# Patient Record
Sex: FEMALE | Race: WHITE | NOT HISPANIC OR LATINO | Employment: OTHER | URBAN - METROPOLITAN AREA
[De-identification: names, ages, dates, MRNs, and addresses within clinical notes are randomized per-mention and may not be internally consistent; named-entity substitution may affect disease eponyms.]

---

## 2020-12-15 ENCOUNTER — TELEPHONE (OUTPATIENT)
Dept: FAMILY MEDICINE CLINIC | Facility: CLINIC | Age: 85
End: 2020-12-15

## 2020-12-22 ENCOUNTER — OFFICE VISIT (OUTPATIENT)
Dept: FAMILY MEDICINE CLINIC | Facility: CLINIC | Age: 85
End: 2020-12-22
Payer: COMMERCIAL

## 2020-12-22 VITALS
WEIGHT: 134 LBS | OXYGEN SATURATION: 97 % | BODY MASS INDEX: 21.53 KG/M2 | HEART RATE: 74 BPM | SYSTOLIC BLOOD PRESSURE: 160 MMHG | TEMPERATURE: 97.9 F | DIASTOLIC BLOOD PRESSURE: 84 MMHG | HEIGHT: 66 IN

## 2020-12-22 DIAGNOSIS — I82.461 ACUTE DEEP VEIN THROMBOSIS (DVT) OF CALF MUSCLE VEIN OF RIGHT LOWER EXTREMITY (HCC): ICD-10-CM

## 2020-12-22 DIAGNOSIS — I73.9 PAD (PERIPHERAL ARTERY DISEASE) (HCC): ICD-10-CM

## 2020-12-22 DIAGNOSIS — R79.9 ELEVATED BLOOD UREA NITROGEN: ICD-10-CM

## 2020-12-22 DIAGNOSIS — I16.0 HYPERTENSIVE URGENCY: Primary | ICD-10-CM

## 2020-12-22 DIAGNOSIS — L97.921 LEG ULCER, LEFT, LIMITED TO BREAKDOWN OF SKIN (HCC): ICD-10-CM

## 2020-12-22 DIAGNOSIS — E87.1 HYPONATREMIA: ICD-10-CM

## 2020-12-22 DIAGNOSIS — E46 PROTEIN-CALORIE MALNUTRITION, UNSPECIFIED SEVERITY (HCC): ICD-10-CM

## 2020-12-22 DIAGNOSIS — N13.30 HYDRONEPHROSIS OF RIGHT KIDNEY: ICD-10-CM

## 2020-12-22 DIAGNOSIS — N17.9 AKI (ACUTE KIDNEY INJURY) (HCC): ICD-10-CM

## 2020-12-22 DIAGNOSIS — L97.912 ULCER OF RIGHT LOWER EXTREMITY WITH FAT LAYER EXPOSED (HCC): ICD-10-CM

## 2020-12-22 DIAGNOSIS — A41.50 SEPSIS DUE TO GRAM NEGATIVE BACTERIA (HCC): ICD-10-CM

## 2020-12-22 DIAGNOSIS — R33.9 URINARY RETENTION: ICD-10-CM

## 2020-12-22 PROBLEM — T14.8XXA WOUND INFECTION: Status: ACTIVE | Noted: 2020-11-10

## 2020-12-22 PROBLEM — L08.9 WOUND INFECTION: Status: ACTIVE | Noted: 2020-11-10

## 2020-12-22 PROBLEM — T14.8XXA NONHEALING NONSURGICAL WOUND: Status: ACTIVE | Noted: 2020-11-09

## 2020-12-22 PROCEDURE — 1160F RVW MEDS BY RX/DR IN RCRD: CPT | Performed by: FAMILY MEDICINE

## 2020-12-22 PROCEDURE — 99214 OFFICE O/P EST MOD 30 MIN: CPT | Performed by: FAMILY MEDICINE

## 2020-12-22 PROCEDURE — 3725F SCREEN DEPRESSION PERFORMED: CPT | Performed by: FAMILY MEDICINE

## 2020-12-22 PROCEDURE — 1036F TOBACCO NON-USER: CPT | Performed by: FAMILY MEDICINE

## 2020-12-22 PROCEDURE — 3288F FALL RISK ASSESSMENT DOCD: CPT | Performed by: FAMILY MEDICINE

## 2020-12-22 PROCEDURE — 1100F PTFALLS ASSESS-DOCD GE2>/YR: CPT | Performed by: FAMILY MEDICINE

## 2020-12-22 RX ORDER — ERGOCALCIFEROL 1.25 MG/1
CAPSULE ORAL
COMMUNITY
Start: 2020-12-14 | End: 2021-03-22 | Stop reason: HOSPADM

## 2020-12-22 RX ORDER — WARFARIN SODIUM 4 MG/1
4 TABLET ORAL DAILY
Status: ON HOLD | COMMUNITY
Start: 2020-12-14 | End: 2021-01-06 | Stop reason: SDUPTHER

## 2020-12-22 RX ORDER — AMLODIPINE BESYLATE 10 MG/1
10 TABLET ORAL DAILY
COMMUNITY
Start: 2020-12-14 | End: 2021-01-15 | Stop reason: SDUPTHER

## 2020-12-28 ENCOUNTER — APPOINTMENT (EMERGENCY)
Dept: RADIOLOGY | Facility: HOSPITAL | Age: 85
DRG: 291 | End: 2020-12-28
Payer: MEDICARE

## 2020-12-28 ENCOUNTER — HOSPITAL ENCOUNTER (INPATIENT)
Facility: HOSPITAL | Age: 85
LOS: 9 days | Discharge: HOME HEALTH | DRG: 291 | End: 2021-01-06
Attending: EMERGENCY MEDICINE | Admitting: INTERNAL MEDICINE
Payer: MEDICARE

## 2020-12-28 DIAGNOSIS — N18.9 CKD (CHRONIC KIDNEY DISEASE): ICD-10-CM

## 2020-12-28 DIAGNOSIS — K59.00 CONSTIPATION: ICD-10-CM

## 2020-12-28 DIAGNOSIS — I73.9 PAD (PERIPHERAL ARTERY DISEASE) (HCC): Primary | ICD-10-CM

## 2020-12-28 DIAGNOSIS — I50.31 ACUTE DIASTOLIC CHF (CONGESTIVE HEART FAILURE) (HCC): ICD-10-CM

## 2020-12-28 DIAGNOSIS — I87.8 VENOUS STASIS OF LOWER EXTREMITY: ICD-10-CM

## 2020-12-28 DIAGNOSIS — I50.9 CHF (CONGESTIVE HEART FAILURE) (HCC): ICD-10-CM

## 2020-12-28 DIAGNOSIS — L89.616 PRESSURE INJURY OF DEEP TISSUE OF RIGHT HEEL: ICD-10-CM

## 2020-12-28 DIAGNOSIS — Z86.718 HISTORY OF DVT (DEEP VEIN THROMBOSIS): ICD-10-CM

## 2020-12-28 DIAGNOSIS — E87.79 OTHER HYPERVOLEMIA: ICD-10-CM

## 2020-12-28 PROBLEM — I10 ESSENTIAL HYPERTENSION: Status: ACTIVE | Noted: 2020-12-28

## 2020-12-28 PROBLEM — E87.70 VOLUME OVERLOAD: Status: ACTIVE | Noted: 2020-12-28

## 2020-12-28 LAB
ALBUMIN SERPL BCP-MCNC: 2.5 G/DL (ref 3.5–5)
ALP SERPL-CCNC: 101 U/L (ref 46–116)
ALT SERPL W P-5'-P-CCNC: 65 U/L (ref 12–78)
ANION GAP SERPL CALCULATED.3IONS-SCNC: 11 MMOL/L (ref 4–13)
APTT PPP: 42 SECONDS (ref 23–37)
AST SERPL W P-5'-P-CCNC: 61 U/L (ref 5–45)
ATRIAL RATE: 66 BPM
BASOPHILS # BLD AUTO: 0.03 THOUSANDS/ΜL (ref 0–0.1)
BASOPHILS NFR BLD AUTO: 0 % (ref 0–1)
BILIRUB SERPL-MCNC: 0.57 MG/DL (ref 0.2–1)
BUN SERPL-MCNC: 50 MG/DL (ref 5–25)
CALCIUM ALBUM COR SERPL-MCNC: 9.5 MG/DL (ref 8.3–10.1)
CALCIUM SERPL-MCNC: 8.3 MG/DL (ref 8.3–10.1)
CHLORIDE SERPL-SCNC: 105 MMOL/L (ref 100–108)
CO2 SERPL-SCNC: 22 MMOL/L (ref 21–32)
CREAT SERPL-MCNC: 1.9 MG/DL (ref 0.6–1.3)
EOSINOPHIL # BLD AUTO: 0.55 THOUSAND/ΜL (ref 0–0.61)
EOSINOPHIL NFR BLD AUTO: 7 % (ref 0–6)
ERYTHROCYTE [DISTWIDTH] IN BLOOD BY AUTOMATED COUNT: 19.2 % (ref 11.6–15.1)
FLUAV RNA RESP QL NAA+PROBE: NEGATIVE
FLUBV RNA RESP QL NAA+PROBE: NEGATIVE
GFR SERPL CREATININE-BSD FRML MDRD: 23 ML/MIN/1.73SQ M
GLUCOSE SERPL-MCNC: 136 MG/DL (ref 65–140)
HCT VFR BLD AUTO: 34.9 % (ref 34.8–46.1)
HGB BLD-MCNC: 10.6 G/DL (ref 11.5–15.4)
IMM GRANULOCYTES # BLD AUTO: 0.04 THOUSAND/UL (ref 0–0.2)
IMM GRANULOCYTES NFR BLD AUTO: 1 % (ref 0–2)
INR PPP: 1.96 (ref 0.84–1.19)
LACTATE SERPL-SCNC: 1.6 MMOL/L (ref 0.5–2)
LYMPHOCYTES # BLD AUTO: 3.7 THOUSANDS/ΜL (ref 0.6–4.47)
LYMPHOCYTES NFR BLD AUTO: 45 % (ref 14–44)
MCH RBC QN AUTO: 27.8 PG (ref 26.8–34.3)
MCHC RBC AUTO-ENTMCNC: 30.4 G/DL (ref 31.4–37.4)
MCV RBC AUTO: 92 FL (ref 82–98)
MONOCYTES # BLD AUTO: 0.66 THOUSAND/ΜL (ref 0.17–1.22)
MONOCYTES NFR BLD AUTO: 8 % (ref 4–12)
NEUTROPHILS # BLD AUTO: 3.18 THOUSANDS/ΜL (ref 1.85–7.62)
NEUTS SEG NFR BLD AUTO: 39 % (ref 43–75)
NRBC BLD AUTO-RTO: 0 /100 WBCS
P AXIS: 92 DEGREES
PLATELET # BLD AUTO: 305 THOUSANDS/UL (ref 149–390)
PMV BLD AUTO: 9 FL (ref 8.9–12.7)
POTASSIUM SERPL-SCNC: 4.3 MMOL/L (ref 3.5–5.3)
PR INTERVAL: 172 MS
PROCALCITONIN SERPL-MCNC: <0.05 NG/ML
PROT SERPL-MCNC: 6.7 G/DL (ref 6.4–8.2)
PROTHROMBIN TIME: 21.9 SECONDS (ref 11.6–14.5)
QRS AXIS: 73 DEGREES
QRSD INTERVAL: 86 MS
QT INTERVAL: 396 MS
QTC INTERVAL: 415 MS
RBC # BLD AUTO: 3.81 MILLION/UL (ref 3.81–5.12)
RSV RNA RESP QL NAA+PROBE: NEGATIVE
SARS-COV-2 RNA RESP QL NAA+PROBE: NEGATIVE
SODIUM SERPL-SCNC: 138 MMOL/L (ref 136–145)
T WAVE AXIS: 77 DEGREES
VENTRICULAR RATE: 66 BPM
WBC # BLD AUTO: 8.16 THOUSAND/UL (ref 4.31–10.16)

## 2020-12-28 PROCEDURE — 80053 COMPREHEN METABOLIC PANEL: CPT | Performed by: INTERNAL MEDICINE

## 2020-12-28 PROCEDURE — 93010 ELECTROCARDIOGRAM REPORT: CPT | Performed by: INTERNAL MEDICINE

## 2020-12-28 PROCEDURE — 0241U HB NFCT DS VIR RESP RNA 4 TRGT: CPT | Performed by: INTERNAL MEDICINE

## 2020-12-28 PROCEDURE — 73630 X-RAY EXAM OF FOOT: CPT

## 2020-12-28 PROCEDURE — 84145 PROCALCITONIN (PCT): CPT | Performed by: INTERNAL MEDICINE

## 2020-12-28 PROCEDURE — 83605 ASSAY OF LACTIC ACID: CPT | Performed by: INTERNAL MEDICINE

## 2020-12-28 PROCEDURE — 85025 COMPLETE CBC W/AUTO DIFF WBC: CPT | Performed by: INTERNAL MEDICINE

## 2020-12-28 PROCEDURE — 85730 THROMBOPLASTIN TIME PARTIAL: CPT | Performed by: INTERNAL MEDICINE

## 2020-12-28 PROCEDURE — 99223 1ST HOSP IP/OBS HIGH 75: CPT | Performed by: INTERNAL MEDICINE

## 2020-12-28 PROCEDURE — 87040 BLOOD CULTURE FOR BACTERIA: CPT | Performed by: INTERNAL MEDICINE

## 2020-12-28 PROCEDURE — 36415 COLL VENOUS BLD VENIPUNCTURE: CPT | Performed by: INTERNAL MEDICINE

## 2020-12-28 PROCEDURE — 99285 EMERGENCY DEPT VISIT HI MDM: CPT

## 2020-12-28 PROCEDURE — 71045 X-RAY EXAM CHEST 1 VIEW: CPT

## 2020-12-28 PROCEDURE — 85610 PROTHROMBIN TIME: CPT | Performed by: INTERNAL MEDICINE

## 2020-12-28 PROCEDURE — 99285 EMERGENCY DEPT VISIT HI MDM: CPT | Performed by: EMERGENCY MEDICINE

## 2020-12-28 PROCEDURE — 99254 IP/OBS CNSLTJ NEW/EST MOD 60: CPT | Performed by: PODIATRIST

## 2020-12-28 PROCEDURE — 93005 ELECTROCARDIOGRAM TRACING: CPT

## 2020-12-28 RX ORDER — POTASSIUM CHLORIDE 20 MEQ/1
20 TABLET, EXTENDED RELEASE ORAL DAILY
Status: DISCONTINUED | OUTPATIENT
Start: 2020-12-29 | End: 2021-01-06 | Stop reason: HOSPADM

## 2020-12-28 RX ORDER — AMLODIPINE BESYLATE 10 MG/1
10 TABLET ORAL DAILY
Status: DISCONTINUED | OUTPATIENT
Start: 2020-12-29 | End: 2021-01-06 | Stop reason: HOSPADM

## 2020-12-28 RX ORDER — ACETAMINOPHEN 325 MG/1
650 TABLET ORAL EVERY 4 HOURS PRN
Status: DISCONTINUED | OUTPATIENT
Start: 2020-12-28 | End: 2021-01-06 | Stop reason: HOSPADM

## 2020-12-28 RX ORDER — FUROSEMIDE 10 MG/ML
40 INJECTION INTRAMUSCULAR; INTRAVENOUS DAILY
Status: DISCONTINUED | OUTPATIENT
Start: 2020-12-29 | End: 2020-12-29

## 2020-12-28 RX ORDER — HEPARIN SODIUM 5000 [USP'U]/ML
5000 INJECTION, SOLUTION INTRAVENOUS; SUBCUTANEOUS EVERY 8 HOURS SCHEDULED
Status: DISCONTINUED | OUTPATIENT
Start: 2020-12-28 | End: 2020-12-29

## 2020-12-28 RX ORDER — CALCIUM CARBONATE 200(500)MG
1000 TABLET,CHEWABLE ORAL 3 TIMES DAILY PRN
Status: DISCONTINUED | OUTPATIENT
Start: 2020-12-28 | End: 2021-01-06 | Stop reason: HOSPADM

## 2020-12-28 RX ORDER — LABETALOL 20 MG/4 ML (5 MG/ML) INTRAVENOUS SYRINGE
10 EVERY 6 HOURS PRN
Status: DISCONTINUED | OUTPATIENT
Start: 2020-12-28 | End: 2020-12-29

## 2020-12-28 RX ORDER — FUROSEMIDE 10 MG/ML
20 INJECTION INTRAMUSCULAR; INTRAVENOUS ONCE
Status: COMPLETED | OUTPATIENT
Start: 2020-12-28 | End: 2020-12-28

## 2020-12-28 RX ORDER — ONDANSETRON 2 MG/ML
4 INJECTION INTRAMUSCULAR; INTRAVENOUS EVERY 6 HOURS PRN
Status: DISCONTINUED | OUTPATIENT
Start: 2020-12-28 | End: 2021-01-06 | Stop reason: HOSPADM

## 2020-12-28 RX ORDER — WARFARIN SODIUM 4 MG/1
4 TABLET ORAL
Status: DISCONTINUED | OUTPATIENT
Start: 2020-12-29 | End: 2021-01-04

## 2020-12-28 RX ORDER — SENNOSIDES 8.6 MG
1 TABLET ORAL
Status: DISCONTINUED | OUTPATIENT
Start: 2020-12-28 | End: 2021-01-06 | Stop reason: HOSPADM

## 2020-12-28 RX ADMIN — METOPROLOL TARTRATE 25 MG: 25 TABLET, FILM COATED ORAL at 22:57

## 2020-12-28 RX ADMIN — HEPARIN SODIUM 5000 UNITS: 5000 INJECTION INTRAVENOUS; SUBCUTANEOUS at 22:56

## 2020-12-28 RX ADMIN — FUROSEMIDE 20 MG: 10 INJECTION, SOLUTION INTRAMUSCULAR; INTRAVENOUS at 18:24

## 2020-12-28 NOTE — CONSULTS
[address] [facility]iatry - Consultation    Patient Information:   Malka Jaimes 80 y o  female MRN: [id number]  Unit/Bed#: ED 14 Encounter: [id number]  PCP: Terence Garvey MD  Date of Admission:  12/28/2020  Date of Consultation: 12/28/20  Requesting Physician: Lynn Perez*      ASSESSMENT:    Malka Jaimes is a 80 y o  female with:    1  Anterior Right Leg Venous Stasis Ulceration  2  Right Heel Pressure Ulceration - Unstageable  3  Right hallux and lateral right foot wounds with gangrenous changes    4  PAD with soft tissue loss  5  H/o DVT RLE    PLAN:    · Right foot xray ordered  No evidence of underlying osteomyelitis  · Continue with local wound care  Adaptic, alginate, DSD to anterior right leg; Betadine, adaptic, bulky dressing to right heel; Betadine paint, BRANDAN to lateral right foot and hallux  · Ordered LEADS  Emphasized importance of obtaining LEADS to assess lower extremity perfusion and to establish care with vascular surgeon  · Elevate heel while non-ambulatory for pressure reduction  · WBAT  · Rest of care per primary team   · Will discuss this plan with my attending and update as needed  SUBJECTIVE    History of Present Illness:    Malka Jaimes is a 80 y o  female with past medical history of HTN, no healing leg ulcer, BLE edema who presents with right lower extremity wounds  Patient states that wounds have been present at right leg since approximately June 2020 2/2 to trauma  She has previously undergone surgical debridement of wounds  She was recently admitted at [facility] and found to be septic with e  Coli bacteremia for which she completed 7x days of ciprofloxacin 500mg at discharge  She followed up in outpatient with family physician who recommended wound care and LEADS  Patient states that she did not go to wound care and did not get LEADS  She has been getting daily dressing changes from home visiting nurses   She reports numbness/tingling of the feet bilaterally  She denies history of smoking  Review of Systems:    Constitutional: Negative  HENT: Negative  Eyes: Negative  Respiratory: Negative  Cardiovascular: Negative  Gastrointestinal: Negative  Musculoskeletal: Negative   Skin:RLE ulcerations   Neurological: numbness/tingling of digits bilaterally   Psych: Negative  Past Medical and Surgical History:     Past Medical History:   Diagnosis Date    Chronic kidney disease     Hypertension        History reviewed  No pertinent surgical history  Meds/Allergies:    (Not in a hospital admission)      No Known Allergies    Social History:     Marital Status: /Civil Union    Substance Use History:   Social History     Substance and Sexual Activity   Alcohol Use Never    Frequency: Never     Social History     Tobacco Use   Smoking Status Never Smoker   Smokeless Tobacco Never Used     Social History     Substance and Sexual Activity   Drug Use Never       Family History:    Family History   Problem Relation Age of Onset    No Known Problems Mother     No Known Problems Father          OBJECTIVE:    Vitals:   Blood Pressure: (!) 196/81 (12/28/20 1530)  Pulse: 84 (12/28/20 1530)  Temperature: 97 8 °F (36 6 °C) (12/28/20 1530)  Temp Source: Temporal (12/28/20 1530)  Respirations: 20 (12/28/20 1530)  SpO2: 93 % (12/28/20 1530)    Physical Exam:     General Appearance: Alert, cooperative, no distress  HEENT: Head normocephalic, atraumatic, without obvious abnormality  Heart: Normal rate and rhythm  Lungs: Wheezing  Abdomen: Without distension  Psychiatric: AAOx3  Lower Extremity:  Vascular:   DP/PT pedal pulses on the left are absent and monophasic by Doppler  DP/PT pedal pulses on the right are absent and biphasic by Doppler  CRT delayed at the digits  Pedal hair is absent  +2/4 edema noted at bilateral lower extremities  Skin temperature is cool at the digits bilaterally      Musculoskeletal:  MMT is 4/5 in all muscle compartments bilaterally  ROM at the 1st MPJ and ankle joint are decreased bilaterally with the leg extended  Pain on palpation of right heel  Digital contractures noted bilaterally  Dermatological:  Mottling of plantar feet bilaterally  Duskiness of the digits bilaterally  Bilateral lower extremity edema and erythema with dry scaly skin on the left leg  Weeping from small wound mid thigh on the right  LE Wound Exam:   Wound (1) located central anterior right leg, measures approximately 5 cm x 4 cm x 0 2 cm  without sinus tracking or undermining  Wound bed appears pink/red and fibrotic with serous drainage  The wound base is 60% red/granular, 40% yellow/fibrous, 0% black/necrotic  Deepest tissue noted in base is subq  Malodor is not noticed  Wound edge appears rolled  Corazon-wound skin appears intact and erythematous  Probe to bone is negative   Signs of infection are not present at this time  Wound (2) located medial anterior right leg, measures approximately 3 cm x 2 cm x 0 3 cm  without sinus tracking or undermining  Wound bed appears pink/red and fibrotic with serous drainage  The wound base is 80% red/granular, 20% yellow/fibrous, 0% black/necrotic  Deepest tissue noted in base is subq  Malodor is not noticed  Wound edge appears rolled  Corazon-wound skin appears intact and erythematous  Probe to bone is negative   Signs of infection are not present at this time  Wound (3) located posterior right heel, measures approximately 4 cm x 3 cm x 0 1 cm  without sinus tracking or undermining  Wound bed appears necrotic with no drainage  The wound base is 0% red/granular, 0% yellow/fibrous, 100% black/necrotic  Deepest tissue noted in base is unknown  Malodor is not noticed  Wound edge appears Attached  Corazon-wound skin appears intact and erythematous  Probe to bone is negative   Signs of infection are not present at this time       Wound (4) located dorsal distal right hallux, measures approximately 1 cm x 1 5 cm x 0 1 cm  without sinus tracking or undermining  Wound bed appears nectrotic with no drainage  The wound base is 0% red/granular, 0% yellow/fibrous, 100% black/necrotic  Deepest tissue noted in base is unknown  Malodor is not noticed  Wound edge appears Attached  Corazon-wound skin appears calloused  Probe to bone is negative   Signs of infection are not present at this time  Wound (5) located distal lateral right foot, measures approximately 2 cm x 1 5 cm x 0 1 cm  without sinus tracking or undermining  Wound bed appears nectrotic with no drainage  The wound base is 0% red/granular, 0% yellow/fibrous, 100% black/necrotic  Deepest tissue noted in base is unknown  Malodor is not noticed  Wound edge appears Attached  Corazon-wound skin appears calloused  Probe to bone is negative   Signs of infection are not present at this time  Neurological:  Gross sensation is diminished  Protective sensation is absent  Patient Reports numbness and/or paresthesias  Clinical Images 12/28/20:    Anterior Right leg      Posterior right heel      Right hallux      Lateral right foot      Additional Data:     Lab Results: I have personally reviewed pertinent labs including:              Invalid input(s): LABALBU        Cultures: I have personally reviewed pertinent cultures including:              Imaging: I have personally reviewed pertinent films in PACS  EKG, Pathology, and Other Studies: I have personally reviewed pertinent reports  ** Please Note: Portions of the record may have been created with voice recognition software  Occasional wrong word or "sound a like" substitutions may have occurred due to the inherent limitations of voice recognition software  Read the chart carefully and recognize, using context, where substitutions have occurred   **

## 2020-12-28 NOTE — DISCHARGE INSTRUCTIONS
Discharge Instructions - Podiatry    Weight Bearing Status: Weight bear as tolerated                       Follow-up appointment instructions: Please make an appointment within one week of discharge with Dr Alyssa Eugene in wound care  Contact sooner if any increase in pain, or signs of infection occur    Wound Care:   1  Anterior right leg- adaptic, maxorb, abd pad, Swapna  Please change every other day  2  Right Heel- betadine, adaptic, Allevyn Heel pad  Please change every other day  Elevate heel while in bed  3  Right hallux and lateral right foot- Betadine, open to air  Apply daily

## 2020-12-28 NOTE — ED NOTES
Pt ambulated with steady gait but c/o SOB and R foot pain  Pt ambulatory pulse ox 87%   Pt placed on 2L at this time     Lex Day RN  12/28/20 8263

## 2020-12-28 NOTE — ED PROVIDER NOTES
History  Chief Complaint   Patient presents with    Leg Swelling     bilateral leg swelling x 1 week  recently discharged from rehab ([facility])  also c/o HTN     HPI  66-year-old female presents to the ED for evaluation of bilateral swelling  She states the swelling got worse over last 48 hours  Prior to that she had on and off again swelling since May  She reports she was at [facility] for physical rehab to regain her balance  Since coming home, she has had a health nurse who noticed her leak wound had worsened along with the bilateral leg swelling  Patient denies any leg pain  She has no acute limitations in her activity  She feels well overall  She lives at home with her  who was in good health  She reports she is on warfarin for the right leg DVT  Patient reports she has been wheezing and somewhat short of breath  She worse no oxygen at home and has no history of asthma  She denies any lung or heart disease  Patient denies headache, visual changes, dizziness, fevers, chills, chest pain, palpitations, abdominal pain, diarrhea, melena, hematochezia, dysuria or tingling of the extremities  Prior to Admission Medications   Prescriptions Last Dose Informant Patient Reported? Taking? amLODIPine (NORVASC) 10 mg tablet   Yes No   Sig: Take 10 mg by mouth daily   ergocalciferol (VITAMIN D2) 50,000 units   Yes No   Sig: take 1 capsule by mouth every week ON SATURDAY   metoprolol tartrate (LOPRESSOR) 25 mg tablet   No No   Sig: Take 1 tablet (25 mg total) by mouth every 12 (twelve) hours   warfarin (COUMADIN) 4 mg tablet   Yes No   Sig: Take 4 mg by mouth daily      Facility-Administered Medications: None       Past Medical History:   Diagnosis Date    Chronic kidney disease     Hypertension        History reviewed  No pertinent surgical history      Family History   Problem Relation Age of Onset    No Known Problems Mother     No Known Problems Father      I have reviewed and agree with the history as documented  E-Cigarette/Vaping    E-Cigarette Use Never User      E-Cigarette/Vaping Substances    Nicotine No     THC No     CBD No     Flavoring No     Other No     Unknown No      Social History     Tobacco Use    Smoking status: Never Smoker    Smokeless tobacco: Never Used   Substance Use Topics    Alcohol use: Never     Frequency: Never    Drug use: Never        Review of Systems   All other systems reviewed and are negative        Physical Exam  ED Triage Vitals [12/28/20 1530]   Temperature Pulse Respirations Blood Pressure SpO2   97 8 °F (36 6 °C) 84 20 (!) 196/81 93 %      Temp Source Heart Rate Source Patient Position - Orthostatic VS BP Location FiO2 (%)   Temporal Monitor Lying Right arm --      Pain Score       --             Orthostatic Vital Signs  Vitals:    12/28/20 1530 12/28/20 1708   BP: (!) 196/81 166/83   Pulse: 84 79   Patient Position - Orthostatic VS: Lying Lying       Physical Exam   PHYSICAL EXAM  Constitutional:  Well developed, no acute distress  HEENT:  Conjunctiva normal  Oropharynx moist  Respiratory:  No respiratory distress, mild diffuse wheezing  Cardiovascular:  Normal rate  GI:  Soft, nondistended, nontender  :  No costovertebral angle tenderness   Musculoskeletal:  See lower extremity phot below  Integument:  Well hydrated, no rash   Lymphatic:  Pitting lymphedema from feet up to upper thighs bilaterally   Neurologic:  Alert & oriented x 3, normal motor function, no focal deficits noted   Psychiatric:  Speech and behavior appropriate                     ED Medications  Medications   furosemide (LASIX) injection 20 mg (has no administration in time range)       Diagnostic Studies  Results Reviewed     Procedure Component Value Units Date/Time    COVID19, Influenza A/B, RSV PCR, [facility] [[id number]]     Lab Status: No result Specimen: Nares     Protime-INR [[id number]]  (Abnormal) Collected: 12/28/20 1531    Lab Status: Final result Specimen: Blood from Arm, Left Updated: 12/28/20 1708     Protime 21 9 seconds      INR 1 96    APTT [[id number]]  (Abnormal) Collected: 12/28/20 1531    Lab Status: Final result Specimen: Blood from Arm, Left Updated: 12/28/20 1708     PTT 42 seconds     Lactic acid [[id number]]  (Normal) Collected: 12/28/20 1605    Lab Status: Final result Specimen: Blood from Hand, Left Updated: 12/28/20 1638     LACTIC ACID 1 6 mmol/L     Narrative:      Result may be elevated if tourniquet was used during collection      Comprehensive metabolic panel [[id number]]  (Abnormal) Collected: 12/28/20 1531    Lab Status: Final result Specimen: Blood from Arm, Left Updated: 12/28/20 1637     Sodium 138 mmol/L      Potassium 4 3 mmol/L      Chloride 105 mmol/L      CO2 22 mmol/L      ANION GAP 11 mmol/L      BUN 50 mg/dL      Creatinine 1 90 mg/dL      Glucose 136 mg/dL      Calcium 8 3 mg/dL      Corrected Calcium 9 5 mg/dL      AST 61 U/L      ALT 65 U/L      Alkaline Phosphatase 101 U/L      Total Protein 6 7 g/dL      Albumin 2 5 g/dL      Total Bilirubin 0 57 mg/dL      eGFR 23 ml/min/1 73sq m     Narrative:      [place] guidelines for Chronic Kidney Disease (CKD):     Stage 1 with normal or high GFR (GFR > 90 mL/min/1 73 square meters)    Stage 2 Mild CKD (GFR = 60-89 mL/min/1 73 square meters)    Stage 3A Moderate CKD (GFR = 45-59 mL/min/1 73 square meters)    Stage 3B Moderate CKD (GFR = 30-44 mL/min/1 73 square meters)    Stage 4 Severe CKD (GFR = 15-29 mL/min/1 73 square meters)    Stage 5 End Stage CKD (GFR <15 mL/min/1 73 square meters)  Note: GFR calculation is accurate only with a steady state creatinine    CBC and differential [[id number]]  (Abnormal) Collected: 12/28/20 1531    Lab Status: Final result Specimen: Blood from Arm, Left Updated: 12/28/20 1618     WBC 8 16 Thousand/uL      RBC 3 81 Million/uL      Hemoglobin 10 6 g/dL      Hematocrit 34 9 %      MCV 92 fL      MCH 27 8 pg      MCHC 30 4 g/dL RDW 19 2 %      MPV 9 0 fL      Platelets 750 Thousands/uL      nRBC 0 /100 WBCs      Neutrophils Relative 39 %      Immat GRANS % 1 %      Lymphocytes Relative 45 %      Monocytes Relative 8 %      Eosinophils Relative 7 %      Basophils Relative 0 %      Neutrophils Absolute 3 18 Thousands/µL      Immature Grans Absolute 0 04 Thousand/uL      Lymphocytes Absolute 3 70 Thousands/µL      Monocytes Absolute 0 66 Thousand/µL      Eosinophils Absolute 0 55 Thousand/µL      Basophils Absolute 0 03 Thousands/µL     Blood culture #2 [[id number]] Collected: 12/28/20 1604    Lab Status: In process Specimen: Blood from Hand, Left Updated: 12/28/20 1615    Blood culture #1 [[id number]] Collected: 12/28/20 1531    Lab Status: In process Specimen: Blood from Arm, Left Updated: 12/28/20 1615    Procalcitonin with AM Reflex [[id number]] Collected: 12/28/20 1531    Lab Status: In process Specimen: Blood from Arm, Left Updated: 12/28/20 1612                 XR chest 1 view portable   Final Result by Richard Padilla MD (12/28 1738)      Cardiomegaly with interstitial prominence bilaterally, cephalization of the pulmonary vessels and bilateral pleural effusions consistent with CHF  Workstation performed: [id number]         XR foot 3+ vw right   Final Result by Dwight Chamberlain DO (12/28 1734)      No acute osseous abnormality  Workstation performed: [id number]         VAS lower limb arterial duplex, complete bilateral    (Results Pending)         Procedures  Procedures      ED Course                                       MDM  Number of Diagnoses or Management Options  CHF (congestive heart failure) ([facility] [address] ):   PAD (peripheral artery disease) ([facility] [address] ):   Pressure injury of deep tissue of right heel:   Venous stasis of lower extremity:   Diagnosis management comments: 51-year-old female with bilateral lymphedema and right leg ulcerative lesion  Labs showed elevated creatinine, no previous labs for comparison    Chest x-ray showed signs of CHF  Lasix started  Patient desaturated to the 80s with ambulation  Now patient is requiring 2-3 L of oxygen at rest   Podiatry consulted, they recommended vascular studies and no acute intervention for multiple lesions of right lower extremity  Will admit patient for further management          Amount and/or Complexity of Data Reviewed  Clinical lab tests: ordered and reviewed  Tests in the radiology section of CPT®: ordered and reviewed  Discussion of test results with the performing providers: yes  Review and summarize past medical records: yes  Discuss the patient with other providers: yes    Risk of Complications, Morbidity, and/or Mortality  Presenting problems: moderate  Diagnostic procedures: moderate  Management options: moderate    Patient Progress  Patient progress: improved      Disposition  Final diagnoses:   PAD (peripheral artery disease) ([facility] )   CHF (congestive heart failure) ([place] [address] )   Venous stasis of lower extremity   Pressure injury of deep tissue of right heel     Time reflects when diagnosis was documented in both MDM as applicable and the Disposition within this note     Time User Action Codes Description Comment    12/28/2020  5:36 PM Brian Scruggs Add [I73 9] PAD (peripheral artery disease) ([place] [address] )     12/28/2020  5:57 PM Creta Tejeda Add [I50 9] CHF (congestive heart failure) ([place] [address] )     12/28/2020  5:58 PM Creta Tejeda Add [I87 8] Venous stasis of lower extremity     12/28/2020  5:59 PM Creta Tejeda Add [L89 616] Pressure injury of deep tissue of right heel     12/28/2020  5:59 PM Creta Ileana Modify [I73 9] PAD (peripheral artery disease) [facility])       ED Disposition     ED Disposition Condition Date/Time Comment    Admit Stable Mon Dec 28, 2020  5:57 PM Case was discussed with Dr Lucie Rojo and the patient's admission status was agreed to be Admission Status: inpatient status to the service of Dr Boom Mclaughlin up With Specialties Details Why Contact Info Additional Information    Ana Laura Schedule an appointment as soon as possible for a visit in 1 week(s) Please see Dr Josiah Kayser in wound care [facility] [address] [facility] [place] [address] [facility], [facility] [facility] [address]  , [place], [place], [address] [facility] [address]          Current Discharge Medication List      CONTINUE these medications which have NOT CHANGED    Details   amLODIPine (NORVASC) 10 mg tablet Take 10 mg by mouth daily      ergocalciferol (VITAMIN D2) 50,000 units take 1 capsule by mouth every week ON SATURDAY      metoprolol tartrate (LOPRESSOR) 25 mg tablet Take 1 tablet (25 mg total) by mouth every 12 (twelve) hours  Qty: 60 tablet, Refills: 0    Associated Diagnoses: Hypertensive urgency; Acute deep vein thrombosis (DVT) of calf muscle vein of right lower extremity ([address] ); Leg ulcer, left, limited to breakdown of skin ([address] ); LILIAN (acute kidney injury) ([address] ); Hydronephrosis of right kidney; Urinary retention; Hyponatremia; Elevated blood urea nitrogen; Protein-calorie malnutrition, unspecified severity ([address] ); Sepsis due to Gram negative bacteria ([address] ); Ulcer of right lower extremity with fat layer exposed ([address] ); PAD (peripheral artery disease) ([facility])      warfarin (COUMADIN) 4 mg tablet Take 4 mg by mouth daily           Outpatient Discharge Orders   VAS lower limb arterial duplex, complete bilateral   Standing Status: Future Standing Exp  Date: 12/28/24       PDMP Review     None           ED Provider  Attending physically available and evaluated [facility]  I managed the patient along with the ED Attending      Electronically Signed by         Marni Liang MD  12/28/20 8813

## 2020-12-29 ENCOUNTER — APPOINTMENT (INPATIENT)
Dept: NON INVASIVE DIAGNOSTICS | Facility: HOSPITAL | Age: 85
DRG: 291 | End: 2020-12-29
Payer: MEDICARE

## 2020-12-29 LAB
ANION GAP SERPL CALCULATED.3IONS-SCNC: 10 MMOL/L (ref 4–13)
BASOPHILS # BLD AUTO: 0.03 THOUSANDS/ΜL (ref 0–0.1)
BASOPHILS NFR BLD AUTO: 0 % (ref 0–1)
BUN SERPL-MCNC: 47 MG/DL (ref 5–25)
CALCIUM SERPL-MCNC: 8.8 MG/DL (ref 8.3–10.1)
CHLORIDE SERPL-SCNC: 105 MMOL/L (ref 100–108)
CO2 SERPL-SCNC: 22 MMOL/L (ref 21–32)
CREAT SERPL-MCNC: 1.75 MG/DL (ref 0.6–1.3)
EOSINOPHIL # BLD AUTO: 0.45 THOUSAND/ΜL (ref 0–0.61)
EOSINOPHIL NFR BLD AUTO: 4 % (ref 0–6)
ERYTHROCYTE [DISTWIDTH] IN BLOOD BY AUTOMATED COUNT: 18.9 % (ref 11.6–15.1)
GFR SERPL CREATININE-BSD FRML MDRD: 25 ML/MIN/1.73SQ M
GLUCOSE SERPL-MCNC: 97 MG/DL (ref 65–140)
HCT VFR BLD AUTO: 35.2 % (ref 34.8–46.1)
HGB BLD-MCNC: 10.8 G/DL (ref 11.5–15.4)
IMM GRANULOCYTES # BLD AUTO: 0.03 THOUSAND/UL (ref 0–0.2)
IMM GRANULOCYTES NFR BLD AUTO: 0 % (ref 0–2)
INR PPP: 2.03 (ref 0.84–1.19)
LYMPHOCYTES # BLD AUTO: 5.53 THOUSANDS/ΜL (ref 0.6–4.47)
LYMPHOCYTES NFR BLD AUTO: 54 % (ref 14–44)
MCH RBC QN AUTO: 27.5 PG (ref 26.8–34.3)
MCHC RBC AUTO-ENTMCNC: 30.7 G/DL (ref 31.4–37.4)
MCV RBC AUTO: 90 FL (ref 82–98)
MONOCYTES # BLD AUTO: 0.7 THOUSAND/ΜL (ref 0.17–1.22)
MONOCYTES NFR BLD AUTO: 7 % (ref 4–12)
NEUTROPHILS # BLD AUTO: 3.61 THOUSANDS/ΜL (ref 1.85–7.62)
NEUTS SEG NFR BLD AUTO: 35 % (ref 43–75)
NRBC BLD AUTO-RTO: 0 /100 WBCS
NT-PROBNP SERPL-MCNC: ABNORMAL PG/ML
PLATELET # BLD AUTO: 335 THOUSANDS/UL (ref 149–390)
PMV BLD AUTO: 9.3 FL (ref 8.9–12.7)
POTASSIUM SERPL-SCNC: 3.9 MMOL/L (ref 3.5–5.3)
PROTHROMBIN TIME: 22.5 SECONDS (ref 11.6–14.5)
RBC # BLD AUTO: 3.93 MILLION/UL (ref 3.81–5.12)
SODIUM SERPL-SCNC: 137 MMOL/L (ref 136–145)
WBC # BLD AUTO: 10.35 THOUSAND/UL (ref 4.31–10.16)

## 2020-12-29 PROCEDURE — 99223 1ST HOSP IP/OBS HIGH 75: CPT | Performed by: INTERNAL MEDICINE

## 2020-12-29 PROCEDURE — 93925 LOWER EXTREMITY STUDY: CPT | Performed by: SURGERY

## 2020-12-29 PROCEDURE — 85025 COMPLETE CBC W/AUTO DIFF WBC: CPT | Performed by: PHYSICIAN ASSISTANT

## 2020-12-29 PROCEDURE — 94640 AIRWAY INHALATION TREATMENT: CPT

## 2020-12-29 PROCEDURE — 93922 UPR/L XTREMITY ART 2 LEVELS: CPT | Performed by: SURGERY

## 2020-12-29 PROCEDURE — 93306 TTE W/DOPPLER COMPLETE: CPT

## 2020-12-29 PROCEDURE — 93923 UPR/LXTR ART STDY 3+ LVLS: CPT

## 2020-12-29 PROCEDURE — 85610 PROTHROMBIN TIME: CPT | Performed by: HOSPITALIST

## 2020-12-29 PROCEDURE — 94002 VENT MGMT INPAT INIT DAY: CPT

## 2020-12-29 PROCEDURE — 93306 TTE W/DOPPLER COMPLETE: CPT | Performed by: INTERNAL MEDICINE

## 2020-12-29 PROCEDURE — 93925 LOWER EXTREMITY STUDY: CPT

## 2020-12-29 PROCEDURE — 94762 N-INVAS EAR/PLS OXIMTRY CONT: CPT

## 2020-12-29 PROCEDURE — 99232 SBSQ HOSP IP/OBS MODERATE 35: CPT | Performed by: HOSPITALIST

## 2020-12-29 PROCEDURE — 80048 BASIC METABOLIC PNL TOTAL CA: CPT | Performed by: INTERNAL MEDICINE

## 2020-12-29 PROCEDURE — 83880 ASSAY OF NATRIURETIC PEPTIDE: CPT | Performed by: PHYSICIAN ASSISTANT

## 2020-12-29 RX ORDER — HALOPERIDOL 5 MG/ML
2.5 INJECTION INTRAMUSCULAR ONCE
Status: COMPLETED | OUTPATIENT
Start: 2020-12-29 | End: 2020-12-29

## 2020-12-29 RX ORDER — ALBUTEROL SULFATE 2.5 MG/3ML
2.5 SOLUTION RESPIRATORY (INHALATION) EVERY 6 HOURS PRN
Status: DISCONTINUED | OUTPATIENT
Start: 2020-12-29 | End: 2021-01-06 | Stop reason: HOSPADM

## 2020-12-29 RX ORDER — HYDRALAZINE HYDROCHLORIDE 20 MG/ML
5 INJECTION INTRAMUSCULAR; INTRAVENOUS EVERY 6 HOURS PRN
Status: DISCONTINUED | OUTPATIENT
Start: 2020-12-29 | End: 2020-12-29

## 2020-12-29 RX ORDER — CARVEDILOL 12.5 MG/1
12.5 TABLET ORAL 2 TIMES DAILY WITH MEALS
Status: DISCONTINUED | OUTPATIENT
Start: 2020-12-29 | End: 2021-01-06 | Stop reason: HOSPADM

## 2020-12-29 RX ORDER — HYDRALAZINE HYDROCHLORIDE 20 MG/ML
10 INJECTION INTRAMUSCULAR; INTRAVENOUS EVERY 6 HOURS PRN
Status: DISCONTINUED | OUTPATIENT
Start: 2020-12-29 | End: 2021-01-06 | Stop reason: HOSPADM

## 2020-12-29 RX ORDER — FUROSEMIDE 10 MG/ML
40 INJECTION INTRAMUSCULAR; INTRAVENOUS
Status: DISCONTINUED | OUTPATIENT
Start: 2020-12-29 | End: 2020-12-30

## 2020-12-29 RX ORDER — LORAZEPAM 2 MG/ML
0.5 INJECTION INTRAMUSCULAR ONCE
Status: COMPLETED | OUTPATIENT
Start: 2020-12-29 | End: 2020-12-29

## 2020-12-29 RX ADMIN — LORAZEPAM 0.5 MG: 2 INJECTION INTRAMUSCULAR; INTRAVENOUS at 10:08

## 2020-12-29 RX ADMIN — ALBUTEROL SULFATE 2.5 MG: 2.5 SOLUTION RESPIRATORY (INHALATION) at 18:36

## 2020-12-29 RX ADMIN — WARFARIN SODIUM 4 MG: 4 TABLET ORAL at 17:47

## 2020-12-29 RX ADMIN — POTASSIUM CHLORIDE 20 MEQ: 1500 TABLET, EXTENDED RELEASE ORAL at 08:38

## 2020-12-29 RX ADMIN — HEPARIN SODIUM 5000 UNITS: 5000 INJECTION INTRAVENOUS; SUBCUTANEOUS at 06:34

## 2020-12-29 RX ADMIN — HYDRALAZINE HYDROCHLORIDE 10 MG: 20 INJECTION INTRAMUSCULAR; INTRAVENOUS at 14:51

## 2020-12-29 RX ADMIN — FUROSEMIDE 40 MG: 10 INJECTION, SOLUTION INTRAMUSCULAR; INTRAVENOUS at 15:29

## 2020-12-29 RX ADMIN — AMLODIPINE BESYLATE 10 MG: 10 TABLET ORAL at 08:38

## 2020-12-29 RX ADMIN — HYDRALAZINE HYDROCHLORIDE 5 MG: 20 INJECTION INTRAMUSCULAR; INTRAVENOUS at 10:12

## 2020-12-29 RX ADMIN — HALOPERIDOL LACTATE 2.5 MG: 5 INJECTION, SOLUTION INTRAMUSCULAR at 15:28

## 2020-12-29 RX ADMIN — FUROSEMIDE 40 MG: 10 INJECTION, SOLUTION INTRAMUSCULAR; INTRAVENOUS at 08:39

## 2020-12-29 RX ADMIN — CARVEDILOL 12.5 MG: 12.5 TABLET, FILM COATED ORAL at 17:47

## 2020-12-29 RX ADMIN — METOPROLOL TARTRATE 25 MG: 25 TABLET, FILM COATED ORAL at 08:39

## 2020-12-29 NOTE — PROGRESS NOTES
Progress Note - Sonido Abraham 1930, 80 y o  female MRN: [id number]    Unit/Bed#: E4 -01 Encounter: [id number]    Primary Care Provider: Delmar Corcoran MD   Date and time admitted to hospital: 2020  3:25 PM        * Volume overload  Assessment & Plan  · Likely new diagnosis of acute CHF  · Getting Echo right now  · BNP > 30, 000  · On IV lasix, but I am going to increase it    Accelerated HTN may be playing a role as well  Add prn IV hydralazine to Metoprolol and Norvasc  ( I may change Norvasc to an alternative agent due to leg swelling)    Hypertensive urgency  Assessment & Plan  May be contributing to the acute CHF    On Metoprolol and Norvasc for now    Add prn hydralazine  CKD (chronic kidney disease)  Assessment & Plan  Lab Results   Component Value Date    EGFR 25 2020    EGFR 23 2020    CREATININE 1 75 (H) 2020    CREATININE 1 90 (H) 2020   · creatinine appears around baseline 1 7-1 9   · Monitor daily while on IV Lasix       Actually a little better, so some of this may be due to hypoperfusion from acute CHF    History of DVT (deep vein thrombosis)  Assessment & Plan  INR mildly subtherapeutic on admission  See where it is today  Should not be low enough to cause a DVT PE  No need for bridging    Ulcer of right lower extremity with fat layer exposed [facility])  Assessment & Plan  Recently at [facility] for this  Follows with podiatry  Will ask wound care to eval here in the hospital            Subjective:   Still feels very SOB  Now requiring 100% non rebreather  No chest pain    No cough    No fever nor chills    Also, complaining of worsening bilateral leg swelling        Objective:     Vitals:   Temp (24hrs), Av 7 °F (36 5 °C), Min:97 6 °F (36 4 °C), Max:98 °F (36 7 °C)    Temp:  [97 6 °F (36 4 °C)-98 °F (36 7 °C)] 97 6 °F (36 4 °C)  HR:  [] 98  Resp:  [19-24] 22  BP: (141-210)/() 186/91  SpO2:  [84 %-97 %] 97 %  Body mass index is 22 56 kg/m²  Input and Output Summary (last 24 hours): Intake/Output Summary (Last 24 hours) at 12/29/2020 0957  Last data filed at 12/29/2020 0855  Gross per 24 hour   Intake 240 ml   Output 1136 ml   Net -896 ml       Physical Exam:     Physical Exam  Vitals signs and nursing note reviewed  Constitutional:       General: She is in acute distress  HENT:      Head: Normocephalic and atraumatic  Eyes:      Pupils: Pupils are equal, round, and reactive to light  Cardiovascular:      Rate and Rhythm: Normal rate and regular rhythm  Heart sounds: No murmur  No friction rub  No gallop  Pulmonary:      Effort: Pulmonary effort is normal       Breath sounds: Normal breath sounds  No wheezing or rales  Abdominal:      General: Bowel sounds are normal       Palpations: Abdomen is soft  Tenderness: There is no abdominal tenderness  Musculoskeletal:      Right lower leg: Edema (2+) present  Left lower leg: Left lower leg edema: 2+         Additional Data:     Labs:    Results from last 7 days   Lab Units 12/29/20  0535   WBC Thousand/uL 10 35*   HEMOGLOBIN g/dL 10 8*   HEMATOCRIT % 35 2   PLATELETS Thousands/uL 335   NEUTROS PCT % 35*   LYMPHS PCT % 54*   MONOS PCT % 7   EOS PCT % 4     Results from last 7 days   Lab Units 12/29/20  0535 12/28/20  1531   POTASSIUM mmol/L 3 9 4 3   CHLORIDE mmol/L 105 105   CO2 mmol/L 22 22   BUN mg/dL 47* 50*   CREATININE mg/dL 1 75* 1 90*   CALCIUM mg/dL 8 8 8 3   ALK PHOS U/L  --  101   ALT U/L  --  65   AST U/L  --  61*     Results from last 7 days   Lab Units 12/28/20  1531   INR  1 96*                   * I Have Reviewed All Lab Data     Recent Cultures (last 7 days):     Results from last 7 days   Lab Units 12/28/20  1604 12/28/20  1531   BLOOD CULTURE  Received in Microbiology Lab  Culture in Progress  Received in Microbiology Lab  Culture in Progress           Last 24 Hours Medication List:   Current Facility-Administered Medications Medication Dose Route Frequency Provider Last Rate    acetaminophen  650 mg Oral Q4H PRN Thurlow Milling, LAINA      amLODIPine  10 mg Oral Daily Juanita Batista PA-C      calcium carbonate  1,000 mg Oral TID PRN Thurlow Milling, LAINA      furosemide  40 mg Intravenous Daily Juanita Batista PA-C      hydrALAZINE  5 mg Intravenous Q6H PRN Luann Vasquez DO      metoprolol tartrate  25 mg Oral Q12H [facility] Juanita Batista PA-C      ondansetron  4 mg Intravenous Q6H PRN Thurlow Milling, LAINA      potassium chloride  20 mEq Oral Daily Juanita Batista PA-C      senna  1 tablet Oral HS PRN Thurlow Milling, LAINA      warfarin  4 mg Oral Daily (warfarin) Juanita Batista PA-C           VTE Pharmacologic Prophylaxis:   Pharmacologic: Warfarin (Coumadin)      Current Length of Stay: 1 day(s)    Current Patient Status: Inpatient       Discharge Plan: will need several days in hospital    Code Status: Level 1 - Full Code           Today, Patient Was Seen By: Deena Castillo DO    ** Please Note: Dictation voice to text software may have been used in the creation of this document   **

## 2020-12-29 NOTE — ASSESSMENT & PLAN NOTE
Lab Results   Component Value Date    EGFR 25 12/29/2020    EGFR 23 12/28/2020    CREATININE 1 75 (H) 12/29/2020    CREATININE 1 90 (H) 12/28/2020   · creatinine appears around baseline 1 7-1 9   · Monitor daily while on IV Lasix       Actually a little better, so some of this may be due to hypoperfusion from acute CHF

## 2020-12-29 NOTE — ASSESSMENT & PLAN NOTE
Lab Results   Component Value Date    EGFR 23 12/28/2020    CREATININE 1 90 (H) 12/28/2020   · creatinine appears around baseline 1 7-1 9   · Monitor daily while on IV Lasix

## 2020-12-29 NOTE — NURSING NOTE
Patient attempting to get out of bed, stating, "I have to go get my mom" RN attempted multiple times to reorient patient however patient insistent to get out and attempting to take off her non-rebreather  Received order for mitts, however patient able to get one mitt off and attemping to bite the other off  RN notified Dr Nato German, ordered soft wrists and q15 min checks  RN notified Namita Wilson Coordinator, will move patient to a room closer to nurses station when available  Vitals obtained see flowsheets  RN will continue to monitor

## 2020-12-29 NOTE — SOCIAL WORK
Rec a call from Hank Galvin with [employer] [facility] with [facility] ([phone]) stating she needs fax face sheet and H&P  Pt CAN NOT go home without a good discharge plan  Hank Galvin stated pt was at [facility], then  Acute, then home and now at [facility]  Fax face sheet and H&P to [facility] today  Pt has only been here for 17 hours  Will need to complete assessment and f/u for discharge needs

## 2020-12-29 NOTE — UTILIZATION REVIEW
Initial Clinical Review    Admission: Date/Time/Statement:   Admission Orders (From admission, onward)     Ordered        12/28/20 1800  Inpatient Admission  Once                   Orders Placed This Encounter   Procedures    Inpatient Admission     Standing Status:   Standing     Number of Occurrences:   1     Order Specific Question:   Admitting Physician     Answer:   Danielle Grayson [[id number]]     Order Specific Question:   Level of Care     Answer:   Med Surg [[id number]]     Order Specific Question:   Estimated length of stay     Answer:   More than 2 Midnights     Order Specific Question:   Certification     Answer:   I certify that inpatient services are medically necessary for this patient for a duration of greater than two midnights  See H&P and MD Progress Notes for additional information about the patient's course of treatment  ED Arrival Information     Expected Arrival Acuity Means of Arrival Escorted By Service Admission Type    - 12/28/2020 15:25 Urgent Ambulance [facility] Hospitalist Urgent    Arrival Complaint    Hypertension        Chief Complaint   Patient presents with    Leg Swelling     bilateral leg swelling x 1 week  recently discharged from rehab ([facility] evangelist)  also c/o HTN     Assessment/Plan: 80 y o  female with a PMHx of HTN, DVT, CKD presents to ED from home with bilateral  leg swelling worsening over the last 48 hrs and also notes some SOB & wheezing  She reports LE wounds for which she was recently hospitalized  with need for surgical debridement  She was discharge to Acute Rehab and then to home with [facility]  On exam: Pitting lymphedema from feet up to upper thighs bilaterally (see below)   BUN 50, Cr 1 90, AST 61, Hg 10 6,   CXR  consistent with CHF  O2 sat dropped to 87% on RA w/ ambulation - placed on O2 @ 2L NC  Rec'd IV Lasix in ED                Admitted to Inpatient with Volume Overload -Decompensated CHF, RLE Ulcer with fat layer exposed, CKD - creat appears to be @ baseline  Plan: IV diuresis with Lasix, Cardio Consult, ECHO, elevate LE's, Podiatry Consult for RLE wound  Monitor Cr on diuresis  Monitor BP with PRN labetolol for SBP >200     12/29 Pt c/o SOB, +JVD, 2+ LE Edema  Elevated NTproBNP  HTN may be contributing - Add prn IV hydralazine to Metoprolol and Norvasc  Sat dropped to 84% on 4 L - increased to 6 L only 88% so placed on NRB  Pt progressed to needing 100% NRB 15L  Confused  Echocardiogram showed EF 50% with grade 2 diastolic dysfunction  Cardiology following and patient is on 40 mg Lasix IV b i d  Carlyon Burows Upgraded to Level 2 Stepdown for BiPAP/diuresis/further monitoring        ED Triage Vitals   Temperature Pulse Respirations Blood Pressure SpO2   12/28/20 1530 12/28/20 1530 12/28/20 1530 12/28/20 1530 12/28/20 1530   97 8 °F (36 6 °C) 84 20 (!) 196/81 93 %      Temp Source Heart Rate Source Patient Position - Orthostatic VS BP Location FiO2 (%)   12/28/20 1530 12/28/20 1530 12/28/20 1530 12/28/20 1530 --   Temporal Monitor Lying Right arm       Pain Score       12/28/20 2045       No Pain          Wt Readings from Last 1 Encounters:   12/29/20 63 4 kg (139 lb 12 4 oz)     Additional Vital Signs:   12/29/20 1531  97  171/76Abnormal  97 %  Other (comment)   Bipap Lying   12/29/20 1500     96 %      12/29/20 1440  88 28Abnormal  172/81Abnormal     Lying   12/29/20 1211 97 °F (36 1 °C) 92 36Abnormal  183/87Abnormal  95 % 15 L/min Non-rebreather mask Sitting   12/29/20 0911  98 32Abnormal  186/91Abnormal  97 % 15 L/min Non-rebreather mask Sitting   12/29/20 0850     88 %Abnormal  6 L/min Nasal cannula    12/29/20 0845     84 %Abnormal  4 L/min Nasal cannula    12/29/20 0819    210/94Abnormal     Sitting   12/29/20 0812 97 6 °F (36 4 °C) 101 22 207/101Abnormal   2 L/min Nasal cannula Sitting   12/29/20 0300 98 °F (36 7 °C) 75 22 141/95 90 %   Lying   12/28/20 2330 97 6 °F (36 4 °C) 72 22 194/88Abnormal  92 %   Lying   12/28/20 2100 97 6 °F (36 4 °C) 85 22 192/97Abnormal  95 %   Lying   12/28/20 2044  91 24Abnormal  188/113Abnormal   93 % 2 L/min Nasal cannula Lying   12/28/20 1830  85 19 189/89Abnormal    95 %  None (Room air) Lying   12/28/20 1708  79 19 166/83 93 %  None (Room air) Lying       Pertinent Labs/Diagnostic Test Results:   Results from last 7 days   Lab Units 12/28/20  1822   SARS-COV-2  Negative     Results from last 7 days   Lab Units 12/29/20  0535 12/28/20  1531   WBC Thousand/uL 10 35* 8 16   HEMOGLOBIN g/dL 10 8* 10 6*   HEMATOCRIT % 35 2 34 9   PLATELETS Thousands/uL 335 305   NEUTROS ABS Thousands/µL 3 61 3 18     Results from last 7 days   Lab Units 12/29/20  0535 12/28/20  1531   SODIUM mmol/L 137 138   POTASSIUM mmol/L 3 9 4 3   CHLORIDE mmol/L 105 105   CO2 mmol/L 22 22   ANION GAP mmol/L 10 11   BUN mg/dL 47* 50*   CREATININE mg/dL 1 75* 1 90*   EGFR ml/min/1 73sq m 25 23   CALCIUM mg/dL 8 8 8 3     Results from last 7 days   Lab Units 12/28/20  1531   AST U/L 61*   ALT U/L 65   ALK PHOS U/L 101   TOTAL PROTEIN g/dL 6 7   ALBUMIN g/dL 2 5*   TOTAL BILIRUBIN mg/dL 0 57     Results from last 7 days   Lab Units 12/29/20  0535 12/28/20  1531   GLUCOSE RANDOM mg/dL 97 136       Results from last 7 days   Lab Units 12/29/20  1009 12/28/20  1531   PROTIME seconds 22 5* 21 9*   INR  2 03* 1 96*   PTT seconds  --  42*     Results from last 7 days   Lab Units 12/28/20  1531   PROCALCITONIN ng/ml <0 05     Results from last 7 days   Lab Units 12/28/20  1605   LACTIC ACID mmol/L 1 6       Results from last 7 days   Lab Units 12/29/20  0535   NT-PRO BNP pg/mL 32,959*     Results from last 7 days   Lab Units 12/28/20  1822   INFLUENZA A PCR  Negative   INFLUENZA B PCR  Negative   RSV PCR  Negative     Results from last 7 days   Lab Units 12/28/20  1604 12/28/20  1531   BLOOD CULTURE  Received in Microbiology Lab  Culture in Progress  Received in Microbiology Lab  Culture in Progress       12/28 CXR: Cardiomegaly with interstitial prominence bilaterally, cephalization of the pulmonary vessels and bilateral pleural effusions consistent with CHF    12/28 EKG: Sinus rhythm with sinus arrhythmia  12/28 R Foot Xray: No acute osseous abnormality  12/29 ECHO: LEFT VENTRICLE:  Systolic function was normal  Ejection fraction was estimated to be 50 %  There were no regional wall motion abnormalities  Wall thickness was mildly to moderately increased    Features were consistent with a pseudonormal left ventricular filling pattern, with concomitant abnormal relaxation and increased filling pressure (grade 2 diastolic dysfunction)      12/29 LEADS - PENDING    ED Treatment:   Medication Administration from 12/28/2020 1525 to 12/28/2020 2113       Date/Time Order Dose Route Action     12/28/2020 1824 furosemide (LASIX) injection 20 mg 20 mg Intravenous Given        Past Medical History:   Diagnosis Date    Chronic kidney disease     Hypertension      Present on Admission:   Ulcer of right lower extremity with fat layer exposed ([facility] [address] )   Hypertensive urgency      Admitting Diagnosis: CHF (congestive heart failure) ([facility]) [I50 9]  Hypertension [I10]  PAD (peripheral artery disease) ([facility]) [I73 9]  Venous stasis of lower extremity [I87 8]  Other hypervolemia [E87 79]  Pressure injury of deep tissue of right heel [L89 616]  Age/Sex: 80 y o  female     Admission Orders:  Scheduled Medications:  amLODIPine, 10 mg, Oral, Daily  carvedilol, 12 5 mg, Oral, BID With Meals  furosemide, 40 mg, Intravenous, Daily - increased to BID (diuretic) 12/29  metoprolol tartrate (LOPRESSOR) tablet 25 mg, Oral q 12H  potassium chloride, 20 mEq, Oral, Daily  warfarin, 4 mg, Oral, Daily (warfarin)    Continuous IV Infusions: na  PRN Meds:  acetaminophen, 650 mg, Oral, Q4H PRN  albuterol, 2 5 mg, Nebulization, Q6H PRN  calcium carbonate, 1,000 mg, Oral, TID PRN  hydrALAZINE, 5 mg, Intravenous, Q6H PRN x 1 12/29  - DC'd  hydrALAZINE, 10 mg, Intravenous, Q6H PRN x 1 12/29  ondansetron, 4 mg, Intravenous, Q6H PRN  senna, 1 tablet, Oral, HS PRN  Haldol 2 5 mg IV X 1 12/29  Ativan 0 5 mg IV x 1 12/29    IP CONSULT TO PODIATRY  IP CONSULT TO NUTRITION SERVICES  IP CONSULT TO CARDIOLOGY    Network Utilization Review Department  ATTENTION: Please call with any questions or concerns to [phone] and carefully listen to the prompts so that you are directed to the right person  All voicemails are confidential   Taiwo Pedersen all requests for admission clinical reviews, approved or denied determinations and any other requests to dedicated fax number below belonging to the campus where the patient is receiving treatment   List of dedicated fax numbers for the Facilities:  [address] DENIALS (Administrative/Medical Necessity) [phone]   [address] (Maternity/NICU/Pediatrics) [phone] 401 [address] Lukas Hagan 4057 (  [place] [facility]) [address] [place] [address] [address] [place] [address] [phone]

## 2020-12-29 NOTE — H&P
[address] Internal Medicine  H&P- Glenys Escalera 5/18/1930, 80 y o  female MRN: [id number]    Unit/Bed#: ED 14 Encounter: [id number]    Primary Care Provider: Jacobo Ashraf MD   Date and time admitted to hospital: 12/28/2020  3:25 PM    * Volume overload  Assessment & Plan  · Patient presents with lower extremity edema and shortness of breath for the past few days   · Denies known history of CHF, no echo on file   · Possible new onset CHF vs  Hypoalbuminemia vs  Dietary indiscretion vs  Hypertensive urgency   · Check Echo in AM   · Start on Lasix 40 mg IV daily- patient is lasix naive    · Cardiology consult   · Lower extremity elevation as tolerated     History of DVT (deep vein thrombosis)  Assessment & Plan  · Patient with history of DVT   · Maintained on Coumadin   · INR 1 96 today   · Continue daily Coumadin 4 mg   · Recheck INR in AM     CKD (chronic kidney disease)  Assessment & Plan  Lab Results   Component Value Date    EGFR 23 12/28/2020    CREATININE 1 90 (H) 12/28/2020   · creatinine appears around baseline 1 7-1 9   · Monitor daily while on IV Lasix     Essential hypertension  Assessment & Plan  · BP elevated at time of admission   · Home regimen Norvasc 10 mg daily and Lopressor 25 mg BID   · Given 1 dose of IV Lasix in ED   · Give nightly dose of Lopressor now   · Monitor BP with PRN labetolol for SBP >200     Ulcer of right lower extremity with fat layer exposed ([address] )  Assessment & Plan  · Patient notes recent admission with [facility] for lower extremity wounds, now has home care nurses   · Podiatry following   · Local wound care per podiatry   · LEADS pending     VTE Prophylaxis: Warfarin (Coumadin)  / sequential compression device   Code Status: full code  POLST: POLST form is not discussed and not completed at this time  Discussion with family: none    Anticipated Length of Stay:  Patient will be admitted on an Inpatient basis with an anticipated length of stay of  More than 2 midnights  Justification for Hospital Stay: lower extremity edema, pulmonary edema     Total Time for Visit, including Counseling / Coordination of Care: 1 hour  Greater than 50% of this total time spent on direct patient counseling and coordination of care  Chief Complaint:   Leg swelling     History of Present Illness:    Walter Krause is a 80 y o  female with a PMHx of HTN, DVT who presents with leg swelling  Patient notes she has been noticing her legs have been more swollen than usual for the past few days  Denies any pain or tenderness  Does report lower extremity wounds for which she was recently hospitalized for with need for surgical debridement  Patient reports she went to rehab after admission then was discharged home with home wound care  Patient notes she has not had this leg swelling in the past, only began noticing it a few days ago  Notes it is getting progressively worse  Denies any associated fevers but does not associated SOB  Patient denies any known cardiac history, denies any chest pain  Patient does not wear home oxygen  Denies history of COPD, asthma or tobacco abuse  Review of Systems:    Review of Systems   Constitutional: Negative for chills and fever  HENT: Negative for trouble swallowing  Eyes: Negative for visual disturbance  Respiratory: Positive for shortness of breath  Negative for cough  Cardiovascular: Negative for chest pain and leg swelling  Gastrointestinal: Negative for abdominal distention, abdominal pain, nausea and vomiting  Genitourinary: Negative for difficulty urinating  Musculoskeletal: Negative for back pain and gait problem  Skin: Positive for wound  Allergic/Immunologic: Negative for immunocompromised state  Neurological: Negative for dizziness and weakness  Psychiatric/Behavioral: Negative for confusion         Past Medical and Surgical History:     Past Medical History:   Diagnosis Date    Chronic kidney disease     Hypertension History reviewed  No pertinent surgical history  Meds/Allergies:    Prior to Admission medications    Medication Sig Start Date End Date Taking? Authorizing Provider   amLODIPine (NORVASC) 10 mg tablet Take 10 mg by mouth daily 12/14/20   Historical Provider, MD   ergocalciferol (VITAMIN D2) 50,000 units take 1 capsule by mouth every week ON SATURDAY 12/14/20   Historical Provider, MD   metoprolol tartrate (LOPRESSOR) 25 mg tablet Take 1 tablet (25 mg total) by mouth every 12 (twelve) hours 12/22/20   Brenda Cook MD   warfarin (COUMADIN) 4 mg tablet Take 4 mg by mouth daily 12/14/20   Historical Provider, MD     I have reviewed home medications with patient personally  Allergies: No Known Allergies    Social History:     Marital Status: /Civil Union   Occupation: unknown  Patient Pre-hospital Living Situation: home with   Patient Pre-hospital Level of Mobility: ambulatory with walker   Patient Pre-hospital Diet Restrictions: none  Substance Use History:   Social History     Substance and Sexual Activity   Alcohol Use Never    Frequency: Never     Social History     Tobacco Use   Smoking Status Never Smoker   Smokeless Tobacco Never Used     Social History     Substance and Sexual Activity   Drug Use Never       Family History:    Family History   Problem Relation Age of Onset    No Known Problems Mother     No Known Problems Father        Physical Exam:     Vitals:   Blood Pressure: (S) (!) 189/89([place]-Carl[place] DO aware) (12/28/20 1830)  Pulse: 85 (12/28/20 1830)  Temperature: 97 8 °F (36 6 °C) (12/28/20 1530)  Temp Source: Temporal (12/28/20 1530)  Respirations: 19 (12/28/20 1830)  SpO2: 95 % (12/28/20 1830)    Physical Exam  Vitals signs reviewed  Constitutional:       General: She is not in acute distress  HENT:      Head: Normocephalic and atraumatic  Eyes:      Conjunctiva/sclera: Conjunctivae normal    Neck:      Musculoskeletal: Neck supple     Cardiovascular:      Rate and Rhythm: Normal rate and regular rhythm  Heart sounds: No murmur  Pulmonary:      Effort: Pulmonary effort is normal  No respiratory distress  Breath sounds: Wheezing (expiratory ) and rales present  Abdominal:      General: Bowel sounds are normal  There is no distension  Palpations: Abdomen is soft  Tenderness: There is no abdominal tenderness  Musculoskeletal:      Right lower leg: Edema (2-3+) present  Left lower leg: Edema (2-3+) present  Comments: RLE wrapped in garrett, clean dry intact   Skin:     General: Skin is warm and dry  Neurological:      Mental Status: She is alert and oriented to person, place, and time  Psychiatric:         Mood and Affect: Mood normal          Behavior: Behavior normal          Additional Data:     Lab Results: I have personally reviewed pertinent reports  Results from last 7 days   Lab Units 12/28/20  1531   WBC Thousand/uL 8 16   HEMOGLOBIN g/dL 10 6*   HEMATOCRIT % 34 9   PLATELETS Thousands/uL 305   NEUTROS PCT % 39*   LYMPHS PCT % 45*   MONOS PCT % 8   EOS PCT % 7*     Results from last 7 days   Lab Units 12/28/20  1531   SODIUM mmol/L 138   POTASSIUM mmol/L 4 3   CHLORIDE mmol/L 105   CO2 mmol/L 22   BUN mg/dL 50*   CREATININE mg/dL 1 90*   ANION GAP mmol/L 11   CALCIUM mg/dL 8 3   ALBUMIN g/dL 2 5*   TOTAL BILIRUBIN mg/dL 0 57   ALK PHOS U/L 101   ALT U/L 65   AST U/L 61*   GLUCOSE RANDOM mg/dL 136     Results from last 7 days   Lab Units 12/28/20  1531   INR  1 96*             Results from last 7 days   Lab Units 12/28/20  1605   LACTIC ACID mmol/L 1 6       Imaging: I have personally reviewed pertinent reports  XR chest 1 view portable   Final Result by Wilma Chirinos MD (12/28 1738)      Cardiomegaly with interstitial prominence bilaterally, cephalization of the pulmonary vessels and bilateral pleural effusions consistent with CHF                    Workstation performed: [id number]         XR foot 3+ vw right   Final Result by Mert Ndiaye DO (12/28 1734)      No acute osseous abnormality  Workstation performed: [id number]         VAS lower limb arterial duplex, complete bilateral    (Results Pending)       EKG, Pathology, and Other Studies Reviewed on Admission:   · EKG: NSR    Allscripts / Epic Records Reviewed: Yes     ** Please Note: This note has been constructed using a voice recognition system   **

## 2020-12-29 NOTE — NURSING NOTE
Patient made a Level 2 Step Down  Patient placed on BiPap by Respiratory, restraints removed  IV Hydralazine given per order for NZD=934  Scott placed per order  IV Haldol and IV lasix given per order  Report given to Edgar Mendoza, [address] [facility]

## 2020-12-29 NOTE — NURSING NOTE
Patient's blood pressure this morning 210/94, FV=801, Respiratory rate 32 breath per min, She is tachypnic and complains of shortness of breath, patient is visibly in distress  Oxygen was 90% on 4LNC ( patient does not wear oxygen at home)  IV Lasix 40mg given as well as her daily norvasc and metoprolol  Patient was incontinent and changed, still in distress  RN rechecked oxygen , 84% on 4L, RN increased oxygen to 6L only 88%, placed a non-rebreather, now saturating at 98%  Lungs are diminished with an expiratory wheeze  RN notified Dr Chun Hernandez via [employer], no new orders at this time  RN will continue to monitor closely

## 2020-12-29 NOTE — CONSULTS
Consult - Cardiology   Alonzo Blanton 80 y o  female MRN: [id number]  Unit/Bed#: E4 -01 Encounter: [id number]        Reason For Consult:  Congestive heart failure               Assessment:  Acute hypoxic respiratory failure:  likely in large part due to Acute CHF (new Dx, unspecified type)  Uncontrolled hypertension:   0/P Rx:  Norvasc 10 mg, Lopressor 25 mg b i d  CKD:   Recent lab suggest baseline creatinine to be approximately 1 7-1 9  Atrophic left kidney, Hx Rt hydronephrosis and ureteral stenting - 12/2/2020 ([facility])  E coli bacteremia -11/2020:  Hx lower extremity wound:  Hx RLE DVT (11/18/2020):   0/P Rx: warfarin    Discussion / Plan:  · Check echocardiogram for assessment of structural heart disease with probable regurgitant valvulopathy and concern for reduced EF  · Continue IV diuretic ~~> agree with plan to increased dose  · For hypertension continue diuretic as above, Norvasc 10 mg, and beta-blocker ~~> will change Lopressor to carvedilol at increased dose (12 5mg bid)  · With recent history of DVT the patient remains on warfarin  -presently with a therapeutic INR (2 03)      History Of Present Illness:  Alonzo Blanton is a 44-year-old who for 40 years had had no medical care until she presented to [facility] 11/9/2020 complaining of nonhealing worsening lower extremity wounds which had been present for several months  There she had an extended hospitalization (11/9-12/6) highlighted by:  nonhealing lower extremity wounds, E coli sepsis, DVT, hypertensive urgency, LILIAN, urinary retension and right hydronephrosis as discussed below  During the patient's time in the hospital she underwent surgical debridement and local care as well as antibiotics for her lower extremity wounds  She had evidence of mild right hydronephrosis without nephrolithiasis for which she underwent ureteral stenting on 12/02         The patient had 2 of 2 blood cultures positive for E coli initially treated with cefepime with later transition to cefazolin, then Cipro  She had signs of uncontrolled hypertension for which she was initiated on labetalol which was later exchanged with Norvasc  On 11/18/2020 she underwent ultrasound showing occlusive DVT of the right soleus vein for which was placed on heparin while awaiting therapeutic INR     While hospitalized she was seen by Psychiatry who deemed her competent of independent decision making  Patient was ultimately discharged 12/6 to [facility] on a regimen notably including amlodipine 10 mg, warfarin, and ciprofloxacin x7 days     During her time in the hospital she had no cardiology consultation and I do not find that she has ever had an echocardiogram or other cardiac testing  On 12/22/2020 the patient was seen by her PCP, Dr Roman Montenegro  Her blood pressure at that visit was 160/84 with heart rate of 74, and weight of 60 8 kg/134 lb  His medications were not changed  A repeat lower extremity arterial duplex was ordered and she was referred to wound care with additional reminders to follow-up with urology  Yesterday the patient came to the emergency department reporting what sounds like her visiting nurse observing several days of worsening lower extremity edema with some audible wheezing and probable dyspnea  She presented with uncontrolled hypertension (189/89) with high normal heart rate (91) and 93% oxygen saturation  At the time my visit the patient is only a fair historian having some increased work of breathing and perhaps mild confusion  She is aware she is in the hospital and acknowledges that she is having trouble breathing but cannot tell me when or the extent to which her breathing has been affected with further inability to elaborate regarding her lower extremity edema  She denies chest pain          Past Medical History:        Past Medical History:   Diagnosis Date    Chronic kidney disease     Hypertension     History reviewed  No pertinent surgical history  Allergy:        No Known Allergies    Medications:       Prior to Admission medications    Medication Sig Start Date End Date Taking?  Authorizing Provider   amLODIPine (NORVASC) 10 mg tablet Take 10 mg by mouth daily 12/14/20  Yes Historical Provider, MD   ergocalciferol (VITAMIN D2) 50,000 units take 1 capsule by mouth every week ON SATURDAY 12/14/20  Yes Historical Provider, MD   metoprolol tartrate (LOPRESSOR) 25 mg tablet Take 1 tablet (25 mg total) by mouth every 12 (twelve) hours 12/22/20  Yes Brenda Cook MD   warfarin (COUMADIN) 4 mg tablet Take 4 mg by mouth daily 12/14/20  Yes Historical Provider, MD       Family History:     Family History   Problem Relation Age of Onset    No Known Problems Mother     No Known Problems Father         Social History:       Social History     Socioeconomic History    Marital status: /Civil Union     Spouse name: None    Number of children: None    Years of education: None    Highest education level: None   Occupational History    None   Social Needs    Financial resource strain: None    Food insecurity     Worry: None     Inability: None    Transportation needs     Medical: None     Non-medical: None   Tobacco Use    Smoking status: Never Smoker    Smokeless tobacco: Never Used   Substance and Sexual Activity    Alcohol use: Never     Frequency: Never    Drug use: Never    Sexual activity: None   Lifestyle    Physical activity     Days per week: None     Minutes per session: None    Stress: None   Relationships    Social connections     Talks on phone: None     Gets together: None     Attends [employer] service: None     Active member of club or organization: None     Attends meetings of clubs or organizations: None     Relationship status: None    Intimate partner violence     Fear of current or ex partner: None     Emotionally abused: None     Physically abused: None Forced sexual activity: None   Other Topics Concern    None   Social History Narrative    None       ROS:  Currently unobtainable with signs of confusion and increased work of breathing  The remainder of the review of systems is negative    Exam:  General:  Patient is alert and able to follow commands though she is pulling at her oxygen with visible increased work of breathing and audible wheezing  Head: Normocephalic, atraumatic  Eyes: EOMI  Pupils - equal, round, reactive to accomodation  No icterus  Normal Conjunctiva  Oropharynx: Without visible lesion  Neck:  Visible JVD  Heart:  Regular with increased rate  Heart sounds are over shadowed by tachypnea and audible wheezing though she does appear to have an apical systolic murmur  Respiratory effort / Chest Inspection:  Tachypneic with shallow breathing  Lungs:  Diffuse wheezing with by basilar blunting  Abdomen:  Soft and nontender with audible bowel sounds and no gross organomegaly or mass  Lower Limbs:  Mostly non pitting edema  Pulses[de-identified]  RLE - DP:  1+                 LLE - DP:  1+  Musculoskeletal: Independent movement of limbs observed, Formal ROM and strength eval not performed  Neurologic:    Oriented to: person , place, situation  Cranial Nerves: grossly intact - vision, smell, taste, and hearing  were not tested       Motor function: grossly normal,   Sensation: Was not tested    Vitals:    12/29/20 0819 12/29/20 0845 12/29/20 0850 12/29/20 0911   BP: (!) 210/94   (!) 186/91   BP Location: Right arm   Right arm   Pulse:    98   Resp:       Temp:       TempSrc:       SpO2:  (!) 84% (!) 88% 97%   Weight:       Height:               DATA:      ECG:   Sinus rhythm with sinus arrhythmia  Anterior infarct , age undetermined  Abnormal ECG  No previous ECGs available  Confirmed by Ruben Montiel ([id number]) on 12/28/2020 3:34:51 PM -----------------------------------------------------------------------------------------------------------------------------------------------  Weights: Wt Readings from Last 3 Encounters:   12/29/20 63 4 kg (139 lb 12 4 oz)   12/22/20 60 8 kg (134 lb)   , Body mass index is 22 56 kg/m²           Lab Studies:             Results from last 7 days   Lab Units 12/29/20  0535 12/28/20  1531   WBC Thousand/uL 10 35* 8 16   HEMOGLOBIN g/dL 10 8* 10 6*   HEMATOCRIT % 35 2 34 9   PLATELETS Thousands/uL 335 305   ,   Results from last 7 days   Lab Units 12/29/20  0535 12/28/20  1531   POTASSIUM mmol/L 3 9 4 3   CHLORIDE mmol/L 105 105   CO2 mmol/L 22 22   BUN mg/dL 47* 50*   CREATININE mg/dL 1 75* 1 90*   CALCIUM mg/dL 8 8 8 3   ALK PHOS U/L  --  101   ALT U/L  --  65   AST U/L  --  61*

## 2020-12-29 NOTE — ASSESSMENT & PLAN NOTE
· Likely new diagnosis of acute CHF  · Getting Echo right now  · BNP > 30, 000  · On IV lasix, but I am going to increase it    Accelerated HTN may be playing a role as well  Add prn IV hydralazine to Metoprolol and Norvasc      ( I may change Norvasc to an alternative agent due to leg swelling)

## 2020-12-29 NOTE — ASSESSMENT & PLAN NOTE
· Patient notes recent admission with [facility] for lower extremity wounds, now has home care nurses   · Podiatry following   · Local wound care per podiatry   · LEADS pending

## 2020-12-29 NOTE — QUICK NOTE
QUICK NOTE - Deterioration Index  Glenys Escalera 80 y o  female MRN: [id number]  Unit/Bed#: E4 -01 Encounter: [id number]      Time Paged: 1400 [facility]  Room #: 18  Primary RN: Sharath Childers RN   Arrival Time: 1420  Deterioration index score at time of page: 69 4    PROBLEMS:    Acute hypoxic respiratory failure secondary to volume overload   Acute diastolic CHF   HTN    PLAN:    · Transfer to step-down two for further monitoring   · Continue diuresis as per Cardiology/primary team  · Attempt BiPAP with bilateral wrist restraints removed  · Consider Scott catheter for diuresis  · Increase hydralazine to 10 mg IV q 6 hours for SBP greater than 160  · Place on telemetry  · Consider p r n  Haldol/zyprexa if QTC within normal limits to help with agitation    HPI Statement (Background):   Glenys Escalera is a 80y o  year old female who presents with bilateral leg swelling on 12/28/2020  She states she was noticing her legs becoming more swollen for the past few days  She was admitted to the medical-surgical floor and in the last 24 hours has had increased oxygenation going from room air, to nasal cannula, to currently non-rebreather 15 L  Echocardiogram showed EF 50% with grade 2 diastolic dysfunction  Cardiology following and patient is on 40 mg Lasix IV b i d   On exam, patient was found with increased work of breathing, respiratory rate 26 and was on a non-rebreather  When non-rebreather removed in patient transition to 15 L mid flow, oxygen saturation 86%  Patient placed back on non-rebreather and would encourage transitioning to BiPAP if bilateral wrist restraints able to be removed  Case discussed with internal medicine attending, Dr Kenn Contreras who agreed to transfer patient to step-down level 2 for BiPAP/diuresis/further monitoring  Historical Information   Past Medical History:   Diagnosis Date    Chronic kidney disease     Hypertension      History reviewed   No pertinent surgical history  Vitals:   Vitals:    20 0845 20 0850 20 0911 20 1211   BP:   (!) 186/91 (!) 183/87   BP Location:   Right arm Right arm   Pulse:   98 92   Resp:   (!) 32 (!) 36   Temp:    (!) 97 °F (36 1 °C)   TempSrc:    Temporal   SpO2: (!) 84% (!) 88% 97% 95%   Weight:       Height:           Temperature: Temp (24hrs), Av 6 °F (36 4 °C), Min:97 °F (36 1 °C), Max:98 °F (36 7 °C)  Current: Temperature: (!) 97 °F (36 1 °C)    DIAGNOSTIC DATA:    Labs:   Results from last 7 days   Lab Units 20  0535 20  1531   WBC Thousand/uL 10 35* 8 16   HEMOGLOBIN g/dL 10 8* 10 6*   HEMATOCRIT % 35 2 34 9   PLATELETS Thousands/uL 335 305   NEUTROS PCT % 35* 39*   MONOS PCT % 7 8     Results from last 7 days   Lab Units 20  0535 20  1531   SODIUM mmol/L 137 138   POTASSIUM mmol/L 3 9 4 3   CHLORIDE mmol/L 105 105   CO2 mmol/L 22 22   BUN mg/dL 47* 50*   CREATININE mg/dL 1 75* 1 90*   CALCIUM mg/dL 8 8 8 3   ALK PHOS U/L  --  101   ALT U/L  --  65   AST U/L  --  61*              Results from last 7 days   Lab Units 20  1009 20  1531   INR  2 03* 1 96*   PTT seconds  --  42*     Results from last 7 days   Lab Units 20  1605   LACTIC ACID mmol/L 1 6             Results from last 7 days   Lab Units 20  1531   PROCALCITONIN ng/ml <0 05     No results found for: [facility]             Applicable Imaging Studies: Xr Chest 1 View Portable    Result Date: 2020  Impression: Cardiomegaly with interstitial prominence bilaterally, cephalization of the pulmonary vessels and bilateral pleural effusions consistent with CHF  Workstation performed: [id number]     Xr Foot 3+ Vw Right    Result Date: 2020  Impression: No acute osseous abnormality   Workstation performed: [id number]     Code Status: Level 1 - Full Code

## 2020-12-29 NOTE — PLAN OF CARE
Problem: Potential for Falls  Goal: Patient will remain free of falls  Description: INTERVENTIONS:  - Assess patient frequently for physical needs  -  Identify cognitive and physical deficits and behaviors that affect risk of falls    -  [place] fall precautions as indicated by assessment   - Educate patient/family on patient safety including physical limitations  - Instruct patient to call for assistance with activity based on assessment  - Modify environment to reduce risk of injury  - Consider OT/PT consult to assist with strengthening/mobility  Outcome: Progressing     Problem: Prexisting or High Potential for Compromised Skin Integrity  Goal: Skin integrity is maintained or improved  Description: INTERVENTIONS:  - Identify patients at risk for skin breakdown  - Assess and monitor skin integrity  - Assess and monitor nutrition and hydration status  - Monitor labs   - Assess for incontinence   - Turn and reposition patient  - Assist with mobility/ambulation  - Relieve pressure over bony prominences  - Avoid friction and shearing  - Provide appropriate hygiene as needed including keeping skin clean and dry  - Evaluate need for skin moisturizer/barrier cream  - Collaborate with interdisciplinary team   - Patient/family teaching  - Consider wound care consult   Outcome: Progressing     Problem: PAIN - ADULT  Goal: Verbalizes/displays adequate comfort level or baseline comfort level  Description: Interventions:  - Encourage patient to monitor pain and request assistance  - Assess pain using appropriate pain scale  - Administer analgesics based on type and severity of pain and evaluate response  - Implement non-pharmacological measures as appropriate and evaluate response  - Consider cultural and social influences on pain and pain management  - Notify physician/advanced practitioner if interventions unsuccessful or patient reports new pain  Outcome: Progressing     Problem: INFECTION - ADULT  Goal: Absence or prevention of progression during hospitalization  Description: INTERVENTIONS:  - Assess and monitor for signs and symptoms of infection  - Monitor lab/diagnostic results  - Monitor all insertion sites, i e  indwelling lines, tubes, and drains  - Monitor endotracheal if appropriate and nasal secretions for changes in amount and color  - [place] appropriate cooling/warming therapies per order  - Administer medications as ordered  - Instruct and encourage patient and family to use good hand hygiene technique  - Identify and instruct in appropriate isolation precautions for identified infection/condition  Outcome: Progressing  Goal: Absence of fever/infection during neutropenic period  Description: INTERVENTIONS:  - Monitor WBC    Outcome: Progressing     Problem: SAFETY ADULT  Goal: Patient will remain free of falls  Description: INTERVENTIONS:  - Assess patient frequently for physical needs  -  Identify cognitive and physical deficits and behaviors that affect risk of falls    -  [place] fall precautions as indicated by assessment   - Educate patient/family on patient safety including physical limitations  - Instruct patient to call for assistance with activity based on assessment  - Modify environment to reduce risk of injury  - Consider OT/PT consult to assist with strengthening/mobility  Outcome: Progressing  Goal: Maintain or return to baseline ADL function  Description: INTERVENTIONS:  -  Assess patient's ability to carry out ADLs; assess patient's baseline for ADL function and identify physical deficits which impact ability to perform ADLs (bathing, care of mouth/teeth, toileting, grooming, dressing, etc )  - Assess/evaluate cause of self-care deficits   - Assess range of motion  - Assess patient's mobility; develop plan if impaired  - Assess patient's need for assistive devices and provide as appropriate  - Encourage maximum independence but intervene and supervise when necessary  - Involve family in performance of ADLs  - Assess for home care needs following discharge   - Consider OT consult to assist with ADL evaluation and planning for discharge  - Provide patient education as appropriate  Outcome: Progressing  Goal: Maintain or return mobility status to optimal level  Description: INTERVENTIONS:  - Assess patient's baseline mobility status (ambulation, transfers, stairs, etc )    - Identify cognitive and physical deficits and behaviors that affect mobility  - Identify mobility aids required to assist with transfers and/or ambulation (gait belt, sit-to-stand, lift, walker, cane, etc )  - [place] fall precautions as indicated by assessment  - Record patient progress and toleration of activity level on Mobility SBAR; progress patient to next Phase/Stage  - Instruct patient to call for assistance with activity based on assessment  - Consider rehabilitation consult to assist with strengthening/weightbearing, etc   Outcome: Progressing     Problem: DISCHARGE PLANNING  Goal: Discharge to home or other facility with appropriate resources  Description: INTERVENTIONS:  - Identify barriers to discharge w/patient and caregiver  - Arrange for needed discharge resources and transportation as appropriate  - Identify discharge learning needs (meds, wound care, etc )  - Arrange for interpretive services to assist at discharge as needed  - Refer to Case Management Department for coordinating discharge planning if the patient needs post-hospital services based on physician/advanced practitioner order or complex needs related to functional status, cognitive ability, or social support system  Outcome: Progressing     Problem: Knowledge Deficit  Goal: Patient/family/caregiver demonstrates understanding of disease process, treatment plan, medications, and discharge instructions  Description: Complete learning assessment and assess knowledge base    Interventions:  - Provide teaching at level of understanding  - Provide teaching via preferred learning methods  Outcome: Progressing

## 2020-12-29 NOTE — PLAN OF CARE
Problem: Potential for Falls  Goal: Patient will remain free of falls  Description: INTERVENTIONS:  - Assess patient frequently for physical needs  -  Identify cognitive and physical deficits and behaviors that affect risk of falls    -  [place] fall precautions as indicated by assessment   - Educate patient/family on patient safety including physical limitations  - Instruct patient to call for assistance with activity based on assessment  - Modify environment to reduce risk of injury  - Consider OT/PT consult to assist with strengthening/mobility  Outcome: Progressing     Problem: Prexisting or High Potential for Compromised Skin Integrity  Goal: Skin integrity is maintained or improved  Description: INTERVENTIONS:  - Identify patients at risk for skin breakdown  - Assess and monitor skin integrity  - Assess and monitor nutrition and hydration status  - Monitor labs   - Assess for incontinence   - Turn and reposition patient  - Assist with mobility/ambulation  - Relieve pressure over bony prominences  - Avoid friction and shearing  - Provide appropriate hygiene as needed including keeping skin clean and dry  - Evaluate need for skin moisturizer/barrier cream  - Collaborate with interdisciplinary team   - Patient/family teaching  - Consider wound care consult   Outcome: Progressing     Problem: PAIN - ADULT  Goal: Verbalizes/displays adequate comfort level or baseline comfort level  Description: Interventions:  - Encourage patient to monitor pain and request assistance  - Assess pain using appropriate pain scale  - Administer analgesics based on type and severity of pain and evaluate response  - Implement non-pharmacological measures as appropriate and evaluate response  - Consider cultural and social influences on pain and pain management  - Notify physician/advanced practitioner if interventions unsuccessful or patient reports new pain  Outcome: Progressing     Problem: INFECTION - ADULT  Goal: Absence or prevention of progression during hospitalization  Description: INTERVENTIONS:  - Assess and monitor for signs and symptoms of infection  - Monitor lab/diagnostic results  - Monitor all insertion sites, i e  indwelling lines, tubes, and drains  - Monitor endotracheal if appropriate and nasal secretions for changes in amount and color  - Indian appropriate cooling/warming therapies per order  - Administer medications as ordered  - Instruct and encourage patient and family to use good hand hygiene technique  - Identify and instruct in appropriate isolation precautions for identified infection/condition  Outcome: Progressing  Goal: Absence of fever/infection during neutropenic period  Description: INTERVENTIONS:  - Monitor WBC    Outcome: Progressing     Problem: SAFETY ADULT  Goal: Patient will remain free of falls  Description: INTERVENTIONS:  - Assess patient frequently for physical needs  -  Identify cognitive and physical deficits and behaviors that affect risk of falls    -  [place] fall precautions as indicated by assessment   - Educate patient/family on patient safety including physical limitations  - Instruct patient to call for assistance with activity based on assessment  - Modify environment to reduce risk of injury  - Consider OT/PT consult to assist with strengthening/mobility  Outcome: Progressing  Goal: Maintain or return to baseline ADL function  Description: INTERVENTIONS:  -  Assess patient's ability to carry out ADLs; assess patient's baseline for ADL function and identify physical deficits which impact ability to perform ADLs (bathing, care of mouth/teeth, toileting, grooming, dressing, etc )  - Assess/evaluate cause of self-care deficits   - Assess range of motion  - Assess patient's mobility; develop plan if impaired  - Assess patient's need for assistive devices and provide as appropriate  - Encourage maximum independence but intervene and supervise when necessary  - Involve family in performance of ADLs  - Assess for home care needs following discharge   - Consider OT consult to assist with ADL evaluation and planning for discharge  - Provide patient education as appropriate  Outcome: Progressing  Goal: Maintain or return mobility status to optimal level  Description: INTERVENTIONS:  - Assess patient's baseline mobility status (ambulation, transfers, stairs, etc )    - Identify cognitive and physical deficits and behaviors that affect mobility  - Identify mobility aids required to assist with transfers and/or ambulation (gait belt, sit-to-stand, lift, walker, cane, etc )  - Daisy fall precautions as indicated by assessment  - Record patient progress and toleration of activity level on Mobility SBAR; progress patient to next Phase/Stage  - Instruct patient to call for assistance with activity based on assessment  - Consider rehabilitation consult to assist with strengthening/weightbearing, etc   Outcome: Progressing     Problem: DISCHARGE PLANNING  Goal: Discharge to home or other facility with appropriate resources  Description: INTERVENTIONS:  - Identify barriers to discharge w/patient and caregiver  - Arrange for needed discharge resources and transportation as appropriate  - Identify discharge learning needs (meds, wound care, etc )  - Arrange for interpretive services to assist at discharge as needed  - Refer to Case Management Department for coordinating discharge planning if the patient needs post-hospital services based on physician/advanced practitioner order or complex needs related to functional status, cognitive ability, or social support system  Outcome: Progressing     Problem: Knowledge Deficit  Goal: Patient/family/caregiver demonstrates understanding of disease process, treatment plan, medications, and discharge instructions  Description: Complete learning assessment and assess knowledge base    Interventions:  - Provide teaching at level of understanding  - Provide teaching via preferred learning methods  Outcome: Progressing

## 2020-12-29 NOTE — ASSESSMENT & PLAN NOTE
· Patient presents with lower extremity edema and shortness of breath for the past few days   · Denies known history of CHF, no echo on file   · Possible new onset CHF vs  Hypoalbuminemia vs  Dietary indiscretion vs  Hypertensive urgency   · Check Echo in AM   · Start on Lasix 40 mg IV daily- patient is lasix naive    · Cardiology consult   · Lower extremity elevation as tolerated

## 2020-12-29 NOTE — CONSULTS
Consult for CHF diet education  PT is not currently appropriate for diet education  At visit PT wasn't able to answer questions, PT requesting to leave, appeared confused

## 2020-12-29 NOTE — ASSESSMENT & PLAN NOTE
Recently at [address] [place] [address] [address] for this  Follows with podiatry      Will ask wound care to eval here in the hospital

## 2020-12-29 NOTE — ASSESSMENT & PLAN NOTE
· Patient with history of DVT   · Maintained on Coumadin   · INR 1 96 today   · Continue daily Coumadin 4 mg   · Recheck INR in AM

## 2020-12-29 NOTE — ASSESSMENT & PLAN NOTE
INR mildly subtherapeutic on admission  See where it is today  Should not be low enough to cause a DVT PE    No need for bridging

## 2020-12-29 NOTE — PROGRESS NOTES
Progress Note - Shanelle Bryan 1930, 80 y o  female MRN: [id number]    Unit/Bed#: E4 -01 Encounter: [id number]    Primary Care Provider: Pasha Mooer MD   Date and time admitted to hospital: 2020  3:25 PM        * Volume overload  Assessment & Plan  · Likely new diagnosis of acute CHF  · Getting Echo right now  · BNP > 30, 000  · On IV lasix, but I am going to increase it    Accelerated HTN may be playing a role as well  Add prn IV hydralazine to Metoprolol and Norvasc  ( I may change Norvasc to an alternative agent due to leg swelling)    Hypertensive urgency  Assessment & Plan  May be contributing to the acute CHF    On Metoprolol and Norvasc for now    Add prn hydralazine  CKD (chronic kidney disease)  Assessment & Plan  Lab Results   Component Value Date    EGFR 25 2020    EGFR 23 2020    CREATININE 1 75 (H) 2020    CREATININE 1 90 (H) 2020   · creatinine appears around baseline 1 7-1 9   · Monitor daily while on IV Lasix       Actually a little better, so some of this may be due to hypoperfusion from acute CHF    History of DVT (deep vein thrombosis)  Assessment & Plan  INR mildly subtherapeutic on admission  See where it is today  Should not be low enough to cause a DVT PE  No need for bridging    Ulcer of right lower extremity with fat layer exposed [facility])  Assessment & Plan  Recently at [facility] for this  Follows with podiatry  Will ask wound care to eval here in the hospital          Subjective:   Feels very sob  No cough    No chest pain    Complains of leg swelling  Objective:     Vitals:   Temp (24hrs), Av 7 °F (36 5 °C), Min:97 6 °F (36 4 °C), Max:98 °F (36 7 °C)    Temp:  [97 6 °F (36 4 °C)-98 °F (36 7 °C)] 97 6 °F (36 4 °C)  HR:  [] 98  Resp:  [19-24] 22  BP: (141-210)/() 186/91  SpO2:  [84 %-97 %] 97 %  Body mass index is 22 56 kg/m²  Input and Output Summary (last 24 hours):        Intake/Output Summary (Last 24 hours) at 12/29/2020 1131  Last data filed at 12/29/2020 1057  Gross per 24 hour   Intake 240 ml   Output 1416 ml   Net -1176 ml       Physical Exam:     Physical Exam  Vitals signs and nursing note reviewed  Constitutional:       General: She is in acute distress  HENT:      Head: Normocephalic and atraumatic  Eyes:      Pupils: Pupils are equal, round, and reactive to light  Neck:      Comments: JVD up to mandible    Cardiovascular:      Rate and Rhythm: Normal rate and regular rhythm  Heart sounds: No murmur  No friction rub  No gallop  Pulmonary:      Effort: Pulmonary effort is normal       Breath sounds: Normal breath sounds  No wheezing or rales  Abdominal:      General: Bowel sounds are normal       Palpations: Abdomen is soft  Tenderness: There is no abdominal tenderness  Musculoskeletal:      Right lower leg: Edema (2+) present  Left lower leg: Left lower leg edema: 2+         Additional Data:     Labs:    Results from last 7 days   Lab Units 12/29/20  0535   WBC Thousand/uL 10 35*   HEMOGLOBIN g/dL 10 8*   HEMATOCRIT % 35 2   PLATELETS Thousands/uL 335   NEUTROS PCT % 35*   LYMPHS PCT % 54*   MONOS PCT % 7   EOS PCT % 4     Results from last 7 days   Lab Units 12/29/20  0535 12/28/20  1531   POTASSIUM mmol/L 3 9 4 3   CHLORIDE mmol/L 105 105   CO2 mmol/L 22 22   BUN mg/dL 47* 50*   CREATININE mg/dL 1 75* 1 90*   CALCIUM mg/dL 8 8 8 3   ALK PHOS U/L  --  101   ALT U/L  --  65   AST U/L  --  61*     Results from last 7 days   Lab Units 12/29/20  1009   INR  2 03*                   * I Have Reviewed All Lab Data     Recent Cultures (last 7 days):     Results from last 7 days   Lab Units 12/28/20  1604 12/28/20  1531   BLOOD CULTURE  Received in Microbiology Lab  Culture in Progress  Received in Microbiology Lab  Culture in Progress           Last 24 Hours Medication List:   Current Facility-Administered Medications   Medication Dose Route Frequency Provider Last Rate    acetaminophen  650 mg Oral Q4H PRN Zachary Russell PA-C      amLODIPine  10 mg Oral Daily Juanita Batista PA-C      calcium carbonate  1,000 mg Oral TID PRN Zachary Russell PA-C      carvedilol  12 5 mg Oral BID With Meals Clearance LAINA Layne      furosemide  40 mg Intravenous Daily Juanita Batista PA-C      hydrALAZINE  5 mg Intravenous Q6H PRN Shannon Vasquez DO      ondansetron  4 mg Intravenous Q6H PRN Zachary Russell PA-C      potassium chloride  20 mEq Oral Daily Juanita Batista PA-C      senna  1 tablet Oral HS PRN Zachary Russell PA-C      warfarin  4 mg Oral Daily (warfarin) Rema Batista PA-C           VTE Pharmacologic Prophylaxis:   Pharmacologic: Warfarin (Coumadin)      Current Length of Stay: 1 day(s)    Current Patient Status: Inpatient       Discharge Plan:   Code Status: Level 1 - Full Code           Today, Patient Was Seen By: Juanito Gutierrez DO    ** Please Note: Dictation voice to text software may have been used in the creation of this document   **

## 2020-12-30 LAB
ANION GAP SERPL CALCULATED.3IONS-SCNC: 11 MMOL/L (ref 4–13)
BUN SERPL-MCNC: 50 MG/DL (ref 5–25)
CALCIUM SERPL-MCNC: 8.7 MG/DL (ref 8.3–10.1)
CHLORIDE SERPL-SCNC: 105 MMOL/L (ref 100–108)
CO2 SERPL-SCNC: 23 MMOL/L (ref 21–32)
CREAT SERPL-MCNC: 2.12 MG/DL (ref 0.6–1.3)
ERYTHROCYTE [DISTWIDTH] IN BLOOD BY AUTOMATED COUNT: 18.5 % (ref 11.6–15.1)
GFR SERPL CREATININE-BSD FRML MDRD: 20 ML/MIN/1.73SQ M
GLUCOSE SERPL-MCNC: 92 MG/DL (ref 65–140)
GLUCOSE SERPL-MCNC: 99 MG/DL (ref 65–140)
HCT VFR BLD AUTO: 27.2 % (ref 34.8–46.1)
HGB BLD-MCNC: 8.3 G/DL (ref 11.5–15.4)
INR PPP: 2.42 (ref 0.84–1.19)
MAGNESIUM SERPL-MCNC: 2.1 MG/DL (ref 1.6–2.6)
MCH RBC QN AUTO: 28.3 PG (ref 26.8–34.3)
MCHC RBC AUTO-ENTMCNC: 30.5 G/DL (ref 31.4–37.4)
MCV RBC AUTO: 93 FL (ref 82–98)
PLATELET # BLD AUTO: 212 THOUSANDS/UL (ref 149–390)
PMV BLD AUTO: 9.9 FL (ref 8.9–12.7)
POTASSIUM SERPL-SCNC: 3.3 MMOL/L (ref 3.5–5.3)
PROTHROMBIN TIME: 25.8 SECONDS (ref 11.6–14.5)
RBC # BLD AUTO: 2.93 MILLION/UL (ref 3.81–5.12)
SODIUM SERPL-SCNC: 139 MMOL/L (ref 136–145)
WBC # BLD AUTO: 12.75 THOUSAND/UL (ref 4.31–10.16)

## 2020-12-30 PROCEDURE — 83735 ASSAY OF MAGNESIUM: CPT | Performed by: HOSPITALIST

## 2020-12-30 PROCEDURE — 99233 SBSQ HOSP IP/OBS HIGH 50: CPT | Performed by: INTERNAL MEDICINE

## 2020-12-30 PROCEDURE — 80048 BASIC METABOLIC PNL TOTAL CA: CPT | Performed by: HOSPITALIST

## 2020-12-30 PROCEDURE — 94640 AIRWAY INHALATION TREATMENT: CPT

## 2020-12-30 PROCEDURE — 99232 SBSQ HOSP IP/OBS MODERATE 35: CPT | Performed by: HOSPITALIST

## 2020-12-30 PROCEDURE — 82948 REAGENT STRIP/BLOOD GLUCOSE: CPT

## 2020-12-30 PROCEDURE — 99232 SBSQ HOSP IP/OBS MODERATE 35: CPT | Performed by: PODIATRIST

## 2020-12-30 PROCEDURE — 94762 N-INVAS EAR/PLS OXIMTRY CONT: CPT

## 2020-12-30 PROCEDURE — 99252 IP/OBS CONSLTJ NEW/EST SF 35: CPT | Performed by: SURGERY

## 2020-12-30 PROCEDURE — 85027 COMPLETE CBC AUTOMATED: CPT | Performed by: HOSPITALIST

## 2020-12-30 PROCEDURE — 85610 PROTHROMBIN TIME: CPT | Performed by: HOSPITALIST

## 2020-12-30 RX ORDER — POTASSIUM CHLORIDE 20 MEQ/1
20 TABLET, EXTENDED RELEASE ORAL ONCE
Status: COMPLETED | OUTPATIENT
Start: 2020-12-30 | End: 2020-12-30

## 2020-12-30 RX ORDER — FUROSEMIDE 20 MG/1
20 TABLET ORAL
Status: DISCONTINUED | OUTPATIENT
Start: 2020-12-30 | End: 2020-12-31

## 2020-12-30 RX ADMIN — ALBUTEROL SULFATE 2.5 MG: 2.5 SOLUTION RESPIRATORY (INHALATION) at 08:42

## 2020-12-30 RX ADMIN — WARFARIN SODIUM 4 MG: 4 TABLET ORAL at 17:23

## 2020-12-30 RX ADMIN — AMLODIPINE BESYLATE 10 MG: 10 TABLET ORAL at 08:20

## 2020-12-30 RX ADMIN — FUROSEMIDE 20 MG: 20 TABLET ORAL at 16:34

## 2020-12-30 RX ADMIN — CARVEDILOL 12.5 MG: 12.5 TABLET, FILM COATED ORAL at 08:20

## 2020-12-30 RX ADMIN — POTASSIUM CHLORIDE 20 MEQ: 1500 TABLET, EXTENDED RELEASE ORAL at 10:08

## 2020-12-30 RX ADMIN — FUROSEMIDE 40 MG: 10 INJECTION, SOLUTION INTRAMUSCULAR; INTRAVENOUS at 08:20

## 2020-12-30 RX ADMIN — POTASSIUM CHLORIDE 20 MEQ: 1500 TABLET, EXTENDED RELEASE ORAL at 08:20

## 2020-12-30 RX ADMIN — CARVEDILOL 12.5 MG: 12.5 TABLET, FILM COATED ORAL at 16:34

## 2020-12-30 NOTE — CONSULTS
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) presenting in acute CHF with b/l lower extremity edema and nonhealing wounds  Vascular surgery asked to evaluate regarding healing potential      Diagnostics:   -LEADs 12/29/2020 - Right: Occlusion vs high grade stenosis of mid/distal popliteal artery w/ stenosis x2 of SFA at the proximal and distal segments  Evidence of tibioperoneal disease  0 46/-/-  Left:  Occlusion vs high grade stenosis of proximal SFA w/ distal reconstitution  Evidence of tibioperoneal disease  0 71/-/-  -XR right foot 12/28/2020 - no evidence of OM  -BC:  No growth x24 hr  -PT/INR: 2 42 (12/30/2020)    Recommendations:   -Nonhealing wounds to the lower extremities b/l, R>L  -Imaging reviewed, patient will likely need RLE arteriogram for nonhealing wound of right foot for adequate wound healing  However given patient's age and comorbidities (coumadin would need to be held and patient would likely require general anesthesia as she is unable to lay flat) it is also reasonable to offer conservative management with local wound care    -On exam femoral pulses are palpable bilaterally  Wounds are noted to the lower extremities bilaterally with 1+ pitting edema   -INR this morning 2 42  Will need to be held if patient decides to proceed with arteriogram    -Cr 2 12, baseline appears to be 1 7-1 9   Will likely require Nephrology consult if arteriogram   -Recommend initiation of ASA 81 mg  -Patient will discuss with  family to determine if she wants arteriogram   Will reassess tomorrow  -Local wound care per Podiatry  -Discussed with Dr Asha Carbajal     CKD (chronic kidney disease)  Assessment & Plan  Lab Results   Component Value Date    EGFR 20 12/30/2020    EGFR 25 12/29/2020    EGFR 23 12/28/2020    CREATININE 2 12 (H) 12/30/2020    CREATININE 1 75 (H) 12/29/2020    CREATININE 1 90 (H) 12/28/2020   -Creatinine 2 12 this morning  -Will consult Nephrology if patient decides to proceed with arteriogram   -Avoid hypotension, nephrotoxins     Volume overload  Assessment & Plan  Patient presented in acute CHF  -Management per primary team   -Stable, on nasal cannula   -Will likely require cardiac clearance if proceeding with arteriogram as patient is unable to lay flat and will require general anesthesia  History of DVT (deep vein thrombosis)  Assessment & Plan  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  -INR 2 42 this morning  -Will need to hold coumadin if proceeding with arteriogram  -Will be able to transition to heparin drip       Subjective: 80year old female presenting with b/l lower extremity wounds, R>L  She initially presented to the hospital in acute CHF exacerbation with bilateral lower extremity edema  She states that she had been following with Podiatry for the wounds on her right foot  She states that she does experience pain on this foot  She reports being ambulatory with a walker at home  She reports minimal symptoms of claudication and some rest pain  Her primary concern is the pain she experiences from the wounds  She denies fevers or chills  Workup performed by primary team showing wounds of the right lower extremity without evidence of osteomyelitis or systemic infection  Noninvasive studies showing evidence of SFA and popliteal stenosis/occlusions    After discussion with the patient she expressed that she is hesitant to obtain arteriogram and would like to discuss with her family prior to proceeding  ROS:   Constitutional:  Negative  HEENT:  Negative  Eyes:  Negative  Pulmonary:  Positive for shortness of breath  Cardiovascular:  Positive for lower extremity edema bilaterally  Gastrointestinal:  Negative  Genitourinary:  Negative  Musculoskeletal:  Negative  Skin:  Positive for wound  Neurologic:  Negative  Endocrine:  Negative  Psych:  Negative    Medications:   Current Facility-Administered Medications   Medication Dose Route Frequency Provider Last Rate    acetaminophen  650 mg Oral Q4H PRN Lorangelo Sarabia PA-C      albuterol  2 5 mg Nebulization Q6H PRN Aroldo Vasquez,       amLODIPine  10 mg Oral Daily Juanita Batista PA-C      calcium carbonate  1,000 mg Oral TID PRN Crow Sarabia PA-C      carvedilol  12 5 mg Oral BID With Meals Dayana Orozco PA-C      furosemide  20 mg Oral BID (diuretic) Dayana Orozco PA-C      hydrALAZINE  10 mg Intravenous Q6H PRN OTIS Jon      ondansetron  4 mg Intravenous Q6H PRN Lorangelo Sarabia PA-C      potassium chloride  20 mEq Oral Daily Crow Sarabia PA-C      senna  1 tablet Oral HS PRN Lorangelo Sarabia PA-C      warfarin  4 mg Oral Daily (warfarin) Crow Sarabia PA-C          History:  Family History   Problem Relation Age of Onset    No Known Problems Mother     No Known Problems Father       Social History      Vitals:   Vitals:    12/29/20 2351 12/30/20 0321 12/30/20 0700 12/30/20 0857   BP: 140/63 148/52 134/60    BP Location: Left arm Right arm Right arm    Pulse: 83 85 88    Resp: (!) 23 (!) 23 22    Temp: 98 8 °F (37 1 °C) 98 1 °F (36 7 °C) 98 4 °F (36 9 °C)    TempSrc: Temporal Temporal Tympanic    SpO2: 95% 96% 97% 93%   Weight:       Height:            I's/O's: Intake/Output Summary (Last 24 hours) at 12/30/2020 1138  Last data filed at 12/30/2020 0852  Gross per 24 hour   Intake 900 ml   Output 1093 ml   Net -193 ml        Exam:    General:  Alert oriented no acute distress    Nasal cannula in place   Neck:  Supple  Cardiovascular:  Regular rate and rhythm  Lungs:  Normal effort, decreased breath sounds bilaterally  Abdomen:  Soft, nontender  Extremities:  Wound to the right lower extremity with bandages in place  Pressure ulcer to the right heel  Neurologic:  Grossly normal    Pulses:  Femoral: Right- 1+ Left- 2+  Popliteal: Right- nonpalpable Left- nonpalpable      Labs:  Lab Results   Component Value Date    WBC 12 75 (H) 2020    HGB 8 3 (L) 2020    HCT 27 2 (L) 2020    MCV 93 2020     2020      Lab Results   Component Value Date    CREATININE 2 12 (H) 2020    BUN 50 (H) 2020    K 3 3 (L) 2020     2020    CO2 23 2020     Lab Results   Component Value Date    INR 2 42 (H) 2020    INR 2 03 (H) 2020    INR 1 96 (H) 2020    PROTIME 25 8 (H) 2020    PROTIME 22 5 (H) 2020    PROTIME 21 9 (H) 2020      Lab Results   Component Value Date    PTT 42 (H) 2020       No results found for: HGBA1C       Imagin   As stated in assessment plan above      Regina Crespo PA-C  20

## 2020-12-30 NOTE — DISCHARGE INSTR - OTHER ORDERS
Sacral Wound Care Plan:   1-Offloading air cushion in chair when out of bed  2-Turn/reposition every 2 hours while in bed and weight shift frequently while in chair for pressure re-distribution on skin--use positioning wedges  Shanti Bench on your side as often as possible when in bed  3-Sacrum--cleanse with soap and water, pat dry  Apply Maxorb to wound bed and cover with Allevyn Life foam dressing  Change dressing every other day and as needed with soilage  IF PATIENT BECOMES INCONTINENT, discontinue foam dressing and apply Calmoseptine paste twice daily and as needed (available over the counter)  Follow-up at the [facility]--[phone], option 1

## 2020-12-30 NOTE — PROGRESS NOTES
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) presenting in acute CHF with b/l lower extremity edema and nonhealing wounds  Vascular surgery asked to evaluate regarding healing potential      Fem palp b/l,   Pop dopp b/l, AT dopp b/l,  PT dopp b/l, DP dopp b/l-weak signal on R    Plan:  - Patient states she has not decided on whether she would like to proceed with further intervention this AM (Agram vs  Local wound care)  - If Agram is decided, will need to hold coumadin, Nephro clearance, and Cards clearance  - Vascular service will be available as needed, will follow up patient decision  - Wound care per podiatry      Subjective/Objective     Subjective:   No acute events overnight  No new complaints  She states she discussed with her  the treatment course and possible Agram, but has not decided yet for sure  She was leaning toward local wound care  Objective:    Blood pressure 162/70, pulse 80, temperature 97 5 °F (36 4 °C), temperature source Temporal, resp  rate 16, height 5' 6" (1 676 m), weight 59 3 kg (130 lb 11 7 oz), SpO2 92 %  ,Body mass index is 21 1 kg/m²  Intake/Output Summary (Last 24 hours) at 12/31/2020 0659  Last data filed at 12/30/2020 2237  Gross per 24 hour   Intake 1440 ml   Output 1400 ml   Net 40 ml       Invasive Devices     Peripheral Intravenous Line            Peripheral IV 12/28/20 Left Forearm 2 days          Drain            Urethral Catheter Straight-tip 16 Fr  1 day                Physical Exam:   Gen:  NAD  HEENT: NCAT  MMM  CV: well perfused, pulses as above  Lungs: Normal respiratory effort  Abd: soft, nt/nd,   Skin: warm/ dry  Extremities: no peripheral edema, no clubbing or cyanosis, dressings cdi  Neuro:  AxO x3      Results from last 7 days   Lab Units 12/31/20  0459 12/30/20  2012 12/29/20  0535   WBC Thousand/uL 10 47* 12 75* 10 35*   HEMOGLOBIN g/dL 7 6* 8 3* 10 8*   HEMATOCRIT % 25 2* 27 2* 35 2   PLATELETS Thousands/uL 182 212 335     Results from last 7 days   Lab Units 12/31/20  0459 12/30/20  0651 12/29/20  0535   POTASSIUM mmol/L 3 5 3 3* 3 9   CHLORIDE mmol/L 102 105 105   CO2 mmol/L 23 23 22   BUN mg/dL 55* 50* 47*   CREATININE mg/dL 2 24* 2 12* 1 75*   CALCIUM mg/dL 8 3 8 7 8 8     Results from last 7 days   Lab Units 12/30/20  0650 12/29/20  1009 12/28/20  1531   INR  2 42* 2 03* 1 96*   PTT seconds  --   --  42*        I have personally reviewed pertinent films in PACS      Medications:   Scheduled Meds:  Current Facility-Administered Medications   Medication Dose Route Frequency Provider Last Rate    acetaminophen  650 mg Oral Q4H PRN Cliffaakash Bolanos PA-C      albuterol  2 5 mg Nebulization Q6H PRN Poppy Vasquez DO      amLODIPine  10 mg Oral Daily Juanita Batista PA-C      calcium carbonate  1,000 mg Oral TID PRN Cliffaakash Bolanos PA-C      carvedilol  12 5 mg Oral BID With Meals Brennen Christensen PA-C      furosemide  20 mg Oral BID (diuretic) Brennen Christensen PA-C      hydrALAZINE  10 mg Intravenous Q6H PRN OTIS Ayala      ondansetron  4 mg Intravenous Q6H PRN Cliffaakash Bolanos PA-C      potassium chloride  20 mEq Oral Daily Juanita Batista PA-C      senna  1 tablet Oral HS PRN Cliffton LAINA Bolanos      warfarin  4 mg Oral Daily (warfarin) Juanita Batista PA-C       Continuous Infusions:   PRN Meds:  acetaminophen, 650 mg, Q4H PRN  albuterol, 2 5 mg, Q6H PRN  calcium carbonate, 1,000 mg, TID PRN  hydrALAZINE, 10 mg, Q6H PRN  ondansetron, 4 mg, Q6H PRN  senna, 1 tablet, HS PRN      VTE Pharmacologic Prophylaxis: Warfarin (Coumadin)  VTE Mechanical Prophylaxis: sequential compression device

## 2020-12-30 NOTE — PROGRESS NOTES
Progress Note - Johnathan Ny 1930, 80 y o  female MRN: [id number]    Unit/Bed#: E4 -01 Encounter: [id number]    Primary Care Provider: Carleen Titus MD   Date and time admitted to hospital: 2020  3:25 PM        Volume overload  Assessment & Plan  Due to CHF  Continue diuresis    Appreciate cards help    Hypertensive urgency  Assessment & Plan  May be contributing to the acute CHF    On Metoprolol and Norvasc for now    Add prn hydralazine  Subjective:   Feels better  Much less sob  Much less anxious  Objective:     Vitals:   Temp (24hrs), Av 7 °F (37 1 °C), Min:98 °F (36 7 °C), Max:99 5 °F (37 5 °C)    Temp:  [98 °F (36 7 °C)-99 5 °F (37 5 °C)] 99 5 °F (37 5 °C)  HR:  [82-88] 82  Resp:  [18-24] 18  BP: (122-160)/(52-78) 142/78  SpO2:  [93 %-98 %] 93 %  Body mass index is 22 56 kg/m²  Input and Output Summary (last 24 hours): Intake/Output Summary (Last 24 hours) at 2020 1630  Last data filed at 2020 1417  Gross per 24 hour   Intake 1380 ml   Output 1450 ml   Net -70 ml       Physical Exam:     Physical Exam  Vitals signs and nursing note reviewed  HENT:      Head: Normocephalic and atraumatic  Eyes:      Pupils: Pupils are equal, round, and reactive to light  Cardiovascular:      Rate and Rhythm: Normal rate and regular rhythm  Heart sounds: No murmur  No friction rub  No gallop  Pulmonary:      Effort: Pulmonary effort is normal       Breath sounds: Normal breath sounds  No wheezing or rales  Abdominal:      General: Bowel sounds are normal       Palpations: Abdomen is soft  Tenderness: There is no abdominal tenderness  Musculoskeletal:      Right lower leg: Edema present  Left lower leg: Edema present  Comments: Bilateral leg wound dressings C/D/I                Additional Data:     Labs:    Results from last 7 days   Lab Units 20  0650 20  0535   WBC Thousand/uL 12 75* 10 35*   HEMOGLOBIN g/dL 8 3* 10 8*   HEMATOCRIT % 27 2* 35 2   PLATELETS Thousands/uL 212 335   NEUTROS PCT %  --  35*   LYMPHS PCT %  --  54*   MONOS PCT %  --  7   EOS PCT %  --  4     Results from last 7 days   Lab Units 12/30/20  0651  12/28/20  1531   POTASSIUM mmol/L 3 3*   < > 4 3   CHLORIDE mmol/L 105   < > 105   CO2 mmol/L 23   < > 22   BUN mg/dL 50*   < > 50*   CREATININE mg/dL 2 12*   < > 1 90*   CALCIUM mg/dL 8 7   < > 8 3   ALK PHOS U/L  --   --  101   ALT U/L  --   --  65   AST U/L  --   --  61*    < > = values in this interval not displayed  Results from last 7 days   Lab Units 12/30/20  0650   INR  2 42*     Results from last 7 days   Lab Units 12/30/20  1139   POC GLUCOSE mg/dl 99               * I Have Reviewed All Lab Data     Recent Cultures (last 7 days):     Results from last 7 days   Lab Units 12/28/20  1604 12/28/20  1531   BLOOD CULTURE  No Growth at 24 hrs  No Growth at 24 hrs  Last 24 Hours Medication List:   Current Facility-Administered Medications   Medication Dose Route Frequency Provider Last Rate    acetaminophen  650 mg Oral Q4H PRN Maximus Larsen PA-C      albuterol  2 5 mg Nebulization Q6H PRN Selvin Vasquez DO      amLODIPine  10 mg Oral Daily Juanita Batista PA-C      calcium carbonate  1,000 mg Oral TID PRN Maximus Larsen PA-C      carvedilol  12 5 mg Oral BID With Meals Sridhar Arnett PA-C      furosemide  20 mg Oral BID (diuretic) Sridhar Arnett PA-C      hydrALAZINE  10 mg Intravenous Q6H PRN OTIS Levine      ondansetron  4 mg Intravenous Q6H PRN Maximus Larsen PA-C      potassium chloride  20 mEq Oral Daily Juanita Batista PA-C      senna  1 tablet Oral HS PRN Maximus Larsen PA-C      warfarin  4 mg Oral Daily (warfarin) Maximus Larsen PA-C           VTE Pharmacologic Prophylaxis:   Pharmacologic: Warfarin (Coumadin)      Current Length of Stay: 2 day(s)    Current Patient Status: Inpatient       Discharge Plan:   Jaime Rosales     Code Status: Level 1 - Full Code Today, Patient Was Seen By: Fam Ortiz DO    ** Please Note: Dictation voice to text software may have been used in the creation of this document   **

## 2020-12-30 NOTE — SOCIAL WORK
LOS 1 day, no bundle, no 30 days readmission and unplanned readmission score is 17  PCP is Dr Jennifer Latham  Pt lives in [place] with her spouse in a ranch style house with no steps to enter  Pt stated she was independent with ADLs, drives a car, amb with walker and is retired  DME:  Walker and shower bench  Pt was open with [facility] for wound care, however they were paying private as [facility] was not in net work with their insurance  This was set up when pt was at [address] [place] [address]  Prior to going to [address] [place] [address] pt was at [facility]  Pt uses [address] at [facility] has no hx of [facility] or D&A  Pt stated she does not have a POA/Living Will  [place]  defer decision-making to spouse  Pt also has two sons  One live in the area and the other does not  Add son Dallas Munoz  "Edmond Valadez" at [phone] to face sheet as second   Pt stated her spouse or son will transport home  Informed pt will not make a referral back to [facility], but will try to find a VNA in net work with insurance  Informed pt if she does not have insurance coverage for VNA will make referral back to [facility]  Pt is agreeable to this plan  Referral made to [address] [place] [address]  at this time  Will f/u  Rec a call from Rosanne Palafox with [employer] at [phone]  Rosanne Palafox stated pt was paying private for VNA RN for wound care, but pt needs HHA care set up at home when discharge or IP Rehab  [facility] set up [facility] ,but this was not in net work with insurance  It is unclear if pt has VNA coverage with insurance  Rosanne Palafox wants a call prior to discharge if pt is going home or what rehab pt is going to  Rosanne Palafox stated she got her referral when pt was at [facility] did competency test and pt was competent to make her own decisions  Rosanne Palafox stated she was called because pt had neglected herself and had not seen doctor in 36 years  Pt now has PCP  Rosanne Palafox stated pt has poor insight to her own care  A post acute care recommendation was made by your care team for [facility]  Discussed Freedom of Choice with patient  List of agencies given to patient via in person  patient aware the list is custom filtered for them by preference  and that [facility] post acute providers are designated  CM reviewed d/c planning process including the following: identifying help at home, patient preference for d/c planning needs, Discharge Lounge, [facility] Meds to Bed program, availability of treatment team to discuss questions or concerns patient and/or family may have regarding understanding medications and recognizing signs and symptoms once discharged  CM also encouraged patient to follow up with all recommended appointments after discharge  Patient advised of importance for patient and family to participate in managing patients medical well being

## 2020-12-30 NOTE — PROGRESS NOTES
[facility] Podiatry - Progress Note  Patient: Luan Tompkins 80 y o  female   MRN: [id number]  PCP: Atilio Sargent MD  Unit/Bed#: E4 MS [place]-01 Encounter: [id number]  Date Of Visit: 20    ASSESSMENT:    Luan Tompkins is a 80 y o  female with:    1  Anterior Right Leg Venous Stasis Ulceration  2  Right Heel Pressure Ulceration - Unstageable  3  Right hallux and lateral right foot wounds with gangrenous changes    4  PAD with soft tissue loss  5  H/o DVT RLE      PLAN:    · Radiograph reviewed showing no OM or CHERYLE  · LEADs show possible occlusion of the right SFA  · Awaiting vascular final plan on revascularization  · Continue NWB given questionable ambulatory status  · Continue to elevate heels off bed bilaterally          SUBJECTIVE:     The patient was seen, evaluated, and assessed at bedside today  The patient was awake, alert, and in no acute distress  No acute events overnight  The patient reports no pain with dressing changes  Patient denies N/V/F/chills/SOB/CP  OBJECTIVE:     Vitals:   /58 (BP Location: Right arm)   Pulse 85   Temp 98 °F (36 7 °C) (Tympanic)   Resp 22   Ht 5' 6" (1 676 m)   Wt 63 4 kg (139 lb 12 4 oz)   LMP  (LMP Unknown)   SpO2 94%   BMI 22 56 kg/m²     Temp (24hrs), Av 5 °F (36 9 °C), Min:98 °F (36 7 °C), Max:99 4 °F (37 4 °C)      Physical Exam :     General:  Alert, cooperative, and in no distress  Lower extremity exam:  Cardiovascular status at baseline  Neurological status at baseline  Musculoskeletal status at baseline  No calf tenderness noted bilaterally  Wound (1) located central anterior right leg, measures approximately 5 cm x 4 cm x 0 2 cm  without sinus tracking or undermining  Wound bed appears pink/red and fibrotic with serous drainage  The wound base is 60% red/granular, 40% yellow/fibrous, 0% black/necrotic  Deepest tissue noted in base is subq  Malodor is not noticed  Wound edge appears rolled   Corazon-wound skin appears intact and erythematous  Probe to bone is negative   Signs of infection are not present at this time       Wound (2) located medial anterior right leg, measures approximately 3 cm x 2 cm x 0 3 cm  without sinus tracking or undermining  Wound bed appears pink/red and fibrotic with serous drainage  The wound base is 80% red/granular, 20% yellow/fibrous, 0% black/necrotic  Deepest tissue noted in base is subq  Malodor is not noticed  Wound edge appears rolled  Corazon-wound skin appears intact and erythematous  Probe to bone is negative   Signs of infection are not present at this time      Wound (3) located posterior right heel, measures approximately 4 cm x 3 cm x 0 1 cm  without sinus tracking or undermining  Wound bed appears necrotic with no drainage  The wound base is 0% red/granular, 0% yellow/fibrous, 100% black/necrotic  Deepest tissue noted in base is unknown  Malodor is not noticed  Wound edge appears Attached  Corazon-wound skin appears intact and erythematous  Probe to bone is negative   Signs of infection are not present at this time       Wound (4) located dorsal distal right hallux, measures approximately 1 cm x 1 5 cm x 0 1 cm  without sinus tracking or undermining  Wound bed appears nectrotic with no drainage  The wound base is 0% red/granular, 0% yellow/fibrous, 100% black/necrotic  Deepest tissue noted in base is unknown  Malodor is not noticed  Wound edge appears Attached  Corazon-wound skin appears calloused  Probe to bone is negative   Signs of infection are not present at this time       Wound (5) located distal lateral right foot, measures approximately 2 cm x 1 5 cm x 0 1 cm  without sinus tracking or undermining  Wound bed appears nectrotic with no drainage  The wound base is 0% red/granular, 0% yellow/fibrous, 100% black/necrotic  Deepest tissue noted in base is unknown  Malodor is not noticed  Wound edge appears Attached  Corazon-wound skin appears calloused  Probe to bone is negative    Signs of infection are not present at this time          Additional Data:     Labs:    Results from last 7 days   Lab Units 12/30/20  0650 12/29/20  0535   WBC Thousand/uL 12 75* 10 35*   HEMOGLOBIN g/dL 8 3* 10 8*   HEMATOCRIT % 27 2* 35 2   PLATELETS Thousands/uL 212 335   NEUTROS PCT %  --  35*   LYMPHS PCT %  --  54*   MONOS PCT %  --  7   EOS PCT %  --  4     Results from last 7 days   Lab Units 12/30/20  0651  12/28/20  1531   POTASSIUM mmol/L 3 3*   < > 4 3   CHLORIDE mmol/L 105   < > 105   CO2 mmol/L 23   < > 22   BUN mg/dL 50*   < > 50*   CREATININE mg/dL 2 12*   < > 1 90*   CALCIUM mg/dL 8 7   < > 8 3   ALK PHOS U/L  --   --  101   ALT U/L  --   --  65   AST U/L  --   --  61*    < > = values in this interval not displayed  Results from last 7 days   Lab Units 12/30/20  0650   INR  2 42*       * I Have Reviewed All Lab Data Listed Above  Recent Cultures (last 7 days):     Results from last 7 days   Lab Units 12/28/20  1604 12/28/20  1531   BLOOD CULTURE  No Growth at 24 hrs  No Growth at 24 hrs  Imaging: I have personally reviewed pertinent films in PACS  Pathology, and Other Studies: I have personally reviewed pertinent reports  ** Please Note: Portions of the record may have been created with voice recognition software  Occasional wrong word or "sound a like" substitutions may have occurred due to the inherent limitations of voice recognition software  Read the chart carefully and recognize, using context, where substitutions have occurred   **

## 2020-12-30 NOTE — UTILIZATION REVIEW
Continued Stay Review    Date:  12/30/20                        Current Patient Class:  IP Current Level of Care: Level 2 Stepdown    HPI:89 y o  female initially admitted on  12/28  To Inpatient  Medsur with Volume Overload -Decompensated CHF, RLE Ulcer with fat layer exposed, CKD - creat appears to be @ baseline  Upgraded to Level 2 Stepdown 12/29    Assessment/Plan:  Notable improvement in work of breathing  Was able to sleep last night  Still with some intermittent mild wheeze and cough without expectoration, with mild persistent dyspnea  BP improved - continue norvasc and carvedilol  Change Lasix to po 20 bid due to uptrend in creat  Continue to attempt to wean O2  Give additional dose of Kcl  Repeat electrolytes in a m  Plan to DC Scott catheter tomorrow  Pt currently on 4 L NC  ( weaned from [facility])    Per Podiatry: Radiograph reviewed showing no OM or CHERYLE  LEADs show possible occlusion of the right SFA  Awaiting vascular final plan on revascularization  Continue NWB and elevate heels off bed     Per Vascular: patient will likely need RLE arteriogram for nonhealing wound of right foot for adequate wound healing   Would need to hold coumadin and consult nephrology if pt agrees to arteriogram  Pt to dw her       Pertinent Labs/Diagnostic Results:   Results from last 7 days   Lab Units 12/28/20  1822   SARS-COV-2  Negative     Results from last 7 days   Lab Units 12/30/20  0650 12/29/20  0535 12/28/20  1531   WBC Thousand/uL 12 75* 10 35* 8 16   HEMOGLOBIN g/dL 8 3* 10 8* 10 6*   HEMATOCRIT % 27 2* 35 2 34 9   PLATELETS Thousands/uL 212 335 305   NEUTROS ABS Thousands/µL  --  3 61 3 18     Results from last 7 days   Lab Units 12/30/20  0651 12/29/20  0535 12/28/20  1531   SODIUM mmol/L 139 137 138   POTASSIUM mmol/L 3 3* 3 9 4 3   CHLORIDE mmol/L 105 105 105   CO2 mmol/L 23 22 22   ANION GAP mmol/L 11 10 11   BUN mg/dL 50* 47* 50*   CREATININE mg/dL 2 12* 1 75* 1 90*   EGFR ml/min/1 73sq m 20 25 23 CALCIUM mg/dL 8 7 8 8 8 3   MAGNESIUM mg/dL 2 1  --   --      Results from last 7 days   Lab Units 12/28/20  1531   AST U/L 61*   ALT U/L 65   ALK PHOS U/L 101   TOTAL PROTEIN g/dL 6 7   ALBUMIN g/dL 2 5*   TOTAL BILIRUBIN mg/dL 0 57     Results from last 7 days   Lab Units 12/30/20  1139   POC GLUCOSE mg/dl 99     Results from last 7 days   Lab Units 12/30/20  0651 12/29/20  0535 12/28/20  1531   GLUCOSE RANDOM mg/dL 92 97 136     Results from last 7 days   Lab Units 12/30/20  0650 12/29/20  1009 12/28/20  1531   PROTIME seconds 25 8* 22 5* 21 9*   INR  2 42* 2 03* 1 96*   PTT seconds  --   --  42*     Results from last 7 days   Lab Units 12/28/20  1531   PROCALCITONIN ng/ml <0 05     Results from last 7 days   Lab Units 12/28/20  1605   LACTIC ACID mmol/L 1 6     Results from last 7 days   Lab Units 12/29/20  0535   NT-PRO BNP pg/mL 32,959*     Results from last 7 days   Lab Units 12/28/20  1822   INFLUENZA A PCR  Negative   INFLUENZA B PCR  Negative   RSV PCR  Negative     Results from last 7 days   Lab Units 12/28/20  1604 12/28/20  1531   BLOOD CULTURE  No Growth at 24 hrs  No Growth at 24 hrs       Vital Signs:   12/30/20 1100 98 °F (36 7 °C) 85 22 122/58 94 % 4 L/min Nasal cannula Lying   12/30/20 0857     93 % 4 L/min Nasal cannula    12/30/20 0700 98 4 °F (36 9 °C) 88 22 134/60 97 %   Lying   12/30/20 0321 98 1 °F (36 7 °C) 85 23Abnormal  148/52 96 % 6 L/min Nasal cannula Lying   12/29/20 2351 98 8 °F (37 1 °C) 83 23Abnormal  140/63 95 % 6 L/min Nasal cannula Lying   12/29/20 1836     97 % 6 L/min High flow nasal cannula    12/29/20 1631 99 4 °F (37 4 °C) 88 24Abnormal  160/73 98 %   Other (comment) Lying   Nasal Cannula O2 Flow Rate (L/min): Bipap at 12/29/20 1631   12/29/20 1531  97  171/76Abnormal  97 %  Other (comment)  Lying   O2 Device: bi pap at 12/29/20 1531   12/29/20 1500     96 %      12/29/20 1440  88 28Abnormal  172/81Abnormal     Lying   12/29/20 1211 97 °F (36 1 °C) 92 36Abnormal  183/87Abnormal  95 % 15 L/min Non-rebreather mask Sitting       Medications:   Scheduled Medications:  amLODIPine, 10 mg, Oral, Daily  carvedilol, 12 5 mg, Oral, BID With Meals  furosemide, 40 mg, IV  BID (diuretic)  potassium chloride, 20 mEq, Oral, Daily  potassium chloride, 20 mEq, Oral,  x1 in addition to above KCL  warfarin, 4 mg, Oral, Daily (warfarin)    Continuous IV Infusions:  PRN Meds:  acetaminophen, 650 mg, Oral, Q4H PRN  albuterol, 2 5 mg, Nebulization, Q6H PRN  X 1  calcium carbonate, 1,000 mg, Oral, TID PRN  hydrALAZINE, 10 mg, Intravenous, Q6H PRN  ondansetron, 4 mg, Intravenous, Q6H PRN  senna, 1 tablet, Oral, HS PRN    Discharge Plan: TBD    Network Utilization Review Department  ATTENTION: Please call with any questions or concerns to [phone] and carefully listen to the prompts so that you are directed to the right person  All voicemails are confidential   Cindy Paulson all requests for admission clinical reviews, approved or denied determinations and any other requests to dedicated fax number below belonging to the campus where the patient is receiving treatment   List of dedicated fax numbers for the Facilities:  [address] DENIALS (Administrative/Medical Necessity) [phone]   [address] (Maternity/NICU/Pediatrics) [phone]   [address] [address] [facility] Shazia Hagan 1273 (  [facility]) [address] [place] [address] [address] Piyush Best [address] [facility]) [address] [place] [address] [phone]

## 2020-12-30 NOTE — ASSESSMENT & PLAN NOTE
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) presenting in acute CHF with b/l lower extremity edema and nonhealing wounds  Vascular surgery asked to evaluate regarding healing potential      Diagnostics:   -LEADs 12/29/2020 - Right: Occlusion vs high grade stenosis of mid/distal popliteal artery w/ stenosis x2 of SFA at the proximal and distal segments  Evidence of tibioperoneal disease  0 46/-/-  Left:  Occlusion vs high grade stenosis of proximal SFA w/ distal reconstitution  Evidence of tibioperoneal disease  0 71/-/-  -XR right foot 12/28/2020 - no evidence of OM  -BC:  No growth x24 hr  -PT/INR: 2 42 (12/30/2020)    Recommendations:   -Nonhealing wounds to the lower extremities b/l, R>L  -Imaging reviewed, patient will likely need RLE arteriogram for nonhealing wound of right foot for adequate wound healing  However given patient's age and comorbidities (coumadin would need to be held and patient would likely require general anesthesia as she is unable to lay flat) it is also reasonable to offer conservative management with local wound care    -On exam femoral pulses are palpable bilaterally  Wounds are noted to the lower extremities bilaterally with 1+ pitting edema   -INR this morning 2 42  Will need to be held if patient decides to proceed with arteriogram    -Cr 2 12, baseline appears to be 1 7-1 9   Will likely require Nephrology consult if arteriogram    -Will discuss with Dr Alfreda Abebe

## 2020-12-30 NOTE — ASSESSMENT & PLAN NOTE
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  -INR 2 42 this morning  -Will need to hold coumadin if proceeding with arteriogram  -Will be able to transition to heparin drip

## 2020-12-30 NOTE — PROGRESS NOTES
Progress Note - Cardiology   [facility] 80 y o  female MRN: [id number]  Unit/Bed#: E4 -01 Encounter: [id number]          Asessment/Plan  Acute hypoxic respiratory failure - acute diastolic CHF:   Echo 79/91/4150:  EF 50%, No RWMAs, mild-mod LVH, Gr II DD  Valvular heart disease: Mod MR, mild TR (ECHO 12/29/2020)  Hypertension - uncontrolled at admission:   0/p Rx:  Norvasc 10 mg, Lopressor 25 mg b i d  CKD:    Recent lab suggest baseline creatinine to be approximately 1 7-1 9  Atrophic left kidney, Hx Rt hydronephrosis and ureteral stenting - 12/2/2020 ([facility])  E coli bacteremia -11/2020:  Hx lower extremity wound:  Hx RLE DVT (11/18/2020):              0/P Rx: warfarin    Weight pending  O2 saturation 93% - 4 L  Potassium 3 3, BUN 50, creatinine 2 12    · BP improved ~~> continue Norvasc (pre-hospital dose), and carvedilol (new)  · With some up trend in creatinine will change IV Lasix to oral (40bid -->20bid)  · Check standing weight, increase ambulation, and try to wean from oxygen      Subjective:  Notable improvement in work of breathing  Was able to sleep last night  Still with some intermittent mild wheeze and cough without expectoration, with mild persistent dyspnea        Vitals:  Vitals:    12/28/20 2213 12/29/20 0537   Weight: 63 4 kg (139 lb 12 4 oz) 63 4 kg (139 lb 12 4 oz)   ,  Vitals:    12/29/20 2351 12/30/20 0321 12/30/20 0700 12/30/20 0857   BP: 140/63 148/52 134/60    BP Location: Left arm Right arm Right arm    Pulse: 83 85 88    Resp: (!) 23 (!) 23 22    Temp: 98 8 °F (37 1 °C) 98 1 °F (36 7 °C) 98 4 °F (36 9 °C)    TempSrc: Temporal Temporal Tympanic    SpO2: 95% 96% 97% 93%   Weight:       Height:           Exam:  General:  Patient appears comfortable this morning  Heart:  Regular with somewhat distant heart tones and soft apical murmur  Respiratory effort:  Currently unlabored with moderate excursion air exchange  Lungs:  Bibasilar dulling with few rales  Lower Limbs: mild edema (1+) Rt>Lt           Telemetry:       Normal sinus rhythm with ventricular rate 70-80s    Medications:    Current Facility-Administered Medications:     acetaminophen (TYLENOL) tablet 650 mg, 650 mg, Oral, Q4H PRN, Jay Furbish Smudde, PA-C    albuterol inhalation solution 2 5 mg, 2 5 mg, Nebulization, Q6H PRN, Luli SHAFER Prechtel, DO, 2 5 mg at 12/30/20 0842    amLODIPine (NORVASC) tablet 10 mg, 10 mg, Oral, Daily, Juanita Cristide, PA-C, 10 mg at 12/30/20 0820    calcium carbonate (TUMS) chewable tablet 1,000 mg, 1,000 mg, Oral, TID PRN, Jay Furbish Smudde, PA-C    carvedilol (COREG) tablet 12 5 mg, 12 5 mg, Oral, BID With Meals, GATO Soriano-C, 12 5 mg at 12/30/20 0820    furosemide (LASIX) injection 40 mg, 40 mg, Intravenous, BID (diuretic), Kandy Clearlake Prechtel, DO, 40 mg at 12/30/20 0820    hydrALAZINE (APRESOLINE) injection 10 mg, 10 mg, Intravenous, Q6H PRN, ROBBIN FloresNP, 10 mg at 12/29/20 1451    ondansetron (ZOFRAN) injection 4 mg, 4 mg, Intravenous, Q6H PRN, Jay Furbish Smudde, PA-C    potassium chloride (K-DUR,KLOR-CON) CR tablet 20 mEq, 20 mEq, Oral, Daily, Juanita Smudde, PA-C, 20 mEq at 12/30/20 0820    senna (SENOKOT) tablet 8 6 mg, 1 tablet, Oral, HS PRN, Jay Furbish Smudde, PA-C    warfarin (COUMADIN) tablet 4 mg, 4 mg, Oral, Daily (warfarin), Juanita Smudde, PA-C, 4 mg at 12/29/20 1747      Labs/Data:        Results from last 7 days   Lab Units 12/30/20  0650 12/29/20  0535 12/28/20  1531   WBC Thousand/uL 12 75* 10 35* 8 16   HEMOGLOBIN g/dL 8 3* 10 8* 10 6*   HEMATOCRIT % 27 2* 35 2 34 9   PLATELETS Thousands/uL 212 335 305     Results from last 7 days   Lab Units 12/30/20  0651 12/29/20  0535 12/28/20  1531   POTASSIUM mmol/L 3 3* 3 9 4 3   CHLORIDE mmol/L 105 105 105   CO2 mmol/L 23 22 22   BUN mg/dL 50* 47* 50*

## 2020-12-30 NOTE — ASSESSMENT & PLAN NOTE
Patient presented in acute CHF  -Management per primary team   -Stable, on nasal cannula   -Will likely require cardiac clearance if proceeding with arteriogram as patient is unable to lay flat and will require general anesthesia

## 2020-12-30 NOTE — PLAN OF CARE
Problem: Potential for Falls  Goal: Patient will remain free of falls  Description: INTERVENTIONS:  - Assess patient frequently for physical needs  -  Identify cognitive and physical deficits and behaviors that affect risk of falls    -  [place] fall precautions as indicated by assessment   - Educate patient/family on patient safety including physical limitations  - Instruct patient to call for assistance with activity based on assessment  - Modify environment to reduce risk of injury  - Consider OT/PT consult to assist with strengthening/mobility  Outcome: Progressing     Problem: Prexisting or High Potential for Compromised Skin Integrity  Goal: Skin integrity is maintained or improved  Description: INTERVENTIONS:  - Identify patients at risk for skin breakdown  - Assess and monitor skin integrity  - Assess and monitor nutrition and hydration status  - Monitor labs   - Assess for incontinence   - Turn and reposition patient  - Assist with mobility/ambulation  - Relieve pressure over bony prominences  - Avoid friction and shearing  - Provide appropriate hygiene as needed including keeping skin clean and dry  - Evaluate need for skin moisturizer/barrier cream  - Collaborate with interdisciplinary team   - Patient/family teaching  - Consider wound care consult   Outcome: Progressing     Problem: PAIN - ADULT  Goal: Verbalizes/displays adequate comfort level or baseline comfort level  Description: Interventions:  - Encourage patient to monitor pain and request assistance  - Assess pain using appropriate pain scale  - Administer analgesics based on type and severity of pain and evaluate response  - Implement non-pharmacological measures as appropriate and evaluate response  - Consider cultural and social influences on pain and pain management  - Notify physician/advanced practitioner if interventions unsuccessful or patient reports new pain  Outcome: Progressing     Problem: INFECTION - ADULT  Goal: Absence or prevention of progression during hospitalization  Description: INTERVENTIONS:  - Assess and monitor for signs and symptoms of infection  - Monitor lab/diagnostic results  - Monitor all insertion sites, i e  indwelling lines, tubes, and drains  - Monitor endotracheal if appropriate and nasal secretions for changes in amount and color  - [place] appropriate cooling/warming therapies per order  - Administer medications as ordered  - Instruct and encourage patient and family to use good hand hygiene technique  - Identify and instruct in appropriate isolation precautions for identified infection/condition  Outcome: Progressing  Goal: Absence of fever/infection during neutropenic period  Description: INTERVENTIONS:  - Monitor WBC    Outcome: Progressing     Problem: SAFETY ADULT  Goal: Patient will remain free of falls  Description: INTERVENTIONS:  - Assess patient frequently for physical needs  -  Identify cognitive and physical deficits and behaviors that affect risk of falls    -  [place] fall precautions as indicated by assessment   - Educate patient/family on patient safety including physical limitations  - Instruct patient to call for assistance with activity based on assessment  - Modify environment to reduce risk of injury  - Consider OT/PT consult to assist with strengthening/mobility  Outcome: Progressing  Goal: Maintain or return to baseline ADL function  Description: INTERVENTIONS:  -  Assess patient's ability to carry out ADLs; assess patient's baseline for ADL function and identify physical deficits which impact ability to perform ADLs (bathing, care of mouth/teeth, toileting, grooming, dressing, etc )  - Assess/evaluate cause of self-care deficits   - Assess range of motion  - Assess patient's mobility; develop plan if impaired  - Assess patient's need for assistive devices and provide as appropriate  - Encourage maximum independence but intervene and supervise when necessary  - Involve family in performance of ADLs  - Assess for home care needs following discharge   - Consider OT consult to assist with ADL evaluation and planning for discharge  - Provide patient education as appropriate  Outcome: Progressing  Goal: Maintain or return mobility status to optimal level  Description: INTERVENTIONS:  - Assess patient's baseline mobility status (ambulation, transfers, stairs, etc )    - Identify cognitive and physical deficits and behaviors that affect mobility  - Identify mobility aids required to assist with transfers and/or ambulation (gait belt, sit-to-stand, lift, walker, cane, etc )  - [place] fall precautions as indicated by assessment  - Record patient progress and toleration of activity level on Mobility SBAR; progress patient to next Phase/Stage  - Instruct patient to call for assistance with activity based on assessment  - Consider rehabilitation consult to assist with strengthening/weightbearing, etc   Outcome: Progressing     Problem: DISCHARGE PLANNING  Goal: Discharge to home or other facility with appropriate resources  Description: INTERVENTIONS:  - Identify barriers to discharge w/patient and caregiver  - Arrange for needed discharge resources and transportation as appropriate  - Identify discharge learning needs (meds, wound care, etc )  - Arrange for interpretive services to assist at discharge as needed  - Refer to Case Management Department for coordinating discharge planning if the patient needs post-hospital services based on physician/advanced practitioner order or complex needs related to functional status, cognitive ability, or social support system  Outcome: Progressing     Problem: Knowledge Deficit  Goal: Patient/family/caregiver demonstrates understanding of disease process, treatment plan, medications, and discharge instructions  Description: Complete learning assessment and assess knowledge base    Interventions:  - Provide teaching at level of understanding  - Provide teaching via preferred learning methods  Outcome: Progressing     Problem: Nutrition/Hydration-ADULT  Goal: Nutrient/Hydration intake appropriate for improving, restoring or maintaining nutritional needs  Description: Monitor and assess patient's nutrition/hydration status for malnutrition  Collaborate with interdisciplinary team and initiate plan and interventions as ordered  Monitor patient's weight and dietary intake as ordered or per policy  Utilize nutrition screening tool and intervene as necessary  Determine patient's food preferences and provide high-protein, high-caloric foods as appropriate       INTERVENTIONS:  - Monitor oral intake, urinary output, labs, and treatment plans  - Assess nutrition and hydration status and recommend course of action  - Evaluate amount of meals eaten  - Assist patient with eating if necessary   - Allow adequate time for meals  - Recommend/ encourage appropriate diets, oral nutritional supplements, and vitamin/mineral supplements  - Order, calculate, and assess calorie counts as needed  - Recommend, monitor, and adjust tube feedings and TPN/PPN based on assessed needs  - Assess need for intravenous fluids  - Provide specific nutrition/hydration education as appropriate  - Include patient/family/caregiver in decisions related to nutrition  Outcome: Progressing

## 2020-12-30 NOTE — PLAN OF CARE
Problem: Potential for Falls  Goal: Patient will remain free of falls  Description: INTERVENTIONS:  - Assess patient frequently for physical needs  -  Identify cognitive and physical deficits and behaviors that affect risk of falls    -  [place] fall precautions as indicated by assessment   - Educate patient/family on patient safety including physical limitations  - Instruct patient to call for assistance with activity based on assessment  - Modify environment to reduce risk of injury  - Consider OT/PT consult to assist with strengthening/mobility  Outcome: Progressing     Problem: Prexisting or High Potential for Compromised Skin Integrity  Goal: Skin integrity is maintained or improved  Description: INTERVENTIONS:  - Identify patients at risk for skin breakdown  - Assess and monitor skin integrity  - Assess and monitor nutrition and hydration status  - Monitor labs   - Assess for incontinence   - Turn and reposition patient  - Assist with mobility/ambulation  - Relieve pressure over bony prominences  - Avoid friction and shearing  - Provide appropriate hygiene as needed including keeping skin clean and dry  - Evaluate need for skin moisturizer/barrier cream  - Collaborate with interdisciplinary team   - Patient/family teaching  - Consider wound care consult   Outcome: Progressing     Problem: PAIN - ADULT  Goal: Verbalizes/displays adequate comfort level or baseline comfort level  Description: Interventions:  - Encourage patient to monitor pain and request assistance  - Assess pain using appropriate pain scale  - Administer analgesics based on type and severity of pain and evaluate response  - Implement non-pharmacological measures as appropriate and evaluate response  - Consider cultural and social influences on pain and pain management  - Notify physician/advanced practitioner if interventions unsuccessful or patient reports new pain  Outcome: Progressing     Problem: INFECTION - ADULT  Goal: Absence or prevention of progression during hospitalization  Description: INTERVENTIONS:  - Assess and monitor for signs and symptoms of infection  - Monitor lab/diagnostic results  - Monitor all insertion sites, i e  indwelling lines, tubes, and drains  - Monitor endotracheal if appropriate and nasal secretions for changes in amount and color  - [place] appropriate cooling/warming therapies per order  - Administer medications as ordered  - Instruct and encourage patient and family to use good hand hygiene technique  - Identify and instruct in appropriate isolation precautions for identified infection/condition  Outcome: Progressing     Problem: SAFETY ADULT  Goal: Patient will remain free of falls  Description: INTERVENTIONS:  - Assess patient frequently for physical needs  -  Identify cognitive and physical deficits and behaviors that affect risk of falls    -  [place] fall precautions as indicated by assessment   - Educate patient/family on patient safety including physical limitations  - Instruct patient to call for assistance with activity based on assessment  - Modify environment to reduce risk of injury  - Consider OT/PT consult to assist with strengthening/mobility  Outcome: Progressing  Goal: Maintain or return to baseline ADL function  Description: INTERVENTIONS:  -  Assess patient's ability to carry out ADLs; assess patient's baseline for ADL function and identify physical deficits which impact ability to perform ADLs (bathing, care of mouth/teeth, toileting, grooming, dressing, etc )  - Assess/evaluate cause of self-care deficits   - Assess range of motion  - Assess patient's mobility; develop plan if impaired  - Assess patient's need for assistive devices and provide as appropriate  - Encourage maximum independence but intervene and supervise when necessary  - Involve family in performance of ADLs  - Assess for home care needs following discharge   - Consider OT consult to assist with ADL evaluation and planning for discharge  - Provide patient education as appropriate  Outcome: Progressing  Goal: Maintain or return mobility status to optimal level  Description: INTERVENTIONS:  - Assess patient's baseline mobility status (ambulation, transfers, stairs, etc )    - Identify cognitive and physical deficits and behaviors that affect mobility  - Identify mobility aids required to assist with transfers and/or ambulation (gait belt, sit-to-stand, lift, walker, cane, etc )  - [place] fall precautions as indicated by assessment  - Record patient progress and toleration of activity level on Mobility SBAR; progress patient to next Phase/Stage  - Instruct patient to call for assistance with activity based on assessment  - Consider rehabilitation consult to assist with strengthening/weightbearing, etc   Outcome: Progressing     Problem: DISCHARGE PLANNING  Goal: Discharge to home or other facility with appropriate resources  Description: INTERVENTIONS:  - Identify barriers to discharge w/patient and caregiver  - Arrange for needed discharge resources and transportation as appropriate  - Identify discharge learning needs (meds, wound care, etc )  - Arrange for interpretive services to assist at discharge as needed  - Refer to Case Management Department for coordinating discharge planning if the patient needs post-hospital services based on physician/advanced practitioner order or complex needs related to functional status, cognitive ability, or social support system  Outcome: Progressing     Problem: Knowledge Deficit  Goal: Patient/family/caregiver demonstrates understanding of disease process, treatment plan, medications, and discharge instructions  Description: Complete learning assessment and assess knowledge base    Interventions:  - Provide teaching at level of understanding  - Provide teaching via preferred learning methods  Outcome: Progressing     Problem: Nutrition/Hydration-ADULT  Goal: Nutrient/Hydration intake appropriate for improving, restoring or maintaining nutritional needs  Description: Monitor and assess patient's nutrition/hydration status for malnutrition  Collaborate with interdisciplinary team and initiate plan and interventions as ordered  Monitor patient's weight and dietary intake as ordered or per policy  Utilize nutrition screening tool and intervene as necessary  Determine patient's food preferences and provide high-protein, high-caloric foods as appropriate       INTERVENTIONS:  - Monitor oral intake, urinary output, labs, and treatment plans  - Assess nutrition and hydration status and recommend course of action  - Evaluate amount of meals eaten  - Assist patient with eating if necessary   - Allow adequate time for meals  - Recommend/ encourage appropriate diets, oral nutritional supplements, and vitamin/mineral supplements  - Order, calculate, and assess calorie counts as needed  - Recommend, monitor, and adjust tube feedings and TPN/PPN based on assessed needs  - Assess need for intravenous fluids  - Provide specific nutrition/hydration education as appropriate  - Include patient/family/caregiver in decisions related to nutrition  Outcome: Progressing

## 2020-12-30 NOTE — WOUND OSTOMY CARE
Consult Note - Wound   Collsamaria Garvey 80 y o  female MRN: [id number]  Unit/Bed#: E4 -01 Encounter: [id number]      History and Present Illness:  80year old female presented to the hospital with increased lower extremity swelling  Patient's history significant for PAD, CHF, venous stasis  Assessment Findings:   Patient agreeable to assessment  Required assist x 1 to turn in bed for sacral assessment  [place] catheter in place  Patient denies incontinence  Left heel intact, boggy, with blanchable erythema  Scaling to lower extremities  Recently healed wound to right buttock with intact pink/tan scar tissue  1   Present on admission healing stage 3 pressure injury to sacrum--wound bed beefy red/pink with javed-wound hyperpigmentation  No induration or signs of infection at this time  2   Right lower extremity foot/leg wounds per podiatry  Vascular surgery consult pending for abnormal LEADs  See flowsheet and media for wound details  Wound Care Plan:   1-Apply Allevyn Life foam dressing to left heel for prevention  [place] with P   Peel back at least daily for skin assessment and re-apply  Change dressing every 3 days and PRN  2-Elevate heels off of bed surface to offload pressure  3-Ehob cushion in chair when out of bed  4-Moisturize skin daily with skin nourishing cream   5-Turn/reposition q2h or when medically stable for pressure re-distribution on skin--use positioning wedges  6-Sacrum--cleanse with soap and water, pat dry  Apply Maxorb to wound bed and cover with Allevyn Life foam dressing  Change dressing every other day and as needed with soilage  IF PATIENT BECOMES INCONTINENT, discontinue foam dressing and apply Triad paste twice daily and PRN (Triad available from storeroom)  7-Right lower extremity wounds per podiatry  Wound care team to follow  Plan of care reviewed with primary RN      Wound 12/30/20 Pressure Injury Sacrum (Active)   Wound Image   12/30/20 110 Wound Description Pink; Beefy red 12/30/20 1106   Pressure Injury Stage 3 12/30/20 1106   [place]-wound Assessment Hyperpigmented 12/30/20 1106   Wound Length (cm) 2 5 cm 12/30/20 1106   Wound Width (cm) 1 5 cm 12/30/20 1106   Wound Depth (cm) 0 1 cm 12/30/20 1106   Wound Surface Area (cm^2) 3 75 cm^2 12/30/20 1106   Wound Volume (cm^3) 0 38 cm^3 12/30/20 1106   Calculated Wound Volume (cm^3) 0 38 cm^3 12/30/20 1106   Drainage Amount Scant 12/30/20 1106   Drainage Description Serosanguineous 12/30/20 1106   Treatments Cleansed 12/30/20 1106   Dressing Foam, Silicon (eg  Allevyn, etc) 12/30/20 1106   Dressing Changed New 12/30/20 1106   Patient Tolerance Tolerated well 12/30/20 1106   Dressing Status Clean;Dry; Intact 12/30/20 Sam WOODN, RN, [employer]

## 2020-12-30 NOTE — ASSESSMENT & PLAN NOTE
Lab Results   Component Value Date    EGFR 20 12/30/2020    EGFR 25 12/29/2020    EGFR 23 12/28/2020    CREATININE 2 12 (H) 12/30/2020    CREATININE 1 75 (H) 12/29/2020    CREATININE 1 90 (H) 12/28/2020   -Creatinine 2 12 this morning  -Will consult Nephrology if patient decides to proceed with arteriogram   -Avoid hypotension, nephrotoxins

## 2020-12-31 ENCOUNTER — TELEPHONE (OUTPATIENT)
Dept: FAMILY MEDICINE CLINIC | Facility: CLINIC | Age: 85
End: 2020-12-31

## 2020-12-31 LAB
ANION GAP SERPL CALCULATED.3IONS-SCNC: 11 MMOL/L (ref 4–13)
BUN SERPL-MCNC: 55 MG/DL (ref 5–25)
CALCIUM SERPL-MCNC: 8.3 MG/DL (ref 8.3–10.1)
CHLORIDE SERPL-SCNC: 102 MMOL/L (ref 100–108)
CO2 SERPL-SCNC: 23 MMOL/L (ref 21–32)
CREAT SERPL-MCNC: 2.24 MG/DL (ref 0.6–1.3)
ERYTHROCYTE [DISTWIDTH] IN BLOOD BY AUTOMATED COUNT: 17.7 % (ref 11.6–15.1)
GFR SERPL CREATININE-BSD FRML MDRD: 19 ML/MIN/1.73SQ M
GLUCOSE SERPL-MCNC: 102 MG/DL (ref 65–140)
HCT VFR BLD AUTO: 25.2 % (ref 34.8–46.1)
HGB BLD-MCNC: 7.6 G/DL (ref 11.5–15.4)
MAGNESIUM SERPL-MCNC: 2.2 MG/DL (ref 1.6–2.6)
MCH RBC QN AUTO: 27.2 PG (ref 26.8–34.3)
MCHC RBC AUTO-ENTMCNC: 30.2 G/DL (ref 31.4–37.4)
MCV RBC AUTO: 90 FL (ref 82–98)
PLATELET # BLD AUTO: 182 THOUSANDS/UL (ref 149–390)
PMV BLD AUTO: 10.7 FL (ref 8.9–12.7)
POTASSIUM SERPL-SCNC: 3.5 MMOL/L (ref 3.5–5.3)
RBC # BLD AUTO: 2.79 MILLION/UL (ref 3.81–5.12)
SODIUM SERPL-SCNC: 136 MMOL/L (ref 136–145)
WBC # BLD AUTO: 10.47 THOUSAND/UL (ref 4.31–10.16)

## 2020-12-31 PROCEDURE — 99232 SBSQ HOSP IP/OBS MODERATE 35: CPT | Performed by: HOSPITALIST

## 2020-12-31 PROCEDURE — 85027 COMPLETE CBC AUTOMATED: CPT | Performed by: HOSPITALIST

## 2020-12-31 PROCEDURE — NC001 PR NO CHARGE: Performed by: SURGERY

## 2020-12-31 PROCEDURE — 80048 BASIC METABOLIC PNL TOTAL CA: CPT | Performed by: HOSPITALIST

## 2020-12-31 PROCEDURE — 94762 N-INVAS EAR/PLS OXIMTRY CONT: CPT

## 2020-12-31 PROCEDURE — 99232 SBSQ HOSP IP/OBS MODERATE 35: CPT | Performed by: PHYSICIAN ASSISTANT

## 2020-12-31 PROCEDURE — 83735 ASSAY OF MAGNESIUM: CPT | Performed by: HOSPITALIST

## 2020-12-31 RX ADMIN — HYDRALAZINE HYDROCHLORIDE 10 MG: 20 INJECTION INTRAMUSCULAR; INTRAVENOUS at 05:43

## 2020-12-31 RX ADMIN — CARVEDILOL 12.5 MG: 12.5 TABLET, FILM COATED ORAL at 08:37

## 2020-12-31 RX ADMIN — AMLODIPINE BESYLATE 10 MG: 10 TABLET ORAL at 08:37

## 2020-12-31 RX ADMIN — FUROSEMIDE 20 MG: 20 TABLET ORAL at 08:37

## 2020-12-31 RX ADMIN — WARFARIN SODIUM 4 MG: 4 TABLET ORAL at 18:14

## 2020-12-31 RX ADMIN — POTASSIUM CHLORIDE 20 MEQ: 1500 TABLET, EXTENDED RELEASE ORAL at 08:37

## 2020-12-31 RX ADMIN — CARVEDILOL 12.5 MG: 12.5 TABLET, FILM COATED ORAL at 16:51

## 2020-12-31 NOTE — PROGRESS NOTES
Progress Note - Cardiology   Martita Blackmon 80 y o  female MRN: [id number]  Unit/Bed#: E4 -01 Encounter: [id number]          Asessment/Plan  Acute hypoxic respiratory failure - acute diastolic CHF:              Echo 12/29/2020:  EF 50%, No RWMAs, mild-mod LVH, Gr II DD  Valvular heart disease: Mod MR, mild TR (ECHO 12/29/2020)  Hypertension - uncontrolled at admission:              0/p Rx:  Norvasc 10 mg, Lopressor 25 mg b i d  Anemia:   Presenting hemoglobin 10 6 (basline 10s) ~~>now 7 6  CKD:               Recent lab suggest baseline creatinine to be approximately 1 7-1 9  Atrophic left kidney, Hx Rt hydronephrosis and ureteral stenting - 12/2/2020 ([facility])  E coli bacteremia -11/2020:  Rt lower extremity wounds (POA):  Hx RLE DVT (11/18/2020):              0/P Rx: warfarin       - oxygen saturation 94% on 4 L     - Potassium 3 5, BUN 55, creatinine 2 24, magnesium 2 2     - WBCs 10 4, hemoglobin 7 6 (worsening)    · Blood pressure trend improved and now normotensive with addition of carvedilol ~~> continue current regimen of Norvasc+Coreg  · With slow up-trend in BUN and creatinine, will hold afternoon dose of Lasix and reassess in the morning  · Make efforts to try and wean from oxygen ~~> discussed with nursing  · Since admission the patient has had a 3 g drop in hemoglobin while being diuresed ~~> no signs of overt blood loss  Will order stool guaiac with further DX/Tx deferred to primary service  · For nonhealing distal right lower extremity wounds patient is being followed by Podiatry and vascular surgery ~~> at this point patient is undecided regarding desires to move forward with evaluation towards revascularization  Local vare ongoing  Subjective:  No new complaint    Breathing comfortably though still on supplemental oxygen    Vitals:  Vitals:    12/29/20 0537 12/31/20 0549   Weight: 63 4 kg (139 lb 12 4 oz) 59 3 kg (130 lb 11 7 oz)   ,  Vitals:    12/31/20 0500 12/31/20 0549 12/31/20 0730 12/31/20 1130   BP: 162/70  143/76 130/60   BP Location: Left arm  Left arm Left arm   Pulse: 80  79 77   Resp: 16  16 18   Temp: 97 5 °F (36 4 °C)  97 8 °F (36 6 °C) 99 2 °F (37 3 °C)   TempSrc: Temporal  Temporal Temporal   SpO2:   92% 94%   Weight:  59 3 kg (130 lb 11 7 oz)     Height:           Exam:  General:  Alert normally conversant and comfortable-appearing  Heart:  Somewhat distant    Regular with controlled rate and no pathologic murmur or rub  Lungs:  Clear throughout  Lower Limbs:  No edema           Telemetry:       Normal sinus rhythm with ventricular rate trend in the 70s    Medications:    Current Facility-Administered Medications:     acetaminophen (TYLENOL) tablet 650 mg, 650 mg, Oral, Q4H PRN, Yaneli Batista PA-C    albuterol inhalation solution 2 5 mg, 2 5 mg, Nebulization, Q6H PRN, Palmer Vasquez, DO, 2 5 mg at 12/30/20 0842    amLODIPine (NORVASC) tablet 10 mg, 10 mg, Oral, Daily, Juanita Batista PA-C, 10 mg at 12/31/20 4828    calcium carbonate (TUMS) chewable tablet 1,000 mg, 1,000 mg, Oral, TID PRN, Yaneli Batista PA-C    carvedilol (COREG) tablet 12 5 mg, 12 5 mg, Oral, BID With Meals, GATO Le-LOW, 12 5 mg at 12/31/20 3191    furosemide (LASIX) tablet 20 mg, 20 mg, Oral, BID (diuretic), GATO Le-C, 20 mg at 12/31/20 0897    hydrALAZINE (APRESOLINE) injection 10 mg, 10 mg, Intravenous, Q6H PRN, [place] Sauce, CRNP, 10 mg at 12/31/20 0543    ondansetron (ZOFRAN) injection 4 mg, 4 mg, Intravenous, Q6H PRN, GATO Pulido-C    potassium chloride (K-DUR,KLOR-CON) CR tablet 20 mEq, 20 mEq, Oral, Daily, GATO Dsouza-LOW, 20 mEq at 12/31/20 7137    senna (SENOKOT) tablet 8 6 mg, 1 tablet, Oral, HS PRN, [employer] LAINA Meyers    warfarin (COUMADIN) tablet 4 mg, 4 mg, Oral, Daily (warfarin), Juanita Batista PA-C, 4 mg at 12/30/20 1723      Labs/Data:        Results from last 7 days   Lab Units 12/31/20  0459 12/30/20  0650 12/29/20  0535   WBC Thousand/uL 10 47* 12 75* 10 35*   HEMOGLOBIN g/dL 7 6* 8 3* 10 8*   HEMATOCRIT % 25 2* 27 2* 35 2   PLATELETS Thousands/uL 182 212 335     Results from last 7 days   Lab Units 12/31/20  0459 12/30/20  0651 12/29/20  0535   POTASSIUM mmol/L 3 5 3 3* 3 9   CHLORIDE mmol/L 102 105 105   CO2 mmol/L 23 23 22   BUN mg/dL 55* 50* 47*

## 2020-12-31 NOTE — PLAN OF CARE
Problem: Potential for Falls  Goal: Patient will remain free of falls  Description: INTERVENTIONS:  - Assess patient frequently for physical needs  -  Identify cognitive and physical deficits and behaviors that affect risk of falls    -  [place] fall precautions as indicated by assessment   - Educate patient/family on patient safety including physical limitations  - Instruct patient to call for assistance with activity based on assessment  - Modify environment to reduce risk of injury  - Consider OT/PT consult to assist with strengthening/mobility  Outcome: Progressing     Problem: Prexisting or High Potential for Compromised Skin Integrity  Goal: Skin integrity is maintained or improved  Description: INTERVENTIONS:  - Identify patients at risk for skin breakdown  - Assess and monitor skin integrity  - Assess and monitor nutrition and hydration status  - Monitor labs   - Assess for incontinence   - Turn and reposition patient  - Assist with mobility/ambulation  - Relieve pressure over bony prominences  - Avoid friction and shearing  - Provide appropriate hygiene as needed including keeping skin clean and dry  - Evaluate need for skin moisturizer/barrier cream  - Collaborate with interdisciplinary team   - Patient/family teaching  - Consider wound care consult   Outcome: Progressing     Problem: PAIN - ADULT  Goal: Verbalizes/displays adequate comfort level or baseline comfort level  Description: Interventions:  - Encourage patient to monitor pain and request assistance  - Assess pain using appropriate pain scale  - Administer analgesics based on type and severity of pain and evaluate response  - Implement non-pharmacological measures as appropriate and evaluate response  - Consider cultural and social influences on pain and pain management  - Notify physician/advanced practitioner if interventions unsuccessful or patient reports new pain  Outcome: Progressing     Problem: INFECTION - ADULT  Goal: Absence or prevention of progression during hospitalization  Description: INTERVENTIONS:  - Assess and monitor for signs and symptoms of infection  - Monitor lab/diagnostic results  - Monitor all insertion sites, i e  indwelling lines, tubes, and drains  - Monitor endotracheal if appropriate and nasal secretions for changes in amount and color  - [place] appropriate cooling/warming therapies per order  - Administer medications as ordered  - Instruct and encourage patient and family to use good hand hygiene technique  - Identify and instruct in appropriate isolation precautions for identified infection/condition  Outcome: Progressing     Problem: SAFETY ADULT  Goal: Patient will remain free of falls  Description: INTERVENTIONS:  - Assess patient frequently for physical needs  -  Identify cognitive and physical deficits and behaviors that affect risk of falls    -  [place] fall precautions as indicated by assessment   - Educate patient/family on patient safety including physical limitations  - Instruct patient to call for assistance with activity based on assessment  - Modify environment to reduce risk of injury  - Consider OT/PT consult to assist with strengthening/mobility  Outcome: Progressing  Goal: Maintain or return to baseline ADL function  Description: INTERVENTIONS:  -  Assess patient's ability to carry out ADLs; assess patient's baseline for ADL function and identify physical deficits which impact ability to perform ADLs (bathing, care of mouth/teeth, toileting, grooming, dressing, etc )  - Assess/evaluate cause of self-care deficits   - Assess range of motion  - Assess patient's mobility; develop plan if impaired  - Assess patient's need for assistive devices and provide as appropriate  - Encourage maximum independence but intervene and supervise when necessary  - Involve family in performance of ADLs  - Assess for home care needs following discharge   - Consider OT consult to assist with ADL evaluation and planning for discharge  - Provide patient education as appropriate  Outcome: Progressing  Goal: Maintain or return mobility status to optimal level  Description: INTERVENTIONS:  - Assess patient's baseline mobility status (ambulation, transfers, stairs, etc )    - Identify cognitive and physical deficits and behaviors that affect mobility  - Identify mobility aids required to assist with transfers and/or ambulation (gait belt, sit-to-stand, lift, walker, cane, etc )  - [place] fall precautions as indicated by assessment  - Record patient progress and toleration of activity level on Mobility SBAR; progress patient to next Phase/Stage  - Instruct patient to call for assistance with activity based on assessment  - Consider rehabilitation consult to assist with strengthening/weightbearing, etc   Outcome: Progressing     Problem: DISCHARGE PLANNING  Goal: Discharge to home or other facility with appropriate resources  Description: INTERVENTIONS:  - Identify barriers to discharge w/patient and caregiver  - Arrange for needed discharge resources and transportation as appropriate  - Identify discharge learning needs (meds, wound care, etc )  - Arrange for interpretive services to assist at discharge as needed  - Refer to Case Management Department for coordinating discharge planning if the patient needs post-hospital services based on physician/advanced practitioner order or complex needs related to functional status, cognitive ability, or social support system  Outcome: Progressing     Problem: Knowledge Deficit  Goal: Patient/family/caregiver demonstrates understanding of disease process, treatment plan, medications, and discharge instructions  Description: Complete learning assessment and assess knowledge base    Interventions:  - Provide teaching at level of understanding  - Provide teaching via preferred learning methods  Outcome: Progressing     Problem: Nutrition/Hydration-ADULT  Goal: Nutrient/Hydration intake appropriate for improving, restoring or maintaining nutritional needs  Description: Monitor and assess patient's nutrition/hydration status for malnutrition  Collaborate with interdisciplinary team and initiate plan and interventions as ordered  Monitor patient's weight and dietary intake as ordered or per policy  Utilize nutrition screening tool and intervene as necessary  Determine patient's food preferences and provide high-protein, high-caloric foods as appropriate       INTERVENTIONS:  - Monitor oral intake, urinary output, labs, and treatment plans  - Assess nutrition and hydration status and recommend course of action  - Evaluate amount of meals eaten  - Assist patient with eating if necessary   - Allow adequate time for meals  - Recommend/ encourage appropriate diets, oral nutritional supplements, and vitamin/mineral supplements  - Order, calculate, and assess calorie counts as needed  - Recommend, monitor, and adjust tube feedings and TPN/PPN based on assessed needs  - Assess need for intravenous fluids  - Provide specific nutrition/hydration education as appropriate  - Include patient/family/caregiver in decisions related to nutrition  Outcome: Progressing

## 2020-12-31 NOTE — PROGRESS NOTES
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 [facility])  Assessment & Plan  Podiatry and vascular seeing            Subjective:   Doing better  Less sob  Less anxious    No chest pain    No cough  Objective:     Vitals:   Temp (24hrs), Av 7 °F (37 1 °C), Min:97 5 °F (36 4 °C), Max:99 4 °F (37 4 °C)    Temp:  [97 5 °F (36 4 °C)-99 4 °F (37 4 °C)] 99 4 °F (37 4 °C)  HR:  [74-89] 75  Resp:  [16-18] 18  BP: (130-162)/(60-76) 146/64  SpO2:  [92 %-94 %] 94 %  Body mass index is 21 1 kg/m²  Input and Output Summary (last 24 hours): Intake/Output Summary (Last 24 hours) at 2020 1755  Last data filed at 2020 0801  Gross per 24 hour   Intake 760 ml   Output 500 ml   Net 260 ml       Physical Exam:     Physical Exam  Vitals signs and nursing note reviewed  HENT:      Head: Normocephalic and atraumatic  Eyes:      Pupils: Pupils are equal, round, and reactive to light  Neck:      Comments: +JVD    Cardiovascular:      Rate and Rhythm: Normal rate and regular rhythm  Heart sounds: No murmur  No friction rub  No gallop  Pulmonary:      Effort: Pulmonary effort is normal       Breath sounds: Normal breath sounds  No wheezing or rales  Abdominal:      General: Bowel sounds are normal       Palpations: Abdomen is soft  Tenderness: There is no abdominal tenderness  Musculoskeletal:      Right lower leg: Edema (1+) present  Left lower leg: Edema (1+) present  [facility] Additional Data:     Labs:    Results from last 7 days   Lab Units 12/31/20  0459  12/29/20  0535   WBC Thousand/uL 10 47*   < > 10 35*   HEMOGLOBIN g/dL 7 6*   < > 10 8*   HEMATOCRIT % 25 2*   < > 35 2   PLATELETS Thousands/uL 182   < > 335   NEUTROS PCT %  --   --  35*   LYMPHS PCT %  --   --  54*   MONOS PCT %  --   --  7   EOS PCT %  --   --  4    < > = values in this interval not displayed  Results from last 7 days   Lab Units 12/31/20  0459  12/28/20  1531   POTASSIUM mmol/L 3 5   < > 4 3   CHLORIDE mmol/L 102   < > 105   CO2 mmol/L 23   < > 22   BUN mg/dL 55*   < > 50*   CREATININE mg/dL 2 24*   < > 1 90*   CALCIUM mg/dL 8 3   < > 8 3   ALK PHOS U/L  --   --  101   ALT U/L  --   --  65   AST U/L  --   --  61*    < > = values in this interval not displayed  Results from last 7 days   Lab Units 12/30/20  0650   INR  2 42*     Results from last 7 days   Lab Units 12/30/20  1139   POC GLUCOSE mg/dl 99               * I Have Reviewed All Lab Data     Recent Cultures (last 7 days):     Results from last 7 days   Lab Units 12/28/20  1604 12/28/20  1531   BLOOD CULTURE  No Growth at 48 hrs  No Growth at 48 hrs           Last 24 Hours Medication List:   Current Facility-Administered Medications   Medication Dose Route Frequency Provider Last Rate    acetaminophen  650 mg Oral Q4H PRN Kerri Vasquez PA-C      albuterol  2 5 mg Nebulization Q6H PRN Guillermina Vasquez,       amLODIPine  10 mg Oral Daily Juanita Batista PA-C      calcium carbonate  1,000 mg Oral TID PRN Kerri Vasquez PA-C      carvedilol  12 5 mg Oral BID With Meals Christopher Barba PA-C      hydrALAZINE  10 mg Intravenous Q6H PRN OTIS Simon      ondansetron  4 mg Intravenous Q6H PRN Kerri Vasquez PA-C      potassium chloride  20 mEq Oral Daily Juanita Batista PA-C      senna  1 tablet Oral HS PRN Kerri Vasquez PA-C      warfarin  4 mg Oral Daily (warfarin) Kerri Vasquez PA-C           VTE Pharmacologic Prophylaxis:   Pharmacologic: Warfarin (Coumadin)      Current Length of Stay: 3 day(s)    Current Patient Status: Inpatient       Discharge Plan:   Code Status: Level 1 - Full Code           Today, Patient Was Seen By: Enio Daugherty DO    ** Please Note: Dictation voice to text software may have been used in the creation of this document   **

## 2020-12-31 NOTE — ASSESSMENT & PLAN NOTE
He is diuresing well  Will have to back off on the lasix with worsening Cr      Move out of level 2 step down

## 2020-12-31 NOTE — SOCIAL WORK
[facility] can not accept any new cases  Made referrals to about 28  VNAs and need to f/u with accepting VNA  Rec a call from spouse stating their insurance will cover long term care RN and informed spouse they would need to work on getting long term RN in the home or HHA  This LSW will set up VNA for RN and PT in room  Spouse stated he understood  Need to f/u with what VNA will accept case for wound care RN and PT  [facility] accepted the case with a [facility] acute hospital[facility] on Tuesday and not before  Will need to fax [phone] when discharge  Left a message with Sandra Barkley from [place] stating setting up [facility] and spouse contacting HHA agencies for extra help in the home

## 2021-01-01 LAB
ANION GAP SERPL CALCULATED.3IONS-SCNC: 9 MMOL/L (ref 4–13)
BUN SERPL-MCNC: 51 MG/DL (ref 5–25)
CALCIUM SERPL-MCNC: 7.9 MG/DL (ref 8.3–10.1)
CHLORIDE SERPL-SCNC: 101 MMOL/L (ref 100–108)
CO2 SERPL-SCNC: 26 MMOL/L (ref 21–32)
CREAT SERPL-MCNC: 2.21 MG/DL (ref 0.6–1.3)
GFR SERPL CREATININE-BSD FRML MDRD: 19 ML/MIN/1.73SQ M
GLUCOSE SERPL-MCNC: 107 MG/DL (ref 65–140)
INR PPP: 2.96 (ref 0.84–1.19)
POTASSIUM SERPL-SCNC: 3.4 MMOL/L (ref 3.5–5.3)
PROTHROMBIN TIME: 30.2 SECONDS (ref 11.6–14.5)
SODIUM SERPL-SCNC: 136 MMOL/L (ref 136–145)

## 2021-01-01 PROCEDURE — 99232 SBSQ HOSP IP/OBS MODERATE 35: CPT | Performed by: HOSPITALIST

## 2021-01-01 PROCEDURE — 99232 SBSQ HOSP IP/OBS MODERATE 35: CPT | Performed by: INTERNAL MEDICINE

## 2021-01-01 PROCEDURE — 80048 BASIC METABOLIC PNL TOTAL CA: CPT | Performed by: PHYSICIAN ASSISTANT

## 2021-01-01 PROCEDURE — 85610 PROTHROMBIN TIME: CPT | Performed by: HOSPITALIST

## 2021-01-01 RX ORDER — FUROSEMIDE 10 MG/ML
20 INJECTION INTRAMUSCULAR; INTRAVENOUS ONCE
Status: COMPLETED | OUTPATIENT
Start: 2021-01-01 | End: 2021-01-01

## 2021-01-01 RX ORDER — FUROSEMIDE 10 MG/ML
20 INJECTION INTRAMUSCULAR; INTRAVENOUS
Status: DISCONTINUED | OUTPATIENT
Start: 2021-01-01 | End: 2021-01-01

## 2021-01-01 RX ADMIN — WARFARIN SODIUM 4 MG: 4 TABLET ORAL at 16:45

## 2021-01-01 RX ADMIN — POTASSIUM CHLORIDE 20 MEQ: 1500 TABLET, EXTENDED RELEASE ORAL at 08:41

## 2021-01-01 RX ADMIN — AMLODIPINE BESYLATE 10 MG: 10 TABLET ORAL at 08:41

## 2021-01-01 RX ADMIN — CARVEDILOL 12.5 MG: 12.5 TABLET, FILM COATED ORAL at 08:41

## 2021-01-01 RX ADMIN — FUROSEMIDE 20 MG: 10 INJECTION, SOLUTION INTRAMUSCULAR; INTRAVENOUS at 12:03

## 2021-01-01 RX ADMIN — CARVEDILOL 12.5 MG: 12.5 TABLET, FILM COATED ORAL at 16:45

## 2021-01-01 NOTE — PROGRESS NOTES
CARDIOLOGY INPATIENT FOLLOW-UP PROGRESS NOTE  *-*-*-*-*-*-*-*-*-*-*-*-*-*-*-*-*-*-*-*-*-*-*-*-*-*-*-*-*-*-*-*-*-*-*-*-*-*-*-*-*-*-*-*-*-*-*-*-*-*-*-*-*-*-  Remington Soto DATE: 01/01/21 11:04 AM   AUTHOR: Jossy Whalen MD  PATIENT: Malka Jaimes   5/18/1930    [id number]   80 y o    female  INPATIENT HOSPITALIST PHYSICIAN: Nadir Miller DO  DATE OF ADMISSION: 12/28/2020  3:25 PM; LENGTH OF STAY: 4 days  *-*-*-*-*-*-*-*-*-*-*-*-*-*-*-*-*-*-*-*-*-*-*-*-*-*-*-*-*-*-*-*-*-*-*-*-*-*-*-*-*-*-*-*-*-*-*-*-*-*-*-*-*-*-    CARDIOLOGY ASSESSMENT  1  Acute on chronic decompensated diastolic heart failure  2  Acute hypoxemic respiratory failure  3  Valvular heart disease with moderate mitral valve regurgitation and mild tricuspid valve regurgitation  4  Essential hypertension  5  Atrophic left kidney  History of recent right hydronephrosis and ureteral stenting  6  Recent E coli bacteremia  7  Right lower extremity DVT November 2020  8  Right lower extremity venous ulcerations  Patient Active Problem List   Diagnosis    Acute deep vein thrombosis (DVT) of calf muscle vein of right lower extremity (HCC)    LILIAN (acute kidney injury) ([address] )    Hydronephrosis of right kidney    Elevated blood urea nitrogen    Hypertensive urgency    Hyponatremia    Leg ulcer, left, limited to breakdown of skin ([address] )    Nonhealing nonsurgical wound    Protein-calorie malnutrition ([address] )    Sepsis due to Gram negative bacteria ([address] )    Ulcer of right lower extremity with fat layer exposed ([address] )    Urinary retention    Wound infection    PAD (peripheral artery disease) (HCC)    Essential hypertension    Volume overload    CKD (chronic kidney disease)    History of DVT (deep vein thrombosis)        PLAN:  - will give 1 dose of furosemide 20 mg x 1 today  - continue carvedilol amlodipine and potassium supplementation  - will try to wean off oxygen therapy    - will add further afterload reduction with Isordil and hydralazine if blood pressure is able to tolerate  - will follow up on repeat electrolytes and renal function tomorrow  -- continued wound care and incentive spirometry    *-*-*-*-*-*-*-*-*-*-*-*-*-*-*-*-*-*-*-*-*-*-*-*-*-*-*-*-*-*-*-*-*-*-*-*-*-*-*-*-*-*-*-*-*-*-*-*-*-*-*-*-*-*-  INTERVAL CHANGES / HISTORY OF PRESENT ILLNESS:  No acute events overnight  Patient reports feeling fatigued has intermittent cough  Still on nasal cannula oxygen up to 3 liters/minute  *-*-*-*-*-*-*-*-*-*-*-*-*-*-*-*-*-*-*-*-*-*-*-*-*-*-*-*-*-*-*-*-*-*-*-*-*-*-*-*-*-*-*-*-*-*-*-*-*-*-*-*-*-*  REVIEW OF SYMPTOMS:    Positive for:  Cough and fatigue  Negative for: All remaining as reviewed below and in HPI   SYSTEM SYMPTOMS REVIEWED:  Generalweight change, fever, night sweats  Respiratoryl Wheezing, shortness of breath, cough, URI symptoms, sputum, blood  Cardiovascularchest pain, syncope, dyspnea on exertion, edema, decline in exercise tolerance, claudication   Gastrointestinalpersistent vomiting, diarrhea, abdominal distention, blood in stool   Muscular or skeletaljoint pain or swelling   Neurologicheadaches, syncope, abnormal movement  Hematologichistory of easy bruising and bleeding   Endocrinethyroid enlargement, heat or cold intolerance, polyuria   Psychiatricanxiety, depression      *-*-*-*-*-*-*-*-*-*-*-*-*-*-*-*-*-*-*-*-*-*-*-*-*-*-*-*-*-*-*-*-*-*-*-*-*-*-*-*-*-*-*-*-*-*-*-*-*-*-*-*-*-*-  VITAL SIGNS:  Vitals:    20 1500 20 2357 21 0537 21 0816   BP: 146/64 121/57  153/70   BP Location: Right arm Right arm  Right arm   Pulse: 75 78  80   Resp: 18 18  18   Temp: 99 4 °F (37 4 °C)   98 4 °F (36 9 °C)   TempSrc: Temporal   Temporal   SpO2: 94% 94%  90%   Weight:   60 2 kg (132 lb 11 5 oz)    Height:          Temp (24hrs), Av °F (37 2 °C), Min:98 4 °F (36 9 °C), Max:99 4 °F (37 4 °C)  Current: Temperature: 98 4 °F (36 9 °C)    Weight (last 2 days)     Date/Time   Weight    21 0537   60 2 (132 72)    20 0549   59 3 (130 73)            Body mass index is 21 42 kg/m²  Wt Readings from Last 3 Encounters:   01/01/21 60 2 kg (132 lb 11 5 oz)   12/22/20 60 8 kg (134 lb)      Intake/Output Summary (Last 24 hours) at 1/1/2021 1104  Last data filed at 1/1/2021 0801  Gross per 24 hour   Intake 740 ml   Output 1000 ml   Net -260 ml        *-*-*-*-*-*-*-*-*-*-*-*-*-*-*-*-*-*-*-*-*-*-*-*-*-*-*-*-*-*-*-*-*-*-*-*-*-*-*-*-*-*-*-*-*-*-*-*-*-*-*-*-*-*-  PHYSICAL EXAM:  General Appearance:     frail, in mild respiratory distress, on nasal cannula oxygen, has intermittent cough   Head, Eyes, ENT:     mucous membranes dry   Neck:    unable to appreciate jugular venous distension   Back:     Symmetric, no curvature  Lungs:     Respirations slightly labored  decreased breath sounds bilaterally   Chest wall:    No tenderness or deformity   Heart:    Regular rate and rhythm, S1 and S2 normal, unable to appreciate murmur   Abdomen:     Soft, non-tender, No obvious masses, or organomegaly   Extremities:   warm peripheries, signs of poor perfusion and venous stasis, wound dressing present in right lower extremity   Skin:   Skin color, texture, turgor normal, no rashes or lesions     *-*-*-*-*-*-*-*-*-*-*-*-*-*-*-*-*-*-*-*-*-*-*-*-*-*-*-*-*-*-*-*-*-*-*-*-*-*-*-*-*-*-*-*-*-*-*-*-*-*-*-*-*-*-  TELEMETRY, LAST ECG:  Telemetry reviewed      Currently not on telemetry Results for orders placed or performed during the hospital encounter of 12/28/20   ECG 12 lead   Result Value    Ventricular Rate 66    Atrial Rate 66    WY Interval 172    QRSD Interval 86    QT Interval 396    QTC Interval 415    P Axis 92    QRS Axis 73    T Wave Axis 77    Narrative    Sinus rhythm with sinus arrhythmia  Anterior infarct , age undetermined  Abnormal ECG  No previous ECGs available  Confirmed by Ruben Montiel ([id number]) on 12/28/2020 3:34:51 PM *-*-*-*-*-*-*-*-*-*-*-*-*-*-*-*-*-*-*-*-*-*-*-*-*-*-*-*-*-*-*-*-*-*-*-*-*-*-*-*-*-*-*-*-*-*-*-*-*-*-*-*-*-*-    CURRENT SCHEDULED MEDICATIONS:    Current Facility-Administered Medications:     acetaminophen (TYLENOL) tablet 650 mg, 650 mg, Oral, Q4H PRN, Edson Juarez, CRNP    albuterol inhalation solution 2 5 mg, 2 5 mg, Nebulization, Q6H PRN, Edson Juarez, CRNP, 2 5 mg at 12/30/20 5216    amLODIPine (NORVASC) tablet 10 mg, 10 mg, Oral, Daily, Edson Juarez, CRNP, 10 mg at 01/01/21 8458    calcium carbonate (TUMS) chewable tablet 1,000 mg, 1,000 mg, Oral, TID PRN, Edson Juarez, CRNP    carvedilol (COREG) tablet 12 5 mg, 12 5 mg, Oral, BID With Meals, Edson Juarez, CRNP, 12 5 mg at 01/01/21 0841    hydrALAZINE (APRESOLINE) injection 10 mg, 10 mg, Intravenous, Q6H PRN, Edson Juarez, CRNP, 10 mg at 12/31/20 0543    ondansetron [facility]) injection 4 mg, 4 mg, Intravenous, Q6H PRN, Edson Juarez, CRNP    potassium chloride (K-DUR,KLOR-CON) CR tablet 20 mEq, 20 mEq, Oral, Daily, Edson Juarez, CRNP, 20 mEq at 01/01/21 0841    senna (SENOKOT) tablet 8 6 mg, 1 tablet, Oral, HS PRN, Edson Juarez, CRNP    warfarin (COUMADIN) tablet 4 mg, 4 mg, Oral, Daily (warfarin), Edson Juarez, CRNP, 4 mg at 12/31/20 1814 ALLERGIES:  Allergies   Allergen Reactions    No Known Allergies         *-*-*-*-*-*-*-*-*-*-*-*-*-*-*-*-*-*-*-*-*-*-*-*-*-*-*-*-*-*-*-*-*-*-*-*-*-*-*-*-*-*-*-*-*-*-*-*-*-*-*-*-*-*  LABORATORY DATA:  I have personally reviewed pertinent labs      CMP:  Results from last 7 days   Lab Units 01/01/21  0510 12/31/20  0459 12/30/20  0651  12/28/20  1531   SODIUM mmol/L 136 136 139   < > 138   POTASSIUM mmol/L 3 4* 3 5 3 3*   < > 4 3   CHLORIDE mmol/L 101 102 105   < > 105   CO2 mmol/L 26 23 23   < > 22   BUN mg/dL 51* 55* 50*   < > 50*   CREATININE mg/dL 2 21* 2 24* 2 12*   < > 1 90*   CALCIUM mg/dL 7 9* 8 3 8 7   < > 8 3   ALK PHOS U/L  --   --   --   --  101   ALT U/L  --   --   --   --  65   AST U/L  --   --   --   --  61* < > = values in this interval not displayed  Results from last 7 days   Lab Units 20  0459 20  0651   MAGNESIUM mg/dL 2 2 2 1        Cardiac Profile:   Results from last 7 days   Lab Units 20  0535   NT-PRO BNP pg/mL 32,959*                CBC:   Results from last 7 days   Lab Units 20  0459 20  0650 20  0535   WBC Thousand/uL 10 47* 12 75* 10 35*   HEMOGLOBIN g/dL 7 6* 8 3* 10 8*   HEMATOCRIT % 25 2* 27 2* 35 2   PLATELETS Thousands/uL 182 212 335     PT/INR: No results found for: PT, INR, Microbiology:   Results from last 7 days   Lab Units 20  1604 20  1531   BLOOD CULTURE  No Growth at 72 hrs  No Growth at 72 hrs  *-*-*-*-*-*-*-*-*-*-*-*-*-*-*-*-*-*-*-*-*-*-*-*-*-*-*-*-*-*-*-*-*-*-*-*-*-*-*-*-*-*-*-*-*-*-*-*-*-*-*-*-*-*-  IMAGING STUDIES REPORTS: Imaging studies results reviewed    No procedure found  No Chest XR results available for this patient  *-*-*-*-*-*-*-*-*-*-*-*-*-*-*-*-*-*-*-*-*-*-*-*-*-*-*-*-*-*-*-*-*-*-*-*-*-*-*-*-*-*-*-*-*-*-*-*-*-*-*-*-*-*-  ECHOCARDIOGRAM AND OTHER CARDIAC TESTS RESULTS:  Results for orders placed during the hospital encounter of 20   Echo complete with contrast if indicated    Narrative [address] [facility]    [address], [address]  [phone]    Transthoracic Echocardiogram  2D, M-mode, Doppler, and Color Doppler    Study date:  29-Dec-2020    Patient: Angeles Todd  MR number: [id number]  Account number: [de-identified]  : 18-May-1930  Age: 80 years  Gender: Female  Status: Inpatient  Location: Bedside  Height: 66 in  Weight: 138 6 lb  BP: 141/ 95 mmHg    Indications: Heart failure    Diagnoses: I50 9 - Heart failure, unspecified    Sonographer:  Dino Hollis RDCS  Primary Physician:  Sun Muhammad MD  Referring Physician:  Luann Manuel PA-C  Group:  [facility]  Interpreting Physician:  Arturo Aguayo DO    SUMMARY    LEFT VENTRICLE:  Systolic function was normal  Ejection fraction was estimated to be 50 %  There were no regional wall motion abnormalities  Wall thickness was mildly to moderately increased  Features were consistent with a pseudonormal left ventricular filling pattern, with concomitant abnormal relaxation and increased filling pressure (grade 2 diastolic dysfunction)  LEFT ATRIUM:  The atrium was markedly dilated  RIGHT ATRIUM:  The atrium was moderately dilated  MITRAL VALVE:  There was moderate regurgitation  TRICUSPID VALVE:  There was mild regurgitation  IVC, HEPATIC VEINS:  The inferior vena cava was mildly dilated  PERICARDIUM:  A trace pericardial effusion was identified posterior to the heart  There was a left pleural effusion  SUMMARY MEASUREMENTS  2D measurements:  Unspecified Anatomy:   %FS was 26 %  Ao Diam was 2 8 cm  EDV(Teich) was 106 ml   EF Biplane was 45 7 %  EF(Teich) was 51 %  ESV(Teich) was 52 ml  IVSd was 1 1 cm  LA Diam was 4 6 cm  LAAs A2C was 28 4 cm2  LAAs A4C was 27 2 cm2  LAESV A-L A2C was 102 1 ml  LAESV A-L A4C was 104 6 ml  LAESV Index (A-L) was 64 ml/m2  LAESV MOD A2C was 99 2 ml  LAESV MOD A4C was 97 3 ml  LAESV(A-L) was 109 4 ml  LAESV(MOD BP) was 103 7 ml  LAESVInd MOD BP was 60 6 ml/m2  LALs  A2C was 6 7 cm  LALs A4C was 6 cm  LVEDV MOD A2C was 158 1 ml  LVEDV MOD A4C was 138 ml  LVEDV MOD BP was 152 3 ml  LVEF MOD A2C was 36 9 %  LVEF MOD A4C was 57 %  LVESV MOD A2C was 99 7 ml  LVESV MOD A4C was 59 4 ml  LVESV MOD BP  was 82 7 ml  LVIDd was 4 8 cm  LVIDs was 3 5 cm  LVLd A2C was 8 8 cm  LVLd A4C was 8 2 cm  LVLs A2C was 8 cm  LVLs A4C was 6 9 cm  LVPWd was 1 1 cm  RAAs A4C was 14 3 cm2  RAESV A-L was 37 4 ml   RAESV MOD was 35 8 ml  RALs was  4 7 cm  RVIDd was 3 3 cm  SV MOD A2C was 58 4 ml   SV MOD A4C was 78 6 ml   SV(Teich) was 54 1 ml   CW measurements:  Unspecified Anatomy:   AV Vmax was 1 2 m/s    AV maxPG was 5 5 mmHg   TR Vmax was 2 6 m/s   TR maxPG was 27 9 mmHg  MM measurements:  Unspecified Anatomy:   TAPSE was 2 2 cm  PW measurements:  Unspecified Anatomy:   E' Avg was 0 1 m/s  E' Lat was 0 1 m/s  E' Sept was 0 1 m/s  E/E' Avg was 20   E/E' Lat was 20 6   E/E' Sept was 19 5   LVOT Vmax was 0 7 m/s  LVOT maxPG was 2 1 mmHg  MV A Steven was 1 1 m/s  MV Dec Finney was  8 7 m/s2  MV DecT was 152 ms  MV E Steven was 1 3 m/s  MV E/A Ratio was 1 2   MV PHT was 44 1 ms  MVA By PHT was 5 cm2  HISTORY: PRIOR HISTORY: HTN, DVT, CKD, sepsis, PVD    PROCEDURE: The procedure was performed at the bedside  This was a routine study  The transthoracic approach was used  The study included complete 2D imaging, M-mode, complete spectral Doppler, and color Doppler  Echocardiographic views  were somewhat limited due to poor patient compliance and poor acoustic window availability  Image quality was adequate  LEFT VENTRICLE: Size was normal  Systolic function was normal  Ejection fraction was estimated to be 50 %  There were no regional wall motion abnormalities  Wall thickness was mildly to moderately increased  DOPPLER: Features were  consistent with a pseudonormal left ventricular filling pattern, with concomitant abnormal relaxation and increased filling pressure (grade 2 diastolic dysfunction)  RIGHT VENTRICLE: The size was normal  Systolic function was normal  Wall thickness was normal     LEFT ATRIUM: The atrium was markedly dilated  RIGHT ATRIUM: The atrium was moderately dilated  MITRAL VALVE: There was normal leaflet separation  There was mildly restricted mobility of the posterior leaflet  DOPPLER: The transmitral velocity was within the normal range  There was no evidence for stenosis  There was moderate  regurgitation  The regurgitant jet was eccentric and directed posteriorly  AORTIC VALVE: The valve was trileaflet  Leaflets exhibited normal thickness and normal cuspal separation   DOPPLER: Transaortic velocity was within the normal range  There was no evidence for stenosis  There was no regurgitation  TRICUSPID VALVE: The valve structure was normal  There was normal leaflet separation  DOPPLER: The transtricuspid velocity was within the normal range  There was no evidence for stenosis  There was mild regurgitation  PULMONIC VALVE: Leaflets exhibited normal thickness, no calcification, and normal cuspal separation  DOPPLER: The transpulmonic velocity was within the normal range  There was no regurgitation  PERICARDIUM: A trace pericardial effusion was identified posterior to the heart  There was a left pleural effusion  SYSTEMIC VEINS: IVC: The inferior vena cava was mildly dilated   Respirophasic changes were normal     SYSTEM MEASUREMENT TABLES    2D  %FS: 26 %  Ao Diam: 2 8 cm  EDV(Teich): 106 ml  EF Biplane: 45 7 %  EF(Teich): 51 %  ESV(Teich): 52 ml  IVSd: 1 1 cm  LA Diam: 4 6 cm  LAAs A2C: 28 4 cm2  LAAs A4C: 27 2 cm2  LAESV A-L A2C: 102 1 ml  LAESV A-L A4C: 104 6 ml  LAESV Index (A-L): 64 ml/m2  LAESV MOD A2C: 99 2 ml  LAESV MOD A4C: 97 3 ml  LAESV(A-L): 109 4 ml  LAESV(MOD BP): 103 7 ml  LAESVInd MOD BP: 60 6 ml/m2  LALs A2C: 6 7 cm  LALs A4C: 6 cm  LVEDV MOD A2C: 158 1 ml  LVEDV MOD A4C: 138 ml  LVEDV MOD BP: 152 3 ml  LVEF MOD A2C: 36 9 %  LVEF MOD A4C: 57 %  LVESV MOD A2C: 99 7 ml  LVESV MOD A4C: 59 4 ml  LVESV MOD BP: 82 7 ml  LVIDd: 4 8 cm  LVIDs: 3 5 cm  LVLd A2C: 8 8 cm  LVLd A4C: 8 2 cm  LVLs A2C: 8 cm  LVLs A4C: 6 9 cm  LVPWd: 1 1 cm  RAAs A4C: 14 3 cm2  RAESV A-L: 37 4 ml  RAESV MOD: 35 8 ml  RALs: 4 7 cm  RVIDd: 3 3 cm  SV MOD A2C: 58 4 ml  SV MOD A4C: 78 6 ml  SV(Teich): 54 1 ml    CW  AV Vmax: 1 2 m/s  AV maxP 5 mmHg  TR Vmax: 2 6 m/s  TR maxP 9 mmHg    MM  TAPSE: 2 2 cm    PW  E' Av 1 m/s  E' Lat: 0 1 m/s  E' Sept: 0 1 m/s  E/E' Av  E/E' Lat: 20 6  E/E' Sept: 19 5  LVOT Vmax: 0 7 m/s  LVOT maxP 1 mmHg  MV A Steven: 1 1 m/s  MV Dec Barber: 8 7 m/s2  MV DecT: 152 ms  MV E Steven: 1 3 m/s  MV E/A Ratio: 1 2  MV PHT: 44 1 ms  MVA By PHT: 5 cm2    [address] [facility]    Prepared and electronically signed by    Timothy Ramirez DO  Signed 29-Dec-2020 12:36:03       No results found for this or any previous visit  No results found for this or any previous visit  No results found for this or any previous visit       *-*-*-*-*-*-*-*-*-*-*-*-*-*-*-*-*-*-*-*-*-*-*-*-*-*-*-*-*-*-*-*-*-*-*-*-*-*-*-*-*-*-*-*-*-*-*-*-*-*-*-*-*-*-  SIGNATURES:   @TLQ@   Celso Hdz MD

## 2021-01-01 NOTE — PLAN OF CARE
Problem: Potential for Falls  Goal: Patient will remain free of falls  Description: INTERVENTIONS:  - Assess patient frequently for physical needs  -  Identify cognitive and physical deficits and behaviors that affect risk of falls    -  [place] fall precautions as indicated by assessment   - Educate patient/family on patient safety including physical limitations  - Instruct patient to call for assistance with activity based on assessment  - Modify environment to reduce risk of injury  - Consider OT/PT consult to assist with strengthening/mobility  Outcome: Progressing     Problem: Prexisting or High Potential for Compromised Skin Integrity  Goal: Skin integrity is maintained or improved  Description: INTERVENTIONS:  - Identify patients at risk for skin breakdown  - Assess and monitor skin integrity  - Assess and monitor nutrition and hydration status  - Monitor labs   - Assess for incontinence   - Turn and reposition patient  - Assist with mobility/ambulation  - Relieve pressure over bony prominences  - Avoid friction and shearing  - Provide appropriate hygiene as needed including keeping skin clean and dry  - Evaluate need for skin moisturizer/barrier cream  - Collaborate with interdisciplinary team   - Patient/family teaching  - Consider wound care consult   Outcome: Progressing     Problem: PAIN - ADULT  Goal: Verbalizes/displays adequate comfort level or baseline comfort level  Description: Interventions:  - Encourage patient to monitor pain and request assistance  - Assess pain using appropriate pain scale  - Administer analgesics based on type and severity of pain and evaluate response  - Implement non-pharmacological measures as appropriate and evaluate response  - Consider cultural and social influences on pain and pain management  - Notify physician/advanced practitioner if interventions unsuccessful or patient reports new pain  Outcome: Progressing     Problem: INFECTION - ADULT  Goal: Absence or prevention of progression during hospitalization  Description: INTERVENTIONS:  - Assess and monitor for signs and symptoms of infection  - Monitor lab/diagnostic results  - Monitor all insertion sites, i e  indwelling lines, tubes, and drains  - Monitor endotracheal if appropriate and nasal secretions for changes in amount and color  - [place] appropriate cooling/warming therapies per order  - Administer medications as ordered  - Instruct and encourage patient and family to use good hand hygiene technique  - Identify and instruct in appropriate isolation precautions for identified infection/condition  Outcome: Progressing     Problem: SAFETY ADULT  Goal: Patient will remain free of falls  Description: INTERVENTIONS:  - Assess patient frequently for physical needs  -  Identify cognitive and physical deficits and behaviors that affect risk of falls    -  [place] fall precautions as indicated by assessment   - Educate patient/family on patient safety including physical limitations  - Instruct patient to call for assistance with activity based on assessment  - Modify environment to reduce risk of injury  - Consider OT/PT consult to assist with strengthening/mobility  Outcome: Progressing  Goal: Maintain or return to baseline ADL function  Description: INTERVENTIONS:  -  Assess patient's ability to carry out ADLs; assess patient's baseline for ADL function and identify physical deficits which impact ability to perform ADLs (bathing, care of mouth/teeth, toileting, grooming, dressing, etc )  - Assess/evaluate cause of self-care deficits   - Assess range of motion  - Assess patient's mobility; develop plan if impaired  - Assess patient's need for assistive devices and provide as appropriate  - Encourage maximum independence but intervene and supervise when necessary  - Involve family in performance of ADLs  - Assess for home care needs following discharge   - Consider OT consult to assist with ADL evaluation and planning for discharge  - Provide patient education as appropriate  Outcome: Progressing  Goal: Maintain or return mobility status to optimal level  Description: INTERVENTIONS:  - Assess patient's baseline mobility status (ambulation, transfers, stairs, etc )    - Identify cognitive and physical deficits and behaviors that affect mobility  - Identify mobility aids required to assist with transfers and/or ambulation (gait belt, sit-to-stand, lift, walker, cane, etc )  - [place] fall precautions as indicated by assessment  - Record patient progress and toleration of activity level on Mobility SBAR; progress patient to next Phase/Stage  - Instruct patient to call for assistance with activity based on assessment  - Consider rehabilitation consult to assist with strengthening/weightbearing, etc   Outcome: Progressing     Problem: DISCHARGE PLANNING  Goal: Discharge to home or other facility with appropriate resources  Description: INTERVENTIONS:  - Identify barriers to discharge w/patient and caregiver  - Arrange for needed discharge resources and transportation as appropriate  - Identify discharge learning needs (meds, wound care, etc )  - Arrange for interpretive services to assist at discharge as needed  - Refer to Case Management Department for coordinating discharge planning if the patient needs post-hospital services based on physician/advanced practitioner order or complex needs related to functional status, cognitive ability, or social support system  Outcome: Progressing     Problem: Knowledge Deficit  Goal: Patient/family/caregiver demonstrates understanding of disease process, treatment plan, medications, and discharge instructions  Description: Complete learning assessment and assess knowledge base    Interventions:  - Provide teaching at level of understanding  - Provide teaching via preferred learning methods  Outcome: Progressing     Problem: Nutrition/Hydration-ADULT  Goal: Nutrient/Hydration intake appropriate for improving, restoring or maintaining nutritional needs  Description: Monitor and assess patient's nutrition/hydration status for malnutrition  Collaborate with interdisciplinary team and initiate plan and interventions as ordered  Monitor patient's weight and dietary intake as ordered or per policy  Utilize nutrition screening tool and intervene as necessary  Determine patient's food preferences and provide high-protein, high-caloric foods as appropriate       INTERVENTIONS:  - Monitor oral intake, urinary output, labs, and treatment plans  - Assess nutrition and hydration status and recommend course of action  - Evaluate amount of meals eaten  - Assist patient with eating if necessary   - Allow adequate time for meals  - Recommend/ encourage appropriate diets, oral nutritional supplements, and vitamin/mineral supplements  - Order, calculate, and assess calorie counts as needed  - Recommend, monitor, and adjust tube feedings and TPN/PPN based on assessed needs  - Assess need for intravenous fluids  - Provide specific nutrition/hydration education as appropriate  - Include patient/family/caregiver in decisions related to nutrition  Outcome: Progressing

## 2021-01-01 NOTE — PROGRESS NOTES
Progress Note - Walter Krause 1930, 80 y o  female MRN: [id number]    Unit/Bed#: E4 -01 Encounter: [id number]    Primary Care Provider: Roxana Jean Baptiste MD   Date and time admitted to hospital: 2020  3:25 PM        Volume overload  Assessment & Plan  Acute diastolic CHF    Watch Cr with diureses     CKD (chronic kidney disease)  Assessment & Plan  Cr is pretty stable  Watch closely on lasix    History of DVT (deep vein thrombosis)  Assessment & Plan  INR mildly subtherapeutic on admission  See where it is today  Should not be low enough to cause a DVT PE  No need for bridging    Ulcer of right lower extremity with fat layer exposed [facility])  Assessment & Plan  Podiatry and vascular seeing          Subjective:   Feels better  Less sob  Less leg swelling  Objective:     Vitals:   Temp (24hrs), Av 4 °F (36 9 °C), Min:98 4 °F (36 9 °C), Max:98 4 °F (36 9 °C)    Temp:  [98 4 °F (36 9 °C)] 98 4 °F (36 9 °C)  HR:  [74-80] 74  Resp:  [18] 18  BP: (121-153)/(57-71) 129/71  SpO2:  [90 %-94 %] 92 %  Body mass index is 21 42 kg/m²  Input and Output Summary (last 24 hours): Intake/Output Summary (Last 24 hours) at 2021 1613  Last data filed at 2021 0801  Gross per 24 hour   Intake 740 ml   Output 1000 ml   Net -260 ml       Physical Exam:     Physical Exam  Vitals signs and nursing note reviewed  HENT:      Head: Normocephalic and atraumatic  Eyes:      Pupils: Pupils are equal, round, and reactive to light  Cardiovascular:      Rate and Rhythm: Normal rate and regular rhythm  Heart sounds: No murmur  No friction rub  No gallop  Pulmonary:      Effort: Pulmonary effort is normal       Breath sounds: Normal breath sounds  No wheezing or rales  Abdominal:      General: Bowel sounds are normal       Palpations: Abdomen is soft  Tenderness: There is no abdominal tenderness     Musculoskeletal:      Right lower leg: Edema (mild above wound, but improved) present  Left lower leg: No edema          Additional Data:     Labs:    Results from last 7 days   Lab Units 12/31/20  0459  12/29/20  0535   WBC Thousand/uL 10 47*   < > 10 35*   HEMOGLOBIN g/dL 7 6*   < > 10 8*   HEMATOCRIT % 25 2*   < > 35 2   PLATELETS Thousands/uL 182   < > 335   NEUTROS PCT %  --   --  35*   LYMPHS PCT %  --   --  54*   MONOS PCT %  --   --  7   EOS PCT %  --   --  4    < > = values in this interval not displayed  Results from last 7 days   Lab Units 01/01/21  0510  12/28/20  1531   POTASSIUM mmol/L 3 4*   < > 4 3   CHLORIDE mmol/L 101   < > 105   CO2 mmol/L 26   < > 22   BUN mg/dL 51*   < > 50*   CREATININE mg/dL 2 21*   < > 1 90*   CALCIUM mg/dL 7 9*   < > 8 3   ALK PHOS U/L  --   --  101   ALT U/L  --   --  65   AST U/L  --   --  61*    < > = values in this interval not displayed  Results from last 7 days   Lab Units 01/01/21  1036   INR  2 96*     Results from last 7 days   Lab Units 12/30/20  1139   POC GLUCOSE mg/dl 99               * I Have Reviewed All Lab Data     Recent Cultures (last 7 days):     Results from last 7 days   Lab Units 12/28/20  1604 12/28/20  1531   BLOOD CULTURE  No Growth at 72 hrs  No Growth at 72 hrs           Last 24 Hours Medication List:   Current Facility-Administered Medications   Medication Dose Route Frequency Provider Last Rate    acetaminophen  650 mg Oral Q4H PRN OTIS Manning      albuterol  2 5 mg Nebulization Q6H PRN OTIS Manning      amLODIPine  10 mg Oral Daily Raymond Manning      calcium carbonate  1,000 mg Oral TID PRN OTIS Manning      carvedilol  12 5 mg Oral BID With Meals OTIS Manning      hydrALAZINE  10 mg Intravenous Q6H PRN OTIS Manning      ondansetron  4 mg Intravenous Q6H PRN OTIS Manning      potassium chloride  20 mEq Oral Daily OTIS Manning      senna  1 tablet Oral HS PRN Debarah Sabot, CRNP      warfarin  4 mg Oral Daily (warfarin) Brian Crespo, OTIS           VTE Pharmacologic Prophylaxis:   Pharmacologic: Warfarin (Coumadin)      Current Length of Stay: 4 day(s)    Current Patient Status: Inpatient       Discharge Plan:   Code Status: Level 1 - Full Code           Today, Patient Was Seen By: Reilly Shi DO    ** Please Note: Dictation voice to text software may have been used in the creation of this document   **

## 2021-01-01 NOTE — PLAN OF CARE
Problem: Potential for Falls  Goal: Patient will remain free of falls  Description: INTERVENTIONS:  - Assess patient frequently for physical needs  -  Identify cognitive and physical deficits and behaviors that affect risk of falls    -  [place] fall precautions as indicated by assessment   - Educate patient/family on patient safety including physical limitations  - Instruct patient to call for assistance with activity based on assessment  - Modify environment to reduce risk of injury  - Consider OT/PT consult to assist with strengthening/mobility  Outcome: Progressing     Problem: Prexisting or High Potential for Compromised Skin Integrity  Goal: Skin integrity is maintained or improved  Description: INTERVENTIONS:  - Identify patients at risk for skin breakdown  - Assess and monitor skin integrity  - Assess and monitor nutrition and hydration status  - Monitor labs   - Assess for incontinence   - Turn and reposition patient  - Assist with mobility/ambulation  - Relieve pressure over bony prominences  - Avoid friction and shearing  - Provide appropriate hygiene as needed including keeping skin clean and dry  - Evaluate need for skin moisturizer/barrier cream  - Collaborate with interdisciplinary team   - Patient/family teaching  - Consider wound care consult   Outcome: Progressing     Problem: PAIN - ADULT  Goal: Verbalizes/displays adequate comfort level or baseline comfort level  Description: Interventions:  - Encourage patient to monitor pain and request assistance  - Assess pain using appropriate pain scale  - Administer analgesics based on type and severity of pain and evaluate response  - Implement non-pharmacological measures as appropriate and evaluate response  - Consider cultural and social influences on pain and pain management  - Notify physician/advanced practitioner if interventions unsuccessful or patient reports new pain  Outcome: Progressing     Problem: INFECTION - ADULT  Goal: Absence or prevention of progression during hospitalization  Description: INTERVENTIONS:  - Assess and monitor for signs and symptoms of infection  - Monitor lab/diagnostic results  - Monitor all insertion sites, i e  indwelling lines, tubes, and drains  - Monitor endotracheal if appropriate and nasal secretions for changes in amount and color  - [place] appropriate cooling/warming therapies per order  - Administer medications as ordered  - Instruct and encourage patient and family to use good hand hygiene technique  - Identify and instruct in appropriate isolation precautions for identified infection/condition  Outcome: Progressing     Problem: SAFETY ADULT  Goal: Patient will remain free of falls  Description: INTERVENTIONS:  - Assess patient frequently for physical needs  -  Identify cognitive and physical deficits and behaviors that affect risk of falls    -  [place] fall precautions as indicated by assessment   - Educate patient/family on patient safety including physical limitations  - Instruct patient to call for assistance with activity based on assessment  - Modify environment to reduce risk of injury  - Consider OT/PT consult to assist with strengthening/mobility  Outcome: Progressing  Goal: Maintain or return to baseline ADL function  Description: INTERVENTIONS:  -  Assess patient's ability to carry out ADLs; assess patient's baseline for ADL function and identify physical deficits which impact ability to perform ADLs (bathing, care of mouth/teeth, toileting, grooming, dressing, etc )  - Assess/evaluate cause of self-care deficits   - Assess range of motion  - Assess patient's mobility; develop plan if impaired  - Assess patient's need for assistive devices and provide as appropriate  - Encourage maximum independence but intervene and supervise when necessary  - Involve family in performance of ADLs  - Assess for home care needs following discharge   - Consider OT consult to assist with ADL evaluation and planning for discharge  - Provide patient education as appropriate  Outcome: Progressing  Goal: Maintain or return mobility status to optimal level  Description: INTERVENTIONS:  - Assess patient's baseline mobility status (ambulation, transfers, stairs, etc )    - Identify cognitive and physical deficits and behaviors that affect mobility  - Identify mobility aids required to assist with transfers and/or ambulation (gait belt, sit-to-stand, lift, walker, cane, etc )  - [place] fall precautions as indicated by assessment  - Record patient progress and toleration of activity level on Mobility SBAR; progress patient to next Phase/Stage  - Instruct patient to call for assistance with activity based on assessment  - Consider rehabilitation consult to assist with strengthening/weightbearing, etc   Outcome: Progressing     Problem: DISCHARGE PLANNING  Goal: Discharge to home or other facility with appropriate resources  Description: INTERVENTIONS:  - Identify barriers to discharge w/patient and caregiver  - Arrange for needed discharge resources and transportation as appropriate  - Identify discharge learning needs (meds, wound care, etc )  - Arrange for interpretive services to assist at discharge as needed  - Refer to Case Management Department for coordinating discharge planning if the patient needs post-hospital services based on physician/advanced practitioner order or complex needs related to functional status, cognitive ability, or social support system  Outcome: Progressing     Problem: Knowledge Deficit  Goal: Patient/family/caregiver demonstrates understanding of disease process, treatment plan, medications, and discharge instructions  Description: Complete learning assessment and assess knowledge base    Interventions:  - Provide teaching at level of understanding  - Provide teaching via preferred learning methods  Outcome: Progressing     Problem: Nutrition/Hydration-ADULT  Goal: Nutrient/Hydration intake appropriate for improving, restoring or maintaining nutritional needs  Description: Monitor and assess patient's nutrition/hydration status for malnutrition  Collaborate with interdisciplinary team and initiate plan and interventions as ordered  Monitor patient's weight and dietary intake as ordered or per policy  Utilize nutrition screening tool and intervene as necessary  Determine patient's food preferences and provide high-protein, high-caloric foods as appropriate       INTERVENTIONS:  - Monitor oral intake, urinary output, labs, and treatment plans  - Assess nutrition and hydration status and recommend course of action  - Evaluate amount of meals eaten  - Assist patient with eating if necessary   - Allow adequate time for meals  - Recommend/ encourage appropriate diets, oral nutritional supplements, and vitamin/mineral supplements  - Order, calculate, and assess calorie counts as needed  - Recommend, monitor, and adjust tube feedings and TPN/PPN based on assessed needs  - Assess need for intravenous fluids  - Provide specific nutrition/hydration education as appropriate  - Include patient/family/caregiver in decisions related to nutrition  Outcome: Progressing

## 2021-01-02 PROBLEM — I50.31 ACUTE DIASTOLIC CHF (CONGESTIVE HEART FAILURE) (HCC): Status: ACTIVE | Noted: 2020-12-28

## 2021-01-02 LAB
ANION GAP SERPL CALCULATED.3IONS-SCNC: 9 MMOL/L (ref 4–13)
BACTERIA BLD CULT: NORMAL
BACTERIA BLD CULT: NORMAL
BUN SERPL-MCNC: 58 MG/DL (ref 5–25)
CALCIUM SERPL-MCNC: 8.6 MG/DL (ref 8.3–10.1)
CHLORIDE SERPL-SCNC: 101 MMOL/L (ref 100–108)
CO2 SERPL-SCNC: 26 MMOL/L (ref 21–32)
CREAT SERPL-MCNC: 2.14 MG/DL (ref 0.6–1.3)
ERYTHROCYTE [DISTWIDTH] IN BLOOD BY AUTOMATED COUNT: 17.4 % (ref 11.6–15.1)
GFR SERPL CREATININE-BSD FRML MDRD: 20 ML/MIN/1.73SQ M
GLUCOSE SERPL-MCNC: 102 MG/DL (ref 65–140)
HCT VFR BLD AUTO: 26.3 % (ref 34.8–46.1)
HGB BLD-MCNC: 8.1 G/DL (ref 11.5–15.4)
INR PPP: 3.07 (ref 0.84–1.19)
MAGNESIUM SERPL-MCNC: 2 MG/DL (ref 1.6–2.6)
MCH RBC QN AUTO: 27.7 PG (ref 26.8–34.3)
MCHC RBC AUTO-ENTMCNC: 30.8 G/DL (ref 31.4–37.4)
MCV RBC AUTO: 90 FL (ref 82–98)
PLATELET # BLD AUTO: 226 THOUSANDS/UL (ref 149–390)
PMV BLD AUTO: 10.1 FL (ref 8.9–12.7)
POTASSIUM SERPL-SCNC: 3.6 MMOL/L (ref 3.5–5.3)
PROTHROMBIN TIME: 31 SECONDS (ref 11.6–14.5)
RBC # BLD AUTO: 2.92 MILLION/UL (ref 3.81–5.12)
SODIUM SERPL-SCNC: 136 MMOL/L (ref 136–145)
WBC # BLD AUTO: 12.84 THOUSAND/UL (ref 4.31–10.16)

## 2021-01-02 PROCEDURE — 85610 PROTHROMBIN TIME: CPT | Performed by: HOSPITALIST

## 2021-01-02 PROCEDURE — 80048 BASIC METABOLIC PNL TOTAL CA: CPT | Performed by: HOSPITALIST

## 2021-01-02 PROCEDURE — 83735 ASSAY OF MAGNESIUM: CPT | Performed by: HOSPITALIST

## 2021-01-02 PROCEDURE — 99232 SBSQ HOSP IP/OBS MODERATE 35: CPT | Performed by: INTERNAL MEDICINE

## 2021-01-02 PROCEDURE — 99232 SBSQ HOSP IP/OBS MODERATE 35: CPT | Performed by: HOSPITALIST

## 2021-01-02 PROCEDURE — 85027 COMPLETE CBC AUTOMATED: CPT | Performed by: HOSPITALIST

## 2021-01-02 RX ORDER — ISOSORBIDE DINITRATE 10 MG/1
10 TABLET ORAL
Status: DISCONTINUED | OUTPATIENT
Start: 2021-01-02 | End: 2021-01-02

## 2021-01-02 RX ORDER — HYDRALAZINE HYDROCHLORIDE 10 MG/1
10 TABLET, FILM COATED ORAL EVERY 8 HOURS SCHEDULED
Status: DISCONTINUED | OUTPATIENT
Start: 2021-01-02 | End: 2021-01-06 | Stop reason: HOSPADM

## 2021-01-02 RX ORDER — ISOSORBIDE DINITRATE 10 MG/1
5 TABLET ORAL
Status: DISCONTINUED | OUTPATIENT
Start: 2021-01-02 | End: 2021-01-03

## 2021-01-02 RX ADMIN — HYDRALAZINE HYDROCHLORIDE 10 MG: 10 TABLET, FILM COATED ORAL at 22:36

## 2021-01-02 RX ADMIN — WARFARIN SODIUM 4 MG: 4 TABLET ORAL at 18:42

## 2021-01-02 RX ADMIN — ISOSORBIDE DINITRATE 5 MG: 10 TABLET ORAL at 18:42

## 2021-01-02 RX ADMIN — AMLODIPINE BESYLATE 10 MG: 10 TABLET ORAL at 09:07

## 2021-01-02 RX ADMIN — HYDRALAZINE HYDROCHLORIDE 10 MG: 10 TABLET, FILM COATED ORAL at 16:31

## 2021-01-02 RX ADMIN — POTASSIUM CHLORIDE 20 MEQ: 1500 TABLET, EXTENDED RELEASE ORAL at 09:07

## 2021-01-02 RX ADMIN — CARVEDILOL 12.5 MG: 12.5 TABLET, FILM COATED ORAL at 09:07

## 2021-01-02 RX ADMIN — CARVEDILOL 12.5 MG: 12.5 TABLET, FILM COATED ORAL at 16:31

## 2021-01-02 NOTE — PROGRESS NOTES
CARDIOLOGY INPATIENT FOLLOW-UP PROGRESS NOTE  *-*-*-*-*-*-*-*-*-*-*-*-*-*-*-*-*-*-*-*-*-*-*-*-*-*-*-*-*-*-*-*-*-*-*-*-*-*-*-*-*-*-*-*-*-*-*-*-*-*-*-*-*-*-  An[facility] [place] DATE: 01/02/21 3:24 PM   AUTHOR: Ellis Woo MD  PATIENT: David Coello   5/18/1930    [id number]   80 y o    female  INPATIENT HOSPITALIST PHYSICIAN: Rashawn Lares DO  DATE OF ADMISSION: 12/28/2020  3:25 PM; LENGTH OF STAY: 5 days  *-*-*-*-*-*-*-*-*-*-*-*-*-*-*-*-*-*-*-*-*-*-*-*-*-*-*-*-*-*-*-*-*-*-*-*-*-*-*-*-*-*-*-*-*-*-*-*-*-*-*-*-*-*-    CARDIOLOGY ASSESSMENT  1  Acute on chronic decompensated diastolic heart failure  2  Acute hypoxemic respiratory failure  3  Valvular heart disease with moderate mitral valve regurgitation and mild tricuspid valve regurgitation  4  Essential hypertension  5  Atrophic left kidney  History of recent right hydronephrosis and ureteral stenting  6  Recent E coli bacteremia  7  Right lower extremity DVT November 2020  8  Right lower extremity arteria and lvenous ulcerations and fat exposed wounds  Patient Active Problem List   Diagnosis    Acute deep vein thrombosis (DVT) of calf muscle vein of right lower extremity (HCC)    LILIAN (acute kidney injury) ([address] )    Hydronephrosis of right kidney    Elevated blood urea nitrogen    Hypertensive urgency    Hyponatremia    Leg ulcer, left, limited to breakdown of skin ([address] )    Nonhealing nonsurgical wound    Protein-calorie malnutrition ([address] )    Sepsis due to Gram negative bacteria ([address] )    Ulcer of right lower extremity with fat layer exposed ([address] )    Urinary retention    Wound infection    PAD (peripheral artery disease) (HCC)    Essential hypertension    Volume overload    CKD (chronic kidney disease)    History of DVT (deep vein thrombosis)        PLAN:  - will add hydralazine 10 mg 3 times daily and Isordil 5 mg 3 times daily  Will continue carvedilol, amlodipine and potassium supplementation  - will place on furosemide 10 mg daily    - continue warfarin with goal INR 2 2-3 0   - continue weaning trial oxygen  - will try to wean off oxygen therapy  - continued wound care  Patient does not want vascular interventions at this time  She is interested in following with her primary care physician and make decisions  *-*-*-*-*-*-*-*-*-*-*-*-*-*-*-*-*-*-*-*-*-*-*-*-*-*-*-*-*-*-*-*-*-*-*-*-*-*-*-*-*-*-*-*-*-*-*-*-*-*-*-*-*-*-  INTERVAL CHANGES / HISTORY OF PRESENT ILLNESS:  No acute events overnight  Patient reports feeling better with respect to her breathing and cough  *-*-*-*-*-*-*-*-*-*-*-*-*-*-*-*-*-*-*-*-*-*-*-*-*-*-*-*-*-*-*-*-*-*-*-*-*-*-*-*-*-*-*-*-*-*-*-*-*-*-*-*-*-*  REVIEW OF SYMPTOMS:    Positive for:  Improved shortness of breath and cough  Negative for: All remaining as reviewed below and in HPI   SYSTEM SYMPTOMS REVIEWED:  Generalweight change, fever, night sweats  Respiratoryl Wheezing, shortness of breath, cough, URI symptoms, sputum, blood  Cardiovascularchest pain, syncope, dyspnea on exertion, edema, decline in exercise tolerance, claudication   Gastrointestinalpersistent vomiting, diarrhea, abdominal distention, blood in stool   Muscular or skeletaljoint pain or swelling   Neurologicheadaches, syncope, abnormal movement  Hematologichistory of easy bruising and bleeding   Endocrinethyroid enlargement, heat or cold intolerance, polyuria   Psychiatricanxiety, depression      *-*-*-*-*-*-*-*-*-*-*-*-*-*-*-*-*-*-*-*-*-*-*-*-*-*-*-*-*-*-*-*-*-*-*-*-*-*-*-*-*-*-*-*-*-*-*-*-*-*-*-*-*-*-  VITAL SIGNS:  Vitals:    21 1600 21 2354 21 0600 21 0759   BP: 157/67 162/74  167/74   BP Location: Right arm Left arm  Left arm   Pulse: 79 78  75   Resp: 18 18  18   Temp: 98 3 °F (36 8 °C) 98 7 °F (37 1 °C)  98 °F (36 7 °C)   TempSrc: Temporal Temporal  Temporal   SpO2: 92% 90%  90%   Weight:   61 7 kg (136 lb 0 4 oz)    Height:          Temp (24hrs), Av 3 °F (36 8 °C), Min:98 °F (36 7 °C), Max:98 7 °F (37 1 °C)  Current: Temperature: 98 °F (36 7 °C)    Weight (last 2 days)     Date/Time   Weight    01/02/21 0600   61 7 (136 02)    01/01/21 0537   60 2 (132 72)    12/31/20 0549   59 3 (130 73)            Body mass index is 21 95 kg/m²  Wt Readings from Last 3 Encounters:   01/02/21 61 7 kg (136 lb 0 4 oz)   12/22/20 60 8 kg (134 lb)      Intake/Output Summary (Last 24 hours) at 1/2/2021 1524  Last data filed at 1/2/2021 1201  Gross per 24 hour   Intake 220 ml   Output 625 ml   Net -405 ml        *-*-*-*-*-*-*-*-*-*-*-*-*-*-*-*-*-*-*-*-*-*-*-*-*-*-*-*-*-*-*-*-*-*-*-*-*-*-*-*-*-*-*-*-*-*-*-*-*-*-*-*-*-*-  PHYSICAL EXAM:  General Appearance:     frail, in no respiratory distress on nasal cannula oxygen,   Head, Eyes, ENT:     mucous membranes dry   Neck:    unable to appreciate jugular venous distension   Back:     Symmetric, no curvature  Lungs:     Respirations unlabored  decreased breath sounds bilaterally with rhonchi but no crackles   Chest wall:    No tenderness or deformity   Heart:    Regular rate and rhythm, S1 and S2 normal, unable to appreciate murmur   Abdomen:     Soft, non-tender, No obvious masses, or organomegaly   Extremities:   warm peripheries, signs of poor perfusion as well as venous insufficiency  Skin:    venous static changes and evidence of open wounds in lower extremities bilaterally worse on the right side with some HR formation and exposure of subcutaneous fat  *-*-*-*-*-*-*-*-*-*-*-*-*-*-*-*-*-*-*-*-*-*-*-*-*-*-*-*-*-*-*-*-*-*-*-*-*-*-*-*-*-*-*-*-*-*-*-*-*-*-*-*-*-*-  TELEMETRY, LAST ECG:  Telemetry reviewed      Currently not on telemetry Results for orders placed or performed during the hospital encounter of 12/28/20   ECG 12 lead   Result Value    Ventricular Rate 66    Atrial Rate 66    LA Interval 172    QRSD Interval 86    QT Interval 396    QTC Interval 415    P Axis 92    QRS Axis 73    T Wave Axis 77    Narrative    Sinus rhythm with sinus arrhythmia  Anterior infarct , age undetermined  Abnormal ECG  No previous ECGs available  Confirmed by Ruben Montiel ([id number]) on 12/28/2020 3:34:51 PM      *-*-*-*-*-*-*-*-*-*-*-*-*-*-*-*-*-*-*-*-*-*-*-*-*-*-*-*-*-*-*-*-*-*-*-*-*-*-*-*-*-*-*-*-*-*-*-*-*-*-*-*-*-*-    CURRENT SCHEDULED MEDICATIONS:    Current Facility-Administered Medications:     acetaminophen (TYLENOL) tablet 650 mg, 650 mg, Oral, Q4H PRN, OTIS Jon    albuterol inhalation solution 2 5 mg, 2 5 mg, Nebulization, Q6H PRN, OTIS Jon, 2 5 mg at 12/30/20 9906    amLODIPine (NORVASC) tablet 10 mg, 10 mg, Oral, Daily, OTIS Jon, 10 mg at 01/02/21 6288    calcium carbonate (TUMS) chewable tablet 1,000 mg, 1,000 mg, Oral, TID PRN, OTIS Jon    carvedilol (COREG) tablet 12 5 mg, 12 5 mg, Oral, BID With Meals, OTIS Jon, 12 5 mg at 01/02/21 0907    hydrALAZINE (APRESOLINE) injection 10 mg, 10 mg, Intravenous, Q6H PRN, OTIS Jon, 10 mg at 12/31/20 0543    ondansetron [facility]) injection 4 mg, 4 mg, Intravenous, Q6H PRN, OTIS Jon    potassium chloride (K-DUR,KLOR-CON) CR tablet 20 mEq, 20 mEq, Oral, Daily, OTIS Jon, 20 mEq at 01/02/21 7959    senna (SENOKOT) tablet 8 6 mg, 1 tablet, Oral, HS PRN, OTIS Jon    warfarin (COUMADIN) tablet 4 mg, 4 mg, Oral, Daily (warfarin), OTIS Jon, 4 mg at 01/01/21 1645 ALLERGIES:  Allergies   Allergen Reactions    No Known Allergies         *-*-*-*-*-*-*-*-*-*-*-*-*-*-*-*-*-*-*-*-*-*-*-*-*-*-*-*-*-*-*-*-*-*-*-*-*-*-*-*-*-*-*-*-*-*-*-*-*-*-*-*-*-*  LABORATORY DATA:  I have personally reviewed pertinent labs      CMP:  Results from last 7 days   Lab Units 01/02/21  0518 01/01/21  0510 12/31/20  0459  12/28/20  1531   SODIUM mmol/L 136 136 136   < > 138   POTASSIUM mmol/L 3 6 3 4* 3 5   < > 4 3   CHLORIDE mmol/L 101 101 102   < > 105   CO2 mmol/L 26 26 23   < > 22   BUN mg/dL 58* 51* 55*   < > 50*   CREATININE mg/dL 2 14* 2 21* 2 24*   < > 1 90*   CALCIUM mg/dL 8 6 7 9* 8 3 < > 8 3   ALK PHOS U/L  --   --   --   --  101   ALT U/L  --   --   --   --  65   AST U/L  --   --   --   --  61*    < > = values in this interval not displayed  Results from last 7 days   Lab Units 21  0518 20  0459 20  0651   MAGNESIUM mg/dL 2 0 2 2 2 1        Cardiac Profile:   Results from last 7 days   Lab Units 20  0535   NT-PRO BNP pg/mL 32,959*                CBC:   Results from last 7 days   Lab Units 21  0518 20  0459 20  0650   WBC Thousand/uL 12 84* 10 47* 12 75*   HEMOGLOBIN g/dL 8 1* 7 6* 8 3*   HEMATOCRIT % 26 3* 25 2* 27 2*   PLATELETS Thousands/uL 226 182 212     PT/INR:   Lab Results   Component Value Date    INR 3 07 (H) 2021   , Microbiology:   Results from last 7 days   Lab Units 20  1604 20  1531   BLOOD CULTURE  No Growth After 4 Days  No Growth After 4 Days  *-*-*-*-*-*-*-*-*-*-*-*-*-*-*-*-*-*-*-*-*-*-*-*-*-*-*-*-*-*-*-*-*-*-*-*-*-*-*-*-*-*-*-*-*-*-*-*-*-*-*-*-*-*-  IMAGING STUDIES REPORTS: Imaging studies results reviewed    No procedure found  No Chest XR results available for this patient  *-*-*-*-*-*-*-*-*-*-*-*-*-*-*-*-*-*-*-*-*-*-*-*-*-*-*-*-*-*-*-*-*-*-*-*-*-*-*-*-*-*-*-*-*-*-*-*-*-*-*-*-*-*-  ECHOCARDIOGRAM AND OTHER CARDIAC TESTS RESULTS:  Results for orders placed during the hospital encounter of 20   Echo complete with contrast if indicated    Narrative [address]  De[address]    [facility], [address]  [phone]    Transthoracic Echocardiogram  2D, M-mode, Doppler, and Color Doppler    Study date:  29-Dec-2020    Patient: Elijah Weiss  MR number: [id number]  Account number: [de-identified]  : 18-May-1930  Age: 80 years  Gender: Female  Status: Inpatient  Location: Bedside  Height: 66 in  Weight: 138 6 lb  BP: 141/ 95 mmHg    Indications: Heart failure    Diagnoses: I50 9 - Heart failure, unspecified    Sonographer:  Kimberly Matamoros [facility]  Primary Physician:  Trey Shields MD  Referring Physician:  Renay De La Torre PA-C  Group:  [facility]  Interpreting Physician:  Nereida Banerjee DO    SUMMARY    LEFT VENTRICLE:  Systolic function was normal  Ejection fraction was estimated to be 50 %  There were no regional wall motion abnormalities  Wall thickness was mildly to moderately increased  Features were consistent with a pseudonormal left ventricular filling pattern, with concomitant abnormal relaxation and increased filling pressure (grade 2 diastolic dysfunction)  LEFT ATRIUM:  The atrium was markedly dilated  RIGHT ATRIUM:  The atrium was moderately dilated  MITRAL VALVE:  There was moderate regurgitation  TRICUSPID VALVE:  There was mild regurgitation  IVC, HEPATIC VEINS:  The inferior vena cava was mildly dilated  PERICARDIUM:  A trace pericardial effusion was identified posterior to the heart  There was a left pleural effusion  SUMMARY MEASUREMENTS  2D measurements:  Unspecified Anatomy:   %FS was 26 %  Ao Diam was 2 8 cm  EDV(Teich) was 106 ml   EF Biplane was 45 7 %  EF(Teich) was 51 %  ESV(Teich) was 52 ml  IVSd was 1 1 cm  LA Diam was 4 6 cm  LAAs A2C was 28 4 cm2  LAAs A4C was 27 2 cm2  LAESV A-L A2C was 102 1 ml  LAESV A-L A4C was 104 6 ml  LAESV Index (A-L) was 64 ml/m2  LAESV MOD A2C was 99 2 ml  LAESV MOD A4C was 97 3 ml  LAESV(A-L) was 109 4 ml  LAESV(MOD BP) was 103 7 ml  LAESVInd MOD BP was 60 6 ml/m2  LALs  A2C was 6 7 cm  LALs A4C was 6 cm  LVEDV MOD A2C was 158 1 ml  LVEDV MOD A4C was 138 ml  LVEDV MOD BP was 152 3 ml  LVEF MOD A2C was 36 9 %  LVEF MOD A4C was 57 %  LVESV MOD A2C was 99 7 ml  LVESV MOD A4C was 59 4 ml  LVESV MOD BP  was 82 7 ml  LVIDd was 4 8 cm  LVIDs was 3 5 cm  LVLd A2C was 8 8 cm  LVLd A4C was 8 2 cm  LVLs A2C was 8 cm  LVLs A4C was 6 9 cm  LVPWd was 1 1 cm  RAAs A4C was 14 3 cm2  RAESV A-L was 37 4 ml   RAESV MOD was 35 8 ml    RALs was  4 7 cm  RVIDd was 3 3 cm  SV MOD A2C was 58 4 ml   SV MOD A4C was 78 6 ml   SV(Teich) was 54 1 ml   CW measurements:  Unspecified Anatomy:   AV Vmax was 1 2 m/s  AV maxPG was 5 5 mmHg  TR Vmax was 2 6 m/s   TR maxPG was 27 9 mmHg  MM measurements:  Unspecified Anatomy:   TAPSE was 2 2 cm  PW measurements:  Unspecified Anatomy:   E' Avg was 0 1 m/s  E' Lat was 0 1 m/s  E' Sept was 0 1 m/s  E/E' Avg was 20   E/E' Lat was 20 6   E/E' Sept was 19 5   LVOT Vmax was 0 7 m/s  LVOT maxPG was 2 1 mmHg  MV A Steven was 1 1 m/s  MV Dec [place] was  8 7 m/s2  MV DecT was 152 ms  MV E Steven was 1 3 m/s  MV E/A Ratio was 1 2   MV PHT was 44 1 ms  MVA By PHT was 5 cm2  HISTORY: PRIOR HISTORY: HTN, DVT, CKD, sepsis, PVD    PROCEDURE: The procedure was performed at the bedside  This was a routine study  The transthoracic approach was used  The study included complete 2D imaging, M-mode, complete spectral Doppler, and color Doppler  Echocardiographic views  were somewhat limited due to poor patient compliance and poor acoustic window availability  Image quality was adequate  LEFT VENTRICLE: Size was normal  Systolic function was normal  Ejection fraction was estimated to be 50 %  There were no regional wall motion abnormalities  Wall thickness was mildly to moderately increased  DOPPLER: Features were  consistent with a pseudonormal left ventricular filling pattern, with concomitant abnormal relaxation and increased filling pressure (grade 2 diastolic dysfunction)  RIGHT VENTRICLE: The size was normal  Systolic function was normal  Wall thickness was normal     LEFT ATRIUM: The atrium was markedly dilated  RIGHT ATRIUM: The atrium was moderately dilated  MITRAL VALVE: There was normal leaflet separation  There was mildly restricted mobility of the posterior leaflet  DOPPLER: The transmitral velocity was within the normal range  There was no evidence for stenosis  There was moderate  regurgitation   The regurgitant jet was eccentric and directed posteriorly  AORTIC VALVE: The valve was trileaflet  Leaflets exhibited normal thickness and normal cuspal separation  DOPPLER: Transaortic velocity was within the normal range  There was no evidence for stenosis  There was no regurgitation  TRICUSPID VALVE: The valve structure was normal  There was normal leaflet separation  DOPPLER: The transtricuspid velocity was within the normal range  There was no evidence for stenosis  There was mild regurgitation  PULMONIC VALVE: Leaflets exhibited normal thickness, no calcification, and normal cuspal separation  DOPPLER: The transpulmonic velocity was within the normal range  There was no regurgitation  PERICARDIUM: A trace pericardial effusion was identified posterior to the heart  There was a left pleural effusion  SYSTEMIC VEINS: IVC: The inferior vena cava was mildly dilated   Respirophasic changes were normal     SYSTEM MEASUREMENT TABLES    2D  %FS: 26 %  Ao Diam: 2 8 cm  EDV(Teich): 106 ml  EF Biplane: 45 7 %  EF(Teich): 51 %  ESV(Teich): 52 ml  IVSd: 1 1 cm  LA Diam: 4 6 cm  LAAs A2C: 28 4 cm2  LAAs A4C: 27 2 cm2  LAESV A-L A2C: 102 1 ml  LAESV A-L A4C: 104 6 ml  LAESV Index (A-L): 64 ml/m2  LAESV MOD A2C: 99 2 ml  LAESV MOD A4C: 97 3 ml  LAESV(A-L): 109 4 ml  LAESV(MOD BP): 103 7 ml  LAESVInd MOD BP: 60 6 ml/m2  LALs A2C: 6 7 cm  LALs A4C: 6 cm  LVEDV MOD A2C: 158 1 ml  LVEDV MOD A4C: 138 ml  LVEDV MOD BP: 152 3 ml  LVEF MOD A2C: 36 9 %  LVEF MOD A4C: 57 %  LVESV MOD A2C: 99 7 ml  LVESV MOD A4C: 59 4 ml  LVESV MOD BP: 82 7 ml  LVIDd: 4 8 cm  LVIDs: 3 5 cm  LVLd A2C: 8 8 cm  LVLd A4C: 8 2 cm  LVLs A2C: 8 cm  LVLs A4C: 6 9 cm  LVPWd: 1 1 cm  RAAs A4C: 14 3 cm2  RAESV A-L: 37 4 ml  RAESV MOD: 35 8 ml  RALs: 4 7 cm  RVIDd: 3 3 cm  SV MOD A2C: 58 4 ml  SV MOD A4C: 78 6 ml  SV(Teich): 54 1 ml    CW  AV Vmax: 1 2 m/s  AV maxP 5 mmHg  TR Vmax: 2 6 m/s  TR maxP 9 mmHg    MM  TAPSE: 2 2 cm    PW  E' Av 1 m/s  E' Lat: 0 1 m/s  E' Sept: 0 1 m/s  E/E' Av  E/E' Lat: 20 6  E/E' Sept: 19 5  LVOT Vmax: 0 7 m/s  LVOT maxP 1 mmHg  MV A Steven: 1 1 m/s  MV Dec [place]: 8 7 m/s2  MV DecT: 152 ms  MV E Steven: 1 3 m/s  MV E/A Ratio: 1 2  MV PHT: 44 1 ms  MVA By PHT: 5 cm2    [address] [facility]    Prepared and electronically signed by    Arturo Aguayo DO  Signed 29-Dec-2020 12:36:03       No results found for this or any previous visit  No results found for this or any previous visit  No results found for this or any previous visit       *-*-*-*-*-*-*-*-*-*-*-*-*-*-*-*-*-*-*-*-*-*-*-*-*-*-*-*-*-*-*-*-*-*-*-*-*-*-*-*-*-*-*-*-*-*-*-*-*-*-*-*-*-*-  SIGNATURES:   @CBT@   Saira Hernandez MD

## 2021-01-02 NOTE — PLAN OF CARE
Problem: Potential for Falls  Goal: Patient will remain free of falls  Description: INTERVENTIONS:  - Assess patient frequently for physical needs  -  Identify cognitive and physical deficits and behaviors that affect risk of falls    -  [place] fall precautions as indicated by assessment   - Educate patient/family on patient safety including physical limitations  - Instruct patient to call for assistance with activity based on assessment  - Modify environment to reduce risk of injury  - Consider OT/PT consult to assist with strengthening/mobility  Outcome: Progressing     Problem: Prexisting or High Potential for Compromised Skin Integrity  Goal: Skin integrity is maintained or improved  Description: INTERVENTIONS:  - Identify patients at risk for skin breakdown  - Assess and monitor skin integrity  - Assess and monitor nutrition and hydration status  - Monitor labs   - Assess for incontinence   - Turn and reposition patient  - Assist with mobility/ambulation  - Relieve pressure over bony prominences  - Avoid friction and shearing  - Provide appropriate hygiene as needed including keeping skin clean and dry  - Evaluate need for skin moisturizer/barrier cream  - Collaborate with interdisciplinary team   - Patient/family teaching  - Consider wound care consult   Outcome: Progressing     Problem: PAIN - ADULT  Goal: Verbalizes/displays adequate comfort level or baseline comfort level  Description: Interventions:  - Encourage patient to monitor pain and request assistance  - Assess pain using appropriate pain scale  - Administer analgesics based on type and severity of pain and evaluate response  - Implement non-pharmacological measures as appropriate and evaluate response  - Consider cultural and social influences on pain and pain management  - Notify physician/advanced practitioner if interventions unsuccessful or patient reports new pain  Outcome: Progressing     Problem: INFECTION - ADULT  Goal: Absence or prevention of progression during hospitalization  Description: INTERVENTIONS:  - Assess and monitor for signs and symptoms of infection  - Monitor lab/diagnostic results  - Monitor all insertion sites, i e  indwelling lines, tubes, and drains  - Monitor endotracheal if appropriate and nasal secretions for changes in amount and color  - [place] appropriate cooling/warming therapies per order  - Administer medications as ordered  - Instruct and encourage patient and family to use good hand hygiene technique  - Identify and instruct in appropriate isolation precautions for identified infection/condition  Outcome: Progressing     Problem: SAFETY ADULT  Goal: Patient will remain free of falls  Description: INTERVENTIONS:  - Assess patient frequently for physical needs  -  Identify cognitive and physical deficits and behaviors that affect risk of falls    -  [place] fall precautions as indicated by assessment   - Educate patient/family on patient safety including physical limitations  - Instruct patient to call for assistance with activity based on assessment  - Modify environment to reduce risk of injury  - Consider OT/PT consult to assist with strengthening/mobility  Outcome: Progressing  Goal: Maintain or return to baseline ADL function  Description: INTERVENTIONS:  -  Assess patient's ability to carry out ADLs; assess patient's baseline for ADL function and identify physical deficits which impact ability to perform ADLs (bathing, care of mouth/teeth, toileting, grooming, dressing, etc )  - Assess/evaluate cause of self-care deficits   - Assess range of motion  - Assess patient's mobility; develop plan if impaired  - Assess patient's need for assistive devices and provide as appropriate  - Encourage maximum independence but intervene and supervise when necessary  - Involve family in performance of ADLs  - Assess for home care needs following discharge   - Consider OT consult to assist with ADL evaluation and planning for discharge  - Provide patient education as appropriate  Outcome: Progressing  Goal: Maintain or return mobility status to optimal level  Description: INTERVENTIONS:  - Assess patient's baseline mobility status (ambulation, transfers, stairs, etc )    - Identify cognitive and physical deficits and behaviors that affect mobility  - Identify mobility aids required to assist with transfers and/or ambulation (gait belt, sit-to-stand, lift, walker, cane, etc )  - [place] fall precautions as indicated by assessment  - Record patient progress and toleration of activity level on Mobility SBAR; progress patient to next Phase/Stage  - Instruct patient to call for assistance with activity based on assessment  - Consider rehabilitation consult to assist with strengthening/weightbearing, etc   Outcome: Progressing     Problem: DISCHARGE PLANNING  Goal: Discharge to home or other facility with appropriate resources  Description: INTERVENTIONS:  - Identify barriers to discharge w/patient and caregiver  - Arrange for needed discharge resources and transportation as appropriate  - Identify discharge learning needs (meds, wound care, etc )  - Arrange for interpretive services to assist at discharge as needed  - Refer to Case Management Department for coordinating discharge planning if the patient needs post-hospital services based on physician/advanced practitioner order or complex needs related to functional status, cognitive ability, or social support system  Outcome: Progressing     Problem: Knowledge Deficit  Goal: Patient/family/caregiver demonstrates understanding of disease process, treatment plan, medications, and discharge instructions  Description: Complete learning assessment and assess knowledge base    Interventions:  - Provide teaching at level of understanding  - Provide teaching via preferred learning methods  Outcome: Progressing     Problem: Nutrition/Hydration-ADULT  Goal: Nutrient/Hydration intake appropriate for improving, restoring or maintaining nutritional needs  Description: Monitor and assess patient's nutrition/hydration status for malnutrition  Collaborate with interdisciplinary team and initiate plan and interventions as ordered  Monitor patient's weight and dietary intake as ordered or per policy  Utilize nutrition screening tool and intervene as necessary  Determine patient's food preferences and provide high-protein, high-caloric foods as appropriate       INTERVENTIONS:  - Monitor oral intake, urinary output, labs, and treatment plans  - Assess nutrition and hydration status and recommend course of action  - Evaluate amount of meals eaten  - Assist patient with eating if necessary   - Allow adequate time for meals  - Recommend/ encourage appropriate diets, oral nutritional supplements, and vitamin/mineral supplements  - Order, calculate, and assess calorie counts as needed  - Recommend, monitor, and adjust tube feedings and TPN/PPN based on assessed needs  - Assess need for intravenous fluids  - Provide specific nutrition/hydration education as appropriate  - Include patient/family/caregiver in decisions related to nutrition  Outcome: Progressing

## 2021-01-02 NOTE — PROGRESS NOTES
Progress Note - Martín Montanez 1930, 80 y o  female MRN: [id number]    Unit/Bed#: E4 -01 Encounter: [id number]    Primary Care Provider: Desiree Mcclellan MD   Date and time admitted to hospital: 2020  3:25 PM        * Acute diastolic CHF (congestive heart failure) ([facility] [address] )  Assessment & Plan  Acute diastolic CHF    Watch Cr with diureses     She is getting better    Hypertensive urgency  Assessment & Plan  May be contributing to the acute CHF    Blood pressure is under better control today        CKD (chronic kidney disease)  Assessment & Plan  Cr is pretty stable  Watch closely on lasix    Ulcer of right lower extremity with fat layer exposed [facility])  Assessment & Plan  Podiatry and vascular seeing          Subjective:   Feels much better  Much less sob  No chest pain  Less leg swelling  Objective:     Vitals:   Temp (24hrs), Av 5 °F (36 9 °C), Min:98 °F (36 7 °C), Max:98 9 °F (37 2 °C)    Temp:  [98 °F (36 7 °C)-98 9 °F (37 2 °C)] 98 9 °F (37 2 °C)  HR:  [67-79] 67  Resp:  [18] 18  BP: (139-167)/(59-74) 139/59  SpO2:  [90 %-92 %] 92 %  Body mass index is 21 95 kg/m²  Input and Output Summary (last 24 hours): Intake/Output Summary (Last 24 hours) at 2021 1539  Last data filed at 2021 1201  Gross per 24 hour   Intake 220 ml   Output 625 ml   Net -405 ml       Physical Exam:     Physical Exam  Vitals signs and nursing note reviewed  HENT:      Head: Normocephalic and atraumatic  Eyes:      Pupils: Pupils are equal, round, and reactive to light  Cardiovascular:      Rate and Rhythm: Normal rate and regular rhythm  Heart sounds: No murmur  No friction rub  No gallop  Pulmonary:      Effort: Pulmonary effort is normal       Breath sounds: Normal breath sounds  No wheezing or rales  Abdominal:      General: Bowel sounds are normal       Palpations: Abdomen is soft  Tenderness: There is no abdominal tenderness     Musculoskeletal:      Right lower leg: Edema (mild, but improving) present  Left lower leg: No edema          Additional Data:     Labs:    Results from last 7 days   Lab Units 01/02/21  0518  12/29/20  0535   WBC Thousand/uL 12 84*   < > 10 35*   HEMOGLOBIN g/dL 8 1*   < > 10 8*   HEMATOCRIT % 26 3*   < > 35 2   PLATELETS Thousands/uL 226   < > 335   NEUTROS PCT %  --   --  35*   LYMPHS PCT %  --   --  54*   MONOS PCT %  --   --  7   EOS PCT %  --   --  4    < > = values in this interval not displayed  Results from last 7 days   Lab Units 01/02/21  0518  12/28/20  1531   POTASSIUM mmol/L 3 6   < > 4 3   CHLORIDE mmol/L 101   < > 105   CO2 mmol/L 26   < > 22   BUN mg/dL 58*   < > 50*   CREATININE mg/dL 2 14*   < > 1 90*   CALCIUM mg/dL 8 6   < > 8 3   ALK PHOS U/L  --   --  101   ALT U/L  --   --  65   AST U/L  --   --  61*    < > = values in this interval not displayed  Results from last 7 days   Lab Units 01/02/21  0518   INR  3 07*     Results from last 7 days   Lab Units 12/30/20  1139   POC GLUCOSE mg/dl 99               * I Have Reviewed All Lab Data     Recent Cultures (last 7 days):     Results from last 7 days   Lab Units 12/28/20  1604 12/28/20  1531   BLOOD CULTURE  No Growth After 4 Days  No Growth After 4 Days           Last 24 Hours Medication List:   Current Facility-Administered Medications   Medication Dose Route Frequency Provider Last Rate    acetaminophen  650 mg Oral Q4H PRN OTIS Mancilla      albuterol  2 5 mg Nebulization Q6H PRN OTIS Mancilla      amLODIPine  10 mg Oral Daily Tray Mancilla [address]      calcium carbonate  1,000 mg Oral TID PRN OTIS Mancilla      carvedilol  12 5 mg Oral BID With Meals OTIS Mancilla      hydrALAZINE  10 mg Intravenous Q6H PRN OTIS Mancilla      hydrALAZINE  10 mg Oral Q8H [facility] Ruben Montiel MD      isosorbide dinitrate  5 mg Oral TID after meals Ruben Montiel MD      ondansetron  4 mg Intravenous Q6H PRN OTIS Mancilla      potassium chloride  20 mEq Oral Daily OTIS Vickers      senna  1 tablet Oral HS PRN OTIS Vickers      warfarin  4 mg Oral Daily (warfarin) OTSI Vickers           VTE Pharmacologic Prophylaxis:   Pharmacologic: Warfarin (Coumadin)      Current Length of Stay: 5 day(s)    Current Patient Status: Inpatient       Discharge Plan:   Code Status: Level 1 - Full Code           Today, Patient Was Seen By: Sheeba Correia DO    ** Please Note: Dictation voice to text software may have been used in the creation of this document   **

## 2021-01-03 LAB
ANION GAP SERPL CALCULATED.3IONS-SCNC: 9 MMOL/L (ref 4–13)
BUN SERPL-MCNC: 67 MG/DL (ref 5–25)
CALCIUM SERPL-MCNC: 8.7 MG/DL (ref 8.3–10.1)
CHLORIDE SERPL-SCNC: 101 MMOL/L (ref 100–108)
CO2 SERPL-SCNC: 25 MMOL/L (ref 21–32)
CREAT SERPL-MCNC: 2.04 MG/DL (ref 0.6–1.3)
ERYTHROCYTE [DISTWIDTH] IN BLOOD BY AUTOMATED COUNT: 17.2 % (ref 11.6–15.1)
GFR SERPL CREATININE-BSD FRML MDRD: 21 ML/MIN/1.73SQ M
GLUCOSE SERPL-MCNC: 106 MG/DL (ref 65–140)
HCT VFR BLD AUTO: 26.4 % (ref 34.8–46.1)
HGB BLD-MCNC: 8.2 G/DL (ref 11.5–15.4)
INR PPP: 3.91 (ref 0.84–1.19)
MAGNESIUM SERPL-MCNC: 2.3 MG/DL (ref 1.6–2.6)
MCH RBC QN AUTO: 27.8 PG (ref 26.8–34.3)
MCHC RBC AUTO-ENTMCNC: 31.1 G/DL (ref 31.4–37.4)
MCV RBC AUTO: 90 FL (ref 82–98)
PLATELET # BLD AUTO: 241 THOUSANDS/UL (ref 149–390)
PMV BLD AUTO: 10.6 FL (ref 8.9–12.7)
POTASSIUM SERPL-SCNC: 4.4 MMOL/L (ref 3.5–5.3)
PROTHROMBIN TIME: 37.4 SECONDS (ref 11.6–14.5)
RBC # BLD AUTO: 2.95 MILLION/UL (ref 3.81–5.12)
SODIUM SERPL-SCNC: 135 MMOL/L (ref 136–145)
WBC # BLD AUTO: 13.78 THOUSAND/UL (ref 4.31–10.16)

## 2021-01-03 PROCEDURE — 99232 SBSQ HOSP IP/OBS MODERATE 35: CPT | Performed by: HOSPITALIST

## 2021-01-03 PROCEDURE — 83735 ASSAY OF MAGNESIUM: CPT | Performed by: HOSPITALIST

## 2021-01-03 PROCEDURE — 85027 COMPLETE CBC AUTOMATED: CPT | Performed by: HOSPITALIST

## 2021-01-03 PROCEDURE — 80048 BASIC METABOLIC PNL TOTAL CA: CPT | Performed by: HOSPITALIST

## 2021-01-03 PROCEDURE — 85610 PROTHROMBIN TIME: CPT | Performed by: HOSPITALIST

## 2021-01-03 RX ORDER — ISOSORBIDE DINITRATE 10 MG/1
10 TABLET ORAL
Status: DISCONTINUED | OUTPATIENT
Start: 2021-01-03 | End: 2021-01-06 | Stop reason: HOSPADM

## 2021-01-03 RX ADMIN — HYDRALAZINE HYDROCHLORIDE 10 MG: 10 TABLET, FILM COATED ORAL at 13:36

## 2021-01-03 RX ADMIN — HYDRALAZINE HYDROCHLORIDE 10 MG: 10 TABLET, FILM COATED ORAL at 06:17

## 2021-01-03 RX ADMIN — ISOSORBIDE DINITRATE 10 MG: 10 TABLET ORAL at 16:42

## 2021-01-03 RX ADMIN — HYDRALAZINE HYDROCHLORIDE 10 MG: 10 TABLET, FILM COATED ORAL at 22:19

## 2021-01-03 RX ADMIN — AMLODIPINE BESYLATE 10 MG: 10 TABLET ORAL at 09:44

## 2021-01-03 RX ADMIN — POTASSIUM CHLORIDE 20 MEQ: 1500 TABLET, EXTENDED RELEASE ORAL at 09:44

## 2021-01-03 RX ADMIN — ISOSORBIDE DINITRATE 5 MG: 10 TABLET ORAL at 09:44

## 2021-01-03 RX ADMIN — CARVEDILOL 12.5 MG: 12.5 TABLET, FILM COATED ORAL at 09:44

## 2021-01-03 RX ADMIN — CARVEDILOL 12.5 MG: 12.5 TABLET, FILM COATED ORAL at 16:43

## 2021-01-03 RX ADMIN — ISOSORBIDE DINITRATE 10 MG: 10 TABLET ORAL at 12:54

## 2021-01-03 NOTE — ASSESSMENT & PLAN NOTE
INR was subtherapeutic on admission    She is now mildly supratherapeutic so I am holding the coumadin tonight

## 2021-01-03 NOTE — QUICK NOTE
Per cardiology note, patient not interested in vascular intervention for now  Vascular surgery will sign off  Please call if any questions  Patient can follow up vascular surgery as an outpatient

## 2021-01-03 NOTE — PLAN OF CARE
Problem: Potential for Falls  Goal: Patient will remain free of falls  Description: INTERVENTIONS:  - Assess patient frequently for physical needs  -  Identify cognitive and physical deficits and behaviors that affect risk of falls    -  [place] fall precautions as indicated by assessment   - Educate patient/family on patient safety including physical limitations  - Instruct patient to call for assistance with activity based on assessment  - Modify environment to reduce risk of injury  - Consider OT/PT consult to assist with strengthening/mobility  Outcome: Progressing     Problem: Prexisting or High Potential for Compromised Skin Integrity  Goal: Skin integrity is maintained or improved  Description: INTERVENTIONS:  - Identify patients at risk for skin breakdown  - Assess and monitor skin integrity  - Assess and monitor nutrition and hydration status  - Monitor labs   - Assess for incontinence   - Turn and reposition patient  - Assist with mobility/ambulation  - Relieve pressure over bony prominences  - Avoid friction and shearing  - Provide appropriate hygiene as needed including keeping skin clean and dry  - Evaluate need for skin moisturizer/barrier cream  - Collaborate with interdisciplinary team   - Patient/family teaching  - Consider wound care consult   Outcome: Progressing     Problem: PAIN - ADULT  Goal: Verbalizes/displays adequate comfort level or baseline comfort level  Description: Interventions:  - Encourage patient to monitor pain and request assistance  - Assess pain using appropriate pain scale  - Administer analgesics based on type and severity of pain and evaluate response  - Implement non-pharmacological measures as appropriate and evaluate response  - Consider cultural and social influences on pain and pain management  - Notify physician/advanced practitioner if interventions unsuccessful or patient reports new pain  Outcome: Progressing     Problem: INFECTION - ADULT  Goal: Absence or prevention of progression during hospitalization  Description: INTERVENTIONS:  - Assess and monitor for signs and symptoms of infection  - Monitor lab/diagnostic results  - Monitor all insertion sites, i e  indwelling lines, tubes, and drains  - Monitor endotracheal if appropriate and nasal secretions for changes in amount and color  - [place] appropriate cooling/warming therapies per order  - Administer medications as ordered  - Instruct and encourage patient and family to use good hand hygiene technique  - Identify and instruct in appropriate isolation precautions for identified infection/condition  Outcome: Progressing     Problem: SAFETY ADULT  Goal: Patient will remain free of falls  Description: INTERVENTIONS:  - Assess patient frequently for physical needs  -  Identify cognitive and physical deficits and behaviors that affect risk of falls    -  [place] fall precautions as indicated by assessment   - Educate patient/family on patient safety including physical limitations  - Instruct patient to call for assistance with activity based on assessment  - Modify environment to reduce risk of injury  - Consider OT/PT consult to assist with strengthening/mobility  Outcome: Progressing  Goal: Maintain or return to baseline ADL function  Description: INTERVENTIONS:  -  Assess patient's ability to carry out ADLs; assess patient's baseline for ADL function and identify physical deficits which impact ability to perform ADLs (bathing, care of mouth/teeth, toileting, grooming, dressing, etc )  - Assess/evaluate cause of self-care deficits   - Assess range of motion  - Assess patient's mobility; develop plan if impaired  - Assess patient's need for assistive devices and provide as appropriate  - Encourage maximum independence but intervene and supervise when necessary  - Involve family in performance of ADLs  - Assess for home care needs following discharge   - Consider OT consult to assist with ADL evaluation and planning for discharge  - Provide patient education as appropriate  Outcome: Progressing  Goal: Maintain or return mobility status to optimal level  Description: INTERVENTIONS:  - Assess patient's baseline mobility status (ambulation, transfers, stairs, etc )    - Identify cognitive and physical deficits and behaviors that affect mobility  - Identify mobility aids required to assist with transfers and/or ambulation (gait belt, sit-to-stand, lift, walker, cane, etc )  - [place] fall precautions as indicated by assessment  - Record patient progress and toleration of activity level on Mobility SBAR; progress patient to next Phase/Stage  - Instruct patient to call for assistance with activity based on assessment  - Consider rehabilitation consult to assist with strengthening/weightbearing, etc   Outcome: Progressing     Problem: DISCHARGE PLANNING  Goal: Discharge to home or other facility with appropriate resources  Description: INTERVENTIONS:  - Identify barriers to discharge w/patient and caregiver  - Arrange for needed discharge resources and transportation as appropriate  - Identify discharge learning needs (meds, wound care, etc )  - Arrange for interpretive services to assist at discharge as needed  - Refer to Case Management Department for coordinating discharge planning if the patient needs post-hospital services based on physician/advanced practitioner order or complex needs related to functional status, cognitive ability, or social support system  Outcome: Progressing     Problem: Knowledge Deficit  Goal: Patient/family/caregiver demonstrates understanding of disease process, treatment plan, medications, and discharge instructions  Description: Complete learning assessment and assess knowledge base    Interventions:  - Provide teaching at level of understanding  - Provide teaching via preferred learning methods  Outcome: Progressing     Problem: Nutrition/Hydration-ADULT  Goal: Nutrient/Hydration intake appropriate for improving, restoring or maintaining nutritional needs  Description: Monitor and assess patient's nutrition/hydration status for malnutrition  Collaborate with interdisciplinary team and initiate plan and interventions as ordered  Monitor patient's weight and dietary intake as ordered or per policy  Utilize nutrition screening tool and intervene as necessary  Determine patient's food preferences and provide high-protein, high-caloric foods as appropriate       INTERVENTIONS:  - Monitor oral intake, urinary output, labs, and treatment plans  - Assess nutrition and hydration status and recommend course of action  - Evaluate amount of meals eaten  - Assist patient with eating if necessary   - Allow adequate time for meals  - Recommend/ encourage appropriate diets, oral nutritional supplements, and vitamin/mineral supplements  - Order, calculate, and assess calorie counts as needed  - Recommend, monitor, and adjust tube feedings and TPN/PPN based on assessed needs  - Assess need for intravenous fluids  - Provide specific nutrition/hydration education as appropriate  - Include patient/family/caregiver in decisions related to nutrition  Outcome: Progressing

## 2021-01-03 NOTE — ASSESSMENT & PLAN NOTE
Came in with acute diastolic CHF and immedately needed BIPAP due to acute respiratory failure  She has improved      Diuretics are on hold right now due to acute kidney injury    Cardiology is following and adjusting medications

## 2021-01-03 NOTE — UTILIZATION REVIEW
Continued Stay Review    Date: 1/1/21                          Current Patient Class: inpatient    Current Level of Care: med surg since 12/31    HPI:89 y o  female initially admitted on 12/28/20 as inpatient due to decompensated CHF, RLE ulcer  Cardio, Podiatry and vascular following  Was on bipap, weaned to 4 liters, with lasix adjustments underway due to rising creat  Doppler showed vascular occlusion with below healing perfusion  Awaiting patient decision on vascular intervention  She was leaning toward local wound care only  Assessment/Plan: 1/1: feels fatigued, but less sob and leg swelling, has intermittent cough  Lungs normal  On 3 liters nc   give IV lasix 20mg x 1 today, continue bp meds, if bp can tolerate, will add isordil and hydralazine for further afterload reduction  Repeat lytes and renal function tomorrow  Creat has been stable  INR was mildly subtherapeutic, need to monitor to prevent it dropping too low to cause DVT or PE  No need for bridge at this time  Pertinent Labs/Diagnostic Results:   12/29 BLE dopplerRIGHT LOWER LIMB:  Findings suggests an occlusion versus high grade stenosis of the mid/distal  popliteal artery with two focal 50-75% stenoses noted; proximal superficial  femoral artery and distal superficial femoral artery  Findings suggests tibioperoneal disease  Ankle/Brachial index:  0 46, ischemic range  PVR/ PPG tracings are severely dampened, therefore, metatarsal and great toe  pressures are unobtainable suggesting poor distal perfusion  LEFT LOWER LIMB:  An occlusion versus high grade stenosis of the proximal thigh portion of the  superficial femoral artery with distal reconstitution  Findings suggests  tibioperoneal disease  Ankle/Brachial index: 0 71, moderate claudication range    PVR/ PPG tracings are severely dampened, therefore, metatarsal and great toe  pressures are unobtainable suggesting poor distal perfusion   ========================================  12/29 echo: LEFT VENTRICLE:  Systolic function was normal  Ejection fraction was estimated to be 50 %  There were no regional wall motion abnormalities  Wall thickness was mildly to moderately increased  Features were consistent with a pseudonormal left ventricular filling pattern, with concomitant abnormal relaxation and increased filling pressure (grade 2 diastolic dysfunction)      LEFT ATRIUM:  The atrium was markedly dilated      RIGHT ATRIUM:  The atrium was moderately dilated      MITRAL VALVE:  There was moderate regurgitation      TRICUSPID VALVE:  There was mild regurgitation      IVC, HEPATIC VEINS:  The inferior vena cava was mildly dilated      PERICARDIUM:  A trace pericardial effusion was identified posterior to the heart  There was a left pleural effusion    Results from last 7 days   Lab Units 12/28/20  1822   SARS-COV-2  Negative     Results from last 7 days   Lab Units 01/03/21  0545 01/02/21  0518 12/31/20  0459 12/30/20  0650 12/29/20  0535 12/28/20  1531   WBC Thousand/uL 13 78* 12 84* 10 47* 12 75* 10 35* 8 16   HEMOGLOBIN g/dL 8 2* 8 1* 7 6* 8 3* 10 8* 10 6*   HEMATOCRIT % 26 4* 26 3* 25 2* 27 2* 35 2 34 9   PLATELETS Thousands/uL 241 226 182 212 335 305   NEUTROS ABS Thousands/µL  --   --   --   --  3 61 3 18         Results from last 7 days   Lab Units 01/03/21  0545 01/02/21  0518 01/01/21  0510 12/31/20  0459 12/30/20  0651   SODIUM mmol/L 135* 136 136 136 139   POTASSIUM mmol/L 4 4 3 6 3 4* 3 5 3 3*   CHLORIDE mmol/L 101 101 101 102 105   CO2 mmol/L 25 26 26 23 23   ANION GAP mmol/L 9 9 9 11 11   BUN mg/dL 67* 58* 51* 55* 50*   CREATININE mg/dL 2 04* 2 14* 2 21* 2 24* 2 12*   EGFR ml/min/1 73sq m 21 20 19 19 20   CALCIUM mg/dL 8 7 8 6 7 9* 8 3 8 7   MAGNESIUM mg/dL 2 3 2 0  --  2 2 2 1     Results from last 7 days   Lab Units 12/28/20  1531   AST U/L 61*   ALT U/L 65   ALK PHOS U/L 101   TOTAL PROTEIN g/dL 6 7   ALBUMIN g/dL 2 5* TOTAL BILIRUBIN mg/dL 0 57     Results from last 7 days   Lab Units 12/30/20  1139   POC GLUCOSE mg/dl 99     Results from last 7 days   Lab Units 01/03/21  0545 01/02/21  0518 01/01/21  0510 12/31/20  0459 12/30/20  0651 12/29/20  0535 12/28/20  1531   GLUCOSE RANDOM mg/dL 106 102 107 102 92 97 136       Results from last 7 days   Lab Units 01/02/21  0518 01/01/21  1036 12/30/20  0650  12/28/20  1531   PROTIME seconds 31 0* 30 2* 25 8*   < > 21 9*   INR  3 07* 2 96* 2 42*   < > 1 96*   PTT seconds  --   --   --   --  42*    < > = values in this interval not displayed  Results from last 7 days   Lab Units 12/28/20  1531   PROCALCITONIN ng/ml <0 05     Results from last 7 days   Lab Units 12/28/20  1605   LACTIC ACID mmol/L 1 6     Results from last 7 days   Lab Units 12/29/20  0535   NT-PRO BNP pg/mL 32,959*       Results from last 7 days   Lab Units 12/28/20  1822   INFLUENZA A PCR  Negative   INFLUENZA B PCR  Negative   RSV PCR  Negative       Results from last 7 days   Lab Units 12/28/20  1604 12/28/20  1531   BLOOD CULTURE  No Growth After 5 Days  No Growth After 5 Days                 Vital Signs:   01/01/21 2354  98 7 °F (37 1 °C)  78  18  162/74  106  90 %        Lying   01/01/21 2137              32  3 L/min       01/01/21 1600  98 3 °F (36 8 °C)  79  18  157/67  97  92 %  32  3 L/min  Nasal cannula  Lying   01/01/21 1150    74  18  129/71    92 %  32  3 L/min  Nasal cannula  Lying   01/01/21 0816  98 4 °F (36 9 °C)  80  18  153/70    90 %  32  3 L/min  Nasal cannula  Lying         Medications:   Scheduled Medications:  amLODIPine, 10 mg, Oral, Daily  carvedilol, 12 5 mg, Oral, BID With Meals  hydrALAZINE, 10 mg, Oral, Q8H NATASHA  isosorbide dinitrate, 10 mg, Oral, TID after meals  potassium chloride, 20 mEq, Oral, Daily  warfarin, 4 mg, Oral, Daily (warfarin)      Continuous IV Infusions: none   PRN Meds:  acetaminophen, 650 mg, Oral, Q4H PRN  albuterol, 2 5 mg, Nebulization, Q6H PRN  calcium carbonate, 1,000 mg, Oral, TID PRN  hydrALAZINE, 10 mg, Intravenous, Q6H PRN x 1 12/31 @0543  ondansetron, 4 mg, Intravenous, Q6H PRN  senna, 1 tablet, Oral, HS PRN      Discharge Plan: TBD    Network Utilization Review Department  ATTENTION: Please call with any questions or concerns to [phone] and carefully listen to the prompts so that you are directed to the right person  All voicemails are confidential   Jaimee Spear all requests for admission clinical reviews, approved or denied determinations and any other requests to dedicated fax number below belonging to the campus where the patient is receiving treatment   List of dedicated fax numbers for the Facilities:  [address] DENIALS (Administrative/Medical Necessity) [phone]   [address] (Maternity/NICU/Pediatrics) [phone]   [address] [place] [address] [address] [place] [address] Dr Lukas Hagan 6504 (  lBaze James "Yuki" 103) [address] [place] [address] Torrey Best 14[address] [place]) [address] [place] [address] [phone]

## 2021-01-03 NOTE — PROGRESS NOTES
Progress Note - Reyes Terrell 1930, 80 y o  female MRN: [id number]    Unit/Bed#: E4 -01 Encounter: [id number]    Primary Care Provider: Ashley Alejandro MD   Date and time admitted to hospital: 2020  3:25 PM        * Acute diastolic CHF (congestive heart failure) ([facility] [address] )  Assessment & Plan  Came in with acute diastolic CHF and immedately needed BIPAP due to acute respiratory failure  She has improved  Diuretics are on hold right now due to acute kidney injury    Cardiology is following and adjusting medications    CKD (chronic kidney disease)  Assessment & Plan  Cr is a little better  Continue to hold Lasix until cr is back to baseline    History of DVT (deep vein thrombosis)  Assessment & Plan  INR was subtherapeutic on admission    She is now mildly supratherapeutic so I am holding the coumadin tonight    Ulcer of right lower extremity with fat layer exposed [facility])  Assessment & Plan  Podiatry and vascular seeing    Treatment will be local wound care          Subjective:   Doing well  No sob  Less leg swelling  Objective:     Vitals:   Temp (24hrs), Av 2 °F (36 8 °C), Min:97 6 °F (36 4 °C), Max:98 6 °F (37 °C)    Temp:  [97 6 °F (36 4 °C)-98 6 °F (37 °C)] 97 6 °F (36 4 °C)  HR:  [72-82] 82  Resp:  [16-18] 18  BP: (141-162)/(63-78) 156/70  SpO2:  [90 %-92 %] 91 %  Body mass index is 21 53 kg/m²  Input and Output Summary (last 24 hours): Intake/Output Summary (Last 24 hours) at 1/3/2021 1724  Last data filed at 1/3/2021 1123  Gross per 24 hour   Intake 360 ml   Output 1200 ml   Net -840 ml       Physical Exam:     Physical Exam  Vitals signs and nursing note reviewed  HENT:      Head: Normocephalic and atraumatic  Eyes:      Pupils: Pupils are equal, round, and reactive to light  Cardiovascular:      Rate and Rhythm: Normal rate and regular rhythm  Heart sounds: No murmur  No friction rub  No gallop      Pulmonary:      Effort: Pulmonary effort is normal  Breath sounds: Normal breath sounds  No wheezing or rales  Abdominal:      General: Bowel sounds are normal       Palpations: Abdomen is soft  Tenderness: There is no abdominal tenderness  Musculoskeletal:      Right lower leg: Edema (trace) present  Left lower leg: No edema  Comments: Right leg dressing over tibia is C/D/I         Additional Data:     Labs:    Results from last 7 days   Lab Units 01/03/21  0545  12/29/20  0535   WBC Thousand/uL 13 78*   < > 10 35*   HEMOGLOBIN g/dL 8 2*   < > 10 8*   HEMATOCRIT % 26 4*   < > 35 2   PLATELETS Thousands/uL 241   < > 335   NEUTROS PCT %  --   --  35*   LYMPHS PCT %  --   --  54*   MONOS PCT %  --   --  7   EOS PCT %  --   --  4    < > = values in this interval not displayed  Results from last 7 days   Lab Units 01/03/21  0545  12/28/20  1531   POTASSIUM mmol/L 4 4   < > 4 3   CHLORIDE mmol/L 101   < > 105   CO2 mmol/L 25   < > 22   BUN mg/dL 67*   < > 50*   CREATININE mg/dL 2 04*   < > 1 90*   CALCIUM mg/dL 8 7   < > 8 3   ALK PHOS U/L  --   --  101   ALT U/L  --   --  65   AST U/L  --   --  61*    < > = values in this interval not displayed  Results from last 7 days   Lab Units 01/03/21  1642   INR  3 91*     Results from last 7 days   Lab Units 12/30/20  1139   POC GLUCOSE mg/dl 99               * I Have Reviewed All Lab Data     Recent Cultures (last 7 days):     Results from last 7 days   Lab Units 12/28/20  1604 12/28/20  1531   BLOOD CULTURE  No Growth After 5 Days  No Growth After 5 Days           Last 24 Hours Medication List:   Current Facility-Administered Medications   Medication Dose Route Frequency Provider Last Rate    acetaminophen  650 mg Oral Q4H PRN OTIS Kong      albuterol  2 5 mg Nebulization Q6H PRN OTIS Kong      amLODIPine  10 mg Oral Daily Abelardo Pino, [address]      calcium carbonate  1,000 mg Oral TID PRN OTIS Kong      carvedilol  12 5 mg Oral BID With Meals OTIS Kong      hydrALAZINE  10 mg Intravenous Q6H PRN Russell Nest, CRNP      hydrALAZINE  10 mg Oral Q8H [address] Ruben Montiel MD      isosorbide dinitrate  10 mg Oral TID after meals Ruben Montiel MD      ondansetron  4 mg Intravenous Q6H PRN Russell Nest, CRNP      potassium chloride  20 mEq Oral Daily Russell Nest, CRNP      senna  1 tablet Oral HS PRN Russell Nest, CRNP      warfarin  4 mg Oral Daily (warfarin) OTIS Shearer           VTE Pharmacologic Prophylaxis:   Pharmacologic: Warfarin (Coumadin)      Current Length of Stay: 6 day(s)    Current Patient Status: Inpatient       Discharge Plan: waiting for SNF placment    Code Status: Level 1 - Full Code           Today, Patient Was Seen By: Kyle Richardson DO    ** Please Note: Dictation voice to text software may have been used in the creation of this document   **

## 2021-01-04 LAB
ANION GAP SERPL CALCULATED.3IONS-SCNC: 9 MMOL/L (ref 4–13)
BUN SERPL-MCNC: 67 MG/DL (ref 5–25)
CALCIUM SERPL-MCNC: 8.2 MG/DL (ref 8.3–10.1)
CHLORIDE SERPL-SCNC: 101 MMOL/L (ref 100–108)
CO2 SERPL-SCNC: 25 MMOL/L (ref 21–32)
CREAT SERPL-MCNC: 2.06 MG/DL (ref 0.6–1.3)
ERYTHROCYTE [DISTWIDTH] IN BLOOD BY AUTOMATED COUNT: 17.2 % (ref 11.6–15.1)
GFR SERPL CREATININE-BSD FRML MDRD: 21 ML/MIN/1.73SQ M
GLUCOSE SERPL-MCNC: 101 MG/DL (ref 65–140)
HCT VFR BLD AUTO: 23.9 % (ref 34.8–46.1)
HGB BLD-MCNC: 7.3 G/DL (ref 11.5–15.4)
INR PPP: 4.03 (ref 0.84–1.19)
MAGNESIUM SERPL-MCNC: 2.1 MG/DL (ref 1.6–2.6)
MCH RBC QN AUTO: 27.1 PG (ref 26.8–34.3)
MCHC RBC AUTO-ENTMCNC: 30.5 G/DL (ref 31.4–37.4)
MCV RBC AUTO: 89 FL (ref 82–98)
PLATELET # BLD AUTO: 203 THOUSANDS/UL (ref 149–390)
PMV BLD AUTO: 9.5 FL (ref 8.9–12.7)
POTASSIUM SERPL-SCNC: 4.5 MMOL/L (ref 3.5–5.3)
PROTHROMBIN TIME: 38.3 SECONDS (ref 11.6–14.5)
RBC # BLD AUTO: 2.69 MILLION/UL (ref 3.81–5.12)
SODIUM SERPL-SCNC: 135 MMOL/L (ref 136–145)
WBC # BLD AUTO: 11.98 THOUSAND/UL (ref 4.31–10.16)

## 2021-01-04 PROCEDURE — 80048 BASIC METABOLIC PNL TOTAL CA: CPT | Performed by: HOSPITALIST

## 2021-01-04 PROCEDURE — 99232 SBSQ HOSP IP/OBS MODERATE 35: CPT | Performed by: INTERNAL MEDICINE

## 2021-01-04 PROCEDURE — 97167 OT EVAL HIGH COMPLEX 60 MIN: CPT

## 2021-01-04 PROCEDURE — 85027 COMPLETE CBC AUTOMATED: CPT | Performed by: HOSPITALIST

## 2021-01-04 PROCEDURE — 83735 ASSAY OF MAGNESIUM: CPT | Performed by: HOSPITALIST

## 2021-01-04 PROCEDURE — 85610 PROTHROMBIN TIME: CPT | Performed by: HOSPITALIST

## 2021-01-04 RX ORDER — FUROSEMIDE 20 MG/1
20 TABLET ORAL 3 TIMES WEEKLY
Status: DISCONTINUED | OUTPATIENT
Start: 2021-01-05 | End: 2021-01-06

## 2021-01-04 RX ADMIN — ISOSORBIDE DINITRATE 10 MG: 10 TABLET ORAL at 09:30

## 2021-01-04 RX ADMIN — ISOSORBIDE DINITRATE 10 MG: 10 TABLET ORAL at 16:41

## 2021-01-04 RX ADMIN — HYDRALAZINE HYDROCHLORIDE 10 MG: 10 TABLET, FILM COATED ORAL at 05:04

## 2021-01-04 RX ADMIN — POTASSIUM CHLORIDE 20 MEQ: 1500 TABLET, EXTENDED RELEASE ORAL at 09:30

## 2021-01-04 RX ADMIN — AMLODIPINE BESYLATE 10 MG: 10 TABLET ORAL at 09:30

## 2021-01-04 RX ADMIN — CARVEDILOL 12.5 MG: 12.5 TABLET, FILM COATED ORAL at 16:41

## 2021-01-04 RX ADMIN — ISOSORBIDE DINITRATE 10 MG: 10 TABLET ORAL at 11:56

## 2021-01-04 RX ADMIN — CARVEDILOL 12.5 MG: 12.5 TABLET, FILM COATED ORAL at 09:30

## 2021-01-04 RX ADMIN — HYDRALAZINE HYDROCHLORIDE 10 MG: 10 TABLET, FILM COATED ORAL at 21:59

## 2021-01-04 RX ADMIN — HYDRALAZINE HYDROCHLORIDE 10 MG: 10 TABLET, FILM COATED ORAL at 13:05

## 2021-01-04 NOTE — PROGRESS NOTES
Patient is having difficulty sitting up in chair  Slouches down to the point where she's almost falling out of it  Needs to be repositioned every 20 minutes  Allevyn patch placed on patient's sacrum and patient was helped into bed

## 2021-01-04 NOTE — PROGRESS NOTES
Progress Note - Cardiology   Sonido Abraham 80 y o  female MRN: [id number]  Unit/Bed#: E4 -01 Encounter: [id number]          Asessment/Plan  Acute hypoxic respiratory failure - acute diastolic CHF: Now seems compensated              Echo 12/29/2020:  EF 50%, No RWMAs, mild-mod LVH, Gr II DD       O/p diuretic: NONE  Valvular heart disease: Mod MR, mild TR (ECHO 12/29/2020)  Hypertension - uncontrolled at admission:              0/p Rx:  Norvasc 10 mg, Lopressor 25 mg b i d  Anemia:              Presenting hemoglobin 10 6 (basline 10s) ~~> xi of 7 3  CKD:               Recent lab suggest baseline creatinine to be approximately 1 7-1 9   Peak 2 24 (12/31/2020)  Atrophic left kidney, Hx Rt hydronephrosis and ureteral stenting - 12/2/2020 ([facility])  E coli bacteremia -11/2020:  Rt lower extremity wounds (POA):  Hx RLE DVT (11/18/2020):              0/P Rx: warfarin    --Hemoglobin 7 3 - normocytic/normochromic indices  --Sodium 135 (stable), potassium 4 5, BUN 67, creatinine 2 06 (stable)  --Magnesium 2 1    · Blood pressure overall improved and now stable with SBP trending 140-150s on current regimen of prehospital Norvasc, carvedilol (replaced pre-hospital Lopressor), and new meds - hydralazine & isosorbide ~~> continue same  · With currently euvolemic exam and creatinine improving off diuretic suggest patient use diuretic on as needed basis guided by weight gain/symptoms ~~> suggest 20 mg/d as needed for gain of more than 3 lb in 24 hours, or 5 lb in a week  · For nonhealing distal right lower extremity wounds, the patient was evaluated by Podiatry and vascular surgery ~~> she has decided against continued workup towards revascularization and instead will pursue local care  · Hemoglobin remains stable though decreased from admission value  Stool guaiac pending with primary service evaluating      Subjective:  Patient admits overall comfort and offers no complaint to me    In fact, she is is wanting to go home      Vitals:  Vitals:    01/03/21 0539 01/04/21 0600   Weight: 60 5 kg (133 lb 6 1 oz) 60 3 kg (132 lb 15 oz)   ,  Vitals:    01/03/21 1500 01/03/21 2219 01/04/21 0504 01/04/21 0600   BP: 156/70 128/68 142/63    BP Location: Right arm Left arm     Pulse: 82 79     Resp: 18 19     Temp: 97 6 °F (36 4 °C) 98 3 °F (36 8 °C)     TempSrc: Temporal Temporal     SpO2: 91% 96%     Weight:    60 3 kg (132 lb 15 oz)   Height:           Exam:  General:  Alert normally conversant and comfortable-appearing elderly female   Heart:  Distant, Regular with controlled rate with faint apical murmur  Lungs:  Clear throughout  Lower Limbs:  No edema             Medications:    Current Facility-Administered Medications:     acetaminophen (TYLENOL) tablet 650 mg, 650 mg, Oral, Q4H PRN, Phyllis Sickle, CRNP    albuterol inhalation solution 2 5 mg, 2 5 mg, Nebulization, Q6H PRN, Phyllis Sickle, CRNP, 2 5 mg at 12/30/20 0842    amLODIPine (NORVASC) tablet 10 mg, 10 mg, Oral, Daily, Phyllis Sickle, CRNP, 10 mg at 01/04/21 0930    calcium carbonate (TUMS) chewable tablet 1,000 mg, 1,000 mg, Oral, TID PRN, Phyllis Sickle, CRNP    carvedilol (COREG) tablet 12 5 mg, 12 5 mg, Oral, BID With Meals, Phyllis Sickle, CRNP, 12 5 mg at 01/04/21 0930    hydrALAZINE (APRESOLINE) injection 10 mg, 10 mg, Intravenous, Q6H PRN, Phyllis Sickle, CRNP, 10 mg at 12/31/20 0543    hydrALAZINE (APRESOLINE) tablet 10 mg, 10 mg, Oral, Q8H [facility], Ruben Montiel MD, 10 mg at 01/04/21 0504    isosorbide dinitrate (ISORDIL) tablet 10 mg, 10 mg, Oral, TID after meals, Ruben Montiel MD, 10 mg at 01/04/21 1156    ondansetron (ZOFRAN) injection 4 mg, 4 mg, Intravenous, Q6H PRN, Phyllis Gillette, ROBBINNP    potassium chloride (K-DUR,KLOR-CON) CR tablet 20 mEq, 20 mEq, Oral, Daily, OTIS Bonner, 20 mEq at 01/04/21 0930    senna (SENOKOT) tablet 8 6 mg, 1 tablet, Oral, HS PRN, ROBBIN BonnerNP      Labs/Data:        Results from last 7 days   Lab Units 01/04/21  0414 01/03/21  0560 01/02/21  0518   WBC Thousand/uL 11 98* 13 78* 12 84*   HEMOGLOBIN g/dL 7 3* 8 2* 8 1*   HEMATOCRIT % 23 9* 26 4* 26 3*   PLATELETS Thousands/uL 203 241 226     Results from last 7 days   Lab Units 01/04/21  0441 01/03/21  0545 01/02/21  0518   POTASSIUM mmol/L 4 5 4 4 3 6   CHLORIDE mmol/L 101 101 101   CO2 mmol/L 25 25 26   BUN mg/dL 67* 67* 58*

## 2021-01-04 NOTE — OCCUPATIONAL THERAPY NOTE
Occupational Therapy Evaluation     Patient Name: Howard Aschoff [id number] Date: 1/4/2021  Problem List  Principal Problem:    Acute diastolic CHF (congestive heart failure) ([facility] [address] )  Active Problems:    Hypertensive urgency    Ulcer of right lower extremity with fat layer exposed ([facility] [address] )    Essential hypertension    CKD (chronic kidney disease)    History of DVT (deep vein thrombosis)    Past Medical History  Past Medical History:   Diagnosis Date    Chronic kidney disease     Hypertension      Past Surgical History  History reviewed  No pertinent surgical history  01/04/21 1438   OT Last Visit   OT Visit Date 01/04/21   Note Type   Note type Evaluation   Restrictions/Precautions   Weight Bearing Precautions Per Order Yes   RLE Weight Bearing Per Order NWB  (per podiatry note)   Braces or Orthoses   (reports has heel offloading shoe at home, wears at times)   Other Precautions Chair Alarm; Bed Alarm;Cognitive; Fall Risk;Pain;Multiple lines   Pain Assessment   Pain Assessment Tool 0-10   Pain Score No Pain   Home Living   Type of [address] [place] [address] One level;Performs ADLs on one level; Able to live on main level with bedroom/bathroom   Bathroom Shower/Tub Tub/shower unit   Bathroom Toilet Raised  (BSC over toilet)   Bathroom Equipment Grab bars in shower; Shower chair;Commode   [address] [place]   Additional Comments pt reports lives w/ spouse and states he is retired marine and able to help her   Prior Function   Level of [place] Independent with ADLs and functional mobility; Needs assistance with IADLs   Lives With Spouse   Receives Help From Family   ADL Assistance Independent   IADLs Independent  (shares tasks w/ spouse)   Falls in the last 6 months 1 to 4   Vocational Retired   Comments pt reports spouse drives, reports supportive family who visits t/o the week, reports recent rehab stay at [address] [place] [address]  per pt independent w/ ADLs, independent w/ functional transfers and mobility w/ RW or no AD at times, shares IADL tasks w/ spouse   Reciprocal Relationships spouse and children and grandkids   Service to Others retired for [employer]   Intrinsic Gratification watching tv   Subjective   Subjective "I have been in bed for a week"   ADL   Where Assessed Chair   Eating Assistance 5  Supervision/Setup   Grooming Assistance 4  Minimal Assistance   [address] 4  Minimal Assistance   [address] 3  Moderate Assistance   [address] 4  [address] 3  Moderate [address]  3  Moderate Assistance   Bed Mobility   Supine to Sit 3  Moderate assistance   Additional items Assist x 1; Increased time required;Verbal cues;LE management;HOB elevated; Bedrails   Additional Comments increased time to complete and use of bed pad to assist w/ positioning   Transfers   Sit to Stand 3  Moderate assistance   Additional items Assist x 2; Increased time required;Verbal cues; Bedrails   Stand to Sit 3  Moderate assistance   Additional items Assist x 2; Increased time required;Verbal cues;Armrests   Additional Comments cues for hand placement and positioning, NWB R LE and unable to maintain NWB R LE; cues for NWB R LE positioning when returning to seated position   Functional Mobility   Functional Mobility 3  Moderate assistance   Additional Comments assist x2 and unable to maintain NWB R LE   Additional items Rolling walker   Balance   Static Sitting Fair   Dynamic Sitting Fair -   Static Standing Poor +   Dynamic Standing Poor   Ambulatory Poor   Activity Tolerance   Activity Tolerance Patient limited by fatigue;Patient limited by pain;Treatment limited secondary to medical complications (Comment)   Nurse Made Aware appropriate to see per RNJulia; HOLLAND Storm present assisting w/ transfer   RUE Assessment   RUE Assessment WFL  (3+/5)   LUE Assessment   LUE Assessment WFL  (3+/5)   Hand Function   Gross Motor Coordination Functional   Fine Motor Coordination Functional   Sensation   Light Touch Partial deficits in the RLE;Partial deficits in the LLE   Proprioception   Proprioception No apparent deficits   Vision-Basic Assessment   Current Vision No visual deficits   Vision - Complex Assessment   Ocular Range of Motion WFL   Acuity Able to read clock/calendar on wall without difficulty   Perception   Inattention/Neglect Appears intact   Cognition   Overall Cognitive Status Impaired   Arousal/Participation Responsive; Cooperative   Attention Attends with cues to redirect   Orientation Level Oriented to person;Oriented to place;Oriented to time   Memory Decreased recall of precautions;Decreased short term memory   Following Commands Follows one step commands with increased time or repetition   Comments decreased insight into deficits, impaired safety awareness, increased processing time   Assessment   Limitation Decreased ADL status; Decreased UE strength;Decreased Safe judgement during ADL;Decreased cognition;Decreased endurance;Decreased self-care trans;Decreased high-level ADLs; Decreased sensation   Prognosis Good;Fair   Assessment Pt is a 80 y o  female seen for OT evaluation s/p admit to [facility] on 12/28/2020 w/ LE swelling and SOB; Acute diastolic CHF (congestive heart failure) ([facility] [address] )  Comorbidities affecting pt's functional performance at time of assessment include: ulcer of R LE (NWB R LE per podiatry), HTN, CKD, h/o DVT, Moderate mitral valve regurgitation and mild tricuspid valve regurgitation, hydronephrosis and ureteral stenting, 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 (pt declined agram)  Personal factors affecting pt at time of IE include:h/o I&D on LE wounds and then went to rehab and then home   Prior to admission, pt was living w/ spouse and reports independent w/ ADLs, independent w/ functional transfers and mobility w/ RW or no AD, assists spouse w/ IADLs, assist transport  Upon evaluation: Pt requires MOD assist x1 supine>sit bed mobility w/ increased time to complete, MOD assist x2 sit<>stand (unable to maintain NWB R LE) w/ VCs for hand placement and positioning of NWB R LE, MOD assist x2 functional mobility w/ RW and unable to maintain NWB R LE, MOD assist LB ADLs, MIN assist UB ADLs, MOD assist toileting  2* the following deficits impacting occupational performance: decreased strength and endurance, impaired balance, impaired activity tolerance, fall risk, multiple lines, increased pain, decreased insight and safety awareness, NWB R LE, impaired STM  Pt to benefit from continued skilled OT tx while in the hospital to address deficits as defined above and maximize level of functional independence w ADL's and functional mobility  Occupational Performance areas to address include: grooming, bathing/shower, toilet hygiene, dressing, health maintenance, functional mobility, clothing management, cleaning and meal prep, home safety education, formal cognitive assessment  From OT standpoint, recommendation at time of d/c would be short term rehab  Recommend geriatric consult  Pt refusing STR  Goals   Patient Goals "to get stronger"   LTG Time Frame 10-14   Long Term Goal please see below goals   Plan   Treatment Interventions ADL retraining;UE strengthening/ROM; Functional transfer training;Cognitive reorientation; Endurance training;Patient/family training;Equipment evaluation/education; Compensatory technique education; Activityengagement; Energy conservation   Goal Expiration Date 01/18/21   OT Frequency 3-5x/wk   Recommendation   Recommendation Geriatric Consult   OT Discharge Recommendation Post-Acute Rehabilitation Services   OT - OK to Discharge   (to rehab when medically stable)   Barthel Index   Feeding 10   Bathing 0   Grooming Score 0   Dressing Score 5   Bladder Score 5   Bowels Score 5   Toilet Use Score 5   Transfers (Bed/Chair) Score 5   Mobility (Level Surface) Score 0   Stairs Score 0   Barthel Index Score 35   Modified Black Scale   Modified Black Scale 4     Occupational Therapy Goals to be met in 10-14 days:  1) Pt will improve activity tolerance to G for 30 min txment sessions to enhance ADLs  2) Pt will complete ADLs/self care w/ setup   3) Pt will complete toileting w/ supervision w/ G hygiene/thoroughness using DME PRN  4) Pt will improve functional transfers on/off all surfaces using DME PRN w/ G balance/safety including toileting w/ supervision while maintaining NWB R LE  5) Pt will improve fx'l mobility during I/ADl/leisure tasks using DME PRN w/ g balance/safety w/ min assist while maintaining NWB  R LE  6) Pt will engage in ongoing cognitive assessment w/ G participation to A w/ safe d/c planning/recommendations  7) Pt will demonstrate G carryover of pt/caregiver education and training as appropriate w/ mod I  w/ G tolerance  8) Pt will engage in depression screen/leisure interest checklist w/ G participation to monitor s/s depression and ID 3 positive coping strategies to A w/ emotional regulation and management  9) Pt will demonstrate 100% carryover of E C  techniques w/ mod I t/o fx'l I/ADL/leisure tasks w/o cues s/p skilled education  10) Pt will demonstrate improved bed mobility to supervision to enhance ADLs  11) Pt will demonstrate improved b/l UE strength by 1 MMT grade to enhance ADLS and functional transfers       Documentation completed by: Randal Han, MS, OTR/L

## 2021-01-04 NOTE — WOUND OSTOMY CARE
Progress Note - Wound   [facility] 80 y o  female MRN: [id number]  Unit/Bed#: E4 -01 Encounter: [id number]        Assessment:   Patient seen for wound care follow-up  She is sitting up in recliner, able to turn in flat recliner for assessment with assist x 1  Scott catheter in place, no stool incontinence  Nutrition team following  Round hyperpigmentation to right buttock where patient recently had wound that is now healed  1   Present on admission stage 3 pressure injury to sacrum--wound bed beefy red with yellow fibrinous tissue and epithelialization  Wound measurements improved  Corazon-wound hyperpigmentation  No induration or signs of infection at this time  2   Right lower extremity foot/leg wounds per podiatry        See flowsheet and media for wound details      Wound Care Plan:   1-Apply Allevyn Life foam dressing to left heel for prevention  Junito with P   Peel back at least daily for skin assessment and re-apply  Change dressing every 3 days and PRN  2-Elevate heels off of bed surface to offload pressure  3-Ehob cushion in chair when out of bed  4-Moisturize skin daily with skin nourishing cream   5-Turn/reposition q2h or when medically stable for pressure re-distribution on skin--use positioning wedges  6-Sacrum--cleanse with soap and water, pat dry  Apply Maxorb to wound bed and cover with Allevyn Life foam dressing  Change dressing every other day and as needed with soilage  IF PATIENT BECOMES INCONTINENT, discontinue foam dressing and apply Triad paste twice daily and PRN (Triad available from storeroom)  7-Right lower extremity wounds per podiatry        Wound care team to follow  Plan of care reviewed with primary RN  Patient should be discharged with [facility] for wound care and follow-up at the wound center  Wound 12/30/20 Pressure Injury Sacrum (Active)   Wound Image   01/04/21 1605   Wound Description Yellow; Beefy red;Epithelialization 01/04/21 1605   Pressure Injury Stage 3 01/04/21 1605   Corazon-wound Assessment Hyperpigmented 01/04/21 1605   Wound Length (cm) 1 2 cm 01/04/21 1605   Wound Width (cm) 1 3 cm 01/04/21 1605   Wound Depth (cm) 0 1 cm 01/04/21 1605   Wound Surface Area (cm^2) 1 56 cm^2 01/04/21 1605   Wound Volume (cm^3) 0 16 cm^3 01/04/21 1605   Calculated Wound Volume (cm^3) 0 16 cm^3 01/04/21 1605   Change in Wound Size % 57 89 01/04/21 1605   Drainage Amount Scant 01/04/21 1605   Drainage Description Serous 01/04/21 1605   Treatments Cleansed 01/04/21 1605   Dressing Open to air 01/04/21 [address] WOODN, RN, [employer]

## 2021-01-04 NOTE — SOCIAL WORK
Rec two calls from spouse over the weekend  Left a message with pt spouse that [facility] ROMOA accepted the case and wanting on PT to see pt  Gave update to Slick Shepherd with [facility] that pt is set up with [facility]  PT/OT evals are pending

## 2021-01-04 NOTE — PROGRESS NOTES
[facility] Progress Note  Patient: Soledad Rivero 80 y o  female   MRN: [id number]  PCP: Demian Espinoza MD  Unit/Bed#: E4 -01 Encounter: [id number]  Date Of Visit: 01/04/21  Addendum: spoke with her  and updated him    Assessment/plan  1  Acute diastolic chf- appreciate cardiology recommendations  It has improved with diuretics  Diuretics on hold due to yuval  Cardiology stated that diuretics will be required on prn basis    2  Yuval/ckd3- baseline likely 1 7-2  Creatinine had been as elevated as 2 24  It improved to 2 06 off of lasix  Will check bmp in am    3  H/o dvt- inr was subtherapeutic on admit  inr is 4 03  will hold coumadin  Check inr in am      4  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 and evidence of femoral- popliteal occlusion- appreciate vascular recommendations who states the level of perfusion is below the healing level  Pt was given the option of arteriography and possibly endovascular intervention vrs local wound care  Pt decided to local wound care only  dispo- currently working on safe discharge plan  Possibly d/c tomorrow if cleared by cardiology and safe discharge  Subjective  Pt seen and examined  Pt wants to go home tomorrow  She states she thinks she is breathing okay  No f/c no cp no n/v/d no abd pain  Objective:     Vitals: Blood pressure 142/63, pulse 79, temperature 98 3 °F (36 8 °C), temperature source Temporal, resp  rate 19, height 5' 6" (1 676 m), weight 60 3 kg (132 lb 15 oz), SpO2 96 %  ,Body mass index is 21 46 kg/m²      Lab, Imaging and other studies:  Results from last 7 days   Lab Units 01/04/21  0429 01/04/21  0414   WBC Thousand/uL  --  11 98*   HEMOGLOBIN g/dL  --  7 3*   HEMATOCRIT %  --  23 9*   PLATELETS Thousands/uL  --  203   INR  4 03*  --      Results from last 7 days   Lab Units 01/04/21  0441  12/28/20  1531   POTASSIUM mmol/L 4 5   < > 4 3   CHLORIDE mmol/L 101   < > 105   CO2 mmol/L 25   < > 22 BUN mg/dL 67*   < > 50*   CREATININE mg/dL 2 06*   < > 1 90*   CALCIUM mg/dL 8 2*   < > 8 3   ALK PHOS U/L  --   --  101   ALT U/L  --   --  65   AST U/L  --   --  61*    < > = values in this interval not displayed  Lab Results   Component Value Date    BLOODCX No Growth After 5 Days  12/28/2020    BLOODCX No Growth After 5 Days  12/28/2020         Xr Chest 1 View Portable    Result Date: 12/28/2020  Narrative: CHEST INDICATION:   Wheezing  COMPARISON:  None EXAM PERFORMED/VIEWS:  XR CHEST PORTABLE FINDINGS: Mild cardiomegaly  Interstitial prominence bilaterally with cephalization of the pulmonary vessels and bilateral pleural effusions consistent with CHF  Osseous structures appear within normal limits for patient age  Impression: Cardiomegaly with interstitial prominence bilaterally, cephalization of the pulmonary vessels and bilateral pleural effusions consistent with CHF  Workstation performed: [id number]     Xr Foot 3+ Vw Right    Result Date: 12/28/2020  Narrative: RIGHT FOOT INDICATION:  Right heel ulcer with gangrene; assess for osteomyeltis  COMPARISON:  None VIEWS:  XR FOOT 3+ VW RIGHT FINDINGS: There is no acute fracture or dislocation  Small plantar calcaneal spur  No periosteal reaction or cortical destruction to suggest osteomyelitis  Soft tissues are unremarkable  Impression: No acute osseous abnormality  Workstation performed: [id number]     Vas Lower Limb Arterial Duplex, Complete Bilateral    Result Date: 12/29/2020  Narrative:  [facility] REPORT [facility]INICAL: Indications:  PVD, Unspecified [I73 9]  Patient presents with diminished/absent pedal pulses on physical examination  Right heel ulceration with gangrenous appearing digits  and left anterior distal limb wound  Risk Factors The patient has history of HTN, previous remote smoking, and CKD  Clinical Right Pressure:  164/ mm Hg, Left Pressure:  IV    FINDINGS:  Segment                Right                        Left Impression  PSV (cm/s)  EDV  Impression  PSV (cm/s)  EDV  Common Femoral Artery                     132    8                     139   16  Prox Profunda                                                           99    7  Prox SFA               50-75%             490   51  Occluded           161   15  Mid SFA                                    91   22                      65    5  Dist SFA               50-75%             408   99                      56    0  Proximal Pop                               33    0                      52    0  Pop Mid                Occluded                                                  Distal Pop                                 22    8                      52   11  Dist Post Tibial                                                        29    4  Dist  Ant  Tibial                          21    3                      43    5     CONCLUSION: Impression: Technically limited/ difficult study secondary to patient's inability to cooperate/ restlessness  RIGHT LOWER LIMB: Findings suggests an occlusion versus high grade stenosis of the mid/distal popliteal artery with two focal 50-75% stenoses noted; proximal superficial femoral artery and distal superficial femoral artery  Findings suggests tibioperoneal disease  Ankle/Brachial index:  0 46, ischemic range  PVR/ PPG tracings are severely dampened, therefore, metatarsal and great toe pressures are unobtainable suggesting poor distal perfusion  LEFT LOWER LIMB: An occlusion versus high grade stenosis of the proximal thigh portion of the superficial femoral artery with distal reconstitution  Findings suggests tibioperoneal disease  Ankle/Brachial index: 0 71, moderate claudication range  PVR/ PPG tracings are severely dampened, therefore, metatarsal and great toe pressures are unobtainable suggesting poor distal perfusion    SIGNATURE: Electronically Signed by: Luis Gill MD, [address] on 2020-12-29 08:11:07 PM      Scheduled Meds:   Current Facility-Administered Medications   Medication Dose Route Frequency Provider Last Rate    acetaminophen  650 mg Oral Q4H PRN OTIS Manning      albuterol  2 5 mg Nebulization Q6H PRN Brian Crespo, ROBBINNP      amLODIPine  10 mg Oral Daily Raymond Manning      calcium carbonate  1,000 mg Oral TID PRN Brian Crespo, ROBBINNP      carvedilol  12 5 mg Oral BID With Meals OTIS Manning      hydrALAZINE  10 mg Intravenous Q6H PRN Brian Crespo, ROBBINNP      hydrALAZINE  10 mg Oral Q8H [facility] Ruben Montiel MD      isosorbide dinitrate  10 mg Oral TID after meals Ruben Montiel MD      ondansetron  4 mg Intravenous Q6H PRN Brian Crespo, ROBBINNP      potassium chloride  20 mEq Oral Daily Brian Crespo, OTIS      senna  1 tablet Oral HS PRN Brian Crespo, ROBBINNP       Continuous Infusions:    PRN Meds:   acetaminophen    albuterol    calcium carbonate    hydrALAZINE    ondansetron    senna      Physical exam:  Physical Exam  General appearance: alert and oriented, in no acute distress  Head: Normocephalic, without obvious abnormality, atraumatic  Eyes: conjunctivae/corneas clear  PERRL, EOM's intact  Fundi benign    Neck: no adenopathy, no carotid bruit, no JVD, supple, symmetrical, trachea midline and thyroid not enlarged, symmetric, no tenderness/mass/nodules  Lungs: clear to auscultation bilaterally  Heart: regular rate and rhythm, S1, S2 normal, no murmur, click, rub or gallop  Abdomen: soft, non-tender; bowel sounds normal; no masses,  no organomegaly  Neurologic: Mental status: Alert, oriented, thought content appropriate      VTE Pharmacologic Prophylaxis: Warfarin (Coumadin)  VTE Mechanical Prophylaxis: sequential compression device    Counseling / Coordination of Care  Total floor / unit time spent today 20 minutes      Current Length of Stay: 7 day(s)    Current Patient Status: Inpatient       Code Status: Level 1 - Full Code

## 2021-01-04 NOTE — PLAN OF CARE
Problem: OCCUPATIONAL THERAPY ADULT  Goal: Performs self-care activities at highest level of function for planned discharge setting  See evaluation for individualized goals  Description: Treatment Interventions: ADL retraining, UE strengthening/ROM, Functional transfer training, Cognitive reorientation, Endurance training, Patient/family training, Equipment evaluation/education, Compensatory technique education, Activityengagement, Energy conservation          See flowsheet documentation for full assessment, interventions and recommendations  Note: Limitation: Decreased ADL status, Decreased UE strength, Decreased Safe judgement during ADL, Decreased cognition, Decreased endurance, Decreased self-care trans, Decreased high-level ADLs, Decreased sensation  Prognosis: Good, Fair  Assessment: Pt is a 80 y o  female seen for OT evaluation s/p admit to [facility] on 12/28/2020 w/ LE swelling and SOB; Acute diastolic CHF (congestive heart failure) ([facility] [address] )  Comorbidities affecting pt's functional performance at time of assessment include: ulcer of R LE (NWB R LE per podiatry), HTN, CKD, h/o DVT, Moderate mitral valve regurgitation and mild tricuspid valve regurgitation, hydronephrosis and ureteral stenting, 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 (pt declined agram)  Personal factors affecting pt at time of IE include:h/o I&D on LE wounds and then went to rehab and then home  Prior to admission, pt was living w/ spouse and reports independent w/ ADLs, independent w/ functional transfers and mobility w/ RW or no AD, assists spouse w/ IADLs, assist transport   Upon evaluation: Pt requires MOD assist x1 supine>sit bed mobility w/ increased time to complete, MOD assist x2 sit<>stand (unable to maintain NWB R LE) w/ VCs for hand placement and positioning of NWB R LE, MOD assist x2 functional mobility w/ RW and unable to maintain NWB R LE, MOD assist LB ADLs, MIN assist UB ADLs, MOD assist toileting  2* the following deficits impacting occupational performance: decreased strength and endurance, impaired balance, impaired activity tolerance, fall risk, multiple lines, increased pain, decreased insight and safety awareness, NWB R LE, impaired STM  Pt to benefit from continued skilled OT tx while in the hospital to address deficits as defined above and maximize level of functional independence w ADL's and functional mobility  Occupational Performance areas to address include: grooming, bathing/shower, toilet hygiene, dressing, health maintenance, functional mobility, clothing management, cleaning and meal prep, home safety education, formal cognitive assessment  From OT standpoint, recommendation at time of d/c would be short term rehab  Recommend geriatric consult  Pt refusing STR    Recommendation: Geriatric Consult  OT Discharge Recommendation: Post-Acute Rehabilitation Services  OT - OK to Discharge: (to rehab when medically stable)

## 2021-01-04 NOTE — PLAN OF CARE
Problem: Potential for Falls  Goal: Patient will remain free of falls  Description: INTERVENTIONS:  - Assess patient frequently for physical needs  -  Identify cognitive and physical deficits and behaviors that affect risk of falls    -  [place] fall precautions as indicated by assessment   - Educate patient/family on patient safety including physical limitations  - Instruct patient to call for assistance with activity based on assessment  - Modify environment to reduce risk of injury  - Consider OT/PT consult to assist with strengthening/mobility  Outcome: Progressing     Problem: Prexisting or High Potential for Compromised Skin Integrity  Goal: Skin integrity is maintained or improved  Description: INTERVENTIONS:  - Identify patients at risk for skin breakdown  - Assess and monitor skin integrity  - Assess and monitor nutrition and hydration status  - Monitor labs   - Assess for incontinence   - Turn and reposition patient  - Assist with mobility/ambulation  - Relieve pressure over bony prominences  - Avoid friction and shearing  - Provide appropriate hygiene as needed including keeping skin clean and dry  - Evaluate need for skin moisturizer/barrier cream  - Collaborate with interdisciplinary team   - Patient/family teaching  - Consider wound care consult   Outcome: Progressing     Problem: PAIN - ADULT  Goal: Verbalizes/displays adequate comfort level or baseline comfort level  Description: Interventions:  - Encourage patient to monitor pain and request assistance  - Assess pain using appropriate pain scale  - Administer analgesics based on type and severity of pain and evaluate response  - Implement non-pharmacological measures as appropriate and evaluate response  - Consider cultural and social influences on pain and pain management  - Notify physician/advanced practitioner if interventions unsuccessful or patient reports new pain  Outcome: Progressing     Problem: INFECTION - ADULT  Goal: Absence or prevention of progression during hospitalization  Description: INTERVENTIONS:  - Assess and monitor for signs and symptoms of infection  - Monitor lab/diagnostic results  - Monitor all insertion sites, i e  indwelling lines, tubes, and drains  - Monitor endotracheal if appropriate and nasal secretions for changes in amount and color  - [place] appropriate cooling/warming therapies per order  - Administer medications as ordered  - Instruct and encourage patient and family to use good hand hygiene technique  - Identify and instruct in appropriate isolation precautions for identified infection/condition  Outcome: Progressing     Problem: SAFETY ADULT  Goal: Patient will remain free of falls  Description: INTERVENTIONS:  - Assess patient frequently for physical needs  -  Identify cognitive and physical deficits and behaviors that affect risk of falls    -  [place] fall precautions as indicated by assessment   - Educate patient/family on patient safety including physical limitations  - Instruct patient to call for assistance with activity based on assessment  - Modify environment to reduce risk of injury  - Consider OT/PT consult to assist with strengthening/mobility  Outcome: Progressing  Goal: Maintain or return to baseline ADL function  Description: INTERVENTIONS:  -  Assess patient's ability to carry out ADLs; assess patient's baseline for ADL function and identify physical deficits which impact ability to perform ADLs (bathing, care of mouth/teeth, toileting, grooming, dressing, etc )  - Assess/evaluate cause of self-care deficits   - Assess range of motion  - Assess patient's mobility; develop plan if impaired  - Assess patient's need for assistive devices and provide as appropriate  - Encourage maximum independence but intervene and supervise when necessary  - Involve family in performance of ADLs  - Assess for home care needs following discharge   - Consider OT consult to assist with ADL evaluation and planning for discharge  - Provide patient education as appropriate  Outcome: Progressing  Goal: Maintain or return mobility status to optimal level  Description: INTERVENTIONS:  - Assess patient's baseline mobility status (ambulation, transfers, stairs, etc )    - Identify cognitive and physical deficits and behaviors that affect mobility  - Identify mobility aids required to assist with transfers and/or ambulation (gait belt, sit-to-stand, lift, walker, cane, etc )  - [place] fall precautions as indicated by assessment  - Record patient progress and toleration of activity level on Mobility SBAR; progress patient to next Phase/Stage  - Instruct patient to call for assistance with activity based on assessment  - Consider rehabilitation consult to assist with strengthening/weightbearing, etc   Outcome: Progressing     Problem: DISCHARGE PLANNING  Goal: Discharge to home or other facility with appropriate resources  Description: INTERVENTIONS:  - Identify barriers to discharge w/patient and caregiver  - Arrange for needed discharge resources and transportation as appropriate  - Identify discharge learning needs (meds, wound care, etc )  - Arrange for interpretive services to assist at discharge as needed  - Refer to Case Management Department for coordinating discharge planning if the patient needs post-hospital services based on physician/advanced practitioner order or complex needs related to functional status, cognitive ability, or social support system  Outcome: Progressing     Problem: Knowledge Deficit  Goal: Patient/family/caregiver demonstrates understanding of disease process, treatment plan, medications, and discharge instructions  Description: Complete learning assessment and assess knowledge base    Interventions:  - Provide teaching at level of understanding  - Provide teaching via preferred learning methods  Outcome: Progressing     Problem: Nutrition/Hydration-ADULT  Goal: Nutrient/Hydration intake appropriate for improving, restoring or maintaining nutritional needs  Description: Monitor and assess patient's nutrition/hydration status for malnutrition  Collaborate with interdisciplinary team and initiate plan and interventions as ordered  Monitor patient's weight and dietary intake as ordered or per policy  Utilize nutrition screening tool and intervene as necessary  Determine patient's food preferences and provide high-protein, high-caloric foods as appropriate       INTERVENTIONS:  - Monitor oral intake, urinary output, labs, and treatment plans  - Assess nutrition and hydration status and recommend course of action  - Evaluate amount of meals eaten  - Assist patient with eating if necessary   - Allow adequate time for meals  - Recommend/ encourage appropriate diets, oral nutritional supplements, and vitamin/mineral supplements  - Order, calculate, and assess calorie counts as needed  - Recommend, monitor, and adjust tube feedings and TPN/PPN based on assessed needs  - Assess need for intravenous fluids  - Provide specific nutrition/hydration education as appropriate  - Include patient/family/caregiver in decisions related to nutrition  Outcome: Progressing

## 2021-01-05 LAB
ANION GAP SERPL CALCULATED.3IONS-SCNC: 8 MMOL/L (ref 4–13)
BUN SERPL-MCNC: 73 MG/DL (ref 5–25)
CALCIUM SERPL-MCNC: 8.8 MG/DL (ref 8.3–10.1)
CHLORIDE SERPL-SCNC: 102 MMOL/L (ref 100–108)
CO2 SERPL-SCNC: 27 MMOL/L (ref 21–32)
CREAT SERPL-MCNC: 2.04 MG/DL (ref 0.6–1.3)
ERYTHROCYTE [DISTWIDTH] IN BLOOD BY AUTOMATED COUNT: 17.2 % (ref 11.6–15.1)
GFR SERPL CREATININE-BSD FRML MDRD: 21 ML/MIN/1.73SQ M
GLUCOSE SERPL-MCNC: 99 MG/DL (ref 65–140)
HCT VFR BLD AUTO: 26.7 % (ref 34.8–46.1)
HGB BLD-MCNC: 8.1 G/DL (ref 11.5–15.4)
INR PPP: 3.67 (ref 0.84–1.19)
MCH RBC QN AUTO: 27.3 PG (ref 26.8–34.3)
MCHC RBC AUTO-ENTMCNC: 30.3 G/DL (ref 31.4–37.4)
MCV RBC AUTO: 90 FL (ref 82–98)
PLATELET # BLD AUTO: 289 THOUSANDS/UL (ref 149–390)
PMV BLD AUTO: 10.2 FL (ref 8.9–12.7)
POTASSIUM SERPL-SCNC: 4.8 MMOL/L (ref 3.5–5.3)
PROTHROMBIN TIME: 35.6 SECONDS (ref 11.6–14.5)
RBC # BLD AUTO: 2.97 MILLION/UL (ref 3.81–5.12)
SODIUM SERPL-SCNC: 137 MMOL/L (ref 136–145)
WBC # BLD AUTO: 10.13 THOUSAND/UL (ref 4.31–10.16)

## 2021-01-05 PROCEDURE — 85027 COMPLETE CBC AUTOMATED: CPT | Performed by: INTERNAL MEDICINE

## 2021-01-05 PROCEDURE — 80048 BASIC METABOLIC PNL TOTAL CA: CPT | Performed by: INTERNAL MEDICINE

## 2021-01-05 PROCEDURE — 97163 PT EVAL HIGH COMPLEX 45 MIN: CPT | Performed by: PHYSICAL THERAPIST

## 2021-01-05 PROCEDURE — 99232 SBSQ HOSP IP/OBS MODERATE 35: CPT | Performed by: INTERNAL MEDICINE

## 2021-01-05 PROCEDURE — 85610 PROTHROMBIN TIME: CPT | Performed by: INTERNAL MEDICINE

## 2021-01-05 PROCEDURE — 97116 GAIT TRAINING THERAPY: CPT | Performed by: PHYSICAL THERAPIST

## 2021-01-05 PROCEDURE — 99232 SBSQ HOSP IP/OBS MODERATE 35: CPT | Performed by: PODIATRIST

## 2021-01-05 RX ADMIN — ISOSORBIDE DINITRATE 10 MG: 10 TABLET ORAL at 16:48

## 2021-01-05 RX ADMIN — AMLODIPINE BESYLATE 10 MG: 10 TABLET ORAL at 08:14

## 2021-01-05 RX ADMIN — ISOSORBIDE DINITRATE 10 MG: 10 TABLET ORAL at 12:15

## 2021-01-05 RX ADMIN — CARVEDILOL 12.5 MG: 12.5 TABLET, FILM COATED ORAL at 16:48

## 2021-01-05 RX ADMIN — CARVEDILOL 12.5 MG: 12.5 TABLET, FILM COATED ORAL at 08:15

## 2021-01-05 RX ADMIN — HYDRALAZINE HYDROCHLORIDE 10 MG: 10 TABLET, FILM COATED ORAL at 15:08

## 2021-01-05 RX ADMIN — POTASSIUM CHLORIDE 20 MEQ: 1500 TABLET, EXTENDED RELEASE ORAL at 08:15

## 2021-01-05 RX ADMIN — HYDRALAZINE HYDROCHLORIDE 10 MG: 10 TABLET, FILM COATED ORAL at 05:30

## 2021-01-05 RX ADMIN — HYDRALAZINE HYDROCHLORIDE 10 MG: 10 TABLET, FILM COATED ORAL at 22:52

## 2021-01-05 RX ADMIN — ISOSORBIDE DINITRATE 10 MG: 10 TABLET ORAL at 08:15

## 2021-01-05 NOTE — PHYSICAL THERAPY NOTE
Physical Therapy Evaluation      Patient Active Problem List   Diagnosis    Acute deep vein thrombosis (DVT) of calf muscle vein of right lower extremity ([facility])    LILIAN [facility]acute kidney injury) [facility]    Hydronephrosis of right kidney    Elevated blood urea nitrogen    Hypertensive urgency    Hyponatremia    Leg ulcer, left, limited to breakdown of skin ([facility] )    Nonhealing nonsurgical wound    Protein-calorie malnutrition ([facility]    Sepsis due to Gram negative bacteria ([facility])    Ulcer of right lower extremity with fat layer exposed [facility]    Urinary retention    Wound infection    PAD (peripheral artery disease) ([facility]    Essential hypertension    Acute diastolic CHF (congestive heart failure) ([facility])    CKD (chronic kidney disease)    History of DVT (deep vein thrombosis)       Past Medical History:   Diagnosis Date    Chronic kidney disease     Hypertension        History reviewed  No pertinent surgical history  01/05/21 1100   PT Last Visit   PT Visit Date 01/05/21   Note Type   Note type Evaluation   Pain Assessment   Pain Assessment Tool 0-10   Pain Score No Pain   Home Living   Type of [address] [place] [address] One level  (no stairs)   Bathroom Equipment Grab bars in shower;Commode   Home Equipment Walker   Prior Function   Level of [place] Independent with ADLs and functional mobility; Needs assistance with IADLs   Lives With Spouse   Receives Help From Family   ADL Assistance Independent   IADLs Needs assistance   Falls in the last 6 months 1 to 4   Comments pt reports using rw for amb  denies falls except remotely  pt reports concern about r foot wounds  Restrictions/Precautions   Weight Bearing Precautions Per Order No   RLE Weight Bearing Per Order WBAT  (clarified [facility] podiatry , now has order for wbat )   Other Precautions Cognitive; Chair Alarm; Bed Alarm; Fall Risk;Multiple lines;O2   General   Family/Caregiver Present No   Cognition   Overall Cognitive Status Impaired   Orientation Level Oriented to person;Oriented to place;Oriented to time  (confused about situation)   RUE Assessment   RUE Assessment WFL   LUE Assessment   LUE Assessment WFL   RLE Assessment   RLE Assessment X  (r foot wounds , swelling, str 4/5)   LLE Assessment   LLE Assessment X  (str 4/5)   Coordination   Movements are Fluid and Coordinated 1   Sensation X   Light Touch   RLE Light Touch Impaired   RLE Light Touch Comments foot   Bed Mobility   Supine to Sit 4  Minimal assistance   Additional items Assist x 1; Increased time required;Verbal cues;LE management; Bedrails   Sit to Supine 4  Minimal assistance   Additional items Assist x 1; Increased time required;Verbal cues;LE management   Transfers   Sit to Stand 3  Moderate assistance   Additional items Assist x 1; Increased time required; Impulsive;Verbal cues; Bedrails   Stand to Sit 4  Minimal assistance   Additional items Assist x 1; Armrests; Increased time required;Verbal cues; Impulsive   Ambulation/Elevation   Gait pattern Forward Flexion;Decreased foot clearance; Short stride; Excessively slow   Gait Assistance 4  Minimal assist   Additional items Assist x 1;Verbal cues; Tactile cues   Assistive Device Rolling walker   Distance 15', 20'   Balance   Static Sitting Fair +   Dynamic Sitting Fair -   Static Standing Poor +   Dynamic Standing Poor +   Ambulatory Poor +   Endurance Deficit   Endurance Deficit Yes   Endurance Deficit Description spO2 79% after amb 15 feet  O2 at 2L placed  gradual rising to spO2 86%  notified nursing   Activity Tolerance   Activity Tolerance Patient limited by fatigue;Treatment limited secondary to medical complications (Comment)   Medical Staff Made Aware podiatrist- spoke to regarding wbs, order for wbat placed   Nurse Made Aware yes   Assessment   Prognosis Fair   Problem List Decreased strength;Decreased endurance; Impaired balance;Decreased mobility; Decreased cognition; Impaired judgement;Decreased safety awareness; Impaired sensation;Decreased skin integrity   Assessment pt admitted with chf and r heel injury  evaluated by podiatry  referred to PT  pt was using rw with assist prior to admission  pt lives with spouse who can assist and uses rw , living in single floor home, no stairs    pt had been to rehab in past  refusing at this time  pt demonstrated moderate functional limitations due to prior debility, pad and foot wounds  pt needed min assist for mobility except sit to stand where she needed mod assist  pt can amb short distances only, due to hypoxia  pt had drop in spO2 to 79% on room air, requiring O2 at 2L to recover   pt has current deficits in strength, skin interity, balance, gait stability , posture and sequencing  pt is refusing rehab, is high fall risk but could be managed by 1 person , with rw   pt will need assist for all walking, 24/7 due to fall risk, recommend home PT  Barriers to Discharge None   Goals   Patient Goals have foot wounds heal on their own   STG Expiration Date 01/12/21   Short Term Goal #1 bed mobility, transfers, amb using rw for 50' with min assist  improve strength and balance by 1/2 grade  demonstrate good safety practices  Plan   Treatment/Interventions Functional transfer training;LE strengthening/ROM; Therapeutic exercise; Endurance training;Cognitive reorientation;Patient/family training;Equipment eval/education; Bed mobility;Gait training; Compensatory technique education;Spoke to nursing;Spoke to MD   PT Frequency   (3-5x/week)   Recommendation   PT Discharge Recommendation Return to previous environment with social support;Home with skilled therapy;Post-Acute Rehabilitation Services  (pt refusing rehab)   Equipment Recommended Kusum Ohara   PT - OK to Discharge Yes   Modified [place] Scale   Modified [place] Scale 4   Barthel Index   Feeding 10   Bathing 0   Grooming Score 0   Dressing Score 5   Bladder Score 0   Bowels Score 5   Toilet Use Score 5   Transfers (Bed/Chair) Score 10   Mobility (Level Surface) Score 0 Stairs Score 0   Barthel Index Score 35   History: co - morbidities, fall risk, use of assistive device, assist for adl's, cognition, multiple lines  Exam: impairments in locomotion, musculoskeletal, balance,posture,  skin integrity,  pulmonary, cognition   Clinical: unstable/unpredictable  Complexity:high  Time In:1046  Time Out:1100  Total Time: 14 min      S:  I need to go to the bathroom  O:  Pt assisted to bathroom with min assist  Pt noted to have mild dyspnea, increased weakness ble  Pt reported feeling out of breath  Pt needed assist to perform hygiene  Pt continues to need mod assist for sit to stand  A:  Low activity tolerance, poor safety decisions      P: cont above POC    Sudhir Louise, PT

## 2021-01-05 NOTE — PLAN OF CARE
Problem: PHYSICAL THERAPY ADULT  Goal: Performs mobility at highest level of function for planned discharge setting  See evaluation for individualized goals  Description: Treatment/Interventions: Functional transfer training, LE strengthening/ROM, Therapeutic exercise, Endurance training, Cognitive reorientation, Patient/family training, Equipment eval/education, Bed mobility, Gait training, Compensatory technique education, Spoke to nursing, Spoke to MD  Equipment Recommended: Mera Judd       See flowsheet documentation for full assessment, interventions and recommendations  Note: Prognosis: Fair  Problem List: Decreased strength, Decreased endurance, Impaired balance, Decreased mobility, Decreased cognition, Impaired judgement, Decreased safety awareness, Impaired sensation, Decreased skin integrity  Assessment: pt admitted with chf and r heel injury  evaluated by podiatry  referred to PT  pt was using rw with assist prior to admission  pt lives with spouse who can assist and uses rw , living in single floor home, no stairs    pt had been to rehab in past  refusing at this time  pt demonstrated moderate functional limitations due to prior debility, pad and foot wounds  pt needed min assist for mobility except sit to stand where she needed mod assist  pt can amb short distances only, due to hypoxia  pt had drop in spO2 to 79% on room air, requiring O2 at 2L to recover   pt has current deficits in strength, skin interity, balance, gait stability , posture and sequencing  pt is refusing rehab, is high fall risk but could be managed by 1 person , with rw   pt will need assist for all walking, 24/7 due to fall risk, recommend home PT  Barriers to Discharge: None     PT Discharge Recommendation: Return to previous environment with social support, Home with skilled therapy, Post-Acute Rehabilitation Services(pt refusing rehab)     PT - OK to Discharge: Yes    See flowsheet documentation for full assessment

## 2021-01-05 NOTE — PLAN OF CARE
Problem: Potential for Falls  Goal: Patient will remain free of falls  Description: INTERVENTIONS:  - Assess patient frequently for physical needs  -  Identify cognitive and physical deficits and behaviors that affect risk of falls    -  [place] fall precautions as indicated by assessment   - Educate patient/family on patient safety including physical limitations  - Instruct patient to call for assistance with activity based on assessment  - Modify environment to reduce risk of injury  - Consider OT/PT consult to assist with strengthening/mobility  Outcome: Progressing     Problem: Prexisting or High Potential for Compromised Skin Integrity  Goal: Skin integrity is maintained or improved  Description: INTERVENTIONS:  - Identify patients at risk for skin breakdown  - Assess and monitor skin integrity  - Assess and monitor nutrition and hydration status  - Monitor labs   - Assess for incontinence   - Turn and reposition patient  - Assist with mobility/ambulation  - Relieve pressure over bony prominences  - Avoid friction and shearing  - Provide appropriate hygiene as needed including keeping skin clean and dry  - Evaluate need for skin moisturizer/barrier cream  - Collaborate with interdisciplinary team   - Patient/family teaching  - Consider wound care consult   Outcome: Progressing     Problem: PAIN - ADULT  Goal: Verbalizes/displays adequate comfort level or baseline comfort level  Description: Interventions:  - Encourage patient to monitor pain and request assistance  - Assess pain using appropriate pain scale  - Administer analgesics based on type and severity of pain and evaluate response  - Implement non-pharmacological measures as appropriate and evaluate response  - Consider cultural and social influences on pain and pain management  - Notify physician/advanced practitioner if interventions unsuccessful or patient reports new pain  Outcome: Progressing     Problem: INFECTION - ADULT  Goal: Absence or prevention of progression during hospitalization  Description: INTERVENTIONS:  - Assess and monitor for signs and symptoms of infection  - Monitor lab/diagnostic results  - Monitor all insertion sites, i e  indwelling lines, tubes, and drains  - Monitor endotracheal if appropriate and nasal secretions for changes in amount and color  - [place] appropriate cooling/warming therapies per order  - Administer medications as ordered  - Instruct and encourage patient and family to use good hand hygiene technique  - Identify and instruct in appropriate isolation precautions for identified infection/condition  Outcome: Progressing     Problem: SAFETY ADULT  Goal: Patient will remain free of falls  Description: INTERVENTIONS:  - Assess patient frequently for physical needs  -  Identify cognitive and physical deficits and behaviors that affect risk of falls    -  [place] fall precautions as indicated by assessment   - Educate patient/family on patient safety including physical limitations  - Instruct patient to call for assistance with activity based on assessment  - Modify environment to reduce risk of injury  - Consider OT/PT consult to assist with strengthening/mobility  Outcome: Progressing  Goal: Maintain or return to baseline ADL function  Description: INTERVENTIONS:  -  Assess patient's ability to carry out ADLs; assess patient's baseline for ADL function and identify physical deficits which impact ability to perform ADLs (bathing, care of mouth/teeth, toileting, grooming, dressing, etc )  - Assess/evaluate cause of self-care deficits   - Assess range of motion  - Assess patient's mobility; develop plan if impaired  - Assess patient's need for assistive devices and provide as appropriate  - Encourage maximum independence but intervene and supervise when necessary  - Involve family in performance of ADLs  - Assess for home care needs following discharge   - Consider OT consult to assist with ADL evaluation and planning for discharge  - Provide patient education as appropriate  Outcome: Progressing  Goal: Maintain or return mobility status to optimal level  Description: INTERVENTIONS:  - Assess patient's baseline mobility status (ambulation, transfers, stairs, etc )    - Identify cognitive and physical deficits and behaviors that affect mobility  - Identify mobility aids required to assist with transfers and/or ambulation (gait belt, sit-to-stand, lift, walker, cane, etc )  - [place] fall precautions as indicated by assessment  - Record patient progress and toleration of activity level on Mobility SBAR; progress patient to next Phase/Stage  - Instruct patient to call for assistance with activity based on assessment  - Consider rehabilitation consult to assist with strengthening/weightbearing, etc   Outcome: Progressing     Problem: DISCHARGE PLANNING  Goal: Discharge to home or other facility with appropriate resources  Description: INTERVENTIONS:  - Identify barriers to discharge w/patient and caregiver  - Arrange for needed discharge resources and transportation as appropriate  - Identify discharge learning needs (meds, wound care, etc )  - Arrange for interpretive services to assist at discharge as needed  - Refer to Case Management Department for coordinating discharge planning if the patient needs post-hospital services based on physician/advanced practitioner order or complex needs related to functional status, cognitive ability, or social support system  Outcome: Progressing     Problem: Knowledge Deficit  Goal: Patient/family/caregiver demonstrates understanding of disease process, treatment plan, medications, and discharge instructions  Description: Complete learning assessment and assess knowledge base    Interventions:  - Provide teaching at level of understanding  - Provide teaching via preferred learning methods  Outcome: Progressing     Problem: Nutrition/Hydration-ADULT  Goal: Nutrient/Hydration intake appropriate for improving, restoring or maintaining nutritional needs  Description: Monitor and assess patient's nutrition/hydration status for malnutrition  Collaborate with interdisciplinary team and initiate plan and interventions as ordered  Monitor patient's weight and dietary intake as ordered or per policy  Utilize nutrition screening tool and intervene as necessary  Determine patient's food preferences and provide high-protein, high-caloric foods as appropriate       INTERVENTIONS:  - Monitor oral intake, urinary output, labs, and treatment plans  - Assess nutrition and hydration status and recommend course of action  - Evaluate amount of meals eaten  - Assist patient with eating if necessary   - Allow adequate time for meals  - Recommend/ encourage appropriate diets, oral nutritional supplements, and vitamin/mineral supplements  - Order, calculate, and assess calorie counts as needed  - Recommend, monitor, and adjust tube feedings and TPN/PPN based on assessed needs  - Assess need for intravenous fluids  - Provide specific nutrition/hydration education as appropriate  - Include patient/family/caregiver in decisions related to nutrition  Outcome: Progressing

## 2021-01-05 NOTE — QUICK NOTE
Spoke with pt  She states that they decided that she is to return home with hhpt  I tried to call her  to make sure this was indeed the plan and number was busy  Will try to call later  Pt will require home oxygen eval prior to discharge

## 2021-01-05 NOTE — CONSULTS
Consult for cardiac diet education, per PT request  Originally consulted for CHF diet education on 12/29/20 however PT wasn't appropriate at that time, however currently PT is appropriate  PT reported has good appetite, usually consumes 3 meals with snacks  B: eggs, [place] toast L: spaghetti with meatballs D: salad or cheese and fruit, snacks on pb with celery and raisins  Limited weight hx records: 11/23/20 110lbs, 12/9/20 139lbs, 1/5/20 132lbs weight changes most likely d/t fluid status  PT reported usually maintains around 125-130lbs  Despite signs of moderate muscle/fat depletion around orbitals, temples, clavicles, shoulders, and triceps didn't place malnutrition form d/t PT being 80years old and reported being active with good appetite and no weight loss  Provided verbal and written CHF diet education  Will continue to monitor po intake and any further diet questions

## 2021-01-05 NOTE — PROGRESS NOTES
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  Creatinine had been as elevated as 2 24  It improved to 2 04 off of lasix  Will d/c [place]     3  H/o dvt- inr was subtherapeutic on admit  inr is 3 67  will hold coumadin  Check inr in am       4  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 and evidence of femoral- popliteal occlusion- appreciate vascular recommendations who states the level of perfusion is below the healing level  Pt was given the option of arteriography and possibly endovascular intervention vrs local wound care  Pt decided to local wound care only  This was discussed with pt again and pt refused agram  Discussed with her  who would like to talk to vascular  Vascular will call her  to discuss  Her  was informed that ultimately it is pts decision       dispo- currently working on safe discharge plan  Pt currently wanting to be discharged to home with hhpt and vna   was not sure about this plan  He wanted to hold discharge until he spoke with vascular  Pt will require home oxygen eval prior to discharge and wound care follow up if being discharged to home  Subjective:   Pt seen and examined  Pt wants to go home  She spoke with her  this am who wants her to go forward with agram  Pts oxygen did drop with ambulation with physical therapy  Pt is also upset that podiatry has not followed up for change of her wound recently  No other problems were noted       Objective:     Vitals: Blood pressure 140/60, pulse 66, temperature (!) 97 3 °F (36 3 °C), temperature source Temporal, resp  rate 18, height 5' 6" (1 676 m), weight 60 kg (132 lb 4 4 oz), SpO2 90 %  ,Body mass index is 21 35 kg/m²  Lab, Imaging and other studies:  Results from last 7 days   Lab Units 01/05/21  0430   WBC Thousand/uL 10 13   HEMOGLOBIN g/dL 8 1*   HEMATOCRIT % 26 7*   PLATELETS Thousands/uL 289   INR  3 67*     Results from last 7 days   Lab Units 01/05/21  0430   POTASSIUM mmol/L 4 8   CHLORIDE mmol/L 102   CO2 mmol/L 27   BUN mg/dL 73*   CREATININE mg/dL 2 04*   CALCIUM mg/dL 8 8         Lab Results   Component Value Date    BLOODCX No Growth After 5 Days  12/28/2020    BLOODCX No Growth After 5 Days  12/28/2020     Scheduled Meds:   Current Facility-Administered Medications   Medication Dose Route Frequency Provider Last Rate    acetaminophen  650 mg Oral Q4H PRN OTIS Vickers      albuterol  2 5 mg Nebulization Q6H PRN OTIS Vickers      amLODIPine  10 mg Oral Daily [facility] [address]      calcium carbonate  1,000 mg Oral TID PRN OTIS Vickers      carvedilol  12 5 mg Oral BID With Meals OTIS Vickers      furosemide  20 mg Oral Once per day on Mon Wed Fri Ruben Montiel MD      hydrALAZINE  10 mg Intravenous Q6H PRN OTIS Vickers      hydrALAZINE  10 mg Oral Q8H [facility] Ruben Montiel MD      isosorbide dinitrate  10 mg Oral TID after meals Ruben Montiel MD      ondansetron  4 mg Intravenous Q6H PRN OTIS Vickers      potassium chloride  20 mEq Oral Daily OTIS Vickers      senna  1 tablet Oral HS PRN OTIS Vickers       Continuous Infusions:    PRN Meds:   acetaminophen    albuterol    calcium carbonate    hydrALAZINE    ondansetron    senna      Physical exam:  Physical Exam  General appearance: alert and oriented, in no acute distress  Head: Normocephalic, without obvious abnormality, atraumatic  Eyes: conjunctivae/corneas clear  PERRL, EOM's intact  Fundi benign    Lungs: clear to auscultation bilaterally  Heart: regular rate and rhythm, S1, S2 normal, no murmur, click, rub or gallop  Abdomen: soft, non-tender; bowel sounds normal; no masses,  no organomegaly  Skin: bandage on right lower ext some discoloration of heel   Neurologic: Mental status: Alert, oriented, thought content appropriate      VTE Pharmacologic Prophylaxis: Warfarin (Coumadin)  VTE Mechanical Prophylaxis: sequential compression device    Counseling / Coordination of Care  Total floor / unit time spent today 20 minutes    Current Length of Stay: 8 day(s)    Current Patient Status: Inpatient       Code Status: Level 1 - Full Code

## 2021-01-05 NOTE — UTILIZATION REVIEW
Continued Stay Review    Date:  1/5/21                       Current Patient Class: IP Current Level of Care: MedSur     HPI:89 y o  female initially admitted on 12/28  To Inpatient  Medsur with Volume Overload -Decompensated CHF, RLE Ulcer with fat layer exposed, Upgraded to Level 2 Stepdown 12/29  Med Surg since 12/31  Assessment/Plan:  1/3 Cr is a little better  Diuretics are on hold right now due to acute kidney injury - Cardio following  Hold coumain today as INR slightly supratherapeutic @ 3 91  Vascular Sx signed off as pt not interested in vascular intervention  1/5: Cr improved to 2 04  Cardio with concern that pt will develop flash pulmonary edema and heart failure if not on diuretic regime  Started on 20 mg 3 times weekly Monday Wednesday and Friday  Blood pressure overall improved and now stable with  current regimen of prehospital Norvasc, carvedilol (replaced pre-hospital Lopressor), and new meds - hydralazine & isosorbide  Pt with mild dyspnea and  Had drop in sat to 79% on RA  with PT - required O2 @ 2l to recover  Refusing rehab  Will need home O2 eval prior to discharge  FELIX Scott   wants pt to go forward w/ agram and wishes to dw Vascular  Further discussion pending         Pertinent Labs/Diagnostic Results:  Results from last 7 days   Lab Units 01/05/21  0430 01/04/21  0414 01/03/21  0545 01/02/21  0518 12/31/20  0459   WBC Thousand/uL 10 13 11 98* 13 78* 12 84* 10 47*   HEMOGLOBIN g/dL 8 1* 7 3* 8 2* 8 1* 7 6*   HEMATOCRIT % 26 7* 23 9* 26 4* 26 3* 25 2*   PLATELETS Thousands/uL 289 203 241 226 182     Results from last 7 days   Lab Units 01/05/21  0430 01/04/21  0441 01/03/21  0545 01/02/21  0518 01/01/21  0510 12/31/20  0459 12/30/20  0651   SODIUM mmol/L 137 135* 135* 136 136 136 139   POTASSIUM mmol/L 4 8 4 5 4 4 3 6 3 4* 3 5 3 3*   CHLORIDE mmol/L 102 101 101 101 101 102 105   CO2 mmol/L 27 25 25 26 26 23 23   ANION GAP mmol/L 8 9 9 9 9 11 11   BUN mg/dL 73* 67* 67* 58* 51* 55* 50*   CREATININE mg/dL 2 04* 2 06* 2 04* 2 14* 2 21* 2 24* 2 12*   EGFR ml/min/1 73sq m 21 21 21 20 19 19 20   CALCIUM mg/dL 8 8 8 2* 8 7 8 6 7 9* 8 3 8 7   MAGNESIUM mg/dL  --  2 1 2 3 2 0  --  2 2 2 1     Results from last 7 days   Lab Units 12/30/20  1139   POC GLUCOSE mg/dl 99     Results from last 7 days   Lab Units 01/05/21  0430 01/04/21  0441 01/03/21  0545 01/02/21  0518 01/01/21  0510 12/31/20  0459 12/30/20  0651   GLUCOSE RANDOM mg/dL 99 101 106 102 107 102 92       Results from last 7 days   Lab Units 01/05/21  0430 01/04/21  0429 01/03/21  1642   PROTIME seconds 35 6* 38 3* 37 4*   INR  3 67* 4 03* 3 91*     Vital Signs:   01/05/21 0821     90 %  None (Room air)    01/05/21 0814 97 3 °F (36 3 °C) 66 18 140/60    Sitting   01/05/21 0430  67  137/61    Lying   01/04/21 2317     92 % 2 L/min Nasal cannula    01/04/21 2308 98 °F (36 7 °C) 61 16 134/58    Lying   01/04/21 2157  63  123/55    Lying   01/04/21 1500 97 9 °F (36 6 °C) 70 18 120/59 90 %  None (Room air) Lying   01/04/21 0504    142/63       01/03/21 2219 98 3 °F (36 8 °C) 79 19 128/68 96 %  Nasal cannula Lying   01/03/21 1500 97 6 °F (36 4 °C) 82 18 156/70 91 %  Nasal cannula Lying   01/03/21 0950    144/66   Nasal cannula    01/03/21 0756 97 8 °F (36 6 °C) 76 16 162/78 92 %  Nasal cannula Lying   01/03/21 0615 98 6 °F (37 °C) 73  158/72 91 % 3 L/min Nasal cannula Lying     Medications:   Scheduled Medications:  amLODIPine, 10 mg, Oral, Daily  carvedilol, 12 5 mg, Oral, BID With Meals  furosemide, 20 mg, Oral, Once per day on Mon Wed Fri  hydrALAZINE, 10 mg, Oral, Q8H [address]  isosorbide dinitrate, 10 mg, Oral, TID after meals  potassium chloride, 20 mEq, Oral, Daily    Continuous IV Infusions:     PRN Meds:  acetaminophen, 650 mg, Oral, Q4H PRN  albuterol, 2 5 mg, Nebulization, Q6H PRN  calcium carbonate, 1,000 mg, Oral, TID PRN  hydrALAZINE, 10 mg, Intravenous, Q6H PRN  ondansetron, 4 mg, Intravenous, Q6H PRN  senna, 1 tablet, Oral, HS PRN    Discharge Plan: Pt is refusing rehab     Network Utilization Review Department  ATTENTION: Please call with any questions or concerns to [phone] and carefully listen to the prompts so that you are directed to the right person  All voicemails are confidential   Daniel Richard all requests for admission clinical reviews, approved or denied determinations and any other requests to dedicated fax number below belonging to the campus where the patient is receiving treatment   List of dedicated fax numbers for the Facilities:  [address] DENIALS (Administrative/Medical Necessity) [phone]   [address] (Maternity/NICU/Pediatrics) [phone] [address] Lukas Hagan 6083 (  [place] [facility]) [address] [place] [address] Torrey Piyush [address] [place] [address] [phone]

## 2021-01-05 NOTE — SOCIAL WORK
Left a message with Gary Lee with [facility] at [phone] that MD is planning on discharging pt tomorrow home with [facility] set up  Need to check for possible home 02  Spouse will transport  FAX script for home 02 and study to [employer] for home 02

## 2021-01-05 NOTE — PROGRESS NOTES
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    4  PAD with soft tissue loss  5  H/o DVT RLE     Plan:  - Right anterior leg wound, forefoot gangrene and heel pressure ulceration are stable without signs of infection or progression to wet gangrene  Plan to continue with [facility] - per vascular surgery note 1/3/21 no vascular intervention planned however discussion between , patient and vascular team pending for further treatment plan  - I discussed treatment options with patient today: watching/waiting with lwc to keep wounds clear of infection vs active vascular intervention for blood flow improvement - patient will discuss with    - Patient is stable for dispo from podiatry pov  Will f/u with Dr Tera Stephens in wound care center - instructions in dispo  - Elevate heels off of bed    Weight Bearing Status: Weight bear as tolerated                       Wound Care:   1  Anterior right leg- adaptic, maxorb, abd pad, Swapna  Please change every other day  2  Right Heel- betadine, adaptic, Allevyn Heel pad  Please change every other day  Elevate heel while in bed  3  Right hallux and lateral right foot- Betadine, open to air  Apply daily  Subjective/Objective   Chief Complaint:   Chief Complaint   Patient presents with    Leg Swelling     bilateral leg swelling x 1 week  recently discharged from rehab (good [facility])  also c/o HTN       Subjective: 80 y o  y/o female was seen and evaluated at bedside  Feels fine  Denies significant pain to right foot  Blood pressure 140/60, pulse 66, temperature (!) 97 3 °F (36 3 °C), temperature source Temporal, resp   rate 18, height 5' 6" (1 676 m), weight 60 kg (132 lb 4 4 oz), SpO2 90 % ,Body mass index is 21 35 kg/m²  Invasive Devices     Peripheral Intravenous Line            Peripheral IV 01/05/21 Left;Ventral (anterior) Forearm less than 1 day          Drain            Urethral Catheter Straight-tip 16 Fr  6 days                Physical Exam:   General: Alert, cooperative and no distress  Lungs: Non labored breathing    Gross msk, neuro and vascular status baseline to B/L LE  No open wounds to left lower extremity however skin is thin, dry and shiny  RIGHT lower extremity:   Wounds 1, 2, 3: Right hallux, 4th toe and lateral-distal foot (over 5th met head) with dry gangrenous changes - stable without conversion to wet gangrene or signs of infection noted  Wound #: 4  Location: Right heel  Length 4cm: Width 3cm: Depth unknown cm:   Probe to Bone: No  Peripheral Skin Description: Attached  Necrotic Tissue: 100%   Drainage Amount: None  Signs of Infection: No, no conversion to wet gangrene      Wound #: 5   Location: right anterior lower leg  Length 6cm: Width 2cm: Depth 0 2cm:   Deepest Tissue Noted in Base: subcutaneous  Probe to Bone: No  Peripheral Skin Description: Attached and rolled edge  Granulation: 80% Fibrotic Tissue: 20% Necrotic Tissue: 0%   Drainage Amount: minimal, serous  Signs of Infection: No, no ascending erythema, purulence

## 2021-01-06 VITALS
OXYGEN SATURATION: 95 % | WEIGHT: 136.9 LBS | HEIGHT: 66 IN | BODY MASS INDEX: 22 KG/M2 | HEART RATE: 55 BPM | SYSTOLIC BLOOD PRESSURE: 135 MMHG | RESPIRATION RATE: 18 BRPM | TEMPERATURE: 97.1 F | DIASTOLIC BLOOD PRESSURE: 63 MMHG

## 2021-01-06 PROBLEM — I16.0 HYPERTENSIVE URGENCY: Status: RESOLVED | Noted: 2020-11-10 | Resolved: 2021-01-06

## 2021-01-06 PROBLEM — I50.31 ACUTE DIASTOLIC CHF (CONGESTIVE HEART FAILURE) (HCC): Status: RESOLVED | Noted: 2020-12-28 | Resolved: 2021-01-06

## 2021-01-06 LAB
ANION GAP SERPL CALCULATED.3IONS-SCNC: 11 MMOL/L (ref 4–13)
BUN SERPL-MCNC: 72 MG/DL (ref 5–25)
CALCIUM SERPL-MCNC: 8.7 MG/DL (ref 8.3–10.1)
CHLORIDE SERPL-SCNC: 103 MMOL/L (ref 100–108)
CO2 SERPL-SCNC: 23 MMOL/L (ref 21–32)
CREAT SERPL-MCNC: 2.13 MG/DL (ref 0.6–1.3)
GFR SERPL CREATININE-BSD FRML MDRD: 20 ML/MIN/1.73SQ M
GLUCOSE SERPL-MCNC: 105 MG/DL (ref 65–140)
INR PPP: 3.24 (ref 0.84–1.19)
POTASSIUM SERPL-SCNC: 4.7 MMOL/L (ref 3.5–5.3)
PROTHROMBIN TIME: 32.4 SECONDS (ref 11.6–14.5)
SODIUM SERPL-SCNC: 137 MMOL/L (ref 136–145)

## 2021-01-06 PROCEDURE — 94761 N-INVAS EAR/PLS OXIMETRY MLT: CPT

## 2021-01-06 PROCEDURE — 99239 HOSP IP/OBS DSCHRG MGMT >30: CPT | Performed by: INTERNAL MEDICINE

## 2021-01-06 PROCEDURE — 80048 BASIC METABOLIC PNL TOTAL CA: CPT | Performed by: INTERNAL MEDICINE

## 2021-01-06 PROCEDURE — 85610 PROTHROMBIN TIME: CPT | Performed by: INTERNAL MEDICINE

## 2021-01-06 PROCEDURE — 99232 SBSQ HOSP IP/OBS MODERATE 35: CPT | Performed by: INTERNAL MEDICINE

## 2021-01-06 RX ORDER — FUROSEMIDE 20 MG/1
20 TABLET ORAL ONCE
Status: COMPLETED | OUTPATIENT
Start: 2021-01-06 | End: 2021-01-06

## 2021-01-06 RX ORDER — FUROSEMIDE 40 MG/1
40 TABLET ORAL DAILY
Qty: 30 TABLET | Refills: 0 | Status: SHIPPED | OUTPATIENT
Start: 2021-01-07 | End: 2021-02-03 | Stop reason: HOSPADM

## 2021-01-06 RX ORDER — ISOSORBIDE DINITRATE 10 MG/1
10 TABLET ORAL
Qty: 90 TABLET | Refills: 0 | Status: SHIPPED | OUTPATIENT
Start: 2021-01-06 | End: 2021-04-16 | Stop reason: SDUPTHER

## 2021-01-06 RX ORDER — POTASSIUM CHLORIDE 20 MEQ/1
20 TABLET, EXTENDED RELEASE ORAL DAILY
Qty: 30 TABLET | Refills: 0 | Status: SHIPPED | OUTPATIENT
Start: 2021-01-07 | End: 2021-03-22 | Stop reason: HOSPADM

## 2021-01-06 RX ORDER — CARVEDILOL 12.5 MG/1
12.5 TABLET ORAL 2 TIMES DAILY WITH MEALS
Qty: 60 TABLET | Refills: 0 | Status: SHIPPED | OUTPATIENT
Start: 2021-01-06 | End: 2021-04-07 | Stop reason: SDUPTHER

## 2021-01-06 RX ORDER — FUROSEMIDE 40 MG/1
40 TABLET ORAL DAILY
Status: DISCONTINUED | OUTPATIENT
Start: 2021-01-07 | End: 2021-01-06 | Stop reason: HOSPADM

## 2021-01-06 RX ORDER — HYDRALAZINE HYDROCHLORIDE 10 MG/1
10 TABLET, FILM COATED ORAL EVERY 8 HOURS SCHEDULED
Qty: 90 TABLET | Refills: 0 | Status: SHIPPED | OUTPATIENT
Start: 2021-01-06 | End: 2021-02-03 | Stop reason: HOSPADM

## 2021-01-06 RX ORDER — WARFARIN SODIUM 4 MG/1
2 TABLET ORAL DAILY
Refills: 0
Start: 2021-01-06 | End: 2021-01-06 | Stop reason: SDUPTHER

## 2021-01-06 RX ORDER — SENNOSIDES 8.6 MG
8.6 TABLET ORAL
Qty: 30 TABLET | Refills: 0 | Status: SHIPPED | OUTPATIENT
Start: 2021-01-06 | End: 2021-03-10

## 2021-01-06 RX ORDER — WARFARIN SODIUM 4 MG/1
2 TABLET ORAL DAILY
Refills: 0
Start: 2021-01-07 | End: 2021-02-03 | Stop reason: HOSPADM

## 2021-01-06 RX ADMIN — HYDRALAZINE HYDROCHLORIDE 10 MG: 10 TABLET, FILM COATED ORAL at 05:15

## 2021-01-06 RX ADMIN — ISOSORBIDE DINITRATE 10 MG: 10 TABLET ORAL at 08:13

## 2021-01-06 RX ADMIN — CARVEDILOL 12.5 MG: 12.5 TABLET, FILM COATED ORAL at 16:36

## 2021-01-06 RX ADMIN — ISOSORBIDE DINITRATE 10 MG: 10 TABLET ORAL at 12:01

## 2021-01-06 RX ADMIN — ISOSORBIDE DINITRATE 10 MG: 10 TABLET ORAL at 16:36

## 2021-01-06 RX ADMIN — FUROSEMIDE 20 MG: 20 TABLET ORAL at 08:13

## 2021-01-06 RX ADMIN — CARVEDILOL 12.5 MG: 12.5 TABLET, FILM COATED ORAL at 08:14

## 2021-01-06 RX ADMIN — ACETAMINOPHEN 650 MG: 325 TABLET ORAL at 05:14

## 2021-01-06 RX ADMIN — FUROSEMIDE 20 MG: 20 TABLET ORAL at 13:54

## 2021-01-06 RX ADMIN — AMLODIPINE BESYLATE 10 MG: 10 TABLET ORAL at 08:13

## 2021-01-06 RX ADMIN — POTASSIUM CHLORIDE 20 MEQ: 1500 TABLET, EXTENDED RELEASE ORAL at 08:14

## 2021-01-06 RX ADMIN — HYDRALAZINE HYDROCHLORIDE 10 MG: 10 TABLET, FILM COATED ORAL at 13:54

## 2021-01-06 NOTE — SOCIAL WORK
MD is planning on discharging pt home today with VNA  Pt will need home 02 and script/study was fax to [facility]  Spouse left message that he now wants transport set up  Pt will need BLS as she is new on 02  Completed the medical necessity, copy sent to [facility] and copy put in binder  TC to  [facility] for  time  [facility] reports [facility] will  at 1700 today  TC to [place] and sent message by [employer] questioning when 02 will be in the home as pt is being picked up at 1700  MD, [facility], and RN are aware of  time  TC to spouse and informed him of the  time  Spouse stated someone from [facility] stated the 02 should be delivered soon  FAX AVS ot [facility]  Rec a call from liaison with [facility] that the 02 should be in the home by 1700  They are aware pt is being picked up at hospital at 1700       Rec a call from spouse stating the oxygen is in the home so 1700 is a good  from the hospital

## 2021-01-06 NOTE — NURSING NOTE
IV removed  AVS, medications and appointments reviewed with patient  Up coming lab work discussed with patient and scripts for that included in discharge folder  Patient picked up by w/c [facility] with 3L NC on oxygen tank

## 2021-01-06 NOTE — TRANSPORTATION MEDICAL NECESSITY
Section I - General Information    Name of Patient: Percell Canavan                 : 1930    Medicare #: [id number]  Transport Date: 21 (PCS is valid for round trips on this date and for all repetitive trips in the 60-day range as noted below )  Origin: [address]                                                         Destination: [facility], [address]  Is the pt's stay covered under Medicare Part A (PPS/DRG)   []     Closest appropriate facility? If no, why is transport to more distant facility required? Yes  If hospice pt, is this transport related to pt's terminal illness? NA       Section II - Medical Necessity Questionnaire  Ambulance transportation is medically necessary only if other means of transport are contraindicated or would be potentially harmful to the patient  To meet this requirement, the patient must either be "bed confined" or suffer from a condition such that transport by means other than ambulance is contraindicated by the patient's condition  The following questions must be answered by the medical professional signing below for this form to be valid:    1)  Describe the MEDICAL CONDITION (physical and/or mental) of this patient AT [address] [place] [address] that requires the patient to be transported in an ambulance and why transport by other means is contraindicated by the patient's condition:  Patient is new on home oxygen at 3L, fall risk, impaired judgetment, decreased safety awareness, impaired balance, anterior right leg venous stasis ulceration, right heel pressure ulceration, right hallux & lateral right foot dry gangrene and PAD with soft tissue loss  Pt is not safe to ride in w/c [place] alone  2) Is the patient "bed confined" as defined below?      No  To be "be confined" the patient must satisfy all three of the following conditions: (1) unable to get up from bed without Assistance; AND (2) unable to ambulate; AND (3) unable to sit in a chair or wheelchair  3) Can this patient safely be transported by car or wheelchair van (i e , seated during transport without a medical attendant or monitoring)? No    4) In addition to completing questions 1-3 above, please check any of the following conditions that apply*:   *Note: supporting documentation for any boxes checked must be maintained in the patient's medical records  If hosp-hosp transfer, describe services needed at 2nd facility not available at 1st facility? Medical attendant required   Requires oxygen-unable to self administer  Unable to tolerate seated position for time needed to transport   Unable to sit in a chair or wheelchair due to decubitus ulcers or other wounds       Section III - Signature of Physician or Healthcare Professional  I certify that the above information is true and correct based on my evaluation of this patient, and represent that the patient requires transport by ambulance and that other forms of transport are contraindicated  I understand that this information will be used by the Centers for Medicare and Medicaid Services (CMS) to support the determination of medical necessity for ambulance services, and I represent that I have personal knowledge of the patient's condition at time of transport  []  If this box is checked, I also certify that the patient is physically or mentally incapable of signing the ambulance service's claim and that the institution with which I am affiliated has furnished care, services, or assistance to the patient  My signature below is made on behalf of the patient pursuant to 42 CFR §424 36(b)(4)  In accordance with 42 CFR §424 37, the specific reason(s) that the patient is physically or mentally incapable of signing the claim form is as follows        Signature of Physician* or [address] [place], [place] [address]JUNG LSW____________________________________________________________  Signature Date 01/06/21 (For scheduled repetitive transports, this form is not valid for transports performed more than 60 days after this date)    Printed Name & Credentials of Physician or Healthcare Professional (MD, DO, RN, etc )__Svetlana Rice, MSW, LSW______________________________  *Form must be signed by patient's attending physician for scheduled, repetitive transports   For non-repetitive, unscheduled ambulance transports, if unable to obtain the signature of the attending physician, any of the following may sign (choose appropriate option below)  [] Physician Assistant []  Clinical Nurse Specialist []  Registered Nurse  []  Nurse Practitioner  [x] Discharge Planner

## 2021-01-06 NOTE — DISCHARGE SUMMARY
Discharge Summary - [address] [facility]    Patient Information: Reyes Terrell 80 y o  female MRN: [id number]  Unit/Bed#: E4 -01 Encounter: [id number]    Discharging Physician / Practitioner: Roxy Cho DO  PCP: Ahsley Alejandro MD  Admission Date: 12/28/2020  Discharge Date: 01/06/21    Disposition:     Home with VNA Services (Reminder: Complete face to face encounter)     Reason for Admission: acute diastolic chf    Discharge Diagnoses:     Principal Problem (Resolved):    Acute diastolic CHF (congestive heart failure) ([facility] [address] )  Active Problems:    Ulcer of right lower extremity with fat layer exposed ([facility] [address] )    Essential hypertension    CKD (chronic kidney disease)    History of DVT (deep vein thrombosis)  Resolved Problems:    Hypertensive urgency      Consultations During Hospital Stay:  · Cardiology  · Podiatry  · Vascular surgery    Procedures Performed:     · none    Significant Findings / Test Results:   Xr Chest 1 View Portable  Result Date: 12/28/2020  Impression: Cardiomegaly with interstitial prominence bilaterally, cephalization of the pulmonary vessels and bilateral pleural effusions consistent with CHF  Xr Foot 3+ Vw Right  Result Date: 12/28/2020  Impression: No acute osseous abnormality  Incidental Findings:   · none    Test Results Pending at Discharge (will require follow up):   · none     Outpatient Tests Requested:  inr on Monday  Bmp in 1 week    Hospital Course:     Reyes Terrell is a 80 y o  female patient who originally presented to the hospital on 12/28/2020 due to acute diastolic chf in which she was evaluated by cardiology  It has improved with diuretics  In which her lasix was changed to 20mg three times a week dosing  Pt still appears to be slightly fluid overloaded  Spoke with cardiology and will increase lasix to 40mg daily for 1 week and decrease to 20mg daily after that  She will have repeat bmp in 1 week  She will follow up with cardiology outpt    She has Yuval/ckd3 with a baseline likely 1 7-2  Creatinine had been as elevated as 2 24  it had improved  Likely her new baseline is 2-2 1  Diuretics will be adjusted and pt will require repeat bmp in 1 week      She has a H/o dvt and inr was subtherapeutic on admit  inr is 3 24  will hold coumadin for one more night and restart coumadin on thrusday of 2mg  She will have a repeat inr on Monday  She has 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 and evidence of femoral- popliteal occlusion in which she was evaluated by vascular who stated the level of perfusion is below the healing level  Pt was given the option of arteriography and possibly endovascular intervention vrs local wound care  Pt decided to local wound care only  both her  and pt agree to continue current plan  Pt will follow up with podiatry outpt  If wounds do not heal she will be re evalated by vascular for procedure  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 in which she will continue with wound care and she will follow up with wound care clinic  She had Acute hypoxic respiratory failure due to diastolic chf and atelectasis  She will continue with 3 liters of oxygen  She was discharged to home with hhpt and close cardiology follow up     Discharge Day Visit / Exam:     * Please refer to separate progress note for these details *    Discharge instructions/Information to patient and family:   See after visit summary for information provided to patient and family  Provisions for Follow-Up Care:  See after visit summary for information related to follow-up care and any pertinent home health orders  Discharge Statement:  I spent 33 minutes discharging the patient  This time was spent on the day of discharge  I had direct contact with the patient on the day of discharge   Greater than 50% of the total time was spent examining patient, answering all patient questions, arranging and discussing plan of care with patient as well as directly providing post-discharge instructions  Additional time then spent on discharge activities  Discharge Medications:  See after visit summary for reconciled discharge medications provided to patient and family

## 2021-01-06 NOTE — PLAN OF CARE
Problem: Potential for Falls  Goal: Patient will remain free of falls  Description: INTERVENTIONS:  - Assess patient frequently for physical needs  -  Identify cognitive and physical deficits and behaviors that affect risk of falls    -  [place] fall precautions as indicated by assessment   - Educate patient/family on patient safety including physical limitations  - Instruct patient to call for assistance with activity based on assessment  - Modify environment to reduce risk of injury  - Consider OT/PT consult to assist with strengthening/mobility  Outcome: Progressing     Problem: Prexisting or High Potential for Compromised Skin Integrity  Goal: Skin integrity is maintained or improved  Description: INTERVENTIONS:  - Identify patients at risk for skin breakdown  - Assess and monitor skin integrity  - Assess and monitor nutrition and hydration status  - Monitor labs   - Assess for incontinence   - Turn and reposition patient  - Assist with mobility/ambulation  - Relieve pressure over bony prominences  - Avoid friction and shearing  - Provide appropriate hygiene as needed including keeping skin clean and dry  - Evaluate need for skin moisturizer/barrier cream  - Collaborate with interdisciplinary team   - Patient/family teaching  - Consider wound care consult   Outcome: Progressing     Problem: PAIN - ADULT  Goal: Verbalizes/displays adequate comfort level or baseline comfort level  Description: Interventions:  - Encourage patient to monitor pain and request assistance  - Assess pain using appropriate pain scale  - Administer analgesics based on type and severity of pain and evaluate response  - Implement non-pharmacological measures as appropriate and evaluate response  - Consider cultural and social influences on pain and pain management  - Notify physician/advanced practitioner if interventions unsuccessful or patient reports new pain  Outcome: Progressing     Problem: INFECTION - ADULT  Goal: Absence or prevention of progression during hospitalization  Description: INTERVENTIONS:  - Assess and monitor for signs and symptoms of infection  - Monitor lab/diagnostic results  - Monitor all insertion sites, i e  indwelling lines, tubes, and drains  - Monitor endotracheal if appropriate and nasal secretions for changes in amount and color  - [place] appropriate cooling/warming therapies per order  - Administer medications as ordered  - Instruct and encourage patient and family to use good hand hygiene technique  - Identify and instruct in appropriate isolation precautions for identified infection/condition  Outcome: Progressing     Problem: SAFETY ADULT  Goal: Patient will remain free of falls  Description: INTERVENTIONS:  - Assess patient frequently for physical needs  -  Identify cognitive and physical deficits and behaviors that affect risk of falls    -  [place] fall precautions as indicated by assessment   - Educate patient/family on patient safety including physical limitations  - Instruct patient to call for assistance with activity based on assessment  - Modify environment to reduce risk of injury  - Consider OT/PT consult to assist with strengthening/mobility  Outcome: Progressing  Goal: Maintain or return to baseline ADL function  Description: INTERVENTIONS:  -  Assess patient's ability to carry out ADLs; assess patient's baseline for ADL function and identify physical deficits which impact ability to perform ADLs (bathing, care of mouth/teeth, toileting, grooming, dressing, etc )  - Assess/evaluate cause of self-care deficits   - Assess range of motion  - Assess patient's mobility; develop plan if impaired  - Assess patient's need for assistive devices and provide as appropriate  - Encourage maximum independence but intervene and supervise when necessary  - Involve family in performance of ADLs  - Assess for home care needs following discharge   - Consider OT consult to assist with ADL evaluation and planning for discharge  - Provide patient education as appropriate  Outcome: Progressing  Goal: Maintain or return mobility status to optimal level  Description: INTERVENTIONS:  - Assess patient's baseline mobility status (ambulation, transfers, stairs, etc )    - Identify cognitive and physical deficits and behaviors that affect mobility  - Identify mobility aids required to assist with transfers and/or ambulation (gait belt, sit-to-stand, lift, walker, cane, etc )  - [place] fall precautions as indicated by assessment  - Record patient progress and toleration of activity level on Mobility SBAR; progress patient to next Phase/Stage  - Instruct patient to call for assistance with activity based on assessment  - Consider rehabilitation consult to assist with strengthening/weightbearing, etc   Outcome: Progressing     Problem: DISCHARGE PLANNING  Goal: Discharge to home or other facility with appropriate resources  Description: INTERVENTIONS:  - Identify barriers to discharge w/patient and caregiver  - Arrange for needed discharge resources and transportation as appropriate  - Identify discharge learning needs (meds, wound care, etc )  - Arrange for interpretive services to assist at discharge as needed  - Refer to Case Management Department for coordinating discharge planning if the patient needs post-hospital services based on physician/advanced practitioner order or complex needs related to functional status, cognitive ability, or social support system  Outcome: Progressing     Problem: Knowledge Deficit  Goal: Patient/family/caregiver demonstrates understanding of disease process, treatment plan, medications, and discharge instructions  Description: Complete learning assessment and assess knowledge base    Interventions:  - Provide teaching at level of understanding  - Provide teaching via preferred learning methods  Outcome: Progressing     Problem: Nutrition/Hydration-ADULT  Goal: Nutrient/Hydration intake appropriate for improving, restoring or maintaining nutritional needs  Description: Monitor and assess patient's nutrition/hydration status for malnutrition  Collaborate with interdisciplinary team and initiate plan and interventions as ordered  Monitor patient's weight and dietary intake as ordered or per policy  Utilize nutrition screening tool and intervene as necessary  Determine patient's food preferences and provide high-protein, high-caloric foods as appropriate       INTERVENTIONS:  - Monitor oral intake, urinary output, labs, and treatment plans  - Assess nutrition and hydration status and recommend course of action  - Evaluate amount of meals eaten  - Assist patient with eating if necessary   - Allow adequate time for meals  - Recommend/ encourage appropriate diets, oral nutritional supplements, and vitamin/mineral supplements  - Order, calculate, and assess calorie counts as needed  - Recommend, monitor, and adjust tube feedings and TPN/PPN based on assessed needs  - Assess need for intravenous fluids  - Provide specific nutrition/hydration education as appropriate  - Include patient/family/caregiver in decisions related to nutrition  Outcome: Progressing

## 2021-01-06 NOTE — RESPIRATORY THERAPY NOTE
Home Oxygen Qualifying Test       Patient name: Sari Doty        : 1930   Date of Test:  2021  Diagnosis:      Home Oxygen Test:    **Medicare Guidelines require item(s) 1-5 on all ambulatory patients or 1 and 2 on non-ambulatory patients  1   Baseline SPO2 on Room Air at rest 86%  2   SPO2 during exercise on Room Air n/a %  During exercise monitor SpO2  If SPO2 increases >=89% with ambulation do not add supplemental             oxygen  If <= 88% on room air add O2 via NC and titrate patient  Patient must be ambulated with O2 and titrated to > 88% with exertion  3   SPO2 on Oxygen at rest 95 %3 lpm     4   SPO2 during exercise on Oxygen n/a% a liter flow of n/a lpm     5   Exercise performed:   Pt unable to exercise or stand up from chair stating she had foot pain thus unable to ambulate          [x]  Supplemental Home Oxygen is indicated  []  Client does not qualify for home oxygen        Respiratory Additional Notes- pt should use 3 lpm as baseline oxygen requirement     Elke Husain, RT

## 2021-01-06 NOTE — PROGRESS NOTES
[facility] Progress Note  Patient: Viridiana Carroll 80 y o  female   MRN: [id number]  PCP: Ivone Keller MD  Unit/Bed#: E4 -01 Encounter: [id number]  Date Of Visit: 01/06/21      Assessment/plan  1  Acute diastolic chf- appreciate cardiology recommendations  It has improved with diuretics  Pt still appears to be slightly fluid overloaded  Spoke with cardiology and will increase lasix to 40mg daily for 1 week and decrease to 20mg daily after that  She will have repeat bmp in 1 week  She will follow up with cardiology outpt       2  Yuval/ckd3- baseline likely 1 7-2  Creatinine had been as elevated as 2 24  it had improved  Likely new baseline  Diuretics will be adjusted and pt will require repeat bmp in 1 week       3  H/o dvt- inr was subtherapeutic on admit  inr is 3 24  will hold coumadin for one more night and restart coumadin on Friday of 2mg  Repeat inr on Monday       4  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 and evidence of femoral- popliteal occlusion- appreciate vascular recommendations who states the level of perfusion is below the healing level  Pt was given the option of arteriography and possibly endovascular intervention vrs local wound care  Pt decided to local wound care only  both her  and pt agree to continue current plan  Pt will follow up with podiatry outpt  If wounds do not heal she will be re evalated by vascular for procedure  5  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- pt to continue with wound care and she will follow up with wound care clinic  6  Acute hypoxic respiratory failure due to number 1 and atelectasis- will continue with 3 liters of oxygen  Will work on weaning oxygen as outpt    dispo- d/c to home with hhpt  Pt to have close follow up with cardiology       Subjective:   Pt seen and examined  Pt is ready to be discharged  She states she feels better  No f/c no cp no worsening sob no n/v/d no abd pain       Objective: Vitals: Blood pressure 121/56, pulse 65, temperature (!) 97 3 °F (36 3 °C), temperature source Tympanic, resp  rate 18, height 5' 6" (1 676 m), weight 62 1 kg (136 lb 14 4 oz), SpO2 92 %  ,Body mass index is 22 1 kg/m²  Lab, Imaging and other studies:  Results from last 7 days   Lab Units 01/06/21  0521 01/05/21  0430   WBC Thousand/uL  --  10 13   HEMOGLOBIN g/dL  --  8 1*   HEMATOCRIT %  --  26 7*   PLATELETS Thousands/uL  --  289   INR  3 24* 3 67*     Results from last 7 days   Lab Units 01/06/21  0521   POTASSIUM mmol/L 4 7   CHLORIDE mmol/L 103   CO2 mmol/L 23   BUN mg/dL 72*   CREATININE mg/dL 2 13*   CALCIUM mg/dL 8 7         Lab Results   Component Value Date    BLOODCX No Growth After 5 Days  12/28/2020    BLOODCX No Growth After 5 Days   12/28/2020     Scheduled Meds:   Current Facility-Administered Medications   Medication Dose Route Frequency Provider Last Rate    acetaminophen  650 mg Oral Q4H PRN ROBBIN SegundoNP      albuterol  2 5 mg Nebulization Q6H PRN OTIS Segundo      amLODIPine  10 mg Oral Daily Macario Kirby, [address]      calcium carbonate  1,000 mg Oral TID PRN OTIS Segundo      carvedilol  12 5 mg Oral BID With Meals OTIS Segundo      [START ON 1/7/2021] furosemide  40 mg Oral Daily Noemi Becker DO      hydrALAZINE  10 mg Intravenous Q6H PRN OTIS Segundo      hydrALAZINE  10 mg Oral Q8H [facility] Ruben Montiel MD      isosorbide dinitrate  10 mg Oral TID after meals Ruben Montiel MD      ondansetron  4 mg Intravenous Q6H PRN OTIS Segundo      potassium chloride  20 mEq Oral Daily OTIS Segundo      senna  1 tablet Oral HS PRN ROBBIN SegundoNP       Continuous Infusions:    PRN Meds:   acetaminophen    albuterol    calcium carbonate    hydrALAZINE    ondansetron    senna      Physical exam:  Physical Exam  General appearance: alert and oriented, in no acute distress  Head: Normocephalic, without obvious abnormality, atraumatic  Eyes: conjunctivae/corneas clear  PERRL, EOM's intact  Fundi benign    Neck: no adenopathy, no carotid bruit, no JVD, supple, symmetrical, trachea midline and thyroid not enlarged, symmetric, no tenderness/mass/nodules  Lungs: slight decrease breath sounds at bases bilateral  Heart: regular rate and rhythm, S1, S2 normal, no murmur, click, rub or gallop  Abdomen: soft, non-tender; bowel sounds normal; no masses,  no organomegaly  Extremities: bandage c/d/i  Neurologic: Mental status: Alert, oriented, thought content appropriate

## 2021-01-06 NOTE — PROGRESS NOTES
Progress Note - Cardiology   Roger Garvey 80 y o  female MRN: [id number]  Unit/Bed#: E4 -01 Encounter: [id number]          Asessment/Plan  Acute hypoxic respiratory failure - acute diastolic CHF:               Echo 12/29/2020:  EF 50%, No RWMAs, mild-mod LVH, Gr II DD                  O/p diuretic: NONE  Valvular heart disease: Mod MR, mild TR (ECHO 12/29/2020)  Hypertension - uncontrolled at admission:              0/p Rx:  Norvasc 10 mg, Lopressor 25 mg b i d  Anemia:              Presenting hemoglobin 10 6 (basline 10s) ~~> xi of 7 3  CKD:               Recent lab suggest baseline creatinine to be approximately 1 7-1 9              Peak 2 24 (12/31/2020)  Atrophic left kidney, Hx Rt hydronephrosis and ureteral stenting - 12/2/2020 ([facility])  E coli bacteremia -11/2020:  Rt lower extremity wounds (POA):  Hx RLE DVT (11/18/2020):              0/P Rx: warfarin     Hemoglobin 8 1 (improving)  Potassium 4 7, BUN 72, creatinine 2 1 -- Though slightly above prior baseline, Creatinine looks to have plateaued around 0 9-7 6 with relative stability in the last few days  INR 3 24  No standing weights available    · Home oxygen test today records resting room air saturation of 86% with improvement to 95% on 3 L (patient was not ambulated) ~~> patient meets criteria for, and will go home with, oxygen -3 lpm  · With some edema in the dependent tissues and probably mild pulmonary rales which are under expressed by limited respiratory excursion feel the patient would benefit from a higher dose of Lasix ~~> suggest 40 mg daily for the next week with 20 mg/d thereafter  · Will arrange outpatient follow-up for repeat exam and reassess  · Check BMP in a week  · For nonhealing distal right lower extremity wounds, the patient was evaluated by Podiatry and vascular surgery ~~> she has decided against continued workup towards revascularization and instead will pursue palliative wound care    · Patient to get in-home PT post discharge      Subjective:  Patient offers no specific complaint to me  Earlier today she had a home O2 evaluation noting desaturation when off oxygen though she tells me she was comfortable at the time  Vitals:  Vitals:    01/05/21 0600 01/06/21 0543   Weight: 60 kg (132 lb 4 4 oz) 61 8 kg (136 lb 3 9 oz)   ,  Vitals:    01/06/21 0514 01/06/21 0543 01/06/21 0758 01/06/21 0813   BP: 145/67  123/67 123/67   BP Location:   Right arm    Pulse:   65 65   Resp:   18    Temp:   (!) 97 3 °F (36 3 °C)    TempSrc:   Tympanic    SpO2:   92%    Weight:  61 8 kg (136 lb 3 9 oz)     Height:           Exam:  General:  Patient is appropriately conversant and comfortable-appearing while seated out of bed with low-flow nasal cannula oxygen  Heart:  Regular with controlled rate and faint apical murmur  Respiratory effort: Moderate excursion with decreased air exchange  Lungs:  Scant and inspiratory rales can be auscultated - enter probably under appreciated given decreased air exchange  Lower Limbs:  Dependent thighs with at least 1+ pitting edema    Distal lower extremities with mild edema               Medications:    Current Facility-Administered Medications:     acetaminophen (TYLENOL) tablet 650 mg, 650 mg, Oral, Q4H PRN, OTIS Chandler, 650 mg at 01/06/21 0514    albuterol inhalation solution 2 5 mg, 2 5 mg, Nebulization, Q6H PRN, OTIS Chandler, 2 5 mg at 12/30/20 4304    amLODIPine (NORVASC) tablet 10 mg, 10 mg, Oral, Daily, OTIS Chandler, 10 mg at 01/06/21 0425    calcium carbonate (TUMS) chewable tablet 1,000 mg, 1,000 mg, Oral, TID PRN, OTIS Chandler    carvedilol (COREG) tablet 12 5 mg, 12 5 mg, Oral, BID With Meals, OTIS Chandler, 12 5 mg at 01/06/21 7891    furosemide (LASIX) tablet 20 mg, 20 mg, Oral, Once per day on Mon Wed Fri, Ruben Montiel MD, 20 mg at 01/06/21 0813    hydrALAZINE (APRESOLINE) injection 10 mg, 10 mg, Intravenous, Q6H PRN, OTIS Chandler, 10 mg at 12/31/20 0543    hydrALAZINE (APRESOLINE) tablet 10 mg, 10 mg, Oral, Q8H [address], Ruben Montiel MD, 10 mg at 01/06/21 0515    isosorbide dinitrate (ISORDIL) tablet 10 mg, 10 mg, Oral, TID after meals, Ruben Montiel MD, 10 mg at 01/06/21 1201    ondansetron (ZOFRAN) injection 4 mg, 4 mg, Intravenous, Q6H PRN, OTIS Roque    potassium chloride (K-DUR,KLOR-CON) CR tablet 20 mEq, 20 mEq, Oral, Daily, OTIS Roque, 20 mEq at 01/06/21 1306    senna (SENOKOT) tablet 8 6 mg, 1 tablet, Oral, HS PRN, OTIS Roque      Labs/Data:        Results from last 7 days   Lab Units 01/05/21  0430 01/04/21  0414 01/03/21  0545   WBC Thousand/uL 10 13 11 98* 13 78*   HEMOGLOBIN g/dL 8 1* 7 3* 8 2*   HEMATOCRIT % 26 7* 23 9* 26 4*   PLATELETS Thousands/uL 289 203 241     Results from last 7 days   Lab Units 01/06/21  0521 01/05/21  0430 01/04/21  0441   POTASSIUM mmol/L 4 7 4 8 4 5   CHLORIDE mmol/L 103 102 101   CO2 mmol/L 23 27 25   BUN mg/dL 72* 73* 67*

## 2021-01-07 ENCOUNTER — TRANSITIONAL CARE MANAGEMENT (OUTPATIENT)
Dept: FAMILY MEDICINE CLINIC | Facility: CLINIC | Age: 86
End: 2021-01-07

## 2021-01-13 ENCOUNTER — TELEPHONE (OUTPATIENT)
Dept: FAMILY MEDICINE CLINIC | Facility: CLINIC | Age: 86
End: 2021-01-13
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
## 2021-01-13 NOTE — TELEPHONE ENCOUNTER
As per [facility] protective service [phone]    FYI   Paper to follow  For home care  Spouse has limits  Needs direction on  Moving forward with  homecare /orders  TCM appt  1/15/21   As per Juan Rush  Pt hasn't been seen with PCP in 40 yrs ,   Would like a call back  Before Friday  If possible to speak with you ,  Will be faxing over  paper work to review also  Thank you  [facility]

## 2021-01-14 ENCOUNTER — DOCUMENTATION (OUTPATIENT)
Dept: FAMILY MEDICINE CLINIC | Facility: CLINIC | Age: 86
End: 2021-01-14

## 2021-01-15 ENCOUNTER — OFFICE VISIT (OUTPATIENT)
Dept: FAMILY MEDICINE CLINIC | Facility: CLINIC | Age: 86
End: 2021-01-15
Payer: COMMERCIAL

## 2021-01-15 VITALS — DIASTOLIC BLOOD PRESSURE: 48 MMHG | SYSTOLIC BLOOD PRESSURE: 118 MMHG | TEMPERATURE: 93.4 F

## 2021-01-15 DIAGNOSIS — I73.9 PAD (PERIPHERAL ARTERY DISEASE) (HCC): ICD-10-CM

## 2021-01-15 DIAGNOSIS — L97.912 ULCER OF RIGHT LOWER EXTREMITY WITH FAT LAYER EXPOSED (HCC): ICD-10-CM

## 2021-01-15 DIAGNOSIS — L97.921 LEG ULCER, LEFT, LIMITED TO BREAKDOWN OF SKIN (HCC): ICD-10-CM

## 2021-01-15 DIAGNOSIS — E03.9 HYPOTHYROIDISM, UNSPECIFIED TYPE: ICD-10-CM

## 2021-01-15 DIAGNOSIS — N18.30 STAGE 3 CHRONIC KIDNEY DISEASE, UNSPECIFIED WHETHER STAGE 3A OR 3B CKD (HCC): ICD-10-CM

## 2021-01-15 DIAGNOSIS — I10 ESSENTIAL HYPERTENSION: ICD-10-CM

## 2021-01-15 DIAGNOSIS — I82.461 ACUTE DEEP VEIN THROMBOSIS (DVT) OF CALF MUSCLE VEIN OF RIGHT LOWER EXTREMITY (HCC): ICD-10-CM

## 2021-01-15 DIAGNOSIS — I50.31 ACUTE DIASTOLIC CONGESTIVE HEART FAILURE (HCC): Primary | ICD-10-CM

## 2021-01-15 DIAGNOSIS — N17.9 AKI (ACUTE KIDNEY INJURY) (HCC): ICD-10-CM

## 2021-01-15 PROCEDURE — 1111F DSCHRG MED/CURRENT MED MERGE: CPT | Performed by: FAMILY MEDICINE

## 2021-01-15 PROCEDURE — 99495 TRANSJ CARE MGMT MOD F2F 14D: CPT | Performed by: FAMILY MEDICINE

## 2021-01-15 RX ORDER — AMLODIPINE BESYLATE 5 MG/1
5 TABLET ORAL DAILY
Qty: 90 TABLET | Refills: 1 | Status: SHIPPED | OUTPATIENT
Start: 2021-01-15 | End: 2021-02-03 | Stop reason: HOSPADM

## 2021-01-15 RX ORDER — GABAPENTIN 100 MG/1
100 CAPSULE ORAL 3 TIMES DAILY
Qty: 90 CAPSULE | Refills: 2 | Status: SHIPPED | OUTPATIENT
Start: 2021-01-15 | End: 2021-02-03 | Stop reason: HOSPADM

## 2021-01-15 RX ORDER — LEVOTHYROXINE SODIUM 0.03 MG/1
25 TABLET ORAL
Qty: 90 TABLET | Refills: 3 | Status: SHIPPED | OUTPATIENT
Start: 2021-01-15

## 2021-01-15 NOTE — PROGRESS NOTES
TCM Call (since 12/15/2020)     Date and time call was made  1/7/2021 11:52 AM    Patient was hospitialized at  [facility]        Date of Admission  12/28/20    Date of discharge  01/06/21    Diagnosis  ACUTE CHF    Disposition  Home    Were the patients medications reviewed and updated  Yes    Current Symptoms  None      TCM Call (since 12/15/2020)     Post hospital issues  None    Should patient be enrolled in anticoag monitoring? No    Scheduled for follow up?   Yes    Did you obtain your prescribed medications  Yes    Do you need help managing your prescriptions or medications  Yes    Why type of assitance do you need   HELPS HER    Is transportation to your appointment needed  Yes    Specify why  [address] or [address] other    Support System  Spouse    The type of support provided  Emotional; Physical    Do you have social support  Yes, as much as I need    Are you recieving any outpatient services  Yes    What type of services  VNA, PT, OT    Are you recieving home care services  Yes    Types of home care services  Home PT; Nurse visit    Are you using any community resources  No    Current waiver services  No    Have you fallen in the last 12 months  Yes    How many times  1    Interperter language line needed  No    Counseling  Family

## 2021-01-15 NOTE — PROGRESS NOTES
Assessment/Plan: guidance given overall  Extremely long discussion regarding wound care and need for vascularization  Patient wishes to observe eschars and elevate legs  Patient will have Norvasc decreased due to decreased blood pressure today and does see if this helps with lower extremity edema  Patient will continue with Lasix  Patient will continue with other meds as listed  Patient will start gabapentin low dose for leg pain  Patient have laboratory studies prior to next visit in 1 month  Patient will start levothyroxine for hypothyroid state  Recheck TSH prior to visit in 1 month  Patient to let us know if anything worsens or patient wishes to see vascular surgeon or wound care as referrals will be given  Patient does not want hyperbaric therapy either  Patient will continue to have visiting nurses well as physical therapy and occupational therapy come to the home  Diagnoses and all orders for this visit:    Acute diastolic congestive heart failure (HCC)  -     amLODIPine (NORVASC) 5 mg tablet; Take 1 tablet (5 mg total) by mouth daily  -     CBC and differential; Future  -     Comprehensive metabolic panel; Future  -     Lipid panel; Future  -     TSH, 3rd generation with Free T4 reflex; Future    PAD (peripheral artery disease) (HCC)  -     amLODIPine (NORVASC) 5 mg tablet; Take 1 tablet (5 mg total) by mouth daily  -     CBC and differential; Future  -     Comprehensive metabolic panel; Future  -     Lipid panel; Future  -     TSH, 3rd generation with Free T4 reflex; Future    Essential hypertension  -     amLODIPine (NORVASC) 5 mg tablet; Take 1 tablet (5 mg total) by mouth daily  -     CBC and differential; Future  -     Comprehensive metabolic panel; Future  -     Lipid panel; Future  -     TSH, 3rd generation with Free T4 reflex; Future    Acute deep vein thrombosis (DVT) of calf muscle vein of right lower extremity (HCC)  -     amLODIPine (NORVASC) 5 mg tablet;  Take 1 tablet (5 mg total) by mouth daily  -     CBC and differential; Future  -     Comprehensive metabolic panel; Future  -     Lipid panel; Future  -     TSH, 3rd generation with Free T4 reflex; Future    Stage 3 chronic kidney disease, unspecified whether stage 3a or 3b CKD  -     amLODIPine (NORVASC) 5 mg tablet; Take 1 tablet (5 mg total) by mouth daily  -     CBC and differential; Future  -     Comprehensive metabolic panel; Future  -     Lipid panel; Future  -     TSH, 3rd generation with Free T4 reflex; Future    Ulcer of right lower extremity with fat layer exposed (HCC)  -     amLODIPine (NORVASC) 5 mg tablet; Take 1 tablet (5 mg total) by mouth daily  -     CBC and differential; Future  -     Comprehensive metabolic panel; Future  -     Lipid panel; Future  -     TSH, 3rd generation with Free T4 reflex; Future    LILIAN (acute kidney injury) ([address] )  -     amLODIPine (NORVASC) 5 mg tablet; Take 1 tablet (5 mg total) by mouth daily  -     CBC and differential; Future  -     Comprehensive metabolic panel; Future  -     Lipid panel; Future  -     TSH, 3rd generation with Free T4 reflex; Future    Leg ulcer, left, limited to breakdown of skin (HCC)  -     gabapentin (NEURONTIN) 100 mg capsule; Take 1 capsule (100 mg total) by mouth 3 (three) times a day    Hypothyroidism, unspecified type  -     levothyroxine 25 mcg tablet; Take 1 tablet (25 mcg total) by mouth daily in the early morning            Subjective:        Patient ID: Walter Krause is a 80 y o  female  Patient is here status post hospitalization at [place] [address] from December 28th through January 6th  Patient diagnosed with acute CHF with lower extremity edema and shortness of breath  Patient also with peripheral vascular disease with decreased blood flow to the right lower extremity  Patient with right heel ulcer  Patient's other diagnosis include acute kidney injury, chronic kidney disease, history of DVT along with accelerated hypertension    Patient did have a chest x-ray which showed findings consistent with CHF  Patient also had x-ray of the right foot which was normal   Patient was seen by vascular surgery  They recommended bypass increase blood flow to right lower extremity  Patient denied /refused intervention  Patient is on Coumadin  Patient labs reviewed  Hemoglobin was 8 0  Patient also with elevated TSH  Patient is getting visiting nurses and PT and OT at home  Patient with some shortness of breath  No chest pain  Patient with lower extremity edema  Normal urination defecation  No fevers  Patient did not see wound care as an outpatient  Patient did go to [facility]  The following portions of the patient's history were reviewed and updated as appropriate: allergies, current medications, past family history, past medical history, past social history, past surgical history and problem list       Review of Systems   Constitutional: Negative  HENT: Negative  Eyes: Negative  Respiratory: Positive for shortness of breath  Cardiovascular: Positive for leg swelling  Gastrointestinal: Negative  Endocrine: Negative  Genitourinary: Negative  Musculoskeletal: Negative  Skin: Positive for color change and wound  Allergic/Immunologic: Negative  Neurological: Negative  Hematological: Negative  Psychiatric/Behavioral: Negative  Objective: Falls Plan of Care: balance, strength, and gait training instructions were provided  BP (!) 118/48 (BP Location: Right arm, Patient Position: Sitting, Cuff Size: Standard)   Temp (!) 93 4 °F (34 1 °C) (Tympanic)   LMP  (LMP Unknown)          Physical Exam  Vitals signs and nursing note reviewed  Constitutional:       General: She is not in acute distress  Appearance: Normal appearance  She is not ill-appearing, toxic-appearing or diaphoretic  HENT:      Head: Normocephalic and atraumatic        Right Ear: Tympanic membrane, ear canal and external ear normal  There is no impacted cerumen  Left Ear: Tympanic membrane, ear canal and external ear normal  There is no impacted cerumen  Nose: Nose normal  No congestion or rhinorrhea  Mouth/Throat:      Mouth: Mucous membranes are moist       Pharynx: No oropharyngeal exudate or posterior oropharyngeal erythema  Eyes:      General: No scleral icterus  Right eye: No discharge  Left eye: No discharge  Extraocular Movements: Extraocular movements intact  Conjunctiva/sclera: Conjunctivae normal       Pupils: Pupils are equal, round, and reactive to light  Neck:      Musculoskeletal: Normal range of motion and neck supple  No neck rigidity or muscular tenderness  Vascular: No carotid bruit  Cardiovascular:      Rate and Rhythm: Normal rate and regular rhythm  Pulses: Normal pulses  Heart sounds: Normal heart sounds  No murmur  No friction rub  No gallop  Pulmonary:      Effort: Pulmonary effort is normal  No respiratory distress  Breath sounds: Normal breath sounds  No stridor  No wheezing, rhonchi or rales  Chest:      Chest wall: No tenderness  Abdominal:      General: Abdomen is flat  Bowel sounds are normal  There is no distension  Palpations: Abdomen is soft  Tenderness: There is no abdominal tenderness  There is no guarding or rebound  Musculoskeletal: Normal range of motion  General: Swelling and tenderness present  No deformity or signs of injury  Right lower leg: Edema present  Left lower leg: Edema present  Lymphadenopathy:      Cervical: No cervical adenopathy  Skin:     General: Skin is warm and dry  Capillary Refill: Capillary refill takes less than 2 seconds  Coloration: Skin is not jaundiced  Findings: Lesion present  No bruising, erythema or rash  Comments:   Dry eschar right heel measuring 3 cm across    Patient also with dry eschar right 1st toe at other skin changes at other 4 toes   Neurological:      Mental Status: She is alert and oriented to person, place, and time  Mental status is at baseline  Cranial Nerves: No cranial nerve deficit  Sensory: No sensory deficit  Motor: No weakness  Coordination: Coordination normal       Gait: Gait abnormal    Psychiatric:         Mood and Affect: Mood normal          Behavior: Behavior normal          Thought Content:  Thought content normal          Judgment: Judgment normal

## 2021-01-18 ENCOUNTER — TELEPHONE (OUTPATIENT)
Dept: FAMILY MEDICINE CLINIC | Facility: CLINIC | Age: 86
End: 2021-01-18

## 2021-01-18 ENCOUNTER — APPOINTMENT (INPATIENT)
Dept: RADIOLOGY | Facility: HOSPITAL | Age: 86
DRG: 299 | End: 2021-01-18
Payer: MEDICARE

## 2021-01-18 ENCOUNTER — HOSPITAL ENCOUNTER (INPATIENT)
Facility: HOSPITAL | Age: 86
LOS: 16 days | Discharge: NURSING, REHAB OR LONG-TERM CARE | DRG: 299 | End: 2021-02-03
Attending: EMERGENCY MEDICINE | Admitting: INTERNAL MEDICINE
Payer: MEDICARE

## 2021-01-18 DIAGNOSIS — N17.9 AKI (ACUTE KIDNEY INJURY) (HCC): ICD-10-CM

## 2021-01-18 DIAGNOSIS — D64.9 ANEMIA: ICD-10-CM

## 2021-01-18 DIAGNOSIS — R33.9 URINARY RETENTION: ICD-10-CM

## 2021-01-18 DIAGNOSIS — I10 ESSENTIAL HYPERTENSION: ICD-10-CM

## 2021-01-18 DIAGNOSIS — I96 DRY GANGRENE (HCC): ICD-10-CM

## 2021-01-18 DIAGNOSIS — N13.30 HYDRONEPHROSIS OF RIGHT KIDNEY: ICD-10-CM

## 2021-01-18 DIAGNOSIS — D72.829 LEUKOCYTOSIS: ICD-10-CM

## 2021-01-18 DIAGNOSIS — R60.0 BILATERAL LOWER EXTREMITY EDEMA: ICD-10-CM

## 2021-01-18 DIAGNOSIS — L03.90 CELLULITIS: ICD-10-CM

## 2021-01-18 DIAGNOSIS — E87.1 ACUTE HYPONATREMIA: Primary | ICD-10-CM

## 2021-01-18 DIAGNOSIS — E87.5 ACUTE HYPERKALEMIA: ICD-10-CM

## 2021-01-18 DIAGNOSIS — Z01.89 ENCOUNTER FOR COMPETENCY EVALUATION: ICD-10-CM

## 2021-01-18 PROBLEM — J96.11 CHRONIC HYPOXEMIC RESPIRATORY FAILURE (HCC): Status: ACTIVE | Noted: 2021-01-18

## 2021-01-18 PROBLEM — I50.32 CHRONIC DIASTOLIC HEART FAILURE (HCC): Status: ACTIVE | Noted: 2021-01-18

## 2021-01-18 PROBLEM — R26.2 AMBULATORY DYSFUNCTION: Status: ACTIVE | Noted: 2021-01-18

## 2021-01-18 LAB
ANION GAP SERPL CALCULATED.3IONS-SCNC: 10 MMOL/L (ref 4–13)
ANION GAP SERPL CALCULATED.3IONS-SCNC: 12 MMOL/L (ref 4–13)
ATRIAL RATE: 55 BPM
BACTERIA UR QL AUTO: ABNORMAL /HPF
BASOPHILS # BLD AUTO: 0.02 THOUSANDS/ΜL (ref 0–0.1)
BASOPHILS NFR BLD AUTO: 0 % (ref 0–1)
BILIRUB UR QL STRIP: NEGATIVE
BUN SERPL-MCNC: 121 MG/DL (ref 5–25)
BUN SERPL-MCNC: 123 MG/DL (ref 5–25)
CALCIUM SERPL-MCNC: 8.6 MG/DL (ref 8.3–10.1)
CALCIUM SERPL-MCNC: 8.7 MG/DL (ref 8.3–10.1)
CHLORIDE SERPL-SCNC: 95 MMOL/L (ref 100–108)
CHLORIDE SERPL-SCNC: 97 MMOL/L (ref 100–108)
CK SERPL-CCNC: 32 U/L (ref 26–192)
CLARITY UR: ABNORMAL
CO2 SERPL-SCNC: 18 MMOL/L (ref 21–32)
CO2 SERPL-SCNC: 19 MMOL/L (ref 21–32)
COLOR UR: ABNORMAL
CREAT SERPL-MCNC: 3.3 MG/DL (ref 0.6–1.3)
CREAT SERPL-MCNC: 3.34 MG/DL (ref 0.6–1.3)
EOSINOPHIL # BLD AUTO: 0.06 THOUSAND/ΜL (ref 0–0.61)
EOSINOPHIL NFR BLD AUTO: 1 % (ref 0–6)
ERYTHROCYTE [DISTWIDTH] IN BLOOD BY AUTOMATED COUNT: 17.1 % (ref 11.6–15.1)
FERRITIN SERPL-MCNC: 215 NG/ML (ref 8–388)
GFR SERPL CREATININE-BSD FRML MDRD: 12 ML/MIN/1.73SQ M
GFR SERPL CREATININE-BSD FRML MDRD: 12 ML/MIN/1.73SQ M
GLUCOSE SERPL-MCNC: 100 MG/DL (ref 65–140)
GLUCOSE SERPL-MCNC: 82 MG/DL (ref 65–140)
GLUCOSE UR STRIP-MCNC: NEGATIVE MG/DL
HCT VFR BLD AUTO: 23.9 % (ref 34.8–46.1)
HGB BLD-MCNC: 7.4 G/DL (ref 11.5–15.4)
HGB UR QL STRIP.AUTO: ABNORMAL
IMM GRANULOCYTES # BLD AUTO: 0.17 THOUSAND/UL (ref 0–0.2)
IMM GRANULOCYTES NFR BLD AUTO: 1 % (ref 0–2)
INR PPP: 10.64 (ref 0.84–1.19)
IRON SATN MFR SERPL: 9 %
IRON SERPL-MCNC: 16 UG/DL (ref 50–170)
KETONES UR STRIP-MCNC: NEGATIVE MG/DL
LEUKOCYTE ESTERASE UR QL STRIP: ABNORMAL
LYMPHOCYTES # BLD AUTO: 1.47 THOUSANDS/ΜL (ref 0.6–4.47)
LYMPHOCYTES NFR BLD AUTO: 11 % (ref 14–44)
MCH RBC QN AUTO: 26.9 PG (ref 26.8–34.3)
MCHC RBC AUTO-ENTMCNC: 31 G/DL (ref 31.4–37.4)
MCV RBC AUTO: 87 FL (ref 82–98)
MONOCYTES # BLD AUTO: 0.34 THOUSAND/ΜL (ref 0.17–1.22)
MONOCYTES NFR BLD AUTO: 3 % (ref 4–12)
NEUTROPHILS # BLD AUTO: 10.96 THOUSANDS/ΜL (ref 1.85–7.62)
NEUTS SEG NFR BLD AUTO: 84 % (ref 43–75)
NITRITE UR QL STRIP: NEGATIVE
NON-SQ EPI CELLS URNS QL MICRO: ABNORMAL /HPF
NRBC BLD AUTO-RTO: 0 /100 WBCS
NT-PROBNP SERPL-MCNC: ABNORMAL PG/ML
OSMOLALITY UR/SERPL-RTO: 308 MMOL/KG (ref 282–298)
OSMOLALITY UR: 338 MMOL/KG
P AXIS: 16 DEGREES
PH UR STRIP.AUTO: 6 [PH]
PLATELET # BLD AUTO: 241 THOUSANDS/UL (ref 149–390)
PMV BLD AUTO: 10.4 FL (ref 8.9–12.7)
POTASSIUM SERPL-SCNC: 5.5 MMOL/L (ref 3.5–5.3)
POTASSIUM SERPL-SCNC: 5.7 MMOL/L (ref 3.5–5.3)
POTASSIUM SERPL-SCNC: 6 MMOL/L (ref 3.5–5.3)
PR INTERVAL: 240 MS
PROT UR STRIP-MCNC: ABNORMAL MG/DL
PROTHROMBIN TIME: 81.3 SECONDS (ref 11.6–14.5)
QRS AXIS: 69 DEGREES
QRSD INTERVAL: 86 MS
QT INTERVAL: 446 MS
QTC INTERVAL: 418 MS
RBC # BLD AUTO: 2.75 MILLION/UL (ref 3.81–5.12)
RBC #/AREA URNS AUTO: ABNORMAL /HPF
SODIUM 24H UR-SCNC: 47 MOL/L
SODIUM SERPL-SCNC: 124 MMOL/L (ref 136–145)
SODIUM SERPL-SCNC: 127 MMOL/L (ref 136–145)
SP GR UR STRIP.AUTO: 1.01 (ref 1–1.03)
T WAVE AXIS: 32 DEGREES
TIBC SERPL-MCNC: 178 UG/DL (ref 250–450)
TSH SERPL DL<=0.05 MIU/L-ACNC: 10.62 UIU/ML (ref 0.36–3.74)
URATE SERPL-MCNC: 9 MG/DL (ref 2–6.8)
UROBILINOGEN UR QL STRIP.AUTO: 0.2 E.U./DL
VENTRICULAR RATE: 53 BPM
WBC # BLD AUTO: 13.02 THOUSAND/UL (ref 4.31–10.16)
WBC #/AREA URNS AUTO: ABNORMAL /HPF

## 2021-01-18 PROCEDURE — 87040 BLOOD CULTURE FOR BACTERIA: CPT | Performed by: INTERNAL MEDICINE

## 2021-01-18 PROCEDURE — 83540 ASSAY OF IRON: CPT | Performed by: INTERNAL MEDICINE

## 2021-01-18 PROCEDURE — 84550 ASSAY OF BLOOD/URIC ACID: CPT | Performed by: INTERNAL MEDICINE

## 2021-01-18 PROCEDURE — 80048 BASIC METABOLIC PNL TOTAL CA: CPT | Performed by: EMERGENCY MEDICINE

## 2021-01-18 PROCEDURE — 82728 ASSAY OF FERRITIN: CPT | Performed by: INTERNAL MEDICINE

## 2021-01-18 PROCEDURE — 83880 ASSAY OF NATRIURETIC PEPTIDE: CPT | Performed by: INTERNAL MEDICINE

## 2021-01-18 PROCEDURE — 83935 ASSAY OF URINE OSMOLALITY: CPT | Performed by: INTERNAL MEDICINE

## 2021-01-18 PROCEDURE — 80048 BASIC METABOLIC PNL TOTAL CA: CPT | Performed by: INTERNAL MEDICINE

## 2021-01-18 PROCEDURE — 99291 CRITICAL CARE FIRST HOUR: CPT | Performed by: INTERNAL MEDICINE

## 2021-01-18 PROCEDURE — 96366 THER/PROPH/DIAG IV INF ADDON: CPT

## 2021-01-18 PROCEDURE — 36415 COLL VENOUS BLD VENIPUNCTURE: CPT | Performed by: EMERGENCY MEDICINE

## 2021-01-18 PROCEDURE — 84443 ASSAY THYROID STIM HORMONE: CPT | Performed by: INTERNAL MEDICINE

## 2021-01-18 PROCEDURE — 84300 ASSAY OF URINE SODIUM: CPT | Performed by: INTERNAL MEDICINE

## 2021-01-18 PROCEDURE — 96375 TX/PRO/DX INJ NEW DRUG ADDON: CPT

## 2021-01-18 PROCEDURE — 85610 PROTHROMBIN TIME: CPT | Performed by: INTERNAL MEDICINE

## 2021-01-18 PROCEDURE — 99291 CRITICAL CARE FIRST HOUR: CPT | Performed by: EMERGENCY MEDICINE

## 2021-01-18 PROCEDURE — 84132 ASSAY OF SERUM POTASSIUM: CPT | Performed by: INTERNAL MEDICINE

## 2021-01-18 PROCEDURE — 83930 ASSAY OF BLOOD OSMOLALITY: CPT | Performed by: INTERNAL MEDICINE

## 2021-01-18 PROCEDURE — 83550 IRON BINDING TEST: CPT | Performed by: INTERNAL MEDICINE

## 2021-01-18 PROCEDURE — 85025 COMPLETE CBC W/AUTO DIFF WBC: CPT | Performed by: EMERGENCY MEDICINE

## 2021-01-18 PROCEDURE — 73620 X-RAY EXAM OF FOOT: CPT

## 2021-01-18 PROCEDURE — 93010 ELECTROCARDIOGRAM REPORT: CPT | Performed by: INTERNAL MEDICINE

## 2021-01-18 PROCEDURE — 96365 THER/PROPH/DIAG IV INF INIT: CPT

## 2021-01-18 PROCEDURE — 99285 EMERGENCY DEPT VISIT HI MDM: CPT

## 2021-01-18 PROCEDURE — 81001 URINALYSIS AUTO W/SCOPE: CPT | Performed by: INTERNAL MEDICINE

## 2021-01-18 PROCEDURE — 93005 ELECTROCARDIOGRAM TRACING: CPT

## 2021-01-18 PROCEDURE — 82550 ASSAY OF CK (CPK): CPT | Performed by: EMERGENCY MEDICINE

## 2021-01-18 RX ORDER — LEVOTHYROXINE SODIUM 0.03 MG/1
25 TABLET ORAL
Status: DISCONTINUED | OUTPATIENT
Start: 2021-01-19 | End: 2021-02-03 | Stop reason: HOSPADM

## 2021-01-18 RX ORDER — SODIUM CHLORIDE 9 MG/ML
75 INJECTION, SOLUTION INTRAVENOUS CONTINUOUS
Status: DISPENSED | OUTPATIENT
Start: 2021-01-18 | End: 2021-01-18

## 2021-01-18 RX ORDER — DOCUSATE SODIUM 100 MG/1
100 CAPSULE, LIQUID FILLED ORAL 2 TIMES DAILY
Status: DISCONTINUED | OUTPATIENT
Start: 2021-01-18 | End: 2021-01-30

## 2021-01-18 RX ORDER — PHYTONADIONE 5 MG/1
2.5 TABLET ORAL ONCE
Status: COMPLETED | OUTPATIENT
Start: 2021-01-18 | End: 2021-01-18

## 2021-01-18 RX ORDER — WARFARIN SODIUM 2 MG/1
2 TABLET ORAL DAILY
Status: DISCONTINUED | OUTPATIENT
Start: 2021-01-18 | End: 2021-01-18 | Stop reason: DRUGHIGH

## 2021-01-18 RX ORDER — SACCHAROMYCES BOULARDII 250 MG
250 CAPSULE ORAL 2 TIMES DAILY
Status: DISCONTINUED | OUTPATIENT
Start: 2021-01-18 | End: 2021-02-03 | Stop reason: HOSPADM

## 2021-01-18 RX ORDER — AMLODIPINE BESYLATE 5 MG/1
5 TABLET ORAL DAILY
Status: DISCONTINUED | OUTPATIENT
Start: 2021-01-18 | End: 2021-01-19

## 2021-01-18 RX ORDER — CALCIUM GLUCONATE 20 MG/ML
1 INJECTION, SOLUTION INTRAVENOUS ONCE
Status: COMPLETED | OUTPATIENT
Start: 2021-01-18 | End: 2021-01-18

## 2021-01-18 RX ORDER — CARVEDILOL 6.25 MG/1
12.5 TABLET ORAL 2 TIMES DAILY WITH MEALS
Status: DISCONTINUED | OUTPATIENT
Start: 2021-01-18 | End: 2021-01-19

## 2021-01-18 RX ORDER — ONDANSETRON 2 MG/ML
4 INJECTION INTRAMUSCULAR; INTRAVENOUS EVERY 6 HOURS PRN
Status: DISCONTINUED | OUTPATIENT
Start: 2021-01-18 | End: 2021-02-03 | Stop reason: HOSPADM

## 2021-01-18 RX ORDER — CEFAZOLIN SODIUM 2 G/50ML
2000 SOLUTION INTRAVENOUS ONCE
Status: COMPLETED | OUTPATIENT
Start: 2021-01-18 | End: 2021-01-18

## 2021-01-18 RX ORDER — ACETAMINOPHEN 325 MG/1
650 TABLET ORAL EVERY 6 HOURS PRN
Status: DISCONTINUED | OUTPATIENT
Start: 2021-01-18 | End: 2021-02-03 | Stop reason: HOSPADM

## 2021-01-18 RX ORDER — HYDRALAZINE HYDROCHLORIDE 10 MG/1
10 TABLET, FILM COATED ORAL EVERY 8 HOURS SCHEDULED
Status: DISCONTINUED | OUTPATIENT
Start: 2021-01-18 | End: 2021-01-31

## 2021-01-18 RX ORDER — CEFAZOLIN SODIUM 1 G/50ML
1000 SOLUTION INTRAVENOUS EVERY 24 HOURS
Status: DISCONTINUED | OUTPATIENT
Start: 2021-01-19 | End: 2021-01-26

## 2021-01-18 RX ORDER — ISOSORBIDE DINITRATE 10 MG/1
10 TABLET ORAL
Status: DISCONTINUED | OUTPATIENT
Start: 2021-01-18 | End: 2021-02-03 | Stop reason: HOSPADM

## 2021-01-18 RX ORDER — SODIUM POLYSTYRENE SULFONATE 4.1 MEQ/G
15 POWDER, FOR SUSPENSION ORAL; RECTAL ONCE
Status: COMPLETED | OUTPATIENT
Start: 2021-01-18 | End: 2021-01-18

## 2021-01-18 RX ORDER — DEXTROSE MONOHYDRATE 25 G/50ML
50 INJECTION, SOLUTION INTRAVENOUS ONCE
Status: COMPLETED | OUTPATIENT
Start: 2021-01-18 | End: 2021-01-18

## 2021-01-18 RX ADMIN — DEXTROSE MONOHYDRATE 50 ML: 500 INJECTION PARENTERAL at 15:00

## 2021-01-18 RX ADMIN — SODIUM CHLORIDE 1000 ML: 0.9 INJECTION, SOLUTION INTRAVENOUS at 15:18

## 2021-01-18 RX ADMIN — DOCUSATE SODIUM 100 MG: 100 CAPSULE, LIQUID FILLED ORAL at 18:48

## 2021-01-18 RX ADMIN — PHYTONADIONE 2.5 MG: 5 TABLET ORAL at 19:12

## 2021-01-18 RX ADMIN — CALCIUM GLUCONATE 1 G: 20 INJECTION, SOLUTION INTRAVENOUS at 15:08

## 2021-01-18 RX ADMIN — Medication 250 MG: at 18:48

## 2021-01-18 RX ADMIN — CEFAZOLIN SODIUM 2000 MG: 2 SOLUTION INTRAVENOUS at 13:14

## 2021-01-18 RX ADMIN — SODIUM POLYSTYRENE SULFONATE 15 G: 4.1 POWDER, FOR SUSPENSION ORAL; RECTAL at 15:41

## 2021-01-18 RX ADMIN — INSULIN HUMAN 10 UNITS: 100 INJECTION, SOLUTION PARENTERAL at 15:03

## 2021-01-18 NOTE — ASSESSMENT & PLAN NOTE
History of chronic anemia  Hemoglobin 7 4  Consider blood transfusion tomorrow if less than 7  Hemoccult stool  Check iron panel    Lab Results   Component Value Date    HGB 7 4 (L) 01/18/2021    HGB 8 1 (L) 01/05/2021    HGB 7 3 (L) 01/04/2021    HGB 8 2 (L) 01/03/2021    HGB 8 1 (L) 01/02/2021    HGB 7 6 (L) 12/31/2020

## 2021-01-18 NOTE — ED PROVIDER NOTES
History  Chief Complaint   Patient presents with    Leg Swelling     inpatient here recently for same, LE edema states worse over past few days, LLE redness, denies fever, chills  79 y/o F presents for evalaution of worsening swelling and buring pain in her L lower extremity  Pt has been having symptoms chronically but states worse over the past 3 days  No cp, sob, cough, uri symtpoms  [facility] pt has been stuck at her kitchen table  For 3 days and they feel she is not safe to return to home  Prior to Admission Medications   Prescriptions Last Dose Informant Patient Reported? Taking?    amLODIPine (NORVASC) 5 mg tablet   No Yes   Sig: Take 1 tablet (5 mg total) by mouth daily   carvedilol (COREG) 12 5 mg tablet   No Yes   Sig: Take 1 tablet (12 5 mg total) by mouth 2 (two) times a day with meals   ergocalciferol (VITAMIN D2) 50,000 units   Yes Yes   Sig: take 1 capsule by mouth every week ON SATURDAY   furosemide (LASIX) 40 mg tablet   No Yes   Sig: Take 1 tablet (40 mg total) by mouth daily   gabapentin (NEURONTIN) 100 mg capsule   No Yes   Sig: Take 1 capsule (100 mg total) by mouth 3 (three) times a day   hydrALAZINE (APRESOLINE) 10 mg tablet   No Yes   Sig: Take 1 tablet (10 mg total) by mouth every 8 (eight) hours   isosorbide dinitrate (ISORDIL) 10 mg tablet   No Yes   Sig: Take 1 tablet (10 mg total) by mouth 3 (three) times daily after meals   levothyroxine 25 mcg tablet   No Yes   Sig: Take 1 tablet (25 mcg total) by mouth daily in the early morning   potassium chloride (K-DUR,KLOR-CON) 20 mEq tablet   No Yes   Sig: Take 1 tablet (20 mEq total) by mouth daily   senna (SENOKOT) 8 6 mg   No Yes   Sig: Take 1 tablet (8 6 mg total) by mouth daily at bedtime as needed for constipation   warfarin (COUMADIN) 4 mg tablet   No Yes   Sig: Take 0 5 tablets (2 mg total) by mouth daily      Facility-Administered Medications: None       Past Medical History:   Diagnosis Date    Anemia 1/18/2021    Chronic kidney disease     Hypertension        History reviewed  No pertinent surgical history  Family History   Problem Relation Age of Onset    No Known Problems Mother     No Known Problems Father      I have reviewed and agree with the history as documented  E-Cigarette/Vaping    E-Cigarette Use Never User      E-Cigarette/Vaping Substances    Nicotine No     THC No     CBD No     Flavoring No     Other No     Unknown No      Social History     Tobacco Use    Smoking status: Never Smoker    Smokeless tobacco: Never Used   Substance Use Topics    Alcohol use: Never     Frequency: Never    Drug use: Never       Review of Systems   Constitutional: Negative for activity change, appetite change, fatigue and fever  HENT: Negative for congestion, dental problem, ear pain, rhinorrhea and sore throat  Eyes: Negative for pain and redness  Respiratory: Negative for chest tightness, shortness of breath and wheezing  Cardiovascular: Positive for leg swelling  Negative for chest pain and palpitations  Gastrointestinal: Negative for abdominal pain, blood in stool, constipation, diarrhea, nausea and vomiting  Endocrine: Negative for cold intolerance and heat intolerance  Genitourinary: Negative for dysuria, frequency and hematuria  Musculoskeletal: Negative for arthralgias and myalgias  Skin: Positive for color change and wound  Negative for pallor and rash  Neurological: Negative for weakness and numbness  Hematological: Does not bruise/bleed easily  Psychiatric/Behavioral: Negative for agitation, hallucinations and suicidal ideas  Physical Exam  Physical Exam  Constitutional:       Appearance: She is well-developed  HENT:      Head: Normocephalic and atraumatic  Eyes:      Pupils: Pupils are equal, round, and reactive to light  Neck:      Vascular: No JVD  Trachea: No tracheal deviation     Cardiovascular:      Rate and Rhythm: Normal rate and regular rhythm  Pulmonary:      Effort: No tachypnea, accessory muscle usage or respiratory distress  Abdominal:      General: There is no distension  Musculoskeletal:      Right lower leg: Normal  Edema present  Left lower leg: Normal  Edema (chronic venous stasis changes and ulcer, pt with 3x4 cm area of warmth ttp over quadriceps w/o crepitus fluctuance or induration) present  Skin:     General: Skin is warm  Capillary Refill: Capillary refill takes less than 2 seconds  Neurological:      Mental Status: She is alert and oriented to person, place, and time     Psychiatric:         Behavior: Behavior normal          Vital Signs  ED Triage Vitals   Temperature Pulse Respirations Blood Pressure SpO2   01/18/21 1150 01/18/21 1150 01/18/21 1150 01/18/21 1150 01/18/21 1150   97 6 °F (36 4 °C) 57 18 106/53 100 %      Temp Source Heart Rate Source Patient Position - Orthostatic VS BP Location FiO2 (%)   01/18/21 1150 01/18/21 1452 01/18/21 1452 01/18/21 1452 --   Temporal Monitor Lying Right arm       Pain Score       --                  Vitals:    01/18/21 1150 01/18/21 1452 01/18/21 1553   BP: 106/53 115/55 128/60   Pulse: 57 (!) 53 62   Patient Position - Orthostatic VS:  Lying Lying         Visual Acuity      ED Medications  Medications   sodium chloride 0 9 % bolus 1,000 mL (1,000 mL Intravenous New Bag 1/18/21 1518)   saccharomyces boulardii (FLORASTOR) capsule 250 mg (has no administration in time range)   amLODIPine (NORVASC) tablet 5 mg (has no administration in time range)   carvedilol (COREG) tablet 12 5 mg (has no administration in time range)   hydrALAZINE (APRESOLINE) tablet 10 mg (has no administration in time range)   isosorbide dinitrate (ISORDIL) tablet 10 mg (has no administration in time range)   levothyroxine tablet 25 mcg (has no administration in time range)   warfarin (COUMADIN) tablet 2 mg (has no administration in time range)   acetaminophen (TYLENOL) tablet 650 mg (has no administration in time range)   docusate sodium (COLACE) capsule 100 mg (has no administration in time range)   ondansetron (ZOFRAN) injection 4 mg (has no administration in time range)   sodium chloride 0 9 % infusion (has no administration in time range)   ceFAZolin (ANCEF) IVPB (premix in dextrose) 1,000 mg 50 mL (has no administration in time range)   ceFAZolin (ANCEF) IVPB (premix in dextrose) 2,000 mg 50 mL (0 mg Intravenous Stopped 1/18/21 1508)   insulin regular (HumuLIN R,NovoLIN R) injection 10 Units (10 Units Intravenous Given 1/18/21 1503)   dextrose 50 % IV solution 50 mL (50 mL Intravenous Given 1/18/21 1500)   calcium gluconate 1 g in sodium chloride 0 9% 50 mL (premix) (0 g Intravenous Stopped 1/18/21 1543)   sodium polystyrene (KAYEXALATE) powder 15 g (15 g Oral Given 1/18/21 1541)       Diagnostic Studies  Results Reviewed     Procedure Component Value Units Date/Time    Osmolality, urine [[id number]] Collected: 01/18/21 1609    Lab Status: In process Specimen: Urine, Clean Catch Updated: 01/18/21 1615    Sodium, urine, random [[id number]] Collected: 01/18/21 1609    Lab Status: In process Specimen: Urine, Clean Catch Updated: 01/18/21 1613    UA (URINE) with reflex to Scope [[id number]] Collected: 01/18/21 1609    Lab Status: In process Specimen: Urine, Clean Catch Updated: 01/18/21 1613    Protime-INR [[id number]]  (Abnormal) Collected: 01/18/21 1515    Lab Status: Final result Specimen: Blood from Arm, Right Updated: 01/18/21 1610     Protime 81 3 seconds      INR 10 64    Uric acid [[id number]] Collected: 01/18/21 1602    Lab Status: In process Specimen: Blood from Arm, Right Updated: 01/18/21 1607    Osmolality-If this is regarding a toxic alcohol, STOP  Test is not routinely indicated  Please consult medical  on call for further guidance  [[id number]] Collected: 01/18/21 1602    Lab Status:  In process Specimen: Blood from Arm, Right Updated: 01/18/21 1607    BMP Q8 hours X 3 (Hyponatremia monitoring) [[id number]] Collected: 01/18/21 1602    Lab Status: In process Specimen: Blood from Arm, Right Updated: 01/18/21 1607    CK Total with Reflex CKMB [[id number]]  (Normal) Collected: 01/18/21 1515    Lab Status: Final result Specimen: Blood from Arm, Right Updated: 01/18/21 1541     Total CK 32 U/L     TSH, 3rd generation [[id number]]     Lab Status: No result Specimen: Blood     Blood culture [[id number]] Collected: 01/18/21 1515    Lab Status: In process Specimen: Blood from Hand, Left Updated: 01/18/21 1521    Blood culture [[id number]] Collected: 01/18/21 1515    Lab Status:  In process Specimen: Blood from Arm, Right Updated: 01/18/21 1520    NT-BNP PRO [[id number]]     Lab Status: No result Specimen: Blood     Occult blood 1-3, stool [[id number]]     Lab Status: No result Specimen: Stool     Potassium [[id number]]     Lab Status: No result Specimen: Blood     Basic metabolic panel [[id number]]  (Abnormal) Collected: 01/18/21 1314    Lab Status: Final result Specimen: Blood from Arm, Right Updated: 01/18/21 1429     Sodium 124 mmol/L      Potassium 6 0 mmol/L      Chloride 95 mmol/L      CO2 19 mmol/L      ANION GAP 10 mmol/L       mg/dL      Creatinine 3 30 mg/dL      Glucose 100 mg/dL      Calcium 8 7 mg/dL      eGFR 12 ml/min/1 73sq m     Narrative:      [place] guidelines for Chronic Kidney Disease (CKD):     Stage 1 with normal or high GFR (GFR > 90 mL/min/1 73 square meters)    Stage 2 Mild CKD (GFR = 60-89 mL/min/1 73 square meters)    Stage 3A Moderate CKD (GFR = 45-59 mL/min/1 73 square meters)    Stage 3B Moderate CKD (GFR = 30-44 mL/min/1 73 square meters)    Stage 4 Severe CKD (GFR = 15-29 mL/min/1 73 square meters)    Stage 5 End Stage CKD (GFR <15 mL/min/1 73 square meters)  Note: GFR calculation is accurate only with a steady state creatinine    CBC and differential [[id number]]  (Abnormal) Collected: 01/18/21 1314    Lab Status: Final result Specimen: Blood from Arm, Right Updated: 01/18/21 1328     WBC 13 02 Thousand/uL      RBC 2 75 Million/uL      Hemoglobin 7 4 g/dL      Hematocrit 23 9 %      MCV 87 fL      MCH 26 9 pg      MCHC 31 0 g/dL      RDW 17 1 %      MPV 10 4 fL      Platelets 853 Thousands/uL      nRBC 0 /100 WBCs      Neutrophils Relative 84 %      Immat GRANS % 1 %      Lymphocytes Relative 11 %      Monocytes Relative 3 %      Eosinophils Relative 1 %      Basophils Relative 0 %      Neutrophils Absolute 10 96 Thousands/µL      Immature Grans Absolute 0 17 Thousand/uL      Lymphocytes Absolute 1 47 Thousands/µL      Monocytes Absolute 0 34 Thousand/µL      Eosinophils Absolute 0 06 Thousand/µL      Basophils Absolute 0 02 Thousands/µL                  No orders to display              Procedures  CriticalCare Time  Performed by: Clark Powell MD  Authorized by: Clark Powell MD     Critical care provider statement:     Critical care time (minutes):  35    Critical care time was exclusive of:  Separately billable procedures and treating other patients and teaching time    Critical care was necessary to treat or prevent imminent or life-threatening deterioration of the following conditions:  Metabolic crisis and circulatory failure    Critical care was time spent personally by me on the following activities:  Blood draw for specimens, obtaining history from patient or surrogate, development of treatment plan with patient or surrogate, discussions with consultants, evaluation of patient's response to treatment, examination of patient, interpretation of cardiac output measurements, ordering and performing treatments and interventions, ordering and review of laboratory studies, ordering and review of radiographic studies, re-evaluation of patient's condition and review of old charts    ECG 12 Lead Documentation Only    Date/Time: 1/18/2021 3:33 PM  Performed by: Clark Powell MD  Authorized by: Clark Powell MD     ECG reviewed by me, the ED Provider: yes    Patient location:  ED  Rate:     ECG rate:  53    ECG rate assessment: bradycardic    Rhythm:     Rhythm: sinus bradycardia    Ectopy:     Ectopy: PVCs    QRS:     QRS axis:  Normal    QRS intervals:  Normal  Conduction:     Conduction: abnormal      Abnormal conduction: 1st degree    ST segments:     ST segments:  Normal  T waves:     T waves: normal               ED Course  ED Course as of Jan 18 1615   Mon Jan 18, 2021   1356 WBC(!): 13 02   1356 Hemoglobin(!): 7 4                                           MDM  Number of Diagnoses or Management Options  Acute hyperkalemia:   Acute hyponatremia:   LILIAN (acute kidney injury) ([place] [address] ):    Anemia:   Bilateral lower extremity edema:   Cellulitis:   Leukocytosis:   Diagnosis management comments: LLE pain and swelling-will do work up, iv abx, admit      Disposition  Final diagnoses:   Acute hyponatremia   Acute hyperkalemia   LILIAN (acute kidney injury) ([place] [address] ) - on CKD   Bilateral lower extremity edema   Cellulitis - LLE   Anemia   Leukocytosis     Time reflects when diagnosis was documented in both MDM as applicable and the Disposition within this note     Time User Action Codes Description Comment    1/18/2021  2:40 PM Gale Maldonado Add [E87 1] Acute hyponatremia     1/18/2021  2:40 PM Sedrick Asif Add [E87 5] Acute hyperkalemia     1/18/2021  2:40 PM Junito Asif J Add [N17 9] LILIAN (acute kidney injury) ([facility] )     1/18/2021  2:40 PM Gale [place] Modify [N17 9] LILIAN (acute kidney injury) ([place] [address] ) on CKD    1/18/2021  2:40 PM Junito Asif J Add [R60 0] Bilateral lower extremity edema     1/18/2021  2:40 PM HosJunito aguirre J Add [L03 90] Cellulitis     1/18/2021  2:40 PM Gale Joel Modify [L03 90] Cellulitis LLE    1/18/2021  2:41 PM Gale Joel Add [D64 9] Anemia     1/18/2021  2:41 PM Gale Joel Add [F26 755] Leukocytosis       ED Disposition     ED Disposition Condition Date/Time Comment    Admit Stable Mon Jan 18, 2021  2:40 PM Case was discussed with   Remington Grover and the patient's admission status was agreed to be Admission Status: observation status to the service of Dr Remington Grover   Follow-up Information    None         Patient's Medications   Discharge Prescriptions    No medications on file     No discharge procedures on file      PDMP Review     None          ED Provider  Electronically Signed by           Elzbieta Baig MD  01/18/21 [id number]

## 2021-01-18 NOTE — ASSESSMENT & PLAN NOTE
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

## 2021-01-18 NOTE — ASSESSMENT & PLAN NOTE
Wt Readings from Last 3 Encounters:   01/06/21 62 1 kg (136 lb 14 4 oz)   12/22/20 60 8 kg (134 lb)     For resume diuretics once patient is more clinically stable  Appears 2 lb  Gentle IV fluid

## 2021-01-18 NOTE — H&P
H&P- James Glasgow 5/18/1930, 80 y o  female MRN: [id number]    Unit/Bed#: ED 19 Encounter: [id number]    Primary Care Provider: Husam Denson MD   Date and time admitted to hospital: 1/18/2021 11:44 AM        * Cellulitis  Assessment & Plan  Patient presenting with left lower extremity cellulitis, was previously treated with intravenous antibiotics  Per her  the wound became significantly worse  Will consult Podiatry as they were previously evaluating the patient for potential surgical intervention  She does have a history of atherosclerotic occlusive disease with right foot ulceration, at that time she had evidence of femoral popliteal occlusion which he was evaluated by vascular surgery however she deferred treatment  If renal function stabilizes potentially may need angiogram during this admission  Can evaluate for vascular consultation once Podiatry evaluate    Continue [facility] for now    Chronic hypoxemic respiratory failure (HCC)  Assessment & Plan  Chronically on 3 L nasal canal oxygen    Ambulatory dysfunction  Assessment & Plan  Patient with 2 days of nonambulatory status, per her  she was sitting at the kitchen table unable to move  Previous had hospitalization at [facility], and was discharged to [facility]      Will consult PT and OT  Likely will need placement  Appears area of the aging is involved    Chronic diastolic heart failure (HCC)  Assessment & Plan  Wt Readings from Last 3 Encounters:   01/06/21 62 1 kg (136 lb 14 4 oz)   12/22/20 60 8 kg (134 lb)     For resume diuretics once patient is more clinically stable  Appears 2 lb  Gentle IV fluid        Anemia  Assessment & Plan  History of chronic anemia  Hemoglobin 7 4  Consider blood transfusion tomorrow if less than 7  Hemoccult stool  Check iron panel    Lab Results   Component Value Date    HGB 7 4 (L) 01/18/2021    HGB 8 1 (L) 01/05/2021    HGB 7 3 (L) 01/04/2021    HGB 8 2 (L) 01/03/2021    HGB 8 1 (L) 01/02/2021    HGB 7 6 (L) 12/31/2020         Hyperkalemia  Assessment & Plan  Spine patient presenting with hyperkalemia, EKG has been ordered  Given calcium gluconate, insulin and D 5  Will give 1 dose of Kayexalate  Recheck in 2 hours    Recent Labs     01/18/21  1314   K 6 0*         Essential hypertension  Assessment & Plan  History of hypertension, resume antihypertensive regimen  Currently on Norvasc 5 mg, Coreg 12 5 mg, hydralazine 10 mg    Leg ulcer, left, limited to breakdown of skin ([address] )  Assessment & Plan  Continue localized wound care - prior to last admission she also had a stage III pressure injury to sacrum which will need to be followed in the [address]  [facility] as well    Hyponatremia  Assessment & Plan  Recent Labs     01/18/21  1314   SODIUM 124*     Check hyponatremia labs, suspect secondary to acute kidney injury  Will give gentle IV fluid    LILIAN (acute kidney injury) [facility])  Assessment & Plan  Patient presenting with acute kidney injury    1  Acute renal failure: Most likely pre-renal LILIAN 2/2 volume depletion  keep MAP > 65mmHg  - normal PLT, doubt HUS/TTP  - No need for emergent HD at this point  - avoid renal toxins;hold RAAS blockers and diuretics  - renal dose all medications as eGFR < 15 ml/min   Hyponatremia: 2/2 vol depletion      Acute deep vein thrombosis (DVT) of calf muscle vein of right lower extremity (HCC)  Assessment & Plan  Previous history of acute deep venous thrombosis at that time she had a supratherapeutic INR  Recheck here, no reason for reimaging    VTE Prophylaxis: Other Medication: Pending INR  / sequential compression device   Code Status:  Full code  POLST: There is no POLST form on file for this patient (pre-hospital)  Discussion with family:      Anticipated Length of Stay:  Patient will be admitted on an Inpatient basis with an anticipated length of stay of  greater than 2 midnights     Justification for Hospital Stay:  Lower extremity cellulitis, acute kidney injury    Total Time for Visit, including Counseling / Coordination of Care: 45 minutes  Greater than 50% of this total time spent on direct patient counseling and coordination of care  Chief Complaint:   Worsening left lower extremity cellulitis    History of Present Illness:    Kelly Sparrow is a 80 y o  female who presents with left lower extremity cellulitis which has been progressive since discharge on 01/06/2021  The patient also has a history of chronic diastolic heart failure as well as known vascular disease  Her  reports that over the last 2 days she is unable to ambulate  The patient also reports having left lower extremity pain  She denies any chest pain, she does have a history of chronic hypoxemic respiratory failure and is on 3 L of oxygen  She denies any nausea, vomiting, diarrhea  No abdominal pain    Review of Systems:    A complete and comprehensive 14 point organ system review was performed and all other systems are negative other than stated above in the HPI    Past Medical and Surgical History:     Past Medical History:   Diagnosis Date    Anemia 1/18/2021    Chronic kidney disease     Hypertension        History reviewed  No pertinent surgical history  Meds/Allergies:    Prior to Admission medications    Medication Sig Start Date End Date Taking?  Authorizing Provider   amLODIPine (NORVASC) 5 mg tablet Take 1 tablet (5 mg total) by mouth daily 1/15/21  Yes Shayne Batista, DO   carvedilol (COREG) 12 5 mg tablet Take 1 tablet (12 5 mg total) by mouth 2 (two) times a day with meals 1/6/21 2/5/21 Yes Noemi Becker DO   ergocalciferol (VITAMIN D2) 50,000 units take 1 capsule by mouth every week ON SATURDAY 12/14/20  Yes Historical Provider, MD   furosemide (LASIX) 40 mg tablet Take 1 tablet (40 mg total) by mouth daily 1/7/21 2/6/21 Yes Noemi Becker DO   gabapentin (NEURONTIN) 100 mg capsule Take 1 capsule (100 mg total) by mouth 3 (three) times a day 1/15/21  Yes Clara Iverson DO   hydrALAZINE (APRESOLINE) 10 mg tablet Take 1 tablet (10 mg total) by mouth every 8 (eight) hours 1/6/21 2/5/21 Yes Noemi Becker, DO   isosorbide dinitrate (ISORDIL) 10 mg tablet Take 1 tablet (10 mg total) by mouth 3 (three) times daily after meals 1/6/21 2/5/21 Yes Noemi Becker DO   levothyroxine 25 mcg tablet Take 1 tablet (25 mcg total) by mouth daily in the early morning 1/15/21  Yes Clara Iverson DO   potassium chloride (K-DUR,KLOR-CON) 20 mEq tablet Take 1 tablet (20 mEq total) by mouth daily 1/7/21 2/6/21 Yes Noemi Becker DO   senna (SENOKOT) 8 6 mg Take 1 tablet (8 6 mg total) by mouth daily at bedtime as needed for constipation 1/6/21 2/5/21 Yes Noemi Becker DO   warfarin (COUMADIN) 4 mg tablet Take 0 5 tablets (2 mg total) by mouth daily 1/7/21  Yes Noemi Becker DO     I have reviewed home medications with patient personally  Allergies:    Allergies   Allergen Reactions    No Known Allergies        Social History:     Marital Status: /Civil Union   Occupation:  Retired    Substance Use History:   Social History     Substance and Sexual Activity   Alcohol Use Never    Frequency: Never     Social History     Tobacco Use   Smoking Status Never Smoker   Smokeless Tobacco Never Used     Social History     Substance and Sexual Activity   Drug Use Never       Family History:    Family History   Problem Relation Age of Onset    No Known Problems Mother     No Known Problems Father        Physical Exam:     Vitals:   Blood Pressure: 115/55 (01/18/21 1452)  Pulse: (!) 53 (01/18/21 1452)  Temperature: 97 6 °F (36 4 °C) (01/18/21 1150)  Temp Source: Temporal (01/18/21 1150)  Respirations: 12 (01/18/21 1452)  SpO2: 100 % (01/18/21 1452)      General: well appearing, no acute distress  HEENT: atraumatic, PERRLA, moist mucosa, normal pharynx, normal tonsils and adenoids, normal tongue, no fluid in sinuses  Neck: Trachea midline, no carotid bruit, no masses  Respiratory: normal chest wall expansion, CTA B, no r/r/w, no rubs  Cardiovascular: RRR, no m/r/g, Normal S1 and S2, 2/6 systolic ejection murmur  Abdomen: Soft, non-tender, non-distended, normal bowel sounds in all quadrants, no hepatosplenomegaly, no tympany  Rectal: deferred  Musculoskeletal: normal ROM in upper and lower extremities  Integumentary:  Right lower extremity ulceration, left lower extremity with 2+ to 3+ edema with significant cellulitic change   Heme/Lymph: no lymphadenopathy, no bruises  Neurological: Cranial Nerves II-XII grossly intact  Psychiatric: cooperative with normal mood, affect, and cognition    Additional Data:     Lab Results: I have personally reviewed pertinent reports  Results from last 7 days   Lab Units 01/18/21  1314   WBC Thousand/uL 13 02*   HEMOGLOBIN g/dL 7 4*   HEMATOCRIT % 23 9*   PLATELETS Thousands/uL 241   NEUTROS PCT % 84*   LYMPHS PCT % 11*   MONOS PCT % 3*   EOS PCT % 1     Results from last 7 days   Lab Units 01/18/21  1314   SODIUM mmol/L 124*   POTASSIUM mmol/L 6 0*   CHLORIDE mmol/L 95*   CO2 mmol/L 19*   BUN mg/dL 123*   CREATININE mg/dL 3 30*   ANION GAP mmol/L 10   CALCIUM mg/dL 8 7   GLUCOSE RANDOM mg/dL 100                       Imaging: I have personally reviewed pertinent reports  No orders to display       EKG, Pathology, and Other Studies Reviewed on Admission:   · EKG:  Sinus bradycardia rate of 53, QTC of 418, slight increase in T-wave amplitude at the lateral leads  Allscripts / Epic Records Reviewed: Yes     ** Please Note: This note has been constructed using a voice recognition system  **        Total critical care time 60 minutes  Total critical care time documented does not include time spent on separately billed procedures or the services of  nurse practioners or physician assistants  I have personally seen and examined the patient  I have reviewed all diagnostic interpretations and treatment plans as written   I was present for the key portions of any procedures performed and the inclusive time noted in any critical care statement  Critical care time includes patient management by me, time spent at the patients bedside, time to review lab and imaging results, discussing patient care, documentation in the medical record, and time spent with the family or caregiver      Meets Critical care criteria based on  Organ System affected renal  Time Spent 60 minutes  Action taken is calcium gluconate, intravenous insulin, Kayexalate, IV dextrose for life threatening hyperkalemia, without this the patient has a life threatening condition for which rapid deterioation is imminent and will lead to potential death if untreated

## 2021-01-18 NOTE — TELEPHONE ENCOUNTER
Spoke with Damari visiting nurse-Patient with cellulitis R leg up to thigh-per GT-patient to go to ED-call 911  Damari aware and if refuses he will call in oral abx but patient does need the hospital for evaluation  Damari(visiting nurse) aware

## 2021-01-18 NOTE — ASSESSMENT & PLAN NOTE
Patient with 2 days of nonambulatory status, per her  she was sitting at the kitchen table unable to move  Previous had hospitalization at [facility], and was discharged to [facility]      Will consult PT and OT  Likely will need placement  Appears area of the aging is involved

## 2021-01-18 NOTE — ASSESSMENT & PLAN NOTE
Recent Labs     01/18/21  1314   SODIUM 124*     Check hyponatremia labs, suspect secondary to acute kidney injury  Will give gentle IV fluid

## 2021-01-18 NOTE — ASSESSMENT & PLAN NOTE
Patient presenting with left lower extremity cellulitis, was previously treated with intravenous antibiotics  Per her  the wound became significantly worse  Will consult Podiatry as they were previously evaluating the patient for potential surgical intervention  She does have a history of atherosclerotic occlusive disease with right foot ulceration, at that time she had evidence of femoral popliteal occlusion which he was evaluated by vascular surgery however she deferred treatment      If renal function stabilizes potentially may need angiogram during this admission  Can evaluate for vascular consultation once Podiatry evaluate    Continue [facility] for now

## 2021-01-18 NOTE — ASSESSMENT & PLAN NOTE
Patient presenting with acute kidney injury    1  Acute renal failure: Most likely pre-renal LILIAN 2/2 volume depletion  keep MAP > 65mmHg  - normal PLT, doubt HUS/TTP  - No need for emergent HD at this point  - avoid renal toxins;hold RAAS blockers and diuretics    - renal dose all medications as eGFR < 15 ml/min   Hyponatremia: 2/2 vol depletion

## 2021-01-18 NOTE — ED NOTES
Received a call from [facility] #[phone], with adult protective services  [facility] she has concerns about patient being discharged home from the ED to  [facility], it is reported the patient has been sitting at a kitchen table unable to get up since Friday  States services have been set up but  prevents staff from coming into the home   ED physician and primary RN aware     Estela Baltazar, RN  01/18/21 0650 Raisa Ramirez RN  01/18/21 5944

## 2021-01-18 NOTE — ASSESSMENT & PLAN NOTE
Spine patient presenting with hyperkalemia, EKG has been ordered    Given calcium gluconate, insulin and D 5  Will give 1 dose of Kayexalate  Recheck in 2 hours    Recent Labs     01/18/21  1314   K 6 0*

## 2021-01-18 NOTE — ASSESSMENT & PLAN NOTE
Previous history of acute deep venous thrombosis at that time she had a supratherapeutic INR    Recheck here, no reason for reimaging

## 2021-01-18 NOTE — ASSESSMENT & PLAN NOTE
History of hypertension, resume antihypertensive regimen  Currently on Norvasc 5 mg, Coreg 12 5 mg, hydralazine 10 mg

## 2021-01-19 ENCOUNTER — APPOINTMENT (INPATIENT)
Dept: ULTRASOUND IMAGING | Facility: HOSPITAL | Age: 86
DRG: 299 | End: 2021-01-19
Payer: MEDICARE

## 2021-01-19 PROBLEM — D68.9 COAGULOPATHY (HCC): Status: ACTIVE | Noted: 2021-01-19

## 2021-01-19 PROBLEM — I96 DRY GANGRENE (HCC): Status: ACTIVE | Noted: 2020-11-25

## 2021-01-19 LAB
ANION GAP SERPL CALCULATED.3IONS-SCNC: 12 MMOL/L (ref 4–13)
ANION GAP SERPL CALCULATED.3IONS-SCNC: 12 MMOL/L (ref 4–13)
ANION GAP SERPL CALCULATED.3IONS-SCNC: 13 MMOL/L (ref 4–13)
ANION GAP SERPL CALCULATED.3IONS-SCNC: 13 MMOL/L (ref 4–13)
BUN SERPL-MCNC: 123 MG/DL (ref 5–25)
BUN SERPL-MCNC: 125 MG/DL (ref 5–25)
BUN SERPL-MCNC: 126 MG/DL (ref 5–25)
BUN SERPL-MCNC: 127 MG/DL (ref 5–25)
CALCIUM SERPL-MCNC: 7.9 MG/DL (ref 8.3–10.1)
CALCIUM SERPL-MCNC: 8.1 MG/DL (ref 8.3–10.1)
CALCIUM SERPL-MCNC: 8.2 MG/DL (ref 8.3–10.1)
CALCIUM SERPL-MCNC: 8.7 MG/DL (ref 8.3–10.1)
CHLORIDE SERPL-SCNC: 95 MMOL/L (ref 100–108)
CHLORIDE SERPL-SCNC: 96 MMOL/L (ref 100–108)
CHLORIDE SERPL-SCNC: 98 MMOL/L (ref 100–108)
CHLORIDE SERPL-SCNC: 98 MMOL/L (ref 100–108)
CO2 SERPL-SCNC: 18 MMOL/L (ref 21–32)
CO2 SERPL-SCNC: 18 MMOL/L (ref 21–32)
CO2 SERPL-SCNC: 19 MMOL/L (ref 21–32)
CO2 SERPL-SCNC: 20 MMOL/L (ref 21–32)
CORTIS AM PEAK SERPL-MCNC: 32.8 UG/DL (ref 4.2–22.4)
CREAT SERPL-MCNC: 3.62 MG/DL (ref 0.6–1.3)
CREAT SERPL-MCNC: 3.63 MG/DL (ref 0.6–1.3)
CREAT SERPL-MCNC: 3.64 MG/DL (ref 0.6–1.3)
CREAT SERPL-MCNC: 3.7 MG/DL (ref 0.6–1.3)
ERYTHROCYTE [DISTWIDTH] IN BLOOD BY AUTOMATED COUNT: 17.1 % (ref 11.6–15.1)
GFR SERPL CREATININE-BSD FRML MDRD: 10 ML/MIN/1.73SQ M
GLUCOSE SERPL-MCNC: 102 MG/DL (ref 65–140)
GLUCOSE SERPL-MCNC: 140 MG/DL (ref 65–140)
GLUCOSE SERPL-MCNC: 145 MG/DL (ref 65–140)
GLUCOSE SERPL-MCNC: 86 MG/DL (ref 65–140)
HCT VFR BLD AUTO: 23.4 % (ref 34.8–46.1)
HGB BLD-MCNC: 7.4 G/DL (ref 11.5–15.4)
INR PPP: 6.98 (ref 0.84–1.19)
MCH RBC QN AUTO: 27.2 PG (ref 26.8–34.3)
MCHC RBC AUTO-ENTMCNC: 31.6 G/DL (ref 31.4–37.4)
MCV RBC AUTO: 86 FL (ref 82–98)
PLATELET # BLD AUTO: 228 THOUSANDS/UL (ref 149–390)
PMV BLD AUTO: 10.5 FL (ref 8.9–12.7)
POTASSIUM SERPL-SCNC: 4.8 MMOL/L (ref 3.5–5.3)
POTASSIUM SERPL-SCNC: 4.9 MMOL/L (ref 3.5–5.3)
POTASSIUM SERPL-SCNC: 5 MMOL/L (ref 3.5–5.3)
POTASSIUM SERPL-SCNC: 5.2 MMOL/L (ref 3.5–5.3)
PROTHROMBIN TIME: 58.6 SECONDS (ref 11.6–14.5)
RBC # BLD AUTO: 2.72 MILLION/UL (ref 3.81–5.12)
SODIUM SERPL-SCNC: 126 MMOL/L (ref 136–145)
SODIUM SERPL-SCNC: 127 MMOL/L (ref 136–145)
SODIUM SERPL-SCNC: 127 MMOL/L (ref 136–145)
SODIUM SERPL-SCNC: 129 MMOL/L (ref 136–145)
SODIUM SERPL-SCNC: 130 MMOL/L (ref 136–145)
WBC # BLD AUTO: 12.65 THOUSAND/UL (ref 4.31–10.16)

## 2021-01-19 PROCEDURE — 85027 COMPLETE CBC AUTOMATED: CPT | Performed by: INTERNAL MEDICINE

## 2021-01-19 PROCEDURE — 76770 US EXAM ABDO BACK WALL COMP: CPT

## 2021-01-19 PROCEDURE — 97163 PT EVAL HIGH COMPLEX 45 MIN: CPT | Performed by: PHYSICAL THERAPIST

## 2021-01-19 PROCEDURE — 85610 PROTHROMBIN TIME: CPT | Performed by: INTERNAL MEDICINE

## 2021-01-19 PROCEDURE — 97167 OT EVAL HIGH COMPLEX 60 MIN: CPT

## 2021-01-19 PROCEDURE — 84295 ASSAY OF SERUM SODIUM: CPT | Performed by: NURSE PRACTITIONER

## 2021-01-19 PROCEDURE — 80048 BASIC METABOLIC PNL TOTAL CA: CPT | Performed by: NURSE PRACTITIONER

## 2021-01-19 PROCEDURE — 82533 TOTAL CORTISOL: CPT | Performed by: INTERNAL MEDICINE

## 2021-01-19 PROCEDURE — 80048 BASIC METABOLIC PNL TOTAL CA: CPT | Performed by: INTERNAL MEDICINE

## 2021-01-19 PROCEDURE — 99254 IP/OBS CNSLTJ NEW/EST MOD 60: CPT | Performed by: INTERNAL MEDICINE

## 2021-01-19 PROCEDURE — 99254 IP/OBS CNSLTJ NEW/EST MOD 60: CPT | Performed by: PODIATRIST

## 2021-01-19 PROCEDURE — 99232 SBSQ HOSP IP/OBS MODERATE 35: CPT | Performed by: INTERNAL MEDICINE

## 2021-01-19 RX ORDER — CARVEDILOL 6.25 MG/1
12.5 TABLET ORAL 2 TIMES DAILY WITH MEALS
Status: DISCONTINUED | OUTPATIENT
Start: 2021-01-19 | End: 2021-02-03 | Stop reason: HOSPADM

## 2021-01-19 RX ORDER — SODIUM BICARBONATE 650 MG/1
650 TABLET ORAL
Status: DISCONTINUED | OUTPATIENT
Start: 2021-01-19 | End: 2021-01-20

## 2021-01-19 RX ORDER — AMLODIPINE BESYLATE 5 MG/1
5 TABLET ORAL DAILY
Status: DISCONTINUED | OUTPATIENT
Start: 2021-01-20 | End: 2021-01-26

## 2021-01-19 RX ORDER — SODIUM CHLORIDE 9 MG/ML
75 INJECTION, SOLUTION INTRAVENOUS CONTINUOUS
Status: DISCONTINUED | OUTPATIENT
Start: 2021-01-19 | End: 2021-01-19

## 2021-01-19 RX ADMIN — DOCUSATE SODIUM 100 MG: 100 CAPSULE, LIQUID FILLED ORAL at 17:38

## 2021-01-19 RX ADMIN — SODIUM CHLORIDE 75 ML/HR: 0.9 INJECTION, SOLUTION INTRAVENOUS at 09:15

## 2021-01-19 RX ADMIN — Medication 250 MG: at 17:38

## 2021-01-19 RX ADMIN — HYDRALAZINE HYDROCHLORIDE 10 MG: 10 TABLET, FILM COATED ORAL at 15:03

## 2021-01-19 RX ADMIN — HYDRALAZINE HYDROCHLORIDE 10 MG: 10 TABLET, FILM COATED ORAL at 05:09

## 2021-01-19 RX ADMIN — HYDRALAZINE HYDROCHLORIDE 10 MG: 10 TABLET, FILM COATED ORAL at 21:15

## 2021-01-19 RX ADMIN — SODIUM BICARBONATE 650 MG TABLET 650 MG: at 12:41

## 2021-01-19 RX ADMIN — SODIUM BICARBONATE 650 MG TABLET 650 MG: at 16:44

## 2021-01-19 RX ADMIN — Medication 250 MG: at 09:15

## 2021-01-19 RX ADMIN — DOCUSATE SODIUM 100 MG: 100 CAPSULE, LIQUID FILLED ORAL at 09:16

## 2021-01-19 RX ADMIN — CEFAZOLIN SODIUM 1000 MG: 1 SOLUTION INTRAVENOUS at 12:29

## 2021-01-19 RX ADMIN — LEVOTHYROXINE SODIUM 25 MCG: 25 TABLET ORAL at 05:04

## 2021-01-19 NOTE — PLAN OF CARE
Problem: Potential for Falls  Goal: Patient will remain free of falls  Description: INTERVENTIONS:  - Assess patient frequently for physical needs  -  Identify cognitive and physical deficits and behaviors that affect risk of falls  -  [place] fall precautions as indicated by assessment   - Educate patient/family on patient safety including physical limitations  - Instruct patient to call for assistance with activity based on assessment  - Modify environment to reduce risk of injury  - Consider OT/PT consult to assist with strengthening/mobility  1/19/2021 0036 by Flower Garibay RN  Outcome: Progressing  1/19/2021 0035 by Flower Garibay RN  Outcome: Progressing     Problem: Prexisting or High Potential for Compromised Skin Integrity  Goal: Skin integrity is maintained or improved  Description: INTERVENTIONS:  - Identify patients at risk for skin breakdown  - Assess and monitor skin integrity  - Assess and monitor nutrition and hydration status  - Monitor labs   - Assess for incontinence   - Turn and reposition patient  - Assist with mobility/ambulation  - Relieve pressure over bony prominences  - Avoid friction and shearing  - Provide appropriate hygiene as needed including keeping skin clean and dry  - Evaluate need for skin moisturizer/barrier cream  - Collaborate with interdisciplinary team   - Patient/family teaching  - Consider wound care consult   1/19/2021 0036 by Flower Garibay RN  Outcome: Progressing  1/19/2021 0035 by Flower Garibay RN  Outcome: Progressing     Problem: Nutrition/Hydration-ADULT  Goal: Nutrient/Hydration intake appropriate for improving, restoring or maintaining nutritional needs  Description: Monitor and assess patient's nutrition/hydration status for malnutrition  Collaborate with interdisciplinary team and initiate plan and interventions as ordered  Monitor patient's weight and dietary intake as ordered or per policy   Utilize nutrition screening tool and intervene as necessary  Determine patient's food preferences and provide high-protein, high-caloric foods as appropriate       INTERVENTIONS:  - Monitor oral intake, urinary output, labs, and treatment plans  - Assess nutrition and hydration status and recommend course of action  - Evaluate amount of meals eaten  - Assist patient with eating if necessary   - Allow adequate time for meals  - Recommend/ encourage appropriate diets, oral nutritional supplements, and vitamin/mineral supplements  - Order, calculate, and assess calorie counts as needed  - Recommend, monitor, and adjust tube feedings and TPN/PPN based on assessed needs  - Assess need for intravenous fluids  - Provide specific nutrition/hydration education as appropriate  - Include patient/family/caregiver in decisions related to nutrition  1/19/2021 0036 by Griffin Brink, RN  Outcome: Progressing  1/19/2021 0035 by Griffin Brink, RN  Outcome: Progressing     Problem: PAIN - ADULT  Goal: Verbalizes/displays adequate comfort level or baseline comfort level  Description: Interventions:  - Encourage patient to monitor pain and request assistance  - Assess pain using appropriate pain scale  - Administer analgesics based on type and severity of pain and evaluate response  - Implement non-pharmacological measures as appropriate and evaluate response  - Consider cultural and social influences on pain and pain management  - Notify physician/advanced practitioner if interventions unsuccessful or patient reports new pain  Outcome: Progressing     Problem: INFECTION - ADULT  Goal: Absence or prevention of progression during hospitalization  Description: INTERVENTIONS:  - Assess and monitor for signs and symptoms of infection  - Monitor lab/diagnostic results  - Monitor all insertion sites, i e  indwelling lines, tubes, and drains  - Monitor endotracheal if appropriate and nasal secretions for changes in amount and color  - [place] appropriate cooling/warming therapies per order  - Administer medications as ordered  - Instruct and encourage patient and family to use good hand hygiene technique  - Identify and instruct in appropriate isolation precautions for identified infection/condition  Outcome: Progressing  Goal: Absence of fever/infection during neutropenic period  Description: INTERVENTIONS:  - Monitor WBC    Outcome: Progressing     Problem: SAFETY ADULT  Goal: Patient will remain free of falls  Description: INTERVENTIONS:  - Assess patient frequently for physical needs  -  Identify cognitive and physical deficits and behaviors that affect risk of falls    -  [place] fall precautions as indicated by assessment   - Educate patient/family on patient safety including physical limitations  - Instruct patient to call for assistance with activity based on assessment  - Modify environment to reduce risk of injury  - Consider OT/PT consult to assist with strengthening/mobility  1/19/2021 0036 by Kathleen Dominguez RN  Outcome: Progressing  1/19/2021 0035 by Kathleen Dominguez RN  Outcome: Progressing  Goal: Maintain or return to baseline ADL function  Description: INTERVENTIONS:  -  Assess patient's ability to carry out ADLs; assess patient's baseline for ADL function and identify physical deficits which impact ability to perform ADLs (bathing, care of mouth/teeth, toileting, grooming, dressing, etc )  - Assess/evaluate cause of self-care deficits   - Assess range of motion  - Assess patient's mobility; develop plan if impaired  - Assess patient's need for assistive devices and provide as appropriate  - Encourage maximum independence but intervene and supervise when necessary  - Involve family in performance of ADLs  - Assess for home care needs following discharge   - Consider OT consult to assist with ADL evaluation and planning for discharge  - Provide patient education as appropriate  Outcome: Progressing  Goal: Maintain or return mobility status to optimal level  Description: INTERVENTIONS:  - Assess patient's baseline mobility status (ambulation, transfers, stairs, etc )    - Identify cognitive and physical deficits and behaviors that affect mobility  - Identify mobility aids required to assist with transfers and/or ambulation (gait belt, sit-to-stand, lift, walker, cane, etc )  - [place] fall precautions as indicated by assessment  - Record patient progress and toleration of activity level on Mobility SBAR; progress patient to next Phase/Stage  - Instruct patient to call for assistance with activity based on assessment  - Consider rehabilitation consult to assist with strengthening/weightbearing, etc   Outcome: Progressing     Problem: DISCHARGE PLANNING  Goal: Discharge to home or other facility with appropriate resources  Description: INTERVENTIONS:  - Identify barriers to discharge w/patient and caregiver  - Arrange for needed discharge resources and transportation as appropriate  - Identify discharge learning needs (meds, wound care, etc )  - Arrange for interpretive services to assist at discharge as needed  - Refer to Case Management Department for coordinating discharge planning if the patient needs post-hospital services based on physician/advanced practitioner order or complex needs related to functional status, cognitive ability, or social support system  Outcome: Progressing     Problem: Knowledge Deficit  Goal: Patient/family/caregiver demonstrates understanding of disease process, treatment plan, medications, and discharge instructions  Description: Complete learning assessment and assess knowledge base    Interventions:  - Provide teaching at level of understanding  - Provide teaching via preferred learning methods  Outcome: Progressing

## 2021-01-19 NOTE — ASSESSMENT & PLAN NOTE
Patient presented with dry gangrene involving right great toe and 4th toe  Right anterior shin ulcer, and heel ulcer  Appreciate podiatry evaluation and recommendations  Patient with significant peripheral arterial due, and worsening condition of her right foot wound  Vascular surgery evaluation, however patient is not currently able to receive any angiogram or IV dye due to her acute kidney injury  Continue wound care  Pathology discussed with patient the options of palliative care with wound care to ranges, versus amputation versus vascular surgery evaluation and possible reperfusion, and future debridements

## 2021-01-19 NOTE — ASSESSMENT & PLAN NOTE
Patient presented with extensive left lower extremity cellulitis extending from her toes up to her thigh  She was started on IV antibiotics with Ancef  Blood culture pending x2   Previous LEADS 12/2020 with significant left lower extremity peripheral arterial disease, and she had declined vascular surgery intervention

## 2021-01-19 NOTE — ASSESSMENT & PLAN NOTE
History of chronic anemia with baseline hemoglobin ranging 7-8  Hemoglobin 7 4  Hemoccult stool  Iron panel consistent with iron deficiency anemia  Unable to give IV Venofer fair due to acute infection    Lab Results   Component Value Date    HGB 7 4 (L) 01/19/2021    HGB 7 4 (L) 01/18/2021    HGB 8 1 (L) 01/05/2021    HGB 7 3 (L) 01/04/2021    HGB 8 2 (L) 01/03/2021    HGB 8 1 (L) 01/02/2021

## 2021-01-19 NOTE — UTILIZATION REVIEW
Notification of Inpatient Admission/Inpatient Authorization Request   This is a Notification of Inpatient Admission for Svetlana Sterling  Be advised that this patient was admitted to our facility under Inpatient Status  Contact Kita Molina at [phone] for additional admission information  [facility] DE[facility]  DEDICATED [phone]  Patient Name:   Sandra Salguero   YOB: 1930       [address]   [address]:   [address]   Tax ID: [id number]  NPI: [id number] Attending Provider/NPI:  Phone:  Address: Claudia Boss, [address] [place] [address] Road  [phone]  Same as the facility   Place of Service Code: 24 Place of Service Name:  [facility]   Start Date: 1/18/21 1513 Discharge Date & Time: No discharge date for patient encounter  Type of Admission: Inpatient Status Discharge Disposition   (if discharged): Home with [address] [facility]   Patient Diagnoses: Acute hyponatremia [E87 1]  Acute hyperkalemia [E87 5]  Cellulitis [L03 90]  Leukocytosis [D72 829]  Anemia [D64 9]  Leg swelling [M79 89]  LILIAN (acute kidney injury) ([facility] [address] ) [N17 9]  Bilateral lower extremity edema [R60 0]     Orders: Admission Orders (From admission, onward)     Ordered        01/18/21 1513  INPATIENT ADMISSION  Once                    Assigned Utilization Review Contact: [address] [place] [address] Utilization Review Department  Phone: [phone]; Fax [phone]  Email: [email or web address]   ATTENTION PAYERS: Please call the assigned Utilization  directly with any questions or concerns ALL voicemails in the department are confidential  Send all requests for admission clinical reviews, approved or denied determinations and any other requests to dedicated fax number belonging to the campus where the patient is receiving treatment

## 2021-01-19 NOTE — ASSESSMENT & PLAN NOTE
Patient has a history of peripheral arterial disease  12/2020 she was evaluated by the vascular surgery team and had LEADS that revealed   Right lower extremity:  occlusion versus high-grade stenosis at the mid/distal popliteal artery, and focal stenosis in the proximal superficial femoral artery and distal superficial femoral artery  Also finding suggestive of tibial peroneal disease  ÁNGEL 0 46  Left lower extremity:  Occlusion versus high-grade stenosis of the proximal thigh portion of the superficial femoral artery with distal reconstitution, finding suggestive of tibioperoneal disease    ÁNGEL 0 71  Patient had declined previous vascular surgery intervention  Currently now with dry gangrene and 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

## 2021-01-19 NOTE — PLAN OF CARE
Problem: Potential for Falls  Goal: Patient will remain free of falls  Description: INTERVENTIONS:  - Assess patient frequently for physical needs  -  Identify cognitive and physical deficits and behaviors that affect risk of falls  -  [place] fall precautions as indicated by assessment   - Educate patient/family on patient safety including physical limitations  - Instruct patient to call for assistance with activity based on assessment  - Modify environment to reduce risk of injury  - Consider OT/PT consult to assist with strengthening/mobility  Outcome: Progressing     Problem: Prexisting or High Potential for Compromised Skin Integrity  Goal: Skin integrity is maintained or improved  Description: INTERVENTIONS:  - Identify patients at risk for skin breakdown  - Assess and monitor skin integrity  - Assess and monitor nutrition and hydration status  - Monitor labs   - Assess for incontinence   - Turn and reposition patient  - Assist with mobility/ambulation  - Relieve pressure over bony prominences  - Avoid friction and shearing  - Provide appropriate hygiene as needed including keeping skin clean and dry  - Evaluate need for skin moisturizer/barrier cream  - Collaborate with interdisciplinary team   - Patient/family teaching  - Consider wound care consult   Outcome: Progressing     Problem: Nutrition/Hydration-ADULT  Goal: Nutrient/Hydration intake appropriate for improving, restoring or maintaining nutritional needs  Description: Monitor and assess patient's nutrition/hydration status for malnutrition  Collaborate with interdisciplinary team and initiate plan and interventions as ordered  Monitor patient's weight and dietary intake as ordered or per policy  Utilize nutrition screening tool and intervene as necessary  Determine patient's food preferences and provide high-protein, high-caloric foods as appropriate       INTERVENTIONS:  - Monitor oral intake, urinary output, labs, and treatment plans  - Assess nutrition and hydration status and recommend course of action  - Evaluate amount of meals eaten  - Assist patient with eating if necessary   - Allow adequate time for meals  - Recommend/ encourage appropriate diets, oral nutritional supplements, and vitamin/mineral supplements  - Order, calculate, and assess calorie counts as needed  - Recommend, monitor, and adjust tube feedings and TPN/PPN based on assessed needs  - Assess need for intravenous fluids  - Provide specific nutrition/hydration education as appropriate  - Include patient/family/caregiver in decisions related to nutrition  Outcome: Progressing     Problem: PAIN - ADULT  Goal: Verbalizes/displays adequate comfort level or baseline comfort level  Description: Interventions:  - Encourage patient to monitor pain and request assistance  - Assess pain using appropriate pain scale  - Administer analgesics based on type and severity of pain and evaluate response  - Implement non-pharmacological measures as appropriate and evaluate response  - Consider cultural and social influences on pain and pain management  - Notify physician/advanced practitioner if interventions unsuccessful or patient reports new pain  Outcome: Progressing     Problem: INFECTION - ADULT  Goal: Absence or prevention of progression during hospitalization  Description: INTERVENTIONS:  - Assess and monitor for signs and symptoms of infection  - Monitor lab/diagnostic results  - Monitor all insertion sites, i e  indwelling lines, tubes, and drains  - Monitor endotracheal if appropriate and nasal secretions for changes in amount and color  - [place] appropriate cooling/warming therapies per order  - Administer medications as ordered  - Instruct and encourage patient and family to use good hand hygiene technique  - Identify and instruct in appropriate isolation precautions for identified infection/condition  Outcome: Progressing  Goal: Absence of fever/infection during neutropenic period  Description: INTERVENTIONS:  - Monitor WBC    Outcome: Progressing     Problem: SAFETY ADULT  Goal: Patient will remain free of falls  Description: INTERVENTIONS:  - Assess patient frequently for physical needs  -  Identify cognitive and physical deficits and behaviors that affect risk of falls    -  [place] fall precautions as indicated by assessment   - Educate patient/family on patient safety including physical limitations  - Instruct patient to call for assistance with activity based on assessment  - Modify environment to reduce risk of injury  - Consider OT/PT consult to assist with strengthening/mobility  Outcome: Progressing  Goal: Maintain or return to baseline ADL function  Description: INTERVENTIONS:  -  Assess patient's ability to carry out ADLs; assess patient's baseline for ADL function and identify physical deficits which impact ability to perform ADLs (bathing, care of mouth/teeth, toileting, grooming, dressing, etc )  - Assess/evaluate cause of self-care deficits   - Assess range of motion  - Assess patient's mobility; develop plan if impaired  - Assess patient's need for assistive devices and provide as appropriate  - Encourage maximum independence but intervene and supervise when necessary  - Involve family in performance of ADLs  - Assess for home care needs following discharge   - Consider OT consult to assist with ADL evaluation and planning for discharge  - Provide patient education as appropriate  Outcome: Progressing  Goal: Maintain or return mobility status to optimal level  Description: INTERVENTIONS:  - Assess patient's baseline mobility status (ambulation, transfers, stairs, etc )    - Identify cognitive and physical deficits and behaviors that affect mobility  - Identify mobility aids required to assist with transfers and/or ambulation (gait belt, sit-to-stand, lift, walker, cane, etc )  - [place] fall precautions as indicated by assessment  - Record patient progress and toleration of activity level on Mobility SBAR; progress patient to next Phase/Stage  - Instruct patient to call for assistance with activity based on assessment  - Consider rehabilitation consult to assist with strengthening/weightbearing, etc   Outcome: Progressing     Problem: DISCHARGE PLANNING  Goal: Discharge to home or other facility with appropriate resources  Description: INTERVENTIONS:  - Identify barriers to discharge w/patient and caregiver  - Arrange for needed discharge resources and transportation as appropriate  - Identify discharge learning needs (meds, wound care, etc )  - Arrange for interpretive services to assist at discharge as needed  - Refer to Case Management Department for coordinating discharge planning if the patient needs post-hospital services based on physician/advanced practitioner order or complex needs related to functional status, cognitive ability, or social support system  Outcome: Progressing     Problem: Knowledge Deficit  Goal: Patient/family/caregiver demonstrates understanding of disease process, treatment plan, medications, and discharge instructions  Description: Complete learning assessment and assess knowledge base    Interventions:  - Provide teaching at level of understanding  - Provide teaching via preferred learning methods  Outcome: Progressing

## 2021-01-19 NOTE — PROGRESS NOTES
Dr Sophia Ford requested podiatry to see pt today  Contacted [facility] on call podiatry to see pt tonight - they said they would see her tomorrow

## 2021-01-19 NOTE — PLAN OF CARE
Problem: OCCUPATIONAL THERAPY ADULT  Goal: Performs self-care activities at highest level of function for planned discharge setting  See evaluation for individualized goals  Description: Treatment Interventions: ADL retraining, Functional transfer training, UE strengthening/ROM, Endurance training, Patient/family training, Equipment evaluation/education, Compensatory technique education, Continued evaluation, Activityengagement          See flowsheet documentation for full assessment, interventions and recommendations  Note: Limitation: Decreased ADL status, Decreased UE ROM, Decreased UE strength, Decreased Safe judgement during ADL, Decreased cognition, Decreased endurance, Decreased self-care trans, Decreased high-level ADLs  Prognosis: Fair  Assessment: Pt is a 80 y o  female seen for OT evaluation s/p adm to [facility]  on 1/18/2021 w/ LE edema and dx'd w/ Cellulitis, chronic hypoxemic respiratory failure, and ambulatory dysfunction  Of note, pt with recent admission from 12/28/20 to 1/6/21 for Acute diastolic CHF and ulcer of R LE  Pt evaluated by vascular surgery during previous admission, however pt deferred treatment  Podiatry consult this admission (1/19/21) for R LE venous wounds and L LE cellulitis, WBAT B/L LEs  Comorbidities affecting pts functional performance include a significant PMH of Anemia, CKD, and HTN  Pt with active OT orders and activity orders for Activity as tolerated  Pt lives in a one level house with 0 CHERYLE  Pt reports spouse is able to assist as needed  At baseline, pt reports requiring assist w/ ADLs and IADLs  Pt reports I w/ functional mobility/transfers with use of RW, however chart indicates pt unable to get up from kitchen table x3 days   Upon evaluation, pt currently requires Min A for UB ADLs, Max-Mod A for LB ADLs, Max A for toileting, and Max A of 2 for bed mobility (OOB deferred) 2* the following deficits impacting occupational performance: weakness, decreased ROM, decreased strength, decreased balance, decreased tolerance, impaired problem solving and decreased safety awareness  These impairments, as well at pts difficulty performing ADLS and limited insight into deficits limit pts ability to safely engage in all baseline areas of occupation  Pt scored overall 35/100 on the Barthel Index  Based on the aforementioned OT evaluation, functional performance deficits, and assessments, pt has been identified as a High complexity evaluation  Pt to continue to benefit from continued acute OT services during hospital stay to address defined deficits and to maximize level of functional independence in the following Occupational Performance areas: grooming, bathing/shower, toilet hygiene, dressing, medication management, health maintenance, functional mobility, community mobility, clothing management and social participation  From OT standpoint, recommend STR upon D/C   OT will continue to follow pt 3-5x/wk to address the following goals to  w/in 10-14 days:     OT Discharge Recommendation: [address]  OT - OK to Discharge: Yes(when medically cleared to rehab)

## 2021-01-19 NOTE — ASSESSMENT & PLAN NOTE
-patient has a history of chronic kidney disease stage 3  -baseline creatinine appears to range 1 7-2 2  -patient was recently hospitalized 11/2020 at [facility] with acute kidney injury, right-sided hydronephrosis, and atrophic left kidney, and required right ureteral stent    -patient's previous creatinine and began ER general was approximately 2 0  -she presented with acute kidney injury with a creatinine of 3 3, that worsened to 3 6  -appreciate Nephrology evaluation and recommendations  -check urinary retention protocol  -due to previous right-sided hydronephrosis requiring ureteral stent will check renal ultrasound  -continue IV fluids and bicarbonate  -patient with associated metabolic acidosis and hyperkalemia

## 2021-01-19 NOTE — CASE MANAGEMENT
Patient here with cellulitis on IV antibiotics  Patient is a 30 day readmission  Patient pending medical clearance and PT/OT evaluation  CM will continue to follow

## 2021-01-19 NOTE — ASSESSMENT & PLAN NOTE
-patient was diagnosed with a right soleal vein occlusive DVT during her hospitalization 11/2020 at [facility]  -she is on chronic anticoagulation with Coumadin  -INR currently supratherapeutic

## 2021-01-19 NOTE — PLAN OF CARE
Problem: PHYSICAL THERAPY ADULT  Goal: Performs mobility at highest level of function for planned discharge setting  See evaluation for individualized goals  Description: Treatment/Interventions: Functional transfer training, LE strengthening/ROM, Therapeutic exercise, Endurance training, Cognitive reorientation, Patient/family training, Equipment eval/education, Bed mobility, Gait training, Compensatory technique education, Spoke to nursing, OT          See flowsheet documentation for full assessment, interventions and recommendations  Note: Prognosis: Fair  Problem List: Decreased strength, Decreased range of motion, Decreased endurance, Impaired balance, Decreased mobility, Decreased coordination, Decreased cognition, Impaired judgement, Decreased safety awareness, Impaired sensation, Decreased skin integrity  Assessment: pt admitted after being unable to get up from [facility] chair for 2 days (denied by pt)  dx with hyponatremia, hypokalemia, cellulitis, anemia and ble edema  pt was seen by podiatry and is wbat ble  pt was living with spouse, needing assist for adl's, using rw for amb  pt reports being able to amb by herself but was lethargic, confused and with hgb 7 4 and INR 6 98  pt seen bed level today  pt needed max assist of 2 persons for bed mobility, sitting balance F static and P+ dynamic  pt was able to perform some arom while sitting  pt tolerated 10 minutes sitting  pt has chronic deficits in strength, balance, gait stability, skin integrity, cognition, activity tolerance due to vascular compromise  pt will need skilled PT, recommend rehab vs ltc  Barriers to Discharge: Decreased caregiver support     PT Discharge Recommendation: Post-Acute Rehabilitation Services(rehab-> ? ltc)     PT - OK to Discharge: Yes(rehab)    See flowsheet documentation for full assessment

## 2021-01-19 NOTE — ASSESSMENT & PLAN NOTE
Patient presented with acute kidney injury and hyperkalemia  She was treated medically with calcium gluconate, insulin and D 50 and kayexelate with improvement      Recent Labs     01/19/21  0108 01/19/21  0515 01/19/21  0852 01/19/21  1628   K 5 0 5 2 4 9 4 8

## 2021-01-19 NOTE — CONSULTS
Consult - Podiatry   Sari Doty 80 y o  female MRN: [id number]  Unit/Bed#: E5 -01 Encounter: [id number]    Assessment:  81yo female with chronic CHF, hx DVT RLE, PAD, right lower extremity venous wounds presenting with left lower extremity cellulitis extending up to thigh, BLE leg wounds are stable    Plan:  · Recommend local wound care for BLE wounds, continue IV abx for LLE cellulitis as there's improvement since admission  · Recommend venous duplex of LLE to r/o DVT if symptoms persist  · Weight bear as tolerated, recommend PT/OT consult  · Rest of care per primary team     History of Present Illness   Chief Complaint: "left leg pain"    HPI:  Sari Doty is a 80 y o  female with PMH significant for chronic CHF, recently admitted for volume overload presenting with left leg pain and 2 day history of unable to ambulate  Podiatry is consulted for left leg cellulitis  Patient is a poor historian and is unable to tell me when the redness started  She is unclear about when her dressings were changed last  Podiatry was involved with her most recent admission where she had RLE venous stasis wounds of anterior leg and some gangrene of right forefoot  She was worked up by vascular surgery for possible intervention however she declined  Denies nausea, vomiting, fever, chills  Consults  Review of Systems   Constitutional: Negative  HENT: Negative  Eyes: Negative  Respiratory: Negative  Cardiovascular: Negative  Gastrointestinal: Negative  Musculoskeletal: left leg pain   Skin:wounds   Neurological: Negative  Psych: negative  Historical Information   Past Medical History:   Diagnosis Date    Anemia 1/18/2021    Chronic kidney disease     Hypertension      History reviewed  No pertinent surgical history    Social History   Social History     Substance and Sexual Activity   Alcohol Use Never    Frequency: Never     Social History     Substance and Sexual Activity   Drug Use Never     Social History     Tobacco Use   Smoking Status Never Smoker   Smokeless Tobacco Never Used     Family History:   Family History   Problem Relation Age of Onset    No Known Problems Mother     No Known Problems Father        Meds/Allergies   Medications Prior to Admission   Medication    amLODIPine (NORVASC) 5 mg tablet    carvedilol (COREG) 12 5 mg tablet    ergocalciferol (VITAMIN D2) 50,000 units    furosemide (LASIX) 40 mg tablet    gabapentin (NEURONTIN) 100 mg capsule    hydrALAZINE (APRESOLINE) 10 mg tablet    isosorbide dinitrate (ISORDIL) 10 mg tablet    levothyroxine 25 mcg tablet    potassium chloride (K-DUR,KLOR-CON) 20 mEq tablet    senna (SENOKOT) 8 6 mg    warfarin (COUMADIN) 4 mg tablet     Allergies   Allergen Reactions    No Known Allergies        Objective   First Vitals:   Blood Pressure: 106/53 (01/18/21 1150)  Pulse: 57 (01/18/21 1150)  Temperature: 97 6 °F (36 4 °C) (01/18/21 1150)  Temp Source: Temporal (01/18/21 1150)  Respirations: 18 (01/18/21 1150)  Weight - Scale: 62 kg (136 lb 11 oz) (01/19/21 0546)  SpO2: 100 % (01/18/21 1150)    Current Vitals:   Blood Pressure: 106/63 (01/19/21 0743)  Pulse: 98 (01/19/21 0743)  Temperature: (!) 96 7 °F (35 9 °C) (01/19/21 0743)  Temp Source: Temporal (01/19/21 0743)  Respirations: 18 (01/19/21 0743)  Weight - Scale: 62 kg (136 lb 11 oz) (01/19/21 0546)  SpO2: 96 % (01/19/21 0743)    /63 (BP Location: Left arm)   Pulse 98   Temp (!) 96 7 °F (35 9 °C) (Temporal)   Resp 18   Wt 62 kg (136 lb 11 oz)   LMP  (LMP Unknown)   SpO2 96%   BMI 22 06 kg/m²      Physical Exam    General Appearance:    Alert, cooperative, no distress   Head:    Normocephalic, without obvious abnormality, atraumatic   Neck:   Supple, symmetrical, trachea midline   Lungs:     Respirations unlabored, no audible wheezes   Abdomen:     Soft, non-tender   Lower Extremities:    Vascular:   Right DP pulse is 0/4, Right PT pulse is 0/4, Left DP pulse is 0/4, Left PT pulse is 0/4  CRT < 3 seconds at the digits  Pedal hair is absent  2+ edema noted at bilateral lower extremities  Skin temperature is equal bilaterally  Musculoskeletal:  MMT is 4/5 in all muscle compartments bilaterally  ROM at the 1st MPJ and ankle joint are decreased bilaterally with the leg extended  No gross deformities noted  Dermatological:  Right anterior lower leg wound measures 2 5cm x 2 5cm x 0 2cm, wound bed is mixture of granular and necrotic base, moderate amount of serous drainage; right forefoot gangrene that is stable without acute signs of infection; left lower extremity is erythematous and edematous, small wound on anterior leg with minimal drainage      Neurological:  Gross sensation is intact  Protective sensation is diminished  Patient Denies numbness and/or paresthesias                    Lab Results:   Admission on 01/18/2021   Component Date Value    WBC 01/18/2021 13 02*    RBC 01/18/2021 2 75*    Hemoglobin 01/18/2021 7 4*    Hematocrit 01/18/2021 23 9*    MCV 01/18/2021 87     MCH 01/18/2021 26 9     MCHC 01/18/2021 31 0*    RDW 01/18/2021 17 1*    MPV 01/18/2021 10 4     Platelets 28/19/9906 241     nRBC 01/18/2021 0     Neutrophils Relative 01/18/2021 84*    Immat GRANS % 01/18/2021 1     Lymphocytes Relative 01/18/2021 11*    Monocytes Relative 01/18/2021 3*    Eosinophils Relative 01/18/2021 1     Basophils Relative 01/18/2021 0     Neutrophils Absolute 01/18/2021 10 96*    Immature Grans Absolute 01/18/2021 0 17     Lymphocytes Absolute 01/18/2021 1 47     Monocytes Absolute 01/18/2021 0 34     Eosinophils Absolute 01/18/2021 0 06     Basophils Absolute 01/18/2021 0 02     Sodium 01/18/2021 124*    Potassium 01/18/2021 6 0*    Chloride 01/18/2021 95*    CO2 01/18/2021 19*    ANION GAP 01/18/2021 10     BUN 01/18/2021 123*    Creatinine 01/18/2021 3 30*    Glucose 01/18/2021 100     Calcium 01/18/2021 8 7     eGFR 01/18/2021 12     Total CK 01/18/2021 32     Blood Culture 01/18/2021 Received in Microbiology Lab  Culture in Progress   Blood Culture 01/18/2021 Received in Microbiology Lab  Culture in Progress       Potassium 01/18/2021 5 7*    Color, UA 01/18/2021 Straw     Clarity, UA 01/18/2021 Turbid     Specific Gravity, UA 01/18/2021 1 015     pH, UA 01/18/2021 6 0     Leukocytes, UA 01/18/2021 Large*    Nitrite, UA 01/18/2021 Negative     Protein, UA 01/18/2021 100 (2+)*    Glucose, UA 01/18/2021 Negative     Ketones, UA 01/18/2021 Negative     Urobilinogen, UA 01/18/2021 0 2     Bilirubin, UA 01/18/2021 Negative     Blood, UA 01/18/2021 Large*    Protime 01/18/2021 81 3*    INR 01/18/2021 10 64*    Ventricular Rate 01/18/2021 53     Atrial Rate 01/18/2021 55     NY Interval 01/18/2021 240     QRSD Interval 01/18/2021 86     QT Interval 01/18/2021 446     QTC Interval 01/18/2021 418     P Axis 01/18/2021 16     QRS Axis 01/18/2021 69     T Wave Axis 01/18/2021 32     NT-proBNP 01/18/2021 15,067*    Sodium 01/18/2021 127*    Potassium 01/18/2021 5 5*    Chloride 01/18/2021 97*    CO2 01/18/2021 18*    ANION GAP 01/18/2021 12     BUN 01/18/2021 121*    Creatinine 01/18/2021 3 34*    Glucose 01/18/2021 82     Calcium 01/18/2021 8 6     eGFR 01/18/2021 12     TSH 3RD GENERATON 01/18/2021 10 623*    Osmolality Serum 01/18/2021 308*    Osmolality, Ur 01/18/2021 338     Sodium, Ur 01/18/2021 47     Uric Acid 01/18/2021 9 0*    RBC, UA 01/18/2021 Field obscured, unable to enumerate*    WBC, UA 01/18/2021 Innumerable*    Epithelial Cells 01/18/2021 Field obscured, unable to enumerate*    Bacteria, UA 01/18/2021 Innumerable*    Iron Saturation 01/18/2021 9     TIBC 01/18/2021 178*    Iron 01/18/2021 16*    Ferritin 01/18/2021 215     Sodium 01/19/2021 127*    Potassium 01/19/2021 5 0     Chloride 01/19/2021 96*    CO2 01/19/2021 18*    ANION GAP 01/19/2021 13     BUN 01/19/2021 125*    Creatinine 01/19/2021 3 62*    Glucose 01/19/2021 140     Calcium 01/19/2021 8 1*    eGFR 01/19/2021 10     Protime 01/19/2021 58 6*    INR 01/19/2021 6 98*    Sodium 01/19/2021 130*    Potassium 01/19/2021 5 2     Chloride 01/19/2021 98*    CO2 01/19/2021 20*    ANION GAP 01/19/2021 12     BUN 01/19/2021 127*    Creatinine 01/19/2021 3 70*    Glucose 01/19/2021 86     Calcium 01/19/2021 8 7     eGFR 01/19/2021 10     WBC 01/19/2021 12 65*    RBC 01/19/2021 2 72*    Hemoglobin 01/19/2021 7 4*    Hematocrit 01/19/2021 23 4*    MCV 01/19/2021 86     MCH 01/19/2021 27 2     MCHC 01/19/2021 31 6     RDW 01/19/2021 17 1*    Platelets 44/31/6516 228     MPV 01/19/2021 10 5     Sodium 01/19/2021 129*    Potassium 01/19/2021 4 9     Chloride 01/19/2021 98*    CO2 01/19/2021 18*    ANION GAP 01/19/2021 13     BUN 01/19/2021 126*    Creatinine 01/19/2021 3 63*    Glucose 01/19/2021 102     Calcium 01/19/2021 8 2*    eGFR 01/19/2021 10              Results from last 7 days   Lab Units 01/18/21  1515   BLOOD CULTURE  Received in Microbiology Lab  Culture in Progress  Received in Microbiology Lab  Culture in Progress  Imaging: I have personally reviewed pertinent films in PACS  EKG, Pathology, and Other Studies: I have personally reviewed pertinent reports  VTE Pharmacologic Prophylaxis: Heparin  Code Status: Level 1 - Full Code      Portions of the record may have been created with voice recognition software  Occasional wrong word or "sound a like" substitutions may have occurred due to the inherent limitations of voice recognition software  Read the chart carefully and recognize, using context, where substitutions have occurred      Evangelist Patel DPM

## 2021-01-19 NOTE — PHYSICAL THERAPY NOTE
Physical Therapy Evaluation      Patient Active Problem List   Diagnosis    Acute deep vein thrombosis (DVT) of calf muscle vein of right lower extremity (HCC)    LILIAN [facility]acute kidney [facility]) [facility]    Hydronephrosis of right kidney    Elevated blood urea nitrogen    Hyponatremia    Leg ulcer, left, limited to breakdown of skin ([facility] )    Nonhealing nonsurgical wound    Protein-calorie malnutrition ([facility] )    Sepsis due to Gram negative bacteria ([facility])    Ulcer of right lower extremity with fat layer exposed ([facility] )    Urinary retention    Wound infection    PAD (peripheral artery disease) ([facility])    Essential hypertension    CKD (chronic kidney disease)    History of DVT (deep vein thrombosis)    Acute diastolic congestive heart failure ([facility])    Cellulitis    Hyperkalemia    Anemia    Chronic diastolic heart failure (HCC)    Ambulatory dysfunction    Chronic hypoxemic respiratory failure ([facility])       Past Medical History:   Diagnosis Date    Anemia 1/18/2021    Chronic kidney disease     Hypertension        History reviewed  No pertinent surgical history  01/19/21 1135   PT Last Visit   PT Visit Date 01/19/21   Note Type   Note type Evaluation   Pain Assessment   Pain Assessment Tool Pain Assessment not indicated - pt denies pain   Pain Score No Pain   Home Living   Type of [address] [place] [address] One level   Bathroom Equipment Grab bars in shower;Commode   Home Equipment Walker  (home O2 at 3L/min)   Prior Function   Level of [place] Needs assistance with IADLs; Needs assistance with ADLs and functional mobility   Lives With Spouse   Receives Help From Family   ADL Assistance Needs assistance   IADLs Needs assistance   Falls in the last 6 months 1 to 4   Comments pt lives wiht spouse, has assist for adl's but claims to amb using rw indep  pt was unreliable historian, drowsy , confused  pt had prior rehab  per chart,  has refused home services once set up      Restrictions/Precautions Weight Bearing Precautions Per Order Yes   RLE Weight Bearing Per Order WBAT   LLE Weight Bearing Per Order WBAT   Other Precautions Cognitive; Bed Alarm;Multiple lines; Fall Risk   General   Family/Caregiver Present No   Cognition   Overall Cognitive Status Impaired   Orientation Level Oriented to person;Oriented to place   RUE Assessment   RUE Assessment WFL   LUE Assessment   LUE Assessment WFL   RLE Assessment   RLE Assessment X  (str 4-/5, necrotic toes, knee F 80*)   LLE Assessment   LLE Assessment X  (str 3/5 range  erythema, hypotrophic skin ,knee F 75*)   Coordination   Movements are Fluid and Coordinated 1   Sensation X   Light Touch   RLE Light Touch Impaired   RLE Light Touch Comments foot   LLE Light Touch Impaired   LLE Light Touch Comments foot   Bed Mobility   Supine to Sit 2  Maximal assistance   Additional items Assist x 2;Verbal cues;LE management   Sit to Supine 2  Maximal assistance   Additional items Assist x 2;Verbal cues;LE management   Transfers   Sit to Stand Unable to assess   Balance   Static Sitting Fair   Dynamic Sitting Poor +   Endurance Deficit   Endurance Deficit Yes   Endurance Deficit Description fatigue, weakness, lethargy   Activity Tolerance   Activity Tolerance Treatment limited secondary to medical complications (Comment)   Medical Staff Made Aware ot   Nurse Made Aware yes   Assessment   Prognosis Fair   Problem List Decreased strength;Decreased range of motion;Decreased endurance; Impaired balance;Decreased mobility; Decreased coordination;Decreased cognition; Impaired judgement;Decreased safety awareness; Impaired sensation;Decreased skin integrity   Assessment pt admitted after being unable to get up from [facility] chair for 2 days (denied by pt)  dx with hyponatremia, hypokalemia, cellulitis, anemia and ble edema  pt was seen by podiatry and is wbat ble  pt was living with spouse, needing assist for adl's, using rw for amb   pt reports being able to amb by herself but was lethargic, confused and with hgb 7 4 and INR 6 98  pt seen bed level today  pt needed max assist of 2 persons for bed mobility, sitting balance F static and P+ dynamic  pt was able to perform some arom while sitting  pt tolerated 10 minutes sitting  pt has chronic deficits in strength, balance, gait stability, skin integrity, cognition, activity tolerance due to vascular compromise  pt will need skilled PT, recommend rehab vs ltc  Barriers to Discharge Decreased caregiver support   Goals   Patient Goals none offered   STG Expiration Date 01/29/21   Short Term Goal #1 bed mobility with min assist  imrpove strength and balance by 1/2 to 1 grade  kne rom 0/100* bilat  activity tolerance 45 minutes  demonstrate good safety practices  continue eval and update goals for OOB mobility   Plan   Treatment/Interventions Functional transfer training;LE strengthening/ROM; Therapeutic exercise; Endurance training;Cognitive reorientation;Patient/family training;Equipment eval/education; Bed mobility;Gait training; Compensatory technique education;Spoke to nursing;OT   PT Frequency   (3-5x/week)   Recommendation   PT Discharge Recommendation Post-Acute Rehabilitation Services  (rehab-> ? ltc)   PT - OK to Discharge Yes  (rehab)   Camryn 8 in Bed Without Bedrails 2   Lying on Back to Sitting on Edge of Flat Bed 1   Moving Bed to Chair 1   Standing Up From Chair 1   Walk in Room 1   Climb 3-5 Stairs 1   Basic Mobility Inpatient Raw Score 7   Turning Head Towards Sound 4   Follow Simple Instructions 3   Low Function Basic Mobility Raw Score 14   Low Function Basic Mobility Standardized Score 22 01   History: co - morbidities, fall risk, use of assistive device, assist for adl's, cognition, multiple lines  Exam: impairments in locomotion, musculoskeletal, balance, skin integrity, vascular, cognition   Clinical: unstable/unpredictable  Complexity:high    Maite Li, PT

## 2021-01-19 NOTE — ASSESSMENT & PLAN NOTE
During her December 2020 admission patient was diagnosed with hypoxic respiratory failure secondary to her congestive heart failure atelectasis    She was discharged on 3 L nasal cannula  Continue oxygen  Currently with adequate oxygenation on 3 L

## 2021-01-19 NOTE — ASSESSMENT & PLAN NOTE
Patient presented with coagulopathy secondary to Coumadin  INR was 10 on admission, and she was given 2 5 mg of vitamin K  INR improved to 6  Continue to monitor, and hold Coumadin

## 2021-01-19 NOTE — ASSESSMENT & PLAN NOTE
Patient with 2 days of nonambulatory status, per her  she was sitting at the kitchen table unable to move    Likely secondary to her peripheral arterial disease, and cellulitis  Continue PT/OT  Case management consulted  Likely will need short-term rehab  Reportedly area of the aging is involved

## 2021-01-19 NOTE — ASSESSMENT & PLAN NOTE
Recent Labs     01/19/21  0108 01/19/21  0515 01/19/21  0852 01/19/21  1628   SODIUM 127* 130* 129* 126*     Patient presented with hyponatremia, likely secondary to hypovolemic hyponatremia  Appreciate Nephrology evaluation and recommendations  Patient was placed on isotonic IV fluids with normal saline  Continue 1 5 L fluid restriction  Home diuretics on hold  Started on sodium bicarbonate for metabolic acidosis secondary to acute kidney injury

## 2021-01-19 NOTE — PROGRESS NOTES
Progress Note - Reyes Terrell 5/18/1930, 80 y o  female MRN: [id number]    Unit/Bed#: E5 -01 Encounter: [id number]    Primary Care Provider: Ashley Alejandro MD   Date and time admitted to hospital: 1/18/2021 11:44 AM        * Cellulitis  Assessment & Plan  Patient presented with extensive left lower extremity cellulitis extending from her toes up to her thigh  She was started on IV antibiotics with Ancef  Blood culture pending x2   Previous LEADS 12/2020 with significant left lower extremity peripheral arterial disease, and she had declined vascular surgery intervention      Chronic diastolic heart failure ([facility] [address] )  Assessment & Plan  Wt Readings from Last 3 Encounters:   01/19/21 62 kg (136 lb 11 oz)   01/06/21 62 1 kg (136 lb 14 4 oz)   12/22/20 60 8 kg (134 lb)     Patient has a history of chronic diastolic congestive heart failure  She was recently admitted 12/2020 with an acute exacerbation of her chronic diastolic congestive heart failure  Home Lasix on hold due to acute kidney injury  She previously been on Lasix 20 mg daily  Continue Coreg 12 5 mg b i d    No ACE-inhibitor in light of her chronic kidney disease  Continue isosorbide/hydralazine      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 [facility])  Assessment & Plan  Patient presented with dry gangrene involving right great toe and 4th toe  Right anterior shin ulcer, and heel ulcer  Appreciate podiatry evaluation and recommendations  Patient with significant peripheral arterial due, and worsening condition of her right foot wound  Vascular surgery evaluation, however patient is not currently able to receive any angiogram or IV dye due to her acute kidney injury  Continue wound care  Pathology discussed with patient the options of palliative care with wound care to ranges, versus amputation versus vascular surgery evaluation and possible reperfusion, and future debridements    LILIAN (acute kidney injury) ([facility] [address] )  Assessment & Plan  -patient has a history of chronic kidney disease stage 3  -baseline creatinine appears to range 1 7-2 2  -patient was recently hospitalized 11/2020 at [facility] with acute kidney injury, right-sided hydronephrosis, and atrophic left kidney, and required right ureteral stent    -patient's previous creatinine and began ER general was approximately 2 0  -she presented with acute kidney injury with a creatinine of 3 3, that worsened to 3 6  -appreciate Nephrology evaluation and recommendations  -check urinary retention protocol  -due to previous right-sided hydronephrosis requiring ureteral stent will check renal ultrasound  -continue IV fluids and bicarbonate  -patient with associated metabolic acidosis and hyperkalemia    Coagulopathy ([facility] [address] )  Assessment & Plan  Patient presented with coagulopathy secondary to Coumadin  INR was 10 on admission, and she was given 2 5 mg of vitamin K  INR improved to 6  Continue to monitor, and hold Coumadin    Chronic hypoxemic respiratory failure [facility])  Assessment & Plan  During her December 2020 admission patient was diagnosed with hypoxic respiratory failure secondary to her congestive heart failure atelectasis  She was discharged on 3 L nasal cannula  Continue oxygen  Currently with adequate oxygenation on 3 L    Ambulatory dysfunction  Assessment & Plan  Patient with 2 days of nonambulatory status, per her  she was sitting at the kitchen table unable to move    Likely secondary to her peripheral arterial disease, and cellulitis  Continue PT/OT  Case management consulted  Likely will need short-term rehab  Reportedly area of the aging is involved    Anemia  Assessment & Plan  History of chronic anemia with baseline hemoglobin ranging 7-8  Hemoglobin 7 4  Hemoccult stool  Iron panel consistent with iron deficiency anemia  Unable to give IV Venofer fair due to acute infection    Lab Results   Component Value Date    HGB 7 4 (L) 01/19/2021    HGB 7 4 (L) 01/18/2021    HGB 8 1 (L) 01/05/2021    HGB 7 3 (L) 01/04/2021    HGB 8 2 (L) 01/03/2021    HGB 8 1 (L) 01/02/2021         Hyperkalemia  Assessment & Plan  Patient presented with acute kidney injury and hyperkalemia  She was treated medically with calcium gluconate, insulin and D 50 and kayexelate with improvement      Recent Labs     01/19/21  0108 01/19/21  0515 01/19/21  0852 01/19/21  1628   K 5 0 5 2 4 9 4 8         Essential hypertension  Assessment & Plan  Patient has a history of essential hypertension  She was continued on her home antihypertensives with Norvasc 5 mg daily, Coreg 12 5 mg b i d , hydralazine 10 mg q 8 hours, and isosorbide dinitrate 10 mg t i d   Blood pressure currently adequately controlled  Continue medications and monitor    PAD (peripheral artery disease) [facility])  Assessment & Plan  Patient has a history of peripheral arterial disease  12/2020 she was evaluated by the vascular surgery team and had LEADS that revealed   Right lower extremity:  occlusion versus high-grade stenosis at the mid/distal popliteal artery, and focal stenosis in the proximal superficial femoral artery and distal superficial femoral artery  Also finding suggestive of tibial peroneal disease  ÁNGEL 0 46  Left lower extremity:  Occlusion versus high-grade stenosis of the proximal thigh portion of the superficial femoral artery with distal reconstitution, finding suggestive of tibioperoneal disease    ÁNGEL 0 71  Patient had declined previous vascular surgery intervention  Currently now with dry gangrene and 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    Hyponatremia  Assessment & Plan  Recent Labs     01/19/21  0108 01/19/21  0515 01/19/21  0852 01/19/21  1628   SODIUM 127* 130* 129* 126*     Patient presented with hyponatremia, likely secondary to hypovolemic hyponatremia  Appreciate Nephrology evaluation and recommendations  Patient was placed on isotonic IV fluids with normal saline  Continue 1 5 L fluid restriction  Home diuretics on hold  Started on sodium bicarbonate for metabolic acidosis secondary to acute kidney injury    Acute deep vein thrombosis (DVT) of calf muscle vein of right lower extremity [facility])  Assessment & Plan  -patient was diagnosed with a right soleal vein occlusive DVT during her hospitalization 11/2020 at [facility]  -she is on chronic anticoagulation with Coumadin  -INR currently supratherapeutic          Family:  Called and updated  [place]  Reviewed all test results and podiatry recommendations  Reviewed PAD and vascular surgery eval   Reviewed gangrene and possible need for future amputations  D/w pt's nurse  D/w     VTE Pharmacologic Prophylaxis: Warfarin (Coumadin)  VTE Mechanical Prophylaxis: sequential compression device        Certification Statement: The patient will continue to require additional inpatient hospital stay due to need for further acute intervention for cellulitits on iv abx    Status: inpatient     ===================================================================    Subjective:  Patient denies any complaints  She denies any pain anywhere  She specifically denies any leg or foot pain  She denies any chest pain  She denies any shortness of breath or cough  She denies any nausea, vomiting, diarrhea or constipation  She is tolerating p o  Marleta Press Physical Exam:   Temp:  [96 7 °F (35 9 °C)-97 4 °F (36 3 °C)] 97 4 °F (36 3 °C)  HR:  [51-98] 60  Resp:  [18-20] 18  BP: (103-116)/(54-63) 110/58    Gen:  Pleasant, non-tachypnic, non-dyspnic  Conversant  Heart: regular rate and rhythm, S1S2 present, no murmur, rub or gallop  Lungs: clear to ausculatation bilaterally  No wheezing, crackles, or rhonchi  No accessory muscle use or respiratory distress  Abd: soft, non-tender, non-distended  NABS, no guarding, rebound or peritoneal signs  Extremities:  Left lower extremity erythema and edema extending from the upper thigh down to the toes  Thickened indurated skin    Right lower extremity with anterior shin dressing in place over ulcer  Right 1st toe gangrene at the distal aspect  Fourth toe gangrene noted  Neuro: awake, alert  Fluent speech        LABS:   Results from last 7 days   Lab Units 01/19/21  0515 01/18/21  1314   WBC Thousand/uL 12 65* 13 02*   HEMOGLOBIN g/dL 7 4* 7 4*   HEMATOCRIT % 23 4* 23 9*   PLATELETS Thousands/uL 228 241     Results from last 7 days   Lab Units 01/19/21  1628 01/19/21  0852 01/19/21  0515   POTASSIUM mmol/L 4 8 4 9 5 2   CHLORIDE mmol/L 95* 98* 98*   CO2 mmol/L 19* 18* 20*   BUN mg/dL 123* 126* 127*   CREATININE mg/dL 3 64* 3 63* 3 70*   CALCIUM mg/dL 7 9* 8 2* 8 7       Hospital Data:  1/19:  Renal ultrasound:  Pending    1/18 x-ray right foot:  Pending    Microbiology  1/18:  Blood cultures:  Pending x2              ---------------------------------------------------------------------------------------------------------------  This note has been constructed using a voice recognition system

## 2021-01-19 NOTE — CONSULTS
Consultation - Nephrology   Soledad Josefa 80 y o  female MRN: [id number]  Unit/Bed#: E5 -01 Encounter: [id number]    PLAN:   -will check renal ultrasound   -continues on normal saline at 75 cc/hour   -will check BMP later today  Will make further fluid recommendations once resulted  If sodium level decreasing, would stop IV hydration  -will check UPCR  -check bladder scan    -will start on oral bicarbonate   -avoid IV dye/A-gram at this time  ASSESSMENT/PLAN:  LILIAN (POA) on likely CKD IV:  Suspect multifactorial with possible infection, diuretic use, as well as possible post renal component with history of atrophic left kidney measuring 7 1 cm and right-sided hydro in December 2020    -presented with creatinine of 3 3   -unknown baseline creatinine, suspect around 1 7-2 2   -currently on NSS @ 75 cc/hr  -UA showed large blood, large leukocytes, 2+ protein, RBCs, innumerable WBCs, innumerable bacteria   -check UPCR  -bladder scan: 321, continue urinary retention protocol     -potential need for future arteriogram   Would hold off until creatinine is at baseline unless necessarily needed   -avoid nephrotoxins, hypotension, IV contrast   -I/O  Right-sided hydronephrosis:  With history of right ureteral stent placement in December 2020 at [facility]   -will check renal US and bladder scan  Hyponatremia: suspect hypovolemic as Na level improving with hydration  -urine sodium 47, urine osmolality 338, serum osmolality 308, uric acid elevated at 9 0, TSH 10 6   -presented with sodium of 124 with normal glucose     -sodium level corrected to 129, correcting appropriately   -place on 1 5 L fluid restriction    -diuretics on hold  -receiving NSS  -remains recheck BMP at 2:00 p m  Landen Corley Await results for further fluid recommendation  Hyperkalemia (POA):  Presents with potassium of 5 7  Received medical management with insulin, dextrose, Kayexalate, and calcium gluconate  (resolved)  -recommend low-potassium diet  Metabolic acidosis: In the setting of renal failure   -place on oral bicarb and continue to monitor  Hypertension:  Stable   -avoid hypotension or high fluctuations in blood pressure   -outpatient antihypertensives:  Amlodipine 5 mg daily, carvedilol 12 5 mg 2 times per day, hydralazine 10 mg every 8 hours, Isordil 10 mg 3 times per day   -continue amlodipine, carvedilol, hydralazine, Isordil   -holding diuretics  Lower extremity cellulitis:  With dry gangrene to great toe and 4th toe   -podiatry following   -possible vascular surgery consult to discuss a-gram   Would hold off until creatinine is at baseline   -blood cultures pending   -awaiting decision for palliative care with dressing changes versus amputation of limb versus attempted reperfusion of limb with salvage surgical procedures  -continues on IV antibiotics  CHF:  Echocardiogram showed EF of 50% with grade 2 diastolic dysfunction  No sandro signs of volume overload   -check daily weights, fluid restriction, I/O   -outpatient diuretics:  Lasix 40 mg daily   -continue to monitor volume status on IV hydration  Anemia:  -checking occult stools  -continue to monitor and transfuse as needed for hemoglobin less than 7 0   -iron panel showed low TIBC and low iron  Other:  PAD, DVT  HISTORY OF PRESENT ILLNESS:  Requesting Physician: Tiburcio Witt MD  Reason for Consult: LILIAN (POA), hyponatremia, hyperkalemia    Lainey Suarez is a 80y o  year old female who was admitted to [facility] after presenting with LLE cellulitis which began a couple weeks ago  PMH includes CKD III, HTN, CHF, R DVT, right ureteral stent with right-sided hydro, atrophic left kidney measuring 7 1 cm, and PAD  A renal consultation is requested today for assistance in the management of LILIAN, hyponatremia, and hyperkalemia  Patient reports inability to ambulate at home and increasing pain to her LLE   States she drinks about 4 glasses of water a day and denies high salt diet  Baseline weight per patient is 136 lb  Denies history of kidney problems or following with nephrology  Alert & oriented but appears to be poor historian  Currently complaining of mild SOB, denies N/V/D or abdominal pain  Denies fever/chills  Per record review, patient had recent hospitalization at the end of December secondary to CHF exacerbation  She was discharged on Lasix 20 mg daily  She had acute kidney injury at this time with elevation in creatinine in the mid 2s  She currently does not have outpatient follow-up with Nephrology  PAST MEDICAL HISTORY:  Past Medical History:   Diagnosis Date    Anemia 1/18/2021    Chronic kidney disease     Hypertension        PAST SURGICAL HISTORY:  History reviewed  No pertinent surgical history      ALLERGIES:  Allergies   Allergen Reactions    No Known Allergies        SOCIAL HISTORY:  Social History     Substance and Sexual Activity   Alcohol Use Never    Frequency: Never     Social History     Substance and Sexual Activity   Drug Use Never     Social History     Tobacco Use   Smoking Status Never Smoker   Smokeless Tobacco Never Used       FAMILY HISTORY:  Family History   Problem Relation Age of Onset    No Known Problems Mother     No Known Problems Father        MEDICATIONS:    Current Facility-Administered Medications:     acetaminophen (TYLENOL) tablet 650 mg, 650 mg, Oral, Q6H PRN, Harsha Shook DO    amLODIPine (NORVASC) tablet 5 mg, 5 mg, Oral, Daily, Harsha Shook DO, Stopped at 01/18/21 1845    carvedilol (COREG) tablet 12 5 mg, 12 5 mg, Oral, BID With Meals, Natacha Coleman DO, Stopped at 01/18/21 1845    ceFAZolin (ANCEF) IVPB (premix in dextrose) 1,000 mg 50 mL, 1,000 mg, Intravenous, Q24H, Harsha Shook DO    docusate sodium (COLACE) capsule 100 mg, 100 mg, Oral, BID, Harsha Shook DO, 100 mg at 01/19/21 0916    hydrALAZINE (APRESOLINE) tablet 10 mg, 10 mg, Oral, Q8H [facility], Harsha SULLIVAN DO [place], 10 mg at 01/19/21 9646    isosorbide dinitrate (ISORDIL) tablet 10 mg, 10 mg, Oral, TID after meals, Harsha Shook DO, Stopped at 01/18/21 1846    levothyroxine tablet 25 mcg, 25 mcg, Oral, Early Morning, Harsha Shook DO, 25 mcg at 01/19/21 0504    ondansetron (ZOFRAN) injection 4 mg, 4 mg, Intravenous, Q6H PRN, Harsha Shook DO    saccharomyces boulardii (FLORASTOR) capsule 250 mg, 250 mg, Oral, BID, Harsha Shook DO, 250 mg at 01/19/21 0915    sodium chloride 0 9 % infusion, 75 mL/hr, Intravenous, Continuous, Brittany Knight MD, Last Rate: 75 mL/hr at 01/19/21 0915, 75 mL/hr at 01/19/21 0915    REVIEW OF SYSTEMS:  A complete review of systems was performed and found to be negative unless otherwise noted in the history of present illness  General: No fevers, chills  Cardiovascular: Denies chest pain, chest tightness  Endorses LLE edema  Respiratory: No cough, sputum production  Complains of shortness of breath  Gastrointestinal:  Denies N/V/D,  Denies abdominal pain  Genitourinary: No hematuria  No foamy urine  No dysuria      PHYSICAL EXAM:  Current Weight: Weight - Scale: 62 kg (136 lb 11 oz)  First Weight: Weight - Scale: 62 kg (136 lb 11 oz)  Vitals:    01/18/21 2324 01/19/21 0509 01/19/21 0546 01/19/21 0743   BP: 103/58 113/59  106/63   BP Location: Left arm   Left arm   Pulse: 62 60  98   Resp: 18   18   Temp: (!) 96 8 °F (36 °C)   (!) 96 7 °F (35 9 °C)   TempSrc: Temporal   Temporal   SpO2: 98%   96%   Weight:   62 kg (136 lb 11 oz)        Intake/Output Summary (Last 24 hours) at 1/19/2021 1101  Last data filed at 1/19/2021 0901  Gross per 24 hour   Intake 1780 ml   Output    Net 1780 ml     General: frail, with mild tachypnea on supplemental oxygen  Skin: erythema to LLE, RLE dressing in place  HEENT: Moist mucous membranes, sclera anicteric, normocephalic, atraumatic  Neck: No apparent JVD appreciated  Chest: dim in bases, on 2L supplemental oxygen  CVS:Regular rate and rhythm, no murmer   Abdomen: Soft, round, non-tender, +BS  Extremities: edema present BL LE  Neuro: alert and oriented  Psych: appropriate mood and affect     Invasive Devices:      Lab Results:   Results from last 7 days   Lab Units 01/19/21  0852 01/19/21  0515 01/19/21  0108  01/18/21  1314   WBC Thousand/uL  --  12 65*  --   --  13 02*   HEMOGLOBIN g/dL  --  7 4*  --   --  7 4*   HEMATOCRIT %  --  23 4*  --   --  23 9*   PLATELETS Thousands/uL  --  228  --   --  241   SODIUM mmol/L 129* 130* 127*   < > 124*   POTASSIUM mmol/L 4 9 5 2 5 0   < > 6 0*   CHLORIDE mmol/L 98* 98* 96*   < > 95*   CO2 mmol/L 18* 20* 18*   < > 19*   BUN mg/dL 126* 127* 125*   < > 123*   CREATININE mg/dL 3 63* 3 70* 3 62*   < > 3 30*   CALCIUM mg/dL 8 2* 8 7 8 1*   < > 8 7    < > = values in this interval not displayed

## 2021-01-19 NOTE — ASSESSMENT & PLAN NOTE
Wt Readings from Last 3 Encounters:   01/19/21 62 kg (136 lb 11 oz)   01/06/21 62 1 kg (136 lb 14 4 oz)   12/22/20 60 8 kg (134 lb)     Patient has a history of chronic diastolic congestive heart failure  She was recently admitted 12/2020 with an acute exacerbation of her chronic diastolic congestive heart failure  Home Lasix on hold due to acute kidney injury  She previously been on Lasix 20 mg daily  Continue Coreg 12 5 mg b i d    No ACE-inhibitor in light of her chronic kidney disease  Continue isosorbide/hydralazine

## 2021-01-19 NOTE — UTILIZATION REVIEW
Initial Clinical Review    Admission: Date/Time/Statement:   Admission Orders (From admission, onward)     Ordered        01/18/21 1513  INPATIENT ADMISSION  Once                   Orders Placed This Encounter   Procedures    INPATIENT ADMISSION     Standing Status:   Standing     Number of Occurrences:   1     Order Specific Question:   Level of Care     Answer:   Med Surg [16]     Order Specific Question:   Bed Type     Answer:   [place] [4]     Order Specific Question:   Estimated length of stay     Answer:   More than 2 Midnights     Order Specific Question:   Certification     Answer:   I certify that inpatient services are medically necessary for this patient for a duration of greater than two midnights  See H&P and MD Progress Notes for additional information about the patient's course of treatment  ED Arrival Information     Expected Arrival Acuity Means of Arrival Escorted By Service Admission Type    - 1/18/2021 11:44 Urgent [address] [facility] ([facility]) Hospitalist Urgent    Arrival Complaint    -        Chief Complaint   Patient presents with    Leg Swelling     inpatient here recently for same, LE edema states worse over past few days, LLE redness, denies fever, chills  Assessment/Plan: 79 yo female to ED by EMS presents with left lower extremity cellulitis progressively worsening with burning & pain  since DC on 1/6/2021  Patient treated with IV antibiotics   reports patient unable to ambulate last 2 days  PMhx CKD (creatinine 1 7-2 2) with an atrophic left kidney, chronic Hypoxic resp failure uses 3 L O2  [place] [facility] patient stuck at table for 3 days ; she is not safe to return home  IN ED: Exam-Left lower leg:  Edema (chronic venous stasis changes and ulcer, pt with 3x4 cm area of warmth ttp over quadriceps w/o crepitus fluctuance or induration)   She received IVF, IV ANTIBX, IV calcium, insulin & Kayexelate     Admit Inpatient due to Cellulitis, Ambulatory dysfunction, Hyperkalemia, Hyponatremia, LILIAN, HTN:  consult Podiatry for potential surgical intervention, once Podiatry eval can eval for vascular consult, cont IV Ancef  Consult OT/PT  Gentle IVF & flud restrict, obtain EKG, give 1 dose Kayexalate & ryan potassium in 2 HR, cont BP meds  Hold RAAS blockers, diuretics, renal dose all meds  1/19/2021 PODIATRY  Patient is centrally has 3 options given her current condition  As it stands the perfusion to her lower limit is insufficient to be able to heal these wounds  Her options are as follows  1  Benign neglect  Palliative care with Betadine wet-to-dry dressing  2  Amputation of the limb  3  Attempted reperfusion of the limb with limb salvage surgical procedures involving wound debridements partial amputation of digits, etc   All 3 of these options, serious complication and risk  I tried my best to discuss what these would involve so she can make an informed decision  She was unable to make a decision this morning  I would suggest another vascular surgery consultation to discuss an angiogram     1/19/2021 Nephrology  hyponatremia hyperkalemia slowly improving, creatinine is still rising  LILIAN on CKD: cont gentle IVF, fluid restriction diet, serial labs, I&O, check bladder scan, antibx per primary   Keep holding diuretics in setting of LILIAN; monitor H&H    ED Triage Vitals   Temperature Pulse Respirations Blood Pressure SpO2   01/18/21 1150 01/18/21 1150 01/18/21 1150 01/18/21 1150 01/18/21 1150   97 6 °F (36 4 °C) 57 18 106/53 100 %      Temp Source Heart Rate Source Patient Position - Orthostatic VS BP Location FiO2 (%)   01/18/21 1150 01/18/21 1452 01/18/21 1452 01/18/21 1452 --   Temporal Monitor Lying Right arm       Pain Score       01/18/21 2000 1          Wt Readings from Last 1 Encounters:   01/19/21 62 kg (136 lb 11 oz)     Additional Vital Signs:   Date/Time  Temp  Pulse  Resp  BP  MAP (mmHg)  SpO2  Calculated FIO2 (%) - Nasal Cannula Nasal Cannula O2 Flow Rate (L/min)  O2 Device  Patient Position - Orthostatic VS   01/19/21 1100    56    109/55            Sitting   01/19/21 0900              28  2 L/min  Nasal cannula     01/19/21 0743  96 7 °F (35 9 °C)Abnormal   98  18  106/63    96 %      Nasal cannula  Lying   01/19/21 0509    60    113/59               01/18/21 2324  96 8 °F (36 °C)Abnormal   62  18  103/58    98 %  28  2 L/min  Nasal cannula  Lying   01/18/21 1838  97 4 °F (36 3 °C)Abnormal   51Abnormal   20  109/57    98 %  28  2 L/min  Nasal cannula  Lying   01/18/21 1700    50Abnormal   20  115/58    97 %  28  2 L/min  Nasal cannula  Lying   01/18/21 1553    62  16  128/60  87  98 %  28  2 L/min  Nasal cannula  Lying   01/18/21 1452    53Abnormal   12  115/55    100 %  32  3 L/min  Nasal cannula  Lying   01/18/21 1249                  None (Room air)     01/18/21 1150  97 6 °F (36 4 °C)  57  18  106/53    100 %  28  2 L/min  Nasal cannula        Weights (last 14 days)    Date/Time  Weight  Weight Method   01/19/21 0546  62 kg (136 lb 11 oz)  Bed scale       Pertinent Labs/Diagnostic Test Results:       Results from last 7 days   Lab Units 01/19/21  0515 01/18/21  1314   WBC Thousand/uL 12 65* 13 02*   HEMOGLOBIN g/dL 7 4* 7 4*   HEMATOCRIT % 23 4* 23 9*   PLATELETS Thousands/uL 228 241   NEUTROS ABS Thousands/µL  --  10 96*         Results from last 7 days   Lab Units 01/19/21  0852 01/19/21  0515 01/19/21  0108 01/18/21  2051 01/18/21  1602 01/18/21  1314   SODIUM mmol/L 129* 130* 127*  --  127* 124*   POTASSIUM mmol/L 4 9 5 2 5 0 5 7* 5 5* 6 0*   CHLORIDE mmol/L 98* 98* 96*  --  97* 95*   CO2 mmol/L 18* 20* 18*  --  18* 19*   ANION GAP mmol/L 13 12 13  --  12 10   BUN mg/dL 126* 127* 125*  --  121* 123*   CREATININE mg/dL 3 63* 3 70* 3 62*  --  3 34* 3 30*   EGFR ml/min/1 73sq m 10 10 10  --  12 12   CALCIUM mg/dL 8 2* 8 7 8 1*  --  8 6 8 7             Results from last 7 days   Lab Units 01/19/21  7381 01/19/21  0515 01/19/21  0108 01/18/21  1602 01/18/21  1314   GLUCOSE RANDOM mg/dL 102 86 140 82 100     Results from last 7 days   Lab Units 01/18/21  1602   OSMOLALITY, SERUM mmol/*         No results found for: BETA-HYDROXYBUTYRATE               Results from last 7 days   Lab Units 01/18/21  1515   CK TOTAL U/L 32             Results from last 7 days   Lab Units 01/19/21  0515 01/18/21  1515   PROTIME seconds 58 6* 81 3*   INR  6 98* 10 64*     Results from last 7 days   Lab Units 01/18/21  1515   TSH 3RD GENERATON uIU/mL 10 623*           Results from last 7 days   Lab Units 01/18/21  1515   NT-PRO BNP pg/mL 15,067*     Results from last 7 days   Lab Units 01/18/21  1602   FERRITIN ng/mL 215     Results from last 7 days   Lab Units 01/18/21  1609   CLARITY UA  Turbid   COLOR UA  Straw   SPEC GRAV UA  1 015   PH UA  6 0   GLUCOSE UA mg/dl Negative   KETONES UA mg/dl Negative   BLOOD UA  Large*   PROTEIN UA mg/dl 100 (2+)*   NITRITE UA  Negative   BILIRUBIN UA  Negative   UROBILINOGEN UA E U /dl 0 2   LEUKOCYTES UA  Large*   WBC UA /hpf Innumerable*   RBC UA /hpf Field obscured, unable to enumerate*   BACTERIA UA /hpf Innumerable*   EPITHELIAL CELLS WET PREP /hpf Field obscured, unable to enumerate*   SODIUM UR  47                                 Results from last 7 days   Lab Units 01/18/21  1515   BLOOD CULTURE  Received in Microbiology Lab  Culture in Progress  Received in Microbiology Lab  Culture in Progress         1/18 EKG:  Sinus bradycardia rate of 53, QTC of 418, slight increase in T-wave amplitude at the lateral leads      ED Treatment:   Medication Administration from 01/18/2021 1143 to 01/18/2021 1829       Date/Time Order Dose Route Action     01/18/2021 1314 ceFAZolin (ANCEF) IVPB (premix in dextrose) 2,000 mg 50 mL 2,000 mg Intravenous New Bag     01/18/2021 1518 sodium chloride 0 9 % bolus 1,000 mL 1,000 mL Intravenous New Bag     01/18/2021 1503 insulin regular (HumuLIN R,NovoLIN R) injection 10 Units 10 Units Intravenous Given     01/18/2021 1500 dextrose 50 % IV solution 50 mL 50 mL Intravenous Given     01/18/2021 1508 calcium gluconate 1 g in sodium chloride 0 9% 50 mL (premix) 1 g Intravenous New Bag     01/18/2021 1541 sodium polystyrene (KAYEXALATE) powder 15 g 15 g Oral Given        Past Medical History:   Diagnosis Date    Anemia 1/18/2021    Chronic kidney disease     Hypertension      Present on Admission:   Cellulitis   Acute deep vein thrombosis (DVT) of calf muscle vein of right lower extremity (HCC)   LILIAN [facility]) [facility] )   Hyponatremia   Leg ulcer, left, limited to breakdown of skin ([facility] )   Essential hypertension    Admitting Diagnosis: Acute hyponatremia [E87 1]  Acute hyperkalemia [E87 5]  Cellulitis [L03 90]  Leukocytosis [D72 829]  Anemia [D64 9]  Leg swelling [M79 89]  LILIAN (acute kidney injury) ([facility] ) [N17 9]  Bilateral lower extremity edema [R60 0]  Age/Sex: 80 y o  female  Admission Orders:  telemetry  Renal diet & fluid restrict 1800 ml  Bladder scan  scd  I&O  Daily wt  Scheduled Medications:  [START ON 1/20/2021] amLODIPine, 5 mg, Oral, Daily  carvedilol, 12 5 mg, Oral, BID With Meals  cefazolin, 1,000 mg, Intravenous, Q24H  docusate sodium, 100 mg, Oral, BID  hydrALAZINE, 10 mg, Oral, Q8H [facility] HOME  isosorbide dinitrate, 10 mg, Oral, TID after meals  levothyroxine, 25 mcg, Oral, Early Morning  saccharomyces boulardii, 250 mg, Oral, BID  sodium bicarbonate, 650 mg, Oral, TID after meals    Continuous IV Infusions:  sodium chloride, 75 mL/hr, Intravenous, Continuous      PRN Meds:  acetaminophen, 650 mg, Oral, Q6H PRN  ondansetron, 4 mg, Intravenous, Q6H PRN      IP CONSULT TO NEPHROLOGY  IP CONSULT TO PODIATRY    Network Utilization Review Department  ATTENTION: Please call with any questions or concerns to [phone] and carefully listen to the prompts so that you are directed to the right person   All voicemails are confidential   Cindy canada requests for admission clinical reviews, approved or denied determinations and any other requests to dedicated fax number below belonging to the campus where the patient is receiving treatment   List of dedicated fax numbers for the Facilities:  [address] DENIALS (Administrative/Medical Necessity) [phone]   [address]  (Maternity/NICU/Pediatrics) [phone] [address] Lukas Hagan 6031 (  Blaze James "Yuki" 103) [address] [address] Piyush Ron 14[address] [place] [address] [phone]

## 2021-01-19 NOTE — ASSESSMENT & PLAN NOTE
Patient has a history of essential hypertension  She was continued on her home antihypertensives with Norvasc 5 mg daily, Coreg 12 5 mg b i d , hydralazine 10 mg q 8 hours, and isosorbide dinitrate 10 mg t i d   Blood pressure currently adequately controlled    Continue medications and monitor

## 2021-01-19 NOTE — OCCUPATIONAL THERAPY NOTE
Occupational Therapy Evaluation     Patient Name: Lisa Jauregui  ITZNL'CARLITOS Date: 1/19/2021  Problem List  Principal Problem:    Cellulitis  Active Problems:    Acute deep vein thrombosis (DVT) of calf muscle vein of right lower extremity (HCC)    LILIAN (acute kidney injury) ([facility] )    Hyponatremia    Leg ulcer, left, limited to breakdown of skin (HCC)    Essential hypertension    Hyperkalemia    Anemia    Chronic diastolic heart failure (HCC)    Ambulatory dysfunction    Chronic hypoxemic respiratory failure [facility])    Past Medical History  Past Medical History:   Diagnosis Date    Anemia 1/18/2021    Chronic kidney disease     Hypertension      Past Surgical History  History reviewed  No pertinent surgical history  01/19/21 1136   Note Type   Note type Evaluation   Restrictions/Precautions   Weight Bearing Precautions Per Order Yes   RLE Weight Bearing Per Order WBAT   LLE Weight Bearing Per Order WBAT   Other Precautions Cognitive; Chair Alarm; Bed Alarm;Multiple lines; Fall Risk;WBS   Pain Assessment   Pain Assessment Tool Pain Assessment not indicated - pt denies pain   Pain Score No Pain   Home Living   Type of [address] [place] [address] One level; Able to live on main level with bedroom/bathroom; Performs ADLs on one level  (0 CHERYLE)   Bathroom Shower/Tub Tub/shower unit   Bathroom Toilet Raised  (BSC over toilet)   Bathroom Equipment Grab bars in shower; Shower chair;Commode   [facility]; Other (Comment)  (Home O2, 3L)   Additional Comments Pt lives in a one level house with 0 CHERYLE  Pt reports spouse is able to assist as needed  Prior Function   Level of [place] Needs assistance with ADLs and functional mobility; Needs assistance with IADLs   Lives With Spouse   Receives Help From Family   ADL Assistance Needs assistance   IADLs Needs assistance   Falls in the last 6 months 1 to 4   Vocational Retired   Comments At baseline, pt reports requiring assist w/ ADLs and IADLs  Pt reports I w/ functional mobility/transfers with use of RW, however chart indicates pt unable to get up from kitchen table x3 days  Lifestyle   Autonomy At baseline, pt reports requiring assist w/ ADLs and IADLs  Pt reports I w/ functional mobility/transfers with use of RW, however chart indicates pt unable to get up from kitchen table x3 days  Reciprocal Relationships Spouse   Service to Others Retired- [employer]   Intrinsic Gratification Spending time with family   Psychosocial   Psychosocial (WDL) WDL   ADL   Where Assessed Edge of bed   Eating Assistance 5  Supervision/Setup   Grooming Assistance 4  Minimal Assistance   [address] 4  Minimal Assistance   LB [address] 3  Moderate Assistance   [address] 4  [address] 2  Maximal [address]  2  Maximal [address] 2  Maximal Assistance   Bed Mobility   Supine to Sit 2  Maximal assistance   Additional items Assist x 2;HOB elevated; Bedrails; Increased time required;Verbal cues;LE management   Sit to Supine 2  Maximal assistance   Additional items Assist x 2; Increased time required;Verbal cues;LE management   Additional Comments Pt able to tolerate approx  10 mins sitting EOB with Fair static seated balance and Poor+ dynamic seated balance  Pt lying supine at end of session with bed alarm activated  Call bell and phone within reach  All needs met and pt reports no further questions for OT at this time   Transfers   Sit to Stand Unable to assess   Additional Comments OOB deferred at this time 2* increased lethargy, INR: 6 98, and hgb 7 4   Balance   Static Sitting Fair   Dynamic Sitting Poor +   Activity Tolerance   Activity Tolerance Treatment limited secondary to medical complications (Comment)   Medical Staff Made Aware Lyn Hurtado, PT   Nurse Made Aware yes;  German Salvador, RN   RUE Assessment   RUE Assessment X  (decreased shldr flex; Elbow-distal=WFL)   RUE Overall AROM   R Shoulder Flexion 50-60* AROM, AAROM: WFL   RUE Overall PROM   R Shoulder Flexion WFL   RUE Strength   R Shoulder Flexion 3-/5   R Shoulder Extension 3-/5   R Elbow Flexion 3+/5   R Elbow Extension 3+/5   LUE Assessment   LUE Assessment WFL   LUE Strength   LUE Overall Strength Within Functional Limits - able to perform ADL tasks with strength  (3+/5 throughout)   Hand Function   Gross Motor Coordination Functional   Fine Motor Coordination Functional   Sensation   Light Touch Partial deficits in the RLE;Partial deficits in the LLE   Proprioception   Proprioception No apparent deficits   Vision-Basic Assessment   Current Vision No visual deficits   Vision - Complex Assessment   Ocular Range of Motion [facility]   Additional Comments Unable to read employee name zach, able to accurately identify number of digits held up by therapist   Perception   Inattention/Neglect Appears intact   Cognition   Overall Cognitive Status Impaired   Arousal/Participation Lethargic;Responsive   Attention Attends with cues to redirect   Orientation Level Oriented to person;Oriented to place; Disoriented to time;Disoriented to situation   Memory Decreased recall of precautions;Decreased recall of recent events;Decreased short term memory   Following Commands Follows one step commands with increased time or repetition   Comments Lethargic  Increased verbal and tactile cues required to maintain attention/alertness throughout session   Assessment   Limitation Decreased ADL status; Decreased UE ROM; Decreased UE strength;Decreased Safe judgement during ADL;Decreased cognition;Decreased endurance;Decreased self-care trans;Decreased high-level ADLs   Prognosis Fair   Assessment Pt is a 80 y o  female seen for OT evaluation s/p adm to [facility]/18[facility]21 w/ LE edema and dx'd w/ Cellulitis, chronic hypoxemic respiratory failure, and ambulatory dysfunction   Of note, pt with recent admission from 20 to 21 for Acute diastolic CHF and ulcer of R LE  Pt evaluated by vascular surgery during previous admission, however pt deferred treatment  Podiatry consult this admission (21) for R LE venous wounds and L LE cellulitis, WBAT B/L LEs  Comorbidities affecting pts functional performance include a significant PMH of Anemia, CKD, and HTN  Pt with active OT orders and activity orders for Activity as tolerated  Pt lives in a one level house with 0 CHERYLE  Pt reports spouse is able to assist as needed  At baseline, pt reports requiring assist w/ ADLs and IADLs  Pt reports I w/ functional mobility/transfers with use of RW, however chart indicates pt unable to get up from kitchen table x3 days  Upon evaluation, pt currently requires Min A for UB ADLs, Max-Mod A for LB ADLs, Max A for toileting, and Max A of 2 for bed mobility (OOB deferred) 2* the following deficits impacting occupational performance: weakness, decreased ROM, decreased strength, decreased balance, decreased tolerance, impaired problem solving and decreased safety awareness  These impairments, as well at pts difficulty performing ADLS and limited insight into deficits limit pts ability to safely engage in all baseline areas of occupation  Pt scored overall 35/100 on the Barthel Index  Based on the aforementioned OT evaluation, functional performance deficits, and assessments, pt has been identified as a High complexity evaluation  Pt to continue to benefit from continued acute OT services during hospital stay to address defined deficits and to maximize level of functional independence in the following Occupational Performance areas: grooming, bathing/shower, toilet hygiene, dressing, medication management, health maintenance, functional mobility, community mobility, clothing management and social participation  From OT standpoint, recommend STR upon D/C   OT will continue to follow pt 3-5x/wk to address the following goals to  w/in 10-14 days:   Goals   Patient Goals None stated at this time   LTG Time Frame 10-14   Long Term Goal Please refer to LTGs listed below   Plan   Treatment Interventions ADL retraining;Functional transfer training;UE strengthening/ROM; Endurance training;Patient/family training;Equipment evaluation/education; Compensatory technique education;Continued evaluation; Activityengagement   Goal Expiration Date 02/02/21   OT Treatment Day 0   OT Frequency 3-5x/wk   Recommendation   OT Discharge Recommendation Post-Acute Rehabilitation Services   OT - OK to Discharge Yes  (when medically cleared to rehab)   Barthel Index   Feeding 5   Bathing 0   Grooming Score 0   Dressing Score 5   Bladder Score 5   Bowels Score 10   Toilet Use Score 5   Transfers (Bed/Chair) Score 5   Mobility (Level Surface) Score 0   Stairs Score 0   Barthel Index Score 35   Modified Salima Scale   Modified Salima Scale 4          GOALS    1  Pt will improve activity tolerance to G for min 30 min txment sessions for increase engagement in functional tasks    2  Pt will complete bed mobility at a Supervision level w/ G balance/safety demonstrated to decrease caregiver assistance required      3  Pt will increase dynamic seated balance to Fair+ increase safety during participation in ADLs     4  Pt will complete UB dressing/self care w/ Supervision using adaptive device and DME as needed     5  Pt will complete LB dressing/self care w/ min A using adaptive device and DME as needed    6  Pt will complete toileting w/ min A w/ G hygiene/thoroughness using DME as needed    7  Pt will be attentive 100% of the time during ongoing cognitive assessment w/ G participation to assist w/ safe d/c planning/recommendations    8  Pt will demonstrate G carryover of pt/caregiver education and training as appropriate w/o cues w/ good tolerance to increase safety during functional tasks    9   Pt will demonstrate 100% carryover of energy conservation techniques t/o functional I/ADL/leisure tasks w/o cues s/p skilled education to increase endurance during functional tasks    10  Pt will increase BUE strength by 1MM grade via AROM/AAROM/PROM exercises to increase independence in ADLs and transfers    11  Pt will verbalize 3 potential fall hazards and identify appropriate compensatory techniques to decrease fall risk in home environment     12   Pt will tolerate continued OOB assessment and appropriate functional transfer/mobility goals will be established by OTR as applicable       Milda Apley, OTR/ESTEBAN

## 2021-01-19 NOTE — QUICK NOTE
Communicated with Dr Darrel Litten regarding urgency of consult  He is in agreement that consult can be seen tomorrow  Right foot xray was ordered and wound care orders were placed for the time being

## 2021-01-20 ENCOUNTER — APPOINTMENT (INPATIENT)
Dept: CT IMAGING | Facility: HOSPITAL | Age: 86
DRG: 299 | End: 2021-01-20
Payer: MEDICARE

## 2021-01-20 LAB
ANION GAP SERPL CALCULATED.3IONS-SCNC: 15 MMOL/L (ref 4–13)
BUN SERPL-MCNC: 128 MG/DL (ref 5–25)
CALCIUM SERPL-MCNC: 8.2 MG/DL (ref 8.3–10.1)
CHLORIDE SERPL-SCNC: 96 MMOL/L (ref 100–108)
CO2 SERPL-SCNC: 16 MMOL/L (ref 21–32)
CREAT SERPL-MCNC: 3.61 MG/DL (ref 0.6–1.3)
ERYTHROCYTE [DISTWIDTH] IN BLOOD BY AUTOMATED COUNT: 17.1 % (ref 11.6–15.1)
GFR SERPL CREATININE-BSD FRML MDRD: 11 ML/MIN/1.73SQ M
GLUCOSE SERPL-MCNC: 120 MG/DL (ref 65–140)
HCT VFR BLD AUTO: 23.2 % (ref 34.8–46.1)
HGB BLD-MCNC: 7.2 G/DL (ref 11.5–15.4)
INR PPP: 4.23 (ref 0.84–1.19)
MCH RBC QN AUTO: 27.1 PG (ref 26.8–34.3)
MCHC RBC AUTO-ENTMCNC: 31 G/DL (ref 31.4–37.4)
MCV RBC AUTO: 87 FL (ref 82–98)
PLATELET # BLD AUTO: 235 THOUSANDS/UL (ref 149–390)
PMV BLD AUTO: 10.1 FL (ref 8.9–12.7)
POTASSIUM SERPL-SCNC: 4.7 MMOL/L (ref 3.5–5.3)
PROTHROMBIN TIME: 39.8 SECONDS (ref 11.6–14.5)
RBC # BLD AUTO: 2.66 MILLION/UL (ref 3.81–5.12)
SODIUM SERPL-SCNC: 127 MMOL/L (ref 136–145)
WBC # BLD AUTO: 12.69 THOUSAND/UL (ref 4.31–10.16)

## 2021-01-20 PROCEDURE — 97112 NEUROMUSCULAR REEDUCATION: CPT | Performed by: PHYSICAL THERAPIST

## 2021-01-20 PROCEDURE — 97110 THERAPEUTIC EXERCISES: CPT | Performed by: PHYSICAL THERAPIST

## 2021-01-20 PROCEDURE — 80048 BASIC METABOLIC PNL TOTAL CA: CPT | Performed by: INTERNAL MEDICINE

## 2021-01-20 PROCEDURE — 85027 COMPLETE CBC AUTOMATED: CPT | Performed by: INTERNAL MEDICINE

## 2021-01-20 PROCEDURE — 74176 CT ABD & PELVIS W/O CONTRAST: CPT

## 2021-01-20 PROCEDURE — 99233 SBSQ HOSP IP/OBS HIGH 50: CPT | Performed by: INTERNAL MEDICINE

## 2021-01-20 PROCEDURE — 99253 IP/OBS CNSLTJ NEW/EST LOW 45: CPT | Performed by: SURGERY

## 2021-01-20 PROCEDURE — 97530 THERAPEUTIC ACTIVITIES: CPT

## 2021-01-20 PROCEDURE — 97535 SELF CARE MNGMENT TRAINING: CPT

## 2021-01-20 PROCEDURE — G1004 CDSM NDSC: HCPCS

## 2021-01-20 PROCEDURE — 99232 SBSQ HOSP IP/OBS MODERATE 35: CPT | Performed by: INTERNAL MEDICINE

## 2021-01-20 PROCEDURE — 85610 PROTHROMBIN TIME: CPT | Performed by: INTERNAL MEDICINE

## 2021-01-20 PROCEDURE — 99253 IP/OBS CNSLTJ NEW/EST LOW 45: CPT | Performed by: PHYSICIAN ASSISTANT

## 2021-01-20 RX ORDER — SODIUM BICARBONATE 650 MG/1
1300 TABLET ORAL
Status: DISCONTINUED | OUTPATIENT
Start: 2021-01-20 | End: 2021-01-23

## 2021-01-20 RX ADMIN — HYDRALAZINE HYDROCHLORIDE 10 MG: 10 TABLET, FILM COATED ORAL at 15:07

## 2021-01-20 RX ADMIN — HYDRALAZINE HYDROCHLORIDE 10 MG: 10 TABLET, FILM COATED ORAL at 05:47

## 2021-01-20 RX ADMIN — ISOSORBIDE DINITRATE 10 MG: 10 TABLET ORAL at 16:56

## 2021-01-20 RX ADMIN — CARVEDILOL 12.5 MG: 6.25 TABLET, FILM COATED ORAL at 16:56

## 2021-01-20 RX ADMIN — Medication 250 MG: at 08:47

## 2021-01-20 RX ADMIN — SODIUM BICARBONATE 650 MG TABLET 1300 MG: at 08:47

## 2021-01-20 RX ADMIN — HYDRALAZINE HYDROCHLORIDE 10 MG: 10 TABLET, FILM COATED ORAL at 22:09

## 2021-01-20 RX ADMIN — Medication 250 MG: at 16:56

## 2021-01-20 RX ADMIN — ISOSORBIDE DINITRATE 10 MG: 10 TABLET ORAL at 12:30

## 2021-01-20 RX ADMIN — DOCUSATE SODIUM 100 MG: 100 CAPSULE, LIQUID FILLED ORAL at 08:47

## 2021-01-20 RX ADMIN — SODIUM BICARBONATE 650 MG TABLET 1300 MG: at 16:55

## 2021-01-20 RX ADMIN — DOCUSATE SODIUM 100 MG: 100 CAPSULE, LIQUID FILLED ORAL at 16:56

## 2021-01-20 RX ADMIN — CEFAZOLIN SODIUM 1000 MG: 1 SOLUTION INTRAVENOUS at 11:31

## 2021-01-20 RX ADMIN — LEVOTHYROXINE SODIUM 25 MCG: 25 TABLET ORAL at 05:32

## 2021-01-20 RX ADMIN — SODIUM BICARBONATE 650 MG TABLET 1300 MG: at 12:30

## 2021-01-20 NOTE — ASSESSMENT & PLAN NOTE
Patient has a history of peripheral arterial disease  12/2020 she was evaluated by the vascular surgery team and had LEADS that revealed   Right lower extremity:  occlusion versus high-grade stenosis at the mid/distal popliteal artery, and focal stenosis in the proximal superficial femoral artery and distal superficial femoral artery  Also finding suggestive of tibial peroneal disease  ÁNGEL 0 46  Left lower extremity:  Occlusion versus high-grade stenosis of the proximal thigh portion of the superficial femoral artery with distal reconstitution, finding suggestive of tibioperoneal disease    ÁNGEL 0 71  Patient had declined previous vascular surgery intervention  Currently now with 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

## 2021-01-20 NOTE — ASSESSMENT & PLAN NOTE
Recent Labs     01/19/21  0852 01/19/21  1628 01/19/21  2210 01/20/21  0541   SODIUM 129* 126* 127* 127*     Patient presented with hyponatremia, likely secondary to hypovolemic hyponatremia  Appreciate Nephrology evaluation and recommendations  Patient was placed on isotonic IV fluids with normal saline  Continue 1 5 L fluid restriction  Home diuretics on hold  Started on sodium bicarbonate for metabolic acidosis secondary to acute kidney injury
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
## 2021-01-20 NOTE — PLAN OF CARE
Problem: OCCUPATIONAL THERAPY ADULT  Goal: Performs self-care activities at highest level of function for planned discharge setting  See evaluation for individualized goals  Description: Treatment Interventions: ADL retraining, Functional transfer training, UE strengthening/ROM, Endurance training, Patient/family training, Equipment evaluation/education, Compensatory technique education, Continued evaluation, Activityengagement          See flowsheet documentation for full assessment, interventions and recommendations  Outcome: Progressing  Note: Limitation: Decreased ADL status, Decreased UE ROM, Decreased UE strength, Decreased Safe judgement during ADL, Decreased cognition, Decreased endurance, Decreased self-care trans, Decreased high-level ADLs  Prognosis: Fair  Assessment: Pt seen for OT treatment session focusing on functional activity tolerance, bed mobility, ADLs, and UE ROM/strengthening  Pt w/ increased alertness from initial eval  Pt lying supine at start of session  UE exercises completed while lying supine to increase UE ROM/strength needed for ADLs and transfers  AAROM required for R UE to achieve full end range ROM  Pt tolerated well, no c/o pain or fatigue  Grooming tasks completed with Min A with assist to brush back of hair 2* decreased UE ROM  LB dressing completed with Total A with impaired functional reach demonstrated to don/doff B/L socks  Pt declining sitting EOB and OOB trial at this time, reporting just getting back from CT scan and increased fatigue  Pt agreeable to long sitting trial  Pt able to complete long sit with Max A and B/L UE support on bedrails  Pt able to tolerate 30 seconds in long sit position w/ back unsupported x3 trials  Pt lying supine at end of session with call bell and phone within reach  Bed alarm activated  All needs met and pt reports no further questions for OT at this time  Continue to recommend STR when medically cleared  OT to follow pt on caseload  OT Discharge Recommendation: Post-Acute Rehabilitation Services  OT - OK to Discharge: Yes(when medically cleared to rehab)

## 2021-01-20 NOTE — PROGRESS NOTES
NEPHROLOGY PROGRESS NOTE   Viridiana Carroll 80 y o  female MRN: [id number]  Unit/Bed#: E5 -01 Encounter: [id number]  Reason for Consult: LILIAN (POA) on likely CKD IV    Plan:  -creatinine remains elevated at 3 6   -currently monitoring off of fluids  -holding home diuretics  -Na level stable off of fluids at 127  Consider starting low dose salt tabs if trending downward vs lasix based on volume status    -continue to monitor daily BMP  -urology consulted for history of R ureteral stent with mild hydro on renal US  -would avoid IV dye/A-gram until creatinine is improved to baseline  If medically necessary now, will treat with IV fluid and mucomyst    -no urgent indication for renal replacement therapy at this time    -bicarbonate supplements increased for worsening acidosis  ASSESSMENT/PLAN:  LILIAN (POA) on likely CKD IV:  Suspect multifactorial with possible infection, diuretic use, as well as possible post renal component with history of atrophic left kidney measuring 7 1 cm and right-sided hydro in December 2020    -presented with creatinine of 3 3  Today 3 61   -unknown baseline creatinine, suspect around 1 7-2 2   -stopped fluid administration 2/2 decreasing Na level  Currently 127  -UA showed large blood, large leukocytes, 2+ protein, RBCs, innumerable WBCs, innumerable bacteria   -bladder scan: 321, continue urinary retention protocol     -potential need for future arteriogram per Vascular    Would hold off until creatinine is at baseline unless necessarily needed   -avoid nephrotoxins, hypotension, IV contrast   -I/O      Right-sided hydronephrosis:  With history of right ureteral stent placement in December 2020 at [address] revealed bilateral renal atrophy, more  on the left along with mild right hydronephrosis with ureteral stent in place   -Consult Urology for further management   -Continue bladder scans      Hyponatremia: suspect hypovolemic as Na level improving with hydration  -urine sodium 47, urine osmolality 338, serum osmolality 308, uric acid elevated at 9 0, TSH 10 6   -presented with sodium of 124 with normal glucose     -sodium level corrected to 129, correcting appropriately with decrease to 126 with fluids so IVF were discontinued  -continue on 1 5 L fluid restriction    -diuretics on hold       Hyperkalemia (POA):  Presents with potassium of 5 7  Received medical management with insulin, dextrose, Kayexalate, and calcium gluconate  (resolved)  -recommend low-potassium diet      Metabolic acidosis: In the setting of renal failure  -Increase oral bicarb to 1,300 mg and continue to monitor       Hypertension:  Stable   -avoid hypotension or high fluctuations in blood pressure   -outpatient antihypertensives:  Amlodipine 5 mg daily, carvedilol 12 5 mg 2 times per day, hydralazine 10 mg every 8 hours, Isordil 10 mg 3 times per day   -continue amlodipine, carvedilol, hydralazine, Isordil   -holding diuretics      Lower extremity cellulitis:  With dry gangrene to great toe and 4th toe   -podiatry following   -vascular surgery consulted to discuss a-gram and further intervention  Would hold off until creatinine is at baseline  If needed urgently, would hydrate pre and post contrast administration as well as give Mucomyst 1200 mg times total of 4 doses   -blood cultures negative to date   -local dressing changes to LE per vascular recommendations   -continues on IV antibiotics      CHF:  Echocardiogram showed EF of 50% with grade 2 diastolic dysfunction  No sandro signs of volume overload   -check daily weights, fluid restriction, I/O   -outpatient diuretics:  Lasix 40 mg daily   -continue to monitor volume status on IV hydration      Anemia:  -checking occult stools  -continue to monitor and transfuse as needed for hemoglobin less than 7 0   -iron panel showed low TIBC and low iron      Other:  PAD, DVT      Disposition: Requiring additional stay due to medical needs  SUBJECTIVE:  Patient resting comfortably in bed on 3L NC  She denies chest pain, SOB, N/V/D       OBJECTIVE:  Current Weight: Weight - Scale: 65 1 kg (143 lb 8 3 oz)  Vitals:    01/20/21 0544 01/20/21 0545 01/20/21 0716 01/20/21 1057   BP:   116/58 131/58   BP Location:   Left arm Left arm   Pulse:   (!) 54 59   Resp:   20 20   Temp:   (!) 96 9 °F (36 1 °C) 98 7 °F (37 1 °C)   TempSrc:   Temporal Temporal   SpO2:   97% 96%   Weight: 65 1 kg (143 lb 8 3 oz) 65 1 kg (143 lb 8 3 oz)         Intake/Output Summary (Last 24 hours) at 1/20/2021 1149  Last data filed at 1/20/2021 0855  Gross per 24 hour   Intake 480 ml   Output 650 ml   Net -170 ml     General: frail, cooperative  Skin: erythema to LLE with open wound, RLE dressing in place  HEENT: Moist mucous membranes, sclera anicteric, normocephalic, atraumatic  Neck: No apparent JVD appreciated  Chest: lung sounds clear B/L, on 3 L NC  CVS:Regular rate and rhythm, no murmer   Abdomen: Soft, round, non-tender, +BS  Extremities: edema present BL LE  Neuro: alert and oriented  Psych: appropriate mood and affect     Medications:    Current Facility-Administered Medications:     acetaminophen (TYLENOL) tablet 650 mg, 650 mg, Oral, Q6H PRN, Harsha Shook DO    amLODIPine (NORVASC) tablet 5 mg, 5 mg, Oral, Daily, OTIS Parmar, Stopped at 01/20/21 0843    carvedilol (COREG) tablet 12 5 mg, 12 5 mg, Oral, BID With Meals, OTIS aPrmar, Stopped at 01/19/21 1636    ceFAZolin (ANCEF) IVPB (premix in dextrose) 1,000 mg 50 mL, 1,000 mg, Intravenous, Q24H, Harsha Shook DO, Last Rate: 100 mL/hr at 01/20/21 1131, 1,000 mg at 01/20/21 1131    docusate sodium (COLACE) capsule 100 mg, 100 mg, Oral, BID, Harsha Shook DO, 100 mg at 01/20/21 0847    hydrALAZINE (APRESOLINE) tablet 10 mg, 10 mg, Oral, Q8H NATASHA, Harsha Shook DO, 10 mg at 01/20/21 0547    isosorbide dinitrate (ISORDIL) tablet 10 mg, 10 mg, Oral, TID after meals, Harsha Shook DO, Stopped at 01/18/21 1846    levothyroxine tablet 25 mcg, 25 mcg, Oral, Early Morning, Harsha Shook DO, 25 mcg at 01/20/21 0532    ondansetron (ZOFRAN) injection 4 mg, 4 mg, Intravenous, Q6H PRN, Harsha Shook DO    saccharomyces boulardii (FLORASTOR) capsule 250 mg, 250 mg, Oral, BID, Harsha Shook DO, 250 mg at 01/20/21 0847    sodium bicarbonate tablet 1,300 mg, 1,300 mg, Oral, TID after meals, OTIS Vallecillo, 1,300 mg at 01/20/21 8247    Laboratory Results:  Results from last 7 days   Lab Units 01/20/21  0541 01/19/21  2210 01/19/21  1628 01/19/21  0852 01/19/21  0515  01/18/21  1314   WBC Thousand/uL 12 69*  --   --   --  12 65*  --  13 02*   HEMOGLOBIN g/dL 7 2*  --   --   --  7 4*  --  7 4*   HEMATOCRIT % 23 2*  --   --   --  23 4*  --  23 9*   PLATELETS Thousands/uL 235  --   --   --  228  --  241   SODIUM mmol/L 127* 127* 126* 129* 130*   < > 124*   POTASSIUM mmol/L 4 7  --  4 8 4 9 5 2   < > 6 0*   CHLORIDE mmol/L 96*  --  95* 98* 98*   < > 95*   CO2 mmol/L 16*  --  19* 18* 20*   < > 19*   BUN mg/dL 128*  --  123* 126* 127*   < > 123*   CREATININE mg/dL 3 61*  --  3 64* 3 63* 3 70*   < > 3 30*   CALCIUM mg/dL 8 2*  --  7 9* 8 2* 8 7   < > 8 7    < > = values in this interval not displayed

## 2021-01-20 NOTE — ASSESSMENT & PLAN NOTE
Wt Readings from Last 3 Encounters:   01/20/21 65 1 kg (143 lb 8 3 oz)   01/06/21 62 1 kg (136 lb 14 4 oz)   12/22/20 60 8 kg (134 lb)     Patient has a history of chronic diastolic congestive heart failure  She was recently admitted 12/2020 with an acute exacerbation of her chronic diastolic congestive heart failure  Home Lasix on hold due to acute kidney injury  She previously been on Lasix 20 mg daily  Continue Coreg 12 5 mg b i d    No ACE-inhibitor in light of her chronic kidney disease  Continue isosorbide/hydralazine

## 2021-01-20 NOTE — ASSESSMENT & PLAN NOTE
History of chronic anemia with baseline hemoglobin ranging 7-8  Hemoglobin 7 4  Hemoccult stool  Iron panel consistent with iron deficiency anemia  Unable to give IV Venofer due to acute infection    Lab Results   Component Value Date    HGB 7 2 (L) 01/20/2021    HGB 7 4 (L) 01/19/2021    HGB 7 4 (L) 01/18/2021    HGB 8 1 (L) 01/05/2021    HGB 7 3 (L) 01/04/2021    HGB 8 2 (L) 01/03/2021

## 2021-01-20 NOTE — ASSESSMENT & PLAN NOTE
Patient presented with coagulopathy secondary to Coumadin  INR was 10 on admission, and she was given 2 5 mg of vitamin K  INR improved to 6-and now 4   Continue to monitor, and hold Coumadin

## 2021-01-20 NOTE — PLAN OF CARE
Problem: Potential for Falls  Goal: Patient will remain free of falls  Description: INTERVENTIONS:  - Assess patient frequently for physical needs  -  Identify cognitive and physical deficits and behaviors that affect risk of falls  -  [place] fall precautions as indicated by assessment   - Educate patient/family on patient safety including physical limitations  - Instruct patient to call for assistance with activity based on assessment  - Modify environment to reduce risk of injury  - Consider OT/PT consult to assist with strengthening/mobility  Outcome: Progressing     Problem: Prexisting or High Potential for Compromised Skin Integrity  Goal: Skin integrity is maintained or improved  Description: INTERVENTIONS:  - Identify patients at risk for skin breakdown  - Assess and monitor skin integrity  - Assess and monitor nutrition and hydration status  - Monitor labs   - Assess for incontinence   - Turn and reposition patient  - Assist with mobility/ambulation  - Relieve pressure over bony prominences  - Avoid friction and shearing  - Provide appropriate hygiene as needed including keeping skin clean and dry  - Evaluate need for skin moisturizer/barrier cream  - Collaborate with interdisciplinary team   - Patient/family teaching  - Consider wound care consult   Outcome: Progressing     Problem: Nutrition/Hydration-ADULT  Goal: Nutrient/Hydration intake appropriate for improving, restoring or maintaining nutritional needs  Description: Monitor and assess patient's nutrition/hydration status for malnutrition  Collaborate with interdisciplinary team and initiate plan and interventions as ordered  Monitor patient's weight and dietary intake as ordered or per policy  Utilize nutrition screening tool and intervene as necessary  Determine patient's food preferences and provide high-protein, high-caloric foods as appropriate       INTERVENTIONS:  - Monitor oral intake, urinary output, labs, and treatment plans  - Assess nutrition and hydration status and recommend course of action  - Evaluate amount of meals eaten  - Assist patient with eating if necessary   - Allow adequate time for meals  - Recommend/ encourage appropriate diets, oral nutritional supplements, and vitamin/mineral supplements  - Order, calculate, and assess calorie counts as needed  - Recommend, monitor, and adjust tube feedings and TPN/PPN based on assessed needs  - Assess need for intravenous fluids  - Provide specific nutrition/hydration education as appropriate  - Include patient/family/caregiver in decisions related to nutrition  Outcome: Progressing     Problem: PAIN - ADULT  Goal: Verbalizes/displays adequate comfort level or baseline comfort level  Description: Interventions:  - Encourage patient to monitor pain and request assistance  - Assess pain using appropriate pain scale  - Administer analgesics based on type and severity of pain and evaluate response  - Implement non-pharmacological measures as appropriate and evaluate response  - Consider cultural and social influences on pain and pain management  - Notify physician/advanced practitioner if interventions unsuccessful or patient reports new pain  Outcome: Progressing     Problem: INFECTION - ADULT  Goal: Absence or prevention of progression during hospitalization  Description: INTERVENTIONS:  - Assess and monitor for signs and symptoms of infection  - Monitor lab/diagnostic results  - Monitor all insertion sites, i e  indwelling lines, tubes, and drains  - Monitor endotracheal if appropriate and nasal secretions for changes in amount and color  - [place] appropriate cooling/warming therapies per order  - Administer medications as ordered  - Instruct and encourage patient and family to use good hand hygiene technique  - Identify and instruct in appropriate isolation precautions for identified infection/condition  Outcome: Progressing  Goal: Absence of fever/infection during neutropenic period  Description: INTERVENTIONS:  - Monitor WBC    Outcome: Progressing     Problem: SAFETY ADULT  Goal: Patient will remain free of falls  Description: INTERVENTIONS:  - Assess patient frequently for physical needs  -  Identify cognitive and physical deficits and behaviors that affect risk of falls    -  [place] fall precautions as indicated by assessment   - Educate patient/family on patient safety including physical limitations  - Instruct patient to call for assistance with activity based on assessment  - Modify environment to reduce risk of injury  - Consider OT/PT consult to assist with strengthening/mobility  Outcome: Progressing  Goal: Maintain or return to baseline ADL function  Description: INTERVENTIONS:  -  Assess patient's ability to carry out ADLs; assess patient's baseline for ADL function and identify physical deficits which impact ability to perform ADLs (bathing, care of mouth/teeth, toileting, grooming, dressing, etc )  - Assess/evaluate cause of self-care deficits   - Assess range of motion  - Assess patient's mobility; develop plan if impaired  - Assess patient's need for assistive devices and provide as appropriate  - Encourage maximum independence but intervene and supervise when necessary  - Involve family in performance of ADLs  - Assess for home care needs following discharge   - Consider OT consult to assist with ADL evaluation and planning for discharge  - Provide patient education as appropriate  Outcome: Progressing  Goal: Maintain or return mobility status to optimal level  Description: INTERVENTIONS:  - Assess patient's baseline mobility status (ambulation, transfers, stairs, etc )    - Identify cognitive and physical deficits and behaviors that affect mobility  - Identify mobility aids required to assist with transfers and/or ambulation (gait belt, sit-to-stand, lift, walker, cane, etc )  - [place] fall precautions as indicated by assessment  - Record patient progress and toleration of activity level on Mobility SBAR; progress patient to next Phase/Stage  - Instruct patient to call for assistance with activity based on assessment  - Consider rehabilitation consult to assist with strengthening/weightbearing, etc   Outcome: Progressing     Problem: DISCHARGE PLANNING  Goal: Discharge to home or other facility with appropriate resources  Description: INTERVENTIONS:  - Identify barriers to discharge w/patient and caregiver  - Arrange for needed discharge resources and transportation as appropriate  - Identify discharge learning needs (meds, wound care, etc )  - Arrange for interpretive services to assist at discharge as needed  - Refer to Case Management Department for coordinating discharge planning if the patient needs post-hospital services based on physician/advanced practitioner order or complex needs related to functional status, cognitive ability, or social support system  Outcome: Progressing     Problem: Knowledge Deficit  Goal: Patient/family/caregiver demonstrates understanding of disease process, treatment plan, medications, and discharge instructions  Description: Complete learning assessment and assess knowledge base    Interventions:  - Provide teaching at level of understanding  - Provide teaching via preferred learning methods  Outcome: Progressing

## 2021-01-20 NOTE — CONSULTS
Consultation - General Surgery   Alonzo Blanton 80 y o  female MRN: [id number]  Unit/Bed#: E5 -01 Encounter: [id number]    Assessment/Plan     Assessment:  Patient known to the vascular service here due to chronic right heel pain but found to have extensive cellulitis of the entire left lower extremities  Surgery is consulted to reengage in possibility of revasculization for her chronic right foot wounds  On questioning, patient does not ambulate as much unless with help due to her right heel pain  Patient now also with new toe gangrene of the right 1st, 4th and 5th toes  The heel wound is stable  I spoke in length with her in terms of the options available including angioplasty/stenting of the affected vessels, palliative care/local wound care, amputation of the foot etc  Patient is currently still hesitant to make a decision  Plan:  - Podiatry input appreciated  - cont abx for her cellultis  - correction of hypercoagulopathy  - If patient is agreeable to the procedure, will need cardiology and nephrology clearance and optimization  - Other plans per primary  Wound care per podiatry  - Vascular surgery will follow  History of Present Illness   HPI:  Alonzo Blanton is a 80 y o  female who presents with right heel pain but found to have extensive cellulitis of the left lower extremity  Patient is known to the vascular surgery team  She was last seen just earlier this month due to R>L foot wounds  LEADs 12/29/2020 - Right: Occlusion vs high grade stenosis of mid/distal popliteal artery w/ stenosis x2 of SFA at the proximal and distal segments  Evidence of tibioperoneal disease  0 46/-/-  Left:  Occlusion vs high grade stenosis of proximal SFA w/ distal reconstitution  Evidence of tibioperoneal disease  0 71/-/-    Her wounds have since progressed  She now has new toe dry gangrene of the 1st, 4th and 5th toes of the R  Her R heel wound is stable       Patient denies chest pain/SOB/N/V/fever/chills  States that the redness of the left leg present for about 4-5 days  Occasionally also have heel pain on the left leg while walking  Inpatient consult to Vascular Surgery     Performed by  Nicolette Calzada MD     Authorized by Brittany Eagle MD              Review of Systems   12 systems reviewed and negative except for the above    Historical Information   Past Medical History:   Diagnosis Date    Anemia 1/18/2021    Chronic kidney disease     Hypertension      History reviewed  No pertinent surgical history    Social History   Social History     Substance and Sexual Activity   Alcohol Use Never    Frequency: Never     Social History     Substance and Sexual Activity   Drug Use Never     E-Cigarette/Vaping    E-Cigarette Use Never User      E-Cigarette/Vaping Substances    Nicotine No     THC No     CBD No     Flavoring No     Other No     Unknown No      Social History     Tobacco Use   Smoking Status Never Smoker   Smokeless Tobacco Never Used     Family History: non-contributory    Meds/Allergies   all current active meds have been reviewed, current meds:   Current Facility-Administered Medications   Medication Dose Route Frequency    acetaminophen (TYLENOL) tablet 650 mg  650 mg Oral Q6H PRN    [START ON 1/20/2021] amLODIPine (NORVASC) tablet 5 mg  5 mg Oral Daily    carvedilol (COREG) tablet 12 5 mg  12 5 mg Oral BID With Meals    ceFAZolin (ANCEF) IVPB (premix in dextrose) 1,000 mg 50 mL  1,000 mg Intravenous Q24H    docusate sodium (COLACE) capsule 100 mg  100 mg Oral BID    hydrALAZINE (APRESOLINE) tablet 10 mg  10 mg Oral Q8H [facility]    isosorbide dinitrate (ISORDIL) tablet 10 mg  10 mg Oral TID after meals    levothyroxine tablet 25 mcg  25 mcg Oral Early Morning    ondansetron (ZOFRAN) injection 4 mg  4 mg Intravenous Q6H PRN    saccharomyces boulardii (FLORASTOR) capsule 250 mg  250 mg Oral BID    sodium bicarbonate tablet 650 mg  650 mg Oral TID after meals    and PTA meds:   Prior to Admission Medications   Prescriptions Last Dose Informant Patient Reported? Taking?    amLODIPine (NORVASC) 5 mg tablet   No Yes   Sig: Take 1 tablet (5 mg total) by mouth daily   carvedilol (COREG) 12 5 mg tablet   No Yes   Sig: Take 1 tablet (12 5 mg total) by mouth 2 (two) times a day with meals   ergocalciferol (VITAMIN D2) 50,000 units   Yes Yes   Sig: take 1 capsule by mouth every week ON SATURDAY   furosemide (LASIX) 40 mg tablet   No Yes   Sig: Take 1 tablet (40 mg total) by mouth daily   gabapentin (NEURONTIN) 100 mg capsule   No Yes   Sig: Take 1 capsule (100 mg total) by mouth 3 (three) times a day   hydrALAZINE (APRESOLINE) 10 mg tablet   No Yes   Sig: Take 1 tablet (10 mg total) by mouth every 8 (eight) hours   isosorbide dinitrate (ISORDIL) 10 mg tablet   No Yes   Sig: Take 1 tablet (10 mg total) by mouth 3 (three) times daily after meals   levothyroxine 25 mcg tablet   No Yes   Sig: Take 1 tablet (25 mcg total) by mouth daily in the early morning   potassium chloride (K-DUR,KLOR-CON) 20 mEq tablet   No Yes   Sig: Take 1 tablet (20 mEq total) by mouth daily   senna (SENOKOT) 8 6 mg   No Yes   Sig: Take 1 tablet (8 6 mg total) by mouth daily at bedtime as needed for constipation   warfarin (COUMADIN) 4 mg tablet   No Yes   Sig: Take 0 5 tablets (2 mg total) by mouth daily      Facility-Administered Medications: None     Allergies   Allergen Reactions    No Known Allergies        Objective   First Vitals:   Blood Pressure: 106/53 (01/18/21 1150)  Pulse: 57 (01/18/21 1150)  Temperature: 97 6 °F (36 4 °C) (01/18/21 1150)  Temp Source: Temporal (01/18/21 1150)  Respirations: 18 (01/18/21 1150)  Weight - Scale: 62 kg (136 lb 11 oz) (01/19/21 7646)  SpO2: 100 % (01/18/21 1150)    Current Vitals:   Blood Pressure: 110/58 (01/19/21 1627)  Pulse: 60 (01/19/21 1627)  Temperature: (!) 97 4 °F (36 3 °C) (01/19/21 1627)  Temp Source: Temporal (01/19/21 1627)  Respirations: 18 (01/19/21 1627)  Weight - Scale: 62 kg (136 lb 11 oz) (01/19/21 0546)  SpO2: 95 % (01/19/21 1627)      Intake/Output Summary (Last 24 hours) at 1/19/2021 2023  Last data filed at 1/19/2021 1844  Gross per 24 hour   Intake 540 ml   Output 650 ml   Net -110 ml       Invasive Devices     Peripheral Intravenous Line            Peripheral IV 01/18/21 Right Forearm 1 day                Physical Exam   General: AAOx3, NAD, slightly malnourished looking  HEENT: atraumatic, non-icteric sclera  Card: RRR, not tachycardic  Pulm: on nasal cannula, not tachypnic  Abd: Soft, non-tender, non distended  LE: R foot with dry gangrene of 1st, 4/5th toe  No erythema/discharge  Dry skin with excoriation stigma of vascular insufficiency  Heel wound seen with scab on, stable from prior  DP/PT dopplerable but weaker compared to the left  Pop signals dopplerable  Fem palpable  L extremity with extensive cellulitis involving the entire distal foot up until proximal thigh near the groins  Tender and warm to palpation  DP/PT signals dopplerable  Pop deferred due to pain with manipulation  Fem palpable  No heel ulcer/wound      Lab Results:   I have personally reviewed pertinent lab results  , CBC:   Lab Results   Component Value Date    WBC 12 65 (H) 01/19/2021    HGB 7 4 (L) 01/19/2021    HCT 23 4 (L) 01/19/2021    MCV 86 01/19/2021     01/19/2021    MCH 27 2 01/19/2021    MCHC 31 6 01/19/2021    RDW 17 1 (H) 01/19/2021    MPV 10 5 01/19/2021   , CMP:   Lab Results   Component Value Date    SODIUM 126 (L) 01/19/2021    K 4 8 01/19/2021    CL 95 (L) 01/19/2021    CO2 19 (L) 01/19/2021     (H) 01/19/2021    CREATININE 3 64 (H) 01/19/2021    CALCIUM 7 9 (L) 01/19/2021    EGFR 10 01/19/2021   , Coagulation:   Lab Results   Component Value Date    INR 6 98 ([facility]) 01/19/2021     Imaging: I have personally reviewed pertinent reports     and I have personally reviewed pertinent films in PACS  EKG, Pathology, and Other Studies: I have personally reviewed pertinent reports  Counseling / Coordination of Care  Total floor / unit time spent today 45 minutes  Greater than 50% of total time was spent with the patient and / or family counseling and / or coordination of care

## 2021-01-20 NOTE — PROGRESS NOTES
Progress Note - Yuri Bowman 5/18/1930, 80 y o  female MRN: [id number]    Unit/Bed#: E5 -01 Encounter: [id number]    Primary Care Provider: Henry Pulido MD   Date and time admitted to hospital: 1/18/2021 11:44 AM        * Cellulitis  Assessment & Plan  Patient presented with extensive left lower extremity cellulitis extending from her toes up to her thigh  She was started on IV antibiotics with Ancef  Blood culture pending x2   Previous LEADS 12/2020 with significant left lower extremity peripheral arterial disease, and she had declined vascular surgery intervention  Cont iv abx      Chronic diastolic heart failure ([facility] [place] [address] )  Assessment & Plan  Wt Readings from Last 3 Encounters:   01/20/21 65 1 kg (143 lb 8 3 oz)   01/06/21 62 1 kg (136 lb 14 4 oz)   12/22/20 60 8 kg (134 lb)     Patient has a history of chronic diastolic congestive heart failure  She was recently admitted 12/2020 with an acute exacerbation of her chronic diastolic congestive heart failure  Home Lasix on hold due to acute kidney injury  She previously been on Lasix 20 mg daily  Continue Coreg 12 5 mg b i d    No ACE-inhibitor in light of her chronic kidney disease  Continue isosorbide/hydralazine      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 [facility])  Assessment & Plan  Patient presented with dry gangrene involving right great toe and 4th toe  Right anterior shin ulcer, and heel ulcer  Appreciate podiatry evaluation and recommendations  Patient with significant peripheral arterial due, and worsening condition of her right foot wound  Vascular surgery evaluation, however patient is not currently able to receive any angiogram or IV dye due to her acute kidney injury  Continue wound care  D/w nephrology- cont to [facility] renal function prior to any iv dye    LILIAN (acute kidney injury) [facility])  Assessment & Plan  -patient has a history of chronic kidney disease stage 3  -baseline creatinine appears to range 1 7-2 2  -patient was recently hospitalized 11/2020 at [facility] with acute kidney injury, right-sided hydronephrosis, and atrophic left kidney, and required right ureteral stent    -patient's previous creatinine and began ER general was approximately 2 0  -she presented with acute kidney injury with a creatinine of 3 3, that worsened to 3 6  -patient with associated metabolic acidosis and hyperkalemia  -appreciate Nephrology evaluation and recommendations  -cont urinary retention protocol  -continue IV fluids and bicarbonate  -no significant improvement as of yet  -appreciate urology input- stent functioning appropriately        Coagulopathy [facility])  Assessment & Plan  Patient presented with coagulopathy secondary to Coumadin  INR was 10 on admission, and she was given 2 5 mg of vitamin K  INR improved to 6-and now 4   Continue to monitor, and hold Coumadin    Chronic hypoxemic respiratory failure [facility])  Assessment & Plan  During her December 2020 admission patient was diagnosed with hypoxic respiratory failure secondary to her congestive heart failure atelectasis  She was discharged on 3 L nasal cannula  Continue oxygen  Currently with adequate oxygenation on 3 L    Ambulatory dysfunction  Assessment & Plan  Patient with 2 days of nonambulatory status, per her  she was sitting at the kitchen table unable to move    Likely secondary to her peripheral arterial disease, and cellulitis  Continue PT/OT  Case management consulted  Likely will need short-term rehab  Reportedly area of the aging is involved    Anemia  Assessment & Plan  History of chronic anemia with baseline hemoglobin ranging 7-8  Hemoglobin 7 4  Hemoccult stool  Iron panel consistent with iron deficiency anemia  Unable to give IV Venofer due to acute infection    Lab Results   Component Value Date    HGB 7 2 (L) 01/20/2021    HGB 7 4 (L) 01/19/2021    HGB 7 4 (L) 01/18/2021    HGB 8 1 (L) 01/05/2021    HGB 7 3 (L) 01/04/2021    HGB 8 2 (L) 01/03/2021         Hyperkalemia  Assessment & Plan  Patient presented with acute kidney injury and hyperkalemia  She was treated medically with calcium gluconate, insulin and D 50 and kayexelate with improvement      Recent Labs     01/19/21  0515 01/19/21  0852 01/19/21  1628 01/20/21  0541   K 5 2 4 9 4 8 4 7         Essential hypertension  Assessment & Plan  Patient has a history of essential hypertension  She was continued on her home antihypertensives with Norvasc 5 mg daily, Coreg 12 5 mg b i d , hydralazine 10 mg q 8 hours, and isosorbide dinitrate 10 mg t i d   Blood pressure currently adequately controlled  Continue medications and monitor    PAD (peripheral artery disease) [facility])  Assessment & Plan  Patient has a history of peripheral arterial disease  12/2020 she was evaluated by the vascular surgery team and had LEADS that revealed   Right lower extremity:  occlusion versus high-grade stenosis at the mid/distal popliteal artery, and focal stenosis in the proximal superficial femoral artery and distal superficial femoral artery  Also finding suggestive of tibial peroneal disease  ÁNGEL 0 46  Left lower extremity:  Occlusion versus high-grade stenosis of the proximal thigh portion of the superficial femoral artery with distal reconstitution, finding suggestive of tibioperoneal disease    ÁNGEL 0 71  Patient had declined previous vascular surgery intervention  Currently now with 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    Hyponatremia  Assessment & Plan  Recent Labs     01/19/21  0852 01/19/21  1628 01/19/21  2210 01/20/21  0541   SODIUM 129* 126* 127* 127*     Patient presented with hyponatremia, likely secondary to hypovolemic hyponatremia  Appreciate Nephrology evaluation and recommendations  Patient was placed on isotonic IV fluids with normal saline  Continue 1 5 L fluid restriction  Home diuretics on hold  Started on sodium bicarbonate for metabolic acidosis secondary to acute kidney injury    Acute deep vein thrombosis (DVT) of calf muscle vein of right lower extremity ([address] )  Assessment & Plan  -patient was diagnosed with a right soleal vein occlusive DVT during her hospitalization 11/2020 at [facility]  -she is on chronic anticoagulation with Coumadin  -INR currently supratherapeutic  Was 10 on admission- received vitamin k- now 4      Patient with very poor short-term memory :  Has difficulty repeating back current medical diagnoses, and treatment options  Concern that patient may not have capacity to make her medical decisions  We will consult neuropsychology      Family:  Called pt's  Bailey Gayle and gave update  Reviewed all test results, and treatment plan  Reviewed specialists diagnoses and recommendations  Discussed with patient's nurse  Discussed with     VTE Pharmacologic Prophylaxis: Warfarin (Coumadin)  VTE Mechanical Prophylaxis: sequential compression device        Certification Statement: The patient will continue to require additional inpatient hospital stay due to need for further acute intervention for cellulitis, dry gangrene    Status: inpatient     ===================================================================    Subjective:  Patient of pain in both feet  She states the pain going up her left leg has improved  She denies any pain anywhere else  She denies any chest pain  She denies any shortness breath or cough  She denies any abdominal pain  She denies any nausea, vomiting, diarrhea  She is tolerating p o  Johnney Bouquet Physical Exam:   Temp:  [96 9 °F (36 1 °C)-98 7 °F (37 1 °C)] 97 6 °F (36 4 °C)  HR:  [54-66] 63  Resp:  [18-20] 20  BP: (116-141)/(54-63) 141/63    Gen:  Pleasant, non-tachypnic, non-dyspnic  Conversant    Heart: regular rate and rhythm, S1S2 present, no murmur, rub or gallop  Lungs: clear to ausculatation bilaterally  No wheezing, crackles, or rhonchi  No accessory muscle use or respiratory distress  Abd: soft, non-tender, non-distended  NABS, no guarding, rebound or peritoneal signs  Extremities:  Right foot  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 noted of right great toe at the distal tip, and the 4th toe  Right anterior shin dressing in place  Left lower extremity with erythema extending from the toes up to the thigh 2+ edema of left lower extremity  Neuro: awake, alert poor short-term memory noted      LABS:   Results from last 7 days   Lab Units 01/20/21  0541 01/19/21  0515 01/18/21  1314   WBC Thousand/uL 12 69* 12 65* 13 02*   HEMOGLOBIN g/dL 7 2* 7 4* 7 4*   HEMATOCRIT % 23 2* 23 4* 23 9*   PLATELETS Thousands/uL 235 228 241     Results from last 7 days   Lab Units 01/20/21  0541 01/19/21  1628 01/19/21  0852   POTASSIUM mmol/L 4 7 4 8 4 9   CHLORIDE mmol/L 96* 95* 98*   CO2 mmol/L 16* 19* 18*   BUN mg/dL 128* 123* 126*   CREATININE mg/dL 3 61* 3 64* 3 63*   CALCIUM mg/dL 8 2* 7 9* 8 2*       Hospital Data:    1/20 CT abdomen/pelvis:  Some residual hydronephrosis appears to be present  The stent is located in the upper pole collecting system rather than the renal pelvis and the lower pigtail also abuts the ureterovesicular junction of the bladder  Urologic assessment of position/function of the stent is advised    Bilateral moderate-sized pleural effusions    Small amount of ascites    Contracted gallbladder possible wall thickening and tiny gallstones    1/19:  Renal ultrasound:    1  Bilateral renal atrophy, more severe on the left  2   Mild right hydronephrosis with ureteral stent in place    3   Distended bladder       1/18 x-ray right foot:    No acute osseous abnormalityg     Microbiology  1/18:  Blood cultures:  Pending x2         ---------------------------------------------------------------------------------------------------------------  This note has been constructed using a voice recognition system

## 2021-01-20 NOTE — PLAN OF CARE
Problem: PHYSICAL THERAPY ADULT  Goal: Performs mobility at highest level of function for planned discharge setting  See evaluation for individualized goals  Description: Treatment/Interventions: Functional transfer training, LE strengthening/ROM, Therapeutic exercise, Endurance training, Cognitive reorientation, Patient/family training, Equipment eval/education, Bed mobility, Gait training, Compensatory technique education, Spoke to nursing, OT          See flowsheet documentation for full assessment, interventions and recommendations  Outcome: Progressing  Note: Prognosis: Fair  Problem List: Decreased strength, Decreased range of motion, Decreased endurance, Impaired balance, Decreased mobility, Decreased cognition, Decreased safety awareness, Impaired sensation, Decreased skin integrity  Assessment: pt seen for PT treatment  pt able to tolerate 38 minute session  pt worked on bed mobility, weight shifting and balance ex in sitting, ble arom and aarom to both knees  pt able to get foot on ground today, able to fully extend both knees  pt needed max assist for bed mobility all phases and for scooting forward and back while sitting  pt was unable to initiate standing, even with max assist and pad assist  pt noted to have brawny edema lle in many areas including thigh  pt will need continued skilled PT and eventual rehab vs long term placement  Barriers to Discharge: Decreased caregiver support     PT Discharge Recommendation: Post-Acute Rehabilitation Services(? LTC)     PT - OK to Discharge: Yes(rehab)    See flowsheet documentation for full assessment

## 2021-01-20 NOTE — DISCHARGE INSTR - OTHER ORDERS
Wound care plans:  1-Sacrum--cleanse with soap and water, pat dry  Apply Santyl to wound bed in nickel thick layer and cover with Allevyn Life foam dressing  Change dressing daily and as needed with soilage  2-Low air loss mattress  3-Elevate heels off of bed surface to offload pressure  4-Offloading air cushion when out of bed  5-Turn/repoisiton every 2 hours while in bed and weight shift frequently while in chair for pressure re-distribution on skin--use positioning wedges  6-Moisturize skin daily with skin nourishing cream     Follow-up at the [address] [place] [address] [facility]--[phone], option 1

## 2021-01-20 NOTE — ASSESSMENT & PLAN NOTE
-patient has a history of chronic kidney disease stage 3  -baseline creatinine appears to range 1 7-2 2  -patient was recently hospitalized 11/2020 at [facility] with acute kidney injury, right-sided hydronephrosis, and atrophic left kidney, and required right ureteral stent    -patient's previous creatinine and began ER general was approximately 2 0  -she presented with acute kidney injury with a creatinine of 3 3, that worsened to 3 6  -patient with associated metabolic acidosis and hyperkalemia  -appreciate Nephrology evaluation and recommendations  -cont urinary retention protocol  -continue IV fluids and bicarbonate  -no significant improvement as of yet  -appreciate urology input- stent functioning appropriately

## 2021-01-20 NOTE — ASSESSMENT & PLAN NOTE
Patient presented with extensive left lower extremity cellulitis extending from her toes up to her thigh  She was started on IV antibiotics with Ancef  Blood culture pending x2   Previous LEADS 12/2020 with significant left lower extremity peripheral arterial disease, and she had declined vascular surgery intervention  Cont iv abx

## 2021-01-20 NOTE — ASSESSMENT & PLAN NOTE
Patient presented with dry gangrene involving right great toe and 4th toe  Right anterior shin ulcer, and heel ulcer  Appreciate podiatry evaluation and recommendations  Patient with significant peripheral arterial due, and worsening condition of her right foot wound  Vascular surgery evaluation, however patient is not currently able to receive any angiogram or IV dye due to her acute kidney injury  Continue wound care  D/w nephrology- cont to [facility] renal function prior to any iv dye

## 2021-01-20 NOTE — QUICK NOTE
CT scan reviewed  Ureteral stent is in appropriate position with no obstructive uropathy  She will not require acute stent exchange  Recommend followup with primary urologist in the outpatient setting for future exchange on q4 month basis  Her bladder appears a little distended down below  Will recommend Scott catheter placement for maximal decompression tomorrow if no additional improvements in her renal function  She may have a diet now, no OR planned      Svetlana Jones PA-C  01/20/21  6:38 PM

## 2021-01-20 NOTE — OCCUPATIONAL THERAPY NOTE
OccupationalTherapy Progress Note     Patient Name: Nikolas Witt  UOBXR'H Date: 1/20/2021  Problem List  Principal Problem:    Cellulitis  Active Problems:    Acute deep vein thrombosis (DVT) of calf muscle vein of right lower extremity (HCC)    [facility]    Hyponatremia    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 (HCC)    PAD (peripheral artery disease) ([facility] )    Essential hypertension    Hyperkalemia    Anemia    Chronic diastolic heart failure ([facility] )    Ambulatory dysfunction    Chronic hypoxemic respiratory failure ([facility] )    Coagulopathy ([facility] )          01/20/21 1452   OT Last Visit   OT Visit Date 01/20/21   Note Type   Note Type Treatment   Restrictions/Precautions   Weight Bearing Precautions Per Order Yes   RLE Weight Bearing Per Order WBAT   LLE Weight Bearing Per Order WBAT   Other Precautions Cognitive; Chair Alarm; Bed Alarm; Fall Risk   General   Response to Previous Treatment Patient with no complaints from previous session   Lifestyle   Autonomy At baseline, pt reports requiring assist w/ ADLs and IADLs  Pt reports I w/ functional mobility/transfers with use of RW, however chart indicates pt unable to get up from kitchen table x3 days  Reciprocal Relationships Spouse   Service to Others Retired- [employer]   Intrinsic Gratification Spending time with family   Pain Assessment   Pain Assessment Tool Pain Assessment not indicated - pt denies pain   Pain Score No Pain   ADL   Where Assessed Supine, bed   Grooming Assistance 4  Minimal Assistance   Grooming Deficit Setup;Verbal cueing;Supervision/safety; Increased time to complete;Brushing hair   Grooming Comments Grooming tasks completed with Min A with assist to brush back of hair 2* decreased UE ROM  UB Dressing Assistance 4  Minimal Assistance   UB Dressing Deficit Setup;Verbal cueing;Supervision/safety; Increased time to complete;Pull around back;Pull down in back   LB Dressing Assistance 1  Total Assistance   LB Dressing Deficit Setup;Don/doff R sock;Don/doff L sock   LB Dressing Comments Impaired functional reach in supine position   Bed Mobility   Supine to Sit 2  Maximal assistance   Additional items Assist x 1;HOB elevated;Trapeze bar; Increased time required;Verbal cues;LE management  (long sit position)   Sit to Supine 2  Maximal assistance   Additional items Assist x 1; Increased time required;Verbal cues;LE management; Bedrails   Additional Comments Pt able to complete long sit with Max A and B/L UE support on bedrails  Pt able to tolerate 30 seconds in long sit position w/ back unsupported x3 trials  Pt lying supine at end of session with call bell and phone within reach  Bed alarm activated  All needs met and pt reports no further questions for OT at this time  Transfers   Sit to Stand Unable to assess   Additional Comments Pt declining OOB trial   Therapeutic Exercise - ROM   UE-ROM Yes   ROM- Right Upper Extremities   R Shoulder AROM;AAROM; Flexion; Extension;Horizontal ABduction   R Elbow AROM;Elbow flexion;Elbow extension   R Position Supine   R Weight/Reps/Sets 10 reps x1   RUE ROM Comment AAROM required to achieve full ROM for R UE shldr exercises  Tolerated well, no c/o pain or fatigue   ROM - Left Upper Extremities    L Shoulder AROM; Flexion; Extension;Horizontal ABduction   L Elbow AROM;Elbow flexion;Elbow extension   L Position Supine   L Weight/Reps/Sets 10 reps x1   LUE ROM Comment Tolerated well, no c/o pain or fatigue   Cognition   Overall Cognitive Status Impaired   Arousal/Participation Alert; Cooperative   Attention Attends with cues to redirect   Orientation Level Oriented to person;Oriented to place; Disoriented to time   Memory Decreased recall of precautions;Decreased recall of recent events;Decreased short term memory   Following Commands Follows one step commands with increased time or repetition   Activity Tolerance   Activity Tolerance Patient limited by fatigue;Patient tolerated treatment well   Medical Staff Made Aware Angelica, RN   Assessment   Assessment Pt seen for OT treatment session focusing on functional activity tolerance, bed mobility, ADLs, and UE ROM/strengthening  Pt w/ increased alertness from initial eval  Pt lying supine at start of session  UE exercises completed while lying supine to increase UE ROM/strength needed for ADLs and transfers  AAROM required for R UE to achieve full end range ROM  Pt tolerated well, no c/o pain or fatigue  Grooming tasks completed with Min A with assist to brush back of hair 2* decreased UE ROM  LB dressing completed with Total A with impaired functional reach demonstrated to don/doff B/L socks  Pt declining sitting EOB and OOB trial at this time, reporting just getting back from CT scan and increased fatigue  Pt agreeable to long sitting trial  Pt able to complete long sit with Max A and B/L UE support on bedrails  Pt able to tolerate 30 seconds in long sit position w/ back unsupported x3 trials  Pt lying supine at end of session with call bell and phone within reach  Bed alarm activated  All needs met and pt reports no further questions for OT at this time  Continue to recommend STR when medically cleared  OT to follow pt on caseload  Plan   Treatment Interventions ADL retraining;Functional transfer training;UE strengthening/ROM; Endurance training;Patient/family training;Equipment evaluation/education; Compensatory technique education; Activityengagement; Energy conservation   Goal Expiration Date 02/02/21   OT Treatment Day 1   OT Frequency 3-5x/wk   Recommendation   OT Discharge Recommendation Post-Acute Rehabilitation Services   OT - OK to Discharge Yes  (when medically cleared to rehab)   Barthel Index   Feeding 5   Bathing 0   Grooming Score 0   Dressing Score 5   Bladder Score 5   Bowels Score 10   Toilet Use Score 5   Transfers (Bed/Chair) Score 5   Mobility (Level Surface) Score 0   Stairs Score 0   Barthel Index Score 35         Vernell Stuart OTR/L

## 2021-01-20 NOTE — ASSESSMENT & PLAN NOTE
-patient was diagnosed with a right soleal vein occlusive DVT during her hospitalization 11/2020 at [facility]  -she is on chronic anticoagulation with Coumadin  -INR currently supratherapeutic  Was 10 on admission- received vitamin k- now 4

## 2021-01-20 NOTE — CONSULTS
Consult - Urology   Sandra Car 5/18/1930, 80 y o  female MRN: [id number]    Unit/Bed#: E5 -01 Encounter: [id number]    Assessment & Plan  1  Right UPJ obstruction  - s/p cystoscopy right ureteral stent insertion 12/2/2020 @ [address]  - current US with mild right hydronephrosis, atrophic nonhydronephrotic left kidney  - check CT noncontrast for eval positioning stent and hydronephrosis, consider stent exchange if stent migration or significant hydronephrosis  - NPO pmn tonight pending CT results    2  Solitary right kidney  - left kidney atrophic  - right kidney hydronephrotic s/t UPJo, now managed with indwelling stent    3  LILIAN? On CKD  - current creatinine mid-3s not improving with fluid and metabolic management by nephrology  Urology consulted today to evaluate any reversible postrenal sources    4  Incomplete bladder emptying  - did void 650ml with PVR 0, later bladder scan is 272ml she tells me she voided today since then but did not tell anyone  - continue urinary retention protocol    Subjective: Denies flank pain  Denies difficulty urinating  Denies hematuria  Admits to generally feeling unwell  She ate breakfast and lunch today  Bowels have been normal for her, no significant constipation or diarrhea, but tends to BM q2 days  Has not seen a doctor in 40 years until recent admissions  Previous CT 11/12/2020 with moderate right hydronephrosis and normal caliber ureter, consistent with UPJ obstruction  No urinary calculi  Diminutive and atrophic non-hydronephrotic left kidney  Right ureteral stent placed December 2020, per ultrasound findings of hydronephrosis, clinically with e coli bacteremia at that time  Review of Systems   Constitutional: Negative for activity change, appetite change, chills, fever and unexpected weight change  HENT: Negative  Respiratory: Negative  Negative for shortness of breath  Cardiovascular: Positive for leg swelling  Negative for chest pain  Gastrointestinal: Negative for abdominal pain, diarrhea, nausea and vomiting  Endocrine: Negative  Genitourinary: Negative for decreased urine volume, difficulty urinating, dysuria, flank pain, frequency, hematuria, pelvic pain and urgency  Musculoskeletal: Negative for back pain and gait problem  Skin: Positive for wound  Allergic/Immunologic: Negative  Neurological: Negative  Hematological: Negative for adenopathy  Does not bruise/bleed easily  Objective:  Vitals: Blood pressure 131/58, pulse 59, temperature 98 7 °F (37 1 °C), temperature source Temporal, resp  rate 20, weight 65 1 kg (143 lb 8 3 oz), SpO2 96 %  ,Body mass index is 23 16 kg/m²  Physical Exam  Vitals signs and nursing note reviewed  Constitutional:       General: She is not in acute distress  Appearance: She is well-developed  She is not diaphoretic  Comments: Elderly white female slightly disheveled   HENT:      Head: Normocephalic and atraumatic  Cardiovascular:      Rate and Rhythm: Normal rate and regular rhythm  Pulmonary:      Effort: Pulmonary effort is normal       Breath sounds: Normal breath sounds  Abdominal:      General: Bowel sounds are normal       Palpations: Abdomen is soft  Tenderness: There is no abdominal tenderness  There is no right CVA tenderness or left CVA tenderness  Skin:     General: Skin is warm and dry  Capillary Refill: Capillary refill takes less than 2 seconds  Neurological:      General: No focal deficit present  Mental Status: She is alert and oriented to person, place, and time     Psychiatric:         Speech: Speech normal          Behavior: Behavior normal          Imaging:  As above, in care everywhere  CT ordered today, pending    Labs:  Recent Labs     01/18/21  1314 01/19/21  0515 01/20/21  0541   WBC 13 02* 12 65* 12 69*     Recent Labs     01/18/21  1314 01/19/21  0515 01/20/21  0541   HGB 7 4* 7 4* 7 2*       Recent Labs     01/18/21  1314 01/18/21  1602 01/19/21  0108 01/19/21  0515 01/19/21  0852 01/19/21  1628 01/20/21  0541   CREATININE 3 30* 3 34* 3 62* 3 70* 3 63* 3 64* 3 61*       History:    Past Medical History:   Diagnosis Date    Anemia 1/18/2021    Chronic kidney disease     Hypertension      History reviewed  No pertinent surgical history    Family History   Problem Relation Age of Onset    No Known Problems Mother     No Known Problems Father        James Mazariegos [place]  Date: 1/20/2021 Time: 1:31 PM

## 2021-01-20 NOTE — ASSESSMENT & PLAN NOTE
Patient presented with acute kidney injury and hyperkalemia  She was treated medically with calcium gluconate, insulin and D 50 and kayexelate with improvement      Recent Labs     01/19/21  0515 01/19/21  0852 01/19/21  1628 01/20/21  0541   K 5 2 4 9 4 8 4 7

## 2021-01-20 NOTE — PHYSICAL THERAPY NOTE
Physical Therapy Progress Note     01/20/21 0930   PT Last Visit   PT Visit Date 01/20/21   Note Type   Note Type Treatment   Pain Assessment   Pain Assessment Tool 0-10   Pain Score No Pain   Restrictions/Precautions   Weight Bearing Precautions Per Order Yes   RLE Weight Bearing Per Order WBAT   LLE Weight Bearing Per Order WBAT   Other Precautions Cognitive;Multiple lines; Fall Risk   General   Chart Reviewed Yes   Response to Previous Treatment Patient with no complaints from previous session  Family/Caregiver Present No   Cognition   Overall Cognitive Status Impaired   Orientation Level Oriented to person;Oriented to place;Oriented to situation   Comments improved cognition today   Subjective   Subjective doesn't remember therapy from yesterday  worried she wont be able to walk again   Bed Mobility   Supine to Sit 2  Maximal assistance   Additional items Assist x 1; Increased time required;Verbal cues;LE management   Sit to Supine 2  Maximal assistance   Additional items Assist x 1; Increased time required;Verbal cues;LE management   Transfers   Sit to Stand Unable to assess   Balance   Static Sitting Fair +   Dynamic Sitting Fair   Endurance Deficit   Endurance Deficit Yes   Endurance Deficit Description weakness   Activity Tolerance   Activity Tolerance Patient tolerated treatment well;Treatment limited secondary to medical complications (Comment)   Nurse Made Aware yes   Exercises   Knee AROM Sitting;AAROM; Bilateral  (achieving 95* knee flexion)   TKR Sitting;20 reps;AROM; Bilateral  (achieving full rom against gravity knee extension)   Neuro re-ed weight shifting , perturbations in sitting, forward and backwards scoot  needed max assist for scooting  sitting tolerance 20 minutes  Assessment   Prognosis Fair   Problem List Decreased strength;Decreased range of motion;Decreased endurance; Impaired balance;Decreased mobility; Decreased cognition;Decreased safety awareness; Impaired sensation;Decreased skin integrity   Assessment pt seen for PT treatment  pt able to tolerate 38 minute session  pt worked on bed mobility, weight shifting and balance ex in sitting, ble arom and aarom to both knees  pt able to get foot on ground today, able to fully extend both knees  pt needed max assist for bed mobility all phases and for scooting forward and back while sitting  pt was unable to initiate standing, even with max assist and pad assist  pt noted to have brawny edema lle in many areas including thigh  pt will need continued skilled PT and eventual rehab vs long term placement   Barriers to Discharge Decreased caregiver support   Goals   Patient Goals hoping legs will heal   STG Expiration Date 01/29/21   PT Treatment Day 1   Plan   Treatment/Interventions Functional transfer training;LE strengthening/ROM; Therapeutic exercise; Endurance training;Cognitive reorientation;Patient/family training;Equipment eval/education; Bed mobility;Continued evaluation;Spoke to nursing   Progress Slow progress, medical status limitations   PT Frequency   (3-5x/week)   Recommendation   PT Discharge Recommendation Post-Acute Rehabilitation Services  (? LTC)   PT - OK to Discharge Yes  (rehab)   Camryn 8 in Bed Without Bedrails 2   Lying on Back to Sitting on Edge of Flat Bed 1   Moving Bed to Chair 1   Standing Up From Chair 1   Walk in Room 1   Climb 3-5 Stairs 1   Basic Mobility Inpatient Raw Score 7   Turning Head Towards Sound 4   Follow Simple Instructions 3   Low Function Basic Mobility Raw Score 14   Low Function Basic Mobility Standardized Score 22 01     Taylor Tenorio, PT

## 2021-01-20 NOTE — WOUND OSTOMY CARE
Consult Note - Wound   Lainey Mola 80 y o  female MRN: [id number]  Unit/Bed#: E5 -01 Encounter: [id number]      History and Present Illness: Patient is seen for wound care consult today   The patient is a 80year old female that is seen while in bed   She is a Max A of 2 for rolling in the bed  RN reports that she was stuck at her kitchen chair for 3 days   She has bilateral lower leg and heel wounds that are being managed by podiatry   Assessment Findings:   1  Lower leg wounds managed by podiatry   2  Sacral is a evolving DTI that is present on admission   Noted that the area has light purple discoloration that is non blanchable and the center of the wound has 100% yellow slough  Evolving DTI suspect stage 3 stage 4 or unstageable present on admission   Discussed the plan with the bedside RN   The lower leg wounds are pictured and managed by podiatry         Skin care plans:  1- Cleanse sacral buttocks with soap and water apply silvasorb gel to the open area then top with allevyn foam susannah with a T and date change daily or when soiled   2-P - 500 low air loss mattress   3-Elevate heels to offload pressure  4-Ehob cushion when out of bed  5-Turn/repoisiton q2h or when medically stable for pressure re-distribution on skin  6-Moisturize skin daily with skin nourishing cream          Vitals: Blood pressure 110/58, pulse 60, temperature (!) 97 4 °F (36 3 °C), temperature source Temporal, resp  rate 18, weight 62 kg (136 lb 11 oz), SpO2 95 %  ,Body mass index is 22 06 kg/m²  Wound 12/30/20 Pressure Injury Sacrum (Active)   Wound Image   01/19/21 1301   Wound Description CARL 01/19/21 1942   Pressure Injury Stage DTPI 01/19/21 1301   Corazon-wound Assessment Clean;Dry; Intact 01/19/21 1301   Wound Length (cm) 7 5 cm 01/19/21 1301   Wound Width (cm) 7 cm 01/19/21 1301   Wound Surface Area (cm^2) 52 5 cm^2 01/19/21 1301   Drainage Amount None 01/19/21 1942   Drainage Description Serosanguineous 01/19/21 1301   Treatments Cleansed;Site care 01/19/21 1301   Dressing Foam, Silicon (eg  Allevyn, etc) 01/19/21 1301   Patient Tolerance Tolerated well 01/19/21 1301   Dressing Status Clean;Dry; Intact 01/19/21 1942       Wound 12/28/20 Venous Ulcer Pretibial Right (Active)   Wound Description Pink 01/18/21 1346   Pressure Injury Stage 2 01/18/21 1346   Corazon-wound Assessment Pink 01/18/21 1346   Wound Length (cm) 4 5 cm 01/18/21 1346   Wound Width (cm) 2 cm 01/18/21 1346   Wound Surface Area (cm^2) 9 cm^2 01/18/21 1346   Drainage Amount Scant 01/18/21 2300   Drainage Description Purulent 01/18/21 2300   Dressing Other (Comment) 01/18/21 2300       Wound 12/28/20 Other (comment) Toe (Comment  which one) Anterior;Right (Active)   Wound Description Black 01/19/21 1942   Pressure Injury Stage U 01/19/21 1942   Corazon-wound Assessment Scaly;Dry 01/19/21 1942   Treatments Other (Comment) 01/18/21 2300   Dressing Open to air 01/19/21 1942       Wound 12/28/20 Other (comment) Foot Anterior;Right (Active)   Pressure Injury Stage U 01/19/21 1942   Corazon-wound Assessment Scaly;Dry 01/19/21 1942   Dressing Open to air 01/19/21 1942       Wound 12/28/20 Pressure Injury Heel Right (Active)   Wound Description Black;Slough 01/19/21 1942   Pressure Injury Stage U 01/19/21 1942   Corazon-wound Assessment Intact 01/18/21 1248   Wound Length (cm) 2 5 cm 01/18/21 1248   Wound Width (cm) 2 5 cm 01/18/21 1248   Wound Surface Area (cm^2) 6 25 cm^2 01/18/21 1248   Dressing Open to air 01/19/21 1942       Wound Venous Ulcer Pretibial Anterior;Right (Active)   Wound Image   01/19/21 0900   Wound Description CARL 01/19/21 1942   Corazon-wound Assessment CARL 01/19/21 1942   Dressing Status Clean;Dry; Intact 01/19/21 1942       Wound Venous Ulcer Cellulitis Leg Lateral (Active)   Wound Image   01/19/21 0900   Wound Description Beefy red 01/19/21 1942   Corazon-wound Assessment Clean;Erythema;Edema 01/19/21 1942   Dressing Open to air 01/19/21 1942 Wound care will follow weekly call or tiger text with questions     Meme Shepherd RN BSN [facility] [place]

## 2021-01-21 LAB
ABO GROUP BLD: NORMAL
ABO GROUP BLD: NORMAL
ANION GAP SERPL CALCULATED.3IONS-SCNC: 12 MMOL/L (ref 4–13)
BLD GP AB SCN SERPL QL: NEGATIVE
BUN SERPL-MCNC: 125 MG/DL (ref 5–25)
CALCIUM SERPL-MCNC: 8 MG/DL (ref 8.3–10.1)
CHLORIDE SERPL-SCNC: 98 MMOL/L (ref 100–108)
CO2 SERPL-SCNC: 20 MMOL/L (ref 21–32)
CREAT SERPL-MCNC: 3.43 MG/DL (ref 0.6–1.3)
ERYTHROCYTE [DISTWIDTH] IN BLOOD BY AUTOMATED COUNT: 16.9 % (ref 11.6–15.1)
GFR SERPL CREATININE-BSD FRML MDRD: 11 ML/MIN/1.73SQ M
GLUCOSE SERPL-MCNC: 141 MG/DL (ref 65–140)
HCT VFR BLD AUTO: 20.3 % (ref 34.8–46.1)
HCT VFR BLD AUTO: 21.2 % (ref 34.8–46.1)
HGB BLD-MCNC: 6.5 G/DL (ref 11.5–15.4)
HGB BLD-MCNC: 6.7 G/DL (ref 11.5–15.4)
INR PPP: 4.7 (ref 0.84–1.19)
MCH RBC QN AUTO: 26.4 PG (ref 26.8–34.3)
MCHC RBC AUTO-ENTMCNC: 31.5 G/DL (ref 31.4–37.4)
MCV RBC AUTO: 84 FL (ref 82–98)
PLATELET # BLD AUTO: 223 THOUSANDS/UL (ref 149–390)
PMV BLD AUTO: 10 FL (ref 8.9–12.7)
POTASSIUM SERPL-SCNC: 4.3 MMOL/L (ref 3.5–5.3)
PROTHROMBIN TIME: 43.1 SECONDS (ref 11.6–14.5)
RBC # BLD AUTO: 2.42 MILLION/UL (ref 3.81–5.12)
RH BLD: POSITIVE
RH BLD: POSITIVE
SODIUM SERPL-SCNC: 130 MMOL/L (ref 136–145)
SPECIMEN EXPIRATION DATE: NORMAL
WBC # BLD AUTO: 9.69 THOUSAND/UL (ref 4.31–10.16)

## 2021-01-21 PROCEDURE — 99232 SBSQ HOSP IP/OBS MODERATE 35: CPT | Performed by: INTERNAL MEDICINE

## 2021-01-21 PROCEDURE — 99232 SBSQ HOSP IP/OBS MODERATE 35: CPT | Performed by: PODIATRIST

## 2021-01-21 PROCEDURE — 85018 HEMOGLOBIN: CPT | Performed by: PHYSICIAN ASSISTANT

## 2021-01-21 PROCEDURE — 30233N1 TRANSFUSION OF NONAUTOLOGOUS RED BLOOD CELLS INTO PERIPHERAL VEIN, PERCUTANEOUS APPROACH: ICD-10-PCS | Performed by: INTERNAL MEDICINE

## 2021-01-21 PROCEDURE — 86850 RBC ANTIBODY SCREEN: CPT | Performed by: PHYSICIAN ASSISTANT

## 2021-01-21 PROCEDURE — 80048 BASIC METABOLIC PNL TOTAL CA: CPT | Performed by: INTERNAL MEDICINE

## 2021-01-21 PROCEDURE — P9016 RBC LEUKOCYTES REDUCED: HCPCS

## 2021-01-21 PROCEDURE — 86900 BLOOD TYPING SEROLOGIC ABO: CPT | Performed by: PHYSICIAN ASSISTANT

## 2021-01-21 PROCEDURE — 99232 SBSQ HOSP IP/OBS MODERATE 35: CPT | Performed by: SURGERY

## 2021-01-21 PROCEDURE — 85014 HEMATOCRIT: CPT | Performed by: PHYSICIAN ASSISTANT

## 2021-01-21 PROCEDURE — 86901 BLOOD TYPING SEROLOGIC RH(D): CPT | Performed by: PHYSICIAN ASSISTANT

## 2021-01-21 PROCEDURE — 85610 PROTHROMBIN TIME: CPT | Performed by: INTERNAL MEDICINE

## 2021-01-21 PROCEDURE — 86920 COMPATIBILITY TEST SPIN: CPT

## 2021-01-21 PROCEDURE — 99232 SBSQ HOSP IP/OBS MODERATE 35: CPT | Performed by: PHYSICIAN ASSISTANT

## 2021-01-21 PROCEDURE — 85027 COMPLETE CBC AUTOMATED: CPT | Performed by: INTERNAL MEDICINE

## 2021-01-21 RX ORDER — SENNOSIDES 8.6 MG
2 TABLET ORAL 2 TIMES DAILY
Status: DISCONTINUED | OUTPATIENT
Start: 2021-01-21 | End: 2021-01-22

## 2021-01-21 RX ORDER — FUROSEMIDE 10 MG/ML
20 INJECTION INTRAMUSCULAR; INTRAVENOUS ONCE
Status: DISCONTINUED | OUTPATIENT
Start: 2021-01-21 | End: 2021-01-21

## 2021-01-21 RX ORDER — FUROSEMIDE 10 MG/ML
40 INJECTION INTRAMUSCULAR; INTRAVENOUS ONCE
Status: COMPLETED | OUTPATIENT
Start: 2021-01-21 | End: 2021-01-21

## 2021-01-21 RX ORDER — OXYCODONE HYDROCHLORIDE 5 MG/1
2.5 TABLET ORAL EVERY 4 HOURS PRN
Status: DISCONTINUED | OUTPATIENT
Start: 2021-01-21 | End: 2021-02-03 | Stop reason: HOSPADM

## 2021-01-21 RX ORDER — OXYCODONE HYDROCHLORIDE 5 MG/1
2.5 TABLET ORAL EVERY 6 HOURS PRN
Status: DISCONTINUED | OUTPATIENT
Start: 2021-01-21 | End: 2021-01-21

## 2021-01-21 RX ORDER — FUROSEMIDE 10 MG/ML
40 INJECTION INTRAMUSCULAR; INTRAVENOUS ONCE
Status: DISCONTINUED | OUTPATIENT
Start: 2021-01-21 | End: 2021-01-21

## 2021-01-21 RX ORDER — PHYTONADIONE 5 MG/1
2.5 TABLET ORAL ONCE
Status: COMPLETED | OUTPATIENT
Start: 2021-01-21 | End: 2021-01-21

## 2021-01-21 RX ORDER — OXYCODONE HYDROCHLORIDE 5 MG/1
5 TABLET ORAL EVERY 4 HOURS PRN
Status: DISCONTINUED | OUTPATIENT
Start: 2021-01-21 | End: 2021-02-03 | Stop reason: HOSPADM

## 2021-01-21 RX ADMIN — ISOSORBIDE DINITRATE 10 MG: 10 TABLET ORAL at 16:48

## 2021-01-21 RX ADMIN — SODIUM BICARBONATE 650 MG TABLET 1300 MG: at 16:48

## 2021-01-21 RX ADMIN — SENNOSIDES 17.2 MG: 8.6 TABLET ORAL at 16:48

## 2021-01-21 RX ADMIN — LEVOTHYROXINE SODIUM 25 MCG: 25 TABLET ORAL at 05:14

## 2021-01-21 RX ADMIN — CEFAZOLIN SODIUM 1000 MG: 1 SOLUTION INTRAVENOUS at 11:34

## 2021-01-21 RX ADMIN — OXYCODONE HYDROCHLORIDE 2.5 MG: 5 TABLET ORAL at 02:00

## 2021-01-21 RX ADMIN — ISOSORBIDE DINITRATE 10 MG: 10 TABLET ORAL at 08:47

## 2021-01-21 RX ADMIN — CARVEDILOL 12.5 MG: 6.25 TABLET, FILM COATED ORAL at 16:49

## 2021-01-21 RX ADMIN — HYDRALAZINE HYDROCHLORIDE 10 MG: 10 TABLET, FILM COATED ORAL at 21:06

## 2021-01-21 RX ADMIN — Medication 250 MG: at 16:48

## 2021-01-21 RX ADMIN — ACETAMINOPHEN 650 MG: 325 TABLET ORAL at 00:04

## 2021-01-21 RX ADMIN — SODIUM BICARBONATE 650 MG TABLET 1300 MG: at 08:46

## 2021-01-21 RX ADMIN — Medication 250 MG: at 08:47

## 2021-01-21 RX ADMIN — CARVEDILOL 12.5 MG: 6.25 TABLET, FILM COATED ORAL at 08:46

## 2021-01-21 RX ADMIN — SODIUM BICARBONATE 650 MG TABLET 1300 MG: at 12:13

## 2021-01-21 RX ADMIN — DOCUSATE SODIUM 100 MG: 100 CAPSULE, LIQUID FILLED ORAL at 08:47

## 2021-01-21 RX ADMIN — HYDRALAZINE HYDROCHLORIDE 10 MG: 10 TABLET, FILM COATED ORAL at 05:14

## 2021-01-21 RX ADMIN — PHYTONADIONE 2.5 MG: 5 TABLET ORAL at 12:28

## 2021-01-21 RX ADMIN — DOCUSATE SODIUM 100 MG: 100 CAPSULE, LIQUID FILLED ORAL at 16:49

## 2021-01-21 RX ADMIN — FUROSEMIDE 40 MG: 10 INJECTION, SOLUTION INTRAMUSCULAR; INTRAVENOUS at 23:28

## 2021-01-21 NOTE — PROGRESS NOTES
Progress Note - Vascular Surgery   Margaret Osorio 80 y o  female MRN: [id number]  Unit/Bed#: E5 -01 Encounter: [id number]    Assessment/Plan:  80 y o  female with CKD and PAD now with cellulitis and R  1/4/5th toe gangrene    · Patient would benefit from arteriogram but awaiting renal optimization given high risk for requiring dialysis post-contrast  · Trend Cr, appreciate Nephrology recommendations and will await their clearance  · Appreciate Urology evaluation - plan to place chadwick for bladder decompression  · Appreciate Podiatry recommendations  · Abx per primary, currently on Ancef  · Care per primary    Subjective/Objective     Subjective: No acute events  Afebrile  We discussed eventual arteriogram and outlined need for optimization of her renal function  Objective:     Vitals: Temp:  [97 5 °F (36 4 °C)-98 7 °F (37 1 °C)] 97 6 °F (36 4 °C)  HR:  [59-67] 60  Resp:  [18-20] 18  BP: (105-141)/(58-75) 125/64  Body mass index is 23 16 kg/m²  I/O       01/19 0701 - 01/20 0700 01/20 0701 - 01/21 0700 01/21 0701 - 01/22 0700    P  O  540 240     IV Piggyback       Total Intake(mL/kg) 540 (8 3) 240 (3 7)     Urine (mL/kg/hr) 650 (0 4) 180 (0 1)     Total Output 650 180     Net -110 +60                  Physical Exam:  GEN: NAD  HEENT: MMM  CV: R DP/PT signals  Lung: Normal effort  Ab: Soft, NT/ND  Neuro:  A+Ox3    Lab, Imaging and other studies:   CBC with diff:   Lab Results   Component Value Date    WBC 9 69 01/21/2021    HGB 6 5 (LL) 01/21/2021    HCT 20 3 (L) 01/21/2021    MCV 84 01/21/2021     01/21/2021    MCH 26 4 (L) 01/21/2021    MCHC 31 5 01/21/2021    RDW 16 9 (H) 01/21/2021    MPV 10 0 01/21/2021   , BMP/CMP:   Lab Results   Component Value Date    SODIUM 130 (L) 01/21/2021    K 4 3 01/21/2021    CL 98 (L) 01/21/2021    CO2 20 (L) 01/21/2021     (H) 01/21/2021    CREATININE 3 43 (H) 01/21/2021    CALCIUM 8 0 (L) 01/21/2021    EGFR 11 01/21/2021   , Coags:   Lab Results Component Value Date    INR 4 70 (H) 01/21/2021   , Blood Culture: No results found for: BLOODCX  VTE Pharmacologic Prophylaxis: Sequential compression device (Venodyne)   VTE Mechanical Prophylaxis: sequential compression device

## 2021-01-21 NOTE — PHYSICAL THERAPY NOTE
Physical Therapy Cancellation Note      PT cancelled for today due to low hemoglobin (6 5)  Will f/u and resume PT as appropriate  D/W nursing and OT

## 2021-01-21 NOTE — ASSESSMENT & PLAN NOTE
Patient has a history of peripheral arterial disease  12/2020 she was evaluated by the vascular surgery team and had LEADS that revealed   Right lower extremity:  occlusion versus high-grade stenosis at the mid/distal popliteal artery, and focal stenosis in the proximal superficial femoral artery and distal superficial femoral artery  Also finding suggestive of tibial peroneal disease  ÁNGEL 0 46  Left lower extremity:  Occlusion versus high-grade stenosis of the proximal thigh portion of the superficial femoral artery with distal reconstitution, finding suggestive of tibioperoneal disease  ÁNGEL 0 71  Patient had declined previous vascular surgery intervention  Currently now with 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; re-consult vascular when ready for procedure

## 2021-01-21 NOTE — PROGRESS NOTES
NEPHROLOGY PROGRESS NOTE   Khushi Powell 80 y o  female MRN: [id number]  Unit/Bed#: E5 -01 Encounter: [id number]  Reason for Consult: LILIAN (POA) on likely CKD IV    Plan:  -creatinine very slowly improving   -will place Scott catheter, discussed with Urology PA   -no urgent indication for RRT  -avoiding arteriogram until kidney function improves unless urgently needed   -podiatry and vascular following for gangrenous right lower extremity toast   -likely will need PRBC transfusion today  Recommend 40 mg IV Lasix with transfusion  ASSESSMENT/PLAN:  LILIAN (POA) on likely CKD IV:  Suspect multifactorial with possible infection, diuretic use, as well as possible post renal component with history of atrophic left kidney measuring 7 1 cm and right-sided hydro in December 2020    -presented with creatinine of 3 3 with peak of 3 7 on 01/19   -unknown baseline creatinine, suspect around 1 7-2 2   -creatinine slowly improving   -previously on IV hydration but stopped due to decreasing sodium  -will give diuretics today  -UA showed large blood, large leukocytes, 2+ protein, RBCs, innumerable WBCs, innumerable bacteria  -will place Scott catheter    -potential need for future arteriogram per Vascular   Would hold off until creatinine is at baseline unless necessarily needed   -avoid nephrotoxins, hypotension, IV contrast   -I/O      Right-sided hydronephrosis:  With history of right ureteral stent placement in December 2020 at [address] revealed bilateral renal atrophy, more  on the left along with mild right hydronephrosis with ureteral stent in place   -urology consulted  -will place Scott catheter for maximum decompression      Hyponatremia:  Suspect component of hypovolemia    -urine sodium 47, urine osmolality 338, serum osmolality 308, uric acid elevated at 9 0, TSH 10 6   -presented with sodium of 124 with normal glucose     -sodium level corrected to 129, correcting appropriately with decrease to 126 with fluids so IVF were discontinued  -continue on 1 5 L fluid restriction       Hyperkalemia (POA):  Presents with potassium of 5 7   Received medical management with insulin, dextrose, Kayexalate, and calcium gluconate  (resolved)  -recommend low-potassium diet      Metabolic acidosis:  In the setting of renal failure   -continue oral bicarb to 1,300 mg and continue to monitor       Hypertension:  Stable   -avoid hypotension or high fluctuations in blood pressure   -outpatient antihypertensives:  Amlodipine 5 mg daily, carvedilol 12 5 mg 2 times per day, hydralazine 10 mg every 8 hours, Isordil 10 mg 3 times per day   -continue amlodipine, carvedilol, hydralazine, Isordil   -holding diuretics      Lower extremity cellulitis:  With dry gangrene to great toe and 4th toe   -podiatry following   -vascular surgery consulted to discuss a-gram and further intervention   Would hold off until creatinine improves unless urgently needed  Recommend pre and post hydration with contrast administration as well as give Mucomyst 1200 mg times total of 4 doses   -blood cultures negative to date   -local dressing changes to LE per vascular recommendations   -continues on IV antibiotics      CHF:  Echocardiogram showed EF of 50% with grade 2 diastolic dysfunction   No sandro signs of volume overload   -check daily weights, fluid restriction, I/O   -outpatient diuretics:  Lasix 40 mg daily   -continue to monitor volume status on IV hydration   -would give Lasix with blood transfusion today  -CT chest shows bilateral moderate-sized pleural effusions, small amount of ascites      Anemia:  Hemoglobin dropping to 6 5     -likely will need transfusion today with 40 mg IV lasix    -checking occult stools  -continue to monitor and transfuse as needed for hemoglobin less than 7 0   -iron panel showed low TIBC and low iron      Other:  PAD, DVT      Disposition:  Requiring additional stay due to medical needs     SUBJECTIVE:  The patient is resting in bed  She states she is doing well  She denies chest pain or increased shortness of breath  She denies nausea, vomiting, diarrhea  We discussed placing the Scott catheter today  Patient is agree to this      OBJECTIVE:  Current Weight: Weight - Scale: 65 1 kg (143 lb 8 3 oz)  Vitals:    01/21/21 0300 01/21/21 0514 01/21/21 0600 01/21/21 0747   BP: 105/75 124/58  125/64   BP Location: Right arm   Left arm   Pulse: 59   60   Resp: 18   18   Temp: 97 8 °F (36 6 °C)   97 6 °F (36 4 °C)   TempSrc:    Temporal   SpO2: 98%   97%   Weight:   65 1 kg (143 lb 8 3 oz)        Intake/Output Summary (Last 24 hours) at 1/21/2021 1129  Last data filed at 1/21/2021 0900  Gross per 24 hour   Intake 180 ml   Output 180 ml   Net 0 ml     General: NAD  Skin: warm, dry, B/L LE wounds, right gangrenous toes  HEENT: Moist mucous membranes, sclera anicteric, normocephalic, atraumatic  Neck: No apparent JVD appreciated  Chest: lung sounds clear B/L, on O2  CVS:Regular rate and rhythm, no murmer   Abdomen: Soft, round, non-tender, +BS  Extremities:B/L LE edema present  Neuro: alert and oriented  Psych: appropriate mood and affect     Medications:    Current Facility-Administered Medications:     acetaminophen (TYLENOL) tablet 650 mg, 650 mg, Oral, Q6H PRN, Harsha Shook DO, 650 mg at 01/21/21 0004    amLODIPine (NORVASC) tablet 5 mg, 5 mg, Oral, Daily, Cindra Dark, CRNP, Stopped at 01/20/21 0843    carvedilol (COREG) tablet 12 5 mg, 12 5 mg, Oral, BID With Meals, Cindra Dark, CRNP, 12 5 mg at 01/21/21 0846    ceFAZolin (ANCEF) IVPB (premix in dextrose) 1,000 mg 50 mL, 1,000 mg, Intravenous, Q24H, Harsha Shook DO, Last Rate: 100 mL/hr at 01/20/21 1131, 1,000 mg at 01/20/21 1131    docusate sodium (COLACE) capsule 100 mg, 100 mg, Oral, BID, Harsha Shook DO, 100 mg at 01/21/21 0847    hydrALAZINE (APRESOLINE) tablet 10 mg, 10 mg, Oral, Q8H [address], Harsha Shook DO, 10 mg at 01/21/21 6919    isosorbide dinitrate (ISORDIL) tablet 10 mg, 10 mg, Oral, TID after meals, Harsha Shook DO, 10 mg at 01/21/21 0847    levothyroxine tablet 25 mcg, 25 mcg, Oral, Early Morning, Harsha Shook DO, 25 mcg at 01/21/21 0514    ondansetron (ZOFRAN) injection 4 mg, 4 mg, Intravenous, Q6H PRN, Harsha Shook DO    oxyCODONE (ROXICODONE) IR tablet 2 5 mg, 2 5 mg, Oral, Q4H PRN, Arabella Abraham PA-C    oxyCODONE (ROXICODONE) IR tablet 5 mg, 5 mg, Oral, Q4H PRN, Arabella Abraham PA-C    phytonadione (MEPHYTON) tablet 2 5 mg, 2 5 mg, Oral, Once, Arabella Abraham PA-C    saccharomyces boulardii (FLORASTOR) capsule 250 mg, 250 mg, Oral, BID, Harsha Shook DO, 250 mg at 01/21/21 0847    senna (SENOKOT) tablet 17 2 mg, 2 tablet, Oral, BID, Arabella Abraham PA-C    sodium bicarbonate tablet 1,300 mg, 1,300 mg, Oral, TID after meals, OITS Perez, 1,300 mg at 01/21/21 0846    Laboratory Results:  Results from last 7 days   Lab Units 01/21/21  0507 01/21/21  0453 01/20/21  0541 01/19/21  2210 01/19/21  1628  01/19/21  0515   WBC Thousand/uL  --  9 69 12 69*  --   --   --  12 65*   HEMOGLOBIN g/dL  --  6 5* 7 2*  --   --   --  7 4*   HEMATOCRIT %  --  20 3* 23 2*  --   --   --  23 4*   PLATELETS Thousands/uL  --  223 235  --   --   --  228   SODIUM mmol/L 130*  --  127* 127* 126*   < > 130*   POTASSIUM mmol/L 4 3  --  4 7  --  4 8   < > 5 2   CHLORIDE mmol/L 98*  --  96*  --  95*   < > 98*   CO2 mmol/L 20*  --  16*  --  19*   < > 20*   BUN mg/dL 125*  --  128*  --  123*   < > 127*   CREATININE mg/dL 3 43*  --  3 61*  --  3 64*   < > 3 70*   CALCIUM mg/dL 8 0*  --  8 2*  --  7 9*   < > 8 7    < > = values in this interval not displayed

## 2021-01-21 NOTE — PROGRESS NOTES
Progress Note - Lainey Suarez 5/18/1930, 80 y o  female MRN: [id number]  Unit/Bed#: E5 -01 Encounter: [id number]  Primary Care Provider: Destini Garcia MD   Date and time admitted to hospital: 1/18/2021 11:44 AM    * Cellulitis  Assessment & Plan  Patient presented with extensive left lower extremity cellulitis extending from her toes up to her thigh  She was started on IV antibiotics with Ancef  Blood culture negative at 48 hours  Previous LEADS 12/2020 with significant left lower extremity peripheral arterial disease, and she had declined vascular surgery intervention  Cont IV abx      Anemia  Assessment & Plan  History of chronic anemia with baseline hemoglobin ranging 7-8  Hemoglobin diminished to 6 5 - 6 7 on 1/21   Preparing 2U; transfuse 1U with 40mg IV Lasix on 1/21  Hemoccult stool pending - no BM, adding sennakot  Iron panel consistent with iron deficiency anemia  Unable to give IV Venofer due to acute infection    Lab Results   Component Value Date    HGB 6 7 (LL) 01/21/2021    HGB 6 5 (LL) 01/21/2021    HGB 7 2 (L) 01/20/2021    HGB 7 4 (L) 01/19/2021    HGB 7 4 (L) 01/18/2021    HGB 8 1 (L) 01/05/2021         Coagulopathy ([facility] [address] )  Assessment & Plan  Patient presented with coagulopathy secondary to Coumadin  INR was 10 on admission, and she was given 2 5 mg of vitamin K  Receiving additional vit K 1/21  INR improved to 6-and now 4/7   Continue to monitor, and hold Coumadin    Chronic hypoxemic respiratory failure [facility])  Assessment & Plan  During her December 2020 admission patient was diagnosed with hypoxic respiratory failure secondary to her congestive heart failure atelectasis  She was discharged on 3 L nasal cannula  Continue oxygen  Currently with adequate oxygenation on 3 L    Ambulatory dysfunction  Assessment & Plan  Patient with 2 days of nonambulatory status, per her  she was sitting at the kitchen table unable to move    Likely secondary to her peripheral arterial disease, and cellulitis  Continue PT/OT  Case management consulted  Likely will need short-term rehab  Reportedly area of the aging is involved  Seen by neuropsych 1/20 and deemed not competent to make medical decisions  Chronic diastolic heart failure (HCC)  Assessment & Plan  Wt Readings from Last 3 Encounters:   01/21/21 65 1 kg (143 lb 8 3 oz)   01/06/21 62 1 kg (136 lb 14 4 oz)   12/22/20 60 8 kg (134 lb)     Patient has a history of chronic diastolic congestive heart failure  She was recently admitted 12/2020 with an acute exacerbation of her chronic diastolic congestive heart failure  Home Lasix on hold due to acute kidney injury  She previously been on Lasix 20 mg daily  Continue Coreg 12 5 mg b i d  No ACE-inhibitor in light of her chronic kidney disease  Continue isosorbide/hydralazine      Hyperkalemia  Assessment & Plan  Patient presented with acute kidney injury and hyperkalemia  She was treated medically with calcium gluconate, insulin and D 50 and kayexelate with improvement      Recent Labs     01/19/21  0852 01/19/21  1628 01/20/21  0541 01/21/21  0507   K 4 9 4 8 4 7 4 3         Essential hypertension  Assessment & Plan  Patient has a history of essential hypertension  She was continued on her home antihypertensives with Norvasc 5 mg daily, Coreg 12 5 mg b i d , hydralazine 10 mg q 8 hours, and isosorbide dinitrate 10 mg t i d   Blood pressure currently adequately controlled  Continue medications and monitor    PAD (peripheral artery disease) [facility])  Assessment & Plan  Patient has a history of peripheral arterial disease  12/2020 she was evaluated by the vascular surgery team and had LEADS that revealed   Right lower extremity:  occlusion versus high-grade stenosis at the mid/distal popliteal artery, and focal stenosis in the proximal superficial femoral artery and distal superficial femoral artery  Also finding suggestive of tibial peroneal disease    ÁNGEL 0 46  Left lower extremity:  Occlusion versus high-grade stenosis of the proximal thigh portion of the superficial femoral artery with distal reconstitution, finding suggestive of tibioperoneal disease  ÁNGEL 0 71  Patient had declined previous vascular surgery intervention  Currently now with 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; re-consult vascular when ready for procedure       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 [facility])  Assessment & Plan  Patient presented with dry gangrene involving right great toe and 4th toe  Right anterior shin ulcer, and heel ulcer  Appreciate podiatry evaluation and recommendations  Patient with significant peripheral arterial due, and worsening condition of her right foot wound  Vascular surgery evaluation, however patient is not currently able to receive any angiogram or IV dye due to her acute kidney injury - re-consult vascular surgery when medically ready for procedure   Continue wound care  D/w nephrology- cont to maxamize renal function prior to any iv dye    Hyponatremia  Assessment & Plan  Recent Labs     01/19/21  1628 01/19/21  2210 01/20/21  0541 01/21/21  0507   SODIUM 126* 127* 127* 130*     Patient presented with hyponatremia, likely secondary to hypovolemic hyponatremia  Appreciate Nephrology evaluation and recommendations  Patient was placed on isotonic IV fluids with normal saline  Continue 1 5 L fluid restriction  Home diuretics on hold  Started on sodium bicarbonate for metabolic acidosis secondary to acute kidney injury    LILIAN (acute kidney injury) ([facility] [address] )  Assessment & Plan  -patient has a history of chronic kidney disease stage 3  -baseline creatinine appears to range 1 7-2 2  -patient was recently hospitalized 11/2020 at [facility] with acute kidney injury, right-sided hydronephrosis, and atrophic left kidney, and required right ureteral stent    -patient's previous creatinine and began ER general was approximately 2 0  -she presented with acute kidney injury with a creatinine of 3 3, that worsened to 3 6  -patient with associated metabolic acidosis and hyperkalemia  -appreciate Nephrology evaluation and recommendations  -cont urinary retention protocol - despite low residuals, placing Scott 1/21/2021  -continue IV fluids and bicarbonate  -no significant improvement as of yet  -appreciate urology input- stent functioning appropriately        Acute deep vein thrombosis (DVT) of calf muscle vein of right lower extremity ([address] )  Assessment & Plan  -patient was diagnosed with a right soleal vein occlusive DVT during her hospitalization 11/2020 at [facility]  -she is on chronic anticoagulation with Coumadin  -INR currently supratherapeutic  Was 10 on admission- received vitamin k- now 4 7  Give additional oral vit K on 1/21, given diminishing hgb        VTE Pharmacologic Prophylaxis:   Pharmacologic: Pharmacologic VTE Prophylaxis contraindicated due to supretherapeutic INR  Mechanical VTE Prophylaxis in Place: Yes    Patient Centered Rounds: I have performed bedside rounds with nursing staff today  Discussions with Specialists or Other Care Team Provider:  Discussed with nursing, case management, nephrology    Education and Discussions with Family / Patient:  Just over the phone with patient's  and full medical update given  All questions answered  Time Spent for Care: 45 minutes  More than 50% of total time spent on counseling and coordination of care as described above      Current Length of Stay: 3 day(s)    Current Patient Status: Inpatient   Certification Statement: The patient will continue to require additional inpatient hospital stay due to IV antibiotics, kidney injury pending vascular procedure if renal injury stabilizes    Discharge Plan: pending, not ready     Code Status: Level 1 - Full Code      Subjective: Patient has L>R foot pain  No SOB  No abdominal pain  Constipated  Objective:     Vitals:   Temp (24hrs), Av 6 °F (36 4 °C), Min:97 4 °F (36 3 °C), Max:97 8 °F (36 6 °C)    Temp:  [97 4 °F (36 3 °C)-97 8 °F (36 6 °C)] 97 4 °F (36 3 °C)  HR:  [54-67] 54  Resp:  [18-20] 18  BP: (105-141)/(52-75) 108/52  SpO2:  [96 %-98 %] 97 %  Body mass index is 23 16 kg/m²  Input and Output Summary (last 24 hours): Intake/Output Summary (Last 24 hours) at 2021 1350  Last data filed at 2021 0900  Gross per 24 hour   Intake 180 ml   Output 180 ml   Net 0 ml       Physical Exam:     Physical Exam  Vitals signs and nursing note reviewed  Constitutional:       General: She is not in acute distress  Appearance: She is well-developed  HENT:      Head: Normocephalic and atraumatic  Eyes:      Conjunctiva/sclera: Conjunctivae normal    Neck:      Musculoskeletal: Neck supple  Cardiovascular:      Rate and Rhythm: Normal rate and regular rhythm  Heart sounds: No murmur  Pulmonary:      Effort: Pulmonary effort is normal  No respiratory distress  Breath sounds: Normal breath sounds  Comments: On 3L NC  Abdominal:      Palpations: Abdomen is soft  Tenderness: There is no abdominal tenderness  Skin:     General: Skin is warm and dry  Comments: PAD of lower extremities b/l   Neurological:      Mental Status: She is alert  Additional Data:     Labs:    Results from last 7 days   Lab Units 21  1056 21  0453  21  1314   WBC Thousand/uL  --  9 69   < > 13 02*   HEMOGLOBIN g/dL 6 7* 6 5*   < > 7 4*   HEMATOCRIT % 21 2* 20 3*   < > 23 9*   PLATELETS Thousands/uL  --  223   < > 241   NEUTROS PCT %  --   --   --  84*   LYMPHS PCT %  --   --   --  11*   MONOS PCT %  --   --   --  3*   EOS PCT %  --   --   --  1    < > = values in this interval not displayed       Results from last 7 days   Lab Units 21  0507   SODIUM mmol/L 130*   POTASSIUM mmol/L 4 3 CHLORIDE mmol/L 98*   CO2 mmol/L 20*   BUN mg/dL 125*   CREATININE mg/dL 3 43*   ANION GAP mmol/L 12   CALCIUM mg/dL 8 0*   GLUCOSE RANDOM mg/dL 141*     Results from last 7 days   Lab Units 01/21/21  0452   INR  4 70*                       * I Have Reviewed All Lab Data Listed Above  * Additional Pertinent Lab Tests Reviewed: All Labs Within Last 24 Hours Reviewed      Recent Cultures (last 7 days):     Results from last 7 days   Lab Units 01/18/21  1515   BLOOD CULTURE  No Growth at 48 hrs  No Growth at 48 hrs  Last 24 Hours Medication List:   Current Facility-Administered Medications   Medication Dose Route Frequency Provider Last Rate    acetaminophen  650 mg Oral Q6H [facility]      amLODIPine  5 mg Oral Daily OTIS Rosas      carvedilol  12 5 mg Oral BID With Meals OTIS Rosas      cefazolin  1,000 mg Intravenous Q24H [facility], DO 1,000 mg (01/21/21 1134)    docusate sodium  100 mg Oral BID Harsha Shook, DO      furosemide  40 mg Intravenous Once Arabella Abraham PA-C      hydrALAZINE  10 mg Oral Q8H [facility] Harsha Kathrine [facility], DO      isosorbide dinitrate  10 mg Oral TID after meals Harsha Shook, DO      levothyroxine  25 mcg Oral Early Morning Harsha Shook, DO      ondansetron  4 mg Intravenous Q6H PRN [facility], DO      oxyCODONE  2 5 mg Oral Q4H PRN Frederic Abraham PA-C      oxyCODONE  5 mg Oral Q4H PRN Arabella Abraham PA-C      saccharomyces boulardii  250 mg Oral BID Harsha Shook, DO      senna  2 tablet Oral BID Arabella Abraham PA-C      sodium bicarbonate  1,300 mg Oral TID after meals OTIS Rosas          Today, Patient Was Seen By: Anthony Milton PA-C    ** Please Note: Dictation voice to text software may have been used in the creation of this document   **

## 2021-01-21 NOTE — PLAN OF CARE
Problem: Potential for Falls  Goal: Patient will remain free of falls  Description: INTERVENTIONS:  - Assess patient frequently for physical needs  -  Identify cognitive and physical deficits and behaviors that affect risk of falls  -  [place] fall precautions as indicated by assessment   - Educate patient/family on patient safety including physical limitations  - Instruct patient to call for assistance with activity based on assessment  - Modify environment to reduce risk of injury  - Consider OT/PT consult to assist with strengthening/mobility  Outcome: Progressing     Problem: Prexisting or High Potential for Compromised Skin Integrity  Goal: Skin integrity is maintained or improved  Description: INTERVENTIONS:  - Identify patients at risk for skin breakdown  - Assess and monitor skin integrity  - Assess and monitor nutrition and hydration status  - Monitor labs   - Assess for incontinence   - Turn and reposition patient  - Assist with mobility/ambulation  - Relieve pressure over bony prominences  - Avoid friction and shearing  - Provide appropriate hygiene as needed including keeping skin clean and dry  - Evaluate need for skin moisturizer/barrier cream  - Collaborate with interdisciplinary team   - Patient/family teaching  - Consider wound care consult   Outcome: Progressing     Problem: Nutrition/Hydration-ADULT  Goal: Nutrient/Hydration intake appropriate for improving, restoring or maintaining nutritional needs  Description: Monitor and assess patient's nutrition/hydration status for malnutrition  Collaborate with interdisciplinary team and initiate plan and interventions as ordered  Monitor patient's weight and dietary intake as ordered or per policy  Utilize nutrition screening tool and intervene as necessary  Determine patient's food preferences and provide high-protein, high-caloric foods as appropriate       INTERVENTIONS:  - Monitor oral intake, urinary output, labs, and treatment plans  - Assess nutrition and hydration status and recommend course of action  - Evaluate amount of meals eaten  - Assist patient with eating if necessary   - Allow adequate time for meals  - Recommend/ encourage appropriate diets, oral nutritional supplements, and vitamin/mineral supplements  - Order, calculate, and assess calorie counts as needed  - Recommend, monitor, and adjust tube feedings and TPN/PPN based on assessed needs  - Assess need for intravenous fluids  - Provide specific nutrition/hydration education as appropriate  - Include patient/family/caregiver in decisions related to nutrition  Outcome: Progressing     Problem: PAIN - ADULT  Goal: Verbalizes/displays adequate comfort level or baseline comfort level  Description: Interventions:  - Encourage patient to monitor pain and request assistance  - Assess pain using appropriate pain scale  - Administer analgesics based on type and severity of pain and evaluate response  - Implement non-pharmacological measures as appropriate and evaluate response  - Consider cultural and social influences on pain and pain management  - Notify physician/advanced practitioner if interventions unsuccessful or patient reports new pain  Outcome: Progressing     Problem: INFECTION - ADULT  Goal: Absence or prevention of progression during hospitalization  Description: INTERVENTIONS:  - Assess and monitor for signs and symptoms of infection  - Monitor lab/diagnostic results  - Monitor all insertion sites, i e  indwelling lines, tubes, and drains  - Monitor endotracheal if appropriate and nasal secretions for changes in amount and color  - [place] appropriate cooling/warming therapies per order  - Administer medications as ordered  - Instruct and encourage patient and family to use good hand hygiene technique  - Identify and instruct in appropriate isolation precautions for identified infection/condition  Outcome: Progressing  Goal: Absence of fever/infection during neutropenic period  Description: INTERVENTIONS:  - Monitor WBC    Outcome: Progressing     Problem: SAFETY ADULT  Goal: Patient will remain free of falls  Description: INTERVENTIONS:  - Assess patient frequently for physical needs  -  Identify cognitive and physical deficits and behaviors that affect risk of falls    -  [place] fall precautions as indicated by assessment   - Educate patient/family on patient safety including physical limitations  - Instruct patient to call for assistance with activity based on assessment  - Modify environment to reduce risk of injury  - Consider OT/PT consult to assist with strengthening/mobility  Outcome: Progressing  Goal: Maintain or return to baseline ADL function  Description: INTERVENTIONS:  -  Assess patient's ability to carry out ADLs; assess patient's baseline for ADL function and identify physical deficits which impact ability to perform ADLs (bathing, care of mouth/teeth, toileting, grooming, dressing, etc )  - Assess/evaluate cause of self-care deficits   - Assess range of motion  - Assess patient's mobility; develop plan if impaired  - Assess patient's need for assistive devices and provide as appropriate  - Encourage maximum independence but intervene and supervise when necessary  - Involve family in performance of ADLs  - Assess for home care needs following discharge   - Consider OT consult to assist with ADL evaluation and planning for discharge  - Provide patient education as appropriate  Outcome: Progressing  Goal: Maintain or return mobility status to optimal level  Description: INTERVENTIONS:  - Assess patient's baseline mobility status (ambulation, transfers, stairs, etc )    - Identify cognitive and physical deficits and behaviors that affect mobility  - Identify mobility aids required to assist with transfers and/or ambulation (gait belt, sit-to-stand, lift, walker, cane, etc )  - [place] fall precautions as indicated by assessment  - Record patient progress and toleration of activity level on Mobility SBAR; progress patient to next Phase/Stage  - Instruct patient to call for assistance with activity based on assessment  - Consider rehabilitation consult to assist with strengthening/weightbearing, etc   Outcome: Progressing     Problem: DISCHARGE PLANNING  Goal: Discharge to home or other facility with appropriate resources  Description: INTERVENTIONS:  - Identify barriers to discharge w/patient and caregiver  - Arrange for needed discharge resources and transportation as appropriate  - Identify discharge learning needs (meds, wound care, etc )  - Arrange for interpretive services to assist at discharge as needed  - Refer to Case Management Department for coordinating discharge planning if the patient needs post-hospital services based on physician/advanced practitioner order or complex needs related to functional status, cognitive ability, or social support system  Outcome: Progressing     Problem: Knowledge Deficit  Goal: Patient/family/caregiver demonstrates understanding of disease process, treatment plan, medications, and discharge instructions  Description: Complete learning assessment and assess knowledge base    Interventions:  - Provide teaching at level of understanding  - Provide teaching via preferred learning methods  Outcome: Progressing     Problem: GENITOURINARY - ADULT  Goal: Maintains or returns to baseline urinary function  Description: INTERVENTIONS:  - Assess urinary function  - Encourage oral fluids to ensure adequate hydration if ordered  - Administer IV fluids as ordered to ensure adequate hydration  - Administer ordered medications as needed  - Offer frequent toileting  - Follow urinary retention protocol if ordered  Outcome: Progressing  Goal: Absence of urinary retention  Description: INTERVENTIONS:  - Assess patients ability to void and empty bladder  - Monitor I/O  - Bladder scan as needed  - Discuss with physician/AP medications to alleviate retention as needed  - Discuss catheterization for long term situations as appropriate  Outcome: Progressing  Goal: Urinary catheter remains patent  Description: INTERVENTIONS:  - Assess patency of urinary catheter  - If patient has a chronic chadwick, consider changing catheter if non-functioning  - Follow guidelines for intermittent irrigation of non-functioning urinary catheter  Outcome: Progressing

## 2021-01-21 NOTE — ASSESSMENT & PLAN NOTE
Patient presented with extensive left lower extremity cellulitis extending from her toes up to her thigh  She was started on IV antibiotics with Ancef   Day 4 Ancef  Leukocytosis improving/resolved  Consider discontinuation in 24-48 hours if signs of infection resolve     Blood culture negative at 48 hours  Previous LEADS 12/2020 with significant left lower extremity peripheral arterial disease, and she had declined vascular surgery intervention  Cont IV abx

## 2021-01-21 NOTE — ASSESSMENT & PLAN NOTE
Recent Labs     01/19/21  1628 01/19/21  2210 01/20/21  0541 01/21/21  0507   SODIUM 126* 127* 127* 130*     Patient presented with hyponatremia, likely secondary to hypovolemic hyponatremia  Appreciate Nephrology evaluation and recommendations  Patient was placed on isotonic IV fluids with normal saline  Continue 1 5 L fluid restriction  Home diuretics on hold  Started on sodium bicarbonate for metabolic acidosis secondary to acute kidney injury

## 2021-01-21 NOTE — ASSESSMENT & PLAN NOTE
-patient has a history of chronic kidney disease stage 3  -baseline creatinine appears to range 1 7-2 2  -patient was recently hospitalized 11/2020 at [facility] with acute kidney injury, right-sided hydronephrosis, and atrophic left kidney, and required right ureteral stent    -patient's previous creatinine and began ER general was approximately 2 0  -she presented with acute kidney injury with a creatinine of 3 3, that worsened to 3 6  -patient with associated metabolic acidosis and hyperkalemia  -appreciate Nephrology evaluation and recommendations  -cont urinary retention protocol - despite low residuals, placing Scott 1/21/2021  -continue IV fluids and bicarbonate  -no significant improvement as of yet  -appreciate urology input- stent functioning appropriately

## 2021-01-21 NOTE — ASSESSMENT & PLAN NOTE
Patient presented with coagulopathy secondary to Coumadin  INR was 10 on admission, and she was given 2 5 mg of vitamin K  Receiving additional vit K 1/21  INR improved to 6-and now 4/7   Continue to monitor, and hold Coumadin

## 2021-01-21 NOTE — PROGRESS NOTES
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   3  PAD, right SFA occlusion and poor distal perfusion  4  Hx DVT      PLAN:    · Continue local wound care of the right leg with qod changes of anterior leg with adaptic and maxorb, right heel betadine paint and DSD and right toes Betadine paint open to air  · Would benefit from close outpatient vascular follow up due to high risk of limb loss  · Radiograph of right heel personally reviewed showing no OM or CHERYLE  · WBAT bilaterally  · No acute signs of infection seen at this time         SUBJECTIVE:     The patient was seen, evaluated, and assessed at bedside today  The patient was awake, alert, and in no acute distress  No acute events overnight  The patient reports pain worsening of the left leg  Patient denies N/V/F/chills/SOB/CP  OBJECTIVE:     Vitals:   /64 (BP Location: Left arm)   Pulse 60   Temp 97 6 °F (36 4 °C) (Temporal)   Resp 18   Wt 65 1 kg (143 lb 8 3 oz)   LMP  (LMP Unknown)   SpO2 97%   BMI 23 16 kg/m²     Temp (24hrs), Av 8 °F (36 6 °C), Min:97 5 °F (36 4 °C), Max:98 7 °F (37 1 °C)      Physical Exam :     General:  Alert, cooperative, and in no distress  Lower extremity exam:  Cardiovascular status at baseline  Neurological status at baseline  Musculoskeletal status at baseline  No calf tenderness noted bilaterally  Wound (1) located right anterior leg, measures approximately 6 0 cm x 3 0 cm x 0 2 cm  without sinus tracking or undermining  Wound bed appears pink/red and eschar with mild serous  drainage  Deepest tissue noted in base is fat/adipose  Malodor is not noticed   Wound edge appears non-attached  Corazon-wound skin appears intact  Probe to bone is negative   Signs of infection are not present at this time  Wound 1 located right posterior heel, measures 3 5 cm x 2 5 cm x 0 1 cm  without sinus tracking or undermining  Wound bed appears eschar with no exudate  Deepest tissue noted unknown  Malodor is not noticed  Wound edge appears non-attached  [place]az[place]-wound skin appears intact  Probe to bone is,  negative   Signs of infection are not present at this time  Right 4th and 5th digit dry stable eschar with no bogginess, drainage or malodor are noted  Clinical Images 01/21/21:    Right foot    Right heel    Right anterior leg      Additional Data:     Labs:    Results from last 7 days   Lab Units 01/21/21  0453  01/18/21  1314   WBC Thousand/uL 9 69   < > 13 02*   HEMOGLOBIN g/dL 6 5*   < > 7 4*   HEMATOCRIT % 20 3*   < > 23 9*   PLATELETS Thousands/uL 223   < > 241   NEUTROS PCT %  --   --  84*   LYMPHS PCT %  --   --  11*   MONOS PCT %  --   --  3*   EOS PCT %  --   --  1    < > = values in this interval not displayed  Results from last 7 days   Lab Units 01/21/21  0507   POTASSIUM mmol/L 4 3   CHLORIDE mmol/L 98*   CO2 mmol/L 20*   BUN mg/dL 125*   CREATININE mg/dL 3 43*   CALCIUM mg/dL 8 0*     Results from last 7 days   Lab Units 01/21/21  0452   INR  4 70*       * I Have Reviewed All Lab Data Listed Above  Recent Cultures (last 7 days):     Results from last 7 days   Lab Units 01/18/21  1515   BLOOD CULTURE  No Growth at 48 hrs  No Growth at 48 hrs  Imaging: I have personally reviewed pertinent films in PACS  Pathology, and Other Studies: I have personally reviewed pertinent reports  ** Please Note: Portions of the record may have been created with voice recognition software  Occasional wrong word or "sound a like" substitutions may have occurred due to the inherent limitations of voice recognition software   Read the chart carefully and recognize, using context, where substitutions have occurred   **

## 2021-01-21 NOTE — CONSULTS
Consultation - Neuropsychology/Psychology Department  Johnathan Campuzano 80 y o  female MRN: [id number]  Unit/Bed#: E5 -01 Encounter: [id number]        Reason for Consultation:  Johnathan Campuzano is a 80y o  year old female who was referred for neuropsychological examination to assess cognitive functioning and comment on capacity to make informed medical decisions  History of Present Illness      Patient was admitted due to leg swelling and burning sensation in her lower left extremity  Physician Requesting Consult: Josh Mathur MD    PROBLEM LIST:  Patient Active Problem List   Diagnosis    Acute deep vein thrombosis (DVT) of calf muscle vein of right lower extremity (HCC)    LILIAN (acute kidney injury) ([address]    Hydronephrosis of right kidney    Elevated blood urea nitrogen    Hyponatremia    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 [facility]    Nonhealing nonsurgical wound    Protein-calorie malnutrition ([address] )    Sepsis due to Gram negative bacteria ([address] )    Ulcer of right lower extremity with fat layer exposed ([address] )    Urinary retention    Wound infection    PAD (peripheral artery disease) ([facility]    Essential hypertension    CKD (chronic kidney disease)    History of DVT (deep vein thrombosis)    Acute diastolic congestive heart failure (HCC)    Cellulitis    Hyperkalemia    Anemia    Chronic diastolic heart failure ([address] )    Ambulatory dysfunction    Chronic hypoxemic respiratory failure (HCC)    Coagulopathy ([address] )         Historical Information   Past Medical History:   Diagnosis Date    Anemia 1/18/2021    Chronic kidney disease     Hypertension      History reviewed  No pertinent surgical history    Social History   Social History     Substance and Sexual Activity   Alcohol Use Never    Frequency: Never     Social History     Substance and Sexual Activity   Drug Use Never     Social History     Tobacco Use   Smoking Status Never Smoker   Smokeless Tobacco Never Used     Family History:   Family History   Problem Relation Age of Onset    No Known Problems Mother     No Known Problems Father        Meds/Allergies   current meds:   Current Facility-Administered Medications   Medication Dose Route Frequency    acetaminophen (TYLENOL) tablet 650 mg  650 mg Oral Q6H PRN    amLODIPine (NORVASC) tablet 5 mg  5 mg Oral Daily    carvedilol (COREG) tablet 12 5 mg  12 5 mg Oral BID With Meals    ceFAZolin (ANCEF) IVPB (premix in dextrose) 1,000 mg 50 mL  1,000 mg Intravenous Q24H    docusate sodium (COLACE) capsule 100 mg  100 mg Oral BID    hydrALAZINE (APRESOLINE) tablet 10 mg  10 mg Oral Q8H [facility]    isosorbide dinitrate (ISORDIL) tablet 10 mg  10 mg Oral TID after meals    levothyroxine tablet 25 mcg  25 mcg Oral Early Morning    ondansetron (ZOFRAN) injection 4 mg  4 mg Intravenous Q6H PRN    saccharomyces boulardii (FLORASTOR) capsule 250 mg  250 mg Oral BID    sodium bicarbonate tablet 1,300 mg  1,300 mg Oral TID after meals       Allergies   Allergen Reactions    No Known Allergies          Family and Social Support:   Living Arrangements: Spouse/significant other  Support Systems: Spouse/significant other  Type of Current Residence: Private residence  Current Home Care Services: Yes  Type of Current Home Care Services: Home PT;Nurse visit  Discharge planning discussed with[de-identified] patient's spouse Ehsannicholas Vang  Freedom of Choice: Yes  CM Handoff Comments: 1/20 Cellulitis IV antibx  Needs BKA, pending competency evaluation  Opened LC [facility] case d/c TBD transport TBD      Behavioral Observations: Patient was alert and cooperative throughout the evaluation, putting forth her best effort on all of the tasks that were presented to her  She could not clearly express the year ("tw[birth year]-oh-[birth year]"), did not know the day of the week or day of the month, and did not know what floor she was on  Her speech was fluid, articulate  Her thought processes were coherent  Her interaction was engaged   Examiner asked her about the notation in he chart that references being "stuck at the kitchen table for three days," which she emphatically denied  Cognitive Examination    General Cognitive Functioning MMSE = Mildly Impaired; General Fund of Information = Low Average    Attention/Concentration Auditory Vigilance = Within Normal Limits    Frontal Systems/Executive Functioning Mental Flexibility/Cognitive Control = Impaired; Generative Ability = Borderline,     Language Functioning Phonemic Fluency = Borderline; Semantic Retrieval = Low Average; Comprehension of Complex Ideational Material = Borderline;  Repetition = Within Normal Limits;     Memory Functioning Narrative Recall - Short Delay = Borderline; Long Delay Narrative Recall = Low Average; Functional Knowledge  Health & Safety Knowledge = Impaired;     Summary/Impression: The results of neuropsychological examination revealed impairments in overall mental status and mental flexibility / cognitive control  The profile is consistent with at least a mild cognitive impairment  On a measure assessing awareness of personal health status, ability to evaluate health problems, handle medical emergencies, and take safety precautions, patient performed in the impaired range of functioning  At this time, patient does not appear to have capacity to make fully informed medical decisions

## 2021-01-21 NOTE — ASSESSMENT & PLAN NOTE
Patient with 2 days of nonambulatory status, per her  she was sitting at the kitchen table unable to move  Likely secondary to her peripheral arterial disease, and cellulitis  Continue PT/OT  Case management consulted  Likely will need short-term rehab  Reportedly area of the aging is involved  Seen by neuropsych 1/20 and deemed not competent to make medical decisions

## 2021-01-21 NOTE — PROGRESS NOTES
Progress Note - Urology  Roger Garvey 5/18/1930, 80 y o  female MRN: [id number]    Unit/Bed#: E5 -01 Encounter: [id number]    Assessment & Plan  1  Right UPJ obstruction  - s/p cystoscopy right ureteral stent insertion 12/2/2020 @ [facility]  - CT performed 1/20 demonstrates stent in appropriate position without obstruction  - resume planned exchanges with primary urologist     2  Solitary right kidney  - left kidney atrophic  - right kidney hydronephrotic s/t UPJo, now managed with indwelling stent     3  LILIAN? On CKD  - current creatinine mid-3s not improving with fluid and metabolic management by nephrology  - no upper urinary tract obstruction, however bladder appears distended she is inconsistently making and/or voiding urine  - place Scott catheter today for bladder decompression, measurement of I/Os, and assess improvement in creatinine following  - reconsider void trial after creatinine xi     4  Incomplete bladder emptying  - inconsistent voiding/measurements  - place Scott today as above      Subjective: Reports no urinary complaints  Insists she is making urine and wetting overnight  No fevers  No flank pain  No abdominal pain  Eating full diet  Not oob or ambulating  Review of Systems   Constitutional: Positive for fatigue  Negative for activity change, appetite change, chills, fever and unexpected weight change  HENT: Negative  Respiratory: Negative  Negative for shortness of breath  Cardiovascular: Positive for leg swelling  Negative for chest pain  Gastrointestinal: Positive for constipation  Negative for abdominal pain, diarrhea, nausea and vomiting  Endocrine: Negative  Genitourinary: Positive for difficulty urinating (unclear)  Negative for decreased urine volume, dysuria, flank pain, frequency, hematuria and urgency  Musculoskeletal: Negative for back pain and gait problem  Skin: Negative  Allergic/Immunologic: Negative  Neurological: Negative      Hematological: Negative for adenopathy  Does not bruise/bleed easily  Objective:  Vitals: Blood pressure 125/64, pulse 60, temperature 97 6 °F (36 4 °C), temperature source Temporal, resp  rate 18, weight 65 1 kg (143 lb 8 3 oz), SpO2 97 %  ,Body mass index is 23 16 kg/m²  Intake/Output Summary (Last 24 hours) at 1/21/2021 9146  Last data filed at 1/20/2021 2300  Gross per 24 hour   Intake    Output 180 ml   Net -180 ml     Invasive Devices     Peripheral Intravenous Line            Peripheral IV 01/20/21 Left Forearm less than 1 day                Physical Exam  Vitals signs and nursing note reviewed  Constitutional:       General: She is not in acute distress  Appearance: She is well-developed  She is not diaphoretic  Comments: Elderly white female slightly disheveled   HENT:      Head: Normocephalic and atraumatic  Cardiovascular:      Rate and Rhythm: Normal rate and regular rhythm  Pulmonary:      Effort: Pulmonary effort is normal       Breath sounds: Normal breath sounds  Abdominal:      General: Bowel sounds are normal       Palpations: Abdomen is soft  Tenderness: There is no abdominal tenderness  There is no right CVA tenderness or left CVA tenderness  Musculoskeletal:      Right lower leg: Edema present  Left lower leg: Edema present  Skin:     General: Skin is warm  Capillary Refill: Capillary refill takes less than 2 seconds  Comments: LE stasis wounds   Neurological:      Mental Status: She is alert and oriented to person, place, and time     Psychiatric:         Speech: Speech normal          Behavior: Behavior normal          Labs:  Recent Labs     01/18/21  1314 01/19/21  0515 01/20/21  0541 01/21/21  0453   WBC 13 02* 12 65* 12 69* 9 69     Recent Labs     01/18/21  1314 01/19/21  0515 01/20/21  0541 01/21/21  0453   HGB 7 4* 7 4* 7 2* 6 5*       Recent Labs     01/18/21  1314 01/18/21  1602 01/19/21  0108 01/19/21  0515 01/19/21  4027 01/19/21  1628 01/20/21  0541 01/21/21  0507   CREATININE 3 30* 3 34* 3 62* 3 70* 3 63* 3 64* 3 61* 3 43*       History:    Past Medical History:   Diagnosis Date    Anemia 1/18/2021    Chronic kidney disease     Hypertension      History reviewed  No pertinent surgical history    Family History   Problem Relation Age of Onset    No Known Problems Mother     No Known Problems Father      Social History     Socioeconomic History    Marital status: /Civil Union     Spouse name: None    Number of children: None    Years of education: None    Highest education level: None   Occupational History    None   Social Needs    Financial resource strain: None    Food insecurity     Worry: None     Inability: None    Transportation needs     Medical: None     Non-medical: None   Tobacco Use    Smoking status: Never Smoker    Smokeless tobacco: Never Used   Substance and Sexual Activity    Alcohol use: Never     Frequency: Never    Drug use: Never    Sexual activity: None   Lifestyle    Physical activity     Days per week: None     Minutes per session: None    Stress: None   Relationships    Social connections     Talks on phone: None     Gets together: None     Attends [employer] service: None     Active member of club or organization: None     Attends meetings of clubs or organizations: None     Relationship status: None    Intimate partner violence     Fear of current or ex partner: None     Emotionally abused: None     Physically abused: None     Forced sexual activity: None   Other Topics Concern    None   Social History Narrative    None         César Noguera  Date: 1/21/2021 Time: 9:09 AM

## 2021-01-21 NOTE — ASSESSMENT & PLAN NOTE
-patient was diagnosed with a right soleal vein occlusive DVT during her hospitalization 11/2020 at [facility]  -she is on chronic anticoagulation with Coumadin  -INR currently supratherapeutic  Was 10 on admission- received vitamin k- now 4 7  Give additional oral vit K on 1/21, given diminishing hgb

## 2021-01-21 NOTE — ASSESSMENT & PLAN NOTE
History of chronic anemia with baseline hemoglobin ranging 7-8  Hemoglobin diminished to 6 5 - 6 7 on 1/21   Preparing 2U; transfuse 1U with 40mg IV Lasix on 1/21  Hemoccult stool pending - no BM, adding sennakot  Iron panel consistent with iron deficiency anemia  Unable to give IV Venofer due to acute infection    Lab Results   Component Value Date    HGB 6 7 (LL) 01/21/2021    HGB 6 5 (LL) 01/21/2021    HGB 7 2 (L) 01/20/2021    HGB 7 4 (L) 01/19/2021    HGB 7 4 (L) 01/18/2021    HGB 8 1 (L) 01/05/2021

## 2021-01-21 NOTE — CASE MANAGEMENT
Patient here with cellulitis, dry gangrene  Patient is consulted by nephrology, podiatry and vascular  Patient neuropsychology, patient does not have capacity to make medical decisions  Patient is pending medical clearance and discharge planning  PT/OT recommending rehab  CM will continue to follow

## 2021-01-21 NOTE — ASSESSMENT & PLAN NOTE
Wt Readings from Last 3 Encounters:   01/21/21 65 1 kg (143 lb 8 3 oz)   01/06/21 62 1 kg (136 lb 14 4 oz)   12/22/20 60 8 kg (134 lb)     Patient has a history of chronic diastolic congestive heart failure  She was recently admitted 12/2020 with an acute exacerbation of her chronic diastolic congestive heart failure  Home Lasix on hold due to acute kidney injury  She previously been on Lasix 20 mg daily  Continue Coreg 12 5 mg b i d    No ACE-inhibitor in light of her chronic kidney disease  Continue isosorbide/hydralazine

## 2021-01-21 NOTE — ASSESSMENT & PLAN NOTE
Patient presented with dry gangrene involving right great toe and 4th toe  Right anterior shin ulcer, and heel ulcer  Appreciate podiatry evaluation and recommendations  Patient with significant peripheral arterial due, and worsening condition of her right foot wound  Vascular surgery evaluation, however patient is not currently able to receive any angiogram or IV dye due to her acute kidney injury - re-consult vascular surgery when medically ready for procedure   Continue wound care  D/w nephrology- cont to [facility] LOW & LASHAY CC renal function prior to any iv dye

## 2021-01-21 NOTE — OCCUPATIONAL THERAPY NOTE
Occupational Therapy Note:    Patient Name: Martita Blackmon  TBYXX'N Date: 1/21/2021    Chart reviewed  Pt with hemoglobin of 6 5 AM  Spoke to nursing  Will defer OT treatment at this time and attempt at later time as medically appropriate      EDNA Sarabia/ESTEBAN

## 2021-01-21 NOTE — ASSESSMENT & PLAN NOTE
Patient presented with acute kidney injury and hyperkalemia  She was treated medically with calcium gluconate, insulin and D 50 and kayexelate with improvement      Recent Labs     01/19/21  0852 01/19/21  1628 01/20/21  0541 01/21/21  0507   K 4 9 4 8 4 7 4 3

## 2021-01-22 LAB
ANION GAP SERPL CALCULATED.3IONS-SCNC: 13 MMOL/L (ref 4–13)
BASOPHILS # BLD AUTO: 0.01 THOUSANDS/ΜL (ref 0–0.1)
BASOPHILS NFR BLD AUTO: 0 % (ref 0–1)
BUN SERPL-MCNC: 125 MG/DL (ref 5–25)
CALCIUM SERPL-MCNC: 7.9 MG/DL (ref 8.3–10.1)
CHLORIDE SERPL-SCNC: 98 MMOL/L (ref 100–108)
CO2 SERPL-SCNC: 21 MMOL/L (ref 21–32)
CREAT SERPL-MCNC: 3.36 MG/DL (ref 0.6–1.3)
EOSINOPHIL # BLD AUTO: 0.3 THOUSAND/ΜL (ref 0–0.61)
EOSINOPHIL NFR BLD AUTO: 4 % (ref 0–6)
ERYTHROCYTE [DISTWIDTH] IN BLOOD BY AUTOMATED COUNT: 17.2 % (ref 11.6–15.1)
GFR SERPL CREATININE-BSD FRML MDRD: 11 ML/MIN/1.73SQ M
GLUCOSE SERPL-MCNC: 100 MG/DL (ref 65–140)
HCT VFR BLD AUTO: 24.6 % (ref 34.8–46.1)
HEMOCCULT STL QL: NEGATIVE
HGB BLD-MCNC: 8 G/DL (ref 11.5–15.4)
IMM GRANULOCYTES # BLD AUTO: 0.19 THOUSAND/UL (ref 0–0.2)
IMM GRANULOCYTES NFR BLD AUTO: 2 % (ref 0–2)
LYMPHOCYTES # BLD AUTO: 1.66 THOUSANDS/ΜL (ref 0.6–4.47)
LYMPHOCYTES NFR BLD AUTO: 20 % (ref 14–44)
MCH RBC QN AUTO: 27.6 PG (ref 26.8–34.3)
MCHC RBC AUTO-ENTMCNC: 32.5 G/DL (ref 31.4–37.4)
MCV RBC AUTO: 85 FL (ref 82–98)
MONOCYTES # BLD AUTO: 0.57 THOUSAND/ΜL (ref 0.17–1.22)
MONOCYTES NFR BLD AUTO: 7 % (ref 4–12)
NEUTROPHILS # BLD AUTO: 5.77 THOUSANDS/ΜL (ref 1.85–7.62)
NEUTS SEG NFR BLD AUTO: 67 % (ref 43–75)
NRBC BLD AUTO-RTO: 0 /100 WBCS
PLATELET # BLD AUTO: 219 THOUSANDS/UL (ref 149–390)
PMV BLD AUTO: 10.7 FL (ref 8.9–12.7)
POTASSIUM SERPL-SCNC: 4.1 MMOL/L (ref 3.5–5.3)
RBC # BLD AUTO: 2.9 MILLION/UL (ref 3.81–5.12)
SODIUM SERPL-SCNC: 132 MMOL/L (ref 136–145)
WBC # BLD AUTO: 8.5 THOUSAND/UL (ref 4.31–10.16)

## 2021-01-22 PROCEDURE — 82272 OCCULT BLD FECES 1-3 TESTS: CPT | Performed by: INTERNAL MEDICINE

## 2021-01-22 PROCEDURE — 80048 BASIC METABOLIC PNL TOTAL CA: CPT | Performed by: INTERNAL MEDICINE

## 2021-01-22 PROCEDURE — 85025 COMPLETE CBC W/AUTO DIFF WBC: CPT | Performed by: INTERNAL MEDICINE

## 2021-01-22 PROCEDURE — 99232 SBSQ HOSP IP/OBS MODERATE 35: CPT | Performed by: INTERNAL MEDICINE

## 2021-01-22 PROCEDURE — 99232 SBSQ HOSP IP/OBS MODERATE 35: CPT | Performed by: PHYSICIAN ASSISTANT

## 2021-01-22 RX ORDER — OXYCODONE HYDROCHLORIDE 5 MG/1
2.5 TABLET ORAL ONCE
Status: COMPLETED | OUTPATIENT
Start: 2021-01-22 | End: 2021-01-22

## 2021-01-22 RX ORDER — SENNOSIDES 8.6 MG
1 TABLET ORAL 2 TIMES DAILY
Status: DISCONTINUED | OUTPATIENT
Start: 2021-01-22 | End: 2021-01-29

## 2021-01-22 RX ORDER — SENNOSIDES 8.6 MG
3 TABLET ORAL 2 TIMES DAILY
Status: DISCONTINUED | OUTPATIENT
Start: 2021-01-22 | End: 2021-01-22

## 2021-01-22 RX ADMIN — SODIUM BICARBONATE 650 MG TABLET 1300 MG: at 12:11

## 2021-01-22 RX ADMIN — SENNOSIDES 8.6 MG: 8.6 TABLET ORAL at 18:30

## 2021-01-22 RX ADMIN — SENNOSIDES 17.2 MG: 8.6 TABLET ORAL at 08:48

## 2021-01-22 RX ADMIN — AMLODIPINE BESYLATE 5 MG: 5 TABLET ORAL at 08:48

## 2021-01-22 RX ADMIN — CEFAZOLIN SODIUM 1000 MG: 1 SOLUTION INTRAVENOUS at 12:13

## 2021-01-22 RX ADMIN — OXYCODONE HYDROCHLORIDE 5 MG: 5 TABLET ORAL at 13:45

## 2021-01-22 RX ADMIN — LEVOTHYROXINE SODIUM 25 MCG: 25 TABLET ORAL at 05:21

## 2021-01-22 RX ADMIN — DOCUSATE SODIUM 100 MG: 100 CAPSULE, LIQUID FILLED ORAL at 08:48

## 2021-01-22 RX ADMIN — Medication 250 MG: at 08:52

## 2021-01-22 RX ADMIN — Medication 250 MG: at 18:30

## 2021-01-22 RX ADMIN — ISOSORBIDE DINITRATE 10 MG: 10 TABLET ORAL at 08:52

## 2021-01-22 RX ADMIN — HYDRALAZINE HYDROCHLORIDE 10 MG: 10 TABLET, FILM COATED ORAL at 13:46

## 2021-01-22 RX ADMIN — SODIUM BICARBONATE 650 MG TABLET 1300 MG: at 08:48

## 2021-01-22 RX ADMIN — OXYCODONE HYDROCHLORIDE 2.5 MG: 5 TABLET ORAL at 15:02

## 2021-01-22 RX ADMIN — ISOSORBIDE DINITRATE 10 MG: 10 TABLET ORAL at 12:11

## 2021-01-22 RX ADMIN — CARVEDILOL 12.5 MG: 6.25 TABLET, FILM COATED ORAL at 18:30

## 2021-01-22 RX ADMIN — HYDRALAZINE HYDROCHLORIDE 10 MG: 10 TABLET, FILM COATED ORAL at 21:10

## 2021-01-22 RX ADMIN — SODIUM BICARBONATE 650 MG TABLET 1300 MG: at 18:29

## 2021-01-22 RX ADMIN — ISOSORBIDE DINITRATE 10 MG: 10 TABLET ORAL at 18:36

## 2021-01-22 RX ADMIN — HYDRALAZINE HYDROCHLORIDE 10 MG: 10 TABLET, FILM COATED ORAL at 05:21

## 2021-01-22 RX ADMIN — CARVEDILOL 12.5 MG: 6.25 TABLET, FILM COATED ORAL at 08:52

## 2021-01-22 RX ADMIN — DOCUSATE SODIUM 100 MG: 100 CAPSULE, LIQUID FILLED ORAL at 18:30

## 2021-01-22 NOTE — ASSESSMENT & PLAN NOTE
Patient presented with dry gangrene involving right great toe and 4th toe  Right anterior shin ulcer, and heel ulcer  Appreciate podiatry evaluation and recommendations  Patient with significant peripheral arterial due, and worsening condition of her right foot wound  Vascular surgery evaluation, however patient is not currently able to receive any angiogram or IV dye due to her acute kidney injury - re-consult vascular surgery when medically ready for procedure, likely next week   Continue wound care  D/w nephrology- cont to [facility] C & LASHAY CC renal function prior to any iv dye

## 2021-01-22 NOTE — ASSESSMENT & PLAN NOTE
History of chronic anemia with baseline hemoglobin ranging 7-8  Hemoglobin diminished to 6 5 - 6 7 on 1/21   Received transfusion 1 unit PRBC on 01/21 and 40 mg IV Lasix  Hemoccult stool sent 1/22  Iron panel consistent with iron deficiency anemia  Unable to give IV Venofer due to acute infection    Lab Results   Component Value Date    HGB 8 0 (L) 01/22/2021    HGB 6 7 (LL) 01/21/2021    HGB 6 5 (LL) 01/21/2021    HGB 7 2 (L) 01/20/2021    HGB 7 4 (L) 01/19/2021    HGB 7 4 (L) 01/18/2021

## 2021-01-22 NOTE — ASSESSMENT & PLAN NOTE
-patient was diagnosed with a right soleal vein occlusive DVT during her hospitalization 11/2020 at [facility]  -she is on chronic anticoagulation with Coumadin  -INR currently supratherapeutic  Was 10 on admission- received vitamin k- most recently 4 7  Give additional oral vit K on 1/21, given diminishing hgb

## 2021-01-22 NOTE — PROGRESS NOTES
Progress Note - Sonido Abraham 5/18/1930, 80 y o  female MRN: [id number]  Unit/Bed#: E5 -01 Encounter: [id number]  Primary Care Provider: Delmar Corcoran MD   Date and time admitted to hospital: 1/18/2021 11:44 AM      * Cellulitis  Assessment & Plan  Patient presented with extensive left lower extremity cellulitis extending from her toes up to her thigh  She was started on IV antibiotics with Ancef   Day 5 Ancef  Leukocytosis improving/resolved  Blood culture negative at 48 hours  Previous LEADS 12/2020 with significant left lower extremity peripheral arterial disease, and she had declined vascular surgery intervention  Planning for arteriogram when renal function recovered, likely next week       Anemia  Assessment & Plan  History of chronic anemia with baseline hemoglobin ranging 7-8  Hemoglobin diminished to 6 5 - 6 7 on 1/21   Received transfusion 1 unit PRBC on 01/21 and 40 mg IV Lasix  Hemoccult stool sent 1/22  Iron panel consistent with iron deficiency anemia  Unable to give IV Venofer due to acute infection    Lab Results   Component Value Date    HGB 8 0 (L) 01/22/2021    HGB 6 7 (LL) 01/21/2021    HGB 6 5 (LL) 01/21/2021    HGB 7 2 (L) 01/20/2021    HGB 7 4 (L) 01/19/2021    HGB 7 4 (L) 01/18/2021         Coagulopathy ([facility] [address] )  Assessment & Plan  Patient presented with coagulopathy secondary to Coumadin  INR was 10 on admission, and she was given 2 5 mg of vitamin K  Receiving additional vit K 1/21  INR improved to 6 - most recently 4/7   Continue to monitor, and hold Coumadin    Chronic hypoxemic respiratory failure [facility])  Assessment & Plan  During her December 2020 admission patient was diagnosed with hypoxic respiratory failure secondary to her congestive heart failure atelectasis    She was discharged on 3 L nasal cannula  Continue oxygen  Currently with adequate oxygenation on 3 L    Ambulatory dysfunction  Assessment & Plan  Patient with 2 days of nonambulatory status, per her  she was sitting at the kitchen table unable to move  Likely secondary to her peripheral arterial disease, and cellulitis  Continue PT/OT  Case management consulted, working on placement  Reportedly area of the aging is involved  Seen by neuropsych 1/20 and deemed not competent to make medical decisions  Chronic diastolic heart failure (HCC)  Assessment & Plan  Wt Readings from Last 3 Encounters:   01/22/21 65 9 kg (145 lb 4 5 oz)   01/06/21 62 1 kg (136 lb 14 4 oz)   12/22/20 60 8 kg (134 lb)     Patient has a history of chronic diastolic congestive heart failure  She was recently admitted 12/2020 with an acute exacerbation of her chronic diastolic congestive heart failure  Home Lasix on hold due to acute kidney injury  She previously been on Lasix 20 mg daily  Continue Coreg 12 5 mg b i d  No ACE-inhibitor in light of her chronic kidney disease  Continue isosorbide/hydralazine      Hyperkalemia  Assessment & Plan  Patient presented with acute kidney injury and hyperkalemia  She was treated medically with calcium gluconate, insulin and D 50 and kayexelate with improvement      Recent Labs     01/19/21  1628 01/20/21  0541 01/21/21  0507 01/22/21  0454   K 4 8 4 7 4 3 4 1         Essential hypertension  Assessment & Plan  Patient has a history of essential hypertension  She was continued on her home antihypertensives with Norvasc 5 mg daily, Coreg 12 5 mg b i d , hydralazine 10 mg q 8 hours, and isosorbide dinitrate 10 mg t i d   Blood pressure currently adequately controlled  Continue medications and monitor    PAD (peripheral artery disease) [facility])  Assessment & Plan  Patient has a history of peripheral arterial disease  12/2020 she was evaluated by the vascular surgery team and had LEADS that revealed   Right lower extremity:  occlusion versus high-grade stenosis at the mid/distal popliteal artery, and focal stenosis in the proximal superficial femoral artery and distal superficial femoral artery    Also finding suggestive of tibial peroneal disease  ÁNGEL 0 46  Left lower extremity:  Occlusion versus high-grade stenosis of the proximal thigh portion of the superficial femoral artery with distal reconstitution, finding suggestive of tibioperoneal disease  ÁNGEL 0 71  Patient had declined previous vascular surgery intervention  Currently now with 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; re-consult vascular when ready for procedure       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 [facility])  Assessment & Plan  Patient presented with dry gangrene involving right great toe and 4th toe  Right anterior shin ulcer, and heel ulcer  Appreciate podiatry evaluation and recommendations  Patient with significant peripheral arterial due, and worsening condition of her right foot wound  Vascular surgery evaluation, however patient is not currently able to receive any angiogram or IV dye due to her acute kidney injury - re-consult vascular surgery when medically ready for procedure, likely next week   Continue wound care  D/w nephrology- cont to [facility] C & [facility] CC renal function prior to any iv dye    Hyponatremia  Assessment & Plan  Recent Labs     01/19/21  2210 01/20/21  0541 01/21/21  0507 01/22/21  0454   SODIUM 127* 127* 130* 132*     Patient presented with hyponatremia, likely secondary to hypovolemic hyponatremia  Appreciate Nephrology evaluation and recommendations  Patient was placed on isotonic IV fluids with normal saline  Continue 1 5 L fluid restriction  Home diuretics on hold  Started on sodium bicarbonate for metabolic acidosis secondary to acute kidney injury    LILIAN (acute kidney injury) ([facility] [address] )  Assessment & Plan  -patient has a history of chronic kidney disease stage 3  -baseline creatinine appears to range 1 7-2 2  -patient was recently hospitalized 11/2020 at [facility] with acute kidney injury, right-sided hydronephrosis, and atrophic left kidney, and required right ureteral stent    -patient's previous creatinine and began ER general was approximately 2 0  -she presented with acute kidney injury with a creatinine of 3 3, that worsened to 3 6  -patient with associated metabolic acidosis and hyperkalemia  -appreciate Nephrology evaluation and recommendations  -cont urinary retention protocol - despite low residuals, placing Scott 1/21/2021  -continue IV fluids and bicarbonate  -gradual improvement  -appreciate urology input- stent functioning appropriately        Acute deep vein thrombosis (DVT) of calf muscle vein of right lower extremity ([address] )  Assessment & Plan  -patient was diagnosed with a right soleal vein occlusive DVT during her hospitalization 11/2020 at [facility]  -she is on chronic anticoagulation with Coumadin  -INR currently supratherapeutic  Was 10 on admission- received vitamin k- most recently 4 7  Give additional oral vit K on 1/21, given diminishing hgb      VTE Pharmacologic Prophylaxis:   Pharmacologic: Pharmacologic VTE Prophylaxis contraindicated due to Supratherapeutic INR and anemia, rule out GI bleed  Mechanical VTE Prophylaxis in Place: Yes    Patient Centered Rounds: I have performed bedside rounds with nursing staff today  Discussions with Specialists or Other Care Team Provider:  Discussed with nursing, nephrology, case management    Education and Discussions with Family / Patient:  Discussed over the phone with patient's spouse and full update given  All questions answered  Time Spent for Care: 45 minutes  More than 50% of total time spent on counseling and coordination of care as described above      Current Length of Stay: 4 day(s)    Current Patient Status: Inpatient   Certification Statement: The patient will continue to require additional inpatient hospital stay due to Cellulitis, monitoring renal function for arteriogram, workup for anemia    Discharge Plan:  Pending, not medically ready    Code Status: Level 1 - Full Code      Subjective:   Patient with no acute complaints or concerns    Objective:     Vitals:   Temp (24hrs), Av 4 °F (36 9 °C), Min:97 1 °F (36 2 °C), Max:99 °F (37 2 °C)    Temp:  [97 1 °F (36 2 °C)-99 °F (37 2 °C)] 97 7 °F (36 5 °C)  HR:  [56-64] 62  Resp:  [18-20] 20  BP: (108-153)/(53-67) 131/59  SpO2:  [93 %-98 %] 97 %  Body mass index is 23 45 kg/m²  Input and Output Summary (last 24 hours): Intake/Output Summary (Last 24 hours) at 2021 1331  Last data filed at 2021 0601  Gross per 24 hour   Intake 470 ml   Output 950 ml   Net -480 ml       Physical Exam:     Physical Exam  Vitals signs and nursing note reviewed  Exam conducted with a chaperone present  Constitutional:       General: She is not in acute distress  Appearance: She is well-developed  Comments: Pleasant, fully conversational   No acute distress  HENT:      Head: Normocephalic and atraumatic  Eyes:      Conjunctiva/sclera: Conjunctivae normal    Neck:      Musculoskeletal: Neck supple  Cardiovascular:      Rate and Rhythm: Normal rate and regular rhythm  Heart sounds: No murmur  Pulmonary:      Effort: Pulmonary effort is normal  No respiratory distress  Breath sounds: Normal breath sounds  Comments: 3 L nasal cannula, not currently in the nose  Abdominal:      Palpations: Abdomen is soft  Tenderness: There is no abdominal tenderness  Skin:     General: Skin is warm and dry  Findings: Erythema present  Comments: Lower extremity erythematous and warm L > R   Neurological:      Mental Status: She is alert           Additional Data:     Labs:    Results from last 7 days   Lab Units 21  0454   WBC Thousand/uL 8 50   HEMOGLOBIN g/dL 8 0*   HEMATOCRIT % 24 6*   PLATELETS Thousands/uL 219   NEUTROS PCT % 67   LYMPHS PCT % 20   MONOS PCT % 7   EOS PCT % 4 Results from last 7 days   Lab Units 01/22/21  0454   SODIUM mmol/L 132*   POTASSIUM mmol/L 4 1   CHLORIDE mmol/L 98*   CO2 mmol/L 21   BUN mg/dL 125*   CREATININE mg/dL 3 36*   ANION GAP mmol/L 13   CALCIUM mg/dL 7 9*   GLUCOSE RANDOM mg/dL 100     Results from last 7 days   Lab Units 01/21/21  0452   INR  4 70*                       * I Have Reviewed All Lab Data Listed Above  * Additional Pertinent Lab Tests Reviewed: All Labs Within Last 24 Hours Reviewed      Recent Cultures (last 7 days):     Results from last 7 days   Lab Units 01/18/21  1515   BLOOD CULTURE  No Growth at 72 hrs  No Growth at 72 hrs  Last 24 Hours Medication List:   Current Facility-Administered Medications   Medication Dose Route Frequency Provider Last Rate    acetaminophen  650 mg Oral Q6H PRN Madeleine Dikes, DO      amLODIPine  5 mg Oral Daily OTIS Meléndez      carvedilol  12 5 mg Oral BID With Meals OTIS Meléndez      cefazolin  1,000 mg Intravenous Q24H Madeleine Diwilner, DO 1,000 mg (01/22/21 1213)    docusate sodium  100 mg Oral BID Harsha Shook, DO      hydrALAZINE  10 mg Oral Q8H [address] Harsha NATE Sandhuice Maknuzhat, DO      isosorbide dinitrate  10 mg Oral TID after meals Harsha Shook, DO      levothyroxine  25 mcg Oral Early Morning Harsha Shook, DO      ondansetron  4 mg Intravenous Q6H PRN Madeleine Dikes, DO      oxyCODONE  2 5 mg Oral Q4H PRN Darlyn Abraham PA-C      oxyCODONE  5 mg Oral Q4H PRN Arabella Abraham PA-C      saccharomyces boulardii  250 mg Oral BID Harsha Shook, DO      senna  1 tablet Oral BID Arabella Abraham PA-C      sodium bicarbonate  1,300 mg Oral TID after meals OTIS Meléndez          Today, Patient Was Seen By: Corey Aldana PA-C    ** Please Note: Dictation voice to text software may have been used in the creation of this document   **

## 2021-01-22 NOTE — OCCUPATIONAL THERAPY NOTE
Occupational Therapy Note:    Patient Name: Malka Jaimes  SCQJV'Y Date: 1/22/2021     Attempted to see pt for OT treatment session this PM, however pt declining 2* increased pain in L LE  RN aware  Pt requesting to defer therapy until tomorrow  Will attempt at later time as schedule permits      EDNA Hoffmann/ESTEBAN

## 2021-01-22 NOTE — ASSESSMENT & PLAN NOTE
Wt Readings from Last 3 Encounters:   01/22/21 65 9 kg (145 lb 4 5 oz)   01/06/21 62 1 kg (136 lb 14 4 oz)   12/22/20 60 8 kg (134 lb)     Patient has a history of chronic diastolic congestive heart failure  She was recently admitted 12/2020 with an acute exacerbation of her chronic diastolic congestive heart failure  Home Lasix on hold due to acute kidney injury  She previously been on Lasix 20 mg daily  Continue Coreg 12 5 mg b i d    No ACE-inhibitor in light of her chronic kidney disease  Continue isosorbide/hydralazine

## 2021-01-22 NOTE — PROGRESS NOTES
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: [id number]  Unit/Bed#: E5 -01 Encounter: [id number]  Reason for Consult: LILIAN (POA) on CKD IV    PLAN:   -creatinine slowly improving  -S/P PRBC transfusion 01/21 with Lasix  -holding further fluids    -continue chadwick catheter for maximal depression, urology following    -holding on A-gram until creatinine return to normal    -continues on oral bicarb for stable acidosis  ASSESSMENT/PLAN:  LILIAN (POA) on likely CKD IV:  Suspect multifactorial with possible infection, diuretic use, as well as possible post renal component with history of atrophic left kidney measuring 7 1 cm and right-sided hydro in December 2020    -presented with creatinine of 3 3 with peak of 3 7 on 01/19   -unknown baseline creatinine, suspect around 1 7-2 2   -creatinine slowly improving   -previously on IV hydration but stopped due to decreasing sodium  -UA showed large blood, large leukocytes, 2+ protein, RBCs, innumerable WBCs, innumerable bacteria  -will place Chadwick catheter    -potential need for future arteriogram per Vascular   Would hold off until creatinine is at baseline unless necessarily needed   -avoid nephrotoxins, hypotension, IV contrast   -I/O      Right-sided hydronephrosis:  With history of right ureteral stent placement in December 2020 at [facility]   -Renal US revealed bilateral renal atrophy, more  on the left along with mild right hydronephrosis with ureteral stent in place   -urology consulted  -chadwick remains in place       Hyponatremia:  Suspect component of hypovolemia   (improving)  -urine sodium 47, urine osmolality 338, serum osmolality 308, uric acid elevated at 9 0, TSH 10 6   -presented with sodium of 124 with normal glucose     -sodium level corrected to 129, correcting appropriately with decrease to 126 with fluids so IVF were discontinued    -continue on 1 5 L fluid restriction       Hyperkalemia (POA):  Presents with potassium of 5 7   Received medical management with insulin, dextrose, Kayexalate, and calcium gluconate  (resolved)  -recommend low-potassium diet      Metabolic acidosis:  In the setting of renal failure   -continue oral bicarb to 1,300 mg and continue to monitor       Hypertension:  Stable   -avoid hypotension or high fluctuations in blood pressure   -outpatient antihypertensives:  Amlodipine 5 mg daily, carvedilol 12 5 mg 2 times per day, hydralazine 10 mg every 8 hours, Isordil 10 mg 3 times per day   -continue amlodipine, carvedilol, hydralazine, Isordil   -holding diuretics      Lower extremity cellulitis:  With dry gangrene to great toe and 4th toe   -podiatry following   -vascular surgery consulted to discuss a-gram and further intervention   Would hold off until creatinine improves unless urgently needed  Recommend pre and post hydration with contrast administration as well as give Mucomyst 1200 mg times total of 4 doses   -blood cultures negative to date   -local dressing changes to LE per vascular recommendations   -continues on IV antibiotics      CHF:  Echocardiogram showed EF of 50% with grade 2 diastolic dysfunction   No sandro signs of volume overload   -check daily weights, fluid restriction, I/O   -outpatient diuretics:  Lasix 40 mg daily  -CT chest shows bilateral moderate-sized pleural effusions, small amount of ascites      Anemia:  Hemoglobin dropping to 6 5     -likely will need transfusion today with 40 mg IV lasix    -checking occult stools  -continue to monitor and transfuse as needed for hemoglobin less than 7 0   -iron panel showed low TIBC and low iron      Other:  PAD, DVT  Disposition:  requiring additional stay due to medical needs  SUBJECTIVE:  The patient is resting in bed  She appears to be comfortable  She denies chest pain or shortness of breath  She complains of bilateral foot pain  She denies nausea, vomiting, diarrhea      OBJECTIVE:  Current Weight: Weight - Scale: 65 9 kg (145 lb 4 5 oz)  Vitals:    01/22/21 0234 01/22/21 0521 01/22/21 0541 01/22/21 0808   BP: 132/64 121/65  153/67   BP Location: Right arm   Left arm   Pulse: 60   61   Resp: 18   20   Temp: 98 8 °F (37 1 °C)   97 7 °F (36 5 °C)   TempSrc: Temporal   Temporal   SpO2: 94%   97%   Weight:   65 9 kg (145 lb 4 5 oz)        Intake/Output Summary (Last 24 hours) at 1/22/2021 1048  Last data filed at 1/22/2021 0601  Gross per 24 hour   Intake 710 ml   Output 950 ml   Net -240 ml     General: NAD, disheveled  Skin: warm, dry, intact, PAD bilateral lower extremity  HEENT: Moist mucous membranes, sclera anicteric, normocephalic, atraumatic  Neck: No apparent JVD appreciated  Chest: lung sounds clear B/L, on O2  CVS:Regular rate and rhythm, no murmer   Abdomen: Soft, round, non-tender, +BS  Extremities:  B/L LE edema present  Neuro: alert and oriented  Psych: appropriate mood and affect     Medications:    Current Facility-Administered Medications:     acetaminophen (TYLENOL) tablet 650 mg, 650 mg, Oral, Q6H PRN, Harsha Shook DO, 650 mg at 01/21/21 0004    amLODIPine (NORVASC) tablet 5 mg, 5 mg, Oral, Daily, Eustace Rouge, CRNP, 5 mg at 01/22/21 0848    carvedilol (COREG) tablet 12 5 mg, 12 5 mg, Oral, BID With Meals, Fei Rouge, CRNP, 12 5 mg at 01/22/21 2526    ceFAZolin (ANCEF) IVPB (premix in dextrose) 1,000 mg 50 mL, 1,000 mg, Intravenous, Q24H, Harsha Shook DO, Last Rate: 100 mL/hr at 01/21/21 1134, 1,000 mg at 01/21/21 1134    docusate sodium (COLACE) capsule 100 mg, 100 mg, Oral, BID, Harsha Shook DO, 100 mg at 01/22/21 0848    hydrALAZINE (APRESOLINE) tablet 10 mg, 10 mg, Oral, Q8H [address], Harsha Shook DO, 10 mg at 01/22/21 0521    isosorbide dinitrate (ISORDIL) tablet 10 mg, 10 mg, Oral, TID after meals, Harsha Shook DO, 10 mg at 01/22/21 5228    levothyroxine tablet 25 mcg, 25 mcg, Oral, Early Morning, Harsha Shook DO, 25 mcg at 01/22/21 0521    ondansetron (ZOFRAN) injection 4 mg, 4 mg, Intravenous, Q6H PRN, Harsha Shook DO    oxyCODONE (ROXICODONE) IR tablet 2 5 mg, 2 5 mg, Oral, Q4H PRN, Arabella Abraham PA-C    oxyCODONE (ROXICODONE) IR tablet 5 mg, 5 mg, Oral, Q4H PRN, Arabella Abraham PA-C    saccharomyces boulardii (FLORASTOR) capsule 250 mg, 250 mg, Oral, BID, Harsha Shook DO, 250 mg at 01/22/21 3683    senna (SENOKOT) tablet 17 2 mg, 2 tablet, Oral, BID, Arabella Abraham PA-C, 17 2 mg at 01/22/21 0848    sodium bicarbonate tablet 1,300 mg, 1,300 mg, Oral, TID after meals, OTIS Meléndez, 1,300 mg at 01/22/21 0848    Laboratory Results:  Results from last 7 days   Lab Units 01/22/21  0454 01/21/21  1056 01/21/21  0507 01/21/21  0453 01/20/21  0541   WBC Thousand/uL 8 50  --   --  9 69 12 69*   HEMOGLOBIN g/dL 8 0* 6 7*  --  6 5* 7 2*   HEMATOCRIT % 24 6* 21 2*  --  20 3* 23 2*   PLATELETS Thousands/uL 219  --   --  223 235   SODIUM mmol/L 132*  --  130*  --  127*   POTASSIUM mmol/L 4 1  --  4 3  --  4 7   CHLORIDE mmol/L 98*  --  98*  --  96*   CO2 mmol/L 21  --  20*  --  16*   BUN mg/dL 125*  --  125*  --  128*   CREATININE mg/dL 3 36*  --  3 43*  --  3 61*   CALCIUM mg/dL 7 9*  --  8 0*  --  8 2*

## 2021-01-22 NOTE — ASSESSMENT & PLAN NOTE
Patient presented with coagulopathy secondary to Coumadin  INR was 10 on admission, and she was given 2 5 mg of vitamin K  Receiving additional vit K 1/21  INR improved to 6 - most recently 4/7   Continue to monitor, and hold Coumadin

## 2021-01-22 NOTE — ASSESSMENT & PLAN NOTE
Patient presented with extensive left lower extremity cellulitis extending from her toes up to her thigh  She was started on IV antibiotics with Ancef   Day 5 Ancef  Leukocytosis improving/resolved      Blood culture negative at 48 hours  Previous LEADS 12/2020 with significant left lower extremity peripheral arterial disease, and she had declined vascular surgery intervention  Planning for arteriogram when renal function recovered, likely next week

## 2021-01-22 NOTE — ASSESSMENT & PLAN NOTE
Patient with 2 days of nonambulatory status, per her  she was sitting at the kitchen table unable to move  Likely secondary to her peripheral arterial disease, and cellulitis  Continue PT/OT  Case management consulted, working on placement  Reportedly area of the aging is involved  Seen by neuropsych 1/20 and deemed not competent to make medical decisions

## 2021-01-22 NOTE — CASE MANAGEMENT
Patient here with cellulitis, dry gangrene  Patient is consulted by nephrology, podiatry and vascular  Patient neuropsychology, patient does not have capacity to make medical decisions  Patient is pending medical clearance and discharge planning  PT/OT recommending rehab  Spouse is requesting referrals be made to [facility]  Spouse states [facility] would not like SNF rehabs  Spouse states patient has Medicare part A and [employer] [facility]  Referrals pending in [facility]

## 2021-01-22 NOTE — ASSESSMENT & PLAN NOTE
Patient presented with acute kidney injury and hyperkalemia  She was treated medically with calcium gluconate, insulin and D 50 and kayexelate with improvement      Recent Labs     01/19/21  1628 01/20/21  0541 01/21/21  0507 01/22/21  0454   K 4 8 4 7 4 3 4 1

## 2021-01-22 NOTE — ASSESSMENT & PLAN NOTE
Recent Labs     01/19/21  2210 01/20/21  0541 01/21/21  0507 01/22/21  0454   SODIUM 127* 127* 130* 132*     Patient presented with hyponatremia, likely secondary to hypovolemic hyponatremia  Appreciate Nephrology evaluation and recommendations  Patient was placed on isotonic IV fluids with normal saline  Continue 1 5 L fluid restriction  Home diuretics on hold  Started on sodium bicarbonate for metabolic acidosis secondary to acute kidney injury

## 2021-01-22 NOTE — ASSESSMENT & PLAN NOTE
-patient has a history of chronic kidney disease stage 3  -baseline creatinine appears to range 1 7-2 2  -patient was recently hospitalized 11/2020 at [facility] with acute kidney injury, right-sided hydronephrosis, and atrophic left kidney, and required right ureteral stent    -patient's previous creatinine and began ER general was approximately 2 0  -she presented with acute kidney injury with a creatinine of 3 3, that worsened to 3 6  -patient with associated metabolic acidosis and hyperkalemia  -appreciate Nephrology evaluation and recommendations  -cont urinary retention protocol - despite low residuals, placing Scott 1/21/2021  -continue IV fluids and bicarbonate  -gradual improvement  -appreciate urology input- stent functioning appropriately

## 2021-01-22 NOTE — PLAN OF CARE
Problem: Potential for Falls  Goal: Patient will remain free of falls  Description: INTERVENTIONS:  - Assess patient frequently for physical needs  -  Identify cognitive and physical deficits and behaviors that affect risk of falls  -  [place] fall precautions as indicated by assessment   - Educate patient/family on patient safety including physical limitations  - Instruct patient to call for assistance with activity based on assessment  - Modify environment to reduce risk of injury  - Consider OT/PT consult to assist with strengthening/mobility  Outcome: Progressing     Problem: Prexisting or High Potential for Compromised Skin Integrity  Goal: Skin integrity is maintained or improved  Description: INTERVENTIONS:  - Identify patients at risk for skin breakdown  - Assess and monitor skin integrity  - Assess and monitor nutrition and hydration status  - Monitor labs   - Assess for incontinence   - Turn and reposition patient  - Assist with mobility/ambulation  - Relieve pressure over bony prominences  - Avoid friction and shearing  - Provide appropriate hygiene as needed including keeping skin clean and dry  - Evaluate need for skin moisturizer/barrier cream  - Collaborate with interdisciplinary team   - Patient/family teaching  - Consider wound care consult   Outcome: Progressing     Problem: Nutrition/Hydration-ADULT  Goal: Nutrient/Hydration intake appropriate for improving, restoring or maintaining nutritional needs  Description: Monitor and assess patient's nutrition/hydration status for malnutrition  Collaborate with interdisciplinary team and initiate plan and interventions as ordered  Monitor patient's weight and dietary intake as ordered or per policy  Utilize nutrition screening tool and intervene as necessary  Determine patient's food preferences and provide high-protein, high-caloric foods as appropriate       INTERVENTIONS:  - Monitor oral intake, urinary output, labs, and treatment plans  - Assess nutrition and hydration status and recommend course of action  - Evaluate amount of meals eaten  - Assist patient with eating if necessary   - Allow adequate time for meals  - Recommend/ encourage appropriate diets, oral nutritional supplements, and vitamin/mineral supplements  - Order, calculate, and assess calorie counts as needed  - Recommend, monitor, and adjust tube feedings and TPN/PPN based on assessed needs  - Assess need for intravenous fluids  - Provide specific nutrition/hydration education as appropriate  - Include patient/family/caregiver in decisions related to nutrition  Outcome: Progressing     Problem: PAIN - ADULT  Goal: Verbalizes/displays adequate comfort level or baseline comfort level  Description: Interventions:  - Encourage patient to monitor pain and request assistance  - Assess pain using appropriate pain scale  - Administer analgesics based on type and severity of pain and evaluate response  - Implement non-pharmacological measures as appropriate and evaluate response  - Consider cultural and social influences on pain and pain management  - Notify physician/advanced practitioner if interventions unsuccessful or patient reports new pain  Outcome: Progressing     Problem: INFECTION - ADULT  Goal: Absence or prevention of progression during hospitalization  Description: INTERVENTIONS:  - Assess and monitor for signs and symptoms of infection  - Monitor lab/diagnostic results  - Monitor all insertion sites, i e  indwelling lines, tubes, and drains  - Monitor endotracheal if appropriate and nasal secretions for changes in amount and color  - [place] appropriate cooling/warming therapies per order  - Administer medications as ordered  - Instruct and encourage patient and family to use good hand hygiene technique  - Identify and instruct in appropriate isolation precautions for identified infection/condition  Outcome: Progressing  Goal: Absence of fever/infection during neutropenic period  Description: INTERVENTIONS:  - Monitor WBC    Outcome: Progressing     Problem: SAFETY ADULT  Goal: Patient will remain free of falls  Description: INTERVENTIONS:  - Assess patient frequently for physical needs  -  Identify cognitive and physical deficits and behaviors that affect risk of falls    -  [place] fall precautions as indicated by assessment   - Educate patient/family on patient safety including physical limitations  - Instruct patient to call for assistance with activity based on assessment  - Modify environment to reduce risk of injury  - Consider OT/PT consult to assist with strengthening/mobility  Outcome: Progressing  Goal: Maintain or return to baseline ADL function  Description: INTERVENTIONS:  -  Assess patient's ability to carry out ADLs; assess patient's baseline for ADL function and identify physical deficits which impact ability to perform ADLs (bathing, care of mouth/teeth, toileting, grooming, dressing, etc )  - Assess/evaluate cause of self-care deficits   - Assess range of motion  - Assess patient's mobility; develop plan if impaired  - Assess patient's need for assistive devices and provide as appropriate  - Encourage maximum independence but intervene and supervise when necessary  - Involve family in performance of ADLs  - Assess for home care needs following discharge   - Consider OT consult to assist with ADL evaluation and planning for discharge  - Provide patient education as appropriate  Outcome: Progressing  Goal: Maintain or return mobility status to optimal level  Description: INTERVENTIONS:  - Assess patient's baseline mobility status (ambulation, transfers, stairs, etc )    - Identify cognitive and physical deficits and behaviors that affect mobility  - Identify mobility aids required to assist with transfers and/or ambulation (gait belt, sit-to-stand, lift, walker, cane, etc )  - [place] fall precautions as indicated by assessment  - Record patient progress and toleration of activity level on Mobility SBAR; progress patient to next Phase/Stage  - Instruct patient to call for assistance with activity based on assessment  - Consider rehabilitation consult to assist with strengthening/weightbearing, etc   Outcome: Progressing     Problem: DISCHARGE PLANNING  Goal: Discharge to home or other facility with appropriate resources  Description: INTERVENTIONS:  - Identify barriers to discharge w/patient and caregiver  - Arrange for needed discharge resources and transportation as appropriate  - Identify discharge learning needs (meds, wound care, etc )  - Arrange for interpretive services to assist at discharge as needed  - Refer to Case Management Department for coordinating discharge planning if the patient needs post-hospital services based on physician/advanced practitioner order or complex needs related to functional status, cognitive ability, or social support system  Outcome: Progressing     Problem: Knowledge Deficit  Goal: Patient/family/caregiver demonstrates understanding of disease process, treatment plan, medications, and discharge instructions  Description: Complete learning assessment and assess knowledge base    Interventions:  - Provide teaching at level of understanding  - Provide teaching via preferred learning methods  Outcome: Progressing     Problem: GENITOURINARY - ADULT  Goal: Maintains or returns to baseline urinary function  Description: INTERVENTIONS:  - Assess urinary function  - Encourage oral fluids to ensure adequate hydration if ordered  - Administer IV fluids as ordered to ensure adequate hydration  - Administer ordered medications as needed  - Offer frequent toileting  - Follow urinary retention protocol if ordered  Outcome: Progressing  Goal: Absence of urinary retention  Description: INTERVENTIONS:  - Assess patients ability to void and empty bladder  - Monitor I/O  - Bladder scan as needed  - Discuss with physician/AP medications to alleviate retention as needed  - Discuss catheterization for long term situations as appropriate  Outcome: Progressing  Goal: Urinary catheter remains patent  Description: INTERVENTIONS:  - Assess patency of urinary catheter  - If patient has a chronic chadwick, consider changing catheter if non-functioning  - Follow guidelines for intermittent irrigation of non-functioning urinary catheter  Outcome: Progressing

## 2021-01-23 LAB
ANION GAP SERPL CALCULATED.3IONS-SCNC: 13 MMOL/L (ref 4–13)
BACTERIA BLD CULT: NORMAL
BACTERIA BLD CULT: NORMAL
BUN SERPL-MCNC: 118 MG/DL (ref 5–25)
CALCIUM SERPL-MCNC: 7.9 MG/DL (ref 8.3–10.1)
CHLORIDE SERPL-SCNC: 96 MMOL/L (ref 100–108)
CO2 SERPL-SCNC: 22 MMOL/L (ref 21–32)
CREAT SERPL-MCNC: 3.19 MG/DL (ref 0.6–1.3)
ERYTHROCYTE [DISTWIDTH] IN BLOOD BY AUTOMATED COUNT: 17.2 % (ref 11.6–15.1)
GFR SERPL CREATININE-BSD FRML MDRD: 12 ML/MIN/1.73SQ M
GLUCOSE SERPL-MCNC: 90 MG/DL (ref 65–140)
HCT VFR BLD AUTO: 25.4 % (ref 34.8–46.1)
HGB BLD-MCNC: 8.3 G/DL (ref 11.5–15.4)
INR PPP: 1.88 (ref 0.84–1.19)
MCH RBC QN AUTO: 27.7 PG (ref 26.8–34.3)
MCHC RBC AUTO-ENTMCNC: 32.7 G/DL (ref 31.4–37.4)
MCV RBC AUTO: 85 FL (ref 82–98)
PLATELET # BLD AUTO: 217 THOUSANDS/UL (ref 149–390)
PMV BLD AUTO: 9.3 FL (ref 8.9–12.7)
POTASSIUM SERPL-SCNC: 3.4 MMOL/L (ref 3.5–5.3)
PROTHROMBIN TIME: 21.2 SECONDS (ref 11.6–14.5)
RBC # BLD AUTO: 3 MILLION/UL (ref 3.81–5.12)
SODIUM SERPL-SCNC: 131 MMOL/L (ref 136–145)
WBC # BLD AUTO: 8.66 THOUSAND/UL (ref 4.31–10.16)

## 2021-01-23 PROCEDURE — 80048 BASIC METABOLIC PNL TOTAL CA: CPT | Performed by: PHYSICIAN ASSISTANT

## 2021-01-23 PROCEDURE — 99232 SBSQ HOSP IP/OBS MODERATE 35: CPT | Performed by: INTERNAL MEDICINE

## 2021-01-23 PROCEDURE — 85027 COMPLETE CBC AUTOMATED: CPT | Performed by: PHYSICIAN ASSISTANT

## 2021-01-23 PROCEDURE — 99232 SBSQ HOSP IP/OBS MODERATE 35: CPT | Performed by: PHYSICIAN ASSISTANT

## 2021-01-23 PROCEDURE — 85610 PROTHROMBIN TIME: CPT | Performed by: PHYSICIAN ASSISTANT

## 2021-01-23 RX ORDER — WARFARIN SODIUM 2 MG/1
2 TABLET ORAL
Status: DISCONTINUED | OUTPATIENT
Start: 2021-01-23 | End: 2021-01-26

## 2021-01-23 RX ORDER — POTASSIUM CHLORIDE 20 MEQ/1
20 TABLET, EXTENDED RELEASE ORAL 2 TIMES DAILY
Status: COMPLETED | OUTPATIENT
Start: 2021-01-23 | End: 2021-01-23

## 2021-01-23 RX ORDER — SODIUM BICARBONATE 650 MG/1
650 TABLET ORAL
Status: DISCONTINUED | OUTPATIENT
Start: 2021-01-24 | End: 2021-01-26

## 2021-01-23 RX ADMIN — LEVOTHYROXINE SODIUM 25 MCG: 25 TABLET ORAL at 05:15

## 2021-01-23 RX ADMIN — HYDRALAZINE HYDROCHLORIDE 10 MG: 10 TABLET, FILM COATED ORAL at 21:15

## 2021-01-23 RX ADMIN — POTASSIUM CHLORIDE 20 MEQ: 1500 TABLET, EXTENDED RELEASE ORAL at 18:15

## 2021-01-23 RX ADMIN — DOCUSATE SODIUM 100 MG: 100 CAPSULE, LIQUID FILLED ORAL at 08:25

## 2021-01-23 RX ADMIN — WARFARIN SODIUM 2 MG: 2 TABLET ORAL at 18:15

## 2021-01-23 RX ADMIN — Medication 250 MG: at 18:15

## 2021-01-23 RX ADMIN — HYDRALAZINE HYDROCHLORIDE 10 MG: 10 TABLET, FILM COATED ORAL at 13:43

## 2021-01-23 RX ADMIN — SODIUM BICARBONATE 650 MG TABLET 1300 MG: at 13:42

## 2021-01-23 RX ADMIN — CARVEDILOL 12.5 MG: 6.25 TABLET, FILM COATED ORAL at 16:18

## 2021-01-23 RX ADMIN — SODIUM BICARBONATE 650 MG TABLET 1300 MG: at 08:25

## 2021-01-23 RX ADMIN — OXYCODONE HYDROCHLORIDE 5 MG: 5 TABLET ORAL at 05:22

## 2021-01-23 RX ADMIN — ISOSORBIDE DINITRATE 10 MG: 10 TABLET ORAL at 08:26

## 2021-01-23 RX ADMIN — ISOSORBIDE DINITRATE 10 MG: 10 TABLET ORAL at 12:14

## 2021-01-23 RX ADMIN — CARVEDILOL 12.5 MG: 6.25 TABLET, FILM COATED ORAL at 08:25

## 2021-01-23 RX ADMIN — POTASSIUM CHLORIDE 20 MEQ: 1500 TABLET, EXTENDED RELEASE ORAL at 08:33

## 2021-01-23 RX ADMIN — ISOSORBIDE DINITRATE 10 MG: 10 TABLET ORAL at 18:15

## 2021-01-23 RX ADMIN — AMLODIPINE BESYLATE 5 MG: 5 TABLET ORAL at 08:25

## 2021-01-23 RX ADMIN — Medication 250 MG: at 08:26

## 2021-01-23 RX ADMIN — CEFAZOLIN SODIUM 1000 MG: 1 SOLUTION INTRAVENOUS at 12:16

## 2021-01-23 RX ADMIN — DOCUSATE SODIUM 100 MG: 100 CAPSULE, LIQUID FILLED ORAL at 18:15

## 2021-01-23 RX ADMIN — SENNOSIDES 8.6 MG: 8.6 TABLET ORAL at 18:15

## 2021-01-23 RX ADMIN — HYDRALAZINE HYDROCHLORIDE 10 MG: 10 TABLET, FILM COATED ORAL at 05:21

## 2021-01-23 RX ADMIN — SENNOSIDES 8.6 MG: 8.6 TABLET ORAL at 08:25

## 2021-01-23 NOTE — ASSESSMENT & PLAN NOTE
· POA, supratherapeutic- INR was 10  · Status post 2 doses of vitamin K  · Closely monitor with restarting Coumadin

## 2021-01-23 NOTE — ASSESSMENT & PLAN NOTE
Recent Labs     01/21/21  0507 01/22/21  0454 01/23/21  0530   SODIUM 130* 132* 131*     · POA; etiology secondary to hypovolemic hyponatremia  · Nephrology following  · Was administered isotonic IV fluids with normal saline  · Continue 1 5 L fluid restriction  · Home diuretics held  · Continue sodium bicarbonate-initiated for metabolic acidosis secondary to acute kidney injury

## 2021-01-23 NOTE — ASSESSMENT & PLAN NOTE
· Continue PT/OT  · Case management consulted; will require placement  · Reportedly area of the aging is involved  · Seen by neuropsych 1/20 and deemed not competent to make medical decisions

## 2021-01-23 NOTE — ASSESSMENT & PLAN NOTE
· Dx with a right soleal vein occlusive DVT during her hospitalization 11/2020 at [facility]  · Chronic anticoagulation with Coumadin  · INR currently supratherapeutic  · POA, supratherapeutic INR of 10; s/p vitamin K x 2  · Hemoglobin stable at 8 3; improvement from 6 0  · INR 1 8  · Restart Coumadin 2 mg

## 2021-01-23 NOTE — ASSESSMENT & PLAN NOTE
· Continued home antihypertensives with Norvasc 5 mg daily, Coreg 12 5 mg b i d , hydralazine 10 mg q 8 hours, and isosorbide dinitrate 10 mg t i d   · Adequate BP control; avoid hypotension for renal perfusion

## 2021-01-23 NOTE — ASSESSMENT & PLAN NOTE
Wt Readings from Last 3 Encounters:   01/23/21 65 kg (143 lb 4 8 oz)   01/06/21 62 1 kg (136 lb 14 4 oz)   12/22/20 60 8 kg (134 lb)     · H/o diastolic congestive heart failure  · Lasix held due to LILIAN  · Home Lasix 20 mg q day  · Continue Coreg 12 5 mg b i d   · No ACE-inhibitor in light of her chronic kidney disease  · Continue isosorbide/hydralazine  · Grossly euvolemic

## 2021-01-23 NOTE — ASSESSMENT & PLAN NOTE
· Recent diagnosis in 12/2020  · Etiology secondary to congestive heart failure atelectasis  · Stable on home 3 L via nasal cannula

## 2021-01-23 NOTE — ASSESSMENT & PLAN NOTE
· H/o anemia of chronic disease; baseline hemoglobin 7-8  · Hemoglobin diminished to 6 5 - 6 7 on 01/21  · S/p transfusion 1 unit PRBC on 01/21 and 40 mg IV Lasix  · Hemoccult stool negative  · Iron panel consistent with iron deficiency anemia  · Unable to give IV Venofer due to acute infection  · Carefully monitor hemoglobin with the restarting anticoagulant therapy    Lab Results   Component Value Date    HGB 8 3 (L) 01/23/2021    HGB 8 0 (L) 01/22/2021    HGB 6 7 (LL) 01/21/2021    HGB 6 5 (LL) 01/21/2021    HGB 7 2 (L) 01/20/2021    HGB 7 4 (L) 01/19/2021

## 2021-01-23 NOTE — ASSESSMENT & PLAN NOTE
· Advanced disease  · 12/2020, pt evaluated by the vascular surgery team and had LEADS  · Right lower extremity:  occlusion versus high-grade stenosis at the mid/distal popliteal artery, and focal stenosis in the proximal superficial femoral artery and distal superficial femoral artery  Also finding suggestive of tibial peroneal disease  ÁNGEL 0 46  · Left lower extremity:  Occlusion versus high-grade stenosis of the proximal thigh portion of the superficial femoral artery with distal reconstitution, finding suggestive of tibioperoneal disease    ÁNGEL 0 71  · Patient had declined previous vascular surgery intervention  · Now, dry gangrene of R great toe and R 4th toe and 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  · Evaluated by vascular team  · Anticipate angiogram/intervention if/ renal function improves  · Re-consult vascular when ready for procedure

## 2021-01-23 NOTE — ASSESSMENT & PLAN NOTE
· POA, dry gangrene involving right great toe and 4th toe  · Right anterior shin ulcer, and heel ulcer  · Podiatry following  · H/o advanced peripheral arterial disease and worsening condition of her right foot wound  · Vascular surgery consulted  · Patient ineligible to receive any angiogram or IV dye due to her acute kidney injury  · Re-consult vascular surgery when medically ready for procedure, likely next week   · D/w nephrology; maximize renal function prior to any iv dye  · Continue wound care

## 2021-01-23 NOTE — PROGRESS NOTES
Progress Note - Nephrology   Lisa Jauregui 80 y o  female MRN: [id number]  Unit/Bed#: E5 -01 Encounter: [id number]    ASSESSMENT and PLAN:  LILIAN (POA) on likely CKD IV:  Multifactorial in the setting of possible infection, diuretic use, post renal component now with Scott catheter with no history of atrophic left kidney measuring 7 1 cm and right-sided hydro in December 2020    -admission creatinine of 3 3   -he 3 7 on 01/19/20   -unknown baseline creatinine, however after review medical records suspect around 1 7-2 2   -creatinine slowly improving and currently 3 19 status post Scott  -status post diuretic yesterday  -I&O: -1 9 liters  Workup:  -UA showed large blood, large leukocytes, 2+ protein, RBCs, innumerable WBCs, innumerable bacteria  -renal ultrasound revealed bilateral renal atrophy more severe on left mild right hydronephrosis with ureteral stent placement and distended bladder  Plan:   -continue Scott catheter per Urology   -avoid hypotension to prevent decreased renal perfusion  -patient requires future arteriogram per vascular-will continue to hold off until creatinine is at baseline unless need arises    -continue to void nephrotoxins, hypotension, and IV contrast   -continue monitor I/O, lab values volume status     Right-sided hydronephrosis:  Has history of right ureteral stent placement in December 2020 at [address]  [facility] obtained and revealed bilateral renal atrophy, more  on the left along with mild right hydronephrosis with ureteral stent in place  -urology following  -now with Scott catheter maximal decompression     Hyponatremia:  Suspect component of hypovolemia and component of SIADH  -current sodium 131  -admission sodium 124 with normal blood sugar  Work up:  -urine sodium 47, urine osmolality 338, serum osmolality 308, uric acid elevated at 9 0, TSH 10 6  Plan:  -continue on 1 5 L fluid restriction  -currently on sodium bicarbonate tablets 1300 mg t i d   With meals  -consider resuming oral diuretics-previously on furosemide 40 mg daily     Hyperkalemia (POA):  Now resolved status post medical treatment  Now with hypo kalemia-likely secondary to IV diuretics  -current potassium 3 4  -will treat with K-Dur 20 mEq x 1  -will continue to trend     Metabolic acidosis:  In the setting of renal failure  -improving  -continue oral bicarb to 1,300 mg and continue to monitor       Hypertension:  Stable   -avoid hypotension or high fluctuations in blood pressure   -outpatient antihypertensives:  Amlodipine 5 mg daily, carvedilol 12 5 mg 2 times per day, hydralazine 10 mg every 8 hours, Isordil 10 mg 3 times per day   -continue amlodipine, carvedilol, hydralazine, Isordil   -oral diuretics on hold     Lower extremity cellulitis:  With dry gangrene to great toe and 4th toe   -podiatry following and vascular following  -patient requires a-gram and further intervention     Recommend holding off until creatinine improves unless urgently needed  -Recommend pre and post hydration with contrast administration as well as give Mucomyst 1200 mg times total of 4 doses   -blood cultures negative to date   -local dressing changes to LE per vascular recommendations   -continues on IV antibiotics      CHF:  Echocardiogram review EF of 50% with grade 2 diastolic dysfunction     -No sandro signs of volume overload on exam  -outpatient diuretics:  Lasix 40 mg daily-currently on hold  -continue to monitor volume status on IV hydration   -status post IV Lasix yesterday      Anemia:    -status post transfusion for hemoglobin of 6 5  1/22/2021   -status post IV diuretics with transfusion  -checking occult stools-for primary team  -continue to monitor and transfuse as needed for hemoglobin less than 7 0   -iron panel 01/18/2021 reveals iron 16, ferritin 215, iron saturation nine, TIBC 178  -consider adding oral iron supplements     Other:  PAD, DVT  SUBJECTIVE:  Patient seen and examined  Denies chest pain shortness of breath    Known Scott catheter placed yesterday    OBJECTIVE:  Current Weight: Weight - Scale: 65 kg (143 lb 4 8 oz)  Vitals:    01/22/21 2322 01/23/21 0521 01/23/21 0600 01/23/21 0804   BP: 156/69 169/74  152/63   BP Location: Left arm   Left arm   Pulse: 62   64   Resp: 18   18   Temp: 97 5 °F (36 4 °C)   97 9 °F (36 6 °C)   TempSrc: Tympanic   Temporal   SpO2: 96%   97%   Weight:   65 kg (143 lb 4 8 oz)        Intake/Output Summary (Last 24 hours) at 1/23/2021 1351  Last data filed at 1/23/2021 0900  Gross per 24 hour   Intake 180 ml   Output 1900 ml   Net -1720 ml     General:  No acute distress, chronically ill-appearing, cachectic  Skin:  Warm and dry without rash-right gangrenous toes  ENMT:  Mucous membranes moist, sclera anicteric, tongue is midline  Neck:  Supple without JVD  Respiratory:  Essentially clear on auscultation without crackles, rhonchi, wheezes, decreased bases  Cardiac:  Regular rate and rhythm without rub, or murmur  GI:  Soft, nontender, no distention, active bowel sounds  Extremities:  No significant edema noted bilaterally  Neuro:  Alert and awake  Psych:  Appropriate affect    Medications:    Current Facility-Administered Medications:     acetaminophen (TYLENOL) tablet 650 mg, 650 mg, Oral, Q6H PRN, Harsha Shook DO, 650 mg at 01/21/21 0004    amLODIPine (NORVASC) tablet 5 mg, 5 mg, Oral, Daily, Pilol Ranjana, CRNP, 5 mg at 01/23/21 0825    carvedilol (COREG) tablet 12 5 mg, 12 5 mg, Oral, BID With Meals, Pillo Ranjana, CRNP, 12 5 mg at 01/23/21 0825    ceFAZolin (ANCEF) IVPB (premix in dextrose) 1,000 mg 50 mL, 1,000 mg, Intravenous, Q24H, Harsha Shook DO, Last Rate: 100 mL/hr at 01/23/21 1216, 1,000 mg at 01/23/21 1216    docusate sodium (COLACE) capsule 100 mg, 100 mg, Oral, BID, Harsha Shook DO, 100 mg at 01/23/21 0825    hydrALAZINE (APRESOLINE) tablet 10 mg, 10 mg, Oral, Q8H [address], Harsha Shook DO, 10 mg at 01/23/21 1343    isosorbide dinitrate (ISORDIL) tablet 10 mg, 10 mg, Oral, TID after meals, Harsha Shook DO, 10 mg at 01/23/21 1214    levothyroxine tablet 25 mcg, 25 mcg, Oral, Early Morning, Harsha Shook DO, 25 mcg at 01/23/21 0515    ondansetron (ZOFRAN) injection 4 mg, 4 mg, Intravenous, Q6H PRN, Harsha Shook DO    oxyCODONE (ROXICODONE) IR tablet 2 5 mg, 2 5 mg, Oral, Q4H PRN, Arabella Abraham PA-C    oxyCODONE (ROXICODONE) IR tablet 5 mg, 5 mg, Oral, Q4H PRN, Arabella Abraham PA-C, 5 mg at 01/23/21 0522    potassium chloride (K-DUR,KLOR-CON) CR tablet 20 mEq, 20 mEq, Oral, BID, [place] Grime, CRNP, 20 mEq at 01/23/21 5573    saccharomyces boulardii (FLORASTOR) capsule 250 mg, 250 mg, Oral, BID, Harsha Shook DO, 250 mg at 01/23/21 2068    senna (SENOKOT) tablet 8 6 mg, 1 tablet, Oral, BID, Arabella Abraham PA-C, 8 6 mg at 01/23/21 0825    sodium bicarbonate tablet 1,300 mg, 1,300 mg, Oral, TID after meals, Pillo [place], CRNP, 1,300 mg at 01/23/21 1342    Laboratory Results:  Results from last 7 days   Lab Units 01/23/21  0530 01/22/21  0454 01/21/21  1056 01/21/21  0507 01/21/21  0453 01/20/21  0541 01/19/21  2210 01/19/21  1628 01/19/21  0852 01/19/21  0515  01/18/21  1314   WBC Thousand/uL 8 66 8 50  --   --  9 69 12 69*  --   --   --  12 65*  --  13 02*   HEMOGLOBIN g/dL 8 3* 8 0* 6 7*  --  6 5* 7 2*  --   --   --  7 4*  --  7 4*   HEMATOCRIT % 25 4* 24 6* 21 2*  --  20 3* 23 2*  --   --   --  23 4*  --  23 9*   PLATELETS Thousands/uL 217 219  --   --  223 235  --   --   --  228  --  241   SODIUM mmol/L 131* 132*  --  130*  --  127* 127* 126* 129* 130*   < > 124*   POTASSIUM mmol/L 3 4* 4 1  --  4 3  --  4 7  --  4 8 4 9 5 2   < > 6 0*   CHLORIDE mmol/L 96* 98*  --  98*  --  96*  --  95* 98* 98*   < > 95*   CO2 mmol/L 22 21  --  20*  --  16*  --  19* 18* 20*   < > 19*   BUN mg/dL 118* 125*  --  125*  --  128*  --  123* 126* 127*   < > 123*   CREATININE mg/dL 3 19* 3 36*  --  3 43*  --  3 61*  --  3 64* 3 63* 3 70*   < > 3 30*   CALCIUM mg/dL 7 9* 7 9*  --  8 0*  --  8 2*  --  7 9* 8 2* 8 7   < > 8 7    < > = values in this interval not displayed

## 2021-01-23 NOTE — ASSESSMENT & PLAN NOTE
· Resolved following calcium gluconate, insulin and D 50 and kayexelate with improvement      Recent Labs     01/21/21  0507 01/22/21  0454 01/23/21  0530   K 4 3 4 1 3 4*

## 2021-01-23 NOTE — PROGRESS NOTES
Progress Note - Sandra Car 5/18/1930, 80 y o  female MRN: [id number]    Unit/Bed#: E5 -01 Encounter: [id number]    Primary Care Provider: Charlie Vang MD   Date and time admitted to hospital: 1/18/2021 11:44 AM      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 ([facility] [address] )  Assessment & Plan  · POA, dry gangrene involving right great toe and 4th toe  · Right anterior shin ulcer, and heel ulcer  · Podiatry following  · H/o advanced peripheral arterial disease and worsening condition of her right foot wound  · Vascular surgery consulted  · Patient ineligible to receive any angiogram or IV dye due to her acute kidney injury  · Re-consult vascular surgery when medically ready for procedure, likely next week   · D/w nephrology; maximize renal function prior to any iv dye  · Continue wound care    * Cellulitis  Assessment & Plan  · POA, extensive LLE cellulitis extending from her toes up to her thigh  · Continue Ancef, day #6  · Leukocytosis resolved  · Blood culture without growth x4 days  · Previous LEADS 12/2020 with significant left lower extremity peripheral arterial disease, and she had declined vascular surgery intervention  · Planning for arteriogram when renal function recovered, likely next week       PAD (peripheral artery disease) ([facility]  Assessment & Plan  · Advanced disease  · 12/2020, pt evaluated by the vascular surgery team and had LEADS  · Right lower extremity:  occlusion versus high-grade stenosis at the mid/distal popliteal artery, and focal stenosis in the proximal superficial femoral artery and distal superficial femoral artery  Also finding suggestive of tibial peroneal disease  ÁNGEL 0 46  · Left lower extremity:  Occlusion versus high-grade stenosis of the proximal thigh portion of the superficial femoral artery with distal reconstitution, finding suggestive of tibioperoneal disease    ÁNGEL 0 71  · Patient had declined previous vascular surgery intervention  · Now, dry gangrene of R great toe and R 4th toe and 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  · Evaluated by vascular team  · Anticipate angiogram/intervention if/ renal function improves  · Re-consult vascular when ready for procedure    LILIAN (acute kidney injury) ([facility] [address] )  Assessment & Plan  · H/o CKD stage 3; baseline Cr  1 7-2 2  · In 11/2020, hospitalized at [facility] with LILIAN, right-sided hydronephrosis, and atrophic left kidney, which required right ureteral stent  · POA, Cr 3 3 which worsened to 3 6   · Associated metabolic acidosis and hyperkalemia  · Nephrology following  · Urology consulted for stent assessment  · Urine retention protocol - despite low residuals;  Scott placed on 01/21  · Received IV fluid hydration; continue bicarbonate  · Creatinine 3 19; sodium 131; potassium 3 4  · Avoid nephrotoxins; avoid hypotension to promote perfusion      Coagulopathy (HCC)  Assessment & Plan  · POA, supratherapeutic- INR was 10  · Status post 2 doses of vitamin K  · Closely monitor with restarting Coumadin    Acute deep vein thrombosis (DVT) of calf muscle vein of right lower extremity (HCC)  Assessment & Plan  · Dx with a right soleal vein occlusive DVT during her hospitalization 11/2020 at [facility]  · Chronic anticoagulation with Coumadin  · INR currently supratherapeutic  · POA, supratherapeutic INR of 10; s/p vitamin K x 2  · Hemoglobin stable at 8 3; improvement from 6 0  · INR 1 8  · Restart Coumadin 2 mg      Chronic hypoxemic [facility] failure ([facility]  Assessment & Plan  · Recent diagnosis in 12/2020  · Etiology secondary to congestive heart failure atelectasis  · Stable on home 3 L via nasal cannula    Ambulatory dysfunction  Assessment & Plan  · Continue PT/OT  · Case management consulted; will require placement  · Reportedly area of the aging is involved  · Seen by neuropsych 1/20 and deemed not competent to make medical decisions    Chronic diastolic heart failure ([facility] [address] )  Assessment & Plan  Wt Readings from Last 3 Encounters:   01/23/21 65 kg (143 lb 4 8 oz) 01/06/21 62 1 kg (136 lb 14 4 oz)   12/22/20 60 8 kg (134 lb)     · H/o diastolic congestive heart failure  · Lasix held due to LILIAN  · Home Lasix 20 mg q day  · Continue Coreg 12 5 mg b i d   · No ACE-inhibitor in light of her chronic kidney disease  · Continue isosorbide/hydralazine  · Grossly euvolemic    Anemia  Assessment & Plan  · H/o anemia of chronic disease; baseline hemoglobin 7-8  · Hemoglobin diminished to 6 5 - 6 7 on 01/21  · S/p transfusion 1 unit PRBC on 01/21 and 40 mg IV Lasix  · Hemoccult stool negative  · Iron panel consistent with iron deficiency anemia  · Unable to give IV Venofer due to acute infection  · Carefully monitor hemoglobin with the restarting anticoagulant therapy    Lab Results   Component Value Date    HGB 8 3 (L) 01/23/2021    HGB 8 0 (L) 01/22/2021    HGB 6 7 (LL) 01/21/2021    HGB 6 5 (LL) 01/21/2021    HGB 7 2 (L) 01/20/2021    HGB 7 4 (L) 01/19/2021         Hyperkalemia  Assessment & Plan  · Resolved following calcium gluconate, insulin and D 50 and kayexelate with improvement      Recent Labs     01/21/21  0507 01/22/21  0454 01/23/21  0530   K 4 3 4 1 3 4*         Essential hypertension  Assessment & Plan  · Continued home antihypertensives with Norvasc 5 mg daily, Coreg 12 5 mg b i d , hydralazine 10 mg q 8 hours, and isosorbide dinitrate 10 mg t i d   · Adequate BP control; avoid hypotension for renal perfusion     Hyponatremia  Assessment & Plan  Recent Labs     01/21/21  0507 01/22/21  0454 01/23/21  0530   SODIUM 130* 132* 131*     · POA; etiology secondary to hypovolemic hyponatremia  · Nephrology following  · Was administered isotonic IV fluids with normal saline  · Continue 1 5 L fluid restriction  · Home diuretics held  · Continue sodium bicarbonate-initiated for metabolic acidosis secondary to acute kidney injury    VTE Pharmacologic Prophylaxis:   Pharmacologic: Warfarin (Coumadin)  Mechanical VTE Prophylaxis in Place: No    Patient Centered Rounds: I have performed bedside rounds with nursing staff today  Discussions with Specialists or Other Care Team Provider:  Nephrology    Education and Discussions with Family / Patient: Call POA; VM left     Time Spent for Care: 45 minutes  More than 50% of total time spent on counseling and coordination of care as described above  Current Length of Stay: 5 day(s)    Current Patient Status: Inpatient   Certification Statement: The patient will continue to require additional inpatient hospital stay due to Due to LILIAN and lower extremity cellulitis    Discharge Plan:  Patient requiring distal hospitalization    Code Status: Level 1 - Full Code      Subjective:   Patient has no specific complaints  Her pain is well controlled  She has a somewhat fair appetite  Objective:     Vitals:   Temp (24hrs), Av 6 °F (36 4 °C), Min:97 5 °F (36 4 °C), Max:97 9 °F (36 6 °C)    Temp:  [97 5 °F (36 4 °C)-97 9 °F (36 6 °C)] 97 9 °F (36 6 °C)  HR:  [62-68] 64  Resp:  [18-21] 18  BP: (134-169)/(63-74) 152/63  SpO2:  [93 %-97 %] 97 %  Body mass index is 23 13 kg/m²  Input and Output Summary (last 24 hours): Intake/Output Summary (Last 24 hours) at 2021 1432  Last data filed at 2021 0900  Gross per 24 hour   Intake 180 ml   Output 1900 ml   Net -1720 ml       Physical Exam:     Physical Exam  Constitutional:       Comments: Thin, frail-appearing, no acute distress   HENT:      Nose: Nose normal       Mouth/Throat:      Mouth: Mucous membranes are moist    Eyes:      Extraocular Movements: Extraocular movements intact  Conjunctiva/sclera: Conjunctivae normal    Neck:      Musculoskeletal: Normal range of motion  Cardiovascular:      Rate and Rhythm: Normal rate and regular rhythm  Pulmonary:      Effort: Pulmonary effort is normal       Breath sounds: Normal breath sounds  Abdominal:      General: Abdomen is flat  Bowel sounds are normal  There is no distension  Tenderness:  There is no abdominal tenderness  Musculoskeletal: Normal range of motion  Skin:     Capillary Refill: Capillary refill takes less than 2 seconds  Comments: Diffuse left lower extremity cellulitis below knee to distal dorsal foot; dressing clean dry and intact on right shin, bilateral chronic peripheral disease appearing skin   Neurological:      General: No focal deficit present  Mental Status: She is alert  Psychiatric:         Mood and Affect: Mood normal          Thought Content: Thought content normal          Judgment: Judgment normal        Additional Data:     Labs:    Results from last 7 days   Lab Units 01/23/21  0530 01/22/21  0454   WBC Thousand/uL 8 66 8 50   HEMOGLOBIN g/dL 8 3* 8 0*   HEMATOCRIT % 25 4* 24 6*   PLATELETS Thousands/uL 217 219   NEUTROS PCT %  --  67   LYMPHS PCT %  --  20   MONOS PCT %  --  7   EOS PCT %  --  4     Results from last 7 days   Lab Units 01/23/21  0530   SODIUM mmol/L 131*   POTASSIUM mmol/L 3 4*   CHLORIDE mmol/L 96*   CO2 mmol/L 22   BUN mg/dL 118*   CREATININE mg/dL 3 19*   ANION GAP mmol/L 13   CALCIUM mg/dL 7 9*   GLUCOSE RANDOM mg/dL 90     Results from last 7 days   Lab Units 01/23/21  0530   INR  1 88*                 * I Have Reviewed All Lab Data Listed Above  * Additional Pertinent Lab Tests Reviewed: [address] Admission Reviewed    Imaging:    Imaging Reports Reviewed Today Include:  CT abdomen pelvis, ultrasound of bladder, x-ray foot  Imaging Personally Reviewed by Myself Includes:  X-ray right foot    Recent Cultures (last 7 days):     Results from last 7 days   Lab Units 01/18/21  1515   BLOOD CULTURE  No Growth After 4 Days  No Growth After 4 Days         Last 24 Hours Medication List:   Current Facility-Administered Medications   Medication Dose Route Frequency Provider Last Rate    acetaminophen  650 mg Oral Q6H PRN Mike Pearce DO      amLODIPine  5 mg Oral Daily OTIS Capellan      carvedilol  12 5 mg Oral BID With Meals OTIS Chavez      cefazolin  1,000 mg Intravenous Q24H Kaylene Young, DO 1,000 mg (01/23/21 1216)    docusate sodium  100 mg Oral BID Kaylene Young, DO      hydrALAZINE  10 mg Oral Q8H [facility] Harsha Anne, DO      isosorbide dinitrate  10 mg Oral TID after meals Harsha Shook, DO      levothyroxine  25 mcg Oral Early Morning Harsha Shook, DO      ondansetron  4 mg Intravenous Q6H PRN Kaylene Young, DO      oxyCODONE  2 5 mg Oral Q4H PRN Lela Abraham PA-C      oxyCODONE  5 mg Oral Q4H PRN Arabella Abraham PA-C      potassium chloride  20 mEq Oral BID OTIS Higgins      saccharomyces boulardii  250 mg Oral BID Harsha Shook, DO      senna  1 tablet Oral BID Arabella Abraham PA-C      sodium bicarbonate  1,300 mg Oral TID after meals OTIS Chavez      warfarin  2 mg Oral Daily (warfarin) Rebeka Rowe PA-C          Today, Patient Was Seen By: Rebeka Rowe PA-C    ** Please Note: Dictation voice to text software may have been used in the creation of this document   **

## 2021-01-23 NOTE — ASSESSMENT & PLAN NOTE
· POA, extensive LLE cellulitis extending from her toes up to her thigh  · Continue Ancef, day #6  · Leukocytosis resolved      · Blood culture without growth x4 days  · Previous LEADS 12/2020 with significant left lower extremity peripheral arterial disease, and she had declined vascular surgery intervention  · Planning for arteriogram when renal function recovered, likely next week

## 2021-01-24 LAB
ANION GAP SERPL CALCULATED.3IONS-SCNC: 11 MMOL/L (ref 4–13)
BUN SERPL-MCNC: 110 MG/DL (ref 5–25)
CALCIUM SERPL-MCNC: 8 MG/DL (ref 8.3–10.1)
CHLORIDE SERPL-SCNC: 96 MMOL/L (ref 100–108)
CO2 SERPL-SCNC: 23 MMOL/L (ref 21–32)
CREAT SERPL-MCNC: 3.16 MG/DL (ref 0.6–1.3)
ERYTHROCYTE [DISTWIDTH] IN BLOOD BY AUTOMATED COUNT: 17.2 % (ref 11.6–15.1)
GFR SERPL CREATININE-BSD FRML MDRD: 12 ML/MIN/1.73SQ M
GLUCOSE SERPL-MCNC: 119 MG/DL (ref 65–140)
HCT VFR BLD AUTO: 25.8 % (ref 34.8–46.1)
HGB BLD-MCNC: 8.3 G/DL (ref 11.5–15.4)
INR PPP: 1.88 (ref 0.84–1.19)
MCH RBC QN AUTO: 27.6 PG (ref 26.8–34.3)
MCHC RBC AUTO-ENTMCNC: 32.2 G/DL (ref 31.4–37.4)
MCV RBC AUTO: 86 FL (ref 82–98)
PLATELET # BLD AUTO: 239 THOUSANDS/UL (ref 149–390)
PMV BLD AUTO: 9.6 FL (ref 8.9–12.7)
POTASSIUM SERPL-SCNC: 3.8 MMOL/L (ref 3.5–5.3)
PROTHROMBIN TIME: 21.2 SECONDS (ref 11.6–14.5)
RBC # BLD AUTO: 3.01 MILLION/UL (ref 3.81–5.12)
SODIUM SERPL-SCNC: 130 MMOL/L (ref 136–145)
WBC # BLD AUTO: 8.52 THOUSAND/UL (ref 4.31–10.16)

## 2021-01-24 PROCEDURE — 85610 PROTHROMBIN TIME: CPT | Performed by: PHYSICIAN ASSISTANT

## 2021-01-24 PROCEDURE — 85027 COMPLETE CBC AUTOMATED: CPT | Performed by: PHYSICIAN ASSISTANT

## 2021-01-24 PROCEDURE — 99232 SBSQ HOSP IP/OBS MODERATE 35: CPT | Performed by: PHYSICIAN ASSISTANT

## 2021-01-24 PROCEDURE — 80048 BASIC METABOLIC PNL TOTAL CA: CPT | Performed by: NURSE PRACTITIONER

## 2021-01-24 PROCEDURE — 99232 SBSQ HOSP IP/OBS MODERATE 35: CPT | Performed by: INTERNAL MEDICINE

## 2021-01-24 RX ORDER — HYDROMORPHONE HCL/PF 1 MG/ML
1 SYRINGE (ML) INJECTION EVERY 4 HOURS PRN
Status: DISCONTINUED | OUTPATIENT
Start: 2021-01-24 | End: 2021-02-03 | Stop reason: HOSPADM

## 2021-01-24 RX ADMIN — OXYCODONE HYDROCHLORIDE 5 MG: 5 TABLET ORAL at 15:08

## 2021-01-24 RX ADMIN — ISOSORBIDE DINITRATE 10 MG: 10 TABLET ORAL at 09:14

## 2021-01-24 RX ADMIN — SENNOSIDES 8.6 MG: 8.6 TABLET ORAL at 09:14

## 2021-01-24 RX ADMIN — SODIUM BICARBONATE 650 MG TABLET 650 MG: at 17:00

## 2021-01-24 RX ADMIN — ISOSORBIDE DINITRATE 10 MG: 10 TABLET ORAL at 12:04

## 2021-01-24 RX ADMIN — SODIUM BICARBONATE 650 MG TABLET 650 MG: at 09:13

## 2021-01-24 RX ADMIN — ISOSORBIDE DINITRATE 10 MG: 10 TABLET ORAL at 17:00

## 2021-01-24 RX ADMIN — WARFARIN SODIUM 2 MG: 2 TABLET ORAL at 17:00

## 2021-01-24 RX ADMIN — HYDRALAZINE HYDROCHLORIDE 10 MG: 10 TABLET, FILM COATED ORAL at 14:54

## 2021-01-24 RX ADMIN — Medication 250 MG: at 09:13

## 2021-01-24 RX ADMIN — HYDRALAZINE HYDROCHLORIDE 10 MG: 10 TABLET, FILM COATED ORAL at 05:41

## 2021-01-24 RX ADMIN — CEFAZOLIN SODIUM 1000 MG: 1 SOLUTION INTRAVENOUS at 11:44

## 2021-01-24 RX ADMIN — Medication 250 MG: at 17:01

## 2021-01-24 RX ADMIN — DOCUSATE SODIUM 100 MG: 100 CAPSULE, LIQUID FILLED ORAL at 09:14

## 2021-01-24 RX ADMIN — HYDRALAZINE HYDROCHLORIDE 10 MG: 10 TABLET, FILM COATED ORAL at 20:54

## 2021-01-24 RX ADMIN — CARVEDILOL 12.5 MG: 6.25 TABLET, FILM COATED ORAL at 09:12

## 2021-01-24 RX ADMIN — CARVEDILOL 12.5 MG: 6.25 TABLET, FILM COATED ORAL at 16:59

## 2021-01-24 RX ADMIN — LEVOTHYROXINE SODIUM 25 MCG: 25 TABLET ORAL at 05:41

## 2021-01-24 RX ADMIN — AMLODIPINE BESYLATE 5 MG: 5 TABLET ORAL at 09:11

## 2021-01-24 NOTE — NURSING NOTE
Patient refusing turn and reposition most times due to pain  Patient also often refusing additional pain medication even though rates pain as 10/10  Was agreeable to repositioning this afternoon while cleaning up BM and changing wound dressings, currently offloaded with wedges  BP elevated when pain poorly controlled  Patient asked to check on her at 1900 to see if she needs pain medications, will follow up at that time

## 2021-01-24 NOTE — ASSESSMENT & PLAN NOTE
· POA, extensive LLE cellulitis extending from her toes up to her thigh  · Continue Ancef, day #7  · Leukocytosis resolved      · Blood culture without growth x 5 days  · Previous LEADS 12/2020 with significant left lower extremity peripheral arterial disease, and she had declined vascular surgery intervention  · Planning for arteriogram when renal function recovered, likely next week

## 2021-01-24 NOTE — PROGRESS NOTES
Progress Note - Nephrology   Nikolas Witt 80 y o  female MRN: [id number]  Unit/Bed#: E5 -01 Encounter: [id number]    ASSESSMENT and PLAN:  LILIAN (POA) on likely CKD IV:  Multifactorial in the setting of possible infection, diuretic use, post renal component now with Chadwick catheter with no history of atrophic left kidney measuring 7 1 cm and right-sided hydro in December 2020    -admission creatinine of 3 3   -peak 3 7 on 01/19/20   -unknown baseline creatinine, however after review medical records suspect around 1 7-2 2   -creatinine slowly improving and currently 3 16 today-monitor for stability  -Now with chadwick cath in place  -status post diuretic 1/22/2021  -I&O:  I&O not well documented  -weight significantly higher-? Accuracy  Workup:  -UA showed large blood, large leukocytes, 2+ protein, RBCs, innumerable WBCs, innumerable bacteria  -renal ultrasound revealed bilateral renal atrophy more severe on left mild right hydronephrosis with ureteral stent placement and distended bladder  Plan:   -continue Chadwick catheter per Urology   -avoid hypotension to prevent decreased renal perfusion  -patient requires future arteriogram per vascular-will continue to hold off until creatinine is at baseline unless need arises    -continue to void nephrotoxins, hypotension, and IV contrast   -continue monitor I/O, lab values volume status     Right-sided hydronephrosis:  Has history of right ureteral stent placement in December 2020 at [address]  [facility] obtained and revealed bilateral renal atrophy, more  on the left along with mild right hydronephrosis with ureteral stent in place  -urology following  -now with Chadwick catheter maximal decompression     Hyponatremia:  Suspect component of hypovolemia and component of SIADH  -current sodium 131  -admission sodium 124 with normal blood sugar  Work up:  -urine sodium 47, urine osmolality 338, serum osmolality 308, uric acid elevated at 9 0, TSH 10 6    Plan:  -continue on 1 5 L fluid restriction  -currently on sodium bicarbonate tablets decreased to 650 b i d    With meals  -consider resuming oral diuretics-previously on furosemide 40 mg daily     Hyperkalemia:  Resolved  Now with hypo kalemia resolved  -current potassium 3 8  -will continue to trend     Metabolic acidosis:  In the setting of renal failure  -improving  -now on sodium bicarbonate 650 b i d       Hypertension:  Stable  Can have intermittent elevation  -avoid hypotension or high fluctuations in blood pressure   -outpatient antihypertensives:  Amlodipine 5 mg daily, carvedilol 12 5 mg 2 times per day, hydralazine 10 mg every 8 hours, Isordil 10 mg 3 times per day   -continue amlodipine, carvedilol, hydralazine, Isordil   -oral diuretics on hold     Lower extremity cellulitis:  With dry gangrene to great toe and 4th toe   -podiatry following and vascular following  -patient requires a-gram and further intervention     Recommend holding off until creatinine improves unless urgently needed     -Recommend pre and post hydration with contrast administration as well as give Mucomyst 1200 mg times total of 4 doses   -blood cultures negative to date   -local dressing changes to LE per vascular recommendations   -continues on IV antibiotics      CHF:  Echocardiogram review EF of 50% with grade 2 diastolic dysfunction     -No sandro signs of volume overload on exam  -outpatient diuretics:  Lasix 40 mg daily-currently on hold    -continue to monitor volume status on IV hydration   -status post IV Lasix 1/22/2021     Anemia:    -current hemoglobin 8 3  -status post transfusion for hemoglobin of 6 5  1/22/2021   -status post IV diuretics with transfusion  -checking occult stools-for primary team  -continue to monitor and transfuse as needed for hemoglobin less than 7 0   -iron panel 01/18/2021 reveals iron 16, ferritin 215, iron saturation nine, TIBC 178  -consider adding oral iron supplements     Other:  PAD, DVT            SUBJECTIVE:  Patient seen exam   Denies chest pain or shortness of breath    Still not eating much    OBJECTIVE:  Current Weight: Weight - Scale: 77 2 kg (170 lb 3 1 oz)(mattress change- everything removed from bed)  Vitals:    01/24/21 0541 01/24/21 0545 01/24/21 0700 01/24/21 1100   BP: (!) 174/99  (!) 195/99 143/83   BP Location: Right arm  Right arm    Pulse: 66  65 57   Resp:   18    Temp:   (!) 96 6 °F (35 9 °C)    TempSrc:   Oral    SpO2:   96%    Weight:  77 2 kg (170 lb 3 1 oz)         Intake/Output Summary (Last 24 hours) at 1/24/2021 1327  Last data filed at 1/24/2021 0540  Gross per 24 hour   Intake 120 ml   Output 850 ml   Net -730 ml     General:  No acute distress, chronically ill-appearing, cachectic  Skin:  Warm and dry without rash-right gangrenous toes  ENMT:  Mucous membranes moist, sclera anicteric, tongue is midline  Neck:  Supple without JVD  Respiratory:  Essentially clear on auscultation without crackles, rhonchi, wheezes, decreased bases  Cardiac:  Regular rate and rhythm without rub, or murmur  GI:  Soft, nontender, no distention, active bowel sounds  Extremities:  No significant edema noted bilaterally  Neuro:  Alert and awake  Psych:  Appropriate affect    Medications:    Current Facility-Administered Medications:     acetaminophen (TYLENOL) tablet 650 mg, 650 mg, Oral, Q6H PRN, Harsha Shook DO, 650 mg at 01/21/21 0004    amLODIPine (NORVASC) tablet 5 mg, 5 mg, Oral, Daily, Prudy Lied, CRNP, 5 mg at 01/24/21 0911    carvedilol (COREG) tablet 12 5 mg, 12 5 mg, Oral, BID With Meals, Prudy Lied, CRNP, 12 5 mg at 01/24/21 0912    ceFAZolin (ANCEF) IVPB (premix in dextrose) 1,000 mg 50 mL, 1,000 mg, Intravenous, Q24H, Harsha Shook DO, Last Rate: 100 mL/hr at 01/24/21 1144, 1,000 mg at 01/24/21 1144    docusate sodium (COLACE) capsule 100 mg, 100 mg, Oral, BID, Harsha Shook DO, 100 mg at 01/24/21 0914    hydrALAZINE (APRESOLINE) tablet 10 mg, 10 mg, Oral, Q8H [address], Cleo Ortega, DO, 10 mg at 01/24/21 0541    isosorbide dinitrate (ISORDIL) tablet 10 mg, 10 mg, Oral, TID after meals, Harsha Shook DO, 10 mg at 01/24/21 1204    levothyroxine tablet 25 mcg, 25 mcg, Oral, Early Morning, Harsha Shook DO, 25 mcg at 01/24/21 0541    ondansetron (ZOFRAN) injection 4 mg, 4 mg, Intravenous, Q6H PRN, Harsha Shook DO    oxyCODONE (ROXICODONE) IR tablet 2 5 mg, 2 5 mg, Oral, Q4H PRN, Arabella Abraham PA-C    oxyCODONE (ROXICODONE) IR tablet 5 mg, 5 mg, Oral, Q4H PRN, Arabella Abraham PA-C, 5 mg at 01/23/21 0522    saccharomyces boulardii (FLORASTOR) capsule 250 mg, 250 mg, Oral, BID, Harsha Shook DO, 250 mg at 01/24/21 0913    senna (SENOKOT) tablet 8 6 mg, 1 tablet, Oral, BID, Arabella Abraham PA-C, 8 6 mg at 01/24/21 0953    sodium bicarbonate tablet 650 mg, 650 mg, Oral, BID after meals, Elijah Richardson MD, 650 mg at 01/24/21 0913    warfarin (COUMADIN) tablet 2 mg, 2 mg, Oral, Daily (warfarin), [facility], LAINA, 2 mg at 01/23/21 1815    Laboratory Results:  Results from last 7 days   Lab Units 01/24/21  0537 01/23/21  0530 01/22/21  0454 01/21/21  1056 01/21/21  0507 01/21/21  0453 01/20/21  0541 01/19/21  2210 01/19/21  1628 01/19/21  0852 01/19/21  0515  01/18/21  1314   WBC Thousand/uL 8 52 8 66 8 50  --   --  9 69 12 69*  --   --   --  12 65*  --  13 02*   HEMOGLOBIN g/dL 8 3* 8 3* 8 0* 6 7*  --  6 5* 7 2*  --   --   --  7 4*  --  7 4*   HEMATOCRIT % 25 8* 25 4* 24 6* 21 2*  --  20 3* 23 2*  --   --   --  23 4*  --  23 9*   PLATELETS Thousands/uL 239 217 219  --   --  223 235  --   --   --  228  --  241   SODIUM mmol/L 130* 131* 132*  --  130*  --  127* 127* 126* 129* 130*   < > 124*   POTASSIUM mmol/L 3 8 3 4* 4 1  --  4 3  --  4 7  --  4 8 4 9 5 2   < > 6 0*   CHLORIDE mmol/L 96* 96* 98*  --  98*  --  96*  --  95* 98* 98*   < > 95*   CO2 mmol/L 23 22 21  --  20*  --  16*  --  19* 18* 20*   < > 19*   BUN mg/dL 110* 118* 125*  --  125*  --  128*  --  123* 126* 127*   < > 123*   CREATININE mg/dL 3 16* 3 19* 3 36*  --  3 43*  --  3 61*  --  3 64* 3 63* 3 70*   < > 3 30*   CALCIUM mg/dL 8 0* 7 9* 7 9*  --  8 0*  --  8 2*  --  7 9* 8 2* 8 7   < > 8 7    < > = values in this interval not displayed

## 2021-01-24 NOTE — ASSESSMENT & PLAN NOTE
Recent Labs     01/22/21  0454 01/23/21  0530 01/24/21  0537   SODIUM 132* 131* 130*     · POA; etiology secondary to hypovolemic hyponatremia  · Nephrology following  · Was administered isotonic IV fluids with normal saline  · Continue 1 5 L fluid restriction  · Home diuretics held  · Continue sodium bicarbonate-initiated for metabolic acidosis secondary to acute kidney injury

## 2021-01-24 NOTE — ASSESSMENT & PLAN NOTE
· Dx with a right soleal vein occlusive DVT during her hospitalization 11/2020 at [facility]  · Chronic anticoagulation with Coumadin  · INR currently supratherapeutic  · POA, supratherapeutic INR of 10; s/p vitamin K x 2  · Hemoglobin stable at 8 3; improvement from 6 0  · INR 1 88  · Coumadin restarted at 2 mg 01/23

## 2021-01-24 NOTE — ASSESSMENT & PLAN NOTE
Wt Readings from Last 3 Encounters:   01/24/21 77 2 kg (170 lb 3 1 oz)   01/06/21 62 1 kg (136 lb 14 4 oz)   12/22/20 60 8 kg (134 lb)     · H/o diastolic congestive heart failure  · Lasix held due to LILIAN  · Home Lasix 20 mg q day  · Continue Coreg 12 5 mg b i d   · No ACE-inhibitor in light of her chronic kidney disease  · Continue isosorbide/hydralazine  · Grossly euvolemic

## 2021-01-24 NOTE — ASSESSMENT & PLAN NOTE
· H/o anemia of chronic disease; baseline hemoglobin 7-8  · Hemoglobin diminished to 6 5 - 6 7 on 01/21  · S/p transfusion 1 unit PRBC on 01/21 and 40 mg IV Lasix  · Hemoccult stool negative  · Iron panel consistent with iron deficiency anemia  · Unable to give IV Venofer due to acute infection  · Carefully monitor hemoglobin with the restarting anticoagulant therapy    Lab Results   Component Value Date    HGB 8 3 (L) 01/24/2021    HGB 8 3 (L) 01/23/2021    HGB 8 0 (L) 01/22/2021    HGB 6 7 (LL) 01/21/2021    HGB 6 5 (LL) 01/21/2021    HGB 7 2 (L) 01/20/2021

## 2021-01-24 NOTE — PLAN OF CARE
Problem: Potential for Falls  Goal: Patient will remain free of falls  Description: INTERVENTIONS:  - Assess patient frequently for physical needs  -  Identify cognitive and physical deficits and behaviors that affect risk of falls  -  [place] fall precautions as indicated by assessment   - Educate patient/family on patient safety including physical limitations  - Instruct patient to call for assistance with activity based on assessment  - Modify environment to reduce risk of injury  - Consider OT/PT consult to assist with strengthening/mobility  Outcome: Progressing     Problem: Prexisting or High Potential for Compromised Skin Integrity  Goal: Skin integrity is maintained or improved  Description: INTERVENTIONS:  - Identify patients at risk for skin breakdown  - Assess and monitor skin integrity  - Assess and monitor nutrition and hydration status  - Monitor labs   - Assess for incontinence   - Turn and reposition patient  - Assist with mobility/ambulation  - Relieve pressure over bony prominences  - Avoid friction and shearing  - Provide appropriate hygiene as needed including keeping skin clean and dry  - Evaluate need for skin moisturizer/barrier cream  - Collaborate with interdisciplinary team   - Patient/family teaching  - Consider wound care consult   Outcome: Progressing     Problem: Nutrition/Hydration-ADULT  Goal: Nutrient/Hydration intake appropriate for improving, restoring or maintaining nutritional needs  Description: Monitor and assess patient's nutrition/hydration status for malnutrition  Collaborate with interdisciplinary team and initiate plan and interventions as ordered  Monitor patient's weight and dietary intake as ordered or per policy  Utilize nutrition screening tool and intervene as necessary  Determine patient's food preferences and provide high-protein, high-caloric foods as appropriate       INTERVENTIONS:  - Monitor oral intake, urinary output, labs, and treatment plans  - Assess nutrition and hydration status and recommend course of action  - Evaluate amount of meals eaten  - Assist patient with eating if necessary   - Allow adequate time for meals  - Recommend/ encourage appropriate diets, oral nutritional supplements, and vitamin/mineral supplements  - Order, calculate, and assess calorie counts as needed  - Recommend, monitor, and adjust tube feedings and TPN/PPN based on assessed needs  - Assess need for intravenous fluids  - Provide specific nutrition/hydration education as appropriate  - Include patient/family/caregiver in decisions related to nutrition  Outcome: Progressing     Problem: PAIN - ADULT  Goal: Verbalizes/displays adequate comfort level or baseline comfort level  Description: Interventions:  - Encourage patient to monitor pain and request assistance  - Assess pain using appropriate pain scale  - Administer analgesics based on type and severity of pain and evaluate response  - Implement non-pharmacological measures as appropriate and evaluate response  - Consider cultural and social influences on pain and pain management  - Notify physician/advanced practitioner if interventions unsuccessful or patient reports new pain  Outcome: Progressing     Problem: INFECTION - ADULT  Goal: Absence or prevention of progression during hospitalization  Description: INTERVENTIONS:  - Assess and monitor for signs and symptoms of infection  - Monitor lab/diagnostic results  - Monitor all insertion sites, i e  indwelling lines, tubes, and drains  - Monitor endotracheal if appropriate and nasal secretions for changes in amount and color  - [place] appropriate cooling/warming therapies per order  - Administer medications as ordered  - Instruct and encourage patient and family to use good hand hygiene technique  - Identify and instruct in appropriate isolation precautions for identified infection/condition  Outcome: Progressing  Goal: Absence of fever/infection during neutropenic period  Description: INTERVENTIONS:  - Monitor WBC    Outcome: Progressing     Problem: SAFETY ADULT  Goal: Patient will remain free of falls  Description: INTERVENTIONS:  - Assess patient frequently for physical needs  -  Identify cognitive and physical deficits and behaviors that affect risk of falls    -  [place] fall precautions as indicated by assessment   - Educate patient/family on patient safety including physical limitations  - Instruct patient to call for assistance with activity based on assessment  - Modify environment to reduce risk of injury  - Consider OT/PT consult to assist with strengthening/mobility  Outcome: Progressing  Goal: Maintain or return to baseline ADL function  Description: INTERVENTIONS:  -  Assess patient's ability to carry out ADLs; assess patient's baseline for ADL function and identify physical deficits which impact ability to perform ADLs (bathing, care of mouth/teeth, toileting, grooming, dressing, etc )  - Assess/evaluate cause of self-care deficits   - Assess range of motion  - Assess patient's mobility; develop plan if impaired  - Assess patient's need for assistive devices and provide as appropriate  - Encourage maximum independence but intervene and supervise when necessary  - Involve family in performance of ADLs  - Assess for home care needs following discharge   - Consider OT consult to assist with ADL evaluation and planning for discharge  - Provide patient education as appropriate  Outcome: Progressing  Goal: Maintain or return mobility status to optimal level  Description: INTERVENTIONS:  - Assess patient's baseline mobility status (ambulation, transfers, stairs, etc )    - Identify cognitive and physical deficits and behaviors that affect mobility  - Identify mobility aids required to assist with transfers and/or ambulation (gait belt, sit-to-stand, lift, walker, cane, etc )  - [place] fall precautions as indicated by assessment  - Record patient progress and toleration of activity level on Mobility SBAR; progress patient to next Phase/Stage  - Instruct patient to call for assistance with activity based on assessment  - Consider rehabilitation consult to assist with strengthening/weightbearing, etc   Outcome: Progressing     Problem: DISCHARGE PLANNING  Goal: Discharge to home or other facility with appropriate resources  Description: INTERVENTIONS:  - Identify barriers to discharge w/patient and caregiver  - Arrange for needed discharge resources and transportation as appropriate  - Identify discharge learning needs (meds, wound care, etc )  - Arrange for interpretive services to assist at discharge as needed  - Refer to Case Management Department for coordinating discharge planning if the patient needs post-hospital services based on physician/advanced practitioner order or complex needs related to functional status, cognitive ability, or social support system  Outcome: Progressing     Problem: Knowledge Deficit  Goal: Patient/family/caregiver demonstrates understanding of disease process, treatment plan, medications, and discharge instructions  Description: Complete learning assessment and assess knowledge base    Interventions:  - Provide teaching at level of understanding  - Provide teaching via preferred learning methods  Outcome: Progressing     Problem: GENITOURINARY - ADULT  Goal: Maintains or returns to baseline urinary function  Description: INTERVENTIONS:  - Assess urinary function  - Encourage oral fluids to ensure adequate hydration if ordered  - Administer IV fluids as ordered to ensure adequate hydration  - Administer ordered medications as needed  - Offer frequent toileting  - Follow urinary retention protocol if ordered  Outcome: Progressing  Goal: Absence of urinary retention  Description: INTERVENTIONS:  - Assess patients ability to void and empty bladder  - Monitor I/O  - Bladder scan as needed  - Discuss with physician/AP medications to alleviate retention as needed  - Discuss catheterization for long term situations as appropriate  Outcome: Progressing  Goal: Urinary catheter remains patent  Description: INTERVENTIONS:  - Assess patency of urinary catheter  - If patient has a chronic chadwick, consider changing catheter if non-functioning  - Follow guidelines for intermittent irrigation of non-functioning urinary catheter  Outcome: Progressing

## 2021-01-24 NOTE — PLAN OF CARE
Problem: Potential for Falls  Goal: Patient will remain free of falls  Description: INTERVENTIONS:  - Assess patient frequently for physical needs  -  Identify cognitive and physical deficits and behaviors that affect risk of falls  -  [place] fall precautions as indicated by assessment   - Educate patient/family on patient safety including physical limitations  - Instruct patient to call for assistance with activity based on assessment  - Modify environment to reduce risk of injury  - Consider OT/PT consult to assist with strengthening/mobility  Outcome: Progressing     Problem: Prexisting or High Potential for Compromised Skin Integrity  Goal: Skin integrity is maintained or improved  Description: INTERVENTIONS:  - Identify patients at risk for skin breakdown  - Assess and monitor skin integrity  - Assess and monitor nutrition and hydration status  - Monitor labs   - Assess for incontinence   - Turn and reposition patient  - Assist with mobility/ambulation  - Relieve pressure over bony prominences  - Avoid friction and shearing  - Provide appropriate hygiene as needed including keeping skin clean and dry  - Evaluate need for skin moisturizer/barrier cream  - Collaborate with interdisciplinary team   - Patient/family teaching  - Consider wound care consult   Outcome: Progressing     Problem: Nutrition/Hydration-ADULT  Goal: Nutrient/Hydration intake appropriate for improving, restoring or maintaining nutritional needs  Description: Monitor and assess patient's nutrition/hydration status for malnutrition  Collaborate with interdisciplinary team and initiate plan and interventions as ordered  Monitor patient's weight and dietary intake as ordered or per policy  Utilize nutrition screening tool and intervene as necessary  Determine patient's food preferences and provide high-protein, high-caloric foods as appropriate       INTERVENTIONS:  - Monitor oral intake, urinary output, labs, and treatment plans  - Assess nutrition and hydration status and recommend course of action  - Evaluate amount of meals eaten  - Assist patient with eating if necessary   - Allow adequate time for meals  - Recommend/ encourage appropriate diets, oral nutritional supplements, and vitamin/mineral supplements  - Order, calculate, and assess calorie counts as needed  - Recommend, monitor, and adjust tube feedings and TPN/PPN based on assessed needs  - Assess need for intravenous fluids  - Provide specific nutrition/hydration education as appropriate  - Include patient/family/caregiver in decisions related to nutrition  Outcome: Progressing     Problem: PAIN - ADULT  Goal: Verbalizes/displays adequate comfort level or baseline comfort level  Description: Interventions:  - Encourage patient to monitor pain and request assistance  - Assess pain using appropriate pain scale  - Administer analgesics based on type and severity of pain and evaluate response  - Implement non-pharmacological measures as appropriate and evaluate response  - Consider cultural and social influences on pain and pain management  - Notify physician/advanced practitioner if interventions unsuccessful or patient reports new pain  Outcome: Progressing     Problem: INFECTION - ADULT  Goal: Absence or prevention of progression during hospitalization  Description: INTERVENTIONS:  - Assess and monitor for signs and symptoms of infection  - Monitor lab/diagnostic results  - Monitor all insertion sites, i e  indwelling lines, tubes, and drains  - Monitor endotracheal if appropriate and nasal secretions for changes in amount and color  - [place] appropriate cooling/warming therapies per order  - Administer medications as ordered  - Instruct and encourage patient and family to use good hand hygiene technique  - Identify and instruct in appropriate isolation precautions for identified infection/condition  Outcome: Progressing  Goal: Absence of fever/infection during neutropenic period  Description: INTERVENTIONS:  - Monitor WBC    Outcome: Progressing     Problem: SAFETY ADULT  Goal: Patient will remain free of falls  Description: INTERVENTIONS:  - Assess patient frequently for physical needs  -  Identify cognitive and physical deficits and behaviors that affect risk of falls    -  [place] fall precautions as indicated by assessment   - Educate patient/family on patient safety including physical limitations  - Instruct patient to call for assistance with activity based on assessment  - Modify environment to reduce risk of injury  - Consider OT/PT consult to assist with strengthening/mobility  Outcome: Progressing  Goal: Maintain or return to baseline ADL function  Description: INTERVENTIONS:  -  Assess patient's ability to carry out ADLs; assess patient's baseline for ADL function and identify physical deficits which impact ability to perform ADLs (bathing, care of mouth/teeth, toileting, grooming, dressing, etc )  - Assess/evaluate cause of self-care deficits   - Assess range of motion  - Assess patient's mobility; develop plan if impaired  - Assess patient's need for assistive devices and provide as appropriate  - Encourage maximum independence but intervene and supervise when necessary  - Involve family in performance of ADLs  - Assess for home care needs following discharge   - Consider OT consult to assist with ADL evaluation and planning for discharge  - Provide patient education as appropriate  Outcome: Progressing  Goal: Maintain or return mobility status to optimal level  Description: INTERVENTIONS:  - Assess patient's baseline mobility status (ambulation, transfers, stairs, etc )    - Identify cognitive and physical deficits and behaviors that affect mobility  - Identify mobility aids required to assist with transfers and/or ambulation (gait belt, sit-to-stand, lift, walker, cane, etc )  - Levels fall precautions as indicated by assessment  - Record patient progress and toleration of activity level on Mobility SBAR; progress patient to next Phase/Stage  - Instruct patient to call for assistance with activity based on assessment  - Consider rehabilitation consult to assist with strengthening/weightbearing, etc   Outcome: Progressing     Problem: DISCHARGE PLANNING  Goal: Discharge to home or other facility with appropriate resources  Description: INTERVENTIONS:  - Identify barriers to discharge w/patient and caregiver  - Arrange for needed discharge resources and transportation as appropriate  - Identify discharge learning needs (meds, wound care, etc )  - Arrange for interpretive services to assist at discharge as needed  - Refer to Case Management Department for coordinating discharge planning if the patient needs post-hospital services based on physician/advanced practitioner order or complex needs related to functional status, cognitive ability, or social support system  Outcome: Progressing     Problem: Knowledge Deficit  Goal: Patient/family/caregiver demonstrates understanding of disease process, treatment plan, medications, and discharge instructions  Description: Complete learning assessment and assess knowledge base    Interventions:  - Provide teaching at level of understanding  - Provide teaching via preferred learning methods  Outcome: Progressing     Problem: GENITOURINARY - ADULT  Goal: Maintains or returns to baseline urinary function  Description: INTERVENTIONS:  - Assess urinary function  - Encourage oral fluids to ensure adequate hydration if ordered  - Administer IV fluids as ordered to ensure adequate hydration  - Administer ordered medications as needed  - Offer frequent toileting  - Follow urinary retention protocol if ordered  Outcome: Progressing  Goal: Absence of urinary retention  Description: INTERVENTIONS:  - Assess patients ability to void and empty bladder  - Monitor I/O  - Bladder scan as needed  - Discuss with physician/AP medications to alleviate retention as needed  - Discuss catheterization for long term situations as appropriate  Outcome: Progressing  Goal: Urinary catheter remains patent  Description: INTERVENTIONS:  - Assess patency of urinary catheter  - If patient has a chronic chadwick, consider changing catheter if non-functioning  - Follow guidelines for intermittent irrigation of non-functioning urinary catheter  Outcome: Progressing

## 2021-01-24 NOTE — ASSESSMENT & PLAN NOTE
· H/o CKD stage 3; baseline Cr  1 7-2 2  · In 11/2020, hospitalized at [facility] with LILIAN, right-sided hydronephrosis, and atrophic left kidney, which required right ureteral stent  · POA, Cr 3 3 which worsened to 3 6   · Associated metabolic acidosis and hyperkalemia  · Nephrology following  · Urology consulted for stent assessment  · Urine retention protocol - despite low residuals;  Scott placed on 01/21  · Received IV fluid hydration; continue bicarbonate  · Creatinine 3 16; sodium 130; potassium 3 8  · Avoid nephrotoxins; avoid hypotension to promote perfusion

## 2021-01-24 NOTE — PROGRESS NOTES
Progress Note - Coe Officer 5/18/1930, 80 y o  female MRN: [id number]    Unit/Bed#: E5 -01 Encounter: [id number]    Primary Care Provider: Gertrude Lockhart MD   Date and time admitted to hospital: 1/18/2021 11:44 AM    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 ([email or web address] )  Assessment & Plan  · POA, dry gangrene involving right great toe and 4th toe  · Right anterior shin ulcer, and heel ulcer  · Podiatry following  · H/o advanced peripheral arterial disease and worsening condition of her right foot wound  · Vascular surgery consulted  · Patient ineligible to receive any angiogram or IV dye due to her acute kidney injury  · Re-consult vascular surgery when medically ready for procedure, likely next week   · D/w nephrology; maximize renal function prior to any iv dye  · Continue wound care    * Cellulitis  Assessment & Plan  · POA, extensive LLE cellulitis extending from her toes up to her thigh  · Continue Ancef, day #7  · Leukocytosis resolved  · Blood culture without growth x 5 days  · Previous LEADS 12/2020 with significant left lower extremity peripheral arterial disease, and she had declined vascular surgery intervention  · Planning for arteriogram when renal function recovered, likely next week       PAD (peripheral artery disease) ([facility] )  Assessment & Plan  · Advanced disease  · 12/2020, pt evaluated by the vascular surgery team and had LEADS  · Right lower extremity:  occlusion versus high-grade stenosis at the mid/distal popliteal artery, and focal stenosis in the proximal superficial femoral artery and distal superficial femoral artery  Also finding suggestive of tibial peroneal disease  ÁNGEL 0 46  · Left lower extremity:  Occlusion versus high-grade stenosis of the proximal thigh portion of the superficial femoral artery with distal reconstitution, finding suggestive of tibioperoneal disease    ÁNGEL 0 71  · Patient had declined previous vascular surgery intervention  · Now, dry gangrene of R great toe and R 4th toe and 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  · Evaluated by vascular team  · Anticipate angiogram/intervention if/ renal function improves  · Re-consult vascular when ready for procedure    LILIAN (acute kidney injury) ([facility] )  Assessment & Plan  · H/o CKD stage 3; baseline Cr  1 7-2 2  · In 11/2020, hospitalized at [facility] with LILIAN, right-sided hydronephrosis, and atrophic left kidney, which required right ureteral stent  · POA, Cr 3 3 which worsened to 3 6   · Associated metabolic acidosis and hyperkalemia  · Nephrology following  · Urology consulted for stent assessment  · Urine retention protocol - despite low residuals;  Scott placed on 01/21  · Received IV fluid hydration; continue bicarbonate  · Creatinine 3 16; sodium 130; potassium 3 8  · Avoid nephrotoxins; avoid hypotension to promote perfusion      Coagulopathy (HCC)  Assessment & Plan  · POA, supratherapeutic- INR was 10  · Status post 2 doses of vitamin K  · Closely monitor with restarting Coumadin    Acute deep vein thrombosis (DVT) of calf muscle vein of right lower extremity (HCC)  Assessment & Plan  · Dx with a right soleal vein occlusive DVT during her hospitalization 11/2020 at [facility]  · Chronic anticoagulation with Coumadin  · INR currently supratherapeutic  · POA, supratherapeutic INR of 10; s/p vitamin K x 2  · Hemoglobin stable at 8 3; improvement from 6 0  · INR 1 88  · Coumadin restarted at 2 mg 01/23      Chronic hypoxemic respiratory failure ([facility] 75 )  Assessment & Plan  · Recent diagnosis in 12/2020  · Etiology secondary to congestive heart failure atelectasis  · Stable on home 3 L via nasal cannula    Ambulatory dysfunction  Assessment & Plan  · Continue PT/OT  · Case management consulted; will require placement  · Reportedly area of the aging is involved  · Seen by neuropsych 1/20 and deemed not competent to make medical decisions    Chronic diastolic heart failure ([facility] )  Assessment & Plan  Wt Readings from Last 3 Encounters:   01/24/21 77 2 kg (170 lb 3 1 oz)   01/06/21 62 1 kg (136 lb 14 4 oz)   12/22/20 60 8 kg (134 lb)     · H/o diastolic congestive heart failure  · Lasix held due to LILIAN  · Home Lasix 20 mg q day  · Continue Coreg 12 5 mg b i d   · No ACE-inhibitor in light of her chronic kidney disease  · Continue isosorbide/hydralazine  · Grossly euvolemic    Anemia  Assessment & Plan  · H/o anemia of chronic disease; baseline hemoglobin 7-8  · Hemoglobin diminished to 6 5 - 6 7 on 01/21  · S/p transfusion 1 unit PRBC on 01/21 and 40 mg IV Lasix  · Hemoccult stool negative  · Iron panel consistent with iron deficiency anemia  · Unable to give IV Venofer due to acute infection  · Carefully monitor hemoglobin with the restarting anticoagulant therapy    Lab Results   Component Value Date    HGB 8 3 (L) 01/24/2021    HGB 8 3 (L) 01/23/2021    HGB 8 0 (L) 01/22/2021    HGB 6 7 (LL) 01/21/2021    HGB 6 5 (LL) 01/21/2021    HGB 7 2 (L) 01/20/2021         Hyperkalemia  Assessment & Plan  · Resolved following calcium gluconate, insulin and D 50 and kayexelate with improvement      Recent Labs     01/22/21  0454 01/23/21  0530 01/24/21  0537   K 4 1 3 4* 3 8         Essential hypertension  Assessment & Plan  · Continued home antihypertensives with Norvasc 5 mg daily, Coreg 12 5 mg b i d , hydralazine 10 mg q 8 hours, and isosorbide dinitrate 10 mg t i d   · Adequate BP control; avoid hypotension for renal perfusion     Hyponatremia  Assessment & Plan  Recent Labs     01/22/21  0454 01/23/21  0530 01/24/21  0537   SODIUM 132* 131* 130*     · POA; etiology secondary to hypovolemic hyponatremia  · Nephrology following  · Was administered isotonic IV fluids with normal saline  · Continue 1 5 L fluid restriction  · Home diuretics held  · Continue sodium bicarbonate-initiated for metabolic acidosis secondary to acute kidney injury      VTE Pharmacologic Prophylaxis:   Pharmacologic: Warfarin (Coumadin)  Mechanical VTE Prophylaxis in Place: Yes    Patient Centered Rounds: I have performed bedside rounds with nursing staff today  Discussions with Specialists or Other Care Team Provider:  Nephrology    Education and Discussions with Family / Patient:  updated via phone     Time Spent for Care: 40  min  More than 50% of total time spent on counseling and coordination of care as described above  Current Length of Stay: 6 day(s)    Current Patient Status: Inpatient   Certification Statement: The patient will continue to require additional inpatient hospital stay due to Improving renal function and anticipated angiogram    Discharge Plan:  Patient still requiring hospitalization    Code Status: Level 1 - Full Code      Subjective:   Patient seen and examined in room  No overnight events to report per nursing  Patient's pain well controlled overnight  Complained of severe pain this afternoon when wound got stuck to side of the dressing  Pain resolved with IV Dilaudid x1  Fair appetite  Scott catheter in place  Objective:     Vitals:   Temp (24hrs), Av 2 °F (36 2 °C), Min:96 6 °F (35 9 °C), Max:97 6 °F (36 4 °C)    Temp:  [96 6 °F (35 9 °C)-97 6 °F (36 4 °C)] 97 4 °F (36 3 °C)  HR:  [57-72] 72  Resp:  [18] 18  BP: (138-195)/(64-99) 174/87  SpO2:  [95 %-97 %] 95 %  Body mass index is 27 47 kg/m²  Input and Output Summary (last 24 hours): Intake/Output Summary (Last 24 hours) at 2021 1642  Last data filed at 2021 1354  Gross per 24 hour   Intake 120 ml   Output 1300 ml   Net -1180 ml       Physical Exam:     Physical Exam  Constitutional:       Appearance: She is normal weight  HENT:      Head: Normocephalic  Nose: Nose normal       Mouth/Throat:      Pharynx: Oropharynx is clear  Eyes:      Extraocular Movements: Extraocular movements intact  Conjunctiva/sclera: Conjunctivae normal    Neck:      Musculoskeletal: Normal range of motion and neck supple  Cardiovascular:      Rate and Rhythm: Normal rate and regular rhythm  Pulmonary:      Effort: Pulmonary effort is normal       Breath sounds: Normal breath sounds  Abdominal:      General: Abdomen is flat  Bowel sounds are normal  There is no distension  Tenderness: There is no abdominal tenderness  Genitourinary:     Comments: Scott in place  Musculoskeletal: Normal range of motion  General: Deformity (Bilateral lower extremity peripheral vascular disease skin) present  Skin:     Capillary Refill: Capillary refill takes less than 2 seconds  Findings: Erythema (Cellulitis below left knee to dorsal foot) and lesion (Open wound with serous discharge left lateral shin) present  Comments: Dressing on right shin clean/dry/intact   Neurological:      General: No focal deficit present  Mental Status: She is alert  Mental status is at baseline  She is disoriented  Psychiatric:      Comments: Flat and bizarre affect       Additional Data:     Labs:    Results from last 7 days   Lab Units 01/24/21  0537  01/22/21  0454   WBC Thousand/uL 8 52   < > 8 50   HEMOGLOBIN g/dL 8 3*   < > 8 0*   HEMATOCRIT % 25 8*   < > 24 6*   PLATELETS Thousands/uL 239   < > 219   NEUTROS PCT %  --   --  67   LYMPHS PCT %  --   --  20   MONOS PCT %  --   --  7   EOS PCT %  --   --  4    < > = values in this interval not displayed  Results from last 7 days   Lab Units 01/24/21  0537   SODIUM mmol/L 130*   POTASSIUM mmol/L 3 8   CHLORIDE mmol/L 96*   CO2 mmol/L 23   BUN mg/dL 110*   CREATININE mg/dL 3 16*   ANION GAP mmol/L 11   CALCIUM mg/dL 8 0*   GLUCOSE RANDOM mg/dL 119     Results from last 7 days   Lab Units 01/24/21  0537   INR  1 88*                 * I Have Reviewed All Lab Data Listed Above  * Additional Pertinent Lab Tests Reviewed:  All [facility] Admission Reviewed    Imaging:    Imaging Reports Reviewed Today Include:  CT ABDOMEN PELVIS; ULTRASOUND KIDNEY AND BLADDER; CHEST X-RAY FOOT  Imaging Personally Reviewed by Myself Includes:  Chest x-ray foot    Recent Cultures (last 7 days):     Results from last 7 days   Lab Units 01/18/21  1515   BLOOD CULTURE  No Growth After 5 Days  No Growth After 5 Days  Last 24 Hours Medication List:   Current Facility-Administered Medications   Medication Dose Route Frequency Provider Last Rate    acetaminophen  650 mg Oral Q6H PRN Veronikamayelin Shookis, DO      amLODIPine  5 mg Oral Daily OTIS Perez      carvedilol  12 5 mg Oral BID With Meals OTIS Perez      cefazolin  1,000 mg Intravenous Q24H Veronika Smith, DO 1,000 mg (01/24/21 1144)    docusate sodium  100 mg Oral BID Harsha Shook, DO      hydrALAZINE  10 mg Oral Q8H [address] Harsha NATE Vergara, DO      HYDROmorphone  1 mg Intravenous Q4H PRN Dejuan Heart PA-C      isosorbide dinitrate  10 mg Oral TID after meals Harsha Shook, DO      levothyroxine  25 mcg Oral Early Morning Harsha Shook, DO      ondansetron  4 mg Intravenous Q6H PRN Veronika Smith, DO      oxyCODONE  2 5 mg Oral Q4H PRN Matthew Abraham PA-C      oxyCODONE  5 mg Oral Q4H PRN Arabella Abraham PA-C      saccharomyces boulardii  250 mg Oral BID Harsha Shook, DO      senna  1 tablet Oral BID Arabella Abraham PA-C      sodium bicarbonate  650 mg Oral BID after meals MD Romy Newman warfarin  2 mg Oral Daily (warfarin) Dejuan Heart PA-C          Today, Patient Was Seen By: Dejuan Heart PA-C    ** Please Note: Dictation voice to text software may have been used in the creation of this document   **

## 2021-01-24 NOTE — ASSESSMENT & PLAN NOTE
· Resolved following calcium gluconate, insulin and D 50 and kayexelate with improvement      Recent Labs     01/22/21  0454 01/23/21  0530 01/24/21  0537   K 4 1 3 4* 3 8

## 2021-01-25 PROBLEM — N18.9 ACUTE KIDNEY INJURY SUPERIMPOSED ON CHRONIC KIDNEY DISEASE (HCC): Status: ACTIVE | Noted: 2020-11-09

## 2021-01-25 PROBLEM — E87.5 HYPERKALEMIA: Status: RESOLVED | Noted: 2021-01-18 | Resolved: 2021-01-25

## 2021-01-25 PROBLEM — D50.8 IRON DEFICIENCY ANEMIA SECONDARY TO INADEQUATE DIETARY IRON INTAKE: Status: ACTIVE | Noted: 2021-01-18

## 2021-01-25 LAB
ABO GROUP BLD BPU: NORMAL
ABO GROUP BLD BPU: NORMAL
ANION GAP SERPL CALCULATED.3IONS-SCNC: 11 MMOL/L (ref 4–13)
BPU ID: NORMAL
BPU ID: NORMAL
BUN SERPL-MCNC: 102 MG/DL (ref 5–25)
CALCIUM SERPL-MCNC: 8.2 MG/DL (ref 8.3–10.1)
CHLORIDE SERPL-SCNC: 98 MMOL/L (ref 100–108)
CO2 SERPL-SCNC: 25 MMOL/L (ref 21–32)
CREAT SERPL-MCNC: 2.99 MG/DL (ref 0.6–1.3)
CROSSMATCH: NORMAL
CROSSMATCH: NORMAL
ERYTHROCYTE [DISTWIDTH] IN BLOOD BY AUTOMATED COUNT: 17.3 % (ref 11.6–15.1)
GFR SERPL CREATININE-BSD FRML MDRD: 13 ML/MIN/1.73SQ M
GLUCOSE SERPL-MCNC: 96 MG/DL (ref 65–140)
HCT VFR BLD AUTO: 25.2 % (ref 34.8–46.1)
HGB BLD-MCNC: 7.9 G/DL (ref 11.5–15.4)
MCH RBC QN AUTO: 27.2 PG (ref 26.8–34.3)
MCHC RBC AUTO-ENTMCNC: 31.3 G/DL (ref 31.4–37.4)
MCV RBC AUTO: 87 FL (ref 82–98)
PLATELET # BLD AUTO: 198 THOUSANDS/UL (ref 149–390)
PMV BLD AUTO: 9 FL (ref 8.9–12.7)
POTASSIUM SERPL-SCNC: 3.8 MMOL/L (ref 3.5–5.3)
RBC # BLD AUTO: 2.9 MILLION/UL (ref 3.81–5.12)
SODIUM SERPL-SCNC: 134 MMOL/L (ref 136–145)
UNIT DISPENSE STATUS: NORMAL
UNIT DISPENSE STATUS: NORMAL
UNIT PRODUCT CODE: NORMAL
UNIT PRODUCT CODE: NORMAL
UNIT RH: NORMAL
UNIT RH: NORMAL
WBC # BLD AUTO: 8.54 THOUSAND/UL (ref 4.31–10.16)

## 2021-01-25 PROCEDURE — 99232 SBSQ HOSP IP/OBS MODERATE 35: CPT | Performed by: PODIATRIST

## 2021-01-25 PROCEDURE — 99233 SBSQ HOSP IP/OBS HIGH 50: CPT | Performed by: INTERNAL MEDICINE

## 2021-01-25 PROCEDURE — 99232 SBSQ HOSP IP/OBS MODERATE 35: CPT | Performed by: STUDENT IN AN ORGANIZED HEALTH CARE EDUCATION/TRAINING PROGRAM

## 2021-01-25 PROCEDURE — 80048 BASIC METABOLIC PNL TOTAL CA: CPT | Performed by: NURSE PRACTITIONER

## 2021-01-25 PROCEDURE — 85027 COMPLETE CBC AUTOMATED: CPT | Performed by: PHYSICIAN ASSISTANT

## 2021-01-25 RX ORDER — FUROSEMIDE 10 MG/ML
20 INJECTION INTRAMUSCULAR; INTRAVENOUS ONCE
Status: DISCONTINUED | OUTPATIENT
Start: 2021-01-25 | End: 2021-01-26

## 2021-01-25 RX ADMIN — CARVEDILOL 12.5 MG: 6.25 TABLET, FILM COATED ORAL at 08:53

## 2021-01-25 RX ADMIN — ISOSORBIDE DINITRATE 10 MG: 10 TABLET ORAL at 08:53

## 2021-01-25 RX ADMIN — Medication 250 MG: at 18:43

## 2021-01-25 RX ADMIN — HYDRALAZINE HYDROCHLORIDE 10 MG: 10 TABLET, FILM COATED ORAL at 06:22

## 2021-01-25 RX ADMIN — CEFAZOLIN SODIUM 1000 MG: 1 SOLUTION INTRAVENOUS at 13:08

## 2021-01-25 RX ADMIN — ISOSORBIDE DINITRATE 10 MG: 10 TABLET ORAL at 16:55

## 2021-01-25 RX ADMIN — SODIUM BICARBONATE 650 MG TABLET 650 MG: at 08:53

## 2021-01-25 RX ADMIN — HYDRALAZINE HYDROCHLORIDE 10 MG: 10 TABLET, FILM COATED ORAL at 14:20

## 2021-01-25 RX ADMIN — CARVEDILOL 12.5 MG: 6.25 TABLET, FILM COATED ORAL at 16:55

## 2021-01-25 RX ADMIN — Medication 250 MG: at 08:53

## 2021-01-25 RX ADMIN — AMLODIPINE BESYLATE 5 MG: 5 TABLET ORAL at 08:53

## 2021-01-25 RX ADMIN — HYDRALAZINE HYDROCHLORIDE 10 MG: 10 TABLET, FILM COATED ORAL at 21:17

## 2021-01-25 RX ADMIN — SODIUM BICARBONATE 650 MG TABLET 650 MG: at 16:55

## 2021-01-25 RX ADMIN — ISOSORBIDE DINITRATE 10 MG: 10 TABLET ORAL at 13:08

## 2021-01-25 RX ADMIN — WARFARIN SODIUM 2 MG: 2 TABLET ORAL at 18:43

## 2021-01-25 RX ADMIN — LEVOTHYROXINE SODIUM 25 MCG: 25 TABLET ORAL at 06:21

## 2021-01-25 NOTE — PHYSICAL THERAPY NOTE
PHYSICAL THERAPY NOTE          Patient Name: Jignesh RENTERIA Date: 1/25/2021     Attempted PT tx but pt refused to participate at this time 2* to inc fatigue  Encouraged several times & educated on effects of immobility but pt continue to refused  Will continue to follow as appropriate   Wally Abarca, PT

## 2021-01-25 NOTE — ASSESSMENT & PLAN NOTE
Multifactorial: Pre-renal, infection   Improving    Recent Labs     01/23/21  0530 01/24/21  0537 01/25/21  0530   * 110* 102*   CREATININE 3 19* 3 16* 2 99*   EGFR 12 12 13

## 2021-01-25 NOTE — PLAN OF CARE
Problem: Potential for Falls  Goal: Patient will remain free of falls  Description: INTERVENTIONS:  - Assess patient frequently for physical needs  -  Identify cognitive and physical deficits and behaviors that affect risk of falls  -  [place] fall precautions as indicated by assessment   - Educate patient/family on patient safety including physical limitations  - Instruct patient to call for assistance with activity based on assessment  - Modify environment to reduce risk of injury  - Consider OT/PT consult to assist with strengthening/mobility  1/24/2021 2253 by Sally Joshua  Outcome: Progressing  1/24/2021 2253 by Sally Joshua  Outcome: Progressing     Problem: Prexisting or High Potential for Compromised Skin Integrity  Goal: Skin integrity is maintained or improved  Description: INTERVENTIONS:  - Identify patients at risk for skin breakdown  - Assess and monitor skin integrity  - Assess and monitor nutrition and hydration status  - Monitor labs   - Assess for incontinence   - Turn and reposition patient  - Assist with mobility/ambulation  - Relieve pressure over bony prominences  - Avoid friction and shearing  - Provide appropriate hygiene as needed including keeping skin clean and dry  - Evaluate need for skin moisturizer/barrier cream  - Collaborate with interdisciplinary team   - Patient/family teaching  - Consider wound care consult   1/24/2021 2253 by Sally Joshua  Outcome: Progressing  1/24/2021 2253 by Sally Joshua  Outcome: Progressing     Problem: Nutrition/Hydration-ADULT  Goal: Nutrient/Hydration intake appropriate for improving, restoring or maintaining nutritional needs  Description: Monitor and assess patient's nutrition/hydration status for malnutrition  Collaborate with interdisciplinary team and initiate plan and interventions as ordered  Monitor patient's weight and dietary intake as ordered or per policy  Utilize nutrition screening tool and intervene as necessary   Determine patient's food preferences and provide high-protein, high-caloric foods as appropriate       INTERVENTIONS:  - Monitor oral intake, urinary output, labs, and treatment plans  - Assess nutrition and hydration status and recommend course of action  - Evaluate amount of meals eaten  - Assist patient with eating if necessary   - Allow adequate time for meals  - Recommend/ encourage appropriate diets, oral nutritional supplements, and vitamin/mineral supplements  - Order, calculate, and assess calorie counts as needed  - Recommend, monitor, and adjust tube feedings and TPN/PPN based on assessed needs  - Assess need for intravenous fluids  - Provide specific nutrition/hydration education as appropriate  - Include patient/family/caregiver in decisions related to nutrition  1/24/2021 2253 by Gonzella Ormond  Outcome: Progressing  1/24/2021 2253 by Gonzella Ormond  Outcome: Progressing     Problem: PAIN - ADULT  Goal: Verbalizes/displays adequate comfort level or baseline comfort level  Description: Interventions:  - Encourage patient to monitor pain and request assistance  - Assess pain using appropriate pain scale  - Administer analgesics based on type and severity of pain and evaluate response  - Implement non-pharmacological measures as appropriate and evaluate response  - Consider cultural and social influences on pain and pain management  - Notify physician/advanced practitioner if interventions unsuccessful or patient reports new pain  1/24/2021 2253 by Gonzella Ormond  Outcome: Progressing  1/24/2021 2253 by Gonzella Ormond  Outcome: Progressing     Problem: INFECTION - ADULT  Goal: Absence or prevention of progression during hospitalization  Description: INTERVENTIONS:  - Assess and monitor for signs and symptoms of infection  - Monitor lab/diagnostic results  - Monitor all insertion sites, i e  indwelling lines, tubes, and drains  - Monitor endotracheal if appropriate and nasal secretions for changes in amount and color  - [place] appropriate cooling/warming therapies per order  - Administer medications as ordered  - Instruct and encourage patient and family to use good hand hygiene technique  - Identify and instruct in appropriate isolation precautions for identified infection/condition  1/24/2021 2253 by Angel Sadler  Outcome: Progressing  1/24/2021 2253 by Angel An  Outcome: Progressing  Goal: Absence of fever/infection during neutropenic period  Description: INTERVENTIONS:  - Monitor WBC    1/24/2021 2253 by Angel Sadler  Outcome: Progressing  1/24/2021 2253 by Angel An  Outcome: Progressing     Problem: SAFETY ADULT  Goal: Patient will remain free of falls  Description: INTERVENTIONS:  - Assess patient frequently for physical needs  -  Identify cognitive and physical deficits and behaviors that affect risk of falls    -  [place] fall precautions as indicated by assessment   - Educate patient/family on patient safety including physical limitations  - Instruct patient to call for assistance with activity based on assessment  - Modify environment to reduce risk of injury  - Consider OT/PT consult to assist with strengthening/mobility  1/24/2021 2253 by Angel Sadler  Outcome: Progressing  1/24/2021 2253 by Angel An  Outcome: Progressing  Goal: Maintain or return to baseline ADL function  Description: INTERVENTIONS:  -  Assess patient's ability to carry out ADLs; assess patient's baseline for ADL function and identify physical deficits which impact ability to perform ADLs (bathing, care of mouth/teeth, toileting, grooming, dressing, etc )  - Assess/evaluate cause of self-care deficits   - Assess range of motion  - Assess patient's mobility; develop plan if impaired  - Assess patient's need for assistive devices and provide as appropriate  - Encourage maximum independence but intervene and supervise when necessary  - Involve family in performance of ADLs  - Assess for home care needs following discharge   - Consider OT consult to assist with ADL evaluation and planning for discharge  - Provide patient education as appropriate  1/24/2021 2253 by Parisa Madrigal  Outcome: Progressing  1/24/2021 2253 by Parisa Madrigal  Outcome: Progressing  Goal: Maintain or return mobility status to optimal level  Description: INTERVENTIONS:  - Assess patient's baseline mobility status (ambulation, transfers, stairs, etc )    - Identify cognitive and physical deficits and behaviors that affect mobility  - Identify mobility aids required to assist with transfers and/or ambulation (gait belt, sit-to-stand, lift, walker, cane, etc )  - [place] fall precautions as indicated by assessment  - Record patient progress and toleration of activity level on Mobility SBAR; progress patient to next Phase/Stage  - Instruct patient to call for assistance with activity based on assessment  - Consider rehabilitation consult to assist with strengthening/weightbearing, etc   1/24/2021 2253 by Parisa Madrigal  Outcome: Progressing  1/24/2021 2253 by Parisa Madrigal  Outcome: Progressing     Problem: DISCHARGE PLANNING  Goal: Discharge to home or other facility with appropriate resources  Description: INTERVENTIONS:  - Identify barriers to discharge w/patient and caregiver  - Arrange for needed discharge resources and transportation as appropriate  - Identify discharge learning needs (meds, wound care, etc )  - Arrange for interpretive services to assist at discharge as needed  - Refer to Case Management Department for coordinating discharge planning if the patient needs post-hospital services based on physician/advanced practitioner order or complex needs related to functional status, cognitive ability, or social support system  1/24/2021 2253 by Parisa Madrigal  Outcome: Progressing  1/24/2021 2253 by Parisa Madrigal  Outcome: Progressing     Problem: Knowledge Deficit  Goal: Patient/family/caregiver demonstrates understanding of disease process, treatment plan, medications, and discharge instructions  Description: Complete learning assessment and assess knowledge base    Interventions:  - Provide teaching at level of understanding  - Provide teaching via preferred learning methods  1/24/2021 2253 by Deb Rios  Outcome: Progressing  1/24/2021 2253 by Deb Rios  Outcome: Progressing     Problem: GENITOURINARY - ADULT  Goal: Maintains or returns to baseline urinary function  Description: INTERVENTIONS:  - Assess urinary function  - Encourage oral fluids to ensure adequate hydration if ordered  - Administer IV fluids as ordered to ensure adequate hydration  - Administer ordered medications as needed  - Offer frequent toileting  - Follow urinary retention protocol if ordered  1/24/2021 2253 by Deb Rios  Outcome: Progressing  1/24/2021 2253 by Deb Rios  Outcome: Progressing  Goal: Absence of urinary retention  Description: INTERVENTIONS:  - Assess patients ability to void and empty bladder  - Monitor I/O  - Bladder scan as needed  - Discuss with physician/AP medications to alleviate retention as needed  - Discuss catheterization for long term situations as appropriate  1/24/2021 2253 by Deb Rios  Outcome: Progressing  1/24/2021 2253 by Deb Rios  Outcome: Progressing  Goal: Urinary catheter remains patent  Description: INTERVENTIONS:  - Assess patency of urinary catheter  - If patient has a chronic chadwick, consider changing catheter if non-functioning  - Follow guidelines for intermittent irrigation of non-functioning urinary catheter  1/24/2021 2253 by Deb Rios  Outcome: Progressing  1/24/2021 2253 by Deb Rios  Outcome: Progressing

## 2021-01-25 NOTE — CASE MANAGEMENT
Patient here with cellulitis, dry gangrene  Patient is pending renal clearance for vascular and podiatry procedures  Patient needs STR, has referrals pending in [address]  Patient has an open case with [facility] due to self neglect at home  Neuropsychiatric evaluation concluded patient does not have capacity to make medical decisions  CM will continue to follow

## 2021-01-25 NOTE — ASSESSMENT & PLAN NOTE
Hypovolemia + SIADH  Improving      Recent Labs     01/23/21  0530 01/24/21  0537 01/25/21  0530   SODIUM 131* 130* 134*

## 2021-01-25 NOTE — ASSESSMENT & PLAN NOTE
80year old female admitted due to LLE cellulitis  Podiatry reviewed imaging and stated patient does not have OM or CHERYLE   - Complete Antibiotic course today  - For arteriogram once renal function improves

## 2021-01-25 NOTE — UTILIZATION REVIEW
Continued Stay Review    Date:    for  1/23/21                          Current Patient Class:   Inpatient  Current Level of Care:   Med surg    HPI:89 y o  female initially admitted on    1/18  With Cellulitis  LLE, ambulatory   Dysfunction,, hyperkalemia, LILIAN, hyponatremia    Vascular consult   (1/20)     options available including angioplasty/stenting of the affected vessels, palliative care/local wound care, amputation of the foot etc  Patient is currently still hesitant to make a decision  Continue  Current  TUTU>     Behavioral health consult  ( 1/20)  At this time, patient does not appear to have capacity to make fully informed medical decisions        Assessment/Plan:   1/23  Arteriogram planned when renal function improves  Monitor labs  S/P  1` U PRBC  1/21  Continue  TUTU  Blood cultures show  No growth thus far  Remains on  O2  3L  NC  Continue PT/OT  Continue fluid restrict       Pertinent Labs/Diagnostic Results:       Results from last 7 days   Lab Units 01/25/21  0530 01/24/21  0537 01/23/21  0530 01/22/21  0454 01/21/21  1056   WBC Thousand/uL 8 54 8 52 8 66 8 50  --    HEMOGLOBIN g/dL 7 9* 8 3* 8 3* 8 0* 6 7*   HEMATOCRIT % 25 2* 25 8* 25 4* 24 6* 21 2*   PLATELETS Thousands/uL 198 239 217 219  --    NEUTROS ABS Thousands/µL  --   --   --  5 77  --          Results from last 7 days   Lab Units 01/25/21  0530 01/24/21  0537 01/23/21  0530 01/22/21  0454 01/21/21  0507   SODIUM mmol/L 134* 130* 131* 132* 130*   POTASSIUM mmol/L 3 8 3 8 3 4* 4 1 4 3   CHLORIDE mmol/L 98* 96* 96* 98* 98*   CO2 mmol/L 25 23 22 21 20*   ANION GAP mmol/L 11 11 13 13 12   BUN mg/dL 102* 110* 118* 125* 125*   CREATININE mg/dL 2 99* 3 16* 3 19* 3 36* 3 43*   EGFR ml/min/1 73sq m 13 12 12 11 11   CALCIUM mg/dL 8 2* 8 0* 7 9* 7 9* 8 0*             Results from last 7 days   Lab Units 01/25/21  0530 01/24/21  0537 01/23/21  0530 01/22/21  0454 01/21/21  0507 01/20/21  0541 01/19/21  1628 01/19/21  0705 01/19/21  0515 01/19/21  0108 01/18/21  1602   GLUCOSE RANDOM mg/dL 96 119 90 100 141* 120 145* 102 86 140 82           Results from last 7 days   Lab Units 01/24/21  0537 01/23/21  0530 01/21/21  0452   PROTIME seconds 21 2* 21 2* 43 1*   INR  1 88* 1 88* 4 70*       Vital Signs:   01/23/21 2300  97 6 °F (36 4 °C)  72  18  138/82    97 %      None (Room air)  Lying   01/23/21 2115    61    148/64            Lying   01/23/21 1930              32  3 L/min  Nasal cannula     01/23/21 1550  97 9 °F (36 6 °C)  64  18  138/57  80  96 %  32  3 L/min  Nasal cannula  Lying   01/23/21 0859              32  3 L/min  Nasal cannula     01/23/21 0804  97 9 °F (36 6 °C)  64  18  152/63    97 %        Lying   01/23/21 0521        169/74                     Medications:   Scheduled Medications:  amLODIPine, 5 mg, Oral, Daily  carvedilol, 12 5 mg, Oral, BID With Meals  cefazolin, 1,000 mg, Intravenous, Q24H  docusate sodium, 100 mg, Oral, BID  furosemide, 20 mg, Intravenous, Once  hydrALAZINE, 10 mg, Oral, Q8H NATASHA  isosorbide dinitrate, 10 mg, Oral, TID after meals  levothyroxine, 25 mcg, Oral, Early Morning  saccharomyces boulardii, 250 mg, Oral, BID  senna, 1 tablet, Oral, BID  sodium bicarbonate, 650 mg, Oral, BID after meals  warfarin, 2 mg, Oral, Daily (warfarin)      Continuous IV Infusions:     PRN Meds:  acetaminophen, 650 mg, Oral, Q6H PRN  HYDROmorphone, 1 mg, Intravenous, Q4H PRN  ondansetron, 4 mg, Intravenous, Q6H PRN  oxyCODONE, 2 5 mg, Oral, Q4H PRN  oxyCODONE, 5 mg, Oral, Q4H PRN        Discharge Plan:    d    Network Utilization Review Department  ATTENTION: Please call with any questions or concerns to [phone] and carefully listen to the prompts so that you are directed to the right person   All voicemails are confidential   Jewels Bradshaw all requests for admission clinical reviews, approved or denied determinations and any other requests to dedicated fax number below belonging to the campus where the patient is receiving treatment   List of dedicated fax numbers for the Facilities:  [address] DENIALS (Administrative/Medical Necessity) [phone]   [address] (Maternity/NICU/Pediatrics) [phone] [address] Lukas Hagan 7390 (  Blaze James "Y[address]" [address]) [address] [address] [address] Tina Awan) [address] [place] [address] [phone]

## 2021-01-25 NOTE — PLAN OF CARE
Problem: Potential for Falls  Goal: Patient will remain free of falls  Description: INTERVENTIONS:  - Assess patient frequently for physical needs  -  Identify cognitive and physical deficits and behaviors that affect risk of falls  -  [place] fall precautions as indicated by assessment   - Educate patient/family on patient safety including physical limitations  - Instruct patient to call for assistance with activity based on assessment  - Modify environment to reduce risk of injury  - Consider OT/PT consult to assist with strengthening/mobility  Outcome: Progressing     Problem: Prexisting or High Potential for Compromised Skin Integrity  Goal: Skin integrity is maintained or improved  Description: INTERVENTIONS:  - Identify patients at risk for skin breakdown  - Assess and monitor skin integrity  - Assess and monitor nutrition and hydration status  - Monitor labs   - Assess for incontinence   - Turn and reposition patient  - Assist with mobility/ambulation  - Relieve pressure over bony prominences  - Avoid friction and shearing  - Provide appropriate hygiene as needed including keeping skin clean and dry  - Evaluate need for skin moisturizer/barrier cream  - Collaborate with interdisciplinary team   - Patient/family teaching  - Consider wound care consult   Outcome: Progressing     Problem: Nutrition/Hydration-ADULT  Goal: Nutrient/Hydration intake appropriate for improving, restoring or maintaining nutritional needs  Description: Monitor and assess patient's nutrition/hydration status for malnutrition  Collaborate with interdisciplinary team and initiate plan and interventions as ordered  Monitor patient's weight and dietary intake as ordered or per policy  Utilize nutrition screening tool and intervene as necessary  Determine patient's food preferences and provide high-protein, high-caloric foods as appropriate       INTERVENTIONS:  - Monitor oral intake, urinary output, labs, and treatment plans  - Assess nutrition and hydration status and recommend course of action  - Evaluate amount of meals eaten  - Assist patient with eating if necessary   - Allow adequate time for meals  - Recommend/ encourage appropriate diets, oral nutritional supplements, and vitamin/mineral supplements  - Order, calculate, and assess calorie counts as needed  - Recommend, monitor, and adjust tube feedings and TPN/PPN based on assessed needs  - Assess need for intravenous fluids  - Provide specific nutrition/hydration education as appropriate  - Include patient/family/caregiver in decisions related to nutrition  Outcome: Progressing     Problem: PAIN - ADULT  Goal: Verbalizes/displays adequate comfort level or baseline comfort level  Description: Interventions:  - Encourage patient to monitor pain and request assistance  - Assess pain using appropriate pain scale  - Administer analgesics based on type and severity of pain and evaluate response  - Implement non-pharmacological measures as appropriate and evaluate response  - Consider cultural and social influences on pain and pain management  - Notify physician/advanced practitioner if interventions unsuccessful or patient reports new pain  Outcome: Progressing     Problem: INFECTION - ADULT  Goal: Absence or prevention of progression during hospitalization  Description: INTERVENTIONS:  - Assess and monitor for signs and symptoms of infection  - Monitor lab/diagnostic results  - Monitor all insertion sites, i e  indwelling lines, tubes, and drains  - Monitor endotracheal if appropriate and nasal secretions for changes in amount and color  - [place] appropriate cooling/warming therapies per order  - Administer medications as ordered  - Instruct and encourage patient and family to use good hand hygiene technique  - Identify and instruct in appropriate isolation precautions for identified infection/condition  Outcome: Progressing  Goal: Absence of fever/infection during neutropenic period  Description: INTERVENTIONS:  - Monitor WBC    Outcome: Progressing     Problem: SAFETY ADULT  Goal: Patient will remain free of falls  Description: INTERVENTIONS:  - Assess patient frequently for physical needs  -  Identify cognitive and physical deficits and behaviors that affect risk of falls    -  [place] fall precautions as indicated by assessment   - Educate patient/family on patient safety including physical limitations  - Instruct patient to call for assistance with activity based on assessment  - Modify environment to reduce risk of injury  - Consider OT/PT consult to assist with strengthening/mobility  Outcome: Progressing  Goal: Maintain or return to baseline ADL function  Description: INTERVENTIONS:  -  Assess patient's ability to carry out ADLs; assess patient's baseline for ADL function and identify physical deficits which impact ability to perform ADLs (bathing, care of mouth/teeth, toileting, grooming, dressing, etc )  - Assess/evaluate cause of self-care deficits   - Assess range of motion  - Assess patient's mobility; develop plan if impaired  - Assess patient's need for assistive devices and provide as appropriate  - Encourage maximum independence but intervene and supervise when necessary  - Involve family in performance of ADLs  - Assess for home care needs following discharge   - Consider OT consult to assist with ADL evaluation and planning for discharge  - Provide patient education as appropriate  Outcome: Progressing  Goal: Maintain or return mobility status to optimal level  Description: INTERVENTIONS:  - Assess patient's baseline mobility status (ambulation, transfers, stairs, etc )    - Identify cognitive and physical deficits and behaviors that affect mobility  - Identify mobility aids required to assist with transfers and/or ambulation (gait belt, sit-to-stand, lift, walker, cane, etc )  - [place] fall precautions as indicated by assessment  - Record patient progress and toleration of activity level on Mobility SBAR; progress patient to next Phase/Stage  - Instruct patient to call for assistance with activity based on assessment  - Consider rehabilitation consult to assist with strengthening/weightbearing, etc   Outcome: Progressing     Problem: DISCHARGE PLANNING  Goal: Discharge to home or other facility with appropriate resources  Description: INTERVENTIONS:  - Identify barriers to discharge w/patient and caregiver  - Arrange for needed discharge resources and transportation as appropriate  - Identify discharge learning needs (meds, wound care, etc )  - Arrange for interpretive services to assist at discharge as needed  - Refer to Case Management Department for coordinating discharge planning if the patient needs post-hospital services based on physician/advanced practitioner order or complex needs related to functional status, cognitive ability, or social support system  Outcome: Progressing     Problem: Knowledge Deficit  Goal: Patient/family/caregiver demonstrates understanding of disease process, treatment plan, medications, and discharge instructions  Description: Complete learning assessment and assess knowledge base    Interventions:  - Provide teaching at level of understanding  - Provide teaching via preferred learning methods  Outcome: Progressing     Problem: GENITOURINARY - ADULT  Goal: Maintains or returns to baseline urinary function  Description: INTERVENTIONS:  - Assess urinary function  - Encourage oral fluids to ensure adequate hydration if ordered  - Administer IV fluids as ordered to ensure adequate hydration  - Administer ordered medications as needed  - Offer frequent toileting  - Follow urinary retention protocol if ordered  Outcome: Progressing  Goal: Absence of urinary retention  Description: INTERVENTIONS:  - Assess patients ability to void and empty bladder  - Monitor I/O  - Bladder scan as needed  - Discuss with physician/AP medications to alleviate retention as needed  - Discuss catheterization for long term situations as appropriate  Outcome: Progressing  Goal: Urinary catheter remains patent  Description: INTERVENTIONS:  - Assess patency of urinary catheter  - If patient has a chronic chadwick, consider changing catheter if non-functioning  - Follow guidelines for intermittent irrigation of non-functioning urinary catheter  Outcome: Progressing

## 2021-01-25 NOTE — ASSESSMENT & PLAN NOTE
In the setting of advanced peripheral arterial disease    - For possible arteriogram once renal function improves and cleared by renal   - Continue wound care

## 2021-01-25 NOTE — PROGRESS NOTES
NEPHROLOGY PROGRESS NOTE   Shivam May 80 y o  female MRN: [id number]  Unit/Bed#: E5 -01 Encounter: [id number]  Reason for Consult: LILIAN (POA) on CKD IV    Plan:  -renal function continues to improve   -would give20 mg IV Lasix with PRBC transfusion today    -avoid arteriogram until renal function returns to baseline   -recommend pre and post contrast hydration as well as Mucomyst 1200 mg x 4 doses  -continue Scott catheter per Urology  -hyponatremia improved  Continue fluid restriction  ASSESSMENT/PLAN:  LILIAN (POA) on likely CKD IV:  Suspect multifactorial with possible infection, diuretic use, as well as possible post renal component with history of atrophic left kidney measuring 7 1 cm and right-sided hydro in December 2020    -presented with creatinine of 3 3 with peak of 3 7 on 01/19   -unknown baseline creatinine, suspect around 1 7-2 2   -creatinine slowly improving   -received diuretic 1/22 with PRBC transfusion  Will repeat today  -UA showed large blood, large leukocytes, 2+ protein, RBCs, innumerable WBCs, innumerable bacteria  -will place Scott catheter per Urology  -potential need for future arteriogram per Vascular   Would hold off until creatinine is at baseline unless necessarily needed   -avoid nephrotoxins, hypotension, IV contrast   -I/O      Right-sided hydronephrosis:  With history of right ureteral stent placement in December 2020 at [facility]   -Renal US revealed bilateral renal atrophy, more  on the left along with mild right hydronephrosis with ureteral stent in place   -urology consulted  -will place Scott catheter for maximum decompression      Hyponatremia:  Suspect component of hypovolemia as well as SIADH   (improving)  -urine sodium 47, urine osmolality 338, serum osmolality 308, uric acid elevated at 9 0, TSH 10 6   -presented with sodium of 124 with normal glucose     -sodium level corrected to 129, correcting appropriately with decrease to 126 with fluids so IVF were discontinued    -continue on 1 5 L fluid restriction       Hyperkalemia (POA)/hypokalemia:  Presents with potassium of 5 7   Received medical management with insulin, dextrose, Kayexalate, and calcium gluconate  (resolved) Now with hypokalemia    -continue to monitor and treat as necessary       Metabolic acidosis:  In the setting of renal failure   -continue oral bicarb 350 mg po BID       Hypertension:  above goal at times    -avoid hypotension or high fluctuations in blood pressure   -outpatient antihypertensives:  Amlodipine 5 mg daily, carvedilol 12 5 mg 2 times per day, hydralazine 10 mg every 8 hours, Isordil 10 mg 3 times per day   -continue amlodipine, carvedilol, hydralazine, Isordil   -holding diuretics      Lower extremity cellulitis:  With dry gangrene to great toe and 4th toe   -podiatry following   -vascular surgery consulted to discuss a-gram and further intervention   Would hold off until creatinine improves unless urgently needed  Recommend pre and post hydration with contrast administration as well as give Mucomyst 1200 mg times total of 4 doses   -blood cultures negative to date   -local dressing changes to LE per vascular recommendations   -continues on IV antibiotics      CHF:  Echocardiogram showed EF of 50% with grade 2 diastolic dysfunction   No sandro signs of volume overload   -check daily weights, fluid restriction, I/O   -outpatient diuretics:  Lasix 40 mg daily   -continue to monitor volume status on IV hydration  -CT chest shows bilateral moderate-sized pleural effusions, small amount of ascites      Anemia:   status post PRBC transfusion 1/21  Hgb trending downward    -recommend giving Lasix 20 mg IV with PRBC transfusion    -avoiding IV Venofer in light of infection  -hemoccult stool is negative   -continue to monitor and transfuse as needed for hemoglobin less than 7 0   -iron panel showed low TIBC and low iron      Other:  PAD, DVT      Disposition: Requiring additional stay due to medical needs  SUBJECTIVE:  The patient is resting in bed  She appears to be comfortable  She denies chest pain no CP shortness of breath  She remains on supplemental oxygen  She denies nausea, vomiting, diarrhea  She has Scott catheter present      OBJECTIVE:  Current Weight: Weight - Scale: 77 kg (169 lb 12 1 oz)  Vitals:    01/25/21 0011 01/25/21 0531 01/25/21 0622 01/25/21 0734   BP: 160/76  136/80 159/90   BP Location: Left arm   Left arm   Pulse:    101   Resp:    20   Temp:    97 6 °F (36 4 °C)   TempSrc:    Temporal   SpO2:    96%   Weight:  77 kg (169 lb 12 1 oz)         Intake/Output Summary (Last 24 hours) at 1/25/2021 1051  Last data filed at 1/25/2021 0734  Gross per 24 hour   Intake 240 ml   Output 1450 ml   Net -1210 ml     General: NAD, discharge hold, thin, cachectic  Skin: warm, dry, intact, no rash  HEENT: Moist mucous membranes, sclera anicteric, normocephalic, atraumatic  Neck: No apparent JVD appreciated  Chest: lung sounds clear B/L, on O2   CVS:Regular rate and rhythm, no murmer   Abdomen: Soft, round, non-tender, +BS, Scott catheter present  Extremities: No B/L LE edema present, gangrenous toes, PAD  Neuro: alert and oriented  Psych: appropriate mood and affect     Medications:    Current Facility-Administered Medications:     acetaminophen (TYLENOL) tablet 650 mg, 650 mg, Oral, Q6H PRN, Harsha Shook DO, 650 mg at 01/21/21 0004    amLODIPine (NORVASC) tablet 5 mg, 5 mg, Oral, Daily, OTIS Capellan, 5 mg at 01/25/21 2719    carvedilol (COREG) tablet 12 5 mg, 12 5 mg, Oral, BID With Meals, OTIS Capellan, 12 5 mg at 01/25/21 0853    ceFAZolin (ANCEF) IVPB (premix in dextrose) 1,000 mg 50 mL, 1,000 mg, Intravenous, Q24H, Harsha Shook DO, Last Rate: 100 mL/hr at 01/24/21 1144, 1,000 mg at 01/24/21 1144    docusate sodium (COLACE) capsule 100 mg, 100 mg, Oral, BID, Harsha Shook DO, 100 mg at 01/24/21 0914    hydrALAZINE (APRESOLINE) tablet 10 mg, 10 mg, Oral, Q8H [address], Harsha Shook, , 10 mg at 01/25/21 1515    HYDROmorphone (DILAUDID) injection 1 mg, 1 mg, Intravenous, Q4H PRN, Mallory Samson PA-C    isosorbide dinitrate (ISORDIL) tablet 10 mg, 10 mg, Oral, TID after meals, Harsha Shook DO, 10 mg at 01/25/21 2570    levothyroxine tablet 25 mcg, 25 mcg, Oral, Early Morning, Harsha Shook DO, 25 mcg at 01/25/21 4197    ondansetron (ZOFRAN) injection 4 mg, 4 mg, Intravenous, Q6H PRN, Harsha Shook DO    oxyCODONE (ROXICODONE) IR tablet 2 5 mg, 2 5 mg, Oral, Q4H PRN, Arabella Abraham PA-C    oxyCODONE (ROXICODONE) IR tablet 5 mg, 5 mg, Oral, Q4H PRN, Arabella Abraham PA-C, 5 mg at 01/24/21 1508    saccharomyces boulardii (FLORASTOR) capsule 250 mg, 250 mg, Oral, BID, Harsha Shook DO, 250 mg at 01/25/21 7805    senna (SENOKOT) tablet 8 6 mg, 1 tablet, Oral, BID, Arabella Abrhaam PA-C, 8 6 mg at 01/24/21 7627    sodium bicarbonate tablet 650 mg, 650 mg, Oral, BID after meals, Danny De La Paz MD, 650 mg at 01/25/21 8305    warfarin (COUMADIN) tablet 2 mg, 2 mg, Oral, Daily (warfarin), Mallory Samson PA-C, 2 mg at 01/24/21 1700    Laboratory Results:  Results from last 7 days   Lab Units 01/25/21  0530 01/24/21  0537 01/23/21  0530   WBC Thousand/uL 8 54 8 52 8 66   HEMOGLOBIN g/dL 7 9* 8 3* 8 3*   HEMATOCRIT % 25 2* 25 8* 25 4*   PLATELETS Thousands/uL 198 239 217   SODIUM mmol/L 134* 130* 131*   POTASSIUM mmol/L 3 8 3 8 3 4*   CHLORIDE mmol/L 98* 96* 96*   CO2 mmol/L 25 23 22   BUN mg/dL 102* 110* 118*   CREATININE mg/dL 2 99* 3 16* 3 19*   CALCIUM mg/dL 8 2* 8 0* 7 9*

## 2021-01-25 NOTE — ASSESSMENT & PLAN NOTE
Iron deficiency anemia with anemia s/p transfusion      Recent Labs     01/23/21  0530 01/24/21  0537 01/25/21  0530   HGB 8 3* 8 3* 7 9*   MCV 85 86 87   RDW 17 2* 17 2* 17 3*

## 2021-01-25 NOTE — ASSESSMENT & PLAN NOTE
Dx with a right soleal vein occlusive DVT during her hospitalization 11/2020 at [facility]  - Continue coumadin and titrate to INR 2-3    Recent Labs     01/23/21  0530 01/24/21  0537   INR 1 88* 1 88*

## 2021-01-25 NOTE — WOUND OSTOMY CARE
Progress Note - Wound   Verito Legacy 80 y o  female MRN: [id number]  Unit/Bed#: E5 -01 Encounter: [id number]        Assessment:   Patient seen for wound care follow-up  Resistant to care--this nurse explained the importance of evaluating wound, changing dressing, and providing javed-anal care since the patient had a BM  Patient was then agreeable  Assessed along with primary RN  Scott catheter in place, occasionally incontinent of stool  Patient is on low air-loss mattress  Positioning wedges in use and heels elevated off of bed surface with pillow  Nutrition team following  1   Lower extremity wounds--Bilateral lower extremities erythematous, scaly, dressing to right lower extremity--podiatry and vascular teams following  2   Present on admission evolving deep tissue injury to sacrum (suspect stage 3, 4, or unstageable)--wound bed continues to be covered with 100% yellow slough  Small amount of dark purple/non-blanchable erythema surrounding slough, mostly to proximal edges  Small tan/yellow drainage noted on old dressing  No javed-wound induration or foul odor appreciated  See flowsheet and media for wound details  Wound care plans:  1-Sacrum--cleanse with soap and water, pat dry  Apply Santyl to wound bed in nickel thick layer and cover with Allevyn Life foam dressing  Change dressing daily and PRN with soilage  2-P500 low air loss mattress  3-Elevate heels off of bed surface to offload pressure  4-Ehob cushion when out of bed  5-Turn/repoisiton q2h or when medically stable for pressure re-distribution on skin--use positioning wedges  6-Moisturize skin daily with skin nourishing cream   7-Follow podiatry orders for lower extremity care  Wound care team to follow  Plan of care reviewed with primary RN  Dr Teresita Patton made aware of assessment--persistent yellow slough  To order Santyl  Per case management documentation    Patient is pending renal clearance for vascular and podiatry procedures  She will likely then be discharged to rehab  Neuropsychiatric evaluation concluded patient does not have capacity to make medical decisions  Wound 12/30/20 Pressure Injury Sacrum (Active)   Wound Image   01/25/21 1353   Wound Description CARL 01/24/21 2100   Pressure Injury Stage DTPI 01/19/21 1301   Corazon-wound Assessment Clean;Dry;Fragile 01/24/21 0958   Wound Length (cm) 4 5 cm 01/25/21 1353   Wound Width (cm) 4 cm 01/25/21 1353   Wound Depth (cm) 0 1 cm 01/25/21 1353   Wound Surface Area (cm^2) 18 cm^2 01/25/21 1353   Wound Volume (cm^3) 1 8 cm^3 01/25/21 1353   Calculated Wound Volume (cm^3) 1 8 cm^3 01/25/21 1353   Change in Wound Size % -373 68 01/25/21 1353   Drainage Amount None 01/24/21 0958   Drainage Description Serosanguineous 01/24/21 0958   Treatments Cleansed 01/21/21 1300   Dressing Silvasorb gel; Foam, Silicon (eg  Allevyn, etc) 01/24/21 0958   Dressing Changed Changed 01/23/21 1300   Patient Tolerance Tolerated well 01/23/21 1300   Dressing Status Clean;Dry; Intact 01/24/21 2100         Justino WOODN, RN, [employer]

## 2021-01-25 NOTE — ASSESSMENT & PLAN NOTE
Wt Readings from Last 3 Encounters:   01/25/21 77 kg (169 lb 12 1 oz)   01/06/21 62 1 kg (136 lb 14 4 oz)   12/22/20 60 8 kg (134 lb)     History of congestive heart failure  Currently above baseline weight   - IV lasix today

## 2021-01-25 NOTE — PROGRESS NOTES
Progress Note - Howard Aschoff 5/18/1930, 80 y o  female MRN: [id number]    Unit/Bed#: E5 -01 Encounter: [id number]    Primary Care Provider: Kasie Alvares MD   Date and time admitted to hospital: 1/18/2021 11:44 AM        * Cellulitis  Assessment & Plan  80year old female admitted due to LLE cellulitis  Podiatry reviewed imaging and stated patient does not have OM or CHERYLE   - Complete Antibiotic course today  - For arteriogram once renal function improves  PAD (peripheral artery disease) ([facility] [address] )  Assessment & Plan  As written above  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 ([facility] [address] )  Assessment & Plan  In the setting of advanced peripheral arterial disease    - For possible arteriogram once renal function improves and cleared by renal   - Continue wound care    Chronic hypoxemic respiratory failure (HCC)  Assessment & Plan  Secondary to CHF  Baseline at 3L    Acute kidney injury superimposed on chronic kidney disease [facility])  Assessment & Plan  Multifactorial: Pre-renal, infection  Improving    Recent Labs     01/23/21  0530 01/24/21  0537 01/25/21  0530   * 110* 102*   CREATININE 3 19* 3 16* 2 99*   EGFR 12 12 13                 Acute deep vein thrombosis (DVT) of calf muscle vein of right lower extremity (HCC)  Assessment & Plan  Dx with a right soleal vein occlusive DVT during her hospitalization 11/2020 at [facility]  - Continue coumadin and titrate to INR 2-3    Recent Labs     01/23/21  0530 01/24/21  0537   INR 1 88* 1 88*         Essential hypertension  Assessment & Plan  Contiue coreg, hydralazine, amlodipine    Iron deficiency anemia secondary to inadequate dietary iron intake  Assessment & Plan  Iron deficiency anemia with anemia s/p transfusion  Recent Labs     01/23/21  0530 01/24/21  0537 01/25/21  0530   HGB 8 3* 8 3* 7 9*   MCV 85 86 87   RDW 17 2* 17 2* 17 3*         Hyponatremia  Assessment & Plan  Hypovolemia + SIADH  Improving      Recent Labs     01/23/21  0530 01/24/21  0537 01/25/21  0530   SODIUM 131* 130* 134*       Chronic diastolic heart failure (HCC)  Assessment & Plan  Wt Readings from Last 3 Encounters:   21 77 kg (169 lb 12 1 oz)   21 62 1 kg (136 lb 14 4 oz)   20 60 8 kg (134 lb)     History of congestive heart failure  Currently above baseline weight   - IV lasix today  Ambulatory dysfunction  Assessment & Plan  Impaired medical decision making capacity  - For placement once stable    Hyperkalemiaresolved as of 2021  Assessment & Plan  Resolved      Recent Labs     21  0530 21  0537 21  0530   K 3 4* 3 8 3 8           VTE Pharmacologic Prophylaxis:   Pharmacologic: Warfarin (Coumadin)  Mechanical VTE Prophylaxis in Place: Yes    Patient Centered Rounds: I have performed bedside rounds with nursing staff today  Discussions with Specialists or Other Care Team Provider: Nursing    Education and Discussions with Family / Patient: patient, spouse    Time Spent for Care: 30 minutes  More than 50% of total time spent on counseling and coordination of care as described above  Current Length of Stay: 7 day(s)    Current Patient Status: Inpatient   Certification Statement: The patient will continue to require additional inpatient hospital stay due to renal optimization for possible angiogram    Discharge Plan: active    Code Status: Level 1 - Full Code      Subjective:   Patient seen and examined at bedside  I told her and her spouse regarding our concerns with proceeding with arteriogram given her current kidney function  They both expressed concern the moment I told them that there is a chance she might go to renal failure if we proceed with arteriogram at this time      Objective:     Vitals:   Temp (24hrs), Av 9 °F (36 6 °C), Min:97 6 °F (36 4 °C), Max:98 2 °F (36 8 °C)    Temp:  [97 6 °F (36 4 °C)-98 2 °F (36 8 °C)] 98 2 °F (36 8 °C)  HR:  [] 64  Resp:  [18-20] 20  BP: (136-182)/(70-90) 157/75  SpO2:  [93 %-96 %] 93 %  Body mass index is 27 4 kg/m²  Input and Output Summary (last 24 hours): Intake/Output Summary (Last 24 hours) at 1/25/2021 1740  Last data filed at 1/25/2021 1225  Gross per 24 hour   Intake 480 ml   Output 1300 ml   Net -820 ml       Physical Exam:     Physical Exam  Vitals signs reviewed  HENT:      Head: Normocephalic  Nose: Nose normal       Mouth/Throat:      Mouth: Mucous membranes are moist    Eyes:      General: No scleral icterus  Extraocular Movements: Extraocular movements intact  Cardiovascular:      Rate and Rhythm: Normal rate  Pulmonary:      Effort: Pulmonary effort is normal  No respiratory distress  Breath sounds: No wheezing  Abdominal:      General: There is no distension  Palpations: Abdomen is soft  Tenderness: There is no abdominal tenderness  Musculoskeletal:      Comments: Sacral ulcer, Right heel eschar   Skin:     General: Skin is warm  Neurological:      Mental Status: She is alert  Mental status is at baseline  Psychiatric:         Mood and Affect: Mood normal          Behavior: Behavior normal        Additional Data:     Labs:    Results from last 7 days   Lab Units 01/25/21  0530  01/22/21  0454   WBC Thousand/uL 8 54   < > 8 50   HEMOGLOBIN g/dL 7 9*   < > 8 0*   HEMATOCRIT % 25 2*   < > 24 6*   PLATELETS Thousands/uL 198   < > 219   NEUTROS PCT %  --   --  67   LYMPHS PCT %  --   --  20   MONOS PCT %  --   --  7   EOS PCT %  --   --  4    < > = values in this interval not displayed  Results from last 7 days   Lab Units 01/25/21  0530   SODIUM mmol/L 134*   POTASSIUM mmol/L 3 8   CHLORIDE mmol/L 98*   CO2 mmol/L 25   BUN mg/dL 102*   CREATININE mg/dL 2 99*   ANION GAP mmol/L 11   CALCIUM mg/dL 8 2*   GLUCOSE RANDOM mg/dL 96     Results from last 7 days   Lab Units 01/24/21  0537   INR  1 88*                       * I Have Reviewed All Lab Data Listed Above  * Additional Pertinent Lab Tests Reviewed:  [address] Admission Reviewed    Imaging:    Imaging Reports Reviewed Today Include: No new imaging    Recent Cultures (last 7 days):           Last 24 Hours Medication List:   Current Facility-Administered Medications   Medication Dose Route Frequency Provider Last Rate    acetaminophen  650 mg Oral Q6H PRN Fred Bowels, DO      amLODIPine  5 mg Oral Daily OTIS Hightower      carvedilol  12 5 mg Oral BID With Meals OTIS Hightower      cefazolin  1,000 mg Intravenous Q24H [place] Bowels, DO 1,000 mg (01/25/21 1308)    [START ON 1/26/2021] collagenase   Topical Daily Jimena Tate MD      docusate sodium  100 mg Oral BID Harsha Shook, DO      furosemide  20 mg Intravenous Once OTIS Hightower      hydrALAZINE  10 mg Oral Q8H [address] Harsha Murray, DO      HYDROmorphone  1 mg Intravenous Q4H PRN Marta Zamarripa PA-C      isosorbide dinitrate  10 mg Oral TID after meals Harsha Shook, DO      levothyroxine  25 mcg Oral Early Morning Harsha Shook, DO      ondansetron  4 mg Intravenous Q6H PRN Yalobusha Bowels, DO      oxyCODONE  2 5 mg Oral Q4H PRN Sultana Abraham PA-C      oxyCODONE  5 mg Oral Q4H PRN Arabella Abraham PA-C      saccharomyces boulardii  250 mg Oral BID Harsha Shook, DO      senna  1 tablet Oral BID Arabella Abraham PA-C      sodium bicarbonate  650 mg Oral BID after meals Jose Stapleton MD      warfarin  2 mg Oral Daily (warfarin) Marta Zamarripa PA-C          Today, Patient Was Seen By: Otto Guadalupe MD    ** Please Note: Dictation voice to text software may have been used in the creation of this document   **

## 2021-01-25 NOTE — PROGRESS NOTES
[facility] Podiatry - Progress Note  Patient: Piotr Martínez 80 y o  female   MRN: [id number]  PCP: Leandro Cook MD  Unit/Bed#: E5 -01 Encounter: [id number]  Date Of Visit: 21    ASSESSMENT:    Piotr Martínez is a 80 y o  female with:    1  Right anterior leg ulcer full thickness with fat layer exposed  2  right heel gangrene with no radiographic changes   3  PAD, right SFA occlusion and poor distal perfusion  4  Hx DVT         PLAN:    · Continue local wound care of the right leg with qod changes of anterior leg with adaptic and maxorb, right heel betadine paint and DSD and right toes Betadine paint open to air  · Would benefit from close outpatient vascular follow up due to high risk of limb loss  · Radiograph of right heel personally reviewed showing no OM or CHERYLE  · WBAT bilaterally         SUBJECTIVE:     The patient was seen, evaluated, and assessed at bedside today  The patient was awake, alert, and in no acute distress  No acute events overnight  Patient denies N/V/F/chills/SOB/CP  OBJECTIVE:     Vitals:   /75 (BP Location: Left arm)   Pulse 64   Temp 98 2 °F (36 8 °C) (Temporal)   Resp 20   Wt 77 kg (169 lb 12 1 oz)   LMP  (LMP Unknown)   SpO2 93%   BMI 27 40 kg/m²     Temp (24hrs), Av 9 °F (36 6 °C), Min:97 6 °F (36 4 °C), Max:98 2 °F (36 8 °C)      Physical Exam :     General:  Alert, cooperative, and in no distress  Lower extremity exam:  Cardiovascular status at baseline  Neurological status at baseline  Musculoskeletal status at baseline  No calf tenderness noted bilaterally  Wound (1) located right anterior leg, measures approximately 6 0 cm x 3 0 cm x 0 2 cm  without sinus tracking or undermining  Wound bed appears pink/red and eschar with mild serous  drainage  Deepest tissue noted in base is fat/adipose  Malodor is not noticed  Wound edge appears non-attached  Corazon-wound skin appears intact  Probe to bone is negative    Signs of infection are not present at this time       Wound 1 located right posterior heel, measures 3 5 cm x 2 5 cm x 0 1 cm  without sinus tracking or undermining  Wound bed appears eschar with no exudate  Deepest tissue noted unknown  Malodor is not noticed  Wound edge appears non-attached  Corazon-wound skin appears intact  Probe to bone is,  negative   Signs of infection are not present at this time       Right 4th and 5th digit dry stable eschar with no bogginess, drainage or malodor are noted  Additional Data:     Labs:    Results from last 7 days   Lab Units 01/25/21  0530  01/22/21  0454   WBC Thousand/uL 8 54   < > 8 50   HEMOGLOBIN g/dL 7 9*   < > 8 0*   HEMATOCRIT % 25 2*   < > 24 6*   PLATELETS Thousands/uL 198   < > 219   NEUTROS PCT %  --   --  67   LYMPHS PCT %  --   --  20   MONOS PCT %  --   --  7   EOS PCT %  --   --  4    < > = values in this interval not displayed  Results from last 7 days   Lab Units 01/25/21  0530   POTASSIUM mmol/L 3 8   CHLORIDE mmol/L 98*   CO2 mmol/L 25   BUN mg/dL 102*   CREATININE mg/dL 2 99*   CALCIUM mg/dL 8 2*     Results from last 7 days   Lab Units 01/24/21  0537   INR  1 88*       * I Have Reviewed All Lab Data Listed Above  Recent Cultures (last 7 days):               Imaging: I have personally reviewed pertinent films in PACS  Pathology, and Other Studies: I have personally reviewed pertinent reports  ** Please Note: Portions of the record may have been created with voice recognition software  Occasional wrong word or "sound a like" substitutions may have occurred due to the inherent limitations of voice recognition software  Read the chart carefully and recognize, using context, where substitutions have occurred   **

## 2021-01-26 LAB
ANION GAP SERPL CALCULATED.3IONS-SCNC: 9 MMOL/L (ref 4–13)
BASOPHILS # BLD AUTO: 0.02 THOUSANDS/ΜL (ref 0–0.1)
BASOPHILS NFR BLD AUTO: 0 % (ref 0–1)
BUN SERPL-MCNC: 99 MG/DL (ref 5–25)
CALCIUM SERPL-MCNC: 8.1 MG/DL (ref 8.3–10.1)
CHLORIDE SERPL-SCNC: 99 MMOL/L (ref 100–108)
CO2 SERPL-SCNC: 26 MMOL/L (ref 21–32)
CREAT SERPL-MCNC: 2.82 MG/DL (ref 0.6–1.3)
EOSINOPHIL # BLD AUTO: 0.3 THOUSAND/ΜL (ref 0–0.61)
EOSINOPHIL NFR BLD AUTO: 3 % (ref 0–6)
ERYTHROCYTE [DISTWIDTH] IN BLOOD BY AUTOMATED COUNT: 17.2 % (ref 11.6–15.1)
GFR SERPL CREATININE-BSD FRML MDRD: 14 ML/MIN/1.73SQ M
GLUCOSE SERPL-MCNC: 90 MG/DL (ref 65–140)
HCT VFR BLD AUTO: 24.6 % (ref 34.8–46.1)
HGB BLD-MCNC: 7.7 G/DL (ref 11.5–15.4)
IMM GRANULOCYTES # BLD AUTO: 0.12 THOUSAND/UL (ref 0–0.2)
IMM GRANULOCYTES NFR BLD AUTO: 1 % (ref 0–2)
INR PPP: 1.76 (ref 0.84–1.19)
LYMPHOCYTES # BLD AUTO: 1.95 THOUSANDS/ΜL (ref 0.6–4.47)
LYMPHOCYTES NFR BLD AUTO: 22 % (ref 14–44)
MAGNESIUM SERPL-MCNC: 2.3 MG/DL (ref 1.6–2.6)
MCH RBC QN AUTO: 27.2 PG (ref 26.8–34.3)
MCHC RBC AUTO-ENTMCNC: 31.3 G/DL (ref 31.4–37.4)
MCV RBC AUTO: 87 FL (ref 82–98)
MONOCYTES # BLD AUTO: 0.78 THOUSAND/ΜL (ref 0.17–1.22)
MONOCYTES NFR BLD AUTO: 9 % (ref 4–12)
NEUTROPHILS # BLD AUTO: 5.82 THOUSANDS/ΜL (ref 1.85–7.62)
NEUTS SEG NFR BLD AUTO: 65 % (ref 43–75)
NRBC BLD AUTO-RTO: 0 /100 WBCS
PLATELET # BLD AUTO: 197 THOUSANDS/UL (ref 149–390)
PMV BLD AUTO: 9.6 FL (ref 8.9–12.7)
POTASSIUM SERPL-SCNC: 3.9 MMOL/L (ref 3.5–5.3)
PROTHROMBIN TIME: 20.2 SECONDS (ref 11.6–14.5)
RBC # BLD AUTO: 2.83 MILLION/UL (ref 3.81–5.12)
SODIUM SERPL-SCNC: 134 MMOL/L (ref 136–145)
WBC # BLD AUTO: 8.99 THOUSAND/UL (ref 4.31–10.16)

## 2021-01-26 PROCEDURE — 83735 ASSAY OF MAGNESIUM: CPT | Performed by: STUDENT IN AN ORGANIZED HEALTH CARE EDUCATION/TRAINING PROGRAM

## 2021-01-26 PROCEDURE — 97535 SELF CARE MNGMENT TRAINING: CPT

## 2021-01-26 PROCEDURE — 85025 COMPLETE CBC W/AUTO DIFF WBC: CPT | Performed by: STUDENT IN AN ORGANIZED HEALTH CARE EDUCATION/TRAINING PROGRAM

## 2021-01-26 PROCEDURE — 80048 BASIC METABOLIC PNL TOTAL CA: CPT | Performed by: STUDENT IN AN ORGANIZED HEALTH CARE EDUCATION/TRAINING PROGRAM

## 2021-01-26 PROCEDURE — 97530 THERAPEUTIC ACTIVITIES: CPT

## 2021-01-26 PROCEDURE — 99232 SBSQ HOSP IP/OBS MODERATE 35: CPT | Performed by: STUDENT IN AN ORGANIZED HEALTH CARE EDUCATION/TRAINING PROGRAM

## 2021-01-26 PROCEDURE — 85610 PROTHROMBIN TIME: CPT | Performed by: STUDENT IN AN ORGANIZED HEALTH CARE EDUCATION/TRAINING PROGRAM

## 2021-01-26 PROCEDURE — 99233 SBSQ HOSP IP/OBS HIGH 50: CPT | Performed by: INTERNAL MEDICINE

## 2021-01-26 RX ORDER — AMLODIPINE BESYLATE 10 MG/1
10 TABLET ORAL DAILY
Status: DISCONTINUED | OUTPATIENT
Start: 2021-01-27 | End: 2021-02-03 | Stop reason: HOSPADM

## 2021-01-26 RX ORDER — WARFARIN SODIUM 3 MG/1
3 TABLET ORAL
Status: DISCONTINUED | OUTPATIENT
Start: 2021-01-26 | End: 2021-01-29

## 2021-01-26 RX ORDER — FUROSEMIDE 10 MG/ML
40 INJECTION INTRAMUSCULAR; INTRAVENOUS ONCE
Status: COMPLETED | OUTPATIENT
Start: 2021-01-26 | End: 2021-01-26

## 2021-01-26 RX ADMIN — FUROSEMIDE 40 MG: 10 INJECTION, SOLUTION INTRAMUSCULAR; INTRAVENOUS at 14:23

## 2021-01-26 RX ADMIN — Medication 250 MG: at 17:01

## 2021-01-26 RX ADMIN — DOCUSATE SODIUM 100 MG: 100 CAPSULE, LIQUID FILLED ORAL at 08:40

## 2021-01-26 RX ADMIN — AMLODIPINE BESYLATE 5 MG: 5 TABLET ORAL at 08:40

## 2021-01-26 RX ADMIN — SODIUM BICARBONATE 650 MG TABLET 650 MG: at 08:40

## 2021-01-26 RX ADMIN — LEVOTHYROXINE SODIUM 25 MCG: 25 TABLET ORAL at 06:17

## 2021-01-26 RX ADMIN — HYDRALAZINE HYDROCHLORIDE 10 MG: 10 TABLET, FILM COATED ORAL at 21:04

## 2021-01-26 RX ADMIN — COLLAGENASE SANTYL: 250 OINTMENT TOPICAL at 08:40

## 2021-01-26 RX ADMIN — CARVEDILOL 12.5 MG: 6.25 TABLET, FILM COATED ORAL at 08:40

## 2021-01-26 RX ADMIN — HYDRALAZINE HYDROCHLORIDE 10 MG: 10 TABLET, FILM COATED ORAL at 06:17

## 2021-01-26 RX ADMIN — SENNOSIDES 8.6 MG: 8.6 TABLET ORAL at 17:01

## 2021-01-26 RX ADMIN — CARVEDILOL 12.5 MG: 6.25 TABLET, FILM COATED ORAL at 17:01

## 2021-01-26 RX ADMIN — CEFAZOLIN SODIUM 1000 MG: 1 SOLUTION INTRAVENOUS at 12:14

## 2021-01-26 RX ADMIN — HYDRALAZINE HYDROCHLORIDE 10 MG: 10 TABLET, FILM COATED ORAL at 13:47

## 2021-01-26 RX ADMIN — ISOSORBIDE DINITRATE 10 MG: 10 TABLET ORAL at 17:02

## 2021-01-26 RX ADMIN — ISOSORBIDE DINITRATE 10 MG: 10 TABLET ORAL at 12:14

## 2021-01-26 RX ADMIN — Medication 250 MG: at 08:43

## 2021-01-26 RX ADMIN — ISOSORBIDE DINITRATE 10 MG: 10 TABLET ORAL at 08:41

## 2021-01-26 RX ADMIN — DOCUSATE SODIUM 100 MG: 100 CAPSULE, LIQUID FILLED ORAL at 17:01

## 2021-01-26 RX ADMIN — WARFARIN SODIUM 3 MG: 3 TABLET ORAL at 17:26

## 2021-01-26 RX ADMIN — SENNOSIDES 8.6 MG: 8.6 TABLET ORAL at 08:40

## 2021-01-26 NOTE — OCCUPATIONAL THERAPY NOTE
OccupationalTherapy Progress Note     Patient Name: Yuri Bowman  QGPPC'V Date: 1/26/2021  Problem List  Principal Problem:    Cellulitis  Active Problems:    Acute deep vein thrombosis (DVT) of calf muscle vein of right lower extremity (HCC)    Acute kidney injury superimposed on chronic kidney disease (HCC)    Hyponatremia    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 (HCC)    PAD (peripheral artery disease) ([facility])    Essential hypertension    Iron deficiency anemia secondary to inadequate dietary iron intake    Chronic diastolic heart failure ([facility] [address] )    Ambulatory dysfunction    Chronic hypoxemic respiratory failure ([facility] [address] )    Coagulopathy ([facility] [address] )            01/26/21 0958   OT Last Visit   OT Visit Date 01/26/21   Note Type   Note Type Treatment   Restrictions/Precautions   Weight Bearing Precautions Per Order Yes   RLE Weight Bearing Per Order WBAT   LLE Weight Bearing Per Order WBAT   Other Precautions Cognitive; Chair Alarm; Bed Alarm;Multiple lines; Fall Risk;Pain   General   Response to Previous Treatment Patient with no complaints from previous session   Lifestyle   Autonomy At baseline, pt reports requiring assist w/ ADLs and IADLs  Pt reports I w/ functional mobility/transfers with use of RW, however chart indicates pt unable to get up from kitchen table x3 days  Reciprocal Relationships Spouse   Service to Others Retired- [employer]   Intrinsic Gratification Spending time with family   Pain Assessment   Pain Assessment Tool 0-10   Pain Score Worst Possible Pain   Pain Location/Orientation Orientation: Bilateral;Location: Leg   Effect of Pain on Daily Activities Increased pain with activity, limiting overall activity tolerance   Patient's Stated Pain Goal No pain   Hospital Pain Intervention(s) Repositioned; Ambulation/increased activity; Emotional support; Rest   Multiple Pain Sites No   ADL   Where Assessed Edge of bed   Grooming Assistance 4  Minimal Assistance   Grooming Deficit Setup;Verbal cueing;Supervision/safety; Increased time to complete;Wash/dry hands; Wash/dry face;Brushing hair   Grooming Comments Grooming tasks completed with Min A with assist for thoroughness to brush back of hair 2* impaired UE ROM  Increased encouragement for pt to attempt task with greater independence  UB Dressing Assistance 4  Minimal Assistance   UB Dressing Deficit Setup;Verbal cueing;Supervision/safety; Increased time to complete;Pull around back;Pull down in back   UB Dressing Comments UB dressing completed with Min A with assist to pull gown around/down in back  LB Dressing Assistance 2  Maximal Assistance   LB Dressing Deficit Setup;Don/doff R sock; Don/doff L sock   LB Dressing Comments LB dressing completed with Max A with assist to don B/L socks 2* impaired functional reach demonstrated by pt  Functional Standing Tolerance   Time 5-10 seconds   Activity Standing trials   Comments Max A of 2 for balance/steadying  Pt with decreased standing tolerance 2* increased pain in B/L LEs with weight bearing  Bed Mobility   Rolling R 2  Maximal assistance   Additional items Assist x 1;Bedrails; Increased time required;Verbal cues;LE management   Rolling L 2  Maximal assistance   Additional items Assist x 1;Bedrails; Increased time required;Verbal cues;LE management   Supine to Sit 2  Maximal assistance   Additional items Assist x 2;HOB elevated; Bedrails; Increased time required;Verbal cues;LE management   Sit to Supine 2  Maximal assistance   Additional items Assist x 2; Increased time required;Verbal cues;LE management   Additional Comments  Pt lying supine at end of session with call bell and phone within reach  All needs met and pt reports no further questions for OT at this time  Transfers   Sit to Stand 2  Maximal assistance   Additional items Assist x 2; Increased time required;Verbal cues  (Pt able to achieve 25% of full standing position)   Stand to Sit 2  Maximal assistance   Additional items Assist x 2; Increased time required;Verbal cues   Additional Comments Pt initially declining OOB activity, however with increased encouragement and education on benefit of OOB activity, pt agreeable to trial sit<>stand transfer  Pt able to clear buttocks partially off of EOB and achieve 25% of standing position with Max A of 2 and use of RW  Cognition   Overall Cognitive Status Impaired   Arousal/Participation Alert   Attention Attends with cues to redirect   Orientation Level Oriented to person;Oriented to place;Oriented to time   Memory Decreased recall of precautions;Decreased recall of recent events   Following Commands Follows one step commands with increased time or repetition   Activity Tolerance   Activity Tolerance Patient limited by fatigue;Patient limited by pain   Medical Staff Made Aware Aaron Wolfe, PT; Kassie Foster, RN   Assessment   Assessment Pt seen for OT treatment session focusing on functional activity tolerance, bed mobility, ADLs, and OOB trial  Pt alert throughout session, however required increased encouragement for participation throughout session  Pt lying supine at start of session, completing bed mobility (supine>sit) w/ Max A of 2 with assist for LE management and trunk control  Pt able to tolerate approx  10-15 mins sitting EOB with Fair static seated balance and Fair- dynamic seated balance  Grooming tasks completed with Min A with assist for thoroughness to brush back of hair 2* impaired UE ROM  Increased encouragement for pt to attempt task with greater independence  UB dressing completed with Min A with assist to pull gown around/down in back  LB dressing completed with Max A with assist to don B/L socks 2* impaired functional reach demonstrated by pt  Pt initially declining OOB activity, however with increased encouragement and education on benefit of OOB activity, pt agreeable to trial sit<>stand transfer  Pt able to clear buttocks partially off of EOB and achieve 25% of standing position with Max A of 2 and use of RW   Pt with decreased standing tolerance 2* increased pain in B/L LEs with weight bearing  Pt declining 2nd OOB trial  Pt returned to supine position with Max A of 2  Rolling L/R completed with max A for placement of bed pad and wedges  Pt lying supine at end of session with call bell and phone within reach  All needs met and pt reports no further questions for OT at this time  Continue to recommend STR when medically cleared  OT to follow pt on caseload  Plan   Treatment Interventions ADL retraining;Functional transfer training;UE strengthening/ROM; Endurance training;Patient/family training;Equipment evaluation/education; Compensatory technique education; Energy conservation; Activityengagement   Goal Expiration Date 02/02/21   OT Treatment Day 2   OT Frequency 3-5x/wk   Recommendation   OT Discharge Recommendation Post-Acute Rehabilitation Services   OT - OK to Discharge Yes  (when medically cleared to rehab)   Barthel Index   Feeding 5   Bathing 0   Grooming Score 0   Dressing Score 5   Bladder Score 5   Bowels Score 10   Toilet Use Score 5   Transfers (Bed/Chair) Score 5   Mobility (Level Surface) Score 0   Stairs Score 0   Barthel Index Score 35   Modified Salima Scale   Modified Salima Scale 4          GOALS      1   Pt will improve functional mobility during ADL/IADL/leisure tasks to Mod A using DME as needed w/ G balance/safety       Dequan Janet, OTR/L

## 2021-01-26 NOTE — PLAN OF CARE
Problem: OCCUPATIONAL THERAPY ADULT  Goal: Performs self-care activities at highest level of function for planned discharge setting  See evaluation for individualized goals  Description: Treatment Interventions: ADL retraining, Functional transfer training, UE strengthening/ROM, Endurance training, Patient/family training, Equipment evaluation/education, Compensatory technique education, Continued evaluation, Activityengagement          See flowsheet documentation for full assessment, interventions and recommendations  Outcome: Progressing  Note: Limitation: Decreased ADL status, Decreased UE ROM, Decreased UE strength, Decreased Safe judgement during ADL, Decreased cognition, Decreased endurance, Decreased self-care trans, Decreased high-level ADLs  Prognosis: Fair  Assessment: Pt seen for OT treatment session focusing on functional activity tolerance, bed mobility, ADLs, and OOB trial  Pt alert throughout session, however required increased encouragement for participation throughout session  Pt lying supine at start of session, completing bed mobility (supine>sit) w/ Max A of 2 with assist for LE management and trunk control  Pt able to tolerate approx  10-15 mins sitting EOB with Fair static seated balance and Fair- dynamic seated balance  Grooming tasks completed with Min A with assist for thoroughness to brush back of hair 2* impaired UE ROM  Increased encouragement for pt to attempt task with greater independence  UB dressing completed with Min A with assist to pull gown around/down in back  LB dressing completed with Max A with assist to don B/L socks 2* impaired functional reach demonstrated by pt  Pt initially declining OOB activity, however with increased encouragement and education on benefit of OOB activity, pt agreeable to trial sit<>stand transfer  Pt able to clear buttocks partially off of EOB and achieve 25% of standing position with Max A of 2 and use of RW   Pt with decreased standing tolerance 2* increased pain in B/L LEs with weight bearing  Pt declining 2nd OOB trial  Pt returned to supine position with Max A of 2  Rolling L/R completed with max A for placement of bed pad and wedges  Pt lying supine at end of session with call bell and phone within reach  All needs met and pt reports no further questions for OT at this time  Continue to recommend STR when medically cleared  OT to follow pt on caseload        OT Discharge Recommendation: Post-Acute Rehabilitation Services  OT - OK to Discharge: Yes(when medically cleared to rehab)

## 2021-01-26 NOTE — ASSESSMENT & PLAN NOTE
80year old female admitted due to LLE cellulitis  Podiatry reviewed imaging and stated patient does not have OM or CHERYLE   - Completed Antibiotic course  - For arteriogram once renal function improves

## 2021-01-26 NOTE — PLAN OF CARE
Problem: Potential for Falls  Goal: Patient will remain free of falls  Description: INTERVENTIONS:  - Assess patient frequently for physical needs  -  Identify cognitive and physical deficits and behaviors that affect risk of falls  -  [place] fall precautions as indicated by assessment   - Educate patient/family on patient safety including physical limitations  - Instruct patient to call for assistance with activity based on assessment  - Modify environment to reduce risk of injury  - Consider OT/PT consult to assist with strengthening/mobility  Outcome: Progressing     Problem: Prexisting or High Potential for Compromised Skin Integrity  Goal: Skin integrity is maintained or improved  Description: INTERVENTIONS:  - Identify patients at risk for skin breakdown  - Assess and monitor skin integrity  - Assess and monitor nutrition and hydration status  - Monitor labs   - Assess for incontinence   - Turn and reposition patient  - Assist with mobility/ambulation  - Relieve pressure over bony prominences  - Avoid friction and shearing  - Provide appropriate hygiene as needed including keeping skin clean and dry  - Evaluate need for skin moisturizer/barrier cream  - Collaborate with interdisciplinary team   - Patient/family teaching  - Consider wound care consult   Outcome: Progressing     Problem: Nutrition/Hydration-ADULT  Goal: Nutrient/Hydration intake appropriate for improving, restoring or maintaining nutritional needs  Description: Monitor and assess patient's nutrition/hydration status for malnutrition  Collaborate with interdisciplinary team and initiate plan and interventions as ordered  Monitor patient's weight and dietary intake as ordered or per policy  Utilize nutrition screening tool and intervene as necessary  Determine patient's food preferences and provide high-protein, high-caloric foods as appropriate       INTERVENTIONS:  - Monitor oral intake, urinary output, labs, and treatment plans  - Assess nutrition and hydration status and recommend course of action  - Evaluate amount of meals eaten  - Assist patient with eating if necessary   - Allow adequate time for meals  - Recommend/ encourage appropriate diets, oral nutritional supplements, and vitamin/mineral supplements  - Order, calculate, and assess calorie counts as needed  - Recommend, monitor, and adjust tube feedings and TPN/PPN based on assessed needs  - Assess need for intravenous fluids  - Provide specific nutrition/hydration education as appropriate  - Include patient/family/caregiver in decisions related to nutrition  Outcome: Progressing     Problem: PAIN - ADULT  Goal: Verbalizes/displays adequate comfort level or baseline comfort level  Description: Interventions:  - Encourage patient to monitor pain and request assistance  - Assess pain using appropriate pain scale  - Administer analgesics based on type and severity of pain and evaluate response  - Implement non-pharmacological measures as appropriate and evaluate response  - Consider cultural and social influences on pain and pain management  - Notify physician/advanced practitioner if interventions unsuccessful or patient reports new pain  Outcome: Progressing     Problem: INFECTION - ADULT  Goal: Absence or prevention of progression during hospitalization  Description: INTERVENTIONS:  - Assess and monitor for signs and symptoms of infection  - Monitor lab/diagnostic results  - Monitor all insertion sites, i e  indwelling lines, tubes, and drains  - Monitor endotracheal if appropriate and nasal secretions for changes in amount and color  - [place] appropriate cooling/warming therapies per order  - Administer medications as ordered  - Instruct and encourage patient and family to use good hand hygiene technique  - Identify and instruct in appropriate isolation precautions for identified infection/condition  Outcome: Progressing  Goal: Absence of fever/infection during neutropenic period  Description: INTERVENTIONS:  - Monitor WBC    Outcome: Progressing     Problem: SAFETY ADULT  Goal: Patient will remain free of falls  Description: INTERVENTIONS:  - Assess patient frequently for physical needs  -  Identify cognitive and physical deficits and behaviors that affect risk of falls    -  [place] fall precautions as indicated by assessment   - Educate patient/family on patient safety including physical limitations  - Instruct patient to call for assistance with activity based on assessment  - Modify environment to reduce risk of injury  - Consider OT/PT consult to assist with strengthening/mobility  Outcome: Progressing  Goal: Maintain or return to baseline ADL function  Description: INTERVENTIONS:  -  Assess patient's ability to carry out ADLs; assess patient's baseline for ADL function and identify physical deficits which impact ability to perform ADLs (bathing, care of mouth/teeth, toileting, grooming, dressing, etc )  - Assess/evaluate cause of self-care deficits   - Assess range of motion  - Assess patient's mobility; develop plan if impaired  - Assess patient's need for assistive devices and provide as appropriate  - Encourage maximum independence but intervene and supervise when necessary  - Involve family in performance of ADLs  - Assess for home care needs following discharge   - Consider OT consult to assist with ADL evaluation and planning for discharge  - Provide patient education as appropriate  Outcome: Progressing  Goal: Maintain or return mobility status to optimal level  Description: INTERVENTIONS:  - Assess patient's baseline mobility status (ambulation, transfers, stairs, etc )    - Identify cognitive and physical deficits and behaviors that affect mobility  - Identify mobility aids required to assist with transfers and/or ambulation (gait belt, sit-to-stand, lift, walker, cane, etc )  - [place] fall precautions as indicated by assessment  - Record patient progress and toleration of activity level on Mobility SBAR; progress patient to next Phase/Stage  - Instruct patient to call for assistance with activity based on assessment  - Consider rehabilitation consult to assist with strengthening/weightbearing, etc   Outcome: Progressing     Problem: DISCHARGE PLANNING  Goal: Discharge to home or other facility with appropriate resources  Description: INTERVENTIONS:  - Identify barriers to discharge w/patient and caregiver  - Arrange for needed discharge resources and transportation as appropriate  - Identify discharge learning needs (meds, wound care, etc )  - Arrange for interpretive services to assist at discharge as needed  - Refer to Case Management Department for coordinating discharge planning if the patient needs post-hospital services based on physician/advanced practitioner order or complex needs related to functional status, cognitive ability, or social support system  Outcome: Progressing     Problem: Knowledge Deficit  Goal: Patient/family/caregiver demonstrates understanding of disease process, treatment plan, medications, and discharge instructions  Description: Complete learning assessment and assess knowledge base    Interventions:  - Provide teaching at level of understanding  - Provide teaching via preferred learning methods  Outcome: Progressing     Problem: GENITOURINARY - ADULT  Goal: Maintains or returns to baseline urinary function  Description: INTERVENTIONS:  - Assess urinary function  - Encourage oral fluids to ensure adequate hydration if ordered  - Administer IV fluids as ordered to ensure adequate hydration  - Administer ordered medications as needed  - Offer frequent toileting  - Follow urinary retention protocol if ordered  Outcome: Progressing  Goal: Absence of urinary retention  Description: INTERVENTIONS:  - Assess patients ability to void and empty bladder  - Monitor I/O  - Bladder scan as needed  - Discuss with physician/AP medications to alleviate retention as needed  - Discuss catheterization for long term situations as appropriate  Outcome: Progressing  Goal: Urinary catheter remains patent  Description: INTERVENTIONS:  - Assess patency of urinary catheter  - If patient has a chronic chadwick, consider changing catheter if non-functioning  - Follow guidelines for intermittent irrigation of non-functioning urinary catheter  Outcome: Progressing     Problem: RESPIRATORY - ADULT  Goal: Achieves optimal ventilation and oxygenation  Description: INTERVENTIONS:  - Assess for changes in respiratory status  - Assess for changes in mentation and behavior  - Position to facilitate oxygenation and minimize respiratory effort  - Oxygen administered by appropriate delivery if ordered  - Initiate smoking cessation education as indicated  - Encourage broncho-pulmonary hygiene including cough, deep breathe, Incentive Spirometry  - Assess the need for suctioning and aspirate as needed  - Assess and instruct to report SOB or any respiratory difficulty  - Respiratory Therapy support as indicated  Outcome: Progressing     Problem: METABOLIC, FLUID AND ELECTROLYTES - ADULT  Goal: Electrolytes maintained within normal limits  Description: INTERVENTIONS:  - Monitor labs and assess patient for signs and symptoms of electrolyte imbalances  - Administer electrolyte replacement as ordered  - Monitor response to electrolyte replacements, including repeat lab results as appropriate  - Instruct patient on fluid and nutrition as appropriate  Outcome: Progressing  Goal: Fluid balance maintained  Description: INTERVENTIONS:  - Monitor labs   - Monitor I/O and WT  - Instruct patient on fluid and nutrition as appropriate  - Assess for signs & symptoms of volume excess or deficit  Outcome: Progressing     Problem: SKIN/TISSUE INTEGRITY - ADULT  Goal: Skin integrity remains intact  Description: INTERVENTIONS  - Identify patients at risk for skin breakdown  - Assess and monitor skin integrity  - Assess and monitor nutrition and hydration status  - Monitor labs (i e  albumin)  - Assess for incontinence   - Turn and reposition patient  - Assist with mobility/ambulation  - Relieve pressure over bony prominences  - Avoid friction and shearing  - Provide appropriate hygiene as needed including keeping skin clean and dry  - Evaluate need for skin moisturizer/barrier cream  - Collaborate with interdisciplinary team (i e  Nutrition, Rehabilitation, etc )   - Patient/family teaching  Outcome: Progressing  Goal: Incision(s), wounds(s) or drain site(s) healing without S/S of infection  Description: INTERVENTIONS  - Assess and document risk factors for skin impairment   - Assess and document dressing, incision, wound bed, drain sites and surrounding tissue  - Consider nutrition services referral as needed  - Oral mucous membranes remain intact  - Provide patient/ family education  Outcome: Progressing     Problem: HEMATOLOGIC - ADULT  Goal: Maintains hematologic stability  Description: INTERVENTIONS  - Assess for signs and symptoms of bleeding or hemorrhage  - Monitor labs  - Administer supportive blood products/factors as ordered and appropriate  Outcome: Progressing

## 2021-01-26 NOTE — PROGRESS NOTES
NEPHROLOGY PROGRESS NOTE   James Glasgow 80 y o  female MRN: [id number]  Unit/Bed#: E5 -01 Encounter: [id number]  Reason for Consult: LILIAN (POA) on CKD IV    Plan:  -creatinine continues to slowly improve   -will increase amlodipine at 10 mg daily due to blood pressure above goal at times   -continue Scott catheter for maximal decompression of right-sided hydronephrosis  -stable hyponatremia  Continue fluid restriction    -continue to monitor and trend hemoglobin, slowly trending downward  Would recommend giving Lasix with PRBC transfusion if needed   -acidosis remains stable  Will discontinue bicarbonate tablets and continue to monitor  ASSESSMENT/PLAN:  LILIAN (POA) on likely CKD IV:  Suspect multifactorial with possible infection, diuretic use, as well as possible post renal component with history of atrophic left kidney measuring 7 1 cm and right-sided hydro in December 2020    -presented with creatinine of 3 3 with peak of 3 7 on 01/19   -unknown baseline creatinine, suspect around 1 7-2 2   -creatinine slowly improving   -received diuretic 1/22 with PRBC transfusion  Will repeat today  -UA showed large blood, large leukocytes, 2+ protein, RBCs, innumerable WBCs, innumerable bacteria   -continue Scott catheter per Urology  -potential need for future arteriogram per Vascular   Would hold off until creatinine is at baseline unless necessarily needed   -avoid nephrotoxins, hypotension, IV contrast   -I/O      Right-sided hydronephrosis:  With history of right ureteral stent placement in December 2020 at [facility]   -Renal US revealed bilateral renal atrophy, more  on the left along with mild right hydronephrosis with ureteral stent in place   -urology consulted  -will place Scott catheter for maximum decompression      Hyponatremia:  Suspect component of hypovolemia as well as SIADH   (improving)  -urine sodium 47, urine osmolality 338, serum osmolality 308, uric acid elevated at 9 0, TSH 10 6   -presented with sodium of 124 with normal glucose     -sodium level corrected to 129, correcting appropriately with decrease to 126 with fluids so IVF were discontinued    -continue on 1 5 L fluid restriction       Hyperkalemia (POA)/hypokalemia:  Presents with potassium of 5 7   Received medical management with insulin, dextrose, Kayexalate, and calcium gluconate  (resolved) Now with hypokalemia    -continue to monitor and treat as necessary       Metabolic acidosis:  In the setting of renal failure   -will discontinue bicarbonate tablets and continue to monitor      Hypertension:  above goal at times    -avoid hypotension or high fluctuations in blood pressure   -outpatient antihypertensives:  Amlodipine 5 mg daily, carvedilol 12 5 mg 2 times per day, hydralazine 10 mg every 8 hours, Isordil 10 mg 3 times per day   -continue amlodipine increased to 10 mg daily, carvedilol, hydralazine, Isordil   -holding diuretics      Lower extremity cellulitis:  With dry gangrene to great toe and 4th toe   -podiatry following   -vascular surgery consulted to discuss a-gram and further intervention   Would hold off until creatinine improves unless urgently needed   Recommend pre and post hydration with contrast administration as well as give Mucomyst 1200 mg times total of 4 doses   -blood cultures negative to date   -local dressing changes to LE per vascular recommendations   -continues on IV antibiotics      CHF:  Echocardiogram showed EF of 50% with grade 2 diastolic dysfunction   No sandro signs of volume overload   -check daily weights, fluid restriction, I/O   -outpatient diuretics:  Lasix 40 mg daily   -continue to monitor volume status on IV hydration  -CT chest shows bilateral moderate-sized pleural effusions, small amount of ascites      Anemia:   status post PRBC transfusion 1/21   Hgb trending downward    -recommend giving Lasix 20 mg IV with PRBC transfusion    -hemoccult stool is negative   -continue to monitor and transfuse as needed for hemoglobin less than 7 0   -iron panel showed low TIBC and low iron      Other:  PAD, DVT  Disposition:  Requiring additional stay due to medical needs  SUBJECTIVE:  The patient is resting in bed  She denies chest pain or shortness of breath  She denies nausea, vomiting, diarrhea, issues with urination  She remains on supplemental oxygen  She has Scott catheter      OBJECTIVE:  Current Weight: Weight - Scale: 77 kg (169 lb 12 1 oz)  Vitals:    01/26/21 0038 01/26/21 0600 01/26/21 0616 01/26/21 0737   BP: 139/64  142/70 166/72   BP Location: Left arm   Left arm   Pulse: 67  67 72   Resp: 18   18   Temp: 99 °F (37 2 °C)   98 1 °F (36 7 °C)   TempSrc: Temporal   Temporal   SpO2: 93%   90%   Weight:  77 kg (169 lb 12 1 oz)         Intake/Output Summary (Last 24 hours) at 1/26/2021 1154  Last data filed at 1/26/2021 0900  Gross per 24 hour   Intake 480 ml   Output 1000 ml   Net -520 ml     General: NAD  Skin: warm, dry, intact, no rash  HEENT: Moist mucous membranes, sclera anicteric, normocephalic, atraumatic  Neck: No apparent JVD appreciated  Chest: lung sounds clear B/L, on O2  CVS:Regular rate and rhythm, no murmer   Abdomen: Soft, round, non-tender, +BS  Extremities: B/L LE edema present, bilateral lower extremity wounds, PAD  Neuro: alert and oriented  Psych: appropriate mood and affect     Medications:    Current Facility-Administered Medications:     acetaminophen (TYLENOL) tablet 650 mg, 650 mg, Oral, Q6H PRN, Harsha Shook DO, 650 mg at 01/21/21 0004    amLODIPine (NORVASC) tablet 5 mg, 5 mg, Oral, Daily, Cindra Dark, CRNP, 5 mg at 01/26/21 0840    carvedilol (COREG) tablet 12 5 mg, 12 5 mg, Oral, BID With Meals, Cindra Dark, CRNP, 12 5 mg at 01/26/21 0840    ceFAZolin (ANCEF) IVPB (premix in dextrose) 1,000 mg 50 mL, 1,000 mg, Intravenous, Q24H, Harsha Shook DO, Last Rate: 100 mL/hr at 01/25/21 1308, 1,000 mg at 01/25/21 1308    collagenase (SANTYL) ointment, , Topical, Daily, Trena Arango MD, Given at 01/26/21 0840    docusate sodium (COLACE) capsule 100 mg, 100 mg, Oral, BID, Harsha Shook DO, 100 mg at 01/26/21 0840    hydrALAZINE (APRESOLINE) tablet 10 mg, 10 mg, Oral, Q8H [address], Harsha Shook DO, 10 mg at 01/26/21 0617    HYDROmorphone (DILAUDID) injection 1 mg, 1 mg, Intravenous, Q4H PRN, Prisca Real PA-C    isosorbide dinitrate (ISORDIL) tablet 10 mg, 10 mg, Oral, TID after meals, Harsha Shook DO, 10 mg at 01/26/21 0841    levothyroxine tablet 25 mcg, 25 mcg, Oral, Early Morning, Harsha Shook DO, 25 mcg at 01/26/21 0617    ondansetron (ZOFRAN) injection 4 mg, 4 mg, Intravenous, Q6H PRN, Harsha Shook DO    oxyCODONE (ROXICODONE) IR tablet 2 5 mg, 2 5 mg, Oral, Q4H PRN, Arabella Abraham PA-C    oxyCODONE (ROXICODONE) IR tablet 5 mg, 5 mg, Oral, Q4H PRN, Arabella Abraham PA-C, 5 mg at 01/24/21 1508    saccharomyces boulardii (FLORASTOR) capsule 250 mg, 250 mg, Oral, BID, Harsha Shook DO, 250 mg at 01/26/21 9109    senna (SENOKOT) tablet 8 6 mg, 1 tablet, Oral, BID, Arabella Abraham PA-C, 8 6 mg at 01/26/21 0840    sodium bicarbonate tablet 650 mg, 650 mg, Oral, BID after meals, Jennifer Marina MD, 650 mg at 01/26/21 0840    warfarin (COUMADIN) tablet 2 mg, 2 mg, Oral, Daily (warfarin), Prisca Real PA-C, 2 mg at 01/25/21 1843    Laboratory Results:  Results from last 7 days   Lab Units 01/26/21  0541 01/25/21  0530 01/24/21  0537   WBC Thousand/uL 8 99 8 54 8 52   HEMOGLOBIN g/dL 7 7* 7 9* 8 3*   HEMATOCRIT % 24 6* 25 2* 25 8*   PLATELETS Thousands/uL 197 198 239   SODIUM mmol/L 134* 134* 130*   POTASSIUM mmol/L 3 9 3 8 3 8   CHLORIDE mmol/L 99* 98* 96*   CO2 mmol/L 26 25 23   BUN mg/dL 99* 102* 110*   CREATININE mg/dL 2 82* 2 99* 3 16*   CALCIUM mg/dL 8 1* 8 2* 8 0*   MAGNESIUM mg/dL 2 3  --   --

## 2021-01-26 NOTE — PLAN OF CARE
Problem: Potential for Falls  Goal: Patient will remain free of falls  Description: INTERVENTIONS:  - Assess patient frequently for physical needs  -  Identify cognitive and physical deficits and behaviors that affect risk of falls  -  [place] fall precautions as indicated by assessment   - Educate patient/family on patient safety including physical limitations  - Instruct patient to call for assistance with activity based on assessment  - Modify environment to reduce risk of injury  - Consider OT/PT consult to assist with strengthening/mobility  Outcome: Progressing     Problem: Prexisting or High Potential for Compromised Skin Integrity  Goal: Skin integrity is maintained or improved  Description: INTERVENTIONS:  - Identify patients at risk for skin breakdown  - Assess and monitor skin integrity  - Assess and monitor nutrition and hydration status  - Monitor labs   - Assess for incontinence   - Turn and reposition patient  - Assist with mobility/ambulation  - Relieve pressure over bony prominences  - Avoid friction and shearing  - Provide appropriate hygiene as needed including keeping skin clean and dry  - Evaluate need for skin moisturizer/barrier cream  - Collaborate with interdisciplinary team   - Patient/family teaching  - Consider wound care consult   Outcome: Progressing     Problem: Nutrition/Hydration-ADULT  Goal: Nutrient/Hydration intake appropriate for improving, restoring or maintaining nutritional needs  Description: Monitor and assess patient's nutrition/hydration status for malnutrition  Collaborate with interdisciplinary team and initiate plan and interventions as ordered  Monitor patient's weight and dietary intake as ordered or per policy  Utilize nutrition screening tool and intervene as necessary  Determine patient's food preferences and provide high-protein, high-caloric foods as appropriate       INTERVENTIONS:  - Monitor oral intake, urinary output, labs, and treatment plans  - Assess nutrition and hydration status and recommend course of action  - Evaluate amount of meals eaten  - Assist patient with eating if necessary   - Allow adequate time for meals  - Recommend/ encourage appropriate diets, oral nutritional supplements, and vitamin/mineral supplements  - Order, calculate, and assess calorie counts as needed  - Recommend, monitor, and adjust tube feedings and TPN/PPN based on assessed needs  - Assess need for intravenous fluids  - Provide specific nutrition/hydration education as appropriate  - Include patient/family/caregiver in decisions related to nutrition  Outcome: Progressing     Problem: PAIN - ADULT  Goal: Verbalizes/displays adequate comfort level or baseline comfort level  Description: Interventions:  - Encourage patient to monitor pain and request assistance  - Assess pain using appropriate pain scale  - Administer analgesics based on type and severity of pain and evaluate response  - Implement non-pharmacological measures as appropriate and evaluate response  - Consider cultural and social influences on pain and pain management  - Notify physician/advanced practitioner if interventions unsuccessful or patient reports new pain  Outcome: Progressing     Problem: INFECTION - ADULT  Goal: Absence or prevention of progression during hospitalization  Description: INTERVENTIONS:  - Assess and monitor for signs and symptoms of infection  - Monitor lab/diagnostic results  - Monitor all insertion sites, i e  indwelling lines, tubes, and drains  - Monitor endotracheal if appropriate and nasal secretions for changes in amount and color  - Brady appropriate cooling/warming therapies per order  - Administer medications as ordered  - Instruct and encourage patient and family to use good hand hygiene technique  - Identify and instruct in appropriate isolation precautions for identified infection/condition  Outcome: Progressing  Goal: Absence of fever/infection during neutropenic period  Description: INTERVENTIONS:  - Monitor WBC    Outcome: Progressing     Problem: SAFETY ADULT  Goal: Patient will remain free of falls  Description: INTERVENTIONS:  - Assess patient frequently for physical needs  -  Identify cognitive and physical deficits and behaviors that affect risk of falls    -  [place] fall precautions as indicated by assessment   - Educate patient/family on patient safety including physical limitations  - Instruct patient to call for assistance with activity based on assessment  - Modify environment to reduce risk of injury  - Consider OT/PT consult to assist with strengthening/mobility  Outcome: Progressing  Goal: Maintain or return to baseline ADL function  Description: INTERVENTIONS:  -  Assess patient's ability to carry out ADLs; assess patient's baseline for ADL function and identify physical deficits which impact ability to perform ADLs (bathing, care of mouth/teeth, toileting, grooming, dressing, etc )  - Assess/evaluate cause of self-care deficits   - Assess range of motion  - Assess patient's mobility; develop plan if impaired  - Assess patient's need for assistive devices and provide as appropriate  - Encourage maximum independence but intervene and supervise when necessary  - Involve family in performance of ADLs  - Assess for home care needs following discharge   - Consider OT consult to assist with ADL evaluation and planning for discharge  - Provide patient education as appropriate  Outcome: Progressing  Goal: Maintain or return mobility status to optimal level  Description: INTERVENTIONS:  - Assess patient's baseline mobility status (ambulation, transfers, stairs, etc )    - Identify cognitive and physical deficits and behaviors that affect mobility  - Identify mobility aids required to assist with transfers and/or ambulation (gait belt, sit-to-stand, lift, walker, cane, etc )  - [place] fall precautions as indicated by assessment  - Record patient progress and toleration of activity level on Mobility SBAR; progress patient to next Phase/Stage  - Instruct patient to call for assistance with activity based on assessment  - Consider rehabilitation consult to assist with strengthening/weightbearing, etc   Outcome: Progressing     Problem: DISCHARGE PLANNING  Goal: Discharge to home or other facility with appropriate resources  Description: INTERVENTIONS:  - Identify barriers to discharge w/patient and caregiver  - Arrange for needed discharge resources and transportation as appropriate  - Identify discharge learning needs (meds, wound care, etc )  - Arrange for interpretive services to assist at discharge as needed  - Refer to Case Management Department for coordinating discharge planning if the patient needs post-hospital services based on physician/advanced practitioner order or complex needs related to functional status, cognitive ability, or social support system  Outcome: Progressing     Problem: Knowledge Deficit  Goal: Patient/family/caregiver demonstrates understanding of disease process, treatment plan, medications, and discharge instructions  Description: Complete learning assessment and assess knowledge base    Interventions:  - Provide teaching at level of understanding  - Provide teaching via preferred learning methods  Outcome: Progressing     Problem: GENITOURINARY - ADULT  Goal: Maintains or returns to baseline urinary function  Description: INTERVENTIONS:  - Assess urinary function  - Encourage oral fluids to ensure adequate hydration if ordered  - Administer IV fluids as ordered to ensure adequate hydration  - Administer ordered medications as needed  - Offer frequent toileting  - Follow urinary retention protocol if ordered  Outcome: Progressing  Goal: Absence of urinary retention  Description: INTERVENTIONS:  - Assess patients ability to void and empty bladder  - Monitor I/O  - Bladder scan as needed  - Discuss with physician/AP medications to alleviate retention as needed  - Discuss catheterization for long term situations as appropriate  Outcome: Progressing  Goal: Urinary catheter remains patent  Description: INTERVENTIONS:  - Assess patency of urinary catheter  - If patient has a chronic chadwick, consider changing catheter if non-functioning  - Follow guidelines for intermittent irrigation of non-functioning urinary catheter  Outcome: Progressing

## 2021-01-26 NOTE — SOCIAL WORK
Spoke with [facility] from [facility] [address] [phone]  She stressed that pt should not go home from this hospitalization and will need SNF  CM informed her this is being worked on and that pt is not medically cleared at this time  She reports  has been doing his best for care for pt at home but he doesn't follow through on recommendations made and she does not feel he is making good choices on her behalf, however at this time they will continue to encourage her  to make good choices on her behalf  There is also a son, but Hallenelly Oneales reports he has not returned her phone calls  Halle Lugo also notes that pt only has Medicare part A and [facility], but some care has been denied as an OP, which has further complicated her ability to get services in the community  CM shared that pt does not have capacity  She requested copy of capacity eval - CM faxed  CM will need to follow up with pt's  regarding SNF choices, as [facility] and [facility] will not accept

## 2021-01-26 NOTE — PROGRESS NOTES
Progress Note - Sari Doty 5/18/1930, 80 y o  female MRN: [id number]    Unit/Bed#: E5 -01 Encounter: [id number]    Primary Care Provider: Julius Marquez MD   Date and time admitted to hospital: 1/18/2021 11:44 AM        * Cellulitis  Assessment & Plan  80year old female admitted due to LLE cellulitis  Podiatry reviewed imaging and stated patient does not have OM or CHERYLE   - Completed Antibiotic course  - For arteriogram once renal function improves  PAD (peripheral artery disease) ([facility] [address] )  Assessment & Plan  As written above  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 ([facility] [address] )  Assessment & Plan  In the setting of advanced peripheral arterial disease    - For possible arteriogram once renal function improves and cleared by renal   - Continue wound care    Chronic hypoxemic respiratory failure (HCC)  Assessment & Plan  Secondary to CHF  Baseline at 3L    Acute kidney injury superimposed on chronic kidney disease [facility])  Assessment & Plan  Multifactorial: Pre-renal, infection  Improving    Recent Labs     01/24/21  0537 01/25/21  0530 01/26/21  0541   * 102* 99*   CREATININE 3 16* 2 99* 2 82*   EGFR 12 13 14                 Acute deep vein thrombosis (DVT) of calf muscle vein of right lower extremity (HCC)  Assessment & Plan  Dx with a right soleal vein occlusive DVT during her hospitalization 11/2020 at [facility]  - Continue coumadin and titrate to INR 2-3  - Warfarin increased to 3mg hs    Recent Labs     01/24/21  0537 01/26/21  0541   INR 1 88* 1 76*         Essential hypertension  Assessment & Plan  Contiue coreg, hydralazine, amlodipine    Iron deficiency anemia secondary to inadequate dietary iron intake  Assessment & Plan  Iron deficiency anemia with anemia s/p transfusion  Recent Labs     01/24/21  0537 01/25/21  0530 01/26/21  0541   HGB 8 3* 7 9* 7 7*   MCV 86 87 87   RDW 17 2* 17 3* 17 2*         Hyponatremia  Assessment & Plan  Hypovolemia + SIADH  Improving      Recent Labs     01/24/21  0537 01/25/21  0530 21  0541   SODIUM 130* 134* 134*       Chronic diastolic heart failure (HCC)  Assessment & Plan  Wt Readings from Last 3 Encounters:   21 77 kg (169 lb 12 1 oz)   21 62 1 kg (136 lb 14 4 oz)   20 60 8 kg (134 lb)     History of congestive heart failure  Currently above baseline weight   - IV lasix 40mg today    Ambulatory dysfunction  Assessment & Plan  Impaired medical decision making capacity  - For placement once stable      VTE Pharmacologic Prophylaxis:   Pharmacologic: Warfarin (Coumadin)  Mechanical VTE Prophylaxis in Place: Yes    Patient Centered Rounds: I have performed bedside rounds with nursing staff today  Discussions with Specialists or Other Care Team Provider: Nursing    Education and Discussions with Family / Patient: patient    Time Spent for Care: 30 minutes  More than 50% of total time spent on counseling and coordination of care as described above  Current Length of Stay: 8 day(s)    Current Patient Status: Inpatient   Certification Statement: The patient will continue to require additional inpatient hospital stay due to intervention , renal reevaluation, iv diuresis    Discharge Plan: active    Code Status: Level 1 - Full Code      Subjective:   Patient seen and examined at bedside  I spoke to patient's  regarding her medical condition  I made him aware that since behavioral psychiatry evaluated her and determined that she does not have capacity to make decisions for herself, he will likely be her decision maker at this point  He was disappointed that the options available to him are both not favorable for her recovery, but will think about it  Objective:     Vitals:   Temp (24hrs), Av 2 °F (36 8 °C), Min:97 6 °F (36 4 °C), Max:99 °F (37 2 °C)    Temp:  [97 6 °F (36 4 °C)-99 °F (37 2 °C)] 97 6 °F (36 4 °C)  HR:  [62-72] 62  Resp:  [16-18] 16  BP: (139-166)/(64-72) 146/71  SpO2:  [90 %-93 %] 91 %  Body mass index is 27 4 kg/m²       Input and Output Summary (last 24 hours): Intake/Output Summary (Last 24 hours) at 1/26/2021 1649  Last data filed at 1/26/2021 1347  Gross per 24 hour   Intake 480 ml   Output 1100 ml   Net -620 ml       Physical Exam:     Physical Exam  Vitals signs reviewed  HENT:      Head: Normocephalic  Nose: Nose normal    Eyes:      General: No scleral icterus  Pupils: Pupils are equal, round, and reactive to light  Cardiovascular:      Rate and Rhythm: Normal rate  Pulmonary:      Effort: Pulmonary effort is normal  No respiratory distress  Breath sounds: No wheezing  Abdominal:      General: There is no distension  Palpations: Abdomen is soft  Tenderness: There is no abdominal tenderness  Musculoskeletal:      Comments: Eschar right posterior heel, 4th and 5th digit   Skin:     General: Skin is warm  Neurological:      Mental Status: She is alert  Mental status is at baseline  Psychiatric:         Mood and Affect: Mood normal          Behavior: Behavior normal        Additional Data:     Labs:    Results from last 7 days   Lab Units 01/26/21  0541   WBC Thousand/uL 8 99   HEMOGLOBIN g/dL 7 7*   HEMATOCRIT % 24 6*   PLATELETS Thousands/uL 197   NEUTROS PCT % 65   LYMPHS PCT % 22   MONOS PCT % 9   EOS PCT % 3     Results from last 7 days   Lab Units 01/26/21  0541   SODIUM mmol/L 134*   POTASSIUM mmol/L 3 9   CHLORIDE mmol/L 99*   CO2 mmol/L 26   BUN mg/dL 99*   CREATININE mg/dL 2 82*   ANION GAP mmol/L 9   CALCIUM mg/dL 8 1*   GLUCOSE RANDOM mg/dL 90     Results from last 7 days   Lab Units 01/26/21  0541   INR  1 76*                       * I Have Reviewed All Lab Data Listed Above  * Additional Pertinent Lab Tests Reviewed:  [address] Admission Reviewed    Imaging:    Imaging Reports Reviewed Today Include: No new imaging    Recent Cultures (last 7 days):           Last 24 Hours Medication List:   Current Facility-Administered Medications   Medication Dose Route Frequency Provider Last Rate    acetaminophen  650 mg Oral Q6H PRN Michaela Ly DO      [START ON 1/27/2021] amLODIPine  10 mg Oral Daily OTIS Mcpherson      carvedilol  12 5 mg Oral BID With Meals OTIS Mcpherson      collagenase   Topical Daily Kaylan Jimenez MD      docusate sodium  100 mg Oral BID Harsha Shook, DO      hydrALAZINE  10 mg Oral Q8H [facility] Harsha Gaitan, DO      HYDROmorphone  1 mg Intravenous Q4H PRN Dago Askew PA-C      isosorbide dinitrate  10 mg Oral TID after meals Harsha Shook, DO      levothyroxine  25 mcg Oral Early Morning Harsha Shook, DO      ondansetron  4 mg Intravenous Q6H PRN Michaela Ly, DO      oxyCODONE  2 5 mg Oral Q4H PRN Spike Abraham PA-C      oxyCODONE  5 mg Oral Q4H PRN Arabella Abraham PA-C      saccharomyces boulardii  250 mg Oral BID Harsha Shook, DO      senna  1 tablet Oral BID Arabella Abraham PA-C      warfarin  3 mg Oral Daily (warfarin) Kaylan Jimenez MD          Today, Patient Was Seen By: Britney Parra MD    ** Please Note: Dictation voice to text software may have been used in the creation of this document   **

## 2021-01-26 NOTE — ASSESSMENT & PLAN NOTE
Dx with a right soleal vein occlusive DVT during her hospitalization 11/2020 at [facility]  - Continue coumadin and titrate to INR 2-3  - Warfarin increased to 3mg hs    Recent Labs     01/24/21  0537 01/26/21  0541   INR 1 88* 1 76*

## 2021-01-26 NOTE — PLAN OF CARE
Problem: PHYSICAL THERAPY ADULT  Goal: Performs mobility at highest level of function for planned discharge setting  See evaluation for individualized goals  Description: Treatment/Interventions: Functional transfer training, LE strengthening/ROM, Therapeutic exercise, Endurance training, Cognitive reorientation, Patient/family training, Equipment eval/education, Bed mobility, Gait training, Compensatory technique education, Spoke to nursing, OT          See flowsheet documentation for full assessment, interventions and recommendations  Outcome: Progressing  Note: Prognosis: Fair  Problem List: Decreased strength, Decreased range of motion, Decreased endurance, Impaired balance, Decreased mobility, Decreased cognition, Decreased safety awareness, Impaired sensation, Decreased skin integrity  Assessment: Pt seen for PT treatment session this date with interventions consisting of therapeutic activity consisting of training: bed mobility, supine<>sit transfers, sit<>stand transfers and static sitting tolerance at EOB for 25 minutes w/ occ B UE support  Pt agreeable to PT treatment session upon arrival, pt found supine in bed w/ HOB elevated, responsive  In comparison to previous session, pt with improvements in attempt to stand  Post session: pt returned BTB, bed alarm engaged and all needs in reach Continue to recommend STR at time of d/c in order to maximize pt's functional independence and safety w/ mobility  Pt continues to be functioning below baseline level, and remains limited 2* factors listed above and including pain and weakness  PT will continue to see pt while here in order to address the deficits listed above and provide interventions consistent w/ POC in effort to achieve goals  Barriers to Discharge: Decreased caregiver support     PT Discharge Recommendation: Post-Acute Rehabilitation Services     PT - OK to Discharge: Yes(to STR)    See flowsheet documentation for full assessment   Alexander Centeno Jose, PT

## 2021-01-26 NOTE — ASSESSMENT & PLAN NOTE
Multifactorial: Pre-renal, infection   Improving    Recent Labs     01/24/21  0537 01/25/21  0530 01/26/21  0541   * 102* 99*   CREATININE 3 16* 2 99* 2 82*   EGFR 12 13 14

## 2021-01-26 NOTE — ASSESSMENT & PLAN NOTE
Wt Readings from Last 3 Encounters:   01/26/21 77 kg (169 lb 12 1 oz)   01/06/21 62 1 kg (136 lb 14 4 oz)   12/22/20 60 8 kg (134 lb)     History of congestive heart failure   Currently above baseline weight   - IV lasix 40mg today

## 2021-01-26 NOTE — PHYSICAL THERAPY NOTE
PT Treatment Note       01/26/21 0957   PT Last Visit   PT Visit Date 01/26/21   Note Type   Note Type Treatment   Pain Assessment   Pain Assessment Tool 0-10   Pain Score Worst Possible Pain   Pain Location/Orientation Orientation: Bilateral;Location: Leg   Hospital Pain Intervention(s) Repositioned   Restrictions/Precautions   Weight Bearing Precautions Per Order Yes   RLE Weight Bearing Per Order WBAT   LLE Weight Bearing Per Order WBAT   Other Precautions Cognitive; Chair Alarm; Bed Alarm;Multiple lines; Fall Risk;Pain   General   Chart Reviewed Yes   Response to Previous Treatment Patient with no complaints from previous session  Family/Caregiver Present No   Cognition   Overall Cognitive Status Impaired   Arousal/Participation Alert   Attention Attends with cues to redirect   Orientation Level Oriented X4   Memory Decreased recall of precautions;Decreased recall of recent events   Following Commands Follows one step commands without difficulty   Comments pt agreeable to PT session   Subjective   Subjective pt required education and encouragement to participate in session   Bed Mobility   Rolling R 2  Maximal assistance   Additional items Assist x 1;Bedrails; Increased time required;Verbal cues;LE management   Rolling L 2  Maximal assistance   Additional items Assist x 1;Bedrails; Increased time required;Verbal cues;LE management   Supine to Sit 2  Maximal assistance   Additional items Assist x 2;HOB elevated; Bedrails; Increased time required;Verbal cues;LE management   Sit to Supine 2  Maximal assistance   Additional items Assist x 2; Increased time required;Verbal cues;LE management   Additional Comments pt sat EOB with Min A/CGA to attain posture   Transfers   Sit to Stand 2  Maximal assistance   Additional items Assist x 2; Increased time required;Verbal cues   Stand to Sit 2  Maximal assistance   Additional items Assist x 2; Increased time required;Verbal cues   Additional Comments pt unable to attain fully erect standing position (about 25%)   Balance   Static Sitting Fair +   Dynamic Sitting Fair   Endurance Deficit   Endurance Deficit Yes   Endurance Deficit Description unable to progress mobility   Activity Tolerance   Activity Tolerance Patient limited by fatigue;Patient limited by pain   Medical Staff Made Aware OT   Nurse Made Aware RN verbalized pt is appropriate for PT session   Assessment   Prognosis Fair   Assessment Pt seen for PT treatment session this date with interventions consisting of therapeutic activity consisting of training: bed mobility, supine<>sit transfers, sit<>stand transfers and static sitting tolerance at EOB for 25 minutes w/ occ B UE support  Pt agreeable to PT treatment session upon arrival, pt found supine in bed w/ HOB elevated, responsive  In comparison to previous session, pt with improvements in attempt to stand  Post session: pt returned BTB, bed alarm engaged and all needs in reach Continue to recommend STR at time of d/c in order to maximize pt's functional independence and safety w/ mobility  Pt continues to be functioning below baseline level, and remains limited 2* factors listed above and including pain and weakness  PT will continue to see pt while here in order to address the deficits listed above and provide interventions consistent w/ POC in effort to achieve goals     Goals   Patient Goals to change her pads and wash her face   PT Treatment Day 2   Plan   Progress Slow progress, decreased activity tolerance   Recommendation   PT Discharge Recommendation Post-Acute Rehabilitation Services   PT - OK to Discharge Yes  (to STR)   Lexi Foley, PT

## 2021-01-26 NOTE — ASSESSMENT & PLAN NOTE
Iron deficiency anemia with anemia s/p transfusion      Recent Labs     01/24/21  0537 01/25/21  0530 01/26/21  0541   HGB 8 3* 7 9* 7 7*   MCV 86 87 87   RDW 17 2* 17 3* 17 2*

## 2021-01-26 NOTE — ASSESSMENT & PLAN NOTE
Hypovolemia + SIADH  Improving      Recent Labs     01/24/21  0537 01/25/21  0530 01/26/21  0541   SODIUM 130* 134* 134*

## 2021-01-27 LAB
ANION GAP SERPL CALCULATED.3IONS-SCNC: 9 MMOL/L (ref 4–13)
BASOPHILS # BLD AUTO: 0.02 THOUSANDS/ΜL (ref 0–0.1)
BASOPHILS NFR BLD AUTO: 0 % (ref 0–1)
BUN SERPL-MCNC: 93 MG/DL (ref 5–25)
CALCIUM SERPL-MCNC: 8.4 MG/DL (ref 8.3–10.1)
CHLORIDE SERPL-SCNC: 98 MMOL/L (ref 100–108)
CO2 SERPL-SCNC: 26 MMOL/L (ref 21–32)
CREAT SERPL-MCNC: 2.76 MG/DL (ref 0.6–1.3)
EOSINOPHIL # BLD AUTO: 0.31 THOUSAND/ΜL (ref 0–0.61)
EOSINOPHIL NFR BLD AUTO: 4 % (ref 0–6)
ERYTHROCYTE [DISTWIDTH] IN BLOOD BY AUTOMATED COUNT: 17.2 % (ref 11.6–15.1)
GFR SERPL CREATININE-BSD FRML MDRD: 15 ML/MIN/1.73SQ M
GLUCOSE SERPL-MCNC: 98 MG/DL (ref 65–140)
HCT VFR BLD AUTO: 25.9 % (ref 34.8–46.1)
HGB BLD-MCNC: 8.1 G/DL (ref 11.5–15.4)
IMM GRANULOCYTES # BLD AUTO: 0.08 THOUSAND/UL (ref 0–0.2)
IMM GRANULOCYTES NFR BLD AUTO: 1 % (ref 0–2)
INR PPP: 1.71 (ref 0.84–1.19)
LYMPHOCYTES # BLD AUTO: 1.55 THOUSANDS/ΜL (ref 0.6–4.47)
LYMPHOCYTES NFR BLD AUTO: 18 % (ref 14–44)
MAGNESIUM SERPL-MCNC: 2.4 MG/DL (ref 1.6–2.6)
MCH RBC QN AUTO: 27.6 PG (ref 26.8–34.3)
MCHC RBC AUTO-ENTMCNC: 31.3 G/DL (ref 31.4–37.4)
MCV RBC AUTO: 88 FL (ref 82–98)
MONOCYTES # BLD AUTO: 0.62 THOUSAND/ΜL (ref 0.17–1.22)
MONOCYTES NFR BLD AUTO: 7 % (ref 4–12)
NEUTROPHILS # BLD AUTO: 5.94 THOUSANDS/ΜL (ref 1.85–7.62)
NEUTS SEG NFR BLD AUTO: 70 % (ref 43–75)
NRBC BLD AUTO-RTO: 0 /100 WBCS
PLATELET # BLD AUTO: 196 THOUSANDS/UL (ref 149–390)
PMV BLD AUTO: 9.9 FL (ref 8.9–12.7)
POTASSIUM SERPL-SCNC: 3.8 MMOL/L (ref 3.5–5.3)
PROTHROMBIN TIME: 19.7 SECONDS (ref 11.6–14.5)
RBC # BLD AUTO: 2.94 MILLION/UL (ref 3.81–5.12)
SODIUM SERPL-SCNC: 133 MMOL/L (ref 136–145)
WBC # BLD AUTO: 8.52 THOUSAND/UL (ref 4.31–10.16)

## 2021-01-27 PROCEDURE — 99232 SBSQ HOSP IP/OBS MODERATE 35: CPT | Performed by: INTERNAL MEDICINE

## 2021-01-27 PROCEDURE — 99232 SBSQ HOSP IP/OBS MODERATE 35: CPT | Performed by: STUDENT IN AN ORGANIZED HEALTH CARE EDUCATION/TRAINING PROGRAM

## 2021-01-27 PROCEDURE — 85610 PROTHROMBIN TIME: CPT | Performed by: STUDENT IN AN ORGANIZED HEALTH CARE EDUCATION/TRAINING PROGRAM

## 2021-01-27 PROCEDURE — 85025 COMPLETE CBC W/AUTO DIFF WBC: CPT | Performed by: STUDENT IN AN ORGANIZED HEALTH CARE EDUCATION/TRAINING PROGRAM

## 2021-01-27 PROCEDURE — 83735 ASSAY OF MAGNESIUM: CPT | Performed by: STUDENT IN AN ORGANIZED HEALTH CARE EDUCATION/TRAINING PROGRAM

## 2021-01-27 PROCEDURE — 80048 BASIC METABOLIC PNL TOTAL CA: CPT | Performed by: INTERNAL MEDICINE

## 2021-01-27 RX ORDER — FUROSEMIDE 10 MG/ML
40 INJECTION INTRAMUSCULAR; INTRAVENOUS ONCE
Status: COMPLETED | OUTPATIENT
Start: 2021-01-27 | End: 2021-01-27

## 2021-01-27 RX ADMIN — Medication 250 MG: at 17:14

## 2021-01-27 RX ADMIN — AMLODIPINE BESYLATE 10 MG: 10 TABLET ORAL at 09:49

## 2021-01-27 RX ADMIN — HYDRALAZINE HYDROCHLORIDE 10 MG: 10 TABLET, FILM COATED ORAL at 13:57

## 2021-01-27 RX ADMIN — WARFARIN SODIUM 3 MG: 3 TABLET ORAL at 17:15

## 2021-01-27 RX ADMIN — DOCUSATE SODIUM 100 MG: 100 CAPSULE, LIQUID FILLED ORAL at 17:14

## 2021-01-27 RX ADMIN — ISOSORBIDE DINITRATE 10 MG: 10 TABLET ORAL at 17:15

## 2021-01-27 RX ADMIN — SENNOSIDES 8.6 MG: 8.6 TABLET ORAL at 17:14

## 2021-01-27 RX ADMIN — FUROSEMIDE 40 MG: 10 INJECTION, SOLUTION INTRAMUSCULAR; INTRAVENOUS at 13:57

## 2021-01-27 RX ADMIN — Medication 250 MG: at 09:45

## 2021-01-27 RX ADMIN — CARVEDILOL 12.5 MG: 6.25 TABLET, FILM COATED ORAL at 09:49

## 2021-01-27 RX ADMIN — CARVEDILOL 12.5 MG: 6.25 TABLET, FILM COATED ORAL at 17:14

## 2021-01-27 RX ADMIN — COLLAGENASE SANTYL: 250 OINTMENT TOPICAL at 09:47

## 2021-01-27 RX ADMIN — SENNOSIDES 8.6 MG: 8.6 TABLET ORAL at 09:45

## 2021-01-27 RX ADMIN — LEVOTHYROXINE SODIUM 25 MCG: 25 TABLET ORAL at 05:13

## 2021-01-27 RX ADMIN — ISOSORBIDE DINITRATE 10 MG: 10 TABLET ORAL at 12:18

## 2021-01-27 RX ADMIN — ISOSORBIDE DINITRATE 10 MG: 10 TABLET ORAL at 09:46

## 2021-01-27 RX ADMIN — DOCUSATE SODIUM 100 MG: 100 CAPSULE, LIQUID FILLED ORAL at 09:45

## 2021-01-27 RX ADMIN — HYDRALAZINE HYDROCHLORIDE 10 MG: 10 TABLET, FILM COATED ORAL at 21:58

## 2021-01-27 RX ADMIN — HYDRALAZINE HYDROCHLORIDE 10 MG: 10 TABLET, FILM COATED ORAL at 05:13

## 2021-01-27 NOTE — CASE MANAGEMENT
Patient here with cellulitis, dry gangrene  Patient is consulted by nephrology, podiatry and vascular  Patient neuropsychology, patient does not have capacity to make medical decisions  Patient is pending medical clearance and discharge planning  PT/OT recommending rehab  CM attempted to reach patient's spouse for rehab choices, CM left voicemail  Spouse has previously chosen: [facility] and [facility], these facilities are not able to accept patient  Spouse previously stated [facility] would not like SNF rehabs  Patient is better suited for SNF/subacute rehab facility  CM will attempt to reach spouse again later  Update:    Patient's spouse has chosen SNF rehabs  Referrals sent in [address] and pending at this time

## 2021-01-27 NOTE — ASSESSMENT & PLAN NOTE
Wt Readings from Last 3 Encounters:   01/27/21 75 kg (165 lb 5 5 oz)   01/06/21 62 1 kg (136 lb 14 4 oz)   12/22/20 60 8 kg (134 lb)     History of congestive heart failure  Currently above baseline weight

## 2021-01-27 NOTE — PLAN OF CARE
Problem: Potential for Falls  Goal: Patient will remain free of falls  Description: INTERVENTIONS:  - Assess patient frequently for physical needs  -  Identify cognitive and physical deficits and behaviors that affect risk of falls  -  [place] fall precautions as indicated by assessment   - Educate patient/family on patient safety including physical limitations  - Instruct patient to call for assistance with activity based on assessment  - Modify environment to reduce risk of injury  - Consider OT/PT consult to assist with strengthening/mobility  Outcome: Progressing     Problem: Prexisting or High Potential for Compromised Skin Integrity  Goal: Skin integrity is maintained or improved  Description: INTERVENTIONS:  - Identify patients at risk for skin breakdown  - Assess and monitor skin integrity  - Assess and monitor nutrition and hydration status  - Monitor labs   - Assess for incontinence   - Turn and reposition patient  - Assist with mobility/ambulation  - Relieve pressure over bony prominences  - Avoid friction and shearing  - Provide appropriate hygiene as needed including keeping skin clean and dry  - Evaluate need for skin moisturizer/barrier cream  - Collaborate with interdisciplinary team   - Patient/family teaching  - Consider wound care consult   Outcome: Progressing     Problem: Nutrition/Hydration-ADULT  Goal: Nutrient/Hydration intake appropriate for improving, restoring or maintaining nutritional needs  Description: Monitor and assess patient's nutrition/hydration status for malnutrition  Collaborate with interdisciplinary team and initiate plan and interventions as ordered  Monitor patient's weight and dietary intake as ordered or per policy  Utilize nutrition screening tool and intervene as necessary  Determine patient's food preferences and provide high-protein, high-caloric foods as appropriate       INTERVENTIONS:  - Monitor oral intake, urinary output, labs, and treatment plans  - Assess nutrition and hydration status and recommend course of action  - Evaluate amount of meals eaten  - Assist patient with eating if necessary   - Allow adequate time for meals  - Recommend/ encourage appropriate diets, oral nutritional supplements, and vitamin/mineral supplements  - Order, calculate, and assess calorie counts as needed  - Recommend, monitor, and adjust tube feedings and TPN/PPN based on assessed needs  - Assess need for intravenous fluids  - Provide specific nutrition/hydration education as appropriate  - Include patient/family/caregiver in decisions related to nutrition  Outcome: Progressing     Problem: PAIN - ADULT  Goal: Verbalizes/displays adequate comfort level or baseline comfort level  Description: Interventions:  - Encourage patient to monitor pain and request assistance  - Assess pain using appropriate pain scale  - Administer analgesics based on type and severity of pain and evaluate response  - Implement non-pharmacological measures as appropriate and evaluate response  - Consider cultural and social influences on pain and pain management  - Notify physician/advanced practitioner if interventions unsuccessful or patient reports new pain  Outcome: Progressing     Problem: INFECTION - ADULT  Goal: Absence or prevention of progression during hospitalization  Description: INTERVENTIONS:  - Assess and monitor for signs and symptoms of infection  - Monitor lab/diagnostic results  - Monitor all insertion sites, i e  indwelling lines, tubes, and drains  - Monitor endotracheal if appropriate and nasal secretions for changes in amount and color  - [place] appropriate cooling/warming therapies per order  - Administer medications as ordered  - Instruct and encourage patient and family to use good hand hygiene technique  - Identify and instruct in appropriate isolation precautions for identified infection/condition  Outcome: Progressing  Goal: Absence of fever/infection during neutropenic period  Description: INTERVENTIONS:  - Monitor WBC    Outcome: Progressing     Problem: SAFETY ADULT  Goal: Patient will remain free of falls  Description: INTERVENTIONS:  - Assess patient frequently for physical needs  -  Identify cognitive and physical deficits and behaviors that affect risk of falls    -  [place] fall precautions as indicated by assessment   - Educate patient/family on patient safety including physical limitations  - Instruct patient to call for assistance with activity based on assessment  - Modify environment to reduce risk of injury  - Consider OT/PT consult to assist with strengthening/mobility  Outcome: Progressing  Goal: Maintain or return to baseline ADL function  Description: INTERVENTIONS:  -  Assess patient's ability to carry out ADLs; assess patient's baseline for ADL function and identify physical deficits which impact ability to perform ADLs (bathing, care of mouth/teeth, toileting, grooming, dressing, etc )  - Assess/evaluate cause of self-care deficits   - Assess range of motion  - Assess patient's mobility; develop plan if impaired  - Assess patient's need for assistive devices and provide as appropriate  - Encourage maximum independence but intervene and supervise when necessary  - Involve family in performance of ADLs  - Assess for home care needs following discharge   - Consider OT consult to assist with ADL evaluation and planning for discharge  - Provide patient education as appropriate  Outcome: Progressing  Goal: Maintain or return mobility status to optimal level  Description: INTERVENTIONS:  - Assess patient's baseline mobility status (ambulation, transfers, stairs, etc )    - Identify cognitive and physical deficits and behaviors that affect mobility  - Identify mobility aids required to assist with transfers and/or ambulation (gait belt, sit-to-stand, lift, walker, cane, etc )  - [place] fall precautions as indicated by assessment  - Record patient progress and toleration of activity level on Mobility SBAR; progress patient to next Phase/Stage  - Instruct patient to call for assistance with activity based on assessment  - Consider rehabilitation consult to assist with strengthening/weightbearing, etc   Outcome: Progressing     Problem: DISCHARGE PLANNING  Goal: Discharge to home or other facility with appropriate resources  Description: INTERVENTIONS:  - Identify barriers to discharge w/patient and caregiver  - Arrange for needed discharge resources and transportation as appropriate  - Identify discharge learning needs (meds, wound care, etc )  - Arrange for interpretive services to assist at discharge as needed  - Refer to Case Management Department for coordinating discharge planning if the patient needs post-hospital services based on physician/advanced practitioner order or complex needs related to functional status, cognitive ability, or social support system  Outcome: Progressing     Problem: Knowledge Deficit  Goal: Patient/family/caregiver demonstrates understanding of disease process, treatment plan, medications, and discharge instructions  Description: Complete learning assessment and assess knowledge base    Interventions:  - Provide teaching at level of understanding  - Provide teaching via preferred learning methods  Outcome: Progressing     Problem: GENITOURINARY - ADULT  Goal: Maintains or returns to baseline urinary function  Description: INTERVENTIONS:  - Assess urinary function  - Encourage oral fluids to ensure adequate hydration if ordered  - Administer IV fluids as ordered to ensure adequate hydration  - Administer ordered medications as needed  - Offer frequent toileting  - Follow urinary retention protocol if ordered  Outcome: Progressing  Goal: Absence of urinary retention  Description: INTERVENTIONS:  - Assess patients ability to void and empty bladder  - Monitor I/O  - Bladder scan as needed  - Discuss with physician/AP medications to alleviate retention as needed  - Discuss catheterization for long term situations as appropriate  Outcome: Progressing  Goal: Urinary catheter remains patent  Description: INTERVENTIONS:  - Assess patency of urinary catheter  - If patient has a chronic [place], consider changing catheter if non-functioning  - Follow guidelines for intermittent irrigation of non-functioning urinary catheter  Outcome: Progressing     Problem: RESPIRATORY - ADULT  Goal: Achieves optimal ventilation and oxygenation  Description: INTERVENTIONS:  - Assess for changes in respiratory status  - Assess for changes in mentation and behavior  - Position to facilitate oxygenation and minimize respiratory effort  - Oxygen administered by appropriate delivery if ordered  - Initiate smoking cessation education as indicated  - Encourage broncho-pulmonary hygiene including cough, deep breathe, Incentive Spirometry  - Assess the need for suctioning and aspirate as needed  - Assess and instruct to report SOB or any respiratory difficulty  - Respiratory Therapy support as indicated  Outcome: Progressing     Problem: METABOLIC, FLUID AND ELECTROLYTES - ADULT  Goal: Electrolytes maintained within normal limits  Description: INTERVENTIONS:  - Monitor labs and assess patient for signs and symptoms of electrolyte imbalances  - Administer electrolyte replacement as ordered  - Monitor response to electrolyte replacements, including repeat lab results as appropriate  - Instruct patient on fluid and nutrition as appropriate  Outcome: Progressing  Goal: Fluid balance maintained  Description: INTERVENTIONS:  - Monitor labs   - Monitor I/O and WT  - Instruct patient on fluid and nutrition as appropriate  - Assess for signs & symptoms of volume excess or deficit  Outcome: Progressing     Problem: SKIN/TISSUE INTEGRITY - ADULT  Goal: Skin integrity remains intact  Description: INTERVENTIONS  - Identify patients at risk for skin breakdown  - Assess and monitor skin integrity  - Assess and monitor nutrition and hydration status  - Monitor labs (i e  albumin)  - Assess for incontinence   - Turn and reposition patient  - Assist with mobility/ambulation  - Relieve pressure over bony prominences  - Avoid friction and shearing  - Provide appropriate hygiene as needed including keeping skin clean and dry  - Evaluate need for skin moisturizer/barrier cream  - Collaborate with interdisciplinary team (i e  Nutrition, Rehabilitation, etc )   - Patient/family teaching  Outcome: Progressing  Goal: Incision(s), wounds(s) or drain site(s) healing without S/S of infection  Description: INTERVENTIONS  - Assess and document risk factors for skin impairment   - Assess and document dressing, incision, wound bed, drain sites and surrounding tissue  - Consider nutrition services referral as needed  - Oral mucous membranes remain intact  - Provide patient/ family education  Outcome: Progressing     Problem: HEMATOLOGIC - ADULT  Goal: Maintains hematologic stability  Description: INTERVENTIONS  - Assess for signs and symptoms of bleeding or hemorrhage  - Monitor labs  - Administer supportive blood products/factors as ordered and appropriate  Outcome: Progressing

## 2021-01-27 NOTE — ASSESSMENT & PLAN NOTE
Hypovolemia + SIADH   Stable    Recent Labs     01/25/21  0530 01/26/21  0541 01/27/21  0517   SODIUM 134* 134* 133*

## 2021-01-27 NOTE — PROGRESS NOTES
Progress Note - Shanelle Bryan 5/18/1930, 80 y o  female MRN: [id number]    Unit/Bed#: E5 -01 Encounter: [id number]    Primary Care Provider: Pasha Moore MD   Date and time admitted to hospital: 1/18/2021 11:44 AM        * Cellulitis  Assessment & Plan  80year old female admitted due to LLE cellulitis  Podiatry reviewed imaging and stated patient does not have OM or CHERYLE   - Completed Antibiotic course  - For arteriogram once renal function improves still awaiting renal approval regarding intervention  Goal is at least 2 2 prior to consideration for any intervention  PAD (peripheral artery disease) ([facility] [address] )  Assessment & Plan  As written above  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 ([facility] [address] )  Assessment & Plan  In the setting of advanced peripheral arterial disease    - For possible arteriogram once renal function improves and cleared by renal   - Continue wound care    Chronic hypoxemic respiratory failure (HCC)  Assessment & Plan  Secondary to CHF  Baseline at 3L    Acute kidney injury superimposed on chronic kidney disease [facility])  Assessment & Plan  Multifactorial: Pre-renal, infection  Gradually Improving    Recent Labs     01/25/21  0530 01/26/21  0541 01/27/21  0517   * 99* 93*   CREATININE 2 99* 2 82* 2 76*   EGFR 13 14 15                 Acute deep vein thrombosis (DVT) of calf muscle vein of right lower extremity (HCC)  Assessment & Plan  Dx with a right soleal vein occlusive DVT during her hospitalization 11/2020 at [facility]  - Continue coumadin and titrate to INR 2-3  - Continue Warfarin which was increased to 3mg hs 1/26/2021    Recent Labs     01/26/21  0541 01/27/21  1012   INR 1 76* 1 71*         Essential hypertension  Assessment & Plan  Contiue coreg, hydralazine, amlodipine    Iron deficiency anemia secondary to inadequate dietary iron intake  Assessment & Plan  Iron deficiency anemia with anemia s/p transfusion      Recent Labs     01/25/21  0530 01/26/21  0541 01/27/21  1012   HGB 7 9* 7 7* 8 1*   MCV 87 87 88   RDW 17 3* 17 2* 17 2*         Hyponatremia  Assessment & Plan  Hypovolemia + SIADH  Stable    Recent Labs     21  0530 21  0541 21  0517   SODIUM 134* 134* 133*       Chronic diastolic heart failure (HCC)  Assessment & Plan  Wt Readings from Last 3 Encounters:   21 75 kg (165 lb 5 5 oz)   21 62 1 kg (136 lb 14 4 oz)   20 60 8 kg (134 lb)     History of congestive heart failure  Currently above baseline weight  Ambulatory dysfunction  Assessment & Plan  Impaired medical decision making capacity  - For placement once stable      VTE Pharmacologic Prophylaxis:   Pharmacologic: Warfarin (Coumadin)  Mechanical VTE Prophylaxis in Place: Yes    Patient Centered Rounds: I have performed bedside rounds with nursing staff today  Discussions with Specialists or Other Care Team Provider: Nursing    Education and Discussions with Family / Patient: patient, spouse    Time Spent for Care: 30 minutes  More than 50% of total time spent on counseling and coordination of care as described above  Current Length of Stay: 9 day(s)    Current Patient Status: Inpatient   Certification Statement: The patient will continue to require additional inpatient hospital stay due to awaiting renal recovery prior to agram    Discharge Plan: active    Code Status: Level 1 - Full Code      Subjective:   Patient seen and examined at bedside  No acute events or complaints overnight  She seems to be leaning for conservative management, but her decision maker/ seems to feel otherwise  Objective:     Vitals:   Temp (24hrs), Av 9 °F (36 6 °C), Min:97 6 °F (36 4 °C), Max:98 °F (36 7 °C)    Temp:  [97 6 °F (36 4 °C)-98 °F (36 7 °C)] 98 °F (36 7 °C)  HR:  [51-66] 62  Resp:  [16-18] 18  BP: (125-178)/(57-76) 130/71  SpO2:  [91 %-96 %] 95 %  Body mass index is 26 69 kg/m²  Input and Output Summary (last 24 hours):        Intake/Output Summary (Last 24 hours) at 2021 1312  Last data filed at 1/27/2021 0900  Gross per 24 hour   Intake 540 ml   Output 1025 ml   Net -485 ml       Physical Exam:     Physical Exam  Vitals signs reviewed  HENT:      Head: Normocephalic  Nose: Nose normal       Mouth/Throat:      Mouth: Mucous membranes are moist    Eyes:      General: No scleral icterus  Extraocular Movements: Extraocular movements intact  Cardiovascular:      Rate and Rhythm: Normal rate and regular rhythm  Pulmonary:      Effort: Pulmonary effort is normal    Abdominal:      General: There is no distension  Palpations: Abdomen is soft  Tenderness: There is no abdominal tenderness  Musculoskeletal:      Comments: Right 4th and fifth digit eschar, posterior heel eschar   Skin:     General: Skin is warm  Neurological:      Mental Status: She is alert  Mental status is at baseline  Psychiatric:         Mood and Affect: Mood normal          Behavior: Behavior normal        Additional Data:     Labs:    Results from last 7 days   Lab Units 01/27/21  1012   WBC Thousand/uL 8 52   HEMOGLOBIN g/dL 8 1*   HEMATOCRIT % 25 9*   PLATELETS Thousands/uL 196   NEUTROS PCT % 70   LYMPHS PCT % 18   MONOS PCT % 7   EOS PCT % 4     Results from last 7 days   Lab Units 01/27/21  0517   SODIUM mmol/L 133*   POTASSIUM mmol/L 3 8   CHLORIDE mmol/L 98*   CO2 mmol/L 26   BUN mg/dL 93*   CREATININE mg/dL 2 76*   ANION GAP mmol/L 9   CALCIUM mg/dL 8 4   GLUCOSE RANDOM mg/dL 98     Results from last 7 days   Lab Units 01/27/21  1012   INR  1 71*                       * I Have Reviewed All Lab Data Listed Above  * Additional Pertinent Lab Tests Reviewed:  [address] Admission Reviewed    Imaging:    Imaging Reports Reviewed Today Include: No new imaging    Recent Cultures (last 7 days):           Last 24 Hours Medication List:   Current Facility-Administered Medications   Medication Dose Route Frequency Provider Last Rate    acetaminophen  650 mg Oral Q6H PRN Malinda Pablo, DO  amLODIPine  10 mg Oral Daily OTSI Mcpherson      carvedilol  12 5 mg Oral BID With Meals OTIS Mcpherson      collagenase   Topical Daily Kaylan Jimenez MD      docusate sodium  100 mg Oral BID Harsha Shook, DO      hydrALAZINE  10 mg Oral Q8H [address] Harsha Andrzej Gaitan, DO      HYDROmorphone  1 mg Intravenous Q4H PRN Dago Askew PA-C      isosorbide dinitrate  10 mg Oral TID after meals Harsha Shook, DO      levothyroxine  25 mcg Oral Early Morning Harsha Shook, DO      ondansetron  4 mg Intravenous Q6H PRN Michaela Ly, DO      oxyCODONE  2 5 mg Oral Q4H PRN Spike Abraham PA-C      oxyCODONE  5 mg Oral Q4H PRN Arabella Abraham PA-C      saccharomyces boulardii  250 mg Oral BID Harsha Shook, DO      senna  1 tablet Oral BID Arabella Abraham PA-C      warfarin  3 mg Oral Daily (warfarin) Kaylan Jimenez MD          Today, Patient Was Seen By: Britney Parra MD    ** Please Note: Dictation voice to text software may have been used in the creation of this document   **

## 2021-01-27 NOTE — ASSESSMENT & PLAN NOTE
Iron deficiency anemia with anemia s/p transfusion      Recent Labs     01/25/21  0530 01/26/21  0541 01/27/21  1012   HGB 7 9* 7 7* 8 1*   MCV 87 87 88   RDW 17 3* 17 2* 17 2*

## 2021-01-27 NOTE — ASSESSMENT & PLAN NOTE
Multifactorial: Pre-renal, infection   Gradually Improving    Recent Labs     01/25/21  0530 01/26/21  0541 01/27/21  0517   * 99* 93*   CREATININE 2 99* 2 82* 2 76*   EGFR 13 14 15

## 2021-01-27 NOTE — ASSESSMENT & PLAN NOTE
Dx with a right soleal vein occlusive DVT during her hospitalization 11/2020 at [facility]  - Continue coumadin and titrate to INR 2-3  - Continue Warfarin which was increased to 3mg hs 1/26/2021    Recent Labs     01/26/21  0541 01/27/21  1012   INR 1 76* 1 71*

## 2021-01-27 NOTE — PROGRESS NOTES
NEPHROLOGY PROGRESS NOTE   Soledad Rivero 80 y o  female MRN: [id number]  Unit/Bed#: E5 -01 Encounter: [id number]  Reason for Consult: LILIAN (POA) on CKD IV    PLAN:   -creatinine continues to slowly improve   -continue Scott catheter per Urology  -holding off on a gram  into creatinine within baseline   -recommend pre and hydration as well as Mucomyst with IV contrast administration  -hyponatremia is stable  -hyper/hypokalemia resolved  Continue to monitor and trend   -bicarbonate level remains stable off of oral supplements  ASSESSMENT/PLAN:  LILIAN (POA) on likely CKD IV:  Suspect multifactorial with possible infection, diuretic use, as well as possible post renal component with history of atrophic left kidney measuring 7 1 cm and right-sided hydro in December 2020    -presented with creatinine of 3 3 with peak of 3 7 on 01/19   -unknown baseline creatinine, suspect around 1 7-2 2   -creatinine slowly improving   -received diuretic 1/22 with PRBC transfusion  Will repeat today    -UA showed large blood, large leukocytes, 2+ protein, RBCs, innumerable WBCs, innumerable bacteria   -continue Scott catheter per Urology  -potential need for future arteriogram per Vascular   Would hold off until creatinine is at baseline unless necessarily needed   -avoid nephrotoxins, hypotension, IV contrast   -I/O      Right-sided hydronephrosis:  With history of right ureteral stent placement in December 2020 at [facility]   -Renal US revealed bilateral renal atrophy, more  on the left along with mild right hydronephrosis with ureteral stent in place   -urology consulted  -will place Scott catheter for maximum decompression      Hyponatremia:  Suspect component of hypovolemia as well as SIADH   (improving)  -urine sodium 47, urine osmolality 338, serum osmolality 308, uric acid elevated at 9 0, TSH 10 6   -presented with sodium of 124 with normal glucose     -sodium level corrected to 129, correcting appropriately with decrease to 126 with fluids so IVF were discontinued    -continue on 1 5 L fluid restriction       Hyperkalemia (POA)/hypokalemia:  Presents with potassium of 5 7   Received medical management with insulin, dextrose, Kayexalate, and calcium gluconate  (resolved) Now with hypokalemia    -continue to monitor and treat as necessary       Metabolic acidosis:  In the setting of renal failure   -will discontinue bicarbonate tablets and continue to monitor      Hypertension:  above goal at times    -avoid hypotension or high fluctuations in blood pressure   -outpatient antihypertensives:  Amlodipine 5 mg daily, carvedilol 12 5 mg 2 times per day, hydralazine 10 mg every 8 hours, Isordil 10 mg 3 times per day   -continue amlodipine increased to 10 mg daily, carvedilol, hydralazine, Isordil   -holding diuretics      Lower extremity cellulitis:  With dry gangrene to great toe and 4th toe   -podiatry following   -vascular surgery consulted to discuss a-gram and further intervention   Would hold off until creatinine improves unless urgently needed   Recommend pre and post hydration with contrast administration as well as give Mucomyst 1200 mg times total of 4 doses   -blood cultures negative to date   -local dressing changes to LE per vascular recommendations   -continues on IV antibiotics      CHF:  Echocardiogram showed EF of 50% with grade 2 diastolic dysfunction   No sandro signs of volume overload   -check daily weights, fluid restriction, I/O   -outpatient diuretics:  Lasix 40 mg daily   -continue to monitor volume status on IV hydration  -CT chest shows bilateral moderate-sized pleural effusions, small amount of ascites      Anemia:   status post PRBC transfusion 1/21   Hgb trending downward    -recommend giving Lasix 20 mg IV with PRBC transfusion    -hemoccult stool is negative   -continue to monitor and transfuse as needed for hemoglobin less than 7 0   -iron panel showed low TIBC and low iron      Other:  PAD, DVT  Disposition:  Requiring additional stay due to medical needs  SUBJECTIVE:  The patient is resting in her bed  She states that she feels well today  She denies chest pain or shortness of breath  She denies nausea, vomiting, diarrhea      OBJECTIVE:  Current Weight: Weight - Scale: 75 kg (165 lb 5 5 oz)  Vitals:    01/26/21 2300 01/27/21 0513 01/27/21 0557 01/27/21 0756   BP: 125/72 (!) 171/70  (!) 178/76   BP Location: Left arm   Left arm   Pulse: (!) 51   66   Resp: 18   18   Temp: 98 °F (36 7 °C)   98 °F (36 7 °C)   TempSrc: Temporal   Temporal   SpO2: 96%   95%   Weight:   75 kg (165 lb 5 5 oz)        Intake/Output Summary (Last 24 hours) at 1/27/2021 1023  Last data filed at 1/26/2021 2100  Gross per 24 hour   Intake 240 ml   Output 1025 ml   Net -785 ml     General: NAD, thin, frail  Skin: warm, dry, intact, no rash  HEENT: Moist mucous membranes, sclera anicteric, normocephalic, atraumatic  Neck: No apparent JVD appreciated  Chest: lung sounds clear B/L, on O2   CVS:Regular rate and rhythm, no murmer   Abdomen: Soft, round, non-tender, +BS, Scott catheter, PAD, lower extremity wound  Extremities:  Trace B/L LE edema present  Neuro: alert and oriented  Psych: appropriate mood and affect     Medications:    Current Facility-Administered Medications:     acetaminophen (TYLENOL) tablet 650 mg, 650 mg, Oral, Q6H PRN, Harsha Shook, DO, 650 mg at 01/21/21 0004    amLODIPine (NORVASC) tablet 10 mg, 10 mg, Oral, Daily, OTIS Olmstead, 10 mg at 01/27/21 0949    carvedilol (COREG) tablet 12 5 mg, 12 5 mg, Oral, BID With Meals, OTIS Olmstead, 12 5 mg at 01/27/21 2126    collagenase (SANTYL) ointment, , Topical, Daily, Misha Crews MD, Given at 01/27/21 0947    docusate sodium (COLACE) capsule 100 mg, 100 mg, Oral, BID, Harsha Shook DO, 100 mg at 01/27/21 0945    hydrALAZINE (APRESOLINE) tablet 10 mg, 10 mg, Oral, Q8H [facility], Harsha Shook DO, 10 mg at 01/27/21 0513   HYDROmorphone (DILAUDID) injection 1 mg, 1 mg, Intravenous, Q4H PRN, Leticia Mccollum PA-C    isosorbide dinitrate (ISORDIL) tablet 10 mg, 10 mg, Oral, TID after meals, Harsha Shook DO, 10 mg at 01/27/21 0946    levothyroxine tablet 25 mcg, 25 mcg, Oral, Early Morning, Harsha Shook DO, 25 mcg at 01/27/21 0513    ondansetron (ZOFRAN) injection 4 mg, 4 mg, Intravenous, Q6H PRN, Harsha Shook DO    oxyCODONE (ROXICODONE) IR tablet 2 5 mg, 2 5 mg, Oral, Q4H PRN, Arabella Abraham PA-C    oxyCODONE (ROXICODONE) IR tablet 5 mg, 5 mg, Oral, Q4H PRN, Arabella Abraham PA-C, 5 mg at 01/24/21 1508    saccharomyces boulardii (FLORASTOR) capsule 250 mg, 250 mg, Oral, BID, Harsha Shook DO, 250 mg at 01/27/21 0945    senna (SENOKOT) tablet 8 6 mg, 1 tablet, Oral, BID, Arabella Abraham PA-C, 8 6 mg at 01/27/21 0945    warfarin (COUMADIN) tablet 3 mg, 3 mg, Oral, Daily (warfarin), Chapis Ortiz MD, 3 mg at 01/26/21 1726    Laboratory Results:  Results from last 7 days   Lab Units 01/27/21  0517 01/26/21  0541 01/25/21  0530 01/24/21  0537   WBC Thousand/uL  --  8 99 8 54 8 52   HEMOGLOBIN g/dL  --  7 7* 7 9* 8 3*   HEMATOCRIT %  --  24 6* 25 2* 25 8*   PLATELETS Thousands/uL  --  197 198 239   SODIUM mmol/L 133* 134* 134* 130*   POTASSIUM mmol/L 3 8 3 9 3 8 3 8   CHLORIDE mmol/L 98* 99* 98* 96*   CO2 mmol/L 26 26 25 23   BUN mg/dL 93* 99* 102* 110*   CREATININE mg/dL 2 76* 2 82* 2 99* 3 16*   CALCIUM mg/dL 8 4 8 1* 8 2* 8 0*   MAGNESIUM mg/dL 2 4 2 3  --   --

## 2021-01-27 NOTE — ASSESSMENT & PLAN NOTE
80year old female admitted due to LLE cellulitis  Podiatry reviewed imaging and stated patient does not have OM or CHERYLE   - Completed Antibiotic course  - For arteriogram once renal function improves still awaiting renal approval regarding intervention  Goal is at least 2 2 prior to consideration for any intervention

## 2021-01-28 LAB
ANION GAP SERPL CALCULATED.3IONS-SCNC: 10 MMOL/L (ref 4–13)
BUN SERPL-MCNC: 92 MG/DL (ref 5–25)
CALCIUM SERPL-MCNC: 8.4 MG/DL (ref 8.3–10.1)
CHLORIDE SERPL-SCNC: 99 MMOL/L (ref 100–108)
CO2 SERPL-SCNC: 26 MMOL/L (ref 21–32)
CREAT SERPL-MCNC: 2.72 MG/DL (ref 0.6–1.3)
GFR SERPL CREATININE-BSD FRML MDRD: 15 ML/MIN/1.73SQ M
GLUCOSE SERPL-MCNC: 101 MG/DL (ref 65–140)
INR PPP: 1.62 (ref 0.84–1.19)
MAGNESIUM SERPL-MCNC: 2.3 MG/DL (ref 1.6–2.6)
POTASSIUM SERPL-SCNC: 3.5 MMOL/L (ref 3.5–5.3)
PROTHROMBIN TIME: 18.9 SECONDS (ref 11.6–14.5)
SODIUM SERPL-SCNC: 135 MMOL/L (ref 136–145)

## 2021-01-28 PROCEDURE — 99232 SBSQ HOSP IP/OBS MODERATE 35: CPT | Performed by: INTERNAL MEDICINE

## 2021-01-28 PROCEDURE — 99232 SBSQ HOSP IP/OBS MODERATE 35: CPT | Performed by: STUDENT IN AN ORGANIZED HEALTH CARE EDUCATION/TRAINING PROGRAM

## 2021-01-28 PROCEDURE — 97530 THERAPEUTIC ACTIVITIES: CPT

## 2021-01-28 PROCEDURE — 85610 PROTHROMBIN TIME: CPT | Performed by: STUDENT IN AN ORGANIZED HEALTH CARE EDUCATION/TRAINING PROGRAM

## 2021-01-28 PROCEDURE — 80048 BASIC METABOLIC PNL TOTAL CA: CPT | Performed by: STUDENT IN AN ORGANIZED HEALTH CARE EDUCATION/TRAINING PROGRAM

## 2021-01-28 PROCEDURE — 97535 SELF CARE MNGMENT TRAINING: CPT

## 2021-01-28 PROCEDURE — 83735 ASSAY OF MAGNESIUM: CPT | Performed by: STUDENT IN AN ORGANIZED HEALTH CARE EDUCATION/TRAINING PROGRAM

## 2021-01-28 RX ORDER — FUROSEMIDE 10 MG/ML
40 INJECTION INTRAMUSCULAR; INTRAVENOUS ONCE
Status: COMPLETED | OUTPATIENT
Start: 2021-01-28 | End: 2021-01-28

## 2021-01-28 RX ORDER — POTASSIUM CHLORIDE 20 MEQ/1
20 TABLET, EXTENDED RELEASE ORAL ONCE
Status: DISCONTINUED | OUTPATIENT
Start: 2021-01-28 | End: 2021-02-03 | Stop reason: HOSPADM

## 2021-01-28 RX ADMIN — ISOSORBIDE DINITRATE 10 MG: 10 TABLET ORAL at 11:46

## 2021-01-28 RX ADMIN — COLLAGENASE SANTYL: 250 OINTMENT TOPICAL at 09:30

## 2021-01-28 RX ADMIN — AMLODIPINE BESYLATE 10 MG: 10 TABLET ORAL at 09:30

## 2021-01-28 RX ADMIN — HYDRALAZINE HYDROCHLORIDE 10 MG: 10 TABLET, FILM COATED ORAL at 14:40

## 2021-01-28 RX ADMIN — ISOSORBIDE DINITRATE 10 MG: 10 TABLET ORAL at 17:07

## 2021-01-28 RX ADMIN — DOCUSATE SODIUM 100 MG: 100 CAPSULE, LIQUID FILLED ORAL at 09:30

## 2021-01-28 RX ADMIN — WARFARIN SODIUM 3 MG: 3 TABLET ORAL at 17:07

## 2021-01-28 RX ADMIN — HYDRALAZINE HYDROCHLORIDE 10 MG: 10 TABLET, FILM COATED ORAL at 21:33

## 2021-01-28 RX ADMIN — Medication 250 MG: at 17:07

## 2021-01-28 RX ADMIN — LEVOTHYROXINE SODIUM 25 MCG: 25 TABLET ORAL at 06:03

## 2021-01-28 RX ADMIN — SENNOSIDES 8.6 MG: 8.6 TABLET ORAL at 09:30

## 2021-01-28 RX ADMIN — ISOSORBIDE DINITRATE 10 MG: 10 TABLET ORAL at 09:30

## 2021-01-28 RX ADMIN — FUROSEMIDE 40 MG: 10 INJECTION, SOLUTION INTRAMUSCULAR; INTRAVENOUS at 14:40

## 2021-01-28 RX ADMIN — CARVEDILOL 12.5 MG: 6.25 TABLET, FILM COATED ORAL at 09:30

## 2021-01-28 RX ADMIN — Medication 250 MG: at 09:30

## 2021-01-28 RX ADMIN — HYDRALAZINE HYDROCHLORIDE 10 MG: 10 TABLET, FILM COATED ORAL at 06:03

## 2021-01-28 RX ADMIN — CARVEDILOL 12.5 MG: 6.25 TABLET, FILM COATED ORAL at 17:07

## 2021-01-28 NOTE — OCCUPATIONAL THERAPY NOTE
[address] Progress Note     Patient Name: Glenys Escalera  UCWOT'Q Date: 1/28/2021  Problem List  Principal Problem:    Cellulitis  Active Problems:    Acute deep vein thrombosis (DVT) of calf muscle vein of right lower extremity (HCC)    Acute kidney injury superimposed on chronic kidney disease (HCC)    Hyponatremia    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 (HCC)    PAD (peripheral artery disease) (HCC)    Essential hypertension    Iron deficiency anemia secondary to inadequate dietary iron intake    Chronic diastolic heart failure ([facility] [address] )    Ambulatory dysfunction    Chronic hypoxemic respiratory failure ([facility] [address] )    Coagulopathy ([facility] [address] )          01/28/21 1149   OT Last Visit   OT Visit Date 01/28/21   Note Type   Note Type Treatment   Restrictions/Precautions   Weight Bearing Precautions Per Order Yes   RLE Weight Bearing Per Order WBAT   LLE Weight Bearing Per Order WBAT   Other Precautions Cognitive; Chair Alarm; Bed Alarm;Multiple lines; Fall Risk   General   Response to Previous Treatment Patient with no complaints from previous session   Lifestyle   Autonomy At baseline, pt reports requiring assist w/ ADLs and IADLs  Pt reports I w/ functional mobility/transfers with use of RW, however chart indicates pt unable to get up from kitchen table x3 days  Reciprocal Relationships Spouse   Service to Others Retired- [employer]   Intrinsic Gratification Spending time with family   Pain Assessment   Pain Assessment Tool Pain Assessment not indicated - pt denies pain   Pain Score No Pain   ADL   Where Assessed Supine, bed   Grooming Assistance 4  Minimal Assistance   Grooming Deficit Setup;Verbal cueing;Supervision/safety; Increased time to complete;Wash/dry hands; Wash/dry face;Brushing hair   Grooming Comments Assist for thoroughness   UB Dressing Assistance 4  Minimal Assistance   UB Dressing Deficit Setup;Verbal cueing;Supervision/safety; Increased time to complete;Pull around back;Pull down in back   UB Dressing Comments Min A required for UB dressing with assist to bring gown over shoulders/down in back  Bed Mobility   Supine to Sit 2  Maximal assistance   Additional items Assist x 1;HOB elevated; Bedrails; Increased time required;Verbal cues;LE management  (long sit position)   Sit to Supine 2  Maximal assistance   Additional items Assist x 1; Increased time required;Verbal cues;LE management   Additional Comments Pt declining sitting EOB/OOB trial in today's session, agreeable to long sitting trial  Pt able to complete two long sitting trials with Max A and use of B/L bed rails  Pt able to tolerate approx  25-30 seconds in long sit position with back unsupported from bed surface  Transfers   Sit to Stand Unable to assess   Therapeutic Exercise - ROM   UE-ROM Yes   ROM- Right Upper Extremities   R Shoulder AROM;AAROM; Flexion; Extension;Horizontal ABduction   R Elbow AROM;Elbow flexion;Elbow extension   R Position Supine   R Weight/Reps/Sets 10 reps x2   ROM - Left Upper Extremities    L Shoulder AROM; Flexion; Extension;Horizontal ABduction   L Elbow AROM;Elbow flexion;Elbow extension   L Position Supine   L Weight/Reps/Sets 10 reps x2   Cognition   Overall Cognitive Status Impaired   Arousal/Participation Alert   Attention Attends with cues to redirect   Orientation Level Oriented X4   Memory Decreased recall of precautions;Decreased recall of recent events;Decreased short term memory   Following Commands Follows one step commands with increased time or repetition   Activity Tolerance   Activity Tolerance Patient limited by fatigue   Medical Staff Montserrat Wilson [id number], RN   Assessment   Assessment Pt seen for OT treatment session focusing on functional activity tolerance, bed mobility, ADLs, and UE ROM/strengthening  Pt alert and cooperative throughout session  Pt lying supine at start of session, agreeable to OT treatment session with increased encouragement   UE exercises completed while lying supine to increase UE ROM/strength needed for ADLs/transfers  Cues required for form/pacing  Pt tolerated well, no c/o pain  Grooming tasks completed with Min A with assist for thoroughness  Min A required for UB dressing with assist to bring gown over shoulders/down in back  Pt declining sitting EOB/OOB trial in today's session, agreeable to long sitting trial  Pt able to complete two long sitting trials with Max A and use of B/L bed rails  Pt able to tolerate approx  25-30 seconds in long sit position with back unsupported from bed surface  Pt lying supine at end of session with call bell and phone within reach  Bed alarm activated  All needs met and pt reports no further questions for OT at this time  Continue to recommend STR when medically cleared  OT to follow pt on caseload  Plan   Treatment Interventions ADL retraining;Functional transfer training;UE strengthening/ROM; Endurance training;Patient/family training;Equipment evaluation/education; Compensatory technique education; Energy conservation; Activityengagement   Goal Expiration Date 02/02/21   OT Treatment Day 3   OT Frequency 3-5x/wk   Recommendation   OT Discharge Recommendation Post-Acute Rehabilitation Services   OT - OK to Discharge Yes  (when medically cleared to rehab)   Barthel Index   Feeding 5   Bathing 0   Grooming Score 0   Dressing Score 5   Bladder Score 5   Bowels Score 10   Toilet Use Score 5   Transfers (Bed/Chair) Score 5   Mobility (Level Surface) Score 0   Stairs Score 0   Barthel Index Score 35   Modified Salima Scale   Modified [place]dale Scale 4          Vernell Stuart, OTR/L

## 2021-01-28 NOTE — PLAN OF CARE
Problem: OCCUPATIONAL THERAPY ADULT  Goal: Performs self-care activities at highest level of function for planned discharge setting  See evaluation for individualized goals  Description: Treatment Interventions: ADL retraining, Functional transfer training, UE strengthening/ROM, Endurance training, Patient/family training, Equipment evaluation/education, Compensatory technique education, Continued evaluation, Activityengagement          See flowsheet documentation for full assessment, interventions and recommendations  Outcome: Progressing  Note: Limitation: Decreased ADL status, Decreased UE ROM, Decreased UE strength, Decreased Safe judgement during ADL, Decreased cognition, Decreased endurance, Decreased self-care trans, Decreased high-level ADLs  Prognosis: Fair  Assessment: Pt seen for OT treatment session focusing on functional activity tolerance, bed mobility, ADLs, and UE ROM/strengthening  Pt alert and cooperative throughout session  Pt lying supine at start of session, agreeable to OT treatment session with increased encouragement  UE exercises completed while lying supine to increase UE ROM/strength needed for ADLs/transfers  Cues required for form/pacing  Pt tolerated well, no c/o pain  Grooming tasks completed with Min A with assist for thoroughness  Min A required for UB dressing with assist to bring gown over shoulders/down in back  Pt declining sitting EOB/OOB trial in today's session, agreeable to long sitting trial  Pt able to complete two long sitting trials with Max A and use of B/L bed rails  Pt able to tolerate approx  25-30 seconds in long sit position with back unsupported from bed surface  Pt lying supine at end of session with call bell and phone within reach  Bed alarm activated  All needs met and pt reports no further questions for OT at this time  Continue to recommend STR when medically cleared  OT to follow pt on caseload        OT Discharge Recommendation: Post-Acute Rehabilitation Services  OT - OK to Discharge: Yes(when medically cleared to rehab)

## 2021-01-28 NOTE — ASSESSMENT & PLAN NOTE
Multifactorial: Pre-renal, infection   Gradually Improving    Recent Labs     01/26/21  0541 01/27/21  0517 01/28/21  0636   BUN 99* 93* 92*   CREATININE 2 82* 2 76* 2 72*   EGFR 14 15 15

## 2021-01-28 NOTE — UTILIZATION REVIEW
Continued Stay Review    Date:    1/28/21                          Current Patient Class:    Inpatient  Current Level of Care:   Med surg    HPI:89 y o  female initially admitted on /18  With Cellulitis  LLE, ambulatory   Dysfunction,, hyperkalemia, LILIAN, hyponatremia     Assessment/Plan:   1/28   Continue PT/OT  Continue to monitor labs  Creatinine today  2 7  Needs  Angiogram per vascular surgery,  Renal function needs to improve prior to procedure  Continue fluid restriction  Scott  Catheter in  Will give  IV  Lasix  1 dose  Today  Remains on  O2  3L NC  Continue other current meds        Pertinent Labs/Diagnostic Results:       Results from last 7 days   Lab Units 01/27/21  1012 01/26/21  0541 01/25/21  0530 01/24/21  0537 01/23/21  0530 01/22/21  0454   WBC Thousand/uL 8 52 8 99 8 54 8 52 8 66 8 50   HEMOGLOBIN g/dL 8 1* 7 7* 7 9* 8 3* 8 3* 8 0*   HEMATOCRIT % 25 9* 24 6* 25 2* 25 8* 25 4* 24 6*   PLATELETS Thousands/uL 196 197 198 239 217 219   NEUTROS ABS Thousands/µL 5 94 5 82  --   --   --  5 77         Results from last 7 days   Lab Units 01/28/21  0636 01/27/21  0517 01/26/21  0541 01/25/21  0530 01/24/21  0537   SODIUM mmol/L 135* 133* 134* 134* 130*   POTASSIUM mmol/L 3 5 3 8 3 9 3 8 3 8   CHLORIDE mmol/L 99* 98* 99* 98* 96*   CO2 mmol/L 26 26 26 25 23   ANION GAP mmol/L 10 9 9 11 11   BUN mg/dL 92* 93* 99* 102* 110*   CREATININE mg/dL 2 72* 2 76* 2 82* 2 99* 3 16*   EGFR ml/min/1 73sq m 15 15 14 13 12   CALCIUM mg/dL 8 4 8 4 8 1* 8 2* 8 0*   MAGNESIUM mg/dL 2 3 2 4 2 3  --   --              Results from last 7 days   Lab Units 01/28/21  0636 01/27/21  0517 01/26/21  0541 01/25/21  0530 01/24/21  0537 01/23/21  0530 01/22/21  0454   GLUCOSE RANDOM mg/dL 101 98 90 96 119 90 100           Results from last 7 days   Lab Units 01/28/21  0636 01/27/21  1012 01/26/21  0541   PROTIME seconds 18 9* 19 7* 20 2*   INR  1 62* 1 71* 1 76*         Vital Signs:     61    133/60          Lying     01/28/21 0747  97 5 °F (36 4 °C)  68  18  158/66  94 %        Lying   01/28/21 0603        128/77                   Medications:   Scheduled Medications:  amLODIPine, 10 mg, Oral, Daily  carvedilol, 12 5 mg, Oral, BID With Meals  collagenase, , Topical, Daily  docusate sodium, 100 mg, Oral, BID  furosemide, 40 mg, Intravenous, Once  hydrALAZINE, 10 mg, Oral, Q8H NATASHA  isosorbide dinitrate, 10 mg, Oral, TID after meals  levothyroxine, 25 mcg, Oral, Early Morning  potassium chloride, 20 mEq, Oral, Once  saccharomyces boulardii, 250 mg, Oral, BID  senna, 1 tablet, Oral, BID  warfarin, 3 mg, Oral, Daily (warfarin)      Continuous IV Infusions:     PRN Meds:  acetaminophen, 650 mg, Oral, Q6H PRN  HYDROmorphone, 1 mg, Intravenous, Q4H PRN  ondansetron, 4 mg, Intravenous, Q6H PRN  oxyCODONE, 2 5 mg, Oral, Q4H PRN  oxyCODONE, 5 mg, Oral, Q4H PRN        Discharge Plan:    D    Network Utilization Review Department  ATTENTION: Please call with any questions or concerns to [phone] and carefully listen to the prompts so that you are directed to the right person  All voicemails are confidential   Valencia Brand all requests for admission clinical reviews, approved or denied determinations and any other requests to dedicated fax number below belonging to the campus where the patient is receiving treatment   List of dedicated fax numbers for the Facilities:  [address] [place] [address] DENIALS (Administrative/Medical Necessity) [phone]   [address] (Maternity/NICU/Pediatrics) [phone]   [address] [address] [facility] Shazia Hagan [address]76 (Phoebe James "[address]" [address]) [address] [facility] [phone] 244 [facility] 421 [facility] [phone]   [address] [phone]

## 2021-01-28 NOTE — PROGRESS NOTES
NEPHROLOGY PROGRESS NOTE   James Glasgow 80 y o  female MRN: [id number]  Unit/Bed#: E5 -01 Encounter: [id number]  Reason for Consult: LILIAN    ASSESSMENT AND PLAN:  LILIAN on CKD stage 3 to stage IV, baseline creatinine 1 8 to 2 0 going back to early 2020  -LILIAN suspect multifactorial, component of prerenal, cellulitis/infection issues  peak creatinine 3 7 now seems to be slowly improving and plateaued at 2 7 today   -patient will eventually need angiogram as per vascular surgery note  optimally this can be considered once renal function improves and closer to baseline and stable after discussing risks versus benefit  -currently has Scott catheter, voiding trial as per Urology      Right-sided hydronephrosis with right ureteral stent placed at [facility] in 2020, ongoing urology follow-up  -renal ultrasound shows severe left kidney atrophy 6 5 cm, diffuse cortical thinning on the right kidney 11 2 cm, mild right-sided hydronephrosis with ureteral stent in place      Borderline hyponatremia, serum sodium stable and slightly improved 135 today   -strict fluid restriction 1 2 L per day     Severe azotemia, seems to be improving with improving renal function  Blood pressure overall acceptable  Continue amlodipine, carvedilol, hydralazine, isosorbide  Avoid hypotension      Hypoxic respiratory failure, diastolic CHF, echo shows EF 05%, grade 2 diastolic dysfunction, dilated IVC  -status post Lasix yesterday  Weight reducing  Diuresing well  Give Lasix 40 mg IV once again today     -remains on 3 L O2 via nasal cannula      Discussed above plan in detail with primary team     SUBJECTIVE:  Patient seen and examined at bedside   No chest pain, denies worsening shortness of breath, nausea, vomiting, abdominal pain    OBJECTIVE:  Current Weight: Weight - Scale: 73 kg (160 lb 15 oz)  Vitals:    01/28/21 1145   BP: 133/60   Pulse: 61   Resp:    Temp:    SpO2:        Intake/Output Summary (Last 24 hours) at 1/28/2021 1346  Last data filed at 1/28/2021 0650  Gross per 24 hour   Intake    Output 1050 ml   Net -1050 ml     Wt Readings from Last 3 Encounters:   01/28/21 73 kg (160 lb 15 oz)   01/06/21 62 1 kg (136 lb 14 4 oz)   12/22/20 60 8 kg (134 lb)     Temp Readings from Last 3 Encounters:   01/28/21 97 5 °F (36 4 °C) (Temporal)   01/15/21 (!) 93 4 °F (34 1 °C) (Tympanic)   01/06/21 (!) 97 1 °F (36 2 °C) (Temporal)     BP Readings from Last 3 Encounters:   01/28/21 133/60   01/15/21 (!) 118/48   01/06/21 135/63     Pulse Readings from Last 3 Encounters:   01/28/21 61   01/06/21 55   12/22/20 74        Physical Examination:  General:  Lying in bed, no acute distress   Eyes:  Mild conjunctival pallor present  ENT:  External examination of ears and nose unremarkable  Neck:  No obvious lymphadenopathy appreciated  Respiratory:  Bilateral air entry present  CVS:  S1, S2 present, trace edema in legs  GI:  Soft nondistended  CNS:  Active alert oriented  Skin:  No new rash in legs, dressing present     Musculoskeletal:  No obvious gross deformity noted    Medications:    Current Facility-Administered Medications:     acetaminophen (TYLENOL) tablet 650 mg, 650 mg, Oral, Q6H PRN, Harsha Shook DO, 650 mg at 01/21/21 0004    amLODIPine (NORVASC) tablet 10 mg, 10 mg, Oral, Daily, OTIS Fowler, 10 mg at 01/28/21 0930    carvedilol (COREG) tablet 12 5 mg, 12 5 mg, Oral, BID With Meals, OTIS Fowler, 12 5 mg at 01/28/21 0930    collagenase (SANTYL) ointment, , Topical, Daily, Gaby Erwin MD, Given at 01/28/21 0930    docusate sodium (COLACE) capsule 100 mg, 100 mg, Oral, BID, Harsha Shook DO, 100 mg at 01/28/21 0930    hydrALAZINE (APRESOLINE) tablet 10 mg, 10 mg, Oral, Q8H [address], Harsha Shook DO, 10 mg at 01/28/21 0603    HYDROmorphone (DILAUDID) injection 1 mg, 1 mg, Intravenous, Q4H PRN, Angelique Argueta PA-C    isosorbide dinitrate (ISORDIL) tablet 10 mg, 10 mg, Oral, TID after meals, Harsha Shook DO, 10 mg at 01/28/21 1146    levothyroxine tablet 25 mcg, 25 mcg, Oral, Early Morning, Harsha Shook DO, 25 mcg at 01/28/21 0603    ondansetron (ZOFRAN) injection 4 mg, 4 mg, Intravenous, Q6H PRN, Harsha Shook DO    oxyCODONE (ROXICODONE) IR tablet 2 5 mg, 2 5 mg, Oral, Q4H PRN, Arabella Abraham PA-C    oxyCODONE (ROXICODONE) IR tablet 5 mg, 5 mg, Oral, Q4H PRN, Arabella Abraham PA-C, 5 mg at 01/24/21 1508    saccharomyces boulardii (FLORASTOR) capsule 250 mg, 250 mg, Oral, BID, Harsha Shook DO, 250 mg at 01/28/21 0930    senna (SENOKOT) tablet 8 6 mg, 1 tablet, Oral, BID, Arabella Abraham PA-C, 8 6 mg at 01/28/21 0930    warfarin (COUMADIN) tablet 3 mg, 3 mg, Oral, Daily (warfarin), Kiana Cuevas MD, 3 mg at 01/27/21 1715    Laboratory Results:  Results from last 7 days   Lab Units 01/28/21  0636 01/27/21  1012 01/27/21  0517 01/26/21  0541 01/25/21  0530 01/24/21  0537 01/23/21  0530 01/22/21  0454   WBC Thousand/uL  --  8 52  --  8 99 8 54 8 52 8 66 8 50   HEMOGLOBIN g/dL  --  8 1*  --  7 7* 7 9* 8 3* 8 3* 8 0*   HEMATOCRIT %  --  25 9*  --  24 6* 25 2* 25 8* 25 4* 24 6*   PLATELETS Thousands/uL  --  196  --  197 198 239 217 219   SODIUM mmol/L 135*  --  133* 134* 134* 130* 131* 132*   POTASSIUM mmol/L 3 5  --  3 8 3 9 3 8 3 8 3 4* 4 1   CHLORIDE mmol/L 99*  --  98* 99* 98* 96* 96* 98*   CO2 mmol/L 26  --  26 26 25 23 22 21   BUN mg/dL 92*  --  93* 99* 102* 110* 118* 125*   CREATININE mg/dL 2 72*  --  2 76* 2 82* 2 99* 3 16* 3 19* 3 36*   CALCIUM mg/dL 8 4  --  8 4 8 1* 8 2* 8 0* 7 9* 7 9*   MAGNESIUM mg/dL 2 3  --  2 4 2 3  --   --   --   --        CT abdomen pelvis wo contrast   Final Result by Miller Lipscomb MD (01/20 8729)      Some residual hydronephrosis appears to be present  The stent is located in the upper pole collecting system rather than the renal pelvis and the lower pigtail also abuts the ureterovesicular junction of the bladder    Urologic assessment of    position/function of the stent is advised  Bilateral moderate-sized pleural effusions  Small amount of ascites  Contracted gallbladder possible wall thickening and tiny gallstones  The study was marked in EPIC for significant notification  Workstation performed: [id number]         US kidney and bladder   Final Result by Romelia Coleman MD (01/20 1465)      1  Bilateral renal atrophy, more severe on the left  2   Mild right hydronephrosis with ureteral stent in place  3   Distended bladder  Workstation performed: [id number]         XR foot 2 vw right   Final Result by Romelia Coleman MD (01/20 4727)      No acute osseous abnormality  Workstation performed: [id number]             Portions of the record may have been created with voice recognition software  Occasional wrong word or "sound a like" substitutions may have occurred due to the inherent limitations of voice recognition software  Read the chart carefully and recognize, using context, where substitutions have occurred

## 2021-01-28 NOTE — CASE MANAGEMENT
Patient needs STR and has referrals pending in [facility] [address]  [facility] interested in patient  Patient pending medical clearance  Arteriogram being considered pending renal clearance  CM will continue to follow

## 2021-01-28 NOTE — PLAN OF CARE
Problem: Potential for Falls  Goal: Patient will remain free of falls  Description: INTERVENTIONS:  - Assess patient frequently for physical needs  -  Identify cognitive and physical deficits and behaviors that affect risk of falls  -  [place] fall precautions as indicated by assessment   - Educate patient/family on patient safety including physical limitations  - Instruct patient to call for assistance with activity based on assessment  - Modify environment to reduce risk of injury  - Consider OT/PT consult to assist with strengthening/mobility  Outcome: Progressing     Problem: Prexisting or High Potential for Compromised Skin Integrity  Goal: Skin integrity is maintained or improved  Description: INTERVENTIONS:  - Identify patients at risk for skin breakdown  - Assess and monitor skin integrity  - Assess and monitor nutrition and hydration status  - Monitor labs   - Assess for incontinence   - Turn and reposition patient  - Assist with mobility/ambulation  - Relieve pressure over bony prominences  - Avoid friction and shearing  - Provide appropriate hygiene as needed including keeping skin clean and dry  - Evaluate need for skin moisturizer/barrier cream  - Collaborate with interdisciplinary team   - Patient/family teaching  - Consider wound care consult   Outcome: Progressing     Problem: Nutrition/Hydration-ADULT  Goal: Nutrient/Hydration intake appropriate for improving, restoring or maintaining nutritional needs  Description: Monitor and assess patient's nutrition/hydration status for malnutrition  Collaborate with interdisciplinary team and initiate plan and interventions as ordered  Monitor patient's weight and dietary intake as ordered or per policy  Utilize nutrition screening tool and intervene as necessary  Determine patient's food preferences and provide high-protein, high-caloric foods as appropriate       INTERVENTIONS:  - Monitor oral intake, urinary output, labs, and treatment plans  - Assess nutrition and hydration status and recommend course of action  - Evaluate amount of meals eaten  - Assist patient with eating if necessary   - Allow adequate time for meals  - Recommend/ encourage appropriate diets, oral nutritional supplements, and vitamin/mineral supplements  - Order, calculate, and assess calorie counts as needed  - Recommend, monitor, and adjust tube feedings and TPN/PPN based on assessed needs  - Assess need for intravenous fluids  - Provide specific nutrition/hydration education as appropriate  - Include patient/family/caregiver in decisions related to nutrition  Outcome: Progressing     Problem: PAIN - ADULT  Goal: Verbalizes/displays adequate comfort level or baseline comfort level  Description: Interventions:  - Encourage patient to monitor pain and request assistance  - Assess pain using appropriate pain scale  - Administer analgesics based on type and severity of pain and evaluate response  - Implement non-pharmacological measures as appropriate and evaluate response  - Consider cultural and social influences on pain and pain management  - Notify physician/advanced practitioner if interventions unsuccessful or patient reports new pain  Outcome: Progressing     Problem: INFECTION - ADULT  Goal: Absence or prevention of progression during hospitalization  Description: INTERVENTIONS:  - Assess and monitor for signs and symptoms of infection  - Monitor lab/diagnostic results  - Monitor all insertion sites, i e  indwelling lines, tubes, and drains  - Monitor endotracheal if appropriate and nasal secretions for changes in amount and color  - [place] appropriate cooling/warming therapies per order  - Administer medications as ordered  - Instruct and encourage patient and family to use good hand hygiene technique  - Identify and instruct in appropriate isolation precautions for identified infection/condition  Outcome: Progressing  Goal: Absence of fever/infection during neutropenic period  Description: INTERVENTIONS:  - Monitor WBC    Outcome: Progressing     Problem: SAFETY ADULT  Goal: Patient will remain free of falls  Description: INTERVENTIONS:  - Assess patient frequently for physical needs  -  Identify cognitive and physical deficits and behaviors that affect risk of falls    -  [place] fall precautions as indicated by assessment   - Educate patient/family on patient safety including physical limitations  - Instruct patient to call for assistance with activity based on assessment  - Modify environment to reduce risk of injury  - Consider OT/PT consult to assist with strengthening/mobility  Outcome: Progressing  Goal: Maintain or return to baseline ADL function  Description: INTERVENTIONS:  -  Assess patient's ability to carry out ADLs; assess patient's baseline for ADL function and identify physical deficits which impact ability to perform ADLs (bathing, care of mouth/teeth, toileting, grooming, dressing, etc )  - Assess/evaluate cause of self-care deficits   - Assess range of motion  - Assess patient's mobility; develop plan if impaired  - Assess patient's need for assistive devices and provide as appropriate  - Encourage maximum independence but intervene and supervise when necessary  - Involve family in performance of ADLs  - Assess for home care needs following discharge   - Consider OT consult to assist with ADL evaluation and planning for discharge  - Provide patient education as appropriate  Outcome: Progressing  Goal: Maintain or return mobility status to optimal level  Description: INTERVENTIONS:  - Assess patient's baseline mobility status (ambulation, transfers, stairs, etc )    - Identify cognitive and physical deficits and behaviors that affect mobility  - Identify mobility aids required to assist with transfers and/or ambulation (gait belt, sit-to-stand, lift, walker, cane, etc )  - [place] fall precautions as indicated by assessment  - Record patient progress and toleration of activity level on Mobility SBAR; progress patient to next Phase/Stage  - Instruct patient to call for assistance with activity based on assessment  - Consider rehabilitation consult to assist with strengthening/weightbearing, etc   Outcome: Progressing     Problem: DISCHARGE PLANNING  Goal: Discharge to home or other facility with appropriate resources  Description: INTERVENTIONS:  - Identify barriers to discharge w/patient and caregiver  - Arrange for needed discharge resources and transportation as appropriate  - Identify discharge learning needs (meds, wound care, etc )  - Arrange for interpretive services to assist at discharge as needed  - Refer to Case Management Department for coordinating discharge planning if the patient needs post-hospital services based on physician/advanced practitioner order or complex needs related to functional status, cognitive ability, or social support system  Outcome: Progressing     Problem: Knowledge Deficit  Goal: Patient/family/caregiver demonstrates understanding of disease process, treatment plan, medications, and discharge instructions  Description: Complete learning assessment and assess knowledge base    Interventions:  - Provide teaching at level of understanding  - Provide teaching via preferred learning methods  Outcome: Progressing     Problem: GENITOURINARY - ADULT  Goal: Maintains or returns to baseline urinary function  Description: INTERVENTIONS:  - Assess urinary function  - Encourage oral fluids to ensure adequate hydration if ordered  - Administer IV fluids as ordered to ensure adequate hydration  - Administer ordered medications as needed  - Offer frequent toileting  - Follow urinary retention protocol if ordered  Outcome: Progressing  Goal: Absence of urinary retention  Description: INTERVENTIONS:  - Assess patients ability to void and empty bladder  - Monitor I/O  - Bladder scan as needed  - Discuss with physician/AP medications to alleviate retention as needed  - Discuss catheterization for long term situations as appropriate  Outcome: Progressing  Goal: Urinary catheter remains patent  Description: INTERVENTIONS:  - Assess patency of urinary catheter  - If patient has a chronic chadwick, consider changing catheter if non-functioning  - Follow guidelines for intermittent irrigation of non-functioning urinary catheter  Outcome: Progressing     Problem: RESPIRATORY - ADULT  Goal: Achieves optimal ventilation and oxygenation  Description: INTERVENTIONS:  - Assess for changes in respiratory status  - Assess for changes in mentation and behavior  - Position to facilitate oxygenation and minimize respiratory effort  - Oxygen administered by appropriate delivery if ordered  - Initiate smoking cessation education as indicated  - Encourage broncho-pulmonary hygiene including cough, deep breathe, Incentive Spirometry  - Assess the need for suctioning and aspirate as needed  - Assess and instruct to report SOB or any respiratory difficulty  - Respiratory Therapy support as indicated  Outcome: Progressing     Problem: METABOLIC, FLUID AND ELECTROLYTES - ADULT  Goal: Electrolytes maintained within normal limits  Description: INTERVENTIONS:  - Monitor labs and assess patient for signs and symptoms of electrolyte imbalances  - Administer electrolyte replacement as ordered  - Monitor response to electrolyte replacements, including repeat lab results as appropriate  - Instruct patient on fluid and nutrition as appropriate  Outcome: Progressing  Goal: Fluid balance maintained  Description: INTERVENTIONS:  - Monitor labs   - Monitor I/O and WT  - Instruct patient on fluid and nutrition as appropriate  - Assess for signs & symptoms of volume excess or deficit  Outcome: Progressing     Problem: SKIN/TISSUE INTEGRITY - ADULT  Goal: Skin integrity remains intact  Description: INTERVENTIONS  - Identify patients at risk for skin breakdown  - Assess and monitor skin integrity  - Assess and monitor nutrition and hydration status  - Monitor labs (i e  albumin)  - Assess for incontinence   - Turn and reposition patient  - Assist with mobility/ambulation  - Relieve pressure over bony prominences  - Avoid friction and shearing  - Provide appropriate hygiene as needed including keeping skin clean and dry  - Evaluate need for skin moisturizer/barrier cream  - Collaborate with interdisciplinary team (i e  Nutrition, Rehabilitation, etc )   - Patient/family teaching  Outcome: Progressing  Goal: Incision(s), wounds(s) or drain site(s) healing without S/S of infection  Description: INTERVENTIONS  - Assess and document risk factors for skin impairment   - Assess and document dressing, incision, wound bed, drain sites and surrounding tissue  - Consider nutrition services referral as needed  - Oral mucous membranes remain intact  - Provide patient/ family education  Outcome: Progressing     Problem: HEMATOLOGIC - ADULT  Goal: Maintains hematologic stability  Description: INTERVENTIONS  - Assess for signs and symptoms of bleeding or hemorrhage  - Monitor labs  - Administer supportive blood products/factors as ordered and appropriate  Outcome: Progressing

## 2021-01-28 NOTE — ASSESSMENT & PLAN NOTE
Dx with a right soleal vein occlusive DVT during her hospitalization 11/2020 at [facility]  - Continue coumadin and titrate to INR 2-3  - Continue Warfarin which was increased to 3mg hs 1/26/2021    Recent Labs     01/26/21  0541 01/27/21  1012 01/28/21  0636   INR 1 76* 1 71* 1 62*

## 2021-01-28 NOTE — ASSESSMENT & PLAN NOTE
Hypovolemia + SIADH   Stable    Recent Labs     01/26/21  0541 01/27/21  0517 01/28/21  0636   SODIUM 134* 133* 135*

## 2021-01-28 NOTE — ASSESSMENT & PLAN NOTE
Iron deficiency anemia with anemia s/p transfusion      Recent Labs     01/26/21  0541 01/27/21  1012   HGB 7 7* 8 1*   MCV 87 88   RDW 17 2* 17 2*

## 2021-01-28 NOTE — ASSESSMENT & PLAN NOTE
Wt Readings from Last 3 Encounters:   01/28/21 73 kg (160 lb 15 oz)   01/06/21 62 1 kg (136 lb 14 4 oz)   12/22/20 60 8 kg (134 lb)     History of congestive heart failure  Currently above baseline weight

## 2021-01-28 NOTE — PROGRESS NOTES
Progress Note - Howard Aschoff 5/18/1930, 80 y o  female MRN: [id number]    Unit/Bed#: E5 -01 Encounter: [id number]    Primary Care Provider: Kasie Alvares MD   Date and time admitted to hospital: 1/18/2021 11:44 AM        * Cellulitis  Assessment & Plan  80year old female admitted due to LLE cellulitis  Podiatry reviewed imaging and stated patient does not have OM or CHERYLE   - Completed Antibiotic course  - For arteriogram once renal function improves, awaiting renal clearance prior to reaching out to vascular again  PAD (peripheral artery disease) ([facility] [address] )  Assessment & Plan  As written above  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 ([facility] [address] )  Assessment & Plan  In the setting of advanced peripheral arterial disease    - For possible arteriogram once renal function improves and cleared by renal   - Continue wound care    Chronic hypoxemic respiratory failure (HCC)  Assessment & Plan  Secondary to CHF  Baseline at 3L    Acute kidney injury superimposed on chronic kidney disease [facility])  Assessment & Plan  Multifactorial: Pre-renal, infection  Gradually Improving    Recent Labs     01/26/21  0541 01/27/21  0517 01/28/21  0636   BUN 99* 93* 92*   CREATININE 2 82* 2 76* 2 72*   EGFR 14 15 15                 Acute deep vein thrombosis (DVT) of calf muscle vein of right lower extremity (HCC)  Assessment & Plan  Dx with a right soleal vein occlusive DVT during her hospitalization 11/2020 at [facility]  - Continue coumadin and titrate to INR 2-3  - Continue Warfarin which was increased to 3mg hs 1/26/2021    Recent Labs     01/26/21  0541 01/27/21  1012 01/28/21  0636   INR 1 76* 1 71* 1 62*         Essential hypertension  Assessment & Plan  Contiue coreg, hydralazine, amlodipine    Iron deficiency anemia secondary to inadequate dietary iron intake  Assessment & Plan  Iron deficiency anemia with anemia s/p transfusion      Recent Labs     01/26/21  0541 01/27/21  1012   HGB 7 7* 8 1*   MCV 87 88   RDW 17 2* 17 2* Hyponatremia  Assessment & Plan  Hypovolemia + SIADH  Stable    Recent Labs     21  0541 21  0517 21  0636   SODIUM 134* 133* 135*       Chronic diastolic heart failure (HCC)  Assessment & Plan  Wt Readings from Last 3 Encounters:   21 73 kg (160 lb 15 oz)   21 62 1 kg (136 lb 14 4 oz)   20 60 8 kg (134 lb)     History of congestive heart failure  Currently above baseline weight  Ambulatory dysfunction  Assessment & Plan  Impaired medical decision making capacity  - For placement once stable      VTE Pharmacologic Prophylaxis:   Pharmacologic: Warfarin (Coumadin)  Mechanical VTE Prophylaxis in Place: Yes    Patient Centered Rounds: I have performed bedside rounds with nursing staff today  Discussions with Specialists or Other Care Team Provider: Nursing    Education and Discussions with Family / Patient: patient, spouse    Time Spent for Care: 30 minutes  More than 50% of total time spent on counseling and coordination of care as described above  Current Length of Stay: 10 day(s)    Current Patient Status: Inpatient   Certification Statement: The patient will continue to require additional inpatient hospital stay due to renal clearance prior to intervention    Discharge Plan: active    Code Status: Level 1 - Full Code      Subjective:   Patient seen and examined at bedside  I again presented the option to both her and her spouse  Patient seems to be inclined to just let things be, however she would like to defer the final decision to her     Her spouse is unable to make a decision at this time and would like to wait and see as per renal recommendations to determine when it would be appropriate to send her for the arteriogram     Objective:     Vitals:   Temp (24hrs), Av 6 °F (36 4 °C), Min:97 5 °F (36 4 °C), Max:97 7 °F (36 5 °C)    Temp:  [97 5 °F (36 4 °C)-97 7 °F (36 5 °C)] 97 5 °F (36 4 °C)  HR:  [61-68] 61  Resp:  [18] 18  BP: (127-158)/(53-78) 133/60  SpO2:  [94 %-96 %] 94 %  Body mass index is 25 98 kg/m²  Input and Output Summary (last 24 hours): Intake/Output Summary (Last 24 hours) at 1/28/2021 1315  Last data filed at 1/28/2021 0650  Gross per 24 hour   Intake    Output 1050 ml   Net -1050 ml       Physical Exam:     Physical Exam  Vitals signs reviewed  HENT:      Head: Normocephalic  Nose: Nose normal       Mouth/Throat:      Mouth: Mucous membranes are moist    Eyes:      General: No scleral icterus  Extraocular Movements: Extraocular movements intact  Cardiovascular:      Rate and Rhythm: Normal rate  Pulmonary:      Effort: Pulmonary effort is normal  No respiratory distress  Abdominal:      General: There is no distension  Palpations: Abdomen is soft  Tenderness: There is no abdominal tenderness  Musculoskeletal:      Comments: Left leg wound c/d/i  Right 4th/5th digit eschar and posterior heel eschar   Skin:     General: Skin is warm  Neurological:      Mental Status: She is alert  Mental status is at baseline  Psychiatric:         Mood and Affect: Mood normal          Behavior: Behavior normal        Additional Data:     Labs:    Results from last 7 days   Lab Units 01/27/21  1012   WBC Thousand/uL 8 52   HEMOGLOBIN g/dL 8 1*   HEMATOCRIT % 25 9*   PLATELETS Thousands/uL 196   NEUTROS PCT % 70   LYMPHS PCT % 18   MONOS PCT % 7   EOS PCT % 4     Results from last 7 days   Lab Units 01/28/21  0636   SODIUM mmol/L 135*   POTASSIUM mmol/L 3 5   CHLORIDE mmol/L 99*   CO2 mmol/L 26   BUN mg/dL 92*   CREATININE mg/dL 2 72*   ANION GAP mmol/L 10   CALCIUM mg/dL 8 4   GLUCOSE RANDOM mg/dL 101     Results from last 7 days   Lab Units 01/28/21  0636   INR  1 62*                       * I Have Reviewed All Lab Data Listed Above  * Additional Pertinent Lab Tests Reviewed:  [address] Admission Reviewed    Imaging:    Imaging Reports Reviewed Today Include: No new imaging    Recent Cultures (last 7 days):           Last 24 Hours Medication List:   Current Facility-Administered Medications   Medication Dose Route Frequency Provider Last Rate    acetaminophen  650 mg Oral Q6H PRN Aleah Fontenot, DO      amLODIPine  10 mg Oral Daily OTIS Coleman      carvedilol  12 5 mg Oral BID With Meals OTIS Coleman      collagenase   Topical Daily Hanna Flowers MD      docusate sodium  100 mg Oral BID Harsha Shook, DO      hydrALAZINE  10 mg Oral Q8H [address] Harshakaty Hoffmann, DO      HYDROmorphone  1 mg Intravenous Q4H PRN Myrna Drew PA-C      isosorbide dinitrate  10 mg Oral TID after meals Harsha Shook, DO      levothyroxine  25 mcg Oral Early Morning Harsha Shook, DO      ondansetron  4 mg Intravenous Q6H PRN Aleah Fontenot, DO      oxyCODONE  2 5 mg Oral Q4H PRN Merary Abraham PA-C      oxyCODONE  5 mg Oral Q4H PRN Arabella Abraham PA-C      saccharomyces boulardii  250 mg Oral BID Harsha Shook,       senna  1 tablet Oral BID Arabella Abraham PA-C      warfarin  3 mg Oral Daily (warfarin) Hanna Flowers MD          Today, Patient Was Seen By: Tiffany Coats MD    ** Please Note: Dictation voice to text software may have been used in the creation of this document   **

## 2021-01-28 NOTE — ASSESSMENT & PLAN NOTE
80year old female admitted due to LLE cellulitis  Podiatry reviewed imaging and stated patient does not have OM or CHERYLE   - Completed Antibiotic course  - For arteriogram once renal function improves, awaiting renal clearance prior to reaching out to vascular again

## 2021-01-28 NOTE — PLAN OF CARE
Problem: Potential for Falls  Goal: Patient will remain free of falls  Description: INTERVENTIONS:  - Assess patient frequently for physical needs  -  Identify cognitive and physical deficits and behaviors that affect risk of falls  -  [place] fall precautions as indicated by assessment   - Educate patient/family on patient safety including physical limitations  - Instruct patient to call for assistance with activity based on assessment  - Modify environment to reduce risk of injury  - Consider OT/PT consult to assist with strengthening/mobility  Outcome: Progressing     Problem: Prexisting or High Potential for Compromised Skin Integrity  Goal: Skin integrity is maintained or improved  Description: INTERVENTIONS:  - Identify patients at risk for skin breakdown  - Assess and monitor skin integrity  - Assess and monitor nutrition and hydration status  - Monitor labs   - Assess for incontinence   - Turn and reposition patient  - Assist with mobility/ambulation  - Relieve pressure over bony prominences  - Avoid friction and shearing  - Provide appropriate hygiene as needed including keeping skin clean and dry  - Evaluate need for skin moisturizer/barrier cream  - Collaborate with interdisciplinary team   - Patient/family teaching  - Consider wound care consult   Outcome: Progressing     Problem: Nutrition/Hydration-ADULT  Goal: Nutrient/Hydration intake appropriate for improving, restoring or maintaining nutritional needs  Description: Monitor and assess patient's nutrition/hydration status for malnutrition  Collaborate with interdisciplinary team and initiate plan and interventions as ordered  Monitor patient's weight and dietary intake as ordered or per policy  Utilize nutrition screening tool and intervene as necessary  Determine patient's food preferences and provide high-protein, high-caloric foods as appropriate       INTERVENTIONS:  - Monitor oral intake, urinary output, labs, and treatment plans  - Assess nutrition and hydration status and recommend course of action  - Evaluate amount of meals eaten  - Assist patient with eating if necessary   - Allow adequate time for meals  - Recommend/ encourage appropriate diets, oral nutritional supplements, and vitamin/mineral supplements  - Order, calculate, and assess calorie counts as needed  - Recommend, monitor, and adjust tube feedings and TPN/PPN based on assessed needs  - Assess need for intravenous fluids  - Provide specific nutrition/hydration education as appropriate  - Include patient/family/caregiver in decisions related to nutrition  Outcome: Progressing     Problem: PAIN - ADULT  Goal: Verbalizes/displays adequate comfort level or baseline comfort level  Description: Interventions:  - Encourage patient to monitor pain and request assistance  - Assess pain using appropriate pain scale  - Administer analgesics based on type and severity of pain and evaluate response  - Implement non-pharmacological measures as appropriate and evaluate response  - Consider cultural and social influences on pain and pain management  - Notify physician/advanced practitioner if interventions unsuccessful or patient reports new pain  Outcome: Progressing     Problem: INFECTION - ADULT  Goal: Absence or prevention of progression during hospitalization  Description: INTERVENTIONS:  - Assess and monitor for signs and symptoms of infection  - Monitor lab/diagnostic results  - Monitor all insertion sites, i e  indwelling lines, tubes, and drains  - Monitor endotracheal if appropriate and nasal secretions for changes in amount and color  - [place] appropriate cooling/warming therapies per order  - Administer medications as ordered  - Instruct and encourage patient and family to use good hand hygiene technique  - Identify and instruct in appropriate isolation precautions for identified infection/condition  Outcome: Progressing  Goal: Absence of fever/infection during neutropenic period  Description: INTERVENTIONS:  - Monitor WBC    Outcome: Progressing     Problem: SAFETY ADULT  Goal: Patient will remain free of falls  Description: INTERVENTIONS:  - Assess patient frequently for physical needs  -  Identify cognitive and physical deficits and behaviors that affect risk of falls    -  [place] fall precautions as indicated by assessment   - Educate patient/family on patient safety including physical limitations  - Instruct patient to call for assistance with activity based on assessment  - Modify environment to reduce risk of injury  - Consider OT/PT consult to assist with strengthening/mobility  Outcome: Progressing  Goal: Maintain or return to baseline ADL function  Description: INTERVENTIONS:  -  Assess patient's ability to carry out ADLs; assess patient's baseline for ADL function and identify physical deficits which impact ability to perform ADLs (bathing, care of mouth/teeth, toileting, grooming, dressing, etc )  - Assess/evaluate cause of self-care deficits   - Assess range of motion  - Assess patient's mobility; develop plan if impaired  - Assess patient's need for assistive devices and provide as appropriate  - Encourage maximum independence but intervene and supervise when necessary  - Involve family in performance of ADLs  - Assess for home care needs following discharge   - Consider OT consult to assist with ADL evaluation and planning for discharge  - Provide patient education as appropriate  Outcome: Progressing  Goal: Maintain or return mobility status to optimal level  Description: INTERVENTIONS:  - Assess patient's baseline mobility status (ambulation, transfers, stairs, etc )    - Identify cognitive and physical deficits and behaviors that affect mobility  - Identify mobility aids required to assist with transfers and/or ambulation (gait belt, sit-to-stand, lift, walker, cane, etc )  - [place] fall precautions as indicated by assessment  - Record patient progress and toleration of activity level on Mobility SBAR; progress patient to next Phase/Stage  - Instruct patient to call for assistance with activity based on assessment  - Consider rehabilitation consult to assist with strengthening/weightbearing, etc   Outcome: Progressing     Problem: DISCHARGE PLANNING  Goal: Discharge to home or other facility with appropriate resources  Description: INTERVENTIONS:  - Identify barriers to discharge w/patient and caregiver  - Arrange for needed discharge resources and transportation as appropriate  - Identify discharge learning needs (meds, wound care, etc )  - Arrange for interpretive services to assist at discharge as needed  - Refer to Case Management Department for coordinating discharge planning if the patient needs post-hospital services based on physician/advanced practitioner order or complex needs related to functional status, cognitive ability, or social support system  Outcome: Progressing     Problem: Knowledge Deficit  Goal: Patient/family/caregiver demonstrates understanding of disease process, treatment plan, medications, and discharge instructions  Description: Complete learning assessment and assess knowledge base    Interventions:  - Provide teaching at level of understanding  - Provide teaching via preferred learning methods  Outcome: Progressing     Problem: GENITOURINARY - ADULT  Goal: Maintains or returns to baseline urinary function  Description: INTERVENTIONS:  - Assess urinary function  - Encourage oral fluids to ensure adequate hydration if ordered  - Administer IV fluids as ordered to ensure adequate hydration  - Administer ordered medications as needed  - Offer frequent toileting  - Follow urinary retention protocol if ordered  Outcome: Progressing  Goal: Absence of urinary retention  Description: INTERVENTIONS:  - Assess patients ability to void and empty bladder  - Monitor I/O  - Bladder scan as needed  - Discuss with physician/AP medications to alleviate retention as needed  - Discuss catheterization for long term situations as appropriate  Outcome: Progressing  Goal: Urinary catheter remains patent  Description: INTERVENTIONS:  - Assess patency of urinary catheter  - If patient has a chronic chadwick, consider changing catheter if non-functioning  - Follow guidelines for intermittent irrigation of non-functioning urinary catheter  Outcome: Progressing     Problem: RESPIRATORY - ADULT  Goal: Achieves optimal ventilation and oxygenation  Description: INTERVENTIONS:  - Assess for changes in respiratory status  - Assess for changes in mentation and behavior  - Position to facilitate oxygenation and minimize respiratory effort  - Oxygen administered by appropriate delivery if ordered  - Initiate smoking cessation education as indicated  - Encourage broncho-pulmonary hygiene including cough, deep breathe, Incentive Spirometry  - Assess the need for suctioning and aspirate as needed  - Assess and instruct to report SOB or any respiratory difficulty  - Respiratory Therapy support as indicated  Outcome: Progressing     Problem: METABOLIC, FLUID AND ELECTROLYTES - ADULT  Goal: Electrolytes maintained within normal limits  Description: INTERVENTIONS:  - Monitor labs and assess patient for signs and symptoms of electrolyte imbalances  - Administer electrolyte replacement as ordered  - Monitor response to electrolyte replacements, including repeat lab results as appropriate  - Instruct patient on fluid and nutrition as appropriate  Outcome: Progressing  Goal: Fluid balance maintained  Description: INTERVENTIONS:  - Monitor labs   - Monitor I/O and WT  - Instruct patient on fluid and nutrition as appropriate  - Assess for signs & symptoms of volume excess or deficit  Outcome: Progressing     Problem: SKIN/TISSUE INTEGRITY - ADULT  Goal: Skin integrity remains intact  Description: INTERVENTIONS  - Identify patients at risk for skin breakdown  - Assess and monitor skin integrity  - Assess and monitor nutrition and hydration status  - Monitor labs (i e  albumin)  - Assess for incontinence   - Turn and reposition patient  - Assist with mobility/ambulation  - Relieve pressure over bony prominences  - Avoid friction and shearing  - Provide appropriate hygiene as needed including keeping skin clean and dry  - Evaluate need for skin moisturizer/barrier cream  - Collaborate with interdisciplinary team (i e  Nutrition, Rehabilitation, etc )   - Patient/family teaching  Outcome: Progressing  Goal: Incision(s), wounds(s) or drain site(s) healing without S/S of infection  Description: INTERVENTIONS  - Assess and document risk factors for skin impairment   - Assess and document dressing, incision, wound bed, drain sites and surrounding tissue  - Consider nutrition services referral as needed  - Oral mucous membranes remain intact  - Provide patient/ family education  Outcome: Progressing     Problem: HEMATOLOGIC - ADULT  Goal: Maintains hematologic stability  Description: INTERVENTIONS  - Assess for signs and symptoms of bleeding or hemorrhage  - Monitor labs  - Administer supportive blood products/factors as ordered and appropriate  Outcome: Progressing

## 2021-01-29 PROBLEM — E87.1 HYPONATREMIA: Status: RESOLVED | Noted: 2020-11-09 | Resolved: 2021-01-29

## 2021-01-29 LAB
ANION GAP SERPL CALCULATED.3IONS-SCNC: 12 MMOL/L (ref 4–13)
BUN SERPL-MCNC: 88 MG/DL (ref 5–25)
CALCIUM SERPL-MCNC: 8.5 MG/DL (ref 8.3–10.1)
CHLORIDE SERPL-SCNC: 101 MMOL/L (ref 100–108)
CO2 SERPL-SCNC: 25 MMOL/L (ref 21–32)
CREAT SERPL-MCNC: 2.82 MG/DL (ref 0.6–1.3)
GFR SERPL CREATININE-BSD FRML MDRD: 14 ML/MIN/1.73SQ M
GLUCOSE SERPL-MCNC: 88 MG/DL (ref 65–140)
INR PPP: 1.71 (ref 0.84–1.19)
POTASSIUM SERPL-SCNC: 3.3 MMOL/L (ref 3.5–5.3)
PROTHROMBIN TIME: 19.7 SECONDS (ref 11.6–14.5)
SODIUM SERPL-SCNC: 138 MMOL/L (ref 136–145)

## 2021-01-29 PROCEDURE — 85610 PROTHROMBIN TIME: CPT | Performed by: STUDENT IN AN ORGANIZED HEALTH CARE EDUCATION/TRAINING PROGRAM

## 2021-01-29 PROCEDURE — 99233 SBSQ HOSP IP/OBS HIGH 50: CPT | Performed by: INTERNAL MEDICINE

## 2021-01-29 PROCEDURE — 99232 SBSQ HOSP IP/OBS MODERATE 35: CPT | Performed by: STUDENT IN AN ORGANIZED HEALTH CARE EDUCATION/TRAINING PROGRAM

## 2021-01-29 PROCEDURE — 97535 SELF CARE MNGMENT TRAINING: CPT

## 2021-01-29 PROCEDURE — 97530 THERAPEUTIC ACTIVITIES: CPT

## 2021-01-29 PROCEDURE — 97110 THERAPEUTIC EXERCISES: CPT

## 2021-01-29 PROCEDURE — 80048 BASIC METABOLIC PNL TOTAL CA: CPT | Performed by: STUDENT IN AN ORGANIZED HEALTH CARE EDUCATION/TRAINING PROGRAM

## 2021-01-29 RX ORDER — POTASSIUM CHLORIDE 20 MEQ/1
20 TABLET, EXTENDED RELEASE ORAL ONCE
Status: COMPLETED | OUTPATIENT
Start: 2021-01-29 | End: 2021-01-29

## 2021-01-29 RX ORDER — WARFARIN SODIUM 4 MG/1
4 TABLET ORAL
Status: DISCONTINUED | OUTPATIENT
Start: 2021-01-29 | End: 2021-01-30

## 2021-01-29 RX ADMIN — HYDRALAZINE HYDROCHLORIDE 10 MG: 10 TABLET, FILM COATED ORAL at 22:53

## 2021-01-29 RX ADMIN — ISOSORBIDE DINITRATE 10 MG: 10 TABLET ORAL at 17:04

## 2021-01-29 RX ADMIN — ISOSORBIDE DINITRATE 10 MG: 10 TABLET ORAL at 13:06

## 2021-01-29 RX ADMIN — HYDRALAZINE HYDROCHLORIDE 10 MG: 10 TABLET, FILM COATED ORAL at 13:05

## 2021-01-29 RX ADMIN — COLLAGENASE SANTYL 1 APPLICATION: 250 OINTMENT TOPICAL at 08:06

## 2021-01-29 RX ADMIN — POTASSIUM CHLORIDE 20 MEQ: 1500 TABLET, EXTENDED RELEASE ORAL at 13:04

## 2021-01-29 RX ADMIN — CARVEDILOL 12.5 MG: 6.25 TABLET, FILM COATED ORAL at 08:02

## 2021-01-29 RX ADMIN — Medication 250 MG: at 08:02

## 2021-01-29 RX ADMIN — WARFARIN SODIUM 4 MG: 4 TABLET ORAL at 17:04

## 2021-01-29 RX ADMIN — AMLODIPINE BESYLATE 10 MG: 10 TABLET ORAL at 08:03

## 2021-01-29 RX ADMIN — CARVEDILOL 12.5 MG: 6.25 TABLET, FILM COATED ORAL at 17:04

## 2021-01-29 RX ADMIN — LEVOTHYROXINE SODIUM 25 MCG: 25 TABLET ORAL at 05:07

## 2021-01-29 RX ADMIN — HYDRALAZINE HYDROCHLORIDE 10 MG: 10 TABLET, FILM COATED ORAL at 05:07

## 2021-01-29 RX ADMIN — POTASSIUM CHLORIDE 20 MEQ: 1500 TABLET, EXTENDED RELEASE ORAL at 17:04

## 2021-01-29 RX ADMIN — Medication 250 MG: at 17:04

## 2021-01-29 RX ADMIN — ISOSORBIDE DINITRATE 10 MG: 10 TABLET ORAL at 08:03

## 2021-01-29 NOTE — ASSESSMENT & PLAN NOTE
Multifactorial: Pre-renal, infection   Gradually Improving    Recent Labs     01/27/21  0517 01/28/21  0636 01/29/21  0438   BUN 93* 92* 88*   CREATININE 2 76* 2 72* 2 82*   EGFR 15 15 14

## 2021-01-29 NOTE — ASSESSMENT & PLAN NOTE
Iron deficiency anemia with anemia s/p transfusion      Recent Labs     01/27/21  1012   HGB 8 1*   MCV 88   RDW 17 2*

## 2021-01-29 NOTE — OCCUPATIONAL THERAPY NOTE
[address] Progress Note     Patient Name: Alonzo Blanton  LRJNO'Y Date: 1/29/2021  Problem List  Principal Problem:    Cellulitis  Active Problems:    Acute deep vein thrombosis (DVT) of calf muscle vein of right lower extremity (HCC)    Acute kidney injury superimposed on chronic kidney disease (HCC)    Hyponatremia    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 (HCC)    PAD (peripheral artery disease) (HCC)    Essential hypertension    Iron deficiency anemia secondary to inadequate dietary iron intake    Chronic diastolic heart failure ([facility] [address] )    Ambulatory dysfunction    Chronic hypoxemic respiratory failure ([facility] [address] )    Coagulopathy ([facility] [address] )              01/29/21 1143   OT Last Visit   OT Visit Date 01/29/21   Note Type   Note Type Treatment   Restrictions/Precautions   Weight Bearing Precautions Per Order Yes   RLE Weight Bearing Per Order WBAT   LLE Weight Bearing Per Order WBAT   Other Precautions Cognitive; Bed Alarm;Multiple lines; Fall Risk   Pain Assessment   Pain Assessment Tool 0-10   Pain Score No Pain  (Denied at rest but c/o pain when standing- no quantity)   ADL   Grooming Assistance 6  Modified Independent   Grooming Deficit Setup; Teeth care;Wash/dry face   Grooming Comments Pt combed hair w/ Min due to knots in hair    UB Bathing Assistance 6  Modified Independent   UB Bathing Deficit Setup  (armpits only )   UB Dressing Assistance 4  Minimal Assistance   UB Dressing Deficit Thread RUE; Thread LUE;Pull around back  (Donned robe due to being cold )   Functional Standing Tolerance   Time 10-20 seconds x 3 trials    Comments Mod x 2 for standing trials    Bed Mobility   Supine to Sit 3  Moderate assistance   Additional items Assist x 2;HOB elevated; Bedrails; Increased time required   Sit to Supine 2  Maximal assistance   Additional items Assist x 2; Increased time required; Bedrails   Additional Comments Pt sat on EOB x 30 mins w/ F+ balance    Transfers   Sit to Stand 3  Moderate assistance   Additional items Assist x 2; Increased time required  (cues for hand placement )   Stand to Sit 3  Moderate assistance   Additional items Assist x 2; Increased time required  (cues for hand placement )   Additional Comments Pt on 3L o2 at 90%  Pt removed oxygen during self care and dropped to 88%  Cognition   Overall Cognitive Status Impaired   Arousal/Participation Alert   Attention Attends with cues to redirect   Orientation Level Oriented X4   Following Commands Follows multistep commands with increased time or repetition   Comments Pt requires increased encouragement to participate in therapy  Appears self limiting at times  Activity Tolerance   Activity Tolerance Patient limited by pain   Medical Staff Made Aware PT, mahogany    Assessment   Assessment RN cleared pt for OT tx  Upon entry pt in supine and initially declined therapy  Extensive education and encouragement provided to pt; agreeable to participate  Denies pain at rest  Required assist x 2 for all transfers  Performed standing trial w/ assist x 2 for 3 times and stood for 10-20 secs  Reports pain when standing but did not quantify  Educated pt on needing to try standing more w/ therapy to increase function  Pt brushed teeth, washed face, washed armpits, and brushed most of hair w/ MI  Required Min to don robe due to being cold  Continue OT to achieve goals and maximize function  Remains below baseline  Appears self limiting at times  Recommendation for rehab remains appropriate  Plan   Treatment Interventions ADL retraining;Functional transfer training; Endurance training;Patient/family training; Activityengagement   Goal Expiration Date 02/02/21   OT Treatment Day 4   OT Frequency 3-5x/wk   Recommendation   OT Discharge Recommendation Post-Acute Rehabilitation Services   OT - OK to Discharge   (when medically cleared )     Tram DOBSON OTR/L

## 2021-01-29 NOTE — ASSESSMENT & PLAN NOTE
Wt Readings from Last 3 Encounters:   01/29/21 75 4 kg (166 lb 3 6 oz)   01/06/21 62 1 kg (136 lb 14 4 oz)   12/22/20 60 8 kg (134 lb)     History of congestive heart failure  Not in exacerbation

## 2021-01-29 NOTE — PROGRESS NOTES
Progress Note - Macy Portillo 5/18/1930, 80 y o  female MRN: [id number]    Unit/Bed#: E5 -01 Encounter: [id number]    Primary Care Provider: Gia Britt MD   Date and time admitted to hospital: 1/18/2021 11:44 AM        * Cellulitis  Assessment & Plan  80year old female admitted due to LLE cellulitis  Podiatry reviewed imaging and stated patient does not have OM or CHERYLE   - Completed Antibiotic course  - Renal would like to reassess her over the weekend and see if she would be stable for arteriogram on monday  - Patient without capacity,  still unable to make a decision but is not interested in "leaving it as it is for now " Thus, still planning for intervention once cleared by renal     PAD (peripheral artery disease) ([facility] [address] )  Assessment & Plan  As written above  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 ([facility] )  Assessment & Plan  In the setting of advanced peripheral arterial disease    - For possible arteriogram once renal function improves and cleared by renal   - Continue wound care    Chronic hypoxemic respiratory failure (HCC)  Assessment & Plan  Secondary to CHF  Baseline at 3L    Acute kidney injury superimposed on chronic kidney disease [facility])  Assessment & Plan  Multifactorial: Pre-renal, infection   Gradually Improving    Recent Labs     01/27/21  0517 01/28/21  0636 01/29/21  0438   BUN 93* 92* 88*   CREATININE 2 76* 2 72* 2 82*   EGFR 15 15 14                 Acute deep vein thrombosis (DVT) of calf muscle vein of right lower extremity (HCC)  Assessment & Plan  Dx with a right soleal vein occlusive DVT during her hospitalization 11/2020 at [facility]  - Continue coumadin and titrate to INR 2-3  - Increase Warfarin to 4mg hs 1/29/2021    Recent Labs     01/27/21  1012 01/28/21  0636 01/29/21  0438   INR 1 71* 1 62* 1 71*         Essential hypertension  Assessment & Plan  Contiue coreg, hydralazine, amlodipine    Iron deficiency anemia secondary to inadequate dietary iron intake  Assessment & Plan  Iron deficiency anemia with anemia s/p transfusion  Recent Labs     21  1012   HGB 8 1*   MCV 88   RDW 17 2*         Hyponatremiaresolved as of 2021  Assessment & Plan  Hypovolemia + SIADH  Resolved  Recent Labs     21  0517 21  0636 21  0438   SODIUM 133* 135* 138       Chronic diastolic heart failure (HCC)  Assessment & Plan  Wt Readings from Last 3 Encounters:   21 75 4 kg (166 lb 3 6 oz)   21 62 1 kg (136 lb 14 4 oz)   20 60 8 kg (134 lb)     History of congestive heart failure  Not in exacerbation  Ambulatory dysfunction  Assessment & Plan  Impaired medical decision making capacity  - For placement once stable      VTE Pharmacologic Prophylaxis:   Pharmacologic: Warfarin (Coumadin)  Mechanical VTE Prophylaxis in Place: Yes    Patient Centered Rounds: I have performed bedside rounds with nursing staff today  Discussions with Specialists or Other Care Team Provider: Nursing    Education and Discussions with Family / Patient: patient    Time Spent for Care: 30 minutes  More than 50% of total time spent on counseling and coordination of care as described above  Current Length of Stay: 11 day(s)    Current Patient Status: Inpatient   Certification Statement: The patient will continue to require additional inpatient hospital stay due to renal reevaluation prior to arteriogram    Discharge Plan: active    Code Status: Level 1 - Full Code      Subjective:   Patient seen and examined at bedside  No acute events or complaints overnight  I explained to her, her 's decision to wait for renals re-evaluation and clearance prior to the intervention instead of letting it be      Objective:     Vitals:   Temp (24hrs), Av 5 °F (36 4 °C), Min:97 3 °F (36 3 °C), Max:97 7 °F (36 5 °C)    Temp:  [97 3 °F (36 3 °C)-97 7 °F (36 5 °C)] 97 6 °F (36 4 °C)  HR:  [60-67] 60  Resp:  [18-22] 20  BP: (122-166)/(61-89) 153/89  SpO2:  [91 %-95 %] 91 %  Body mass index is 26 83 kg/m²  Input and Output Summary (last 24 hours): Intake/Output Summary (Last 24 hours) at 1/29/2021 1636  Last data filed at 1/29/2021 1242  Gross per 24 hour   Intake 510 ml   Output 975 ml   Net -465 ml       Physical Exam:     Physical Exam  Vitals signs reviewed  HENT:      Head: Normocephalic  Nose: Nose normal       Mouth/Throat:      Mouth: Mucous membranes are moist    Eyes:      General: No scleral icterus  Extraocular Movements: Extraocular movements intact  Cardiovascular:      Rate and Rhythm: Normal rate  Pulmonary:      Effort: Pulmonary effort is normal  No respiratory distress  Abdominal:      General: There is no distension  Palpations: Abdomen is soft  Tenderness: There is no abdominal tenderness  Musculoskeletal:      Comments: Left leg wound dressing c/d/i, right 4th,5th digit and posterior heel eschar   Skin:     General: Skin is warm  Neurological:      Mental Status: She is alert  Mental status is at baseline  Psychiatric:         Mood and Affect: Mood normal          Behavior: Behavior normal        Additional Data:     Labs:    Results from last 7 days   Lab Units 01/27/21  1012   WBC Thousand/uL 8 52   HEMOGLOBIN g/dL 8 1*   HEMATOCRIT % 25 9*   PLATELETS Thousands/uL 196   NEUTROS PCT % 70   LYMPHS PCT % 18   MONOS PCT % 7   EOS PCT % 4     Results from last 7 days   Lab Units 01/29/21  0438   SODIUM mmol/L 138   POTASSIUM mmol/L 3 3*   CHLORIDE mmol/L 101   CO2 mmol/L 25   BUN mg/dL 88*   CREATININE mg/dL 2 82*   ANION GAP mmol/L 12   CALCIUM mg/dL 8 5   GLUCOSE RANDOM mg/dL 88     Results from last 7 days   Lab Units 01/29/21  0438   INR  1 71*                       * I Have Reviewed All Lab Data Listed Above  * Additional Pertinent Lab Tests Reviewed:  [address] Admission Reviewed    Imaging:    Imaging Reports Reviewed Today Include: No new imaging    Recent Cultures (last 7 days):           Last 24 Hours Medication List:   Current Facility-Administered Medications   Medication Dose Route Frequency Provider Last Rate    acetaminophen  650 mg Oral Q6H PRN Yolanda Regulus, DO      amLODIPine  10 mg Oral Daily OTIS Vallecillo      carvedilol  12 5 mg Oral BID With Meals OTIS Vallecillo      collagenase   Topical Daily Renetta Kendrick MD      docusate sodium  100 mg Oral BID Harsha Shook, DO      hydrALAZINE  10 mg Oral Q8H [address] Harsha Lázarocia Gut, DO      HYDROmorphone  1 mg Intravenous Q4H PRN James Parry PA-C      isosorbide dinitrate  10 mg Oral TID after meals Harsha Shook, DO      levothyroxine  25 mcg Oral Early Morning Harsha Shook, DO      ondansetron  4 mg Intravenous Q6H PRN Helper Regulus, DO      oxyCODONE  2 5 mg Oral Q4H PRN Arabella Abraham PA-C      oxyCODONE  5 mg Oral Q4H PRN Arabella Abraham PA-C      potassium chloride  20 mEq Oral Once Fausto Mcdaniel MD      potassium chloride  20 mEq Oral Once Fausto Mcdaniel MD      saccharomyces boulardii  250 mg Oral BID Harsha Shook, DO      senna  1 tablet Oral BID Arabella Abraham PA-C      warfarin  4 mg Oral Daily (warfarin) Renetta Kendrick MD          Today, Patient Was Seen By: Layla Connors MD    ** Please Note: Dictation voice to text software may have been used in the creation of this document   **

## 2021-01-29 NOTE — ASSESSMENT & PLAN NOTE
Dx with a right soleal vein occlusive DVT during her hospitalization 11/2020 at [facility]  - Continue coumadin and titrate to INR 2-3  - Increase Warfarin to 4mg hs 1/29/2021    Recent Labs     01/27/21  1012 01/28/21  0636 01/29/21  0438   INR 1 71* 1 62* 1 71*

## 2021-01-29 NOTE — ASSESSMENT & PLAN NOTE
Hypovolemia + SIADH  Resolved      Recent Labs     01/27/21  0517 01/28/21  0636 01/29/21  0438   SODIUM 133* 135* 138

## 2021-01-29 NOTE — PLAN OF CARE
Problem: OCCUPATIONAL THERAPY ADULT  Goal: Performs self-care activities at highest level of function for planned discharge setting  See evaluation for individualized goals  Description: Treatment Interventions: ADL retraining, Functional transfer training, UE strengthening/ROM, Endurance training, Patient/family training, Equipment evaluation/education, Compensatory technique education, Continued evaluation, Activityengagement          See flowsheet documentation for full assessment, interventions and recommendations  Outcome: Progressing  Note: Limitation: Decreased ADL status, Decreased UE ROM, Decreased UE strength, Decreased Safe judgement during ADL, Decreased cognition, Decreased endurance, Decreased self-care trans, Decreased high-level ADLs  Prognosis: Fair  Assessment: RN cleared pt for OT tx  Upon entry pt in supine and initially declined therapy  Extensive education and encouragement provided to pt; agreeable to participate  Denies pain at rest  Required assist x 2 for all transfers  Performed standing trial w/ assist x 2 for 3 times and stood for 10-20 secs  Reports pain when standing but did not quantify  Educated pt on needing to try standing more w/ therapy to increase function  Pt brushed teeth, washed face, washed armpits, and brushed most of hair w/ MI  Required Min to don robe due to being cold  Continue OT to achieve goals and maximize function  Remains below baseline  Appears self limiting at times  Recommendation for rehab remains appropriate        OT Discharge Recommendation: Post-Acute Rehabilitation Services  OT - OK to Discharge: (when medically cleared )

## 2021-01-29 NOTE — PROGRESS NOTES
NEPHROLOGY PROGRESS NOTE   Glenys Escalera 80 y o  female MRN: [id number]  Unit/Bed#: E5 -01 Encounter: [id number]  Reason for Consult: LILIAN (POA) on CKD IV    PLAN:   -creatinine has plateaued between 2 7 and 2 8   -received IV lasix the past 2 days  Will continue to evaluate daily for diuretic needs    -continues with Scott catheter per  Urology  -will give poral potassium today for hypokalemia, normal mag level    -eventually will need arteriogram   Would hold off until renal function and previous to baseline  Recommend pre and post hydration as well as Mucomyst administration  ASSESSMENT/PLAN:  LILIAN (POA) on likely CKD IV:  Suspect multifactorial with possible infection, diuretic use, as well as possible post renal component with history of atrophic left kidney measuring 7 1 cm and right-sided hydro in December 2020    -presented with creatinine of 3 3 with peak of 3 7 on 01/19   -unknown baseline creatinine, suspect around 1 7-2 2   -creatinine has plateaued around 9 6-4 1    -received IV Lasix 01/27 and 1/28  -UA showed large blood, large leukocytes, 2+ protein, RBCs, innumerable WBCs, innumerable bacteria   -continue Scott catheter per Urology  -potential need for future arteriogram per Vascular   Would hold off until creatinine is at baseline unless necessarily needed   -avoid nephrotoxins, hypotension, IV contrast   -I/O      Right-sided hydronephrosis:  With history of right ureteral stent placement in December 2020 at [facility]   -Renal US revealed bilateral renal atrophy, more  on the left along with mild right hydronephrosis with ureteral stent in place   -urology consulted  -will place Scott catheter for maximum decompression      Hyponatremia:  Suspect component of hypovolemia as well as SIADH   (resolved)  -urine sodium 47, urine osmolality 338, serum osmolality 308, uric acid elevated at 9 0, TSH 10 6   -presented with sodium of 124 with normal glucose     -continue on 1 5 L fluid restriction       Hyperkalemia (POA)/hypokalemia:  Presents with potassium of 5 7   Received medical management with insulin, dextrose, Kayexalate, and calcium gluconate  (resolved) Now with hypokalemia    -continue to monitor and treat as necessary    -hypokalemia likely secondary to diuretics       Hypertension:  above goal at times    -avoid hypotension or high fluctuations in blood pressure   -outpatient antihypertensives:  Amlodipine 5 mg daily, carvedilol 12 5 mg 2 times per day, hydralazine 10 mg every 8 hours, Isordil 10 mg 3 times per day   -continue amlodipine 10 mg daily, carvedilol 12 5 mg 2 times per day, hydralazine 10 mg every 8 hours, Isordil 10 mg 3 times per day      Lower extremity cellulitis:  With dry gangrene to great toe and 4th toe   -podiatry following   -vascular surgery consulted to discuss a-gram and further intervention   Would hold off until creatinine improves unless urgently needed   Recommend pre and post hydration with contrast administration as well as give Mucomyst 1200 mg times total of 4 doses   -blood cultures negative to date   -local dressing changes to LE per vascular recommendations   -continues on IV antibiotics      CHF:  Echocardiogram showed EF of 50% with grade 2 diastolic dysfunction   No sandro signs of volume overload   -check daily weights, fluid restriction, I/O   -outpatient diuretics:  Lasix 40 mg daily   -received IV lasix the past two days    -continue to monitor volume status on IV hydration  -CT chest shows bilateral moderate-sized pleural effusions, small amount of ascites      Anemia:   status post PRBC transfusion 1/21  Hgb trending downward    -recommend giving Lasix 20 mg IV with PRBC transfusion    -hemoccult stool is negative   -continue to monitor and transfuse as needed for hemoglobin less than 7 0   -iron panel showed low TIBC and low iron      Other:  PAD, DVT      Disposition:  Requiring additional stay due to medical needs  SUBJECTIVE:  The patient is resting bed  She appears to be comfortable  She denies chest pain or shortness of breath  She denies nausea, vomiting, diarrhea, or issues with urination  She has Scott catheter  She remains on supplemental oxygen      OBJECTIVE:  Current Weight: Weight - Scale: 75 4 kg (166 lb 3 6 oz)  Vitals:    01/28/21 2331 01/29/21 0507 01/29/21 0600 01/29/21 0815   BP: 145/73 151/61  166/73   BP Location: Left arm   Left arm   Pulse: 62   67   Resp: 18   22   Temp: (!) 97 3 °F (36 3 °C)   97 7 °F (36 5 °C)   TempSrc: Temporal   Temporal   SpO2: 95%   94%   Weight:   75 4 kg (166 lb 3 6 oz)        Intake/Output Summary (Last 24 hours) at 1/29/2021 1114  Last data filed at 1/29/2021 1045  Gross per 24 hour   Intake 330 ml   Output 1075 ml   Net -745 ml     General: NAD, frail  Skin: warm, dry, intact, no rash  HEENT: Moist mucous membranes, sclera anicteric, normocephalic, atraumatic  Neck: No apparent JVD appreciated  Chest: lung sounds clear B/L, on O2   CVS:Regular rate and rhythm, no murmer   Abdomen: Soft, round, non-tender, +BS  Extremities: B/L LE edema present, B/L LE wrapped  Neuro: alert and oriented  Psych: appropriate mood and affect     Medications:    Current Facility-Administered Medications:     acetaminophen (TYLENOL) tablet 650 mg, 650 mg, Oral, Q6H PRN, Harsha Shook DO, 650 mg at 01/21/21 0004    amLODIPine (NORVASC) tablet 10 mg, 10 mg, Oral, Daily, Kristi Chandan, CRNP, 10 mg at 01/29/21 0803    carvedilol (COREG) tablet 12 5 mg, 12 5 mg, Oral, BID With Meals, Kristi Chandan, CRNP, 12 5 mg at 01/29/21 0802    collagenase (SANTYL) ointment, , Topical, Daily, Sen Burks MD, 1 application at 61/08/04 0806    docusate sodium (COLACE) capsule 100 mg, 100 mg, Oral, BID, Harsha Shook DO, 100 mg at 01/28/21 0930    hydrALAZINE (APRESOLINE) tablet 10 mg, 10 mg, Oral, Q8H [address], Harsha D Shook, DO, 10 mg at 01/29/21 0507    HYDROmorphone (DILAUDID) injection 1 mg, 1 mg, Intravenous, Q4H PRN, Geoff Zazueta PA-C    isosorbide dinitrate (ISORDIL) tablet 10 mg, 10 mg, Oral, TID after meals, Harsha Shook DO, 10 mg at 01/29/21 0803    levothyroxine tablet 25 mcg, 25 mcg, Oral, Early Morning, Harsha Shook DO, 25 mcg at 01/29/21 0507    ondansetron (ZOFRAN) injection 4 mg, 4 mg, Intravenous, Q6H PRN, Harsha Shook DO    oxyCODONE (ROXICODONE) IR tablet 2 5 mg, 2 5 mg, Oral, Q4H PRN, Arabella Abraham PA-C    oxyCODONE (ROXICODONE) IR tablet 5 mg, 5 mg, Oral, Q4H PRN, Arabella Abraham PA-C, 5 mg at 01/24/21 1508    potassium chloride (K-DUR,KLOR-CON) CR tablet 20 mEq, 20 mEq, Oral, Once, Benna Dakins, MD    saccharomyces boulardii (FLORASTOR) capsule 250 mg, 250 mg, Oral, BID, Harsha Shook DO, 250 mg at 01/29/21 0802    senna (SENOKOT) tablet 8 6 mg, 1 tablet, Oral, BID, Arabella Abraham PA-C, 8 6 mg at 01/28/21 0930    warfarin (COUMADIN) tablet 3 mg, 3 mg, Oral, Daily (warfarin), Alex Paulino MD, 3 mg at 01/28/21 1707    Laboratory Results:  Results from last 7 days   Lab Units 01/29/21  0438 01/28/21  0636 01/27/21  1012 01/27/21  0517 01/26/21  0541 01/25/21  0530   WBC Thousand/uL  --   --  8 52  --  8 99 8 54   HEMOGLOBIN g/dL  --   --  8 1*  --  7 7* 7 9*   HEMATOCRIT %  --   --  25 9*  --  24 6* 25 2*   PLATELETS Thousands/uL  --   --  196  --  197 198   SODIUM mmol/L 138 135*  --  133* 134* 134*   POTASSIUM mmol/L 3 3* 3 5  --  3 8 3 9 3 8   CHLORIDE mmol/L 101 99*  --  98* 99* 98*   CO2 mmol/L 25 26  --  26 26 25   BUN mg/dL 88* 92*  --  93* 99* 102*   CREATININE mg/dL 2 82* 2 72*  --  2 76* 2 82* 2 99*   CALCIUM mg/dL 8 5 8 4  --  8 4 8 1* 8 2*   MAGNESIUM mg/dL  --  2 3  --  2 4 2 3  --

## 2021-01-29 NOTE — PLAN OF CARE
Problem: Potential for Falls  Goal: Patient will remain free of falls  Description: INTERVENTIONS:  - Assess patient frequently for physical needs  -  Identify cognitive and physical deficits and behaviors that affect risk of falls  -  [place] fall precautions as indicated by assessment   - Educate patient/family on patient safety including physical limitations  - Instruct patient to call for assistance with activity based on assessment  - Modify environment to reduce risk of injury  - Consider OT/PT consult to assist with strengthening/mobility  Outcome: Progressing     Problem: Prexisting or High Potential for Compromised Skin Integrity  Goal: Skin integrity is maintained or improved  Description: INTERVENTIONS:  - Identify patients at risk for skin breakdown  - Assess and monitor skin integrity  - Assess and monitor nutrition and hydration status  - Monitor labs   - Assess for incontinence   - Turn and reposition patient  - Assist with mobility/ambulation  - Relieve pressure over bony prominences  - Avoid friction and shearing  - Provide appropriate hygiene as needed including keeping skin clean and dry  - Evaluate need for skin moisturizer/barrier cream  - Collaborate with interdisciplinary team   - Patient/family teaching  - Consider wound care consult   Outcome: Progressing     Problem: Nutrition/Hydration-ADULT  Goal: Nutrient/Hydration intake appropriate for improving, restoring or maintaining nutritional needs  Description: Monitor and assess patient's nutrition/hydration status for malnutrition  Collaborate with interdisciplinary team and initiate plan and interventions as ordered  Monitor patient's weight and dietary intake as ordered or per policy  Utilize nutrition screening tool and intervene as necessary  Determine patient's food preferences and provide high-protein, high-caloric foods as appropriate       INTERVENTIONS:  - Monitor oral intake, urinary output, labs, and treatment plans  - Assess nutrition and hydration status and recommend course of action  - Evaluate amount of meals eaten  - Assist patient with eating if necessary   - Allow adequate time for meals  - Recommend/ encourage appropriate diets, oral nutritional supplements, and vitamin/mineral supplements  - Order, calculate, and assess calorie counts as needed  - Recommend, monitor, and adjust tube feedings and TPN/PPN based on assessed needs  - Assess need for intravenous fluids  - Provide specific nutrition/hydration education as appropriate  - Include patient/family/caregiver in decisions related to nutrition  Outcome: Progressing     Problem: PAIN - ADULT  Goal: Verbalizes/displays adequate comfort level or baseline comfort level  Description: Interventions:  - Encourage patient to monitor pain and request assistance  - Assess pain using appropriate pain scale  - Administer analgesics based on type and severity of pain and evaluate response  - Implement non-pharmacological measures as appropriate and evaluate response  - Consider cultural and social influences on pain and pain management  - Notify physician/advanced practitioner if interventions unsuccessful or patient reports new pain  Outcome: Progressing     Problem: INFECTION - ADULT  Goal: Absence or prevention of progression during hospitalization  Description: INTERVENTIONS:  - Assess and monitor for signs and symptoms of infection  - Monitor lab/diagnostic results  - Monitor all insertion sites, i e  indwelling lines, tubes, and drains  - Monitor endotracheal if appropriate and nasal secretions for changes in amount and color  - [place] appropriate cooling/warming therapies per order  - Administer medications as ordered  - Instruct and encourage patient and family to use good hand hygiene technique  - Identify and instruct in appropriate isolation precautions for identified infection/condition  Outcome: Progressing  Goal: Absence of fever/infection during neutropenic period  Description: INTERVENTIONS:  - Monitor WBC    Outcome: Progressing     Problem: SAFETY ADULT  Goal: Patient will remain free of falls  Description: INTERVENTIONS:  - Assess patient frequently for physical needs  -  Identify cognitive and physical deficits and behaviors that affect risk of falls    -  [place] fall precautions as indicated by assessment   - Educate patient/family on patient safety including physical limitations  - Instruct patient to call for assistance with activity based on assessment  - Modify environment to reduce risk of injury  - Consider OT/PT consult to assist with strengthening/mobility  Outcome: Progressing  Goal: Maintain or return to baseline ADL function  Description: INTERVENTIONS:  -  Assess patient's ability to carry out ADLs; assess patient's baseline for ADL function and identify physical deficits which impact ability to perform ADLs (bathing, care of mouth/teeth, toileting, grooming, dressing, etc )  - Assess/evaluate cause of self-care deficits   - Assess range of motion  - Assess patient's mobility; develop plan if impaired  - Assess patient's need for assistive devices and provide as appropriate  - Encourage maximum independence but intervene and supervise when necessary  - Involve family in performance of ADLs  - Assess for home care needs following discharge   - Consider OT consult to assist with ADL evaluation and planning for discharge  - Provide patient education as appropriate  Outcome: Progressing  Goal: Maintain or return mobility status to optimal level  Description: INTERVENTIONS:  - Assess patient's baseline mobility status (ambulation, transfers, stairs, etc )    - Identify cognitive and physical deficits and behaviors that affect mobility  - Identify mobility aids required to assist with transfers and/or ambulation (gait belt, sit-to-stand, lift, walker, cane, etc )  - [place] fall precautions as indicated by assessment  - Record patient progress and toleration of activity level on Mobility SBAR; progress patient to next Phase/Stage  - Instruct patient to call for assistance with activity based on assessment  - Consider rehabilitation consult to assist with strengthening/weightbearing, etc   Outcome: Progressing     Problem: DISCHARGE PLANNING  Goal: Discharge to home or other facility with appropriate resources  Description: INTERVENTIONS:  - Identify barriers to discharge w/patient and caregiver  - Arrange for needed discharge resources and transportation as appropriate  - Identify discharge learning needs (meds, wound care, etc )  - Arrange for interpretive services to assist at discharge as needed  - Refer to Case Management Department for coordinating discharge planning if the patient needs post-hospital services based on physician/advanced practitioner order or complex needs related to functional status, cognitive ability, or social support system  Outcome: Progressing     Problem: Knowledge Deficit  Goal: Patient/family/caregiver demonstrates understanding of disease process, treatment plan, medications, and discharge instructions  Description: Complete learning assessment and assess knowledge base    Interventions:  - Provide teaching at level of understanding  - Provide teaching via preferred learning methods  Outcome: Progressing     Problem: GENITOURINARY - ADULT  Goal: Maintains or returns to baseline urinary function  Description: INTERVENTIONS:  - Assess urinary function  - Encourage oral fluids to ensure adequate hydration if ordered  - Administer IV fluids as ordered to ensure adequate hydration  - Administer ordered medications as needed  - Offer frequent toileting  - Follow urinary retention protocol if ordered  Outcome: Progressing  Goal: Absence of urinary retention  Description: INTERVENTIONS:  - Assess patients ability to void and empty bladder  - Monitor I/O  - Bladder scan as needed  - Discuss with physician/AP medications to alleviate retention as needed  - Discuss catheterization for long term situations as appropriate  Outcome: Progressing  Goal: Urinary catheter remains patent  Description: INTERVENTIONS:  - Assess patency of urinary catheter  - If patient has a chronic chadwick, consider changing catheter if non-functioning  - Follow guidelines for intermittent irrigation of non-functioning urinary catheter  Outcome: Progressing     Problem: RESPIRATORY - ADULT  Goal: Achieves optimal ventilation and oxygenation  Description: INTERVENTIONS:  - Assess for changes in respiratory status  - Assess for changes in mentation and behavior  - Position to facilitate oxygenation and minimize respiratory effort  - Oxygen administered by appropriate delivery if ordered  - Initiate smoking cessation education as indicated  - Encourage broncho-pulmonary hygiene including cough, deep breathe, Incentive Spirometry  - Assess the need for suctioning and aspirate as needed  - Assess and instruct to report SOB or any respiratory difficulty  - Respiratory Therapy support as indicated  Outcome: Progressing     Problem: METABOLIC, FLUID AND ELECTROLYTES - ADULT  Goal: Electrolytes maintained within normal limits  Description: INTERVENTIONS:  - Monitor labs and assess patient for signs and symptoms of electrolyte imbalances  - Administer electrolyte replacement as ordered  - Monitor response to electrolyte replacements, including repeat lab results as appropriate  - Instruct patient on fluid and nutrition as appropriate  Outcome: Progressing  Goal: Fluid balance maintained  Description: INTERVENTIONS:  - Monitor labs   - Monitor I/O and WT  - Instruct patient on fluid and nutrition as appropriate  - Assess for signs & symptoms of volume excess or deficit  Outcome: Progressing     Problem: SKIN/TISSUE INTEGRITY - ADULT  Goal: Skin integrity remains intact  Description: INTERVENTIONS  - Identify patients at risk for skin breakdown  - Assess and monitor skin integrity  - Assess and monitor nutrition and hydration status  - Monitor labs (i e  albumin)  - Assess for incontinence   - Turn and reposition patient  - Assist with mobility/ambulation  - Relieve pressure over bony prominences  - Avoid friction and shearing  - Provide appropriate hygiene as needed including keeping skin clean and dry  - Evaluate need for skin moisturizer/barrier cream  - Collaborate with interdisciplinary team (i e  Nutrition, Rehabilitation, etc )   - Patient/family teaching  Outcome: Progressing  Goal: Incision(s), wounds(s) or drain site(s) healing without S/S of infection  Description: INTERVENTIONS  - Assess and document risk factors for skin impairment   - Assess and document dressing, incision, wound bed, drain sites and surrounding tissue  - Consider nutrition services referral as needed  - Oral mucous membranes remain intact  - Provide patient/ family education  Outcome: Progressing     Problem: HEMATOLOGIC - ADULT  Goal: Maintains hematologic stability  Description: INTERVENTIONS  - Assess for signs and symptoms of bleeding or hemorrhage  - Monitor labs  - Administer supportive blood products/factors as ordered and appropriate  Outcome: Progressing

## 2021-01-29 NOTE — PHYSICAL THERAPY NOTE
PT Treatment Note        01/29/21 1144   Pain Assessment   Pain Assessment Tool 0-10   Pain Score No Pain  (at rest, upon standing pt  noted increased pain no number pr)   Effect of Pain on Daily Activities Increased pain with standing activity, limiting upright mobility   Patient's Stated Pain Goal No pain   Hospital Pain Intervention(s) Elevated; Rest   Multiple Pain Sites No   Restrictions/Precautions   Weight Bearing Precautions Per Order Yes   RLE Weight Bearing Per Order WBAT   LLE Weight Bearing Per Order WBAT   Other Precautions Cognitive; Bed Alarm;Multiple lines; Fall Risk   General   Chart Reviewed Yes   Response to Previous Treatment Patient with no complaints from previous session  Family/Caregiver Present No   Cognition   Overall Cognitive Status Impaired   Arousal/Participation Alert   Attention Attends with cues to redirect   Orientation Level Oriented X4   Memory Decreased recall of precautions;Decreased recall of recent events;Decreased short term memory   Following Commands Follows one step commands with increased time or repetition   Comments Pt  agreeable to PT session with encouragment  OT treatment due to skilled assist x 2 with mobility and dynamic tasks  Subjective   Subjective I haven't seen you guys before   Bed Mobility   Rolling R 2  Maximal assistance   Additional items Assist x 1; Increased time required; Bedrails;Verbal cues;LE management   Rolling L 2  Maximal assistance   Additional items Assist x 1; Increased time required;Verbal cues;LE management; Bedrails   Supine to Sit 3  Moderate assistance   Additional items Assist x 2;HOB elevated; Bedrails; Increased time required;Verbal cues;LE management   Sit to Supine 2  Maximal assistance   Additional items Assist x 2; Increased time required;Verbal cues;LE management   Additional Comments Pt  able to tolerate 30 minutes sitting EOB with fair dynamic sitting balance, fair- to poor + dynamic sitting balance   Pt  required encouragment for STS transfers due to heel pain from heel spurs  Extensive education provided on upright mobility with standing despite pain and benefit of mobility to limit deconditioning  Pt  verbalized understanding, standing tolerance limited during session  Transfers   Sit to Stand 3  Moderate assistance   Additional items Assist x 2; Increased time required;Verbal cues  (b/l foot block)   Stand to Sit 3  Moderate assistance   Additional items Assist x 2; Increased time required;Verbal cues   Additional Comments Pt  on 3 L O2 >90%, during sitting EOB O2 removed for self care and pt  destated to 88% without significant s/sx  3 Standing trials total ranging from 10 seconds to 20 seconds with maximum encouragment  Pt  does better with distraction  Continue to challenge  Ambulation/Elevation   Gait pattern Not tested; Not appropriate   Balance   Static Sitting Fair +   Dynamic Sitting Fair   Endurance Deficit   Endurance Deficit Yes   Activity Tolerance   Activity Tolerance Treatment limited secondary to medical complications (Comment)   Medical Staff Made Aware OT Jasmin   Nurse Made Aware RN ok to see   Exercises   Knee AROM Long Arc Quad Sitting;20 reps;Bilateral   Ankle Pumps Sitting;20 reps;Bilateral   Assessment   Prognosis Good   Problem List Decreased strength;Decreased endurance; Impaired balance;Decreased mobility; Decreased cognition; Impaired judgement;Decreased safety awareness; Impaired sensation;Decreased skin integrity   Assessment Pt seen for PT treatment session this date with interventions consisting of Therapeutic exercise consisting of: AROM 2 sets of 10 reps B LE in sitting position and therapeutic activity consisting of training: bed mobility, supine<>sit transfers, sit<>stand transfers and static/dynamic sitting tolerance of 30 minute duration with fair balance with cues for posture, sit to stand transfers x 3 repetitions with duration of 10 seconds to 20 seconds   Pt  limited with pain in heels per pt due to heel spurs  Extensive education and encouragement provided throughout session regarding benefits and need for mobility to heal and prevent deconditioning  Pt  Verbalized understanding but required consistent encouragement  Pt  willing to work with PT services  In comparison to previous session, pt with improvements in sitting tolerance duration, increased mobility, increased participation  Post session: pt returned BTB, bed alarm engaged, all needs in reach and RN notified of session findings/recommendations Continue to recommend post acute rehabilitation at time of d/c in order to maximize pt's functional independence and safety w/ mobility  Pt continues to be functioning below baseline level, and remains limited 2* factors listed above and including strength deficits, reduced activity tolerance, ,reduced functional mobility, impaired balance  PT will continue to see pt while here in order to address the deficits listed above and provide interventions consistent w/ POC in effort to achieve STGs  Barriers to Discharge Decreased caregiver support   Goals   Patient Goals to go home when I can   STG Expiration Date 01/05/21   Short Term Goal #1 bed mobility with min assist  imrpove strength and balance by 1/2 to 1 grade  kne rom 0/100* bilat  activity tolerance 45 minutes  demonstrate good safety practices  Pt  will complete sit to stand transfers with Min A x1 to increase functional mobility  Pt  will increase standing balance to fair- with static/dynamic mobility  Pt  will tolerate standing 2 minute duration with Min A x1 as pre-gait activity  PT to see for gait assessment  PT Treatment Day 3   Plan   Treatment/Interventions Functional transfer training;LE strengthening/ROM; Therapeutic exercise; Endurance training;Bed mobility;Gait training;Equipment eval/education;Cognitive reorientation; Compensatory technique education;Spoke to nursing;OT   Progress Slow progress, decreased activity tolerance   PT Frequency (3-5x/wk)   Recommendation   PT Discharge Recommendation Post-Acute Rehabilitation Services  (rehab-> LTC)   Equipment Recommended   (TBD)   PT - OK to Discharge Yes  (rehab)   [place] 8 in Bed Without Bedrails 2   Lying on Back to Sitting on Edge of Flat Bed 2   Moving Bed to Chair 1   Standing Up From Chair 2   Walk in Room 1   Climb 3-5 Stairs 1   Basic Mobility Inpatient Raw Score 9   Rosy Ruvalcaba, PT

## 2021-01-29 NOTE — ASSESSMENT & PLAN NOTE
80year old female admitted due to LLE cellulitis  Podiatry reviewed imaging and stated patient does not have OM or CHERYLE   - Completed Antibiotic course  - Renal would like to reassess her over the weekend and see if she would be stable for arteriogram on monday    - Patient without capacity,  still unable to make a decision but is not interested in "leaving it as it is for now " Thus, still planning for intervention once cleared by renal

## 2021-01-30 LAB
ANION GAP SERPL CALCULATED.3IONS-SCNC: 9 MMOL/L (ref 4–13)
APTT PPP: 45 SECONDS (ref 23–37)
APTT PPP: >210 SECONDS (ref 23–37)
BUN SERPL-MCNC: 83 MG/DL (ref 5–25)
CALCIUM SERPL-MCNC: 8.5 MG/DL (ref 8.3–10.1)
CHLORIDE SERPL-SCNC: 100 MMOL/L (ref 100–108)
CO2 SERPL-SCNC: 26 MMOL/L (ref 21–32)
CREAT SERPL-MCNC: 2.76 MG/DL (ref 0.6–1.3)
ERYTHROCYTE [DISTWIDTH] IN BLOOD BY AUTOMATED COUNT: 17 % (ref 11.6–15.1)
GFR SERPL CREATININE-BSD FRML MDRD: 15 ML/MIN/1.73SQ M
GLUCOSE SERPL-MCNC: 91 MG/DL (ref 65–140)
HCT VFR BLD AUTO: 26 % (ref 34.8–46.1)
HGB BLD-MCNC: 8 G/DL (ref 11.5–15.4)
INR PPP: 1.87 (ref 0.84–1.19)
MAGNESIUM SERPL-MCNC: 2.2 MG/DL (ref 1.6–2.6)
MCH RBC QN AUTO: 27.3 PG (ref 26.8–34.3)
MCHC RBC AUTO-ENTMCNC: 30.8 G/DL (ref 31.4–37.4)
MCV RBC AUTO: 89 FL (ref 82–98)
PHOSPHATE SERPL-MCNC: 5.3 MG/DL (ref 2.3–4.1)
PLATELET # BLD AUTO: 272 THOUSANDS/UL (ref 149–390)
PMV BLD AUTO: 9.6 FL (ref 8.9–12.7)
POTASSIUM SERPL-SCNC: 4.3 MMOL/L (ref 3.5–5.3)
PROTHROMBIN TIME: 21.1 SECONDS (ref 11.6–14.5)
RBC # BLD AUTO: 2.93 MILLION/UL (ref 3.81–5.12)
SODIUM SERPL-SCNC: 135 MMOL/L (ref 136–145)
WBC # BLD AUTO: 6.39 THOUSAND/UL (ref 4.31–10.16)

## 2021-01-30 PROCEDURE — 85610 PROTHROMBIN TIME: CPT | Performed by: STUDENT IN AN ORGANIZED HEALTH CARE EDUCATION/TRAINING PROGRAM

## 2021-01-30 PROCEDURE — 80048 BASIC METABOLIC PNL TOTAL CA: CPT | Performed by: INTERNAL MEDICINE

## 2021-01-30 PROCEDURE — 85730 THROMBOPLASTIN TIME PARTIAL: CPT | Performed by: STUDENT IN AN ORGANIZED HEALTH CARE EDUCATION/TRAINING PROGRAM

## 2021-01-30 PROCEDURE — 85027 COMPLETE CBC AUTOMATED: CPT | Performed by: STUDENT IN AN ORGANIZED HEALTH CARE EDUCATION/TRAINING PROGRAM

## 2021-01-30 PROCEDURE — 99232 SBSQ HOSP IP/OBS MODERATE 35: CPT | Performed by: INTERNAL MEDICINE

## 2021-01-30 PROCEDURE — 83735 ASSAY OF MAGNESIUM: CPT | Performed by: STUDENT IN AN ORGANIZED HEALTH CARE EDUCATION/TRAINING PROGRAM

## 2021-01-30 PROCEDURE — 84100 ASSAY OF PHOSPHORUS: CPT | Performed by: STUDENT IN AN ORGANIZED HEALTH CARE EDUCATION/TRAINING PROGRAM

## 2021-01-30 PROCEDURE — 99232 SBSQ HOSP IP/OBS MODERATE 35: CPT | Performed by: STUDENT IN AN ORGANIZED HEALTH CARE EDUCATION/TRAINING PROGRAM

## 2021-01-30 RX ORDER — FUROSEMIDE 10 MG/ML
40 INJECTION INTRAMUSCULAR; INTRAVENOUS ONCE
Status: COMPLETED | OUTPATIENT
Start: 2021-01-30 | End: 2021-01-30

## 2021-01-30 RX ORDER — HEPARIN SODIUM 1000 [USP'U]/ML
6000 INJECTION, SOLUTION INTRAVENOUS; SUBCUTANEOUS ONCE
Status: COMPLETED | OUTPATIENT
Start: 2021-01-30 | End: 2021-01-30

## 2021-01-30 RX ORDER — HEPARIN SODIUM 10000 [USP'U]/100ML
3-30 INJECTION, SOLUTION INTRAVENOUS
Status: DISCONTINUED | OUTPATIENT
Start: 2021-01-30 | End: 2021-02-03

## 2021-01-30 RX ADMIN — ISOSORBIDE DINITRATE 10 MG: 10 TABLET ORAL at 13:09

## 2021-01-30 RX ADMIN — FUROSEMIDE 40 MG: 10 INJECTION, SOLUTION INTRAMUSCULAR; INTRAVENOUS at 15:20

## 2021-01-30 RX ADMIN — LEVOTHYROXINE SODIUM 25 MCG: 25 TABLET ORAL at 05:43

## 2021-01-30 RX ADMIN — HYDRALAZINE HYDROCHLORIDE 10 MG: 10 TABLET, FILM COATED ORAL at 21:49

## 2021-01-30 RX ADMIN — HEPARIN SODIUM 18 UNITS/KG/HR: 10000 INJECTION, SOLUTION INTRAVENOUS at 15:19

## 2021-01-30 RX ADMIN — HEPARIN SODIUM 6000 UNITS: 1000 INJECTION INTRAVENOUS; SUBCUTANEOUS at 15:20

## 2021-01-30 RX ADMIN — AMLODIPINE BESYLATE 10 MG: 10 TABLET ORAL at 09:47

## 2021-01-30 RX ADMIN — CARVEDILOL 12.5 MG: 6.25 TABLET, FILM COATED ORAL at 09:47

## 2021-01-30 RX ADMIN — Medication 250 MG: at 09:47

## 2021-01-30 RX ADMIN — CARVEDILOL 12.5 MG: 6.25 TABLET, FILM COATED ORAL at 17:16

## 2021-01-30 RX ADMIN — HYDRALAZINE HYDROCHLORIDE 10 MG: 10 TABLET, FILM COATED ORAL at 13:08

## 2021-01-30 RX ADMIN — Medication 250 MG: at 17:17

## 2021-01-30 RX ADMIN — ISOSORBIDE DINITRATE 10 MG: 10 TABLET ORAL at 09:47

## 2021-01-30 RX ADMIN — COLLAGENASE SANTYL 1 APPLICATION: 250 OINTMENT TOPICAL at 09:48

## 2021-01-30 RX ADMIN — HYDRALAZINE HYDROCHLORIDE 10 MG: 10 TABLET, FILM COATED ORAL at 05:43

## 2021-01-30 RX ADMIN — ISOSORBIDE DINITRATE 10 MG: 10 TABLET ORAL at 17:17

## 2021-01-30 NOTE — ASSESSMENT & PLAN NOTE
Dx with a right soleal vein occlusive DVT during her hospitalization 11/2020 at [facility]  - - Coumadin held for intended procedure   Bridge to heparin drip today    Recent Labs     01/28/21  0636 01/29/21  0438 01/30/21  0501   INR 1 62* 1 71* 1 87*

## 2021-01-30 NOTE — ASSESSMENT & PLAN NOTE
80year old female admitted due to LLE cellulitis   Podiatry reviewed imaging and stated patient does not have OM or CHERYLE   - Completed Antibiotic course  - Renal ok for arteriogram on Monday  - Vascular surgery reconsulted

## 2021-01-30 NOTE — PROGRESS NOTES
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: [id number]  Unit/Bed#: E5 -01 Encounter: [id number]  Reason for Consult: LILIAN (POA) on suspected CKD IV    PLAN:   -suspect creatinine has plateaued around 2 7 and 2 8, likely new baseline   -will give IV Lasix today   -continue Scott catheter per Urology  -okay with arteriogram on Monday if creatinine stable and patient and has been agree   -would recommend pre and post hydration as well as Mucomyst 1200 mg times total of 4 doses  -started on low phosphorus diet  Phosphorus level remains elevated, start on phosphorus binder  ASSESSMENT/PLAN:  LILIAN (POA) on likely CKD IV:  Suspect multifactorial with possible infection, diuretic use, as well as possible post renal component with history of atrophic left kidney measuring 7 1 cm and right-sided hydro in December 2020    -presented with creatinine of 3 3 with peak of 3 7 on 01/19   -unknown baseline creatinine, suspect around 1 7-2 2   -creatinine has plateaued around 4 2-2 3    -received IV Lasix 01/27 and 1/28  -UA showed large blood, large leukocytes, 2+ protein, RBCs, innumerable WBCs, innumerable bacteria   -continue Scott catheter per Urology  -potential need for future arteriogram per Vascular   Would hold off until creatinine is at baseline unless necessarily needed   -avoid nephrotoxins, hypotension, IV contrast   -I/O      Right-sided hydronephrosis:  With history of right ureteral stent placement in December 2020 at [facility]   -Renal US revealed bilateral renal atrophy, more  on the left along with mild right hydronephrosis with ureteral stent in place   -urology consulted  -will place Scott catheter for maximum decompression      Hyponatremia:  Suspect component of hypovolemia as well as SIADH   (resolved)  -urine sodium 47, urine osmolality 338, serum osmolality 308, uric acid elevated at 9 0, TSH 10 6   -presented with sodium of 124 with normal glucose     -continue on 1 5 L fluid restriction       Hyperkalemia (POA)/hypokalemia:  Presents with potassium of 5 7   Received medical management with insulin, dextrose, Kayexalate, and calcium gluconate  (resolved) Now with hypokalemia    -continue to monitor and treat as necessary    -hypokalemia likely secondary to diuretics       Hypertension:  above goal at times    -avoid hypotension or high fluctuations in blood pressure   -outpatient antihypertensives:  Amlodipine 5 mg daily, carvedilol 12 5 mg 2 times per day, hydralazine 10 mg every 8 hours, Isordil 10 mg 3 times per day   -continue amlodipine 10 mg daily, carvedilol 12 5 mg 2 times per day, hydralazine 10 mg every 8 hours, Isordil 10 mg 3 times per day      Lower extremity cellulitis:  With dry gangrene to great toe and 4th toe   -podiatry following   -vascular surgery consulted to discuss a-gram and further intervention   Would hold off until creatinine improves unless urgently needed   Recommend pre and post hydration with contrast administration as well as give Mucomyst 1200 mg times total of 4 doses   -blood cultures negative to date   -local dressing changes to LE per vascular recommendations   -continues on IV antibiotics      CHF:  Echocardiogram showed EF of 50% with grade 2 diastolic dysfunction   No sandro signs of volume overload   -check daily weights, fluid restriction, I/O   -outpatient diuretics:  Lasix 40 mg daily  -will give IV Lasix today   -continue to monitor volume status on IV hydration  -CT chest shows bilateral moderate-sized pleural effusions, small amount of ascites  Hyperphosphatemia:  -will place on low phosphorus diet today  Continue to monitor    If remains elevated, will start on phosphorus binder      Anemia:   status post PRBC transfusion 1/21    -recommend giving Lasix 20 mg IV with PRBC transfusion    -hemoccult stool is negative   -continue to monitor and transfuse as needed for hemoglobin less than 7 0   -iron panel showed low TIBC and low iron      Other:  PAD, DVT  Disposition:  Requiring additional stay due to medical needs  SUBJECTIVE:  The patient is resting in her bed  She denies chest pain or shortness of breath  She denies nausea, vomiting, diarrhea, or issues with urination  She continues with Scott catheter  She remains on supplemental oxygen  OBJECTIVE:  Current Weight: Weight - Scale: 75 8 kg (167 lb 1 7 oz)  Vitals:    01/29/21 2300 01/30/21 0543 01/30/21 0600 01/30/21 0807   BP: 135/65 144/81  168/90   BP Location: Right arm   Left arm   Pulse: 66   68   Resp: 20   18   Temp: 97 5 °F (36 4 °C)   98 °F (36 7 °C)   TempSrc: Temporal   Temporal   SpO2: 93%   92%   Weight:   75 8 kg (167 lb 1 7 oz)        Intake/Output Summary (Last 24 hours) at 1/30/2021 1357  Last data filed at 1/30/2021 1018  Gross per 24 hour   Intake    Output 1175 ml   Net -1175 ml     General: NAD, Margareth Buoy    Skin: warm, dry, intact, no rash  HEENT: Moist mucous membranes, sclera anicteric, normocephalic, atraumatic  Neck: No apparent JVD appreciated  Chest: lung sounds clear B/L, on RA   CVS:Regular rate and rhythm, no murmer   Abdomen: Soft, round, non-tender, +BS  Extremities:  B/L LE edema present, bilateral lower extremities around  Neuro: alert and oriented  Psych: appropriate mood and affect     Medications:    Current Facility-Administered Medications:     acetaminophen (TYLENOL) tablet 650 mg, 650 mg, Oral, Q6H PRN, Harsha Shook, DO, 650 mg at 01/21/21 0004    amLODIPine (NORVASC) tablet 10 mg, 10 mg, Oral, Daily, OTIS Maher, 10 mg at 01/30/21 0947    carvedilol (COREG) tablet 12 5 mg, 12 5 mg, Oral, BID With Meals, OTIS Maher, 12 5 mg at 01/30/21 8156    collagenase (SANTYL) ointment, , Topical, Daily, Taniya Patel MD, 1 application at 92/94/66 0948    docusate sodium (COLACE) capsule 100 mg, 100 mg, Oral, BID, Harsha Shook DO, 100 mg at 01/28/21 0930    hydrALAZINE (APRESOLINE) tablet 10 mg, 10 mg, Oral, Q8H [address], Harsha Shook DO, 10 mg at 01/30/21 1308    HYDROmorphone (DILAUDID) injection 1 mg, 1 mg, Intravenous, Q4H PRN, Benita Ortiz PA-C    isosorbide dinitrate (ISORDIL) tablet 10 mg, 10 mg, Oral, TID after meals, Harsha Shook DO, 10 mg at 01/30/21 1309    levothyroxine tablet 25 mcg, 25 mcg, Oral, Early Morning, Harsha Shook DO, 25 mcg at 01/30/21 0543    ondansetron (ZOFRAN) injection 4 mg, 4 mg, Intravenous, Q6H PRN, Harsha Shook DO    oxyCODONE (ROXICODONE) IR tablet 2 5 mg, 2 5 mg, Oral, Q4H PRN, Arabella Abraham PA-C    oxyCODONE (ROXICODONE) IR tablet 5 mg, 5 mg, Oral, Q4H PRN, Arabella Abraham PA-C, 5 mg at 01/24/21 1508    potassium chloride (K-DUR,KLOR-CON) CR tablet 20 mEq, 20 mEq, Oral, Once, Peggy Osborn MD    saccharomyces boulardii (FLORASTOR) capsule 250 mg, 250 mg, Oral, BID, Harsha Shook DO, 250 mg at 01/30/21 0947    warfarin (COUMADIN) tablet 4 mg, 4 mg, Oral, Daily (warfarin), Milton Clayton MD, 4 mg at 01/29/21 1704    Laboratory Results:  Results from last 7 days   Lab Units 01/30/21  0501 01/29/21  0438 01/28/21  0636 01/27/21  1012 01/27/21  0517 01/26/21  0541  01/25/21  0530   WBC Thousand/uL  --   --   --  8 52  --  8 99  --  8 54   HEMOGLOBIN g/dL  --   --   --  8 1*  --  7 7*  --  7 9*   HEMATOCRIT %  --   --   --  25 9*  --  24 6*  --  25 2*   PLATELETS Thousands/uL  --   --   --  196  --  197  --  198   SODIUM mmol/L 135* 138 135*  --  133* 134*  --  134*   POTASSIUM mmol/L 4 3 3 3* 3 5  --  3 8 3 9  --  3 8   CHLORIDE mmol/L 100 101 99*  --  98* 99*  --  98*   CO2 mmol/L 26 25 26  --  26 26  --  25   BUN mg/dL 83* 88* 92*  --  93* 99*  --  102*   CREATININE mg/dL 2 76* 2 82* 2 72*  --  2 76* 2 82*  --  2 99*   CALCIUM mg/dL 8 5 8 5 8 4  --  8 4 8 1*  --  8 2*   MAGNESIUM mg/dL 2 2  --  2 3  --  2 4 2 3   < >  --    PHOSPHORUS mg/dL 5 3*  --   --   --   --   --   --   --     < > = values in this interval not displayed

## 2021-01-30 NOTE — PROGRESS NOTES
Progress Note - Verito Legacy 5/18/1930, 80 y o  female MRN: [id number]    Unit/Bed#: E5 -01 Encounter: [id number]    Primary Care Provider: Brenda Cook MD   Date and time admitted to hospital: 1/18/2021 11:44 AM        * Cellulitis  Assessment & Plan  80year old female admitted due to LLE cellulitis  Podiatry reviewed imaging and stated patient does not have OM or CHERYLE   - Completed Antibiotic course  - Renal ok for arteriogram on Monday  - Vascular surgery reconsulted    PAD (peripheral artery disease) ([facility] [address] )  Assessment & Plan  As written above  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 [facility])  Assessment & Plan  In the setting of advanced peripheral arterial disease    - Renal stating ok for arteriogram on Monday if renal function remains stable  - Continue wound care    Chronic hypoxemic respiratory failure [facility])  Assessment & Plan  Secondary to CHF  Baseline at 3L    Acute kidney injury superimposed on chronic kidney disease [facility])  Assessment & Plan  Multifactorial: Pre-renal, infection  Seems to be her new baseline  Recent Labs     01/28/21  0636 01/29/21  0438 01/30/21  0501   BUN 92* 88* 83*   CREATININE 2 72* 2 82* 2 76*   EGFR 15 14 15                 Acute deep vein thrombosis (DVT) of calf muscle vein of right lower extremity (HCC)  Assessment & Plan  Dx with a right soleal vein occlusive DVT during her hospitalization 11/2020 at [facility]  - - Coumadin held for intended procedure  Bridge to heparin drip today    Recent Labs     01/28/21  0636 01/29/21  0438 01/30/21  0501   INR 1 62* 1 71* 1 87*         Essential hypertension  Assessment & Plan  Contiue coreg, hydralazine, amlodipine    Iron deficiency anemia secondary to inadequate dietary iron intake  Assessment & Plan  Iron deficiency anemia with anemia s/p transfusion  No results for input(s): HGB, MCV, RDW, IRON, TIBC, FERRITIN in the last 72 hours        Chronic diastolic heart failure (HCC)  Assessment & Plan  Wt Readings from Last 3 Encounters: The patient has been examined and the H&P has been reviewed:    I concur with the findings and no changes have occurred since H&P was written.   This patient has been cleared for surgery in an ambulatory surgical facility    ASA 3,  MP3  No history of anesthetic complications  Plan for RN IV sedation      Anesthesia/Surgery risks, benefits and alternative options discussed and understood by patient/family.          There are no hospital problems to display for this patient.     21 75 8 kg (167 lb 1 7 oz)   21 62 1 kg (136 lb 14 4 oz)   20 60 8 kg (134 lb)     History of congestive heart failure  Not in exacerbation  Ambulatory dysfunction  Assessment & Plan  Impaired medical decision making capacity  - For placement once stable      VTE Pharmacologic Prophylaxis:   Pharmacologic: Heparin Drip  Mechanical VTE Prophylaxis in Place: Yes    Patient Centered Rounds: I have performed bedside rounds with nursing staff today  Discussions with Specialists or Other Care Team Provider: Nursing    Education and Discussions with Family / Patient: patient    Time Spent for Care: 30 minutes  More than 50% of total time spent on counseling and coordination of care as described above  Current Length of Stay: 12 day(s)    Current Patient Status: Inpatient   Certification Statement: The patient will continue to require additional inpatient hospital stay due to heparin drip, renal reevaluation, possible arteriogram monday    Discharge Plan: active    Code Status: Level 1 - Full Code      Subjective:   Patient seen and examined at bedside  She is hopeful that we will proceed with her intended procedure on Monday  No acute events overnight  Objective:     Vitals:   Temp (24hrs), Av 8 °F (36 6 °C), Min:97 5 °F (36 4 °C), Max:98 °F (36 7 °C)    Temp:  [97 5 °F (36 4 °C)-98 °F (36 7 °C)] 98 °F (36 7 °C)  HR:  [66-68] 68  Resp:  [18-20] 18  BP: (135-168)/(65-90) 168/90  SpO2:  [92 %-93 %] 92 %  Body mass index is 26 97 kg/m²  Input and Output Summary (last 24 hours): Intake/Output Summary (Last 24 hours) at 2021 1502  Last data filed at 2021 1018  Gross per 24 hour   Intake    Output 1175 ml   Net -1175 ml       Physical Exam:     Physical Exam  Vitals signs reviewed  HENT:      Head: Normocephalic  Nose: Nose normal       Mouth/Throat:      Mouth: Mucous membranes are moist    Eyes:      General: No scleral icterus       Extraocular Movements: Extraocular movements intact  Cardiovascular:      Rate and Rhythm: Normal rate  Pulmonary:      Effort: Pulmonary effort is normal  No respiratory distress  Abdominal:      General: There is no distension  Palpations: Abdomen is soft  Tenderness: There is no abdominal tenderness  Musculoskeletal:      Comments: Left lower extremity and right foot dressing c/d/i   Skin:     General: Skin is warm  Neurological:      Mental Status: She is alert  Mental status is at baseline  Psychiatric:         Mood and Affect: Mood normal          Behavior: Behavior normal        Additional Data:     Labs:    Results from last 7 days   Lab Units 01/27/21  1012   WBC Thousand/uL 8 52   HEMOGLOBIN g/dL 8 1*   HEMATOCRIT % 25 9*   PLATELETS Thousands/uL 196   NEUTROS PCT % 70   LYMPHS PCT % 18   MONOS PCT % 7   EOS PCT % 4     Results from last 7 days   Lab Units 01/30/21  0501   SODIUM mmol/L 135*   POTASSIUM mmol/L 4 3   CHLORIDE mmol/L 100   CO2 mmol/L 26   BUN mg/dL 83*   CREATININE mg/dL 2 76*   ANION GAP mmol/L 9   CALCIUM mg/dL 8 5   GLUCOSE RANDOM mg/dL 91     Results from last 7 days   Lab Units 01/30/21  0501   INR  1 87*                       * I Have Reviewed All Lab Data Listed Above  * Additional Pertinent Lab Tests Reviewed:  [address] Admission Reviewed    Imaging:    Imaging Reports Reviewed Today Include: No new imaging    Recent Cultures (last 7 days):           Last 24 Hours Medication List:   Current Facility-Administered Medications   Medication Dose Route Frequency Provider Last Rate    acetaminophen  650 mg Oral Q6H PRN Harsha Shook DO      amLODIPine  10 mg Oral Daily Tsosie Moulds, CRNP      carvedilol  12 5 mg Oral BID With Meals Tsosie Moulds, CRNP      collagenase   Topical Daily Jeannie Collins MD      docusate sodium  100 mg Oral BID Harsha Shook DO      furosemide  40 mg Intravenous Once Tsosie Julio César, CRNP      heparin   Intravenous Once Jeannie Collins MD  hydrALAZINE  10 mg Oral Q8H [address] Harsha Turcios, DO      HYDROmorphone  1 mg Intravenous Q4H PRN Cheryl Yates PA-C      isosorbide dinitrate  10 mg Oral TID after meals Harsha Shook, DO      levothyroxine  25 mcg Oral Early Morning Harsha Shook, DO      ondansetron  4 mg Intravenous Q6H PRN Pito Prak, DO      oxyCODONE  2 5 mg Oral Q4H PRN Arabella Abraham PA-C      oxyCODONE  5 mg Oral Q4H PRN Arabella Abraham PA-C      potassium chloride  20 mEq Oral Once Jamey Gregorio MD      saccharomyces boulardii  250 mg Oral BID Pitoranjit Park DO          Today, Patient Was Seen By: Jessica Garcia MD    ** Please Note: Dictation voice to text software may have been used in the creation of this document   **

## 2021-01-30 NOTE — ASSESSMENT & PLAN NOTE
In the setting of advanced peripheral arterial disease    - Renal stating ok for arteriogram on Monday if renal function remains stable    - Continue wound care

## 2021-01-30 NOTE — PLAN OF CARE
Problem: Potential for Falls  Goal: Patient will remain free of falls  Description: INTERVENTIONS:  - Assess patient frequently for physical needs  -  Identify cognitive and physical deficits and behaviors that affect risk of falls  -  [place] fall precautions as indicated by assessment   - Educate patient/family on patient safety including physical limitations  - Instruct patient to call for assistance with activity based on assessment  - Modify environment to reduce risk of injury  - Consider OT/PT consult to assist with strengthening/mobility  Outcome: Progressing     Problem: Prexisting or High Potential for Compromised Skin Integrity  Goal: Skin integrity is maintained or improved  Description: INTERVENTIONS:  - Identify patients at risk for skin breakdown  - Assess and monitor skin integrity  - Assess and monitor nutrition and hydration status  - Monitor labs   - Assess for incontinence   - Turn and reposition patient  - Assist with mobility/ambulation  - Relieve pressure over bony prominences  - Avoid friction and shearing  - Provide appropriate hygiene as needed including keeping skin clean and dry  - Evaluate need for skin moisturizer/barrier cream  - Collaborate with interdisciplinary team   - Patient/family teaching  - Consider wound care consult   Outcome: Progressing     Problem: Nutrition/Hydration-ADULT  Goal: Nutrient/Hydration intake appropriate for improving, restoring or maintaining nutritional needs  Description: Monitor and assess patient's nutrition/hydration status for malnutrition  Collaborate with interdisciplinary team and initiate plan and interventions as ordered  Monitor patient's weight and dietary intake as ordered or per policy  Utilize nutrition screening tool and intervene as necessary  Determine patient's food preferences and provide high-protein, high-caloric foods as appropriate       INTERVENTIONS:  - Monitor oral intake, urinary output, labs, and treatment plans  - Assess nutrition and hydration status and recommend course of action  - Evaluate amount of meals eaten  - Assist patient with eating if necessary   - Allow adequate time for meals  - Recommend/ encourage appropriate diets, oral nutritional supplements, and vitamin/mineral supplements  - Order, calculate, and assess calorie counts as needed  - Recommend, monitor, and adjust tube feedings and TPN/PPN based on assessed needs  - Assess need for intravenous fluids  - Provide specific nutrition/hydration education as appropriate  - Include patient/family/caregiver in decisions related to nutrition  Outcome: Progressing     Problem: PAIN - ADULT  Goal: Verbalizes/displays adequate comfort level or baseline comfort level  Description: Interventions:  - Encourage patient to monitor pain and request assistance  - Assess pain using appropriate pain scale  - Administer analgesics based on type and severity of pain and evaluate response  - Implement non-pharmacological measures as appropriate and evaluate response  - Consider cultural and social influences on pain and pain management  - Notify physician/advanced practitioner if interventions unsuccessful or patient reports new pain  Outcome: Progressing     Problem: INFECTION - ADULT  Goal: Absence or prevention of progression during hospitalization  Description: INTERVENTIONS:  - Assess and monitor for signs and symptoms of infection  - Monitor lab/diagnostic results  - Monitor all insertion sites, i e  indwelling lines, tubes, and drains  - Monitor endotracheal if appropriate and nasal secretions for changes in amount and color  - [place] appropriate cooling/warming therapies per order  - Administer medications as ordered  - Instruct and encourage patient and family to use good hand hygiene technique  - Identify and instruct in appropriate isolation precautions for identified infection/condition  Outcome: Progressing  Goal: Absence of fever/infection during neutropenic period  Description: INTERVENTIONS:  - Monitor WBC    Outcome: Progressing     Problem: SAFETY ADULT  Goal: Patient will remain free of falls  Description: INTERVENTIONS:  - Assess patient frequently for physical needs  -  Identify cognitive and physical deficits and behaviors that affect risk of falls    -  [place] fall precautions as indicated by assessment   - Educate patient/family on patient safety including physical limitations  - Instruct patient to call for assistance with activity based on assessment  - Modify environment to reduce risk of injury  - Consider OT/PT consult to assist with strengthening/mobility  Outcome: Progressing  Goal: Maintain or return to baseline ADL function  Description: INTERVENTIONS:  -  Assess patient's ability to carry out ADLs; assess patient's baseline for ADL function and identify physical deficits which impact ability to perform ADLs (bathing, care of mouth/teeth, toileting, grooming, dressing, etc )  - Assess/evaluate cause of self-care deficits   - Assess range of motion  - Assess patient's mobility; develop plan if impaired  - Assess patient's need for assistive devices and provide as appropriate  - Encourage maximum independence but intervene and supervise when necessary  - Involve family in performance of ADLs  - Assess for home care needs following discharge   - Consider OT consult to assist with ADL evaluation and planning for discharge  - Provide patient education as appropriate  Outcome: Progressing  Goal: Maintain or return mobility status to optimal level  Description: INTERVENTIONS:  - Assess patient's baseline mobility status (ambulation, transfers, stairs, etc )    - Identify cognitive and physical deficits and behaviors that affect mobility  - Identify mobility aids required to assist with transfers and/or ambulation (gait belt, sit-to-stand, lift, walker, cane, etc )  - [place] fall precautions as indicated by assessment  - Record patient progress and toleration of activity level on Mobility SBAR; progress patient to next Phase/Stage  - Instruct patient to call for assistance with activity based on assessment  - Consider rehabilitation consult to assist with strengthening/weightbearing, etc   Outcome: Progressing     Problem: DISCHARGE PLANNING  Goal: Discharge to home or other facility with appropriate resources  Description: INTERVENTIONS:  - Identify barriers to discharge w/patient and caregiver  - Arrange for needed discharge resources and transportation as appropriate  - Identify discharge learning needs (meds, wound care, etc )  - Arrange for interpretive services to assist at discharge as needed  - Refer to Case Management Department for coordinating discharge planning if the patient needs post-hospital services based on physician/advanced practitioner order or complex needs related to functional status, cognitive ability, or social support system  Outcome: Progressing     Problem: Knowledge Deficit  Goal: Patient/family/caregiver demonstrates understanding of disease process, treatment plan, medications, and discharge instructions  Description: Complete learning assessment and assess knowledge base    Interventions:  - Provide teaching at level of understanding  - Provide teaching via preferred learning methods  Outcome: Progressing     Problem: GENITOURINARY - ADULT  Goal: Maintains or returns to baseline urinary function  Description: INTERVENTIONS:  - Assess urinary function  - Encourage oral fluids to ensure adequate hydration if ordered  - Administer IV fluids as ordered to ensure adequate hydration  - Administer ordered medications as needed  - Offer frequent toileting  - Follow urinary retention protocol if ordered  Outcome: Progressing  Goal: Absence of urinary retention  Description: INTERVENTIONS:  - Assess patients ability to void and empty bladder  - Monitor I/O  - Bladder scan as needed  - Discuss with physician/AP medications to alleviate retention as needed  - Discuss catheterization for long term situations as appropriate  Outcome: Progressing  Goal: Urinary catheter remains patent  Description: INTERVENTIONS:  - Assess patency of urinary catheter  - If patient has a chronic chadwick, consider changing catheter if non-functioning  - Follow guidelines for intermittent irrigation of non-functioning urinary catheter  Outcome: Progressing     Problem: RESPIRATORY - ADULT  Goal: Achieves optimal ventilation and oxygenation  Description: INTERVENTIONS:  - Assess for changes in respiratory status  - Assess for changes in mentation and behavior  - Position to facilitate oxygenation and minimize respiratory effort  - Oxygen administered by appropriate delivery if ordered  - Initiate smoking cessation education as indicated  - Encourage broncho-pulmonary hygiene including cough, deep breathe, Incentive Spirometry  - Assess the need for suctioning and aspirate as needed  - Assess and instruct to report SOB or any respiratory difficulty  - Respiratory Therapy support as indicated  Outcome: Progressing     Problem: METABOLIC, FLUID AND ELECTROLYTES - ADULT  Goal: Electrolytes maintained within normal limits  Description: INTERVENTIONS:  - Monitor labs and assess patient for signs and symptoms of electrolyte imbalances  - Administer electrolyte replacement as ordered  - Monitor response to electrolyte replacements, including repeat lab results as appropriate  - Instruct patient on fluid and nutrition as appropriate  Outcome: Progressing  Goal: Fluid balance maintained  Description: INTERVENTIONS:  - Monitor labs   - Monitor I/O and WT  - Instruct patient on fluid and nutrition as appropriate  - Assess for signs & symptoms of volume excess or deficit  Outcome: Progressing     Problem: SKIN/TISSUE INTEGRITY - ADULT  Goal: Skin integrity remains intact  Description: INTERVENTIONS  - Identify patients at risk for skin breakdown  - Assess and monitor skin integrity  - Assess and monitor nutrition and hydration status  - Monitor labs (i e  albumin)  - Assess for incontinence   - Turn and reposition patient  - Assist with mobility/ambulation  - Relieve pressure over bony prominences  - Avoid friction and shearing  - Provide appropriate hygiene as needed including keeping skin clean and dry  - Evaluate need for skin moisturizer/barrier cream  - Collaborate with interdisciplinary team (i e  Nutrition, Rehabilitation, etc )   - Patient/family teaching  Outcome: Progressing  Goal: Incision(s), wounds(s) or drain site(s) healing without S/S of infection  Description: INTERVENTIONS  - Assess and document risk factors for skin impairment   - Assess and document dressing, incision, wound bed, drain sites and surrounding tissue  - Consider nutrition services referral as needed  - Oral mucous membranes remain intact  - Provide patient/ family education  Outcome: Progressing     Problem: HEMATOLOGIC - ADULT  Goal: Maintains hematologic stability  Description: INTERVENTIONS  - Assess for signs and symptoms of bleeding or hemorrhage  - Monitor labs  - Administer supportive blood products/factors as ordered and appropriate  Outcome: Progressing

## 2021-01-30 NOTE — DISCHARGE INSTRUCTIONS
Discharge Instructions - Podiatry    Weight Bearing Status: Weight bearing as tolerated                   Elevate lower extremity and offload heels while non weight barring    Follow-up appointment instructions: Please make an appointment within one week of discharge with Dr Caro Rivas  Contact sooner if any increase in pain, or signs of infection occur    Wound Care: Leave dressings clean, dry, and intact between professional dressing changes    Nursing Instructions: Please apply adaptic followed by maxorb and 4x4 to anterior right leg; right heel betadine paint and 4x4  Then cover with Gauze and secure with Kerlix and tape  Please change dressing every other day  Betadine paint to right toes open to air

## 2021-01-30 NOTE — ASSESSMENT & PLAN NOTE
Iron deficiency anemia with anemia s/p transfusion  No results for input(s): HGB, MCV, RDW, IRON, TIBC, FERRITIN in the last 72 hours

## 2021-01-30 NOTE — ASSESSMENT & PLAN NOTE
Wt Readings from Last 3 Encounters:   01/30/21 75 8 kg (167 lb 1 7 oz)   01/06/21 62 1 kg (136 lb 14 4 oz)   12/22/20 60 8 kg (134 lb)     History of congestive heart failure  Not in exacerbation

## 2021-01-30 NOTE — ASSESSMENT & PLAN NOTE
Multifactorial: Pre-renal, infection  Seems to be her new baseline      Recent Labs     01/28/21  0636 01/29/21  0438 01/30/21  0501   BUN 92* 88* 83*   CREATININE 2 72* 2 82* 2 76*   EGFR 15 14 15

## 2021-01-30 NOTE — PROGRESS NOTES
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    LEADs 12/29/2020 - Right: Occlusion vs high grade stenosis of mid/distal popliteal artery w/ stenosis x2 of SFA at the proximal and distal segments  Evidence of tibioperoneal disease  0 46/-/-  Left:  Occlusion vs high grade stenosis of proximal SFA w/ distal reconstitution  Evidence of tibioperoneal disease  0 71/-/-    This morning had another lengthy conversation with patient regarding angiogram and intervention  Patient is still reluctant and wants to consider this  We will revisit this in the afternoon  · Renal recommendations appreciated  · Will reach out to IR for scheduling of RLE angiogram and possible intervention if patient is agreeable to the procedure  · Hold coumadin and continue heparin drip  · Appreciate Podiatry recommendations  · Care per primary    Subjective/Objective     Subjective: No acute events  Afebrile  We discussed eventual arteriogram and outlined need for optimization of her renal function  Patient still having second thoughts and is unsure if she wants to move forward with it  I explained the risks and benefits  She wants more time  Objective:     Vitals: Temp:  [97 5 °F (36 4 °C)-98 °F (36 7 °C)] 98 °F (36 7 °C)  HR:  [66-68] 68  Resp:  [18-20] 18  BP: (135-168)/(65-90) 168/90  Body mass index is 26 97 kg/m²  I/O       01/19 0701 - 01/20 0700 01/20 0701 - 01/21 0700 01/21 0701 - 01/22 0700    P  O  540 240     IV Piggyback       Total Intake(mL/kg) 540 (8 3) 240 (3 7)     Urine (mL/kg/hr) 650 (0 4) 180 (0 1)     Total Output 650 180     Net -110 +60                  Physical Exam:  GEN: NAD  HEENT: MMM  CV:  Not tachy  Lung: Normal effort  Ab: Soft, NT/ND  Neuro: A+Ox3  LE: R foot covered in dressings all the way to the mid calf   Left limb cellulitis has much improved since the last time I examined her      Lab, Imaging and other studies:   CBC with diff:   Lab Results   Component Value Date    WBC 6 39 01/30/2021    HGB 8 0 (L) 01/30/2021    HCT 26 0 (L) 01/30/2021    MCV 89 01/30/2021     01/30/2021    MCH 27 3 01/30/2021    MCHC 30 8 (L) 01/30/2021    RDW 17 0 (H) 01/30/2021    MPV 9 6 01/30/2021   , BMP/CMP:   Lab Results   Component Value Date    SODIUM 135 (L) 01/30/2021    K 4 3 01/30/2021     01/30/2021    CO2 26 01/30/2021    BUN 83 (H) 01/30/2021    CREATININE 2 76 (H) 01/30/2021    CALCIUM 8 5 01/30/2021    EGFR 15 01/30/2021   , Coags:   Lab Results   Component Value Date    PTT 45 (H) 01/30/2021    INR 1 87 (H) 01/30/2021   , Blood Culture: No results found for: BLOODCX  VTE Pharmacologic Prophylaxis: Sequential compression device (Venodyne)   VTE Mechanical Prophylaxis: sequential compression device

## 2021-01-31 LAB
ANION GAP SERPL CALCULATED.3IONS-SCNC: 9 MMOL/L (ref 4–13)
APTT PPP: 108 SECONDS (ref 23–37)
APTT PPP: >210 SECONDS (ref 23–37)
APTT PPP: >210 SECONDS (ref 23–37)
BASOPHILS # BLD AUTO: 0.04 THOUSANDS/ΜL (ref 0–0.1)
BASOPHILS NFR BLD AUTO: 1 % (ref 0–1)
BUN SERPL-MCNC: 79 MG/DL (ref 5–25)
CALCIUM SERPL-MCNC: 8 MG/DL (ref 8.3–10.1)
CHLORIDE SERPL-SCNC: 101 MMOL/L (ref 100–108)
CO2 SERPL-SCNC: 27 MMOL/L (ref 21–32)
CREAT SERPL-MCNC: 2.72 MG/DL (ref 0.6–1.3)
EOSINOPHIL # BLD AUTO: 0.36 THOUSAND/ΜL (ref 0–0.61)
EOSINOPHIL NFR BLD AUTO: 6 % (ref 0–6)
ERYTHROCYTE [DISTWIDTH] IN BLOOD BY AUTOMATED COUNT: 17.4 % (ref 11.6–15.1)
GFR SERPL CREATININE-BSD FRML MDRD: 15 ML/MIN/1.73SQ M
GLUCOSE SERPL-MCNC: 97 MG/DL (ref 65–140)
HCT VFR BLD AUTO: 27.1 % (ref 34.8–46.1)
HGB BLD-MCNC: 8.3 G/DL (ref 11.5–15.4)
IMM GRANULOCYTES # BLD AUTO: 0.02 THOUSAND/UL (ref 0–0.2)
IMM GRANULOCYTES NFR BLD AUTO: 0 % (ref 0–2)
INR PPP: 2.09 (ref 0.84–1.19)
LYMPHOCYTES # BLD AUTO: 2.01 THOUSANDS/ΜL (ref 0.6–4.47)
LYMPHOCYTES NFR BLD AUTO: 31 % (ref 14–44)
MAGNESIUM SERPL-MCNC: 2 MG/DL (ref 1.6–2.6)
MCH RBC QN AUTO: 27 PG (ref 26.8–34.3)
MCHC RBC AUTO-ENTMCNC: 30.6 G/DL (ref 31.4–37.4)
MCV RBC AUTO: 88 FL (ref 82–98)
MONOCYTES # BLD AUTO: 0.5 THOUSAND/ΜL (ref 0.17–1.22)
MONOCYTES NFR BLD AUTO: 8 % (ref 4–12)
NEUTROPHILS # BLD AUTO: 3.47 THOUSANDS/ΜL (ref 1.85–7.62)
NEUTS SEG NFR BLD AUTO: 54 % (ref 43–75)
NRBC BLD AUTO-RTO: 0 /100 WBCS
PLATELET # BLD AUTO: 288 THOUSANDS/UL (ref 149–390)
PMV BLD AUTO: 10.3 FL (ref 8.9–12.7)
POTASSIUM SERPL-SCNC: 3.5 MMOL/L (ref 3.5–5.3)
PROTHROMBIN TIME: 23 SECONDS (ref 11.6–14.5)
RBC # BLD AUTO: 3.07 MILLION/UL (ref 3.81–5.12)
SODIUM SERPL-SCNC: 137 MMOL/L (ref 136–145)
WBC # BLD AUTO: 6.4 THOUSAND/UL (ref 4.31–10.16)

## 2021-01-31 PROCEDURE — 85730 THROMBOPLASTIN TIME PARTIAL: CPT | Performed by: STUDENT IN AN ORGANIZED HEALTH CARE EDUCATION/TRAINING PROGRAM

## 2021-01-31 PROCEDURE — 80048 BASIC METABOLIC PNL TOTAL CA: CPT | Performed by: STUDENT IN AN ORGANIZED HEALTH CARE EDUCATION/TRAINING PROGRAM

## 2021-01-31 PROCEDURE — 99232 SBSQ HOSP IP/OBS MODERATE 35: CPT | Performed by: SURGERY

## 2021-01-31 PROCEDURE — 99232 SBSQ HOSP IP/OBS MODERATE 35: CPT | Performed by: STUDENT IN AN ORGANIZED HEALTH CARE EDUCATION/TRAINING PROGRAM

## 2021-01-31 PROCEDURE — 99232 SBSQ HOSP IP/OBS MODERATE 35: CPT | Performed by: INTERNAL MEDICINE

## 2021-01-31 PROCEDURE — 85730 THROMBOPLASTIN TIME PARTIAL: CPT | Performed by: PHYSICIAN ASSISTANT

## 2021-01-31 PROCEDURE — 83735 ASSAY OF MAGNESIUM: CPT | Performed by: STUDENT IN AN ORGANIZED HEALTH CARE EDUCATION/TRAINING PROGRAM

## 2021-01-31 PROCEDURE — 85025 COMPLETE CBC W/AUTO DIFF WBC: CPT | Performed by: STUDENT IN AN ORGANIZED HEALTH CARE EDUCATION/TRAINING PROGRAM

## 2021-01-31 PROCEDURE — 85610 PROTHROMBIN TIME: CPT | Performed by: STUDENT IN AN ORGANIZED HEALTH CARE EDUCATION/TRAINING PROGRAM

## 2021-01-31 RX ORDER — HYDRALAZINE HYDROCHLORIDE 25 MG/1
25 TABLET, FILM COATED ORAL EVERY 8 HOURS SCHEDULED
Status: DISCONTINUED | OUTPATIENT
Start: 2021-01-31 | End: 2021-02-03

## 2021-01-31 RX ORDER — HYDRALAZINE HYDROCHLORIDE 25 MG/1
25 TABLET, FILM COATED ORAL EVERY 8 HOURS SCHEDULED
Status: DISCONTINUED | OUTPATIENT
Start: 2021-01-31 | End: 2021-01-31

## 2021-01-31 RX ADMIN — COLLAGENASE SANTYL 1 APPLICATION: 250 OINTMENT TOPICAL at 08:36

## 2021-01-31 RX ADMIN — CARVEDILOL 12.5 MG: 6.25 TABLET, FILM COATED ORAL at 16:43

## 2021-01-31 RX ADMIN — Medication 250 MG: at 08:33

## 2021-01-31 RX ADMIN — AMLODIPINE BESYLATE 10 MG: 10 TABLET ORAL at 08:34

## 2021-01-31 RX ADMIN — CARVEDILOL 12.5 MG: 6.25 TABLET, FILM COATED ORAL at 08:33

## 2021-01-31 RX ADMIN — HYDRALAZINE HYDROCHLORIDE 25 MG: 25 TABLET ORAL at 13:35

## 2021-01-31 RX ADMIN — ISOSORBIDE DINITRATE 10 MG: 10 TABLET ORAL at 16:43

## 2021-01-31 RX ADMIN — LEVOTHYROXINE SODIUM 25 MCG: 25 TABLET ORAL at 05:47

## 2021-01-31 RX ADMIN — Medication 250 MG: at 16:43

## 2021-01-31 RX ADMIN — HYDRALAZINE HYDROCHLORIDE 10 MG: 10 TABLET, FILM COATED ORAL at 05:47

## 2021-01-31 RX ADMIN — ISOSORBIDE DINITRATE 10 MG: 10 TABLET ORAL at 08:33

## 2021-01-31 RX ADMIN — ISOSORBIDE DINITRATE 10 MG: 10 TABLET ORAL at 13:34

## 2021-01-31 NOTE — PLAN OF CARE
Problem: Potential for Falls  Goal: Patient will remain free of falls  Description: INTERVENTIONS:  - Assess patient frequently for physical needs  -  Identify cognitive and physical deficits and behaviors that affect risk of falls  -  [place] fall precautions as indicated by assessment   - Educate patient/family on patient safety including physical limitations  - Instruct patient to call for assistance with activity based on assessment  - Modify environment to reduce risk of injury  - Consider OT/PT consult to assist with strengthening/mobility  Outcome: Progressing     Problem: Prexisting or High Potential for Compromised Skin Integrity  Goal: Skin integrity is maintained or improved  Description: INTERVENTIONS:  - Identify patients at risk for skin breakdown  - Assess and monitor skin integrity  - Assess and monitor nutrition and hydration status  - Monitor labs   - Assess for incontinence   - Turn and reposition patient  - Assist with mobility/ambulation  - Relieve pressure over bony prominences  - Avoid friction and shearing  - Provide appropriate hygiene as needed including keeping skin clean and dry  - Evaluate need for skin moisturizer/barrier cream  - Collaborate with interdisciplinary team   - Patient/family teaching  - Consider wound care consult   Outcome: Progressing     Problem: Nutrition/Hydration-ADULT  Goal: Nutrient/Hydration intake appropriate for improving, restoring or maintaining nutritional needs  Description: Monitor and assess patient's nutrition/hydration status for malnutrition  Collaborate with interdisciplinary team and initiate plan and interventions as ordered  Monitor patient's weight and dietary intake as ordered or per policy  Utilize nutrition screening tool and intervene as necessary  Determine patient's food preferences and provide high-protein, high-caloric foods as appropriate       INTERVENTIONS:  - Monitor oral intake, urinary output, labs, and treatment plans  - Assess nutrition and hydration status and recommend course of action  - Evaluate amount of meals eaten  - Assist patient with eating if necessary   - Allow adequate time for meals  - Recommend/ encourage appropriate diets, oral nutritional supplements, and vitamin/mineral supplements  - Order, calculate, and assess calorie counts as needed  - Recommend, monitor, and adjust tube feedings and TPN/PPN based on assessed needs  - Assess need for intravenous fluids  - Provide specific nutrition/hydration education as appropriate  - Include patient/family/caregiver in decisions related to nutrition  Outcome: Progressing     Problem: PAIN - ADULT  Goal: Verbalizes/displays adequate comfort level or baseline comfort level  Description: Interventions:  - Encourage patient to monitor pain and request assistance  - Assess pain using appropriate pain scale  - Administer analgesics based on type and severity of pain and evaluate response  - Implement non-pharmacological measures as appropriate and evaluate response  - Consider cultural and social influences on pain and pain management  - Notify physician/advanced practitioner if interventions unsuccessful or patient reports new pain  Outcome: Progressing     Problem: INFECTION - ADULT  Goal: Absence or prevention of progression during hospitalization  Description: INTERVENTIONS:  - Assess and monitor for signs and symptoms of infection  - Monitor lab/diagnostic results  - Monitor all insertion sites, i e  indwelling lines, tubes, and drains  - Monitor endotracheal if appropriate and nasal secretions for changes in amount and color  - [place] appropriate cooling/warming therapies per order  - Administer medications as ordered  - Instruct and encourage patient and family to use good hand hygiene technique  - Identify and instruct in appropriate isolation precautions for identified infection/condition  Outcome: Progressing  Goal: Absence of fever/infection during neutropenic period  Description: INTERVENTIONS:  - Monitor WBC    Outcome: Progressing     Problem: SAFETY ADULT  Goal: Patient will remain free of falls  Description: INTERVENTIONS:  - Assess patient frequently for physical needs  -  Identify cognitive and physical deficits and behaviors that affect risk of falls    -  [place] fall precautions as indicated by assessment   - Educate patient/family on patient safety including physical limitations  - Instruct patient to call for assistance with activity based on assessment  - Modify environment to reduce risk of injury  - Consider OT/PT consult to assist with strengthening/mobility  Outcome: Progressing  Goal: Maintain or return to baseline ADL function  Description: INTERVENTIONS:  -  Assess patient's ability to carry out ADLs; assess patient's baseline for ADL function and identify physical deficits which impact ability to perform ADLs (bathing, care of mouth/teeth, toileting, grooming, dressing, etc )  - Assess/evaluate cause of self-care deficits   - Assess range of motion  - Assess patient's mobility; develop plan if impaired  - Assess patient's need for assistive devices and provide as appropriate  - Encourage maximum independence but intervene and supervise when necessary  - Involve family in performance of ADLs  - Assess for home care needs following discharge   - Consider OT consult to assist with ADL evaluation and planning for discharge  - Provide patient education as appropriate  Outcome: Progressing  Goal: Maintain or return mobility status to optimal level  Description: INTERVENTIONS:  - Assess patient's baseline mobility status (ambulation, transfers, stairs, etc )    - Identify cognitive and physical deficits and behaviors that affect mobility  - Identify mobility aids required to assist with transfers and/or ambulation (gait belt, sit-to-stand, lift, walker, cane, etc )  - [place] fall precautions as indicated by assessment  - Record patient progress and toleration of activity level on Mobility SBAR; progress patient to next Phase/Stage  - Instruct patient to call for assistance with activity based on assessment  - Consider rehabilitation consult to assist with strengthening/weightbearing, etc   Outcome: Progressing     Problem: DISCHARGE PLANNING  Goal: Discharge to home or other facility with appropriate resources  Description: INTERVENTIONS:  - Identify barriers to discharge w/patient and caregiver  - Arrange for needed discharge resources and transportation as appropriate  - Identify discharge learning needs (meds, wound care, etc )  - Arrange for interpretive services to assist at discharge as needed  - Refer to Case Management Department for coordinating discharge planning if the patient needs post-hospital services based on physician/advanced practitioner order or complex needs related to functional status, cognitive ability, or social support system  Outcome: Progressing     Problem: Knowledge Deficit  Goal: Patient/family/caregiver demonstrates understanding of disease process, treatment plan, medications, and discharge instructions  Description: Complete learning assessment and assess knowledge base    Interventions:  - Provide teaching at level of understanding  - Provide teaching via preferred learning methods  Outcome: Progressing     Problem: GENITOURINARY - ADULT  Goal: Maintains or returns to baseline urinary function  Description: INTERVENTIONS:  - Assess urinary function  - Encourage oral fluids to ensure adequate hydration if ordered  - Administer IV fluids as ordered to ensure adequate hydration  - Administer ordered medications as needed  - Offer frequent toileting  - Follow urinary retention protocol if ordered  Outcome: Progressing  Goal: Absence of urinary retention  Description: INTERVENTIONS:  - Assess patients ability to void and empty bladder  - Monitor I/O  - Bladder scan as needed  - Discuss with physician/AP medications to alleviate retention as needed  - Discuss catheterization for long term situations as appropriate  Outcome: Progressing  Goal: Urinary catheter remains patent  Description: INTERVENTIONS:  - Assess patency of urinary catheter  - If patient has a chronic chadwick, consider changing catheter if non-functioning  - Follow guidelines for intermittent irrigation of non-functioning urinary catheter  Outcome: Progressing     Problem: RESPIRATORY - ADULT  Goal: Achieves optimal ventilation and oxygenation  Description: INTERVENTIONS:  - Assess for changes in respiratory status  - Assess for changes in mentation and behavior  - Position to facilitate oxygenation and minimize respiratory effort  - Oxygen administered by appropriate delivery if ordered  - Initiate smoking cessation education as indicated  - Encourage broncho-pulmonary hygiene including cough, deep breathe, Incentive Spirometry  - Assess the need for suctioning and aspirate as needed  - Assess and instruct to report SOB or any respiratory difficulty  - Respiratory Therapy support as indicated  Outcome: Progressing     Problem: METABOLIC, FLUID AND ELECTROLYTES - ADULT  Goal: Electrolytes maintained within normal limits  Description: INTERVENTIONS:  - Monitor labs and assess patient for signs and symptoms of electrolyte imbalances  - Administer electrolyte replacement as ordered  - Monitor response to electrolyte replacements, including repeat lab results as appropriate  - Instruct patient on fluid and nutrition as appropriate  Outcome: Progressing  Goal: Fluid balance maintained  Description: INTERVENTIONS:  - Monitor labs   - Monitor I/O and WT  - Instruct patient on fluid and nutrition as appropriate  - Assess for signs & symptoms of volume excess or deficit  Outcome: Progressing     Problem: SKIN/TISSUE INTEGRITY - ADULT  Goal: Skin integrity remains intact  Description: INTERVENTIONS  - Identify patients at risk for skin breakdown  - Assess and monitor skin integrity  - Assess and monitor nutrition and hydration status  - Monitor labs (i e  albumin)  - Assess for incontinence   - Turn and reposition patient  - Assist with mobility/ambulation  - Relieve pressure over bony prominences  - Avoid friction and shearing  - Provide appropriate hygiene as needed including keeping skin clean and dry  - Evaluate need for skin moisturizer/barrier cream  - Collaborate with interdisciplinary team (i e  Nutrition, Rehabilitation, etc )   - Patient/family teaching  Outcome: Progressing  Goal: Incision(s), wounds(s) or drain site(s) healing without S/S of infection  Description: INTERVENTIONS  - Assess and document risk factors for skin impairment   - Assess and document dressing, incision, wound bed, drain sites and surrounding tissue  - Consider nutrition services referral as needed  - Oral mucous membranes remain intact  - Provide patient/ family education  Outcome: Progressing     Problem: HEMATOLOGIC - ADULT  Goal: Maintains hematologic stability  Description: INTERVENTIONS  - Assess for signs and symptoms of bleeding or hemorrhage  - Monitor labs  - Administer supportive blood products/factors as ordered and appropriate  Outcome: Progressing

## 2021-01-31 NOTE — PROGRESS NOTES
Progress Note - Margaret Osorio 5/18/1930, 80 y o  female MRN: [id number]    Unit/Bed#: E5 -01 Encounter: [id number]    Primary Care Provider: Jennifer Latham MD   Date and time admitted to hospital: 1/18/2021 11:44 AM    Addendum: Saw vascular surgery's note regarding patient's refusal  Will rediscuss this with patient since she was amenable to it this morning  However, spoke to her  since patient has [address] [place] [address] Ability to assess where he stands with this  He understood the bigger picture and is concerned about no intervention  After I asked him if he is willing to override her wishes due to her capacity and him being the decision maker as per , he stated he would like to discuss it with her first before overriding her  * Cellulitis  Assessment & Plan  80year old female admitted due to LLE cellulitis  Podiatry reviewed imaging and stated patient does not have OM or CHERYLE   - Completed Antibiotic course  - Renal ok for arteriogram on Monday  - Vascular surgery will discuss intended intervention with patient this afternoon  PAD (peripheral artery disease) ([facility] [address] )  Assessment & Plan  As written above  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 [facility])  Assessment & Plan  In the setting of advanced peripheral arterial disease    - Renal stating ok for arteriogram on Monday if renal function remains stable  - Continue wound care    Chronic hypoxemic respiratory failure [facility])  Assessment & Plan  Secondary to CHF  Baseline at 3L    Acute kidney injury superimposed on chronic kidney disease [facility])  Assessment & Plan  Multifactorial: Pre-renal, infection  Seems to be her new baseline      Recent Labs     01/29/21  0438 01/30/21  0501 01/31/21  0516   BUN 88* 83* 79*   CREATININE 2 82* 2 76* 2 72*   EGFR 14 15 15                 Acute deep vein thrombosis (DVT) of calf muscle vein of right lower extremity (HCC)  Assessment & Plan  Dx with a right soleal vein occlusive DVT during her hospitalization 11/2020 at [facility]  - Coumadin held for intended procedure  Continue bridging to heparin  Recent Labs     21  0438 21  0501 21  0516   INR 1 71* 1 87* 2 09*         Essential hypertension  Assessment & Plan  Contiue coreg, hydralazine, amlodipine    Iron deficiency anemia secondary to inadequate dietary iron intake  Assessment & Plan  Iron deficiency anemia with anemia s/p transfusion  Recent Labs     21  1524 21  0516   HGB 8 0* 8 3*   MCV 89 88   RDW 17 0* 17 4*         Chronic diastolic heart failure (HCC)  Assessment & Plan  Wt Readings from Last 3 Encounters:   21 76 kg (167 lb 8 8 oz)   21 62 1 kg (136 lb 14 4 oz)   20 60 8 kg (134 lb)     History of congestive heart failure  Not in exacerbation  Ambulatory dysfunction  Assessment & Plan  Impaired medical decision making capacity  - For placement once stable      VTE Pharmacologic Prophylaxis:   Pharmacologic: Heparin Drip  Mechanical VTE Prophylaxis in Place: Yes    Patient Centered Rounds: I have performed bedside rounds with nursing staff today  Discussions with Specialists or Other Care Team Provider: Nursing    Education and Discussions with Family / Patient: Patient,  kenia    Time Spent for Care: 30 minutes  More than 50% of total time spent on counseling and coordination of care as described above  Current Length of Stay: 13 day(s)    Current Patient Status: Inpatient   Certification Statement: The patient will continue to require additional inpatient hospital stay due to iv hydration, vascular surgery discussion  Discharge Plan: active    Code Status: Level 1 - Full Code      Subjective:   Patient seen and examined at bedside  No acute events or complaints overnight   She is aware that vascular surgery intervention is planned for diogenes if they could squeeze her in     Objective:     Vitals:   Temp (24hrs), Av 3 °F (36 8 °C), Min:97 6 °F (36 4 °C), Max:99 °F (37 2 °C)    Temp:  [97 6 °F (36 4 °C)-99 °F (37 2 °C)] 99 °F (37 2 °C)  HR:  [63-67] 63  Resp:  [18-20] 20  BP: (141-166)/(65-73) 158/72  SpO2:  [92 %-96 %] 96 %  Body mass index is 27 04 kg/m²  Input and Output Summary (last 24 hours): Intake/Output Summary (Last 24 hours) at 1/31/2021 1338  Last data filed at 1/31/2021 6710  Gross per 24 hour   Intake    Output 850 ml   Net -850 ml       Physical Exam:     Physical Exam  Vitals signs reviewed  HENT:      Head: Normocephalic  Nose: Nose normal       Mouth/Throat:      Mouth: Mucous membranes are moist    Eyes:      General: No scleral icterus  Extraocular Movements: Extraocular movements intact  Cardiovascular:      Rate and Rhythm: Normal rate and regular rhythm  Pulmonary:      Effort: Pulmonary effort is normal  No respiratory distress  Abdominal:      General: There is no distension  Palpations: Abdomen is soft  Tenderness: There is no abdominal tenderness  Musculoskeletal:      Comments: Left leg dressing c/d/i  Right 4th and fifth digit / posterior heel gangrene   Skin:     General: Skin is warm  Neurological:      Mental Status: She is alert  Mental status is at baseline  Psychiatric:         Mood and Affect: Mood normal          Behavior: Behavior normal        Additional Data:     Labs:    Results from last 7 days   Lab Units 01/31/21  0516   WBC Thousand/uL 6 40   HEMOGLOBIN g/dL 8 3*   HEMATOCRIT % 27 1*   PLATELETS Thousands/uL 288   NEUTROS PCT % 54   LYMPHS PCT % 31   MONOS PCT % 8   EOS PCT % 6     Results from last 7 days   Lab Units 01/31/21  0516   SODIUM mmol/L 137   POTASSIUM mmol/L 3 5   CHLORIDE mmol/L 101   CO2 mmol/L 27   BUN mg/dL 79*   CREATININE mg/dL 2 72*   ANION GAP mmol/L 9   CALCIUM mg/dL 8 0*   GLUCOSE RANDOM mg/dL 97     Results from last 7 days   Lab Units 01/31/21  0516   INR  2 09*                       * I Have Reviewed All Lab Data Listed Above  * Additional Pertinent Lab Tests Reviewed:  All Labs For Current Hospital Admission Reviewed    Imaging:    Imaging Reports Reviewed Today Include: No new imaging    Recent Cultures (last 7 days):           Last 24 Hours Medication List:   Current Facility-Administered Medications   Medication Dose Route Frequency Provider Last Rate    acetaminophen  650 mg Oral Q6H PRN Ally Sykes DO      amLODIPine  10 mg Oral Daily Eduin Fernanda, OTIS      carvedilol  12 5 mg Oral BID With Meals OTIS Choi      collagenase   Topical Daily Kinza Jang MD      heparin (porcine)  3-30 Units/kg/hr (Order-Specific) Intravenous Titrated Kinza Jang MD 9 Units/kg/hr (01/31/21 1049)    hydrALAZINE  25 mg Oral Q8H [address] [place], OTIS      HYDROmorphone  1 mg Intravenous Q4H PRN Severiano John PA-C      isosorbide dinitrate  10 mg Oral TID after meals Harsha Shook DO      levothyroxine  25 mcg Oral Early Morning Harsha Shook DO      ondansetron  4 mg Intravenous Q6H PRN Harsha Shook DO      oxyCODONE  2 5 mg Oral Q4H PRN Arabella Abraham PA-C      oxyCODONE  5 mg Oral Q4H PRN Arabella Abraham PA-C      potassium chloride  20 mEq Oral Once Tanja Barr MD      saccharomyces boulardii  250 mg Oral BID Ally Sykes DO          Today, Patient Was Seen By: Minh Arreaga MD    ** Please Note: Dictation voice to text software may have been used in the creation of this document   **

## 2021-01-31 NOTE — QUICK NOTE
Spoke to patient's  / Whitney Flowers, he spoke to her regarding intended procedure tomorrow  She stated that she did not refuse the procedure  Due to lack of capacity, I reached out to vascular surgery to speak to her  to determine next step in management

## 2021-01-31 NOTE — PLAN OF CARE
Problem: Potential for Falls  Goal: Patient will remain free of falls  Description: INTERVENTIONS:  - Assess patient frequently for physical needs  -  Identify cognitive and physical deficits and behaviors that affect risk of falls  -  [place] fall precautions as indicated by assessment   - Educate patient/family on patient safety including physical limitations  - Instruct patient to call for assistance with activity based on assessment  - Modify environment to reduce risk of injury  - Consider OT/PT consult to assist with strengthening/mobility  Outcome: Progressing     Problem: Prexisting or High Potential for Compromised Skin Integrity  Goal: Skin integrity is maintained or improved  Description: INTERVENTIONS:  - Identify patients at risk for skin breakdown  - Assess and monitor skin integrity  - Assess and monitor nutrition and hydration status  - Monitor labs   - Assess for incontinence   - Turn and reposition patient  - Assist with mobility/ambulation  - Relieve pressure over bony prominences  - Avoid friction and shearing  - Provide appropriate hygiene as needed including keeping skin clean and dry  - Evaluate need for skin moisturizer/barrier cream  - Collaborate with interdisciplinary team   - Patient/family teaching  - Consider wound care consult   Outcome: Progressing     Problem: Nutrition/Hydration-ADULT  Goal: Nutrient/Hydration intake appropriate for improving, restoring or maintaining nutritional needs  Description: Monitor and assess patient's nutrition/hydration status for malnutrition  Collaborate with interdisciplinary team and initiate plan and interventions as ordered  Monitor patient's weight and dietary intake as ordered or per policy  Utilize nutrition screening tool and intervene as necessary  Determine patient's food preferences and provide high-protein, high-caloric foods as appropriate       INTERVENTIONS:  - Monitor oral intake, urinary output, labs, and treatment plans  - Assess nutrition and hydration status and recommend course of action  - Evaluate amount of meals eaten  - Assist patient with eating if necessary   - Allow adequate time for meals  - Recommend/ encourage appropriate diets, oral nutritional supplements, and vitamin/mineral supplements  - Order, calculate, and assess calorie counts as needed  - Recommend, monitor, and adjust tube feedings and TPN/PPN based on assessed needs  - Assess need for intravenous fluids  - Provide specific nutrition/hydration education as appropriate  - Include patient/family/caregiver in decisions related to nutrition  Outcome: Progressing     Problem: PAIN - ADULT  Goal: Verbalizes/displays adequate comfort level or baseline comfort level  Description: Interventions:  - Encourage patient to monitor pain and request assistance  - Assess pain using appropriate pain scale  - Administer analgesics based on type and severity of pain and evaluate response  - Implement non-pharmacological measures as appropriate and evaluate response  - Consider cultural and social influences on pain and pain management  - Notify physician/advanced practitioner if interventions unsuccessful or patient reports new pain  Outcome: Progressing     Problem: INFECTION - ADULT  Goal: Absence or prevention of progression during hospitalization  Description: INTERVENTIONS:  - Assess and monitor for signs and symptoms of infection  - Monitor lab/diagnostic results  - Monitor all insertion sites, i e  indwelling lines, tubes, and drains  - Monitor endotracheal if appropriate and nasal secretions for changes in amount and color  - [place] appropriate cooling/warming therapies per order  - Administer medications as ordered  - Instruct and encourage patient and family to use good hand hygiene technique  - Identify and instruct in appropriate isolation precautions for identified infection/condition  Outcome: Progressing  Goal: Absence of fever/infection during neutropenic period  Description: INTERVENTIONS:  - Monitor WBC    Outcome: Progressing     Problem: SAFETY ADULT  Goal: Patient will remain free of falls  Description: INTERVENTIONS:  - Assess patient frequently for physical needs  -  Identify cognitive and physical deficits and behaviors that affect risk of falls    -  [place] fall precautions as indicated by assessment   - Educate patient/family on patient safety including physical limitations  - Instruct patient to call for assistance with activity based on assessment  - Modify environment to reduce risk of injury  - Consider OT/PT consult to assist with strengthening/mobility  Outcome: Progressing  Goal: Maintain or return to baseline ADL function  Description: INTERVENTIONS:  -  Assess patient's ability to carry out ADLs; assess patient's baseline for ADL function and identify physical deficits which impact ability to perform ADLs (bathing, care of mouth/teeth, toileting, grooming, dressing, etc )  - Assess/evaluate cause of self-care deficits   - Assess range of motion  - Assess patient's mobility; develop plan if impaired  - Assess patient's need for assistive devices and provide as appropriate  - Encourage maximum independence but intervene and supervise when necessary  - Involve family in performance of ADLs  - Assess for home care needs following discharge   - Consider OT consult to assist with ADL evaluation and planning for discharge  - Provide patient education as appropriate  Outcome: Progressing  Goal: Maintain or return mobility status to optimal level  Description: INTERVENTIONS:  - Assess patient's baseline mobility status (ambulation, transfers, stairs, etc )    - Identify cognitive and physical deficits and behaviors that affect mobility  - Identify mobility aids required to assist with transfers and/or ambulation (gait belt, sit-to-stand, lift, walker, cane, etc )  - [place] fall precautions as indicated by assessment  - Record patient progress and toleration of activity level on Mobility SBAR; progress patient to next Phase/Stage  - Instruct patient to call for assistance with activity based on assessment  - Consider rehabilitation consult to assist with strengthening/weightbearing, etc   Outcome: Progressing     Problem: DISCHARGE PLANNING  Goal: Discharge to home or other facility with appropriate resources  Description: INTERVENTIONS:  - Identify barriers to discharge w/patient and caregiver  - Arrange for needed discharge resources and transportation as appropriate  - Identify discharge learning needs (meds, wound care, etc )  - Arrange for interpretive services to assist at discharge as needed  - Refer to Case Management Department for coordinating discharge planning if the patient needs post-hospital services based on physician/advanced practitioner order or complex needs related to functional status, cognitive ability, or social support system  Outcome: Progressing     Problem: Knowledge Deficit  Goal: Patient/family/caregiver demonstrates understanding of disease process, treatment plan, medications, and discharge instructions  Description: Complete learning assessment and assess knowledge base    Interventions:  - Provide teaching at level of understanding  - Provide teaching via preferred learning methods  Outcome: Progressing     Problem: GENITOURINARY - ADULT  Goal: Maintains or returns to baseline urinary function  Description: INTERVENTIONS:  - Assess urinary function  - Encourage oral fluids to ensure adequate hydration if ordered  - Administer IV fluids as ordered to ensure adequate hydration  - Administer ordered medications as needed  - Offer frequent toileting  - Follow urinary retention protocol if ordered  Outcome: Progressing  Goal: Absence of urinary retention  Description: INTERVENTIONS:  - Assess patients ability to void and empty bladder  - Monitor I/O  - Bladder scan as needed  - Discuss with physician/AP medications to alleviate retention as needed  - Discuss catheterization for long term situations as appropriate  Outcome: Progressing  Goal: Urinary catheter remains patent  Description: INTERVENTIONS:  - Assess patency of urinary catheter  - If patient has a chronic chadwick, consider changing catheter if non-functioning  - Follow guidelines for intermittent irrigation of non-functioning urinary catheter  Outcome: Progressing     Problem: RESPIRATORY - ADULT  Goal: Achieves optimal ventilation and oxygenation  Description: INTERVENTIONS:  - Assess for changes in respiratory status  - Assess for changes in mentation and behavior  - Position to facilitate oxygenation and minimize respiratory effort  - Oxygen administered by appropriate delivery if ordered  - Initiate smoking cessation education as indicated  - Encourage broncho-pulmonary hygiene including cough, deep breathe, Incentive Spirometry  - Assess the need for suctioning and aspirate as needed  - Assess and instruct to report SOB or any respiratory difficulty  - Respiratory Therapy support as indicated  Outcome: Progressing     Problem: METABOLIC, FLUID AND ELECTROLYTES - ADULT  Goal: Electrolytes maintained within normal limits  Description: INTERVENTIONS:  - Monitor labs and assess patient for signs and symptoms of electrolyte imbalances  - Administer electrolyte replacement as ordered  - Monitor response to electrolyte replacements, including repeat lab results as appropriate  - Instruct patient on fluid and nutrition as appropriate  Outcome: Progressing  Goal: Fluid balance maintained  Description: INTERVENTIONS:  - Monitor labs   - Monitor I/O and WT  - Instruct patient on fluid and nutrition as appropriate  - Assess for signs & symptoms of volume excess or deficit  Outcome: Progressing     Problem: SKIN/TISSUE INTEGRITY - ADULT  Goal: Skin integrity remains intact  Description: INTERVENTIONS  - Identify patients at risk for skin breakdown  - Assess and monitor skin integrity  - Assess and monitor nutrition and hydration status  - Monitor labs (i e  albumin)  - Assess for incontinence   - Turn and reposition patient  - Assist with mobility/ambulation  - Relieve pressure over bony prominences  - Avoid friction and shearing  - Provide appropriate hygiene as needed including keeping skin clean and dry  - Evaluate need for skin moisturizer/barrier cream  - Collaborate with interdisciplinary team (i e  Nutrition, Rehabilitation, etc )   - Patient/family teaching  Outcome: Progressing  Goal: Incision(s), wounds(s) or drain site(s) healing without S/S of infection  Description: INTERVENTIONS  - Assess and document risk factors for skin impairment   - Assess and document dressing, incision, wound bed, drain sites and surrounding tissue  - Consider nutrition services referral as needed  - Oral mucous membranes remain intact  - Provide patient/ family education  Outcome: Progressing     Problem: HEMATOLOGIC - ADULT  Goal: Maintains hematologic stability  Description: INTERVENTIONS  - Assess for signs and symptoms of bleeding or hemorrhage  - Monitor labs  - Administer supportive blood products/factors as ordered and appropriate  Outcome: Progressing

## 2021-01-31 NOTE — PROGRESS NOTES
NEPHROLOGY PROGRESS NOTE   Margaret Osorio 80 y o  female MRN: [id number]  Unit/Bed#: E5 -01 Encounter: [id number]  Reason for Consult: LILIAN (POA) on likely CKD IV    PLAN:   -creatinine remains stable at 2 7   -received IV Lasix yesterday  Continue to intermittently dose  Eventually will need to be continued  Will hold today    -will need preparation with IV hydration and Mucomyst pre and post contrast if patient decides to proceed with arteriogram   -vascular surgery following, patient refusing intervention at this time  Awaiting further decision   -blood pressure slightly above goal, hydralazine increased to 25 mg every 8 hours  -continues with Scott catheter  ASSESSMENT/PLAN:  LILIAN (POA) on likely CKD IV:  Suspect multifactorial with possible infection, diuretic use, as well as possible post renal component with history of atrophic left kidney measuring 7 1 cm and right-sided hydro in December 2020    -presented with creatinine of 3 3 with peak of 3 7 on 01/19   -unknown baseline creatinine, suspect around 1 7-2 2   -creatinine has plateaued around 3 5-1 0    -received IV Lasix 01/27 and 1/28    -UA showed large blood, large leukocytes, 2+ protein, RBCs, innumerable WBCs, innumerable bacteria   -continue Scott catheter per Urology  -potential need for future arteriogram per Vascular   Would hold off until creatinine is at baseline unless necessarily needed   -avoid nephrotoxins, hypotension, IV contrast   -I/O      Right-sided hydronephrosis:  With history of right ureteral stent placement in December 2020 at [facility]   -Renal US revealed bilateral renal atrophy, more  on the left along with mild right hydronephrosis with ureteral stent in place   -urology consulted  -will place Scott catheter for maximum decompression      Hyponatremia:  Suspect component of hypovolemia as well as SIADH   (resolved)  -urine sodium 47, urine osmolality 338, serum osmolality 308, uric acid elevated at 9 0, TSH 10 6   -presented with sodium of 124 with normal glucose     -continue on 1 5 L fluid restriction       Hyperkalemia (POA)/hypokalemia:  Presents with potassium of 5 7   Received medical management with insulin, dextrose, Kayexalate, and calcium gluconate  (resolved) Now with hypokalemia    -continue to monitor and treat as necessary    -hypokalemia likely secondary to diuretics       Hypertension:  above goal at times    -avoid hypotension or high fluctuations in blood pressure   -outpatient antihypertensives:  Amlodipine 5 mg daily, carvedilol 12 5 mg 2 times per day, hydralazine 10 mg every 8 hours, Isordil 10 mg 3 times per day   -continue amlodipine 10 mg daily, carvedilol 12 5 mg 2 times per day, hydralazine 10 mg every 8 hours, Isordil 10 mg 3 times per day  -will increase hydralazine to 25 mg every 8 hours      Lower extremity cellulitis:  With dry gangrene to great toe and 4th toe   -podiatry following   -vascular surgery consulted to discuss a-gram and further intervention   Would hold off until creatinine improves unless urgently needed   Recommend pre and post hydration with contrast administration as well as give Mucomyst 1200 mg times total of 4 doses   -blood cultures negative to date   -local dressing changes to LE per vascular recommendations   -continues on IV antibiotics      CHF:  Echocardiogram showed EF of 50% with grade 2 diastolic dysfunction   No sandro signs of volume overload   -check daily weights, fluid restriction, I/O   -outpatient diuretics:  Lasix 40 mg daily  -patient well above admission weight   -receiving intermittent IV Lasix  Last dose 1/30   -continue to monitor volume status on IV hydration  -CT chest shows bilateral moderate-sized pleural effusions, small amount of ascites      Hyperphosphatemia:  -will place on low phosphorus diet today  Continue to monitor    If remains elevated, will start on phosphorus binder      Anemia:   status post PRBC transfusion 1/21    -recommend giving Lasix 20 mg IV with PRBC transfusion    -hemoccult stool is negative   -continue to monitor and transfuse as needed for hemoglobin less than 7 0   -iron panel showed low TIBC and low iron      Other:  PAD, DVT  Disposition:  Requiring additional stay due to medical needs  SUBJECTIVE:  The patient is resting in her bed  She remains on supplemental oxygen  She denies chest pain or shortness of breath  She denies nausea, vomiting, diarrhea  She has a Scott catheter      OBJECTIVE:  Current Weight: Weight - Scale: 76 kg (167 lb 8 8 oz)  Vitals:    01/31/21 0036 01/31/21 0544 01/31/21 0547 01/31/21 0720   BP: 165/72  158/65 158/72   BP Location: Left arm   Left arm   Pulse: 67   63   Resp: 18   20   Temp: 98 7 °F (37 1 °C)   99 °F (37 2 °C)   TempSrc: Temporal   Temporal   SpO2: 92%   96%   Weight:  76 kg (167 lb 8 8 oz)         Intake/Output Summary (Last 24 hours) at 1/31/2021 1105  Last data filed at 1/31/2021 5736  Gross per 24 hour   Intake    Output 850 ml   Net -850 ml     General: NAD, thin, frail  Skin: warm, dry, intact, no rash  HEENT: Moist mucous membranes, sclera anicteric, normocephalic, atraumatic  Neck: No apparent JVD appreciated  Chest: lung sounds clear B/L, on O2   CVS:Regular rate and rhythm, no murmer   Abdomen: Soft, round, non-tender, +BS  Extremities:  Trace B/L LE edema present, bilateral lower extremity wound, right lower extremity wrapped  Neuro: alert and oriented  Psych: appropriate mood and affect     Medications:    Current Facility-Administered Medications:     acetaminophen (TYLENOL) tablet 650 mg, 650 mg, Oral, Q6H PRN, Harsha Shook, DO, 650 mg at 01/21/21 0004    amLODIPine (NORVASC) tablet 10 mg, 10 mg, Oral, Daily, OTIS Chavez, 10 mg at 01/31/21 0834    carvedilol (COREG) tablet 12 5 mg, 12 5 mg, Oral, BID With Meals, Nasim Sharpe CRNP, 12 5 mg at 01/31/21 3838    collagenase (SANTYL) ointment, , Topical, Daily, Al Frame MD Carly, 1 application at 26/28/51 0836    heparin (porcine) 25,000 units in 0 45% NaCl 250 mL infusion (premix), 3-30 Units/kg/hr (Order-Specific), Intravenous, Titrated, Jayjay Wilhelm MD, Last Rate: 6 8 mL/hr at 01/31/21 1049, 9 Units/kg/hr at 01/31/21 1049    hydrALAZINE (APRESOLINE) tablet 10 mg, 10 mg, Oral, Q8H [facility], Harsha Shook DO, 10 mg at 01/31/21 0547    HYDROmorphone (DILAUDID) injection 1 mg, 1 mg, Intravenous, Q4H PRN, [facility], LAINA    isosorbide dinitrate (ISORDIL) tablet 10 mg, 10 mg, Oral, TID after meals, Harsha Shook DO, 10 mg at 01/31/21 2131    levothyroxine tablet 25 mcg, 25 mcg, Oral, Early Morning, Harsha Shook DO, 25 mcg at 01/31/21 0547    ondansetron (ZOFRAN) injection 4 mg, 4 mg, Intravenous, Q6H PRN, Harsha Shook DO    oxyCODONE (ROXICODONE) IR tablet 2 5 mg, 2 5 mg, Oral, Q4H PRN, Arabella Abraham PA-C    oxyCODONE (ROXICODONE) IR tablet 5 mg, 5 mg, Oral, Q4H PRN, Arabella Abraham PA-C, 5 mg at 01/24/21 1508    potassium chloride (K-DUR,KLOR-CON) CR tablet 20 mEq, 20 mEq, Oral, Once, Frieda Stacy MD    saccharomyces boulardii (FLORASTOR) capsule 250 mg, 250 mg, Oral, BID, Harsha Shook DO, 250 mg at 01/31/21 5902    Laboratory Results:  Results from last 7 days   Lab Units 01/31/21  0516 01/30/21  1524 01/30/21  0501 01/29/21  0438 01/28/21  0636 01/27/21  1012   WBC Thousand/uL 6 40 6 39  --   --   --  8 52   HEMOGLOBIN g/dL 8 3* 8 0*  --   --   --  8 1*   HEMATOCRIT % 27 1* 26 0*  --   --   --  25 9*   PLATELETS Thousands/uL 288 272  --   --   --  196   SODIUM mmol/L 137  --  135* 138 135*  --    POTASSIUM mmol/L 3 5  --  4 3 3 3* 3 5  --    CHLORIDE mmol/L 101  --  100 101 99*  --    CO2 mmol/L 27  --  26 25 26  --    BUN mg/dL 79*  --  83* 88* 92*  --    CREATININE mg/dL 2 72*  --  2 76* 2 82* 2 72*  --    CALCIUM mg/dL 8 0*  --  8 5 8 5 8 4  --    MAGNESIUM mg/dL 2 0  --  2 2  --  2 3  --    PHOSPHORUS mg/dL  --   --  5 3*  --   --   --

## 2021-01-31 NOTE — ASSESSMENT & PLAN NOTE
Multifactorial: Pre-renal, infection  Seems to be her new baseline      Recent Labs     01/29/21  0438 01/30/21  0501 01/31/21  0516   BUN 88* 83* 79*   CREATININE 2 82* 2 76* 2 72*   EGFR 14 15 15

## 2021-01-31 NOTE — ASSESSMENT & PLAN NOTE
80year old female admitted due to LLE cellulitis  Podiatry reviewed imaging and stated patient does not have OM or CHERYLE   - Completed Antibiotic course  - Renal ok for arteriogram on Monday  - Vascular surgery will discuss intended intervention with patient this afternoon

## 2021-01-31 NOTE — ASSESSMENT & PLAN NOTE
Iron deficiency anemia with anemia s/p transfusion      Recent Labs     01/30/21  1524 01/31/21  0516   HGB 8 0* 8 3*   MCV 89 88   RDW 17 0* 17 4*

## 2021-01-31 NOTE — ASSESSMENT & PLAN NOTE
Wt Readings from Last 3 Encounters:   01/31/21 76 kg (167 lb 8 8 oz)   01/06/21 62 1 kg (136 lb 14 4 oz)   12/22/20 60 8 kg (134 lb)     History of congestive heart failure  Not in exacerbation

## 2021-01-31 NOTE — ASSESSMENT & PLAN NOTE
Dx with a right soleal vein occlusive DVT during her hospitalization 11/2020 at [facility]  - Coumadin held for intended procedure  Continue bridging to heparin      Recent Labs     01/29/21  0438 01/30/21  0501 01/31/21  0516   INR 1 71* 1 87* 2 09*

## 2021-02-01 ENCOUNTER — APPOINTMENT (INPATIENT)
Dept: RADIOLOGY | Facility: HOSPITAL | Age: 86
DRG: 299 | End: 2021-02-01
Attending: RADIOLOGY
Payer: MEDICARE

## 2021-02-01 LAB
ANION GAP SERPL CALCULATED.3IONS-SCNC: 7 MMOL/L (ref 4–13)
APTT PPP: 49 SECONDS (ref 23–37)
APTT PPP: 71 SECONDS (ref 23–37)
APTT PPP: 75 SECONDS (ref 23–37)
APTT PPP: 94 SECONDS (ref 23–37)
BASOPHILS # BLD AUTO: 0.04 THOUSANDS/ΜL (ref 0–0.1)
BASOPHILS NFR BLD AUTO: 1 % (ref 0–1)
BUN SERPL-MCNC: 68 MG/DL (ref 5–25)
CALCIUM SERPL-MCNC: 8 MG/DL (ref 8.3–10.1)
CHLORIDE SERPL-SCNC: 101 MMOL/L (ref 100–108)
CO2 SERPL-SCNC: 27 MMOL/L (ref 21–32)
CREAT SERPL-MCNC: 2.47 MG/DL (ref 0.6–1.3)
EOSINOPHIL # BLD AUTO: 0.35 THOUSAND/ΜL (ref 0–0.61)
EOSINOPHIL NFR BLD AUTO: 6 % (ref 0–6)
ERYTHROCYTE [DISTWIDTH] IN BLOOD BY AUTOMATED COUNT: 17.4 % (ref 11.6–15.1)
GFR SERPL CREATININE-BSD FRML MDRD: 17 ML/MIN/1.73SQ M
GLUCOSE SERPL-MCNC: 124 MG/DL (ref 65–140)
HCT VFR BLD AUTO: 27.6 % (ref 34.8–46.1)
HGB BLD-MCNC: 8.2 G/DL (ref 11.5–15.4)
IMM GRANULOCYTES # BLD AUTO: 0.02 THOUSAND/UL (ref 0–0.2)
IMM GRANULOCYTES NFR BLD AUTO: 0 % (ref 0–2)
LYMPHOCYTES # BLD AUTO: 1.72 THOUSANDS/ΜL (ref 0.6–4.47)
LYMPHOCYTES NFR BLD AUTO: 30 % (ref 14–44)
MAGNESIUM SERPL-MCNC: 1.8 MG/DL (ref 1.6–2.6)
MCH RBC QN AUTO: 26.8 PG (ref 26.8–34.3)
MCHC RBC AUTO-ENTMCNC: 29.7 G/DL (ref 31.4–37.4)
MCV RBC AUTO: 90 FL (ref 82–98)
MONOCYTES # BLD AUTO: 0.42 THOUSAND/ΜL (ref 0.17–1.22)
MONOCYTES NFR BLD AUTO: 7 % (ref 4–12)
NEUTROPHILS # BLD AUTO: 3.15 THOUSANDS/ΜL (ref 1.85–7.62)
NEUTS SEG NFR BLD AUTO: 56 % (ref 43–75)
NRBC BLD AUTO-RTO: 0 /100 WBCS
PLATELET # BLD AUTO: 257 THOUSANDS/UL (ref 149–390)
PMV BLD AUTO: 9.1 FL (ref 8.9–12.7)
POTASSIUM SERPL-SCNC: 3.5 MMOL/L (ref 3.5–5.3)
RBC # BLD AUTO: 3.06 MILLION/UL (ref 3.81–5.12)
SODIUM SERPL-SCNC: 135 MMOL/L (ref 136–145)
WBC # BLD AUTO: 5.7 THOUSAND/UL (ref 4.31–10.16)

## 2021-02-01 PROCEDURE — 80048 BASIC METABOLIC PNL TOTAL CA: CPT | Performed by: STUDENT IN AN ORGANIZED HEALTH CARE EDUCATION/TRAINING PROGRAM

## 2021-02-01 PROCEDURE — 85025 COMPLETE CBC W/AUTO DIFF WBC: CPT | Performed by: STUDENT IN AN ORGANIZED HEALTH CARE EDUCATION/TRAINING PROGRAM

## 2021-02-01 PROCEDURE — NC001 PR NO CHARGE: Performed by: SURGERY

## 2021-02-01 PROCEDURE — 99232 SBSQ HOSP IP/OBS MODERATE 35: CPT | Performed by: INTERNAL MEDICINE

## 2021-02-01 PROCEDURE — 99232 SBSQ HOSP IP/OBS MODERATE 35: CPT | Performed by: PODIATRIST

## 2021-02-01 PROCEDURE — 83735 ASSAY OF MAGNESIUM: CPT | Performed by: STUDENT IN AN ORGANIZED HEALTH CARE EDUCATION/TRAINING PROGRAM

## 2021-02-01 PROCEDURE — 85730 THROMBOPLASTIN TIME PARTIAL: CPT | Performed by: STUDENT IN AN ORGANIZED HEALTH CARE EDUCATION/TRAINING PROGRAM

## 2021-02-01 PROCEDURE — NC001 PR NO CHARGE: Performed by: RADIOLOGY

## 2021-02-01 PROCEDURE — 85730 THROMBOPLASTIN TIME PARTIAL: CPT | Performed by: INTERNAL MEDICINE

## 2021-02-01 RX ORDER — FUROSEMIDE 10 MG/ML
40 INJECTION INTRAMUSCULAR; INTRAVENOUS ONCE
Status: COMPLETED | OUTPATIENT
Start: 2021-02-01 | End: 2021-02-01

## 2021-02-01 RX ORDER — POTASSIUM CHLORIDE 20 MEQ/1
20 TABLET, EXTENDED RELEASE ORAL ONCE
Status: COMPLETED | OUTPATIENT
Start: 2021-02-01 | End: 2021-02-01

## 2021-02-01 RX ADMIN — ISOSORBIDE DINITRATE 10 MG: 10 TABLET ORAL at 08:19

## 2021-02-01 RX ADMIN — LEVOTHYROXINE SODIUM 25 MCG: 25 TABLET ORAL at 06:30

## 2021-02-01 RX ADMIN — COLLAGENASE SANTYL: 250 OINTMENT TOPICAL at 08:23

## 2021-02-01 RX ADMIN — FUROSEMIDE 40 MG: 10 INJECTION, SOLUTION INTRAMUSCULAR; INTRAVENOUS at 12:43

## 2021-02-01 RX ADMIN — CARVEDILOL 12.5 MG: 6.25 TABLET, FILM COATED ORAL at 16:14

## 2021-02-01 RX ADMIN — AMLODIPINE BESYLATE 10 MG: 10 TABLET ORAL at 08:18

## 2021-02-01 RX ADMIN — Medication 250 MG: at 08:18

## 2021-02-01 RX ADMIN — HYDRALAZINE HYDROCHLORIDE 25 MG: 25 TABLET ORAL at 14:19

## 2021-02-01 RX ADMIN — POTASSIUM CHLORIDE 20 MEQ: 1500 TABLET, EXTENDED RELEASE ORAL at 12:44

## 2021-02-01 RX ADMIN — HEPARIN SODIUM 9 UNITS/KG/HR: 10000 INJECTION, SOLUTION INTRAVENOUS at 00:46

## 2021-02-01 RX ADMIN — Medication 250 MG: at 17:30

## 2021-02-01 RX ADMIN — CARVEDILOL 12.5 MG: 6.25 TABLET, FILM COATED ORAL at 08:18

## 2021-02-01 RX ADMIN — ISOSORBIDE DINITRATE 10 MG: 10 TABLET ORAL at 17:30

## 2021-02-01 RX ADMIN — HYDRALAZINE HYDROCHLORIDE 25 MG: 25 TABLET ORAL at 06:30

## 2021-02-01 RX ADMIN — ISOSORBIDE DINITRATE 10 MG: 10 TABLET ORAL at 12:44

## 2021-02-01 RX ADMIN — HYDRALAZINE HYDROCHLORIDE 25 MG: 25 TABLET ORAL at 22:53

## 2021-02-01 NOTE — PROGRESS NOTES
NEPHROLOGY PROGRESS NOTE   Sonido Abraham 80 y o  female MRN: [id number]  Unit/Bed#: E5 -01 Encounter: [id number]      ASSESSMENT/PLAN:  Acute kidney injury (POA) on likely chronic kidney disease, stage IV:  - etiology suspect multifactorial secondary to infection, diuretic use, progression of underlying kidney disease with history of atrophic left kidney  - upon review of medical records, baseline creatinine 1 8-2 0 going back to early 2020    - peak creatinine thus far 3 70 mg/dL on 01/19   - most recent creatinine 2 47 mg/dL today  - will provide furosemide 40 mg IV x1 today  - per vascular surgery note review, plan for angiogram this week  Will need pre and post hydration in addition to Mucomyst 1200 mg b i d  x 4 doses for renal optimization   - UA revealed large blood, large leukocytes, +2 protein, RBCs, innumerable wbc's, innumerable bacteria  - avoid NSAIDs, nephrotoxic agents, IV contrast   - adjust medications to appropriate GFR  - monitor volume status with strict intake/output, daily weight  - will check BMP, magnesium, phosphorus in a m  Right-sided hydronephrosis:  - with right ureteral stent placement in December 2020 at [facility]  - renal ultrasound revealed bilateral renal atrophy, more severe on the left along with mild right hydronephrosis with ureteral stent in place  - continued with urinary Scott catheter  Electrolytes, acid/base:  - mild hyponatremia with most recent sodium 135 today  - initially with hyperkalemia upon admission, now patient with mild hypokalemia with most recent potassium 3 5    - will provide K-Dur 20 mEq now most will also improve hyponatremia  - will continue to monitor with repeat lab studies  H/H, anemia likely of chronic disease:  - most recent hemoglobin 8 2 grams/deciliter  - status post PRBC transfusion this admission   - iron panel revealed iron saturation 9%, ferritin 215    - goal hemoglobin greater than 8 grams/deciliter    - recommend PRBC transfusion for hemoglobin less than 7  Blood pressure, hypertension:  - blood pressure with fluctuations, elevations  - outpatient regimen includes: Amlodipine 5 mg daily, carvedilol 12 5 mg b i d , hydralazine 10 mg t i d , Isordil 10 mg t i d , furosemide 40 mg daily  - currently on:  Amlodipine 10 mg daily, carvedilol 12 5 mg b i d , hydralazine 25 mg t i d , Isordil 10 mg t i d   - recommendations: Hydralazine increased to 25 mg t i d  yesterday, 1/31  Will further adjust as needed  - optimize hemodynamics; avoid hypotension and fluctuations of blood pressure   - maintain MAP > 65  [place] bone disease of chronic kidney disease:  - hyperphosphatemia most recent phosphorus elevated at 5 3   - will check magnesium and phosphorus in a m   - will continue to monitor PTH, magnesium, phosphorus as an outpatient  Volume status/CHF:  - most recent echocardiogram revealed EF of 50% with grade 2 diastolic dysfunction  - outpatient diuretics on hold  - p r n  Furosemide IV as needed  - monitor volume status closely with gentle IV fluid hydration for renal optimization  Other medical problems:  - lower extremity cellulitis, with dry gangrene:   - vascular surgery following, plan for angiogram this week, please see renal optimization recommendations above  - podiatry following  SUBJECTIVE:  Patient resting in bed comfortably  Patient without acute shortness of breath or chest pain  Plan for angiogram this week      OBJECTIVE:  Current Weight: Weight - Scale: 76 kg (167 lb 8 8 oz)  Vitals:    02/01/21 0735   BP: 164/79   Pulse: 68   Resp: 18   Temp: 97 9 °F (36 6 °C)   SpO2: 93%       Intake/Output Summary (Last 24 hours) at 2/1/2021 1126  Last data filed at 2/1/2021 0654  Gross per 24 hour   Intake    Output 750 ml   Net -750 ml       General:  No acute distress  Skin:  Warm, no rash  Eyes:  Sclerae anicteric  ENT:  Moist lips mucous membranes  Neck:  Supple trachea midline  Chest: Diminished breath sounds bilaterally   CVS:  Regular rate regular rhythm  Abdomen:  Soft, nontender, normoactive bowel sounds  Extremities:  Bilateral lower extremities wrapped   Neuro:  Awake and interactive  Psych:  Flat affect      Medications:  Scheduled Meds:  Current Facility-Administered Medications   Medication Dose Route Frequency Provider Last Rate    acetaminophen  650 mg Oral Q6H PRN Rigo Pyo, DO      amLODIPine  10 mg Oral Daily OTIS Louie      carvedilol  12 5 mg Oral BID With Meals OTIS Louie      collagenase   Topical Daily David Townsend MD      heparin (porcine)  3-30 Units/kg/hr (Order-Specific) Intravenous Titrated David Townsend MD 9 067 Units/kg/hr (02/01/21 0816)    hydrALAZINE  25 mg Oral Q8H [facility] OTIS Louie      HYDROmorphone  1 mg Intravenous Q4H PRN Nel Winkler PA-C      isosorbide dinitrate  10 mg Oral TID after meals Harsha Shook, DO      levothyroxine  25 mcg Oral Early Morning Harsha Shook,       ondansetron  4 mg Intravenous Q6H PRN Westside Dominguez, DO      oxyCODONE  2 5 mg Oral Q4H PRN Shavonne Abraham PA-C      oxyCODONE  5 mg Oral Q4H PRN Arabella Abraham PA-C      potassium chloride  20 mEq Oral Once Alicia Mack MD      saccharomyces boulardii  250 mg Oral BID Rigo Pyo, DO         PRN Meds:   acetaminophen    HYDROmorphone    ondansetron    oxyCODONE    oxyCODONE    Continuous Infusions:heparin (porcine), 3-30 Units/kg/hr (Order-Specific), Last Rate: 9 067 Units/kg/hr (02/01/21 0816)        Laboratory Results:  Results from last 7 days   Lab Units 02/01/21  0911 02/01/21  0650 01/31/21  0516 01/30/21  1524 01/30/21  0501 01/29/21  0438 01/28/21  0636 01/27/21  1012 01/27/21  0517 01/26/21  0541   WBC Thousand/uL  --  5 70 6 40 6 39  --   --   --  8 52  --  8 99   HEMOGLOBIN g/dL  --  8 2* 8 3* 8 0*  --   --   --  8 1*  --  7 7*   HEMATOCRIT %  --  27 6* 27 1* 26 0*  --   --   --  25 9*  --  24 6*   PLATELETS Thousands/uL  --  257 288 272  --   --   --  196  --  197   SODIUM mmol/L 135*  --  137  --  135* 138 135*  --  133* 134*   POTASSIUM mmol/L 3 5  --  3 5  --  4 3 3 3* 3 5  --  3 8 3 9   CHLORIDE mmol/L 101  --  101  --  100 101 99*  --  98* 99*   CO2 mmol/L 27  --  27  --  26 25 26  --  26 26   BUN mg/dL 68*  --  79*  --  83* 88* 92*  --  93* 99*   CREATININE mg/dL 2 47*  --  2 72*  --  2 76* 2 82* 2 72*  --  2 76* 2 82*   CALCIUM mg/dL 8 0*  --  8 0*  --  8 5 8 5 8 4  --  8 4 8 1*   MAGNESIUM mg/dL 1 8  --  2 0  --  2 2  --  2 3  --  2 4 2 3   PHOSPHORUS mg/dL  --   --   --   --  5 3*  --   --   --   --   --

## 2021-02-01 NOTE — PLAN OF CARE
Problem: Potential for Falls  Goal: Patient will remain free of falls  Description: INTERVENTIONS:  - Assess patient frequently for physical needs  -  Identify cognitive and physical deficits and behaviors that affect risk of falls    -  [place] fall precautions as indicated by assessment   - Educate patient/family on patient safety including physical limitations  - Instruct patient to call for assistance with activity based on assessment  - Modify environment to reduce risk of injury  - Consider OT/PT consult to assist with strengthening/mobility  2/1/2021 0818 by Munira La RN  Outcome: Progressing  2/1/2021 0815 by Munira La RN  Outcome: Progressing

## 2021-02-01 NOTE — SOCIAL WORK
Call from [facility] requesting an update on pt's care  CM provided update  She stressed that when pt is d/c'ed, she needs to go to SNF; if  does not agree to long term care, Mundo  may pursue guardianship

## 2021-02-01 NOTE — CONSULTS
Consultation - Interventional Radiology  Lainey Suarez 80 y o  female MRN: [id number]  Unit/Bed#: E5 -01 Encounter: [id number]        Consults     89F with right lower extremity critical limb ischemia, ÁNGEL 0 46, revascularization is indicated for wound healing    We were consulted by the vascular surgery service for our opinion , I recommend angiography/revascularization    She has initially said no for several days, patient recently changed her mind and said okay  Additionally she was found to be not competent and her  has given consent  She also has renal failure and required clearance/optimization from nephrology    Patient is in heart failure and prior CT reveals large bilateral pleural effusions  She is on oxygen  In order to optimize her for a procedure tomorrow I elected to perform thoracentesis  I discussed risks and benefits with the  and obtain consent for this procedure  I then went to bedside and despite a lengthy conversation the patient effectively refused thoracentesis  She also states she cannot lay flat for a 2+ hour procedure that would require sedation    Recommendations  - in her current state despite prior verbalized agreement with proceeding with angiogram, she clearly is confused to a level where approaching her for a procedure is not possible  I believe she is still confused and anxious  - I will cancel the lower extremity angiogram I ordered  - I defer to the primary team and vascular surgery whether it is appropriate to continue to attempt procedures on this confused 80year-old  She will require anesthesia support for angiogram if she is confused and cannot cooperate (her current state)      - recommend a goals of care discussion with the spouse    D/w Dr Boby Gonzales of vascular and Dr Earl Yee of Internal Medicine        ASSESSMENT/PLAN:  Problem List     * (Principal) Cellulitis    Acute deep vein thrombosis (DVT) of calf muscle vein of right lower extremity ([address] )    Acute kidney injury superimposed on chronic kidney disease [facility])    Lab Results   Component Value Date    EGFR 17 02/01/2021    EGFR 15 01/31/2021    EGFR 15 01/30/2021    CREATININE 2 47 (H) 02/01/2021    CREATININE 2 72 (H) 01/31/2021    CREATININE 2 76 (H) 01/30/2021         Hydronephrosis of right kidney    Elevated blood urea nitrogen    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 ([address] )    Nonhealing nonsurgical wound    Protein-calorie malnutrition ([facility] [address] )    Malnutrition Findings:           BMI Findings: Body mass index is 27 04 kg/m²  Sepsis due to Gram negative bacteria (HCC)    Ulcer of right lower extremity with fat layer exposed ([address] )    Urinary retention    Wound infection    PAD (peripheral artery disease) (HCC)    Essential hypertension    CKD (chronic kidney disease)    Lab Results   Component Value Date    EGFR 17 02/01/2021    EGFR 15 01/31/2021    EGFR 15 01/30/2021    CREATININE 2 47 (H) 02/01/2021    CREATININE 2 72 (H) 01/31/2021    CREATININE 2 76 (H) 01/30/2021         History of DVT (deep vein thrombosis)    Acute diastolic congestive heart failure (HCC)    Wt Readings from Last 3 Encounters:   02/01/21 76 kg (167 lb 8 8 oz)   01/06/21 62 1 kg (136 lb 14 4 oz)   12/22/20 60 8 kg (134 lb)                 Iron deficiency anemia secondary to inadequate dietary iron intake    Chronic diastolic heart failure (HCC)    Wt Readings from Last 3 Encounters:   02/01/21 76 kg (167 lb 8 8 oz)   01/06/21 62 1 kg (136 lb 14 4 oz)   12/22/20 60 8 kg (134 lb)                 Ambulatory dysfunction    Chronic hypoxemic respiratory failure (HCC)    Coagulopathy ([address] )          Reason for Consult / Principal Problem:    HPI: Reyes Terrell is a 80y o  year old female who presents with Cellulitis      Review of Systems      Past Medical History:  Past Medical History:   Diagnosis Date    Anemia 1/18/2021    Chronic kidney disease     Hypertension        Past Surgical History:  History reviewed   No pertinent surgical history  Social History:  Social History     Substance and Sexual Activity   Alcohol Use Never    Frequency: Never     Social History     Substance and Sexual Activity   Drug Use Never     Social History     Tobacco Use   Smoking Status Never Smoker   Smokeless Tobacco Never Used       Family History:  Family History   Problem Relation Age of Onset    No Known Problems Mother     No Known Problems Father        Allergies:   Allergies   Allergen Reactions    No Known Allergies        Medications:  Medications Prior to Admission   Medication    amLODIPine (NORVASC) 5 mg tablet    carvedilol (COREG) 12 5 mg tablet    ergocalciferol (VITAMIN D2) 50,000 units    furosemide (LASIX) 40 mg tablet    gabapentin (NEURONTIN) 100 mg capsule    hydrALAZINE (APRESOLINE) 10 mg tablet    isosorbide dinitrate (ISORDIL) 10 mg tablet    levothyroxine 25 mcg tablet    potassium chloride (K-DUR,KLOR-CON) 20 mEq tablet    senna (SENOKOT) 8 6 mg    warfarin (COUMADIN) 4 mg tablet     Current Facility-Administered Medications   Medication Dose Route Frequency    acetaminophen (TYLENOL) tablet 650 mg  650 mg Oral Q6H PRN    amLODIPine (NORVASC) tablet 10 mg  10 mg Oral Daily    carvedilol (COREG) tablet 12 5 mg  12 5 mg Oral BID With Meals    collagenase (SANTYL) ointment   Topical Daily    heparin (porcine) 25,000 units in 0 45% NaCl 250 mL infusion (premix)  3-30 Units/kg/hr (Order-Specific) Intravenous Titrated    hydrALAZINE (APRESOLINE) tablet 25 mg  25 mg Oral Q8H [address]    HYDROmorphone (DILAUDID) injection 1 mg  1 mg Intravenous Q4H PRN    isosorbide dinitrate (ISORDIL) tablet 10 mg  10 mg Oral TID after meals    levothyroxine tablet 25 mcg  25 mcg Oral Early Morning    ondansetron (ZOFRAN) injection 4 mg  4 mg Intravenous Q6H PRN    oxyCODONE (ROXICODONE) IR tablet 2 5 mg  2 5 mg Oral Q4H PRN    oxyCODONE (ROXICODONE) IR tablet 5 mg  5 mg Oral Q4H PRN    potassium chloride (K-DUR,KLOR-CON) CR tablet 20 mEq  20 mEq Oral Once    saccharomyces boulardii (FLORASTOR) capsule 250 mg  250 mg Oral BID       Vitals:  /76   Pulse 66   Temp 97 9 °F (36 6 °C) (Temporal)   Resp 18   Wt 76 kg (167 lb 8 8 oz)   LMP  (LMP Unknown)   SpO2 93%   BMI 27 04 kg/m²   Body mass index is 27 04 kg/m²    Weight (last 2 days)     Date/Time   Weight    02/01/21 0549   76 (167 55)    01/31/21 0544   76 (167 55)    01/30/21 0600   75 8 (167 11)              I/Os:    Intake/Output Summary (Last 24 hours) at 2/1/2021 1501  Last data filed at 2/1/2021 0654  Gross per 24 hour   Intake    Output 750 ml   Net -750 ml       PHYSICAL EXAM    Elderly in bed on oxygen    Aware that she has lower extremity wounds and this is why she is in the hospital    Fluid sentences but not really able to tell me in a logical manner why she should cooperate    Lab Results and Cultures:   CBC with diff:   Lab Results   Component Value Date    WBC 5 70 02/01/2021    HGB 8 2 (L) 02/01/2021    HCT 27 6 (L) 02/01/2021    MCV 90 02/01/2021     02/01/2021    MCH 26 8 02/01/2021    MCHC 29 7 (L) 02/01/2021    RDW 17 4 (H) 02/01/2021    MPV 9 1 02/01/2021    NRBC 0 02/01/2021      BMP/CMP:  Lab Results   Component Value Date    K 3 5 02/01/2021     02/01/2021    CO2 27 02/01/2021    BUN 68 (H) 02/01/2021    CREATININE 2 47 (H) 02/01/2021    CALCIUM 8 0 (L) 02/01/2021    AST 61 (H) 12/28/2020    ALT 65 12/28/2020    ALKPHOS 101 12/28/2020    EGFR 17 02/01/2021   ,     Coags:   Lab Results   Component Value Date    PTT 75 (H) 02/01/2021    INR 2 09 (H) 01/31/2021   ,   Results from last 7 days   Lab Units 02/01/21  0652 01/31/21  2347 01/31/21  1646 01/31/21  0841 01/31/21  0516 01/31/21  0018  01/30/21  0501 01/29/21  0438 01/28/21  0636 01/27/21  1012   PTT seconds 75* 49* 108* >210*  --  >210*   < >  --   --   --   --    INR   --   --   --   --  2 09*  --   --  1 87* 1 71* 1 62* 1 71*    < > = values in this interval not displayed  Lipid Panel: No results found for: CHOL  No results found for: HDL  No results found for: HDL  No results found for: LDLCALC  No results found for: TRIG    HgbA1c: No results found for: HGBA1C    Blood Culture:   Lab Results   Component Value Date    BLOODCX No Growth After 5 Days  01/18/2021    BLOODCX No Growth After 5 Days  01/18/2021   ,   Urinalysis:   Lab Results   Component Value Date    COLORU Straw 01/18/2021    CLARITYU Turbid 01/18/2021    SPECGRAV 1 015 01/18/2021    PHUR 6 0 01/18/2021    LEUKOCYTESUR Large (A) 01/18/2021    NITRITE Negative 01/18/2021    GLUCOSEU Negative 01/18/2021    KETONESU Negative 01/18/2021    BILIRUBINUR Negative 01/18/2021    BLOODU Large (A) 01/18/2021   ,   Urine Culture: No results found for: URINECX,   Wound Culure:  No results found for: WOUNDCULT    Imaging Studies: I have personally reviewed pertinent films in PACS    Counseling / Coordination of Care  Total time spent today  45 minutes  Greater than 50% of total time was spent with the patient and / or family counseling and / or coordination of care

## 2021-02-01 NOTE — PROGRESS NOTES
[facility] - Progress Note  Patient: Nikolas Witt 80 y o  female   MRN: [id number]  PCP: Monie Sepulveda MD  Unit/Bed#: E5 -01 Encounter: [id number]  Date Of Visit: 21    ASSESSMENT:    Nikolas Witt is a 80 y o  female with:    1  Right anterior leg ulcer full thickness with fat layer exposed  2  right heel gangrene with no radiographic changes   3  PAD, right SFA occlusion and poor distal perfusion  4  Hx DVT      PLAN:    · Continue local wound care to right anterior leg, heel and left leg  · Planned to undergo angiogram with vascular surgery  · WBAT bilaterally   · Cellulitis of left leg resolved and swelling improving         SUBJECTIVE:     The patient was seen, evaluated, and assessed at bedside today  The patient was awake, alert, and in no acute distress  No acute events overnight  The patient reports pain on palpation of wounds  She has serious doubts if these wounds will ever heal but she wants to try to heal them  She is aware of the imporance of outpatient follow up to decrease chances of coming back to hospital   Patient denies N/V/F/chills/SOB/CP  OBJECTIVE:     Vitals:   /79 (BP Location: Left arm)   Pulse 68   Temp 97 9 °F (36 6 °C) (Temporal)   Resp 18   Wt 76 kg (167 lb 8 8 oz)   LMP  (LMP Unknown)   SpO2 93%   BMI 27 04 kg/m²     Temp (24hrs), Av 1 °F (36 7 °C), Min:97 6 °F (36 4 °C), Max:98 8 °F (37 1 °C)      Physical Exam :     General:  Alert, cooperative, and in no distress  Lower extremity exam:  Cardiovascular status at baseline  Neurological status at baseline  Musculoskeletal status at baseline  No calf tenderness noted bilaterally  Wound (1) located right anterior leg, measures approximately 6 0 cm x 3 0 cm x 0 2 cm  without sinus tracking or undermining  Wound bed appears pink/red with mild serous drainage  Deepest tissue noted in base is fat  Malodor is not noticed  Wound edge appears Attached   Corazon-wound skin appears intact  Probe to bone is negative   Signs of infection are not present at this time  Wound 2 located right posterior heel, measures 3 5 cm x 2 5 cm x 0 1 cm  without sinus tracking or undermining  Wound bed appears eschar with no exudate  Deepest tissue noted unknown  Malodor is not noticed  Wound edge appears Attached  Corazon-wound skin appears intact  Probe to bone is,  negative   Signs of infection are not present at this time  Wound 1 located left posterior leg, measures 3 0 cm x 1 5 cm x 0 1 cm  without sinus tracking or undermining  Wound bed appears eschar and fibrotic with no exudate  Deepest tissue noted Fat/adipose  Malodor is not noticed  Wound edge appears Attached  Corazon-wound skin appears intact  Probe to bone is,  negative   Signs of infection are not present at this time  Clinical Images 02/01/21:      Right toes    Additional Data:     Labs:    Results from last 7 days   Lab Units 02/01/21  0650   WBC Thousand/uL 5 70   HEMOGLOBIN g/dL 8 2*   HEMATOCRIT % 27 6*   PLATELETS Thousands/uL 257   NEUTROS PCT % 56   LYMPHS PCT % 30   MONOS PCT % 7   EOS PCT % 6     Results from last 7 days   Lab Units 02/01/21  0911   POTASSIUM mmol/L 3 5   CHLORIDE mmol/L 101   CO2 mmol/L 27   BUN mg/dL 68*   CREATININE mg/dL 2 47*   CALCIUM mg/dL 8 0*     Results from last 7 days   Lab Units 01/31/21  0516   INR  2 09*       * I Have Reviewed All Lab Data Listed Above  Recent Cultures (last 7 days):               Imaging: I have personally reviewed pertinent films in PACS  Pathology, and Other Studies: I have personally reviewed pertinent reports  ** Please Note: Portions of the record may have been created with voice recognition software  Occasional wrong word or "sound a like" substitutions may have occurred due to the inherent limitations of voice recognition software  Read the chart carefully and recognize, using context, where substitutions have occurred   **

## 2021-02-01 NOTE — PLAN OF CARE
Problem: Potential for Falls  Goal: Patient will remain free of falls  Description: INTERVENTIONS:  - Assess patient frequently for physical needs  -  Identify cognitive and physical deficits and behaviors that affect risk of falls    -  [place] fall precautions as indicated by assessment   - Educate patient/family on patient safety including physical limitations  - Instruct patient to call for assistance with activity based on assessment  - Modify environment to reduce risk of injury  - Consider OT/PT consult to assist with strengthening/mobility  Outcome: Progressing

## 2021-02-01 NOTE — PROGRESS NOTES
Progress Note - Walter Krause 80 y o  female MRN: [id number]    Unit/Bed#: E5 -01 Encounter: [id number]      Subjective: The patient feels reasonably well at the moment  She said that she slept well  She denies chest pain or shortness of breath  She has no abdominal pain, nausea, or vomiting  Physical Exam:   Temp:  [97 6 °F (36 4 °C)-98 8 °F (37 1 °C)] 97 9 °F (36 6 °C)  HR:  [66-68] 66  Resp:  [18] 18  BP: (129-167)/(63-81) 149/76    Gen:  Well-developed, frail, in no acute distress  Neck:  Supple  No lymphadenopathy, goiter, or bruit  Heart:  Regular rhythm  I heard no murmur, gallop, or rub  Lungs:  Clear to auscultation and percussion  Breath sounds are diminished at the bases  I heard no wheezing, rales, or rhonchi    Abd:  Soft with active bowel sounds  No mass, tenderness, or organomegaly  Extremities:  No clubbing, cyanosis, or edema  No calf tenderness  Neuro:  Alert and conversant  No focal sign  Skin:  Warm and dry      LABS:   CBC:   Lab Results   Component Value Date    WBC 5 70 02/01/2021    HGB 8 2 (L) 02/01/2021    HCT 27 6 (L) 02/01/2021    MCV 90 02/01/2021     02/01/2021    MCH 26 8 02/01/2021    MCHC 29 7 (L) 02/01/2021    RDW 17 4 (H) 02/01/2021    MPV 9 1 02/01/2021    NRBC 0 02/01/2021   , CMP:   Lab Results   Component Value Date    SODIUM 135 (L) 02/01/2021    K 3 5 02/01/2021     02/01/2021    CO2 27 02/01/2021    BUN 68 (H) 02/01/2021    CREATININE 2 47 (H) 02/01/2021    CALCIUM 8 0 (L) 02/01/2021    EGFR 17 02/01/2021           Assessment/Plan:  1  Cellulitis, left calf  2  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, right foot, and ulcer over right tibia  3  Peripheral vascular disease  4  Acute kidney injury superimposed on chronic kidney disease stage 3  5  Hypertension  6  Chronic diastolic congestive heart failure with pleural effusions  7  Chronic hypoxic respiratory failure secondary to 6   8  Acute DVT, right calf, November 2020   9   Anemia, secondary to iron deficiency plus the anemia of chronic kidney disease   10  Cognitive dysfunction  11  BMI  BMI Counseling: Body mass index is 27 04 kg/m²  The BMI is above normal  No BMI follow-up plan is appropriate  Patient is 72 years old and weight reduction/weight gain would further complicate their underlying physical disability  The patient has now agreed to angiography  Nephrology will attempt to optimize her volume status and hemodynamics in preparation for this procedure  I spoke with Dr Brian Holm who plans thoracentesis before the angiogram to optimize the patient's respiratory status  She is off warfarin in preparation for the procedure      VTE Pharmacologic Prophylaxis: Heparin  VTE Mechanical Prophylaxis: reason for no mechanical VTE prophylaxis Bilateral leg wounds

## 2021-02-01 NOTE — PLAN OF CARE
Problem: Potential for Falls  Goal: Patient will remain free of falls  Description: INTERVENTIONS:  - Assess patient frequently for physical needs  -  Identify cognitive and physical deficits and behaviors that affect risk of falls  -  [place] fall precautions as indicated by assessment   - Educate patient/family on patient safety including physical limitations  - Instruct patient to call for assistance with activity based on assessment  - Modify environment to reduce risk of injury  - Consider OT/PT consult to assist with strengthening/mobility  Outcome: Progressing     Problem: Prexisting or High Potential for Compromised Skin Integrity  Goal: Skin integrity is maintained or improved  Description: INTERVENTIONS:  - Identify patients at risk for skin breakdown  - Assess and monitor skin integrity  - Assess and monitor nutrition and hydration status  - Monitor labs   - Assess for incontinence   - Turn and reposition patient  - Assist with mobility/ambulation  - Relieve pressure over bony prominences  - Avoid friction and shearing  - Provide appropriate hygiene as needed including keeping skin clean and dry  - Evaluate need for skin moisturizer/barrier cream  - Collaborate with interdisciplinary team   - Patient/family teaching  - Consider wound care consult   Outcome: Progressing     Problem: Nutrition/Hydration-ADULT  Goal: Nutrient/Hydration intake appropriate for improving, restoring or maintaining nutritional needs  Description: Monitor and assess patient's nutrition/hydration status for malnutrition  Collaborate with interdisciplinary team and initiate plan and interventions as ordered  Monitor patient's weight and dietary intake as ordered or per policy  Utilize nutrition screening tool and intervene as necessary  Determine patient's food preferences and provide high-protein, high-caloric foods as appropriate       INTERVENTIONS:  - Monitor oral intake, urinary output, labs, and treatment plans  - Assess nutrition and hydration status and recommend course of action  - Evaluate amount of meals eaten  - Assist patient with eating if necessary   - Allow adequate time for meals  - Recommend/ encourage appropriate diets, oral nutritional supplements, and vitamin/mineral supplements  - Order, calculate, and assess calorie counts as needed  - Recommend, monitor, and adjust tube feedings and TPN/PPN based on assessed needs  - Assess need for intravenous fluids  - Provide specific nutrition/hydration education as appropriate  - Include patient/family/caregiver in decisions related to nutrition  Outcome: Progressing     Problem: PAIN - ADULT  Goal: Verbalizes/displays adequate comfort level or baseline comfort level  Description: Interventions:  - Encourage patient to monitor pain and request assistance  - Assess pain using appropriate pain scale  - Administer analgesics based on type and severity of pain and evaluate response  - Implement non-pharmacological measures as appropriate and evaluate response  - Consider cultural and social influences on pain and pain management  - Notify physician/advanced practitioner if interventions unsuccessful or patient reports new pain  Outcome: Progressing     Problem: INFECTION - ADULT  Goal: Absence or prevention of progression during hospitalization  Description: INTERVENTIONS:  - Assess and monitor for signs and symptoms of infection  - Monitor lab/diagnostic results  - Monitor all insertion sites, i e  indwelling lines, tubes, and drains  - Monitor endotracheal if appropriate and nasal secretions for changes in amount and color  - [place] appropriate cooling/warming therapies per order  - Administer medications as ordered  - Instruct and encourage patient and family to use good hand hygiene technique  - Identify and instruct in appropriate isolation precautions for identified infection/condition  Outcome: Progressing  Goal: Absence of fever/infection during neutropenic period  Description: INTERVENTIONS:  - Monitor WBC    Outcome: Progressing     Problem: SAFETY ADULT  Goal: Patient will remain free of falls  Description: INTERVENTIONS:  - Assess patient frequently for physical needs  -  Identify cognitive and physical deficits and behaviors that affect risk of falls    -  [place] fall precautions as indicated by assessment   - Educate patient/family on patient safety including physical limitations  - Instruct patient to call for assistance with activity based on assessment  - Modify environment to reduce risk of injury  - Consider OT/PT consult to assist with strengthening/mobility  Outcome: Progressing  Goal: Maintain or return to baseline ADL function  Description: INTERVENTIONS:  -  Assess patient's ability to carry out ADLs; assess patient's baseline for ADL function and identify physical deficits which impact ability to perform ADLs (bathing, care of mouth/teeth, toileting, grooming, dressing, etc )  - Assess/evaluate cause of self-care deficits   - Assess range of motion  - Assess patient's mobility; develop plan if impaired  - Assess patient's need for assistive devices and provide as appropriate  - Encourage maximum independence but intervene and supervise when necessary  - Involve family in performance of ADLs  - Assess for home care needs following discharge   - Consider OT consult to assist with ADL evaluation and planning for discharge  - Provide patient education as appropriate  Outcome: Progressing  Goal: Maintain or return mobility status to optimal level  Description: INTERVENTIONS:  - Assess patient's baseline mobility status (ambulation, transfers, stairs, etc )    - Identify cognitive and physical deficits and behaviors that affect mobility  - Identify mobility aids required to assist with transfers and/or ambulation (gait belt, sit-to-stand, lift, walker, cane, etc )  - [place] fall precautions as indicated by assessment  - Record patient progress and toleration of activity level on Mobility SBAR; progress patient to next Phase/Stage  - Instruct patient to call for assistance with activity based on assessment  - Consider rehabilitation consult to assist with strengthening/weightbearing, etc   Outcome: Progressing     Problem: DISCHARGE PLANNING  Goal: Discharge to home or other facility with appropriate resources  Description: INTERVENTIONS:  - Identify barriers to discharge w/patient and caregiver  - Arrange for needed discharge resources and transportation as appropriate  - Identify discharge learning needs (meds, wound care, etc )  - Arrange for interpretive services to assist at discharge as needed  - Refer to Case Management Department for coordinating discharge planning if the patient needs post-hospital services based on physician/advanced practitioner order or complex needs related to functional status, cognitive ability, or social support system  Outcome: Progressing     Problem: Knowledge Deficit  Goal: Patient/family/caregiver demonstrates understanding of disease process, treatment plan, medications, and discharge instructions  Description: Complete learning assessment and assess knowledge base    Interventions:  - Provide teaching at level of understanding  - Provide teaching via preferred learning methods  Outcome: Progressing     Problem: GENITOURINARY - ADULT  Goal: Maintains or returns to baseline urinary function  Description: INTERVENTIONS:  - Assess urinary function  - Encourage oral fluids to ensure adequate hydration if ordered  - Administer IV fluids as ordered to ensure adequate hydration  - Administer ordered medications as needed  - Offer frequent toileting  - Follow urinary retention protocol if ordered  Outcome: Progressing  Goal: Absence of urinary retention  Description: INTERVENTIONS:  - Assess patients ability to void and empty bladder  - Monitor I/O  - Bladder scan as needed  - Discuss with physician/AP medications to alleviate retention as needed  - Discuss catheterization for long term situations as appropriate  Outcome: Progressing  Goal: Urinary catheter remains patent  Description: INTERVENTIONS:  - Assess patency of urinary catheter  - If patient has a chronic chadwick, consider changing catheter if non-functioning  - Follow guidelines for intermittent irrigation of non-functioning urinary catheter  Outcome: Progressing     Problem: RESPIRATORY - ADULT  Goal: Achieves optimal ventilation and oxygenation  Description: INTERVENTIONS:  - Assess for changes in respiratory status  - Assess for changes in mentation and behavior  - Position to facilitate oxygenation and minimize respiratory effort  - Oxygen administered by appropriate delivery if ordered  - Initiate smoking cessation education as indicated  - Encourage broncho-pulmonary hygiene including cough, deep breathe, Incentive Spirometry  - Assess the need for suctioning and aspirate as needed  - Assess and instruct to report SOB or any respiratory difficulty  - Respiratory Therapy support as indicated  Outcome: Progressing     Problem: METABOLIC, FLUID AND ELECTROLYTES - ADULT  Goal: Electrolytes maintained within normal limits  Description: INTERVENTIONS:  - Monitor labs and assess patient for signs and symptoms of electrolyte imbalances  - Administer electrolyte replacement as ordered  - Monitor response to electrolyte replacements, including repeat lab results as appropriate  - Instruct patient on fluid and nutrition as appropriate  Outcome: Progressing  Goal: Fluid balance maintained  Description: INTERVENTIONS:  - Monitor labs   - Monitor I/O and WT  - Instruct patient on fluid and nutrition as appropriate  - Assess for signs & symptoms of volume excess or deficit  Outcome: Progressing     Problem: SKIN/TISSUE INTEGRITY - ADULT  Goal: Skin integrity remains intact  Description: INTERVENTIONS  - Identify patients at risk for skin breakdown  - Assess and monitor skin integrity  - Assess and monitor nutrition and hydration status  - Monitor labs (i e  albumin)  - Assess for incontinence   - Turn and reposition patient  - Assist with mobility/ambulation  - Relieve pressure over bony prominences  - Avoid friction and shearing  - Provide appropriate hygiene as needed including keeping skin clean and dry  - Evaluate need for skin moisturizer/barrier cream  - Collaborate with interdisciplinary team (i e  Nutrition, Rehabilitation, etc )   - Patient/family teaching  Outcome: Progressing  Goal: Incision(s), wounds(s) or drain site(s) healing without S/S of infection  Description: INTERVENTIONS  - Assess and document risk factors for skin impairment   - Assess and document dressing, incision, wound bed, drain sites and surrounding tissue  - Consider nutrition services referral as needed  - Oral mucous membranes remain intact  - Provide patient/ family education  Outcome: Progressing     Problem: HEMATOLOGIC - ADULT  Goal: Maintains hematologic stability  Description: INTERVENTIONS  - Assess for signs and symptoms of bleeding or hemorrhage  - Monitor labs  - Administer supportive blood products/factors as ordered and appropriate  Outcome: Progressing

## 2021-02-01 NOTE — PROGRESS NOTES
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    LEADs 12/29/2020 - Right: Occlusion vs high grade stenosis of mid/distal popliteal artery w/ stenosis x2 of SFA at the proximal and distal segments  Evidence of tibioperoneal disease  0 46/-/-  Left:  Occlusion vs high grade stenosis of proximal SFA w/ distal reconstitution  Evidence of tibioperoneal disease  0 71/-/-    No acute events overnight  Patient is finally agreeable to the angiogram today  · Renal recommendations appreciated  · Will reach out to IR for scheduling of RLE angiogram and possible intervention later today  Likely sometime this week  · Hold coumadin and continue heparin drip  · Appreciate Podiatry recommendations  · Care per primary    Subjective/Objective     Subjective: No acute events  Afebrile  Patient is finally agreeable to proceed  Objective:     Vitals: Temp:  [97 6 °F (36 4 °C)-99 °F (37 2 °C)] 97 6 °F (36 4 °C)  HR:  [63-68] 68  Resp:  [18-20] 18  BP: (129-167)/(63-81) 167/81  Body mass index is 27 04 kg/m²  I/O       01/19 0701 - 01/20 0700 01/20 0701 - 01/21 0700 01/21 0701 - 01/22 0700    P  O  540 240     IV Piggyback       Total Intake(mL/kg) 540 (8 3) 240 (3 7)     Urine (mL/kg/hr) 650 (0 4) 180 (0 1)     Total Output 650 180     Net -110 +60                  Physical Exam:  GEN: NAD  HEENT: MMM  CV:  Not tachy  Lung: Normal effort  Ab: Soft, NT/ND  Neuro: A+Ox3  LE: R foot covered in dressings all the way to the mid calf  Left limb cellulitis improved/resolved      Lab, Imaging and other studies:   CBC with diff:   No results found for: WBC, HGB, HCT, MCV, PLT, ADJUSTEDWBC, MCH, MCHC, RDW, MPV, NRBC, BMP/CMP:   No results found for: SODIUM, K, CL, CO2, ANIONGAP, BUN, CREATININE, GLUCOSE, CALCIUM, AST, ALT, ALKPHOS, PROT, BILITOT, EGFR, Coags:   Lab Results   Component Value Date    PTT 49 (H) 01/31/2021   , Blood Culture: No results found for: BLOODCX  VTE Pharmacologic Prophylaxis: Sequential compression device (Venodyne)   VTE Mechanical Prophylaxis: sequential compression device

## 2021-02-02 LAB
ANION GAP SERPL CALCULATED.3IONS-SCNC: 8 MMOL/L (ref 4–13)
APTT PPP: 55 SECONDS (ref 23–37)
APTT PPP: 66 SECONDS (ref 23–37)
APTT PPP: 73 SECONDS (ref 23–37)
BUN SERPL-MCNC: 64 MG/DL (ref 5–25)
CALCIUM SERPL-MCNC: 8.3 MG/DL (ref 8.3–10.1)
CHLORIDE SERPL-SCNC: 102 MMOL/L (ref 100–108)
CO2 SERPL-SCNC: 27 MMOL/L (ref 21–32)
CREAT SERPL-MCNC: 2.39 MG/DL (ref 0.6–1.3)
GFR SERPL CREATININE-BSD FRML MDRD: 17 ML/MIN/1.73SQ M
GLUCOSE SERPL-MCNC: 96 MG/DL (ref 65–140)
MAGNESIUM SERPL-MCNC: 1.9 MG/DL (ref 1.6–2.6)
PHOSPHATE SERPL-MCNC: 4 MG/DL (ref 2.3–4.1)
POTASSIUM SERPL-SCNC: 3.8 MMOL/L (ref 3.5–5.3)
SODIUM SERPL-SCNC: 137 MMOL/L (ref 136–145)

## 2021-02-02 PROCEDURE — 99232 SBSQ HOSP IP/OBS MODERATE 35: CPT | Performed by: INTERNAL MEDICINE

## 2021-02-02 PROCEDURE — 85730 THROMBOPLASTIN TIME PARTIAL: CPT | Performed by: INTERNAL MEDICINE

## 2021-02-02 PROCEDURE — 83735 ASSAY OF MAGNESIUM: CPT | Performed by: NURSE PRACTITIONER

## 2021-02-02 PROCEDURE — 84100 ASSAY OF PHOSPHORUS: CPT | Performed by: NURSE PRACTITIONER

## 2021-02-02 PROCEDURE — 80048 BASIC METABOLIC PNL TOTAL CA: CPT | Performed by: NURSE PRACTITIONER

## 2021-02-02 RX ADMIN — ISOSORBIDE DINITRATE 10 MG: 10 TABLET ORAL at 17:33

## 2021-02-02 RX ADMIN — Medication 250 MG: at 17:33

## 2021-02-02 RX ADMIN — LEVOTHYROXINE SODIUM 25 MCG: 25 TABLET ORAL at 05:06

## 2021-02-02 RX ADMIN — COLLAGENASE SANTYL: 250 OINTMENT TOPICAL at 09:33

## 2021-02-02 RX ADMIN — HYDRALAZINE HYDROCHLORIDE 25 MG: 25 TABLET ORAL at 05:06

## 2021-02-02 RX ADMIN — HYDRALAZINE HYDROCHLORIDE 25 MG: 25 TABLET ORAL at 22:03

## 2021-02-02 RX ADMIN — CARVEDILOL 12.5 MG: 6.25 TABLET, FILM COATED ORAL at 17:33

## 2021-02-02 RX ADMIN — HEPARIN SODIUM 6.8 UNITS/KG/HR: 10000 INJECTION, SOLUTION INTRAVENOUS at 11:14

## 2021-02-02 NOTE — PLAN OF CARE
Problem: Potential for Falls  Goal: Patient will remain free of falls  Description: INTERVENTIONS:  - Assess patient frequently for physical needs  -  Identify cognitive and physical deficits and behaviors that affect risk of falls  -  [place] fall precautions as indicated by assessment   - Educate patient/family on patient safety including physical limitations  - Instruct patient to call for assistance with activity based on assessment  - Modify environment to reduce risk of injury  - Consider OT/PT consult to assist with strengthening/mobility  Outcome: Progressing     Problem: Prexisting or High Potential for Compromised Skin Integrity  Goal: Skin integrity is maintained or improved  Description: INTERVENTIONS:  - Identify patients at risk for skin breakdown  - Assess and monitor skin integrity  - Assess and monitor nutrition and hydration status  - Monitor labs   - Assess for incontinence   - Turn and reposition patient  - Assist with mobility/ambulation  - Relieve pressure over bony prominences  - Avoid friction and shearing  - Provide appropriate hygiene as needed including keeping skin clean and dry  - Evaluate need for skin moisturizer/barrier cream  - Collaborate with interdisciplinary team   - Patient/family teaching  - Consider wound care consult   Outcome: Progressing     Problem: Nutrition/Hydration-ADULT  Goal: Nutrient/Hydration intake appropriate for improving, restoring or maintaining nutritional needs  Description: Monitor and assess patient's nutrition/hydration status for malnutrition  Collaborate with interdisciplinary team and initiate plan and interventions as ordered  Monitor patient's weight and dietary intake as ordered or per policy  Utilize nutrition screening tool and intervene as necessary  Determine patient's food preferences and provide high-protein, high-caloric foods as appropriate       INTERVENTIONS:  - Monitor oral intake, urinary output, labs, and treatment plans  - Assess nutrition and hydration status and recommend course of action  - Evaluate amount of meals eaten  - Assist patient with eating if necessary   - Allow adequate time for meals  - Recommend/ encourage appropriate diets, oral nutritional supplements, and vitamin/mineral supplements  - Order, calculate, and assess calorie counts as needed  - Recommend, monitor, and adjust tube feedings and TPN/PPN based on assessed needs  - Assess need for intravenous fluids  - Provide specific nutrition/hydration education as appropriate  - Include patient/family/caregiver in decisions related to nutrition  Outcome: Progressing     Problem: PAIN - ADULT  Goal: Verbalizes/displays adequate comfort level or baseline comfort level  Description: Interventions:  - Encourage patient to monitor pain and request assistance  - Assess pain using appropriate pain scale  - Administer analgesics based on type and severity of pain and evaluate response  - Implement non-pharmacological measures as appropriate and evaluate response  - Consider cultural and social influences on pain and pain management  - Notify physician/advanced practitioner if interventions unsuccessful or patient reports new pain  Outcome: Progressing     Problem: INFECTION - ADULT  Goal: Absence or prevention of progression during hospitalization  Description: INTERVENTIONS:  - Assess and monitor for signs and symptoms of infection  - Monitor lab/diagnostic results  - Monitor all insertion sites, i e  indwelling lines, tubes, and drains  - Monitor endotracheal if appropriate and nasal secretions for changes in amount and color  - [place] appropriate cooling/warming therapies per order  - Administer medications as ordered  - Instruct and encourage patient and family to use good hand hygiene technique  - Identify and instruct in appropriate isolation precautions for identified infection/condition  Outcome: Progressing  Goal: Absence of fever/infection during neutropenic period  Description: INTERVENTIONS:  - Monitor WBC    Outcome: Progressing     Problem: SAFETY ADULT  Goal: Patient will remain free of falls  Description: INTERVENTIONS:  - Assess patient frequently for physical needs  -  Identify cognitive and physical deficits and behaviors that affect risk of falls    -  [place] fall precautions as indicated by assessment   - Educate patient/family on patient safety including physical limitations  - Instruct patient to call for assistance with activity based on assessment  - Modify environment to reduce risk of injury  - Consider OT/PT consult to assist with strengthening/mobility  Outcome: Progressing  Goal: Maintain or return to baseline ADL function  Description: INTERVENTIONS:  -  Assess patient's ability to carry out ADLs; assess patient's baseline for ADL function and identify physical deficits which impact ability to perform ADLs (bathing, care of mouth/teeth, toileting, grooming, dressing, etc )  - Assess/evaluate cause of self-care deficits   - Assess range of motion  - Assess patient's mobility; develop plan if impaired  - Assess patient's need for assistive devices and provide as appropriate  - Encourage maximum independence but intervene and supervise when necessary  - Involve family in performance of ADLs  - Assess for home care needs following discharge   - Consider OT consult to assist with ADL evaluation and planning for discharge  - Provide patient education as appropriate  Outcome: Progressing  Goal: Maintain or return mobility status to optimal level  Description: INTERVENTIONS:  - Assess patient's baseline mobility status (ambulation, transfers, stairs, etc )    - Identify cognitive and physical deficits and behaviors that affect mobility  - Identify mobility aids required to assist with transfers and/or ambulation (gait belt, sit-to-stand, lift, walker, cane, etc )  - [place] fall precautions as indicated by assessment  - Record patient progress and toleration of activity level on Mobility SBAR; progress patient to next Phase/Stage  - Instruct patient to call for assistance with activity based on assessment  - Consider rehabilitation consult to assist with strengthening/weightbearing, etc   Outcome: Progressing     Problem: DISCHARGE PLANNING  Goal: Discharge to home or other facility with appropriate resources  Description: INTERVENTIONS:  - Identify barriers to discharge w/patient and caregiver  - Arrange for needed discharge resources and transportation as appropriate  - Identify discharge learning needs (meds, wound care, etc )  - Arrange for interpretive services to assist at discharge as needed  - Refer to Case Management Department for coordinating discharge planning if the patient needs post-hospital services based on physician/advanced practitioner order or complex needs related to functional status, cognitive ability, or social support system  Outcome: Progressing     Problem: Knowledge Deficit  Goal: Patient/family/caregiver demonstrates understanding of disease process, treatment plan, medications, and discharge instructions  Description: Complete learning assessment and assess knowledge base    Interventions:  - Provide teaching at level of understanding  - Provide teaching via preferred learning methods  Outcome: Progressing     Problem: GENITOURINARY - ADULT  Goal: Maintains or returns to baseline urinary function  Description: INTERVENTIONS:  - Assess urinary function  - Encourage oral fluids to ensure adequate hydration if ordered  - Administer IV fluids as ordered to ensure adequate hydration  - Administer ordered medications as needed  - Offer frequent toileting  - Follow urinary retention protocol if ordered  Outcome: Progressing  Goal: Absence of urinary retention  Description: INTERVENTIONS:  - Assess patients ability to void and empty bladder  - Monitor I/O  - Bladder scan as needed  - Discuss with physician/AP medications to alleviate retention as needed  - Discuss catheterization for long term situations as appropriate  Outcome: Progressing  Goal: Urinary catheter remains patent  Description: INTERVENTIONS:  - Assess patency of urinary catheter  - If patient has a chronic [place]wick, consider changing catheter if non-functioning  - Follow guidelines for intermittent irrigation of non-functioning urinary catheter  Outcome: Progressing     Problem: RESPIRATORY - ADULT  Goal: Achieves optimal ventilation and oxygenation  Description: INTERVENTIONS:  - Assess for changes in respiratory status  - Assess for changes in mentation and behavior  - Position to facilitate oxygenation and minimize respiratory effort  - Oxygen administered by appropriate delivery if ordered  - Initiate smoking cessation education as indicated  - Encourage broncho-pulmonary hygiene including cough, deep breathe, Incentive Spirometry  - Assess the need for suctioning and aspirate as needed  - Assess and instruct to report SOB or any respiratory difficulty  - Respiratory Therapy support as indicated  Outcome: Progressing     Problem: METABOLIC, FLUID AND ELECTROLYTES - ADULT  Goal: Electrolytes maintained within normal limits  Description: INTERVENTIONS:  - Monitor labs and assess patient for signs and symptoms of electrolyte imbalances  - Administer electrolyte replacement as ordered  - Monitor response to electrolyte replacements, including repeat lab results as appropriate  - Instruct patient on fluid and nutrition as appropriate  Outcome: Progressing  Goal: Fluid balance maintained  Description: INTERVENTIONS:  - Monitor labs   - Monitor I/O and WT  - Instruct patient on fluid and nutrition as appropriate  - Assess for signs & symptoms of volume excess or deficit  Outcome: Progressing     Problem: SKIN/TISSUE INTEGRITY - ADULT  Goal: Skin integrity remains intact  Description: INTERVENTIONS  - Identify patients at risk for skin breakdown  - Assess and monitor skin integrity  - Assess and monitor nutrition and hydration status  - Monitor labs (i e  albumin)  - Assess for incontinence   - Turn and reposition patient  - Assist with mobility/ambulation  - Relieve pressure over bony prominences  - Avoid friction and shearing  - Provide appropriate hygiene as needed including keeping skin clean and dry  - Evaluate need for skin moisturizer/barrier cream  - Collaborate with interdisciplinary team (i e  Nutrition, Rehabilitation, etc )   - Patient/family teaching  Outcome: Progressing  Goal: Incision(s), wounds(s) or drain site(s) healing without S/S of infection  Description: INTERVENTIONS  - Assess and document risk factors for skin impairment   - Assess and document dressing, incision, wound bed, drain sites and surrounding tissue  - Consider nutrition services referral as needed  - Oral mucous membranes remain intact  - Provide patient/ family education  Outcome: Progressing     Problem: HEMATOLOGIC - ADULT  Goal: Maintains hematologic stability  Description: INTERVENTIONS  - Assess for signs and symptoms of bleeding or hemorrhage  - Monitor labs  - Administer supportive blood products/factors as ordered and appropriate  Outcome: Progressing
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
## 2021-02-02 NOTE — PLAN OF CARE
Problem: Potential for Falls  Goal: Patient will remain free of falls  Description: INTERVENTIONS:  - Assess patient frequently for physical needs  -  Identify cognitive and physical deficits and behaviors that affect risk of falls  -  [place] fall precautions as indicated by assessment   - Educate patient/family on patient safety including physical limitations  - Instruct patient to call for assistance with activity based on assessment  - Modify environment to reduce risk of injury  - Consider OT/PT consult to assist with strengthening/mobility  Outcome: Progressing     Problem: Prexisting or High Potential for Compromised Skin Integrity  Goal: Skin integrity is maintained or improved  Description: INTERVENTIONS:  - Identify patients at risk for skin breakdown  - Assess and monitor skin integrity  - Assess and monitor nutrition and hydration status  - Monitor labs   - Assess for incontinence   - Turn and reposition patient  - Assist with mobility/ambulation  - Relieve pressure over bony prominences  - Avoid friction and shearing  - Provide appropriate hygiene as needed including keeping skin clean and dry  - Evaluate need for skin moisturizer/barrier cream  - Collaborate with interdisciplinary team   - Patient/family teaching  - Consider wound care consult   Outcome: Progressing     Problem: Nutrition/Hydration-ADULT  Goal: Nutrient/Hydration intake appropriate for improving, restoring or maintaining nutritional needs  Description: Monitor and assess patient's nutrition/hydration status for malnutrition  Collaborate with interdisciplinary team and initiate plan and interventions as ordered  Monitor patient's weight and dietary intake as ordered or per policy  Utilize nutrition screening tool and intervene as necessary  Determine patient's food preferences and provide high-protein, high-caloric foods as appropriate       INTERVENTIONS:  - Monitor oral intake, urinary output, labs, and treatment plans  - Assess nutrition and hydration status and recommend course of action  - Evaluate amount of meals eaten  - Assist patient with eating if necessary   - Allow adequate time for meals  - Recommend/ encourage appropriate diets, oral nutritional supplements, and vitamin/mineral supplements  - Order, calculate, and assess calorie counts as needed  - Recommend, monitor, and adjust tube feedings and TPN/PPN based on assessed needs  - Assess need for intravenous fluids  - Provide specific nutrition/hydration education as appropriate  - Include patient/family/caregiver in decisions related to nutrition  Outcome: Progressing     Problem: PAIN - ADULT  Goal: Verbalizes/displays adequate comfort level or baseline comfort level  Description: Interventions:  - Encourage patient to monitor pain and request assistance  - Assess pain using appropriate pain scale  - Administer analgesics based on type and severity of pain and evaluate response  - Implement non-pharmacological measures as appropriate and evaluate response  - Consider cultural and social influences on pain and pain management  - Notify physician/advanced practitioner if interventions unsuccessful or patient reports new pain  Outcome: Progressing     Problem: INFECTION - ADULT  Goal: Absence or prevention of progression during hospitalization  Description: INTERVENTIONS:  - Assess and monitor for signs and symptoms of infection  - Monitor lab/diagnostic results  - Monitor all insertion sites, i e  indwelling lines, tubes, and drains  - Monitor endotracheal if appropriate and nasal secretions for changes in amount and color  - [place] appropriate cooling/warming therapies per order  - Administer medications as ordered  - Instruct and encourage patient and family to use good hand hygiene technique  - Identify and instruct in appropriate isolation precautions for identified infection/condition  Outcome: Progressing  Goal: Absence of fever/infection during neutropenic period  Description: INTERVENTIONS:  - Monitor WBC    Outcome: Progressing     Problem: SAFETY ADULT  Goal: Patient will remain free of falls  Description: INTERVENTIONS:  - Assess patient frequently for physical needs  -  Identify cognitive and physical deficits and behaviors that affect risk of falls    -  [place] fall precautions as indicated by assessment   - Educate patient/family on patient safety including physical limitations  - Instruct patient to call for assistance with activity based on assessment  - Modify environment to reduce risk of injury  - Consider OT/PT consult to assist with strengthening/mobility  Outcome: Progressing  Goal: Maintain or return to baseline ADL function  Description: INTERVENTIONS:  -  Assess patient's ability to carry out ADLs; assess patient's baseline for ADL function and identify physical deficits which impact ability to perform ADLs (bathing, care of mouth/teeth, toileting, grooming, dressing, etc )  - Assess/evaluate cause of self-care deficits   - Assess range of motion  - Assess patient's mobility; develop plan if impaired  - Assess patient's need for assistive devices and provide as appropriate  - Encourage maximum independence but intervene and supervise when necessary  - Involve family in performance of ADLs  - Assess for home care needs following discharge   - Consider OT consult to assist with ADL evaluation and planning for discharge  - Provide patient education as appropriate  Outcome: Progressing  Goal: Maintain or return mobility status to optimal level  Description: INTERVENTIONS:  - Assess patient's baseline mobility status (ambulation, transfers, stairs, etc )    - Identify cognitive and physical deficits and behaviors that affect mobility  - Identify mobility aids required to assist with transfers and/or ambulation (gait belt, sit-to-stand, lift, walker, cane, etc )  - [place] fall precautions as indicated by assessment  - Record patient progress and toleration of activity level on Mobility SBAR; progress patient to next Phase/Stage  - Instruct patient to call for assistance with activity based on assessment  - Consider rehabilitation consult to assist with strengthening/weightbearing, etc   Outcome: Progressing     Problem: DISCHARGE PLANNING  Goal: Discharge to home or other facility with appropriate resources  Description: INTERVENTIONS:  - Identify barriers to discharge w/patient and caregiver  - Arrange for needed discharge resources and transportation as appropriate  - Identify discharge learning needs (meds, wound care, etc )  - Arrange for interpretive services to assist at discharge as needed  - Refer to Case Management Department for coordinating discharge planning if the patient needs post-hospital services based on physician/advanced practitioner order or complex needs related to functional status, cognitive ability, or social support system  Outcome: Progressing     Problem: Knowledge Deficit  Goal: Patient/family/caregiver demonstrates understanding of disease process, treatment plan, medications, and discharge instructions  Description: Complete learning assessment and assess knowledge base    Interventions:  - Provide teaching at level of understanding  - Provide teaching via preferred learning methods  Outcome: Progressing     Problem: GENITOURINARY - ADULT  Goal: Maintains or returns to baseline urinary function  Description: INTERVENTIONS:  - Assess urinary function  - Encourage oral fluids to ensure adequate hydration if ordered  - Administer IV fluids as ordered to ensure adequate hydration  - Administer ordered medications as needed  - Offer frequent toileting  - Follow urinary retention protocol if ordered  Outcome: Progressing  Goal: Absence of urinary retention  Description: INTERVENTIONS:  - Assess patients ability to void and empty bladder  - Monitor I/O  - Bladder scan as needed  - Discuss with physician/AP medications to alleviate retention as needed  - Discuss catheterization for long term situations as appropriate  Outcome: Progressing  Goal: Urinary catheter remains patent  Description: INTERVENTIONS:  - Assess patency of urinary catheter  - If patient has a chronic chadwick, consider changing catheter if non-functioning  - Follow guidelines for intermittent irrigation of non-functioning urinary catheter  Outcome: Progressing     Problem: RESPIRATORY - ADULT  Goal: Achieves optimal ventilation and oxygenation  Description: INTERVENTIONS:  - Assess for changes in respiratory status  - Assess for changes in mentation and behavior  - Position to facilitate oxygenation and minimize respiratory effort  - Oxygen administered by appropriate delivery if ordered  - Initiate smoking cessation education as indicated  - Encourage broncho-pulmonary hygiene including cough, deep breathe, Incentive Spirometry  - Assess the need for suctioning and aspirate as needed  - Assess and instruct to report SOB or any respiratory difficulty  - Respiratory Therapy support as indicated  Outcome: Progressing     Problem: METABOLIC, FLUID AND ELECTROLYTES - ADULT  Goal: Electrolytes maintained within normal limits  Description: INTERVENTIONS:  - Monitor labs and assess patient for signs and symptoms of electrolyte imbalances  - Administer electrolyte replacement as ordered  - Monitor response to electrolyte replacements, including repeat lab results as appropriate  - Instruct patient on fluid and nutrition as appropriate  Outcome: Progressing  Goal: Fluid balance maintained  Description: INTERVENTIONS:  - Monitor labs   - Monitor I/O and WT  - Instruct patient on fluid and nutrition as appropriate  - Assess for signs & symptoms of volume excess or deficit  Outcome: Progressing     Problem: SKIN/TISSUE INTEGRITY - ADULT  Goal: Skin integrity remains intact  Description: INTERVENTIONS  - Identify patients at risk for skin breakdown  - Assess and monitor skin integrity  - Assess and monitor nutrition and hydration status  - Monitor labs (i e  albumin)  - Assess for incontinence   - Turn and reposition patient  - Assist with mobility/ambulation  - Relieve pressure over bony prominences  - Avoid friction and shearing  - Provide appropriate hygiene as needed including keeping skin clean and dry  - Evaluate need for skin moisturizer/barrier cream  - Collaborate with interdisciplinary team (i e  Nutrition, Rehabilitation, etc )   - Patient/family teaching  Outcome: Progressing  Goal: Incision(s), wounds(s) or drain site(s) healing without S/S of infection  Description: INTERVENTIONS  - Assess and document risk factors for skin impairment   - Assess and document dressing, incision, wound bed, drain sites and surrounding tissue  - Consider nutrition services referral as needed  - Oral mucous membranes remain intact  - Provide patient/ family education  Outcome: Progressing     Problem: HEMATOLOGIC - ADULT  Goal: Maintains hematologic stability  Description: INTERVENTIONS  - Assess for signs and symptoms of bleeding or hemorrhage  - Monitor labs  - Administer supportive blood products/factors as ordered and appropriate  Outcome: Progressing

## 2021-02-02 NOTE — CASE MANAGEMENT
Patient needs STR and has referrals pending in [facility] interested in patient  Patient pending medical decision and clearance  CM will continue to follow      [facility] able to accept, Kenan Cardoza is following

## 2021-02-02 NOTE — PROGRESS NOTES
Progress Note - Nephrology   Sandra Car 80 y o  female MRN: [id number]  Unit/Bed#: E5 -01 Encounter: [id number]      Assessment / Plan:  1  LILIAN, POA, on top of CKD stage 4   In the setting of infection, diuretic use, progression of underlying CKD with history of atrophic left kidney  - baseline serum creatinine 1 8-2  - most recent serum creatinine down to approximately 2 4   Peak serum creatinine 3 7 on January 19   - would hold off on angiogram in the setting of confusion as well as current acute kidney injury which has not yet resolved  - would provide Mucomyst 1200 mg b I d  x4 doses for renal optimization prior to angiogram later this week  - continue Scott catheter in setting of right-sided hydronephrosis  - follow-up a m  BMP  2   Acute hypoxic respiratory failure/CHF - on 4 L oxygen via nasal cannula, appears to be tachypneic on exam, status post 40 mg IV Lasix yesterday, assess daily for diuretic needs   Does have grade 2 diastolic dysfunction with EF of 50%  Oxygen requirement unchanged   3  Anemia of chronic disease - Hgb 8s and stable, monitor CBC, transfuse prn  4  HTN - BP elevated  Continue amlodipine 10 mg daily, Coreg 12 5 mg p o  B i d , hydralazine 25 mg p o  Q 8 hours, Isordil 10 mg p o  T i d  May increase hydralazine if blood pressure remains elevated  5  Hyperphosphatemia - improved on last check, last phos 4  6  CHF - EF 50% with grade 2 diastolic dysfunction - monitor daily weight, receiving prn IV lasix, outpatient regimen on hold  O2 reqs unchanged    Other issues: LLE cellulitis with dry gangrene - podiatry and vascular follow, agram planned for later this week        Subjective:   She denies chest pain or shortness breath, nausea or vomiting  She is on 4 L oxygen via nasal cannula  Objective:     Vitals: Blood pressure (!) 186/76, pulse 74, temperature 98 °F (36 7 °C), temperature source Temporal, resp  rate 20, weight 75 1 kg (165 lb 9 1 oz), SpO2 94 %  ,Body mass index is 26 72 kg/m²  Temp (24hrs), Av 8 °F (36 6 °C), Min:97 7 °F (36 5 °C), Max:98 °F (36 7 °C)      Weight (last 2 days)     Date/Time   Weight    21 0600   75 1 (165 57)    21 0549   76 (167 55)    21 0544   76 (167 55)                Intake/Output Summary (Last 24 hours) at 2021 1222  Last data filed at 2021 0516  Gross per 24 hour   Intake --   Output 1530 ml   Net -1530 ml     I/O last 24 hours: In: -   Out: [facility] [Urine:1530]        Physical Exam:   Physical Exam  Vitals signs and nursing note reviewed  Constitutional:       General: She is not in acute distress  Appearance: Normal appearance  She is well-developed  She is ill-appearing  She is not diaphoretic  Comments: Thin, ill appearing   HENT:      Head: Normocephalic and atraumatic  Mouth/Throat:      Pharynx: No oropharyngeal exudate  Eyes:      General: No scleral icterus  Right eye: No discharge  Left eye: No discharge  Neck:      Musculoskeletal: Normal range of motion and neck supple  Thyroid: No thyromegaly  Cardiovascular:      Rate and Rhythm: Normal rate and regular rhythm  Heart sounds: No murmur  Pulmonary:      Effort: Pulmonary effort is normal  No respiratory distress  Breath sounds: Normal breath sounds  No wheezing  Abdominal:      General: Bowel sounds are normal  There is no distension  Palpations: Abdomen is soft  Genitourinary:     Comments: +chadwick  Musculoskeletal: Normal range of motion  General: No swelling  Skin:     General: Skin is warm and dry  Findings: No rash  Neurological:      General: No focal deficit present  Mental Status: She is alert  Motor: No abnormal muscle tone        Comments: awake   Psychiatric:         Mood and Affect: Mood normal          Behavior: Behavior normal          Invasive Devices     Peripheral Intravenous Line            Peripheral IV 21 Left;Ventral (anterior) Hand 1 day          Drain            Urethral Catheter Non-latex 18 Fr  4 days                Medications:    Scheduled Meds:  Current Facility-Administered Medications   Medication Dose Route Frequency Provider Last Rate    acetaminophen  650 mg Oral Q6H PRN Mike Pearce,       amLODIPine  10 mg Oral Daily OTIS Capellan      carvedilol  12 5 mg Oral BID With Meals OTIS Capellan      collagenase   Topical Daily Kamila Tinajero MD      heparin (porcine)  3-30 Units/kg/hr (Order-Specific) Intravenous Titrated Kamila Tinajero MD 6 8 Units/kg/hr (02/02/21 1114)    hydrALAZINE  25 mg Oral Q8H [address] OTIS Capellan      HYDROmorphone  1 mg Intravenous Q4H PRN Lazarus Racer, PA-C      isosorbide dinitrate  10 mg Oral TID after meals Harhsa Shook, DO      levothyroxine  25 mcg Oral Early Morning Harsha Shook,       ondansetron  4 mg Intravenous Q6H PRN Mike Pearce,       oxyCODONE  2 5 mg Oral Q4H PRN Cadence Abraham PA-C      oxyCODONE  5 mg Oral Q4H PRN Arabella Abraham PA-C      potassium chloride  20 mEq Oral Once Jaki Valdez MD      saccharomyces boulardii  250 mg Oral BID Mike Pearce DO         PRN Meds:   acetaminophen    HYDROmorphone    ondansetron    oxyCODONE    oxyCODONE    Continuous Infusions:heparin (porcine), 3-30 Units/kg/hr (Order-Specific), Last Rate: 6 8 Units/kg/hr (02/02/21 1114)            LAB RESULTS:      Results from last 7 days   Lab Units 02/02/21  0503 02/01/21  0911 02/01/21  0650 01/31/21  0516 01/30/21  1524 01/30/21  0501 01/29/21  0438 01/28/21  0636 01/27/21  1012 01/27/21  0517   WBC Thousand/uL  --   --  5 70 6 40 6 39  --   --   --  8 52  --    HEMOGLOBIN g/dL  --   --  8 2* 8 3* 8 0*  --   --   --  8 1*  --    HEMATOCRIT %  --   --  27 6* 27 1* 26 0*  --   --   --  25 9*  --    PLATELETS Thousands/uL  --   --  257 288 272  --   --   --  196  --    NEUTROS PCT %  --   --  56 54  --   --   --   --  70  --    LYMPHS PCT %  --   --  30 31  --   -- --   --  18  --    MONOS PCT %  --   --  7 8  --   --   --   --  7  --    EOS PCT %  --   --  6 6  --   --   --   --  4  --    POTASSIUM mmol/L 3 8 3 5  --  3 5  --  4 3 3 3* 3 5  --  3 8   CHLORIDE mmol/L 102 101  --  101  --  100 101 99*  --  98*   CO2 mmol/L 27 27  --  27  --  26 25 26  --  26   BUN mg/dL 64* 68*  --  79*  --  83* 88* 92*  --  93*   CREATININE mg/dL 2 39* 2 47*  --  2 72*  --  2 76* 2 82* 2 72*  --  2 76*   CALCIUM mg/dL 8 3 8 0*  --  8 0*  --  8 5 8 5 8 4  --  8 4   MAGNESIUM mg/dL 1 9 1 8  --  2 0  --  2 2  --  2 3  --  2 4   PHOSPHORUS mg/dL 4 0  --   --   --   --  5 3*  --   --   --   --        CUTURES:  Lab Results   Component Value Date    BLOODCX No Growth After 5 Days  01/18/2021    BLOODCX No Growth After 5 Days  01/18/2021    BLOODCX No Growth After 5 Days  12/28/2020    BLOODCX No Growth After 5 Days  12/28/2020                 Portions of the record may have been created with voice recognition software  Occasional wrong word or "sound a like" substitutions may have occurred due to the inherent limitations of voice recognition software  Read the chart carefully and recognize, using context, where substitutions have occurred  If you have any questions, please contact the dictating provider

## 2021-02-03 VITALS
RESPIRATION RATE: 19 BRPM | OXYGEN SATURATION: 82 % | DIASTOLIC BLOOD PRESSURE: 60 MMHG | WEIGHT: 165.57 LBS | HEART RATE: 68 BPM | SYSTOLIC BLOOD PRESSURE: 124 MMHG | BODY MASS INDEX: 26.72 KG/M2 | TEMPERATURE: 97 F

## 2021-02-03 PROBLEM — A41.50 SEPSIS DUE TO GRAM NEGATIVE BACTERIA (HCC): Status: RESOLVED | Noted: 2020-11-29 | Resolved: 2021-02-03

## 2021-02-03 LAB
ANION GAP SERPL CALCULATED.3IONS-SCNC: 7 MMOL/L (ref 4–13)
APTT PPP: >210 SECONDS (ref 23–37)
BASOPHILS # BLD AUTO: 0.02 THOUSANDS/ΜL (ref 0–0.1)
BASOPHILS NFR BLD AUTO: 0 % (ref 0–1)
BUN SERPL-MCNC: 59 MG/DL (ref 5–25)
CALCIUM SERPL-MCNC: 8.6 MG/DL (ref 8.3–10.1)
CHLORIDE SERPL-SCNC: 103 MMOL/L (ref 100–108)
CO2 SERPL-SCNC: 27 MMOL/L (ref 21–32)
CREAT SERPL-MCNC: 2.32 MG/DL (ref 0.6–1.3)
EOSINOPHIL # BLD AUTO: 0.3 THOUSAND/ΜL (ref 0–0.61)
EOSINOPHIL NFR BLD AUTO: 5 % (ref 0–6)
ERYTHROCYTE [DISTWIDTH] IN BLOOD BY AUTOMATED COUNT: 17.4 % (ref 11.6–15.1)
FLUAV RNA RESP QL NAA+PROBE: NEGATIVE
FLUBV RNA RESP QL NAA+PROBE: NEGATIVE
GFR SERPL CREATININE-BSD FRML MDRD: 18 ML/MIN/1.73SQ M
GLUCOSE SERPL-MCNC: 92 MG/DL (ref 65–140)
HCT VFR BLD AUTO: 30.1 % (ref 34.8–46.1)
HGB BLD-MCNC: 9.1 G/DL (ref 11.5–15.4)
IMM GRANULOCYTES # BLD AUTO: 0.03 THOUSAND/UL (ref 0–0.2)
IMM GRANULOCYTES NFR BLD AUTO: 1 % (ref 0–2)
LYMPHOCYTES # BLD AUTO: 1.7 THOUSANDS/ΜL (ref 0.6–4.47)
LYMPHOCYTES NFR BLD AUTO: 28 % (ref 14–44)
MCH RBC QN AUTO: 27.7 PG (ref 26.8–34.3)
MCHC RBC AUTO-ENTMCNC: 30.2 G/DL (ref 31.4–37.4)
MCV RBC AUTO: 92 FL (ref 82–98)
MONOCYTES # BLD AUTO: 0.45 THOUSAND/ΜL (ref 0.17–1.22)
MONOCYTES NFR BLD AUTO: 7 % (ref 4–12)
NEUTROPHILS # BLD AUTO: 3.55 THOUSANDS/ΜL (ref 1.85–7.62)
NEUTS SEG NFR BLD AUTO: 59 % (ref 43–75)
NRBC BLD AUTO-RTO: 0 /100 WBCS
PLATELET # BLD AUTO: 280 THOUSANDS/UL (ref 149–390)
PMV BLD AUTO: 10.4 FL (ref 8.9–12.7)
POTASSIUM SERPL-SCNC: 3.7 MMOL/L (ref 3.5–5.3)
RBC # BLD AUTO: 3.29 MILLION/UL (ref 3.81–5.12)
RSV RNA RESP QL NAA+PROBE: NEGATIVE
SARS-COV-2 RNA RESP QL NAA+PROBE: NEGATIVE
SODIUM SERPL-SCNC: 137 MMOL/L (ref 136–145)
WBC # BLD AUTO: 6.05 THOUSAND/UL (ref 4.31–10.16)

## 2021-02-03 PROCEDURE — 85025 COMPLETE CBC W/AUTO DIFF WBC: CPT | Performed by: INTERNAL MEDICINE

## 2021-02-03 PROCEDURE — 85730 THROMBOPLASTIN TIME PARTIAL: CPT | Performed by: INTERNAL MEDICINE

## 2021-02-03 PROCEDURE — 0241U HB NFCT DS VIR RESP RNA 4 TRGT: CPT | Performed by: INTERNAL MEDICINE

## 2021-02-03 PROCEDURE — 99232 SBSQ HOSP IP/OBS MODERATE 35: CPT | Performed by: PHYSICIAN ASSISTANT

## 2021-02-03 PROCEDURE — 80048 BASIC METABOLIC PNL TOTAL CA: CPT | Performed by: INTERNAL MEDICINE

## 2021-02-03 PROCEDURE — 99232 SBSQ HOSP IP/OBS MODERATE 35: CPT | Performed by: INTERNAL MEDICINE

## 2021-02-03 PROCEDURE — NC001 PR NO CHARGE: Performed by: SURGERY

## 2021-02-03 PROCEDURE — 99239 HOSP IP/OBS DSCHRG MGMT >30: CPT | Performed by: INTERNAL MEDICINE

## 2021-02-03 RX ORDER — WARFARIN SODIUM 5 MG/1
5 TABLET ORAL
Status: COMPLETED | OUTPATIENT
Start: 2021-02-03 | End: 2021-02-03

## 2021-02-03 RX ORDER — OXYCODONE HYDROCHLORIDE 5 MG/1
5 TABLET ORAL EVERY 4 HOURS PRN
Qty: 10 TABLET | Refills: 0 | OUTPATIENT
Start: 2021-02-03 | End: 2021-02-13

## 2021-02-03 RX ORDER — HYDRALAZINE HYDROCHLORIDE 50 MG/1
50 TABLET, FILM COATED ORAL EVERY 8 HOURS SCHEDULED
Refills: 0
Start: 2021-02-03 | End: 2021-05-12 | Stop reason: SDUPTHER

## 2021-02-03 RX ORDER — ACETAMINOPHEN 325 MG/1
650 TABLET ORAL EVERY 6 HOURS PRN
Refills: 0
Start: 2021-02-03 | End: 2021-03-10

## 2021-02-03 RX ORDER — OXYCODONE HYDROCHLORIDE 5 MG/1
2.5 TABLET ORAL EVERY 4 HOURS PRN
Qty: 30 TABLET | Refills: 0 | OUTPATIENT
Start: 2021-02-03 | End: 2021-02-13

## 2021-02-03 RX ORDER — SACCHAROMYCES BOULARDII 250 MG
250 CAPSULE ORAL 2 TIMES DAILY
Refills: 0
Start: 2021-02-03

## 2021-02-03 RX ORDER — AMLODIPINE BESYLATE 10 MG/1
10 TABLET ORAL DAILY
Refills: 0
Start: 2021-02-04 | End: 2021-02-17 | Stop reason: ALTCHOICE

## 2021-02-03 RX ORDER — HYDRALAZINE HYDROCHLORIDE 25 MG/1
50 TABLET, FILM COATED ORAL EVERY 8 HOURS SCHEDULED
Status: DISCONTINUED | OUTPATIENT
Start: 2021-02-03 | End: 2021-02-03 | Stop reason: HOSPADM

## 2021-02-03 RX ADMIN — AMLODIPINE BESYLATE 10 MG: 10 TABLET ORAL at 08:21

## 2021-02-03 RX ADMIN — ISOSORBIDE DINITRATE 10 MG: 10 TABLET ORAL at 17:39

## 2021-02-03 RX ADMIN — HYDRALAZINE HYDROCHLORIDE 25 MG: 25 TABLET ORAL at 05:08

## 2021-02-03 RX ADMIN — COLLAGENASE SANTYL: 250 OINTMENT TOPICAL at 09:43

## 2021-02-03 RX ADMIN — CARVEDILOL 12.5 MG: 6.25 TABLET, FILM COATED ORAL at 08:21

## 2021-02-03 RX ADMIN — WARFARIN SODIUM 5 MG: 5 TABLET ORAL at 17:39

## 2021-02-03 RX ADMIN — Medication 250 MG: at 17:39

## 2021-02-03 RX ADMIN — Medication 250 MG: at 08:21

## 2021-02-03 RX ADMIN — ISOSORBIDE DINITRATE 10 MG: 10 TABLET ORAL at 08:22

## 2021-02-03 RX ADMIN — ISOSORBIDE DINITRATE 10 MG: 10 TABLET ORAL at 12:51

## 2021-02-03 RX ADMIN — LEVOTHYROXINE SODIUM 25 MCG: 25 TABLET ORAL at 05:08

## 2021-02-03 NOTE — TRANSPORTATION MEDICAL NECESSITY
Section I - General Information    Name of Patient: Piotr Martínez                 : 1930    Medicare #: [id number]  Transport Date: 21 (PCS is valid for round trips on this date and for all repetitive trips in the 60-day range as noted below )  Origin: [place]                                                         Destination: [facility]   Is the pt's stay covered under Medicare Part A (PPS/DRG)   [x]     Closest appropriate facility? If no, why is transport to more distant facility required? Yes  If hospice pt, is this transport related to pt's terminal illness? NA       Section II - Medical Necessity Questionnaire  Ambulance transportation is medically necessary only if other means of transport are contraindicated or would be potentially harmful to the patient  To meet this requirement, the patient must either be "bed confined" or suffer from a condition such that transport by means other than ambulance is contraindicated by the patient's condition  The following questions must be answered by the medical professional signing below for this form to be valid:    1)  Describe the MEDICAL CONDITION (physical and/or mental) of this patient AT [address] [place] [address] that requires the patient to be transported in an ambulance and why transport by other means is contraindicated by the patient's condition: confusion at times, 4 L oxygen, max assistance to ambulate, severe pain, DVT, gangrene of right foot    2) Is the patient "bed confined" as defined below? Yes  To be "be confined" the patient must satisfy all three of the following conditions: (1) unable to get up from bed without Assistance; AND (2) unable to ambulate; AND (3) unable to sit in a chair or wheelchair  3) Can this patient safely be transported by car or wheelchair van (i e , seated during transport without a medical attendant or monitoring)?    No    4) In addition to completing questions 1-3 above, please check any of the following conditions that apply*:   *Note: supporting documentation for any boxes checked must be maintained in the patient's medical records  If hosp-hosp transfer, describe services needed at 2nd facility not available at 1st facility? Patient is confused  Moderate/severe pain on movement   DVT requires elevation of lower extremity   Medical attendant required   Requires oxygen-unable to self administer  Unable to tolerate seated position for time needed to transport   Other(specify) fall risk      Section III - Signature of Physician or Healthcare Professional  I certify that the above information is true and correct based on my evaluation of this patient, and represent that the patient requires transport by ambulance and that other forms of transport are contraindicated  I understand that this information will be used by the Centers for Medicare and Medicaid Services (CMS) to support the determination of medical necessity for ambulance services, and I represent that I have personal knowledge of the patient's condition at time of transport  []  If this box is checked, I also certify that the patient is physically or mentally incapable of signing the ambulance service's claim and that the institution with which I am affiliated has furnished care, services, or assistance to the patient  My signature below is made on behalf of the patient pursuant to 42 CFR §424 36(b)(4)  In accordance with 42 CFR §424 37, the specific reason(s) that the patient is physically or mentally incapable of signing the claim form is as follows:  Grace Mullen of Physician* or [address] [place] [address]_________________________________________  Signature Date 02/03/21 (For scheduled repetitive transports, this form is not valid for transports performed more than 60 days after this date)    Printed Name & Credentials of Physician or Healthcare Professional (DO JT, RN, etc )__Darrell DELGADO ______________________________  *Form must be signed by patient's attending physician for scheduled, repetitive transports   For non-repetitive, unscheduled ambulance transports, if unable to obtain the signature of the attending physician, any of the following may sign (choose appropriate option below)  [] Physician Assistant []  Clinical Nurse Specialist []  Registered Nurse  []  Nurse Practitioner  [] Discharge Planner

## 2021-02-03 NOTE — PROGRESS NOTES
NEPHROLOGY PROGRESS NOTE   Malka Jaimes 80 y o  female MRN: [id number]  Unit/Bed#: E5 -01 Encounter: [id number]      ASSESSMENT/PLAN:  Acute kidney injury (POA) on chronic kidney disease, stage IV:  - etiology suspect multifactorial secondary to infection, diuretic use, progression of underlying kidney disease with history of atrophic left kidney  - upon review of medical records, baseline creatinine 1 8-2 0 going back to early 2020    - peak creatinine thus far 3 70 mg/dL on 01/19   - most recent creatinine 2 32 mg/dL today  - status post furosemide 40 mg IV x 1 on 2/1     - p r n  Furosemide IV as needed  - UA revealed large blood, large leukocytes, +2 protein, RBCs, innumerable wbc's, innumerable bacteria  - avoid NSAIDs, nephrotoxic agents, IV contrast   - adjust medications to appropriate GFR  - monitor volume status with strict intake/output, daily weight  - will check BMP, magnesium, phosphorus in a m      Right-sided hydronephrosis:  - with right ureteral stent placement in December 2020 at [facility]  - renal ultrasound revealed bilateral renal atrophy, more severe on the left along with mild right hydronephrosis with ureteral stent in place  - continued with urinary Scott catheter      H/H, anemia likely of chronic disease:  - most recent hemoglobin 9 1 grams/deciliter  - status post PRBC transfusion this admission   - iron panel revealed iron saturation 9%, ferritin 215    - goal hemoglobin greater than 8 grams/deciliter  - recommend PRBC transfusion for hemoglobin less than 7       Blood pressure, hypertension:  - blood pressure with fluctuations, elevations  - outpatient regimen includes: Amlodipine 5 mg daily, carvedilol 12 5 mg b i d , hydralazine 10 mg t i d , Isordil 10 mg t i d , furosemide 40 mg daily    - currently on:  Amlodipine 10 mg daily, carvedilol 12 5 mg b i d , hydralazine 25 mg t i d , Isordil 10 mg t i d   - recommendations: will further increase hydralazine today as blood pressure remains elevated  - optimize hemodynamics; avoid hypotension and fluctuations of blood pressure   - maintain MAP > 65       Mineral bone disease of chronic kidney disease:  - hyperphosphatemia improved most recent phosphorus 4 0   - will continue to monitor PTH, magnesium, phosphorus as an outpatient      Volume status/CHF:  - most recent echocardiogram revealed EF of 50% with grade 2 diastolic dysfunction  - outpatient diuretics on hold  - p r n  Furosemide IV as needed      Other medical problems:  - lower extremity cellulitis, with dry gangrene:              - at current, no plan for angiogram                - podiatry following  SUBJECTIVE:  Patient sitting up in chair  Denies shortness or breath or chest pain  Denies nausea, vomiting or diarrhea      OBJECTIVE:  Current Weight: Weight - Scale: 75 1 kg (165 lb 9 1 oz)  Vitals:    02/03/21 0712   BP: 165/78   Pulse: 77   Resp: 19   Temp: (!) 97 °F (36 1 °C)   SpO2: 91%       Intake/Output Summary (Last 24 hours) at 2/3/2021 1104  Last data filed at 2/2/2021 2300  Gross per 24 hour   Intake --   Output 1025 ml   Net -1025 ml       General:  Not in any acute distress  Skin:  Warm, dry, no rash  Eyes:  Sclerae anicteric  ENT:  Dry mucous membranes  Neck:  Supple, trachea midline  Chest:  Diminished left breath sounds  CVS:  Regular rate regular rhythm  Abdomen:  Soft, nontender, Scott catheter in place with cloudy drainage  Extremities: lower extremities wrapped, dry- right upper extremity edema present  Neuro:  Awake, alert  Psych: Cooperative      Medications:  Scheduled Meds:  Current Facility-Administered Medications   Medication Dose Route Frequency Provider Last Rate    acetaminophen  650 mg Oral Q6H PRN Harsha Shook DO      amLODIPine  10 mg Oral Daily OTIS Olmstead      carvedilol  12 5 mg Oral BID With Meals OTIS Olmstead      collagenase   Topical Daily Misha Crews MD      hydrALAZINE  25 mg Oral [facility] OTIS Meléndez      HYDROmorphone  1 mg Intravenous Q4H PRN [place] LAINA Tang      isosorbide dinitrate  10 mg Oral TID after meals Harsha Shook, DO      levothyroxine  25 mcg Oral Early Morning Harsha Shook, DO      ondansetron  4 mg Intravenous Q6H PRN Madeleine Dikes, DO      oxyCODONE  2 5 mg Oral Q4H PRN Darlyn Abraham PA-C      oxyCODONE  5 mg Oral Q4H PRN Arabella Abraham PA-C      potassium chloride  20 mEq Oral Once Prasanna Whtifield MD      saccharomyces boulardii  250 mg Oral BID Madeleine Dikes, DO      warfarin  5 mg Oral Once (warfarin) Neeta Jasso MD         PRN Meds:   acetaminophen    HYDROmorphone    ondansetron    oxyCODONE    oxyCODONE    Continuous Infusions:     Laboratory Results:  Results from last 7 days   Lab Units 02/03/21  0513 02/02/21  0503 02/01/21  0911 02/01/21  0650 01/31/21  0516 01/30/21  1524 01/30/21  0501 01/29/21  0438 01/28/21  0636   WBC Thousand/uL 6 05  --   --  5 70 6 40 6 39  --   --   --    HEMOGLOBIN g/dL 9 1*  --   --  8 2* 8 3* 8 0*  --   --   --    HEMATOCRIT % 30 1*  --   --  27 6* 27 1* 26 0*  --   --   --    PLATELETS Thousands/uL 280  --   --  257 288 272  --   --   --    SODIUM mmol/L 137 137 135*  --  137  --  135* 138 135*   POTASSIUM mmol/L 3 7 3 8 3 5  --  3 5  --  4 3 3 3* 3 5   CHLORIDE mmol/L 103 102 101  --  101  --  100 101 99*   CO2 mmol/L 27 27 27  --  27  --  26 25 26   BUN mg/dL 59* 64* 68*  --  79*  --  83* 88* 92*   CREATININE mg/dL 2 32* 2 39* 2 47*  --  2 72*  --  2 76* 2 82* 2 72*   CALCIUM mg/dL 8 6 8 3 8 0*  --  8 0*  --  8 5 8 5 8 4   MAGNESIUM mg/dL  --  1 9 1 8  --  2 0  --  2 2  --  2 3   PHOSPHORUS mg/dL  --  4 0  --   --   --   --  5 3*  --   --

## 2021-02-03 NOTE — DISCHARGE SUMMARY
Discharge Summary Nayana Gay, 5/18/1930, [id number]        Admission Date: 1/18/2021  Discharge Date:  2/3/2021      Discharge Diagnosis:   1  Left calf cellulitis  2  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, right foot and right tibial ulcer  3  Peripheral vascular disease  4  Acute kidney injury superimposed on chronic kidney disease stage 3  5  Hypertension  6  Chronic diastolic congestive heart failure with pleural effusions  7  Chronic hypoxic respiratory failure  8  Acute DVT, right calf, November 2020  9  Anemia secondary to iron deficiency plus chronic kidney disease  10  Cognitive dysfunction likely impart metabolic encephalopathy secondary to the above issues, improved     Consulting Physicians:  1  Dr Adalberto Puente, podiatry  2  Dr Katelyn Deluca, nephrology  3  Dr Severa Barefoot, vascular surgery  4  Dr Bhupendra Grimm, neuropsychology  5  Dr Andrae Lemos, urology  6  Dr Chris Graves, interventional Radiology    Procedures Performed:   None    HPI:  The patient is a 80-year-old woman who came to the hospital because of swelling and redness in her left leg  This started about 10 days or so before admission  The last several days she has not been able to walk because of severe left leg pain  Patient has multiple confounding pre-existing medical conditions  She was admitted for further evaluation and care of cellulitis  Hospital Course: The patient was admitted to the hospital and was started on antibiotics  She was noted to have left leg cellulitis and to have dry gangrene of the right foot and a pretibial ulcer on the right calf she was known to have severe peripheral vascular disease  She was seen by Podiatry and vascular surgery in regards to this  The patient was noted to have acute kidney injury  She was seen by Nephrology  Her creatinine was monitored carefully indeed gradually improved  The patient had a stent placed relatively recently  Because of this urology was consulted    They imaged stent to make sure that his placement was satisfactory  No urologic intervention was needed  The patient has a history of chronic diastolic heart failure was found to have pleural effusions  She was not aggressively diuresed because of her kidney dysfunction  She remained stable on her usual dose of oxygen during her stay  Vascular surgery recommended angiography and possible revascularization in an effort to help heal the patient's leg wounds  Initially this was deferred because of her kidney function  This was discussed on a number of occasions with the patient and also with her   Initially the patient said she did not want this done  Her capacity to make this decision was questionable and she was evaluated by neuropsychology  It was felt that the time of their evaluation that the patient did not have capacity to make her own medical decisions  However, I believe that her mental status improved during her stay  On the last several days of her hospitalization I discussed these issues with her in detail and she seemed to have an adequate grasp of the concepts involved  She told me that she was in old woman and that she did not want any invasive procedures done  She seemed to understand that her prognosis is poor  I discussed this information with her  and he agreed that considering her multitude of problems that a successful outcome seems extremely unlikely  The patient has been on warfarin for treatment of a deep vein thrombosis  This was interrupted because of the potential of invasive procedures  She was treated with heparin  When she definitively decided against any invasive procedures, warfarin was resumed  Considering the patient's functional deterioration as result of her multiple medical problems, it was thought that she needed to go to a facility for at least short-term rehab  The patient and her  were both agreeable to this      On the day of discharge the patient said she felt reasonably well  Vital signs were stable  Lungs were clear with diminished breath sounds at the bases  Cardiac examination revealed a regular rhythm  The abdomen was soft and nontender  There was no edema  Disposition:  The patient was transferred to [facility] on February 3rd  Diet and activity will be as tolerated  She will be under the care of the physicians there during her stay  Discharge instructions/Information to patient and family:   See after visit summary for information provided to patient and family  Provisions for Follow-Up Care:  See after visit summary for information related to follow-up care and any pertinent home health orders  Planned Readmission: No    Discharge Statement   I spent 45 minutes discharging the patient  This time was spent on the day of discharge  I had direct contact with the patient on the day of discharge  Discharge Medications:  See after visit summary for reconciled discharge medications provided to patient and family

## 2021-02-03 NOTE — PROGRESS NOTES
Progress Note - Vascular Surgery   Milka He 80 y o  female MRN: [id number]  Unit/Bed#: E5 -01 Encounter: [id number]    Assessment:  80year old female with CKD, hydronephrosis, HTN, CHF, pleural effusions, PAD with R 1/4/5th toe gangrene     LEADS 12/29/2020 with occlusion vs  High grade stenosis of proximal thigh portion of superficial femoral artery with distal reconstitution suggesting tibioperoneal disease    She has been evaluated by nephrology for LILIAN with peak Cr 3 7 and is not yet cleared per nephrology for angiogram     She has been evaluated by neuropsychology 1/20 and determined she lacks capacity to make fully informed medical decisions      Plan:  · Patient currently declines angiogram or further interventional procedures, however medical decision making is up to her    · Discussed with patient that it is unlikely the wounds on her feet will heal without intervention   · She would like to return home, disposition per primary team       Subjective/Objective     Subjective: No acute overnight events  Patient is sitting up in chair and asking appropriate questions regarding treatment plan, procedures and potential outcomes  She declines interventional surgeries or procedures at this time and reports that her  feels differently and does want her to have further medical procedures completed    Objective:     Blood pressure 124/60, pulse 68, temperature (!) 97 °F (36 1 °C), temperature source Temporal, resp  rate 19, weight 75 1 kg (165 lb 9 1 oz), SpO2 (!) 82 %  ,Body mass index is 26 72 kg/m²        Intake/Output Summary (Last 24 hours) at 2/3/2021 1606  Last data filed at 2/2/2021 2300  Gross per 24 hour   Intake --   Output 450 ml   Net -450 ml       Invasive Devices     Peripheral Intravenous Line            Peripheral IV 02/01/21 Left;Ventral (anterior) Hand 2 days          Drain            Urethral Catheter Non-latex 18 Fr  5 days                Physical Exam: General appearance: alert and oriented, in no acute distress and sitting upright in chair    RLE dressing c/d/i     Lab, Imaging and other studies:  CBC:   Lab Results   Component Value Date    WBC 6 05 02/03/2021    HGB 9 1 (L) 02/03/2021    HCT 30 1 (L) 02/03/2021    MCV 92 02/03/2021     02/03/2021    MCH 27 7 02/03/2021    MCHC 30 2 (L) 02/03/2021    RDW 17 4 (H) 02/03/2021    MPV 10 4 02/03/2021    NRBC 0 02/03/2021   , CMP:   Lab Results   Component Value Date    SODIUM 137 02/03/2021    K 3 7 02/03/2021     02/03/2021    CO2 27 02/03/2021    BUN 59 (H) 02/03/2021    CREATININE 2 32 (H) 02/03/2021    CALCIUM 8 6 02/03/2021    EGFR 18 02/03/2021     VTE Pharmacologic Prophylaxis: Heparin  VTE Mechanical Prophylaxis: sequential compression device

## 2021-02-03 NOTE — WOUND OSTOMY CARE
Progress Note - Wound   Kelly Sparrow 80 y o  female MRN: [id number]  Unit/Bed#: E5 -01 Encounter: [id number]      Assessment:  Patient is seen for weekly follow up assessment of the skin   The patient is a 80year old female that is out of bed in the chair   Placed patient on a EHOB cushion due to the patient having a sacral wound   Patient is on the P- 500 low air loss mattress   Scott catheter in place   No incontinence of stool at the time of the assessment   Wedges in the room for offloading   Max  A of 2 for standing with the walker   Patient is very weak and limited tolerance to stand   Assessment Findings   1  Lower extremity wounds being followed by podiatry and vascular team   2  Sacral - evolving deep tissue injury that is evolving and has 100% yellow slough in the wound bed   Small serous drainage   No induration or foul drainage noted   Blanchable pink javed wound   Wound care plans:  1-Sacrum--cleanse with soap and water, pat dry  Apply Santyl to wound bed in nickel thick layer and cover with Allevyn Life foam dressing  Change dressing daily and PRN with soilage  2-P500 low air loss mattress  3-Elevate heels off of bed surface to offload pressure  4-Ehob cushion when out of bed  5-Turn/repoisiton q2h or when medically stable for pressure re-distribution on skin--use positioning wedges  6-Moisturize skin daily with skin nourishing cream   7-Follow podiatry orders for lower extremity care            Objective:    Vitals: Blood pressure 124/60, pulse 68, temperature (!) 97 °F (36 1 °C), temperature source Temporal, resp  rate 19, weight 75 1 kg (165 lb 9 1 oz), SpO2 (!) 82 %  ,Body mass index is 26 72 kg/m²  Wound 12/30/20 Pressure Injury Sacrum (Active)   Wound Image   02/03/21 1009   Wound Description Fragile;Slough 02/03/21 1009   Pressure Injury Stage DTPI 02/03/21 1009   Javed-wound Assessment Clean;Dry; Intact 02/03/21 1009   Wound Length (cm) 2 3 cm 02/03/21 1009   Wound Width (cm) 2 cm 02/03/21 1009   Wound Depth (cm) 0 1 cm 01/25/21 1353   Wound Surface Area (cm^2) 4 6 cm^2 02/03/21 1009   Wound Volume (cm^3) 1 8 cm^3 01/25/21 1353   Calculated Wound Volume (cm^3) 1 8 cm^3 01/25/21 1353   Change in Wound Size % -373 68 01/25/21 1353   Drainage Amount Small 02/03/21 1009   Drainage Description Serous 02/03/21 1009   Treatments Cleansed;Irrigation with NSS 02/03/21 1009   Dressing Foam, Silicon (eg  Allevyn, etc); Other (Comment) 02/03/21 1009   Dressing Changed Changed 02/03/21 1009   Patient Tolerance Tolerated well 02/03/21 1009   Dressing Status Clean;Dry; Intact 02/03/21 0820       Wound 12/28/20 Venous Ulcer Pretibial Right (Active)   Wound Description CARL 02/02/21 2300   Pressure Injury Stage 2 02/01/21 1100   Corazon-wound Assessment Dry; Intact; Pink 02/01/21 1100   Wound Length (cm) 4 5 cm 01/18/21 1346   Wound Width (cm) 2 cm 01/18/21 1346   Wound Surface Area (cm^2) 9 cm^2 01/18/21 1346   Drainage Amount None 02/01/21 1100   Drainage Description Yellow;Serosanguineous 02/01/21 1100   Treatments Cleansed 02/01/21 1100   Dressing Non adherent 02/01/21 1100   Dressing Changed Changed 01/30/21 1403   Patient Tolerance Tolerated well 01/30/21 1403   Dressing Status Clean;Dry; Intact 02/02/21 2300       Wound 12/28/20 Other (comment) Toe (Comment  which one) Anterior;Right (Active)   Wound Description Black; Brown 01/31/21 0713   Pressure Injury Stage U 01/27/21 0756   Corazon-wound Assessment Dry; Intact; Pink 01/31/21 0713   Drainage Amount None 02/01/21 1100   Treatments Cleansed;Site care 01/30/21 1403   Dressing Open to air; Other (Comment) 01/31/21 0713   Patient Tolerance Tolerated well 01/30/21 1403       Wound 12/28/20 Other (comment) Foot Anterior;Right (Active)   Wound Description Black; Brown 02/01/21 1100   Pressure Injury Stage U 02/01/21 1100   Corazon-wound Assessment [place] 02/01/21 1100   Drainage Amount None 01/31/21 0713   Treatments Cleansed;Site care 01/30/21 1403   Dressing Open to air 01/31/21 0713   Dressing Changed Changed 01/23/21 1300   Patient Tolerance Tolerated well 01/30/21 1403   Dressing Status Clean;Dry; Intact 02/01/21 1100       Wound 12/28/20 Pressure Injury Heel Right (Active)   Wound Description Black;Dry; Intact 01/31/21 0713   Pressure Injury Stage U 01/27/21 0756   Corazon-wound Assessment Clean;Dry; Intact; Pink 01/31/21 0713   Wound Length (cm) 2 5 cm 01/18/21 1248   Wound Width (cm) 2 5 cm 01/18/21 1248   Wound Surface Area (cm^2) 6 25 cm^2 01/18/21 1248   Drainage Amount None 01/31/21 0713   Treatments Cleansed;Site care 01/30/21 1403   Dressing Other (Comment) 01/31/21 0713   Dressing Changed Changed 01/27/21 0756   Patient Tolerance Tolerated well 01/30/21 1403   Dressing Status Clean;Dry; Intact 01/30/21 1930       Wound Description CARL 02/03/21 0820   Corazon-wound Assessment CARL 02/03/21 0820   Drainage Amount None 01/31/21 0713   Drainage Description Serosanguineous 02/01/21 1100   Treatments Cleansed 02/01/21 1100   Dressing ABD;Dry dressing 02/01/21 1100   Dressing Changed Changed 01/30/21 1403   Patient Tolerance Tolerated well 01/30/21 1403   Dressing Status Clean;Dry; Intact 02/03/21 0820       Wound care will follow weekly call or tiger text with questions or concerns       Jyoti Olsen RN BSN CWOCN

## 2021-02-03 NOTE — PROGRESS NOTES
Vascular Surgery Note    Pt is a 79 yo F w/ CKD, hydronephrosis, HTN, CHF, PAD and R shin wound, R heel wound, R toe gangrene, L posterior leg wound, sacral decub  Was planned for angiogram but this was postponed for respiratory issues and need for thoracentesis prior to procedure  Patient currently refusing angiogram and thoracentesis  Question of competency when evaluated earlier in her hospitalization  Patient wishes to return home with her       Cr: 2 32  WBC: 6 0  Hgb: 9 1    LEADs:  R: 0 46/-/- and L: 0 71/-/- w/ R popliteal occlusion and SFA stenosis and L SFA occlusion    -patient with nonhealing wounds and significant PAD  -patient does not want intervention at this time and was clear/oriented on my discussion with her today  -appreciate further input from palliative care regarding patient competency and goals of care; pt stating she wishes to return home with home care but this may not be possible given her limited mobility and her husbands own medical issues/frailty

## 2021-02-03 NOTE — PLAN OF CARE
Problem: Potential for Falls  Goal: Patient will remain free of falls  Description: INTERVENTIONS:  - Assess patient frequently for physical needs  -  Identify cognitive and physical deficits and behaviors that affect risk of falls  -  [place] fall precautions as indicated by assessment   - Educate patient/family on patient safety including physical limitations  - Instruct patient to call for assistance with activity based on assessment  - Modify environment to reduce risk of injury  - Consider OT/PT consult to assist with strengthening/mobility  Outcome: Progressing     Problem: Prexisting or High Potential for Compromised Skin Integrity  Goal: Skin integrity is maintained or improved  Description: INTERVENTIONS:  - Identify patients at risk for skin breakdown  - Assess and monitor skin integrity  - Assess and monitor nutrition and hydration status  - Monitor labs   - Assess for incontinence   - Turn and reposition patient  - Assist with mobility/ambulation  - Relieve pressure over bony prominences  - Avoid friction and shearing  - Provide appropriate hygiene as needed including keeping skin clean and dry  - Evaluate need for skin moisturizer/barrier cream  - Collaborate with interdisciplinary team   - Patient/family teaching  - Consider wound care consult   Outcome: Progressing     Problem: Nutrition/Hydration-ADULT  Goal: Nutrient/Hydration intake appropriate for improving, restoring or maintaining nutritional needs  Description: Monitor and assess patient's nutrition/hydration status for malnutrition  Collaborate with interdisciplinary team and initiate plan and interventions as ordered  Monitor patient's weight and dietary intake as ordered or per policy  Utilize nutrition screening tool and intervene as necessary  Determine patient's food preferences and provide high-protein, high-caloric foods as appropriate       INTERVENTIONS:  - Monitor oral intake, urinary output, labs, and treatment plans  - Assess nutrition and hydration status and recommend course of action  - Evaluate amount of meals eaten  - Assist patient with eating if necessary   - Allow adequate time for meals  - Recommend/ encourage appropriate diets, oral nutritional supplements, and vitamin/mineral supplements  - Order, calculate, and assess calorie counts as needed  - Recommend, monitor, and adjust tube feedings and TPN/PPN based on assessed needs  - Assess need for intravenous fluids  - Provide specific nutrition/hydration education as appropriate  - Include patient/family/caregiver in decisions related to nutrition  Outcome: Progressing     Problem: PAIN - ADULT  Goal: Verbalizes/displays adequate comfort level or baseline comfort level  Description: Interventions:  - Encourage patient to monitor pain and request assistance  - Assess pain using appropriate pain scale  - Administer analgesics based on type and severity of pain and evaluate response  - Implement non-pharmacological measures as appropriate and evaluate response  - Consider cultural and social influences on pain and pain management  - Notify physician/advanced practitioner if interventions unsuccessful or patient reports new pain  Outcome: Progressing     Problem: INFECTION - ADULT  Goal: Absence or prevention of progression during hospitalization  Description: INTERVENTIONS:  - Assess and monitor for signs and symptoms of infection  - Monitor lab/diagnostic results  - Monitor all insertion sites, i e  indwelling lines, tubes, and drains  - Monitor endotracheal if appropriate and nasal secretions for changes in amount and color  - Baxter appropriate cooling/warming therapies per order  - Administer medications as ordered  - Instruct and encourage patient and family to use good hand hygiene technique  - Identify and instruct in appropriate isolation precautions for identified infection/condition  Outcome: Progressing  Goal: Absence of fever/infection during neutropenic period  Description: INTERVENTIONS:  - Monitor WBC    Outcome: Progressing     Problem: SAFETY ADULT  Goal: Patient will remain free of falls  Description: INTERVENTIONS:  - Assess patient frequently for physical needs  -  Identify cognitive and physical deficits and behaviors that affect risk of falls    -  [place] fall precautions as indicated by assessment   - Educate patient/family on patient safety including physical limitations  - Instruct patient to call for assistance with activity based on assessment  - Modify environment to reduce risk of injury  - Consider OT/PT consult to assist with strengthening/mobility  Outcome: Progressing  Goal: Maintain or return to baseline ADL function  Description: INTERVENTIONS:  -  Assess patient's ability to carry out ADLs; assess patient's baseline for ADL function and identify physical deficits which impact ability to perform ADLs (bathing, care of mouth/teeth, toileting, grooming, dressing, etc )  - Assess/evaluate cause of self-care deficits   - Assess range of motion  - Assess patient's mobility; develop plan if impaired  - Assess patient's need for assistive devices and provide as appropriate  - Encourage maximum independence but intervene and supervise when necessary  - Involve family in performance of ADLs  - Assess for home care needs following discharge   - Consider OT consult to assist with ADL evaluation and planning for discharge  - Provide patient education as appropriate  Outcome: Progressing  Goal: Maintain or return mobility status to optimal level  Description: INTERVENTIONS:  - Assess patient's baseline mobility status (ambulation, transfers, stairs, etc )    - Identify cognitive and physical deficits and behaviors that affect mobility  - Identify mobility aids required to assist with transfers and/or ambulation (gait belt, sit-to-stand, lift, walker, cane, etc )  - [place] fall precautions as indicated by assessment  - Record patient progress and toleration of activity level on Mobility SBAR; progress patient to next Phase/Stage  - Instruct patient to call for assistance with activity based on assessment  - Consider rehabilitation consult to assist with strengthening/weightbearing, etc   Outcome: Progressing     Problem: DISCHARGE PLANNING  Goal: Discharge to home or other facility with appropriate resources  Description: INTERVENTIONS:  - Identify barriers to discharge w/patient and caregiver  - Arrange for needed discharge resources and transportation as appropriate  - Identify discharge learning needs (meds, wound care, etc )  - Arrange for interpretive services to assist at discharge as needed  - Refer to Case Management Department for coordinating discharge planning if the patient needs post-hospital services based on physician/advanced practitioner order or complex needs related to functional status, cognitive ability, or social support system  Outcome: Progressing     Problem: Knowledge Deficit  Goal: Patient/family/caregiver demonstrates understanding of disease process, treatment plan, medications, and discharge instructions  Description: Complete learning assessment and assess knowledge base    Interventions:  - Provide teaching at level of understanding  - Provide teaching via preferred learning methods  Outcome: Progressing     Problem: GENITOURINARY - ADULT  Goal: Maintains or returns to baseline urinary function  Description: INTERVENTIONS:  - Assess urinary function  - Encourage oral fluids to ensure adequate hydration if ordered  - Administer IV fluids as ordered to ensure adequate hydration  - Administer ordered medications as needed  - Offer frequent toileting  - Follow urinary retention protocol if ordered  Outcome: Progressing  Goal: Absence of urinary retention  Description: INTERVENTIONS:  - Assess patients ability to void and empty bladder  - Monitor I/O  - Bladder scan as needed  - Discuss with physician/AP medications to alleviate retention as needed  - Discuss catheterization for long term situations as appropriate  Outcome: Progressing  Goal: Urinary catheter remains patent  Description: INTERVENTIONS:  - Assess patency of urinary catheter  - If patient has a chronic chadwick, consider changing catheter if non-functioning  - Follow guidelines for intermittent irrigation of non-functioning urinary catheter  Outcome: Progressing     Problem: RESPIRATORY - ADULT  Goal: Achieves optimal ventilation and oxygenation  Description: INTERVENTIONS:  - Assess for changes in respiratory status  - Assess for changes in mentation and behavior  - Position to facilitate oxygenation and minimize respiratory effort  - Oxygen administered by appropriate delivery if ordered  - Initiate smoking cessation education as indicated  - Encourage broncho-pulmonary hygiene including cough, deep breathe, Incentive Spirometry  - Assess the need for suctioning and aspirate as needed  - Assess and instruct to report SOB or any respiratory difficulty  - Respiratory Therapy support as indicated  Outcome: Progressing     Problem: METABOLIC, FLUID AND ELECTROLYTES - ADULT  Goal: Electrolytes maintained within normal limits  Description: INTERVENTIONS:  - Monitor labs and assess patient for signs and symptoms of electrolyte imbalances  - Administer electrolyte replacement as ordered  - Monitor response to electrolyte replacements, including repeat lab results as appropriate  - Instruct patient on fluid and nutrition as appropriate  Outcome: Progressing  Goal: Fluid balance maintained  Description: INTERVENTIONS:  - Monitor labs   - Monitor I/O and WT  - Instruct patient on fluid and nutrition as appropriate  - Assess for signs & symptoms of volume excess or deficit  Outcome: Progressing     Problem: SKIN/TISSUE INTEGRITY - ADULT  Goal: Skin integrity remains intact  Description: INTERVENTIONS  - Identify patients at risk for skin breakdown  - Assess and monitor skin integrity  - Assess and monitor nutrition and hydration status  - Monitor labs (i e  albumin)  - Assess for incontinence   - Turn and reposition patient  - Assist with mobility/ambulation  - Relieve pressure over bony prominences  - Avoid friction and shearing  - Provide appropriate hygiene as needed including keeping skin clean and dry  - Evaluate need for skin moisturizer/barrier cream  - Collaborate with interdisciplinary team (i e  Nutrition, Rehabilitation, etc )   - Patient/family teaching  Outcome: Progressing  Goal: Incision(s), wounds(s) or drain site(s) healing without S/S of infection  Description: INTERVENTIONS  - Assess and document risk factors for skin impairment   - Assess and document dressing, incision, wound bed, drain sites and surrounding tissue  - Consider nutrition services referral as needed  - Oral mucous membranes remain intact  - Provide patient/ family education  Outcome: Progressing     Problem: HEMATOLOGIC - ADULT  Goal: Maintains hematologic stability  Description: INTERVENTIONS  - Assess for signs and symptoms of bleeding or hemorrhage  - Monitor labs  - Administer supportive blood products/factors as ordered and appropriate  Outcome: Progressing

## 2021-02-03 NOTE — CASE MANAGEMENT
CM attempted to reach Alex Prince at [facility] [phone] to discuss what conditions patient can return home  Unable to reach [address]  or leave voicemail, mailbox was full  Patient is refusing treatment, but states she is not ready for hospice  Spouse unable to decide course of medical treatment  SNF referrals made to [facility]  Spouse is refusing Shaina  CM will continue to follow  Update:    CM met with patient discussed rehab  Patient states she will go to [facility]  CM sent message to [facility] in [facility] requesting if bed still available and asked for NPI numbers to start auth  Update:    Patient has Medicare part A, no auth needed for STR  Phoebe asking for COVID swab  Patient will BLS transport   time 6:30 pm  CM made patient's spouse aware of the plan

## 2021-02-05 ENCOUNTER — TELEPHONE (OUTPATIENT)
Dept: NEPHROLOGY | Facility: CLINIC | Age: 86
End: 2021-02-05

## 2021-02-05 DIAGNOSIS — N18.9 ACUTE KIDNEY INJURY SUPERIMPOSED ON CHRONIC KIDNEY DISEASE (HCC): Primary | ICD-10-CM

## 2021-02-05 DIAGNOSIS — N13.30 HYDRONEPHROSIS OF RIGHT KIDNEY: ICD-10-CM

## 2021-02-05 DIAGNOSIS — N17.9 ACUTE KIDNEY INJURY SUPERIMPOSED ON CHRONIC KIDNEY DISEASE (HCC): Primary | ICD-10-CM

## 2021-02-05 NOTE — TELEPHONE ENCOUNTER
Tried calling Son in [facility] to arrange for a hospital follow up, could not get ahold of a nurse  BMP was added to the chart

## 2021-02-05 NOTE — TELEPHONE ENCOUNTER
----- Message from Marthe Leventhal, DO sent at 2/4/2021  8:27 AM EST -----  Regarding: [facility] hospital f/u appt  Patient has been discharged from [facility] and placed on Epic [facility] list  Please schedule hospital follow up for [facility]IL[facility] in 1 week with first available provider  Patient should have BMP prior to office visit which needs to be ordered  She is at [facility]

## 2021-02-08 DIAGNOSIS — I82.461 ACUTE DEEP VEIN THROMBOSIS (DVT) OF CALF MUSCLE VEIN OF RIGHT LOWER EXTREMITY (HCC): Primary | ICD-10-CM

## 2021-02-08 NOTE — TELEPHONE ENCOUNTER
I spoke to a nurse and arranged for the patient's hospital follow up appointment  I faxed an order for BMP, Mag, Phos to Son

## 2021-02-12 ENCOUNTER — TELEPHONE (OUTPATIENT)
Dept: FAMILY MEDICINE CLINIC | Facility: CLINIC | Age: 86
End: 2021-02-12

## 2021-02-17 ENCOUNTER — TELEMEDICINE (OUTPATIENT)
Dept: NEPHROLOGY | Facility: HOSPITAL | Age: 86
End: 2021-02-17
Payer: COMMERCIAL

## 2021-02-17 DIAGNOSIS — I10 ESSENTIAL HYPERTENSION: ICD-10-CM

## 2021-02-17 DIAGNOSIS — I50.32 CHRONIC DIASTOLIC HEART FAILURE (HCC): Primary | ICD-10-CM

## 2021-02-17 DIAGNOSIS — T14.8XXA WOUND INFECTION: ICD-10-CM

## 2021-02-17 DIAGNOSIS — N18.9 ACUTE KIDNEY INJURY SUPERIMPOSED ON CHRONIC KIDNEY DISEASE (HCC): ICD-10-CM

## 2021-02-17 DIAGNOSIS — N17.9 ACUTE KIDNEY INJURY SUPERIMPOSED ON CHRONIC KIDNEY DISEASE (HCC): ICD-10-CM

## 2021-02-17 DIAGNOSIS — I73.9 PAD (PERIPHERAL ARTERY DISEASE) (HCC): ICD-10-CM

## 2021-02-17 DIAGNOSIS — L08.9 WOUND INFECTION: ICD-10-CM

## 2021-02-17 DIAGNOSIS — N13.30 HYDRONEPHROSIS OF RIGHT KIDNEY: ICD-10-CM

## 2021-02-17 PROCEDURE — 99213 OFFICE O/P EST LOW 20 MIN: CPT | Performed by: NURSE PRACTITIONER

## 2021-02-17 NOTE — PROGRESS NOTES
Virtual Regular Visit      Assessment/Plan:    Problem List Items Addressed This Visit        Cardiovascular and Mediastinum    PAD (peripheral artery disease) ([facility] [address] )    Essential hypertension    Chronic diastolic heart failure ([facility] [address] ) - Primary       Genitourinary    Acute kidney injury superimposed on chronic kidney disease ([facility] [address] )    Hydronephrosis of right kidney       Other    Wound infection          Acute kidney injury (POA to hospital) on chronic kidney disease, stage IV:  - etiology of acute kidney injury suspect multifactorial secondary to infection, diuretic use, progression of underlying kidney disease with history of atrophic left kidney  - upon review of medical records, baseline creatinine 1 8-2 0 going back to early 2020    - peak creatinine during hospitalization, 3 70 mg/dL on 01/19    - most recent creatinine 2 41 mg/dL on 2/16/21    - creatinine appears to have stabilized around 2 4- 2 5 mg/dL post hospitalization                - UA revealed large blood, large leukocytes, +2 protein, RBCs, innumerable wbc's, innumerable bacteria  - imaging: CT scan completed on 1/20/21 revealed stent is present in the upper pole right renal collecting system with some residual hydronephrosis  Stent extends to the bladder at the ureterovesicular junction  The pigtail abuts the UVJ  Left kidney is small   - avoid NSAIDs, nephrotoxic agents  - encouraged adequate oral intake    - facility monitoring renal function closely; will tentatively have patient follow with [facility] or Dr Agnieszka Alejandro in 3 months       Right-sided hydronephrosis:  - with right ureteral stent placement in December 2020 at [facility]    - renal ultrasound revealed bilateral renal atrophy, more severe on the left along with mild right hydronephrosis with ureteral stent in place   - continued with urinary Scott catheter      H/H, anemia likely of chronic disease:  - most recent hemoglobin 8 4 grams/deciliter    - iron panel revealed iron saturation 9%, ferritin 215 during hospitalization     - goal hemoglobin greater than 8 grams/deciliter  - per RN, facility is aware and monitoring       Blood pressure, hypertension:  - blood pressure stable per facility  - currently on: carvedilol 12 5 mg b i d , hydralazine 50 mg t i d , Isordil 10 mg t i d   - recommendations: no changes for now  - provider at facility adjusting medications as needed       Min[place] bone disease of chronic kidney disease:  - most recent phosphorus 3 4   - will continue to monitor PTH, magnesium, phosphorus, vitamin d levels as an outpatient      Volume status/CHF:  - most recent echocardiogram revealed EF of 50% with grade 2 diastolic dysfunction  - off diuretics  - per RN and patient, bilateral lower extremities without edema, without shortness of breath       Other medical problems:  - lower extremity cellulitis, with dry gangrene:              - wishes not to pursue invasive measures  Reason for visit is No chief complaint on file  Encounter provider OTIS Handy    Provider located at [address] [place] [address] [address] [place] [address]      Recent Visits  No visits were found meeting these conditions  Showing recent visits within past 7 days and meeting all other requirements     Today's Visits  Date Type Provider Dept   02/17/21 Telemedicine Danica [place], CRNP [facility]   Showing today's visits and meeting all other requirements     Future Appointments  No visits were found meeting these conditions  Showing future appointments within next 150 days and meeting all other requirements        The patient was identified by name and date of birth  Lisa Mention was informed that this is a telemedicine visit and that the visit is being conducted through Telephone and patient was informed that this is not a secure, HIPAA-compliant platform  She agrees to proceed     My office door was closed   No one else was in the room  She acknowledged consent and understanding of privacy and security of the video platform  The patient has agreed to participate and understands they can discontinue the visit at any time  Patient is aware this is a billable service  Sesar Torres is a 80 y o  female who was called for hospital follow-up  I informed the patient about the limitations of using virtual visit platform and that my medical advice and physical exam would be limited as a consequence of this  Patient was informed that this was a billable visit and they are aware of it and wished to proceed  Patient recently hospitalized on 1/18/21 through 2/3/21 due to swelling and redness in her left leg  Subsequently, patient with an acute kidney injury on chronic kidney disease stage 4  Patient was provided intermittent diuresis as needed  Further, patient did not pursue angiography or revascularization per vascular recommendations to assist with the leg wounds as she does not wish to pursue invasive procedures  During conversation, patient has been doing well since discharge  Per patient, she is eating and drinking  She denies nausea, vomiting, diarrhea  She is without shortness of breath or edema  Patient is able to speak in complete sentences and is awake and interactive  Per RN, patient has been slightly off the last few days  Awaiting urine culture as patient may have UTI  Provider at facility aware  Further, have asked RN to have manager/supervisor call our office with plan for the patient so we can ensure she has adequate follow-up as well regarding her renal function  Past Medical History:   Diagnosis Date    Anemia 1/18/2021    Chronic kidney disease     Hypertension        History reviewed  No pertinent surgical history      Current Outpatient Medications   Medication Sig Dispense Refill    acetaminophen (TYLENOL) 325 mg tablet Take 2 tablets (650 mg total) by mouth every 6 (six) hours as needed for mild pain  0    carvedilol (COREG) 12 5 mg tablet Take 1 tablet (12 5 mg total) by mouth 2 (two) times a day with meals 60 tablet 0    collagenase (SANTYL) ointment Apply topically daily 15 g 0    ergocalciferol (VITAMIN D2) 50,000 units take 1 capsule by mouth every week ON SATURDAY      hydrALAZINE (APRESOLINE) 50 mg tablet Take 1 tablet (50 mg total) by mouth every 8 (eight) hours  0    isosorbide dinitrate (ISORDIL) 10 mg tablet Take 1 tablet (10 mg total) by mouth 3 (three) times daily after meals 90 tablet 0    levothyroxine 25 mcg tablet Take 1 tablet (25 mcg total) by mouth daily in the early morning 90 tablet 3    potassium chloride (K-DUR,KLOR-CON) 20 mEq tablet Take 1 tablet (20 mEq total) by mouth daily 30 tablet 0    saccharomyces boulardii (FLORASTOR) 250 mg capsule Take 1 capsule (250 mg total) by mouth 2 (two) times a day  0    senna (SENOKOT) 8 6 mg Take 1 tablet (8 6 mg total) by mouth daily at bedtime as needed for constipation 30 tablet 0     No current facility-administered medications for this visit  Allergies   Allergen Reactions    No Known Allergies        Review of Systems   Constitutional: Negative for activity change and appetite change  Respiratory: Negative for shortness of breath  Cardiovascular: Negative for chest pain and leg swelling  Gastrointestinal: Negative for diarrhea, nausea and vomiting  Genitourinary: Scott catheter   Psychiatric/Behavioral:        With possible UTI, RN states patient slightly "off"  Patient is awake and interactive and able to answer questions during telephone call  I spent 20 minutes directly with the patient during this visit      Karan Harris acknowledges that she has consented to an online visit or consultation   She understands that the online visit is based solely on information provided by her, and that, in the absence of a face-to-face physical evaluation by the physician, the diagnosis she receives is both limited and provisional in terms of accuracy and completeness  This is not intended to replace a full medical face-to-face evaluation by the physician  Roger Garvey understands and accepts these terms

## 2021-02-22 ENCOUNTER — TELEPHONE (OUTPATIENT)
Dept: FAMILY MEDICINE CLINIC | Facility: CLINIC | Age: 86
End: 2021-02-22

## 2021-02-22 NOTE — TELEPHONE ENCOUNTER
Stella Linda called again stating "all I need is a simple letter stating that my wife is capable of signing legal documents "       Please advise

## 2021-02-22 NOTE — TELEPHONE ENCOUNTER
Briseyda Heads called in and would like you to write a letter that states Lucinda Noe is  Mentally capable to sign documents

## 2021-02-23 ENCOUNTER — OFFICE VISIT (OUTPATIENT)
Dept: WOUND CARE | Facility: HOSPITAL | Age: 86
End: 2021-02-23
Payer: COMMERCIAL

## 2021-02-23 ENCOUNTER — TELEPHONE (OUTPATIENT)
Dept: FAMILY MEDICINE CLINIC | Facility: CLINIC | Age: 86
End: 2021-02-23

## 2021-02-23 VITALS — RESPIRATION RATE: 26 BRPM | HEART RATE: 66 BPM | TEMPERATURE: 97.6 F

## 2021-02-23 DIAGNOSIS — L98.499 ARTERIAL INSUFFICIENCY WITH ISCHEMIC ULCER (HCC): Primary | ICD-10-CM

## 2021-02-23 DIAGNOSIS — L97.909 ARTERIAL LEG ULCER (HCC): ICD-10-CM

## 2021-02-23 DIAGNOSIS — I77.1 ARTERIAL INSUFFICIENCY WITH ISCHEMIC ULCER (HCC): Primary | ICD-10-CM

## 2021-02-23 PROCEDURE — 99213 OFFICE O/P EST LOW 20 MIN: CPT | Performed by: PODIATRIST

## 2021-02-23 PROCEDURE — G0463 HOSPITAL OUTPT CLINIC VISIT: HCPCS | Performed by: PODIATRIST

## 2021-02-23 PROCEDURE — 11042 DBRDMT SUBQ TIS 1ST 20SQCM/<: CPT | Performed by: PODIATRIST

## 2021-02-23 PROCEDURE — 99214 OFFICE O/P EST MOD 30 MIN: CPT | Performed by: PODIATRIST

## 2021-02-23 RX ORDER — LIDOCAINE 40 MG/G
CREAM TOPICAL ONCE
Status: COMPLETED | OUTPATIENT
Start: 2021-02-23 | End: 2021-02-23

## 2021-02-23 RX ADMIN — LIDOCAINE 1 APPLICATION: 40 CREAM TOPICAL at 13:48

## 2021-02-23 NOTE — PATIENT INSTRUCTIONS
Orders Placed This Encounter   Procedures    Wound cleansing and dressings     Right heel/right 4th toe  Wash your hands with soap and water  Remove old dressing, discard into plastic bag and place in trash  Cleanse the wound with normal saline prior to applying a clean dressing  Do not use tissue or cotton balls  Do not scrub the wound  Pat dry using gauze  Shower no DO NOT GET DRESSING AND FOOT WET  Apply betadine to the right heel and right 4th wound  Cover with gauze  Secure with kerlix and tape  Change dressing 3X/week        Wash your hands with soap and water  Remove old dressing, discard into plastic bag and place in trash  Cleanse the wound with normal saline prior to applying a clean dressing  Do not use tissue or cotton balls  Do not scrub the wound  Pat dry using gauze  Shower no DO NOT GET DRESSING AND FOOT WET  Apply maxorb ag to the right and left leg wounds    Cover with ABD  Secure with kerlix and tape  Change dressing 3X/week     This treatment was performed in [facility]    Keep pressure off of wound sites  Elevate legs as much as you can tolerate     Standing Status:   Future     Standing Expiration Date:   2/23/2022

## 2021-02-23 NOTE — PROGRESS NOTES
Debridement   Wound Venous Ulcer Pretibial Anterior;Right    Universal Protocol:  Consent: Verbal consent obtained  Risks and benefits: risks, benefits and alternatives were discussed  Consent given by: patient  Time out: Immediately prior to procedure a "time out" was called to verify the correct patient, procedure, equipment, support staff and site/side marked as required  Timeout called at: 2/23/2021 1:00 PM   Patient understanding: patient states understanding of the procedure being performed  Patient identity confirmed: verbally with patient      Performed by: physician  Debridement type: surgical  Level of debridement: subcutaneous tissue  Pain control: lidocaine 4%  Pre-debridement measurements  Length (cm): 2  Width (cm): 1  Depth (cm): 0 1  Surface Area (cm^2): 2  Volume (cm^3): 0 2    Post-debridement measurements  Length (cm): 2  Width (cm): 1  Depth (cm): 0 1  Percent debrided: 100%  Surface Area (cm^2): 2  Area debrided (cm^2): 2  Volume (cm^3): 0 2  Tissue and other material debrided: adipose and subcutaneous tissue  Devitalized tissue debrided: biofilm and fibrin  Instrument(s) utilized: blade  Bleeding: small  Hemostasis obtained with: pressure  Procedural pain (0-10): 0  Post-procedural pain: 0   Response to treatment: procedure was tolerated well    Debridement   Wound 02/23/21 Arterial Ulcer Leg Left; Lower    Universal Protocol:  Consent: Verbal consent obtained  Risks and benefits: risks, benefits and alternatives were discussed  Consent given by: patient  Time out: Immediately prior to procedure a "time out" was called to verify the correct patient, procedure, equipment, support staff and site/side marked as required    Timeout called at: 2/23/2021 1:01 PM   Patient understanding: patient states understanding of the procedure being performed  Patient identity confirmed: verbally with patient      Performed by: physician  Debridement type: surgical  Level of debridement: subcutaneous tissue  Pain control: lidocaine 4%  Pre-debridement measurements  Length (cm): 1 5  Width (cm): 0 5  Depth (cm): 0 1  Surface Area (cm^2): 0 75  Volume (cm^3): 0 08    Post-debridement measurements  Length (cm): 1 5  Width (cm): 0 5  Depth (cm): 0 1  Percent debrided: 100%  Surface Area (cm^2): 0 75  Area debrided (cm^2): 0 75  Volume (cm^3): 0 08  Tissue and other material debrided: adipose and subcutaneous tissue  Devitalized tissue debrided: biofilm and fibrin  Instrument(s) utilized: blade  Bleeding: small  Hemostasis obtained with: pressure  Post-procedural pain: 0   Response to treatment: procedure was tolerated well        Patient ID: Piotr Martínez is a 80 y o  female Date of Birth 5/18/1930     My role is Foot, Ankle and Wound Specialist    Chief Complaint   Patient presents with   [facility] Patient Visit     BLE wounds, patient states they have been open about 3 months         Leg ulcers, foot wound  Subjective:   Juan Lizarraga is here for the first time with her  for management of her lower extremity wounds  She has a longstanding history of wounds and has PAD  She was told she needs an arterial intervention but she has refused that in the past  Her  is in disagreement with her on this and feels she should have the intervention  She presents very frail, on O2        The following portions of the patient's history were reviewed and updated as appropriate:   Patient Active Problem List   Diagnosis    Acute deep vein thrombosis (DVT) of calf muscle vein of right lower extremity (HCC)    Acute kidney injury superimposed on chronic kidney disease ([address] )    Hydronephrosis of right kidney    Elevated blood urea nitrogen    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 ([address] )    Nonhealing nonsurgical wound    Protein-calorie malnutrition ([address] )    Ulcer of right lower extremity with fat layer exposed ([address] )    Urinary retention    Wound infection    PAD (peripheral artery disease) ([address] )    Essential hypertension    CKD (chronic kidney disease)    History of DVT (deep vein thrombosis)    Acute diastolic congestive heart failure (HCC)    Cellulitis    Iron deficiency anemia secondary to inadequate dietary iron intake    Chronic diastolic heart failure (HCC)    Ambulatory dysfunction    Chronic hypoxemic respiratory failure (HCC)    Coagulopathy (HCC)    Arterial leg ulcer (HCC)     Past Medical History:   Diagnosis Date    Anemia 1/18/2021    Chronic kidney disease     Hypertension      History reviewed  No pertinent surgical history    Social History     Socioeconomic History    Marital status: /Civil Union     Spouse name: None    Number of children: None    Years of education: None    Highest education level: None   Occupational History    None   Social Needs    Financial resource strain: None    Food insecurity     Worry: None     Inability: None    Transportation needs     Medical: None     Non-medical: None   Tobacco Use    Smoking status: Never Smoker    Smokeless tobacco: Never Used   Substance and Sexual Activity    Alcohol use: Never     Frequency: Never    Drug use: Never    Sexual activity: None   Lifestyle    Physical activity     Days per week: None     Minutes per session: None    Stress: None   Relationships    Social connections     Talks on phone: None     Gets together: None     Attends [employer] service: None     Active member of club or organization: None     Attends meetings of clubs or organizations: None     Relationship status: None    Intimate partner violence     Fear of current or ex partner: None     Emotionally abused: None     Physically abused: None     Forced sexual activity: None   Other Topics Concern    None   Social History Narrative    None        Current Outpatient Medications:     acetaminophen (TYLENOL) 325 mg tablet, Take 2 tablets (650 mg total) by mouth every 6 (six) hours as needed for mild pain, Disp:  , Rfl: 0    carvedilol (COREG) 12 5 mg tablet, Take 1 tablet (12 5 mg total) by mouth 2 (two) times a day with meals, Disp: 60 tablet, Rfl: 0    collagenase (SANTYL) ointment, Apply topically daily, Disp: 15 g, Rfl: 0    ergocalciferol (VITAMIN D2) 50,000 units, take 1 capsule by mouth every week ON SATURDAY, Disp: , Rfl:     hydrALAZINE (APRESOLINE) 50 mg tablet, Take 1 tablet (50 mg total) by mouth every 8 (eight) hours, Disp:  , Rfl: 0    isosorbide dinitrate (ISORDIL) 10 mg tablet, Take 1 tablet (10 mg total) by mouth 3 (three) times daily after meals, Disp: 90 tablet, Rfl: 0    levothyroxine 25 mcg tablet, Take 1 tablet (25 mcg total) by mouth daily in the early morning, Disp: 90 tablet, Rfl: 3    potassium chloride (K-DUR,KLOR-CON) 20 mEq tablet, Take 1 tablet (20 mEq total) by mouth daily, Disp: 30 tablet, Rfl: 0    saccharomyces boulardii (FLORASTOR) 250 mg capsule, Take 1 capsule (250 mg total) by mouth 2 (two) times a day, Disp:  , Rfl: 0    senna (SENOKOT) 8 6 mg, Take 1 tablet (8 6 mg total) by mouth daily at bedtime as needed for constipation, Disp: 30 tablet, Rfl: 0  No current facility-administered medications for this visit  Family History   Problem Relation Age of Onset    No Known Problems Mother     No Known Problems Father       Review of Systems   Constitutional: Positive for activity change and fatigue  Respiratory: Positive for shortness of breath  Cardiovascular: Positive for leg swelling  Musculoskeletal: Positive for gait problem  Skin: Positive for wound  Psychiatric/Behavioral: Positive for dysphoric mood  Allergies  No known allergies    Objective:  Pulse 66   Temp 97 6 °F (36 4 °C)   Resp (!) 26   LMP  (LMP Unknown)     Physical Exam  Vitals signs and nursing note reviewed  Constitutional:       General: She is not in acute distress  Appearance: She is ill-appearing  She is not toxic-appearing     Cardiovascular:      Pulses:           Dorsalis pedis pulses are 0 on the right side and 0 on the left side  Posterior tibial pulses are 0 on the right side and 0 on the left side  Pulmonary:      Breath sounds: Wheezing present  Skin:     Capillary Refill: Capillary refill takes more than 3 seconds  Comments: See Wound Assessment  Neurological:      General: No focal deficit present  Mental Status: She is alert and oriented to person, place, and time             Wound 12/28/20 Arterial Ulcer Toe (Comment  which one) Right (Active)   Wound Image   02/23/21 1314   Wound Description Eschar 02/23/21 1324   Corazon-wound Assessment Dry 02/23/21 1324   Wound Length (cm) 2 cm 02/23/21 1324   Wound Width (cm) 1 cm 02/23/21 1324   Wound Depth (cm) 0 cm 02/23/21 1324   Wound Surface Area (cm^2) 2 cm^2 02/23/21 1324   Wound Volume (cm^3) 0 cm^3 02/23/21 1324   Calculated Wound Volume (cm^3) 0 cm^3 02/23/21 1324   Drainage Amount None 02/23/21 1324       Wound 12/28/20 Arterial Ulcer Heel Right (Active)   Wound Image   02/23/21 1315   Wound Description Eschar 02/23/21 1325   Corazon-wound Assessment Intact 02/23/21 1325   Wound Length (cm) 2 cm 02/23/21 1325   Wound Width (cm) 2 cm 02/23/21 1325   Wound Depth (cm) 0 cm 02/23/21 1325   Wound Surface Area (cm^2) 4 cm^2 02/23/21 1325   Wound Volume (cm^3) 0 cm^3 02/23/21 1325   Calculated Wound Volume (cm^3) 0 cm^3 02/23/21 1325       Wound Venous Ulcer Pretibial Anterior;Right (Active)   Wound Image   02/23/21 1316   Wound Description Slough 02/23/21 1323   Corazon-wound Assessment Scaly;Dry;Edema 02/23/21 1323   Wound Length (cm) 2 cm 02/23/21 1323   Wound Width (cm) 1 cm 02/23/21 1323   Wound Depth (cm) 0 1 cm 02/23/21 1323   Wound Surface Area (cm^2) 2 cm^2 02/23/21 1323   Wound Volume (cm^3) 0 2 cm^3 02/23/21 1323   Calculated Wound Volume (cm^3) 0 2 cm^3 02/23/21 1323   Drainage Amount Moderate 02/23/21 1323   Drainage Description Serous;Clear 02/23/21 1323   Non-staged Wound Description Full thickness 02/23/21 1323       Wound 02/23/21 Arterial Ulcer Leg Left;Lower (Active)   Wound Image   02/23/21 1316   Wound Description Granulation tissue;Slough 02/23/21 1323   Corazon-wound Assessment Edema;Fragile;Scaly 02/23/21 1323   Wound Length (cm) 1 5 cm 02/23/21 1323   Wound Width (cm) 0 5 cm 02/23/21 1323   Wound Depth (cm) 0 1 cm 02/23/21 1323   Wound Surface Area (cm^2) 0 75 cm^2 02/23/21 1323   Wound Volume (cm^3) 0 08 cm^3 02/23/21 1323   Calculated Wound Volume (cm^3) 0 08 cm^3 02/23/21 1323   Drainage Amount Moderate 02/23/21 1323   Drainage Description Serous;Clear 02/23/21 1323   Non-staged Wound Description Full thickness 02/23/21 1323   Treatments Cleansed 02/23/21 1323       Wound Arterial Ulcer Leg Left;Posterior (Active)   Wound Image   02/23/21 1316   Wound Description [facility] 02/23/21 1321   Corazon-wound Assessment Scaly;Edema; Maceration 02/23/21 1321   Wound Length (cm) 2 cm 02/23/21 1321   Wound Width (cm) 1 cm 02/23/21 1321   Wound Depth (cm) 0 3 cm 02/23/21 1321   Wound Surface Area (cm^2) 2 cm^2 02/23/21 1321   Wound Volume (cm^3) 0 6 cm^3 02/23/21 1321   Calculated Wound Volume (cm^3) 0 6 cm^3 02/23/21 1321   Drainage Amount Moderate 02/23/21 1321   Drainage Description Serous 02/23/21 1321   Non-staged Wound Description Full thickness 02/23/21 1321   Treatments Cleansed 02/23/21 1321           Wound 12/28/20 Arterial Ulcer Toe (Comment  which one) Right (Active)   Wound Image   02/23/21 1314   Wound Description Eschar 02/23/21 1324   Corazon-wound Assessment Dry 02/23/21 1324   Wound Length (cm) 2 cm 02/23/21 1324   Wound Width (cm) 1 cm 02/23/21 1324   Wound Depth (cm) 0 cm 02/23/21 1324   Wound Surface Area (cm^2) 2 cm^2 02/23/21 1324   Wound Volume (cm^3) 0 cm^3 02/23/21 1324   Calculated Wound Volume (cm^3) 0 cm^3 02/23/21 1324   Drainage Amount None 02/23/21 1324       Wound 12/28/20 Arterial Ulcer Heel Right (Active)   Wound Image   02/23/21 1315   Wound Description Eschar 02/23/21 1325   Corazon-wound Assessment Intact 02/23/21 1325   Wound Length (cm) 2 cm 02/23/21 1325   Wound Width (cm) 2 cm 02/23/21 1325   Wound Depth (cm) 0 cm 02/23/21 1325   Wound Surface Area (cm^2) 4 cm^2 02/23/21 1325   Wound Volume (cm^3) 0 cm^3 02/23/21 1325   Calculated Wound Volume (cm^3) 0 cm^3 02/23/21 1325       Wound Venous Ulcer Pretibial Anterior;Right (Active)   Wound Image   02/23/21 1316   Wound Description Slough 02/23/21 1323   Corazon-wound Assessment Scaly;Dry;Edema 02/23/21 1323   Wound Length (cm) 2 cm 02/23/21 1323   Wound Width (cm) 1 cm 02/23/21 1323   Wound Depth (cm) 0 1 cm 02/23/21 1323   Wound Surface Area (cm^2) 2 cm^2 02/23/21 1323   Wound Volume (cm^3) 0 2 cm^3 02/23/21 1323   Calculated Wound Volume (cm^3) 0 2 cm^3 02/23/21 1323   Drainage Amount Moderate 02/23/21 1323   Drainage Description Serous;Clear 02/23/21 1323   Non-staged Wound Description Full thickness 02/23/21 1323       Wound 02/23/21 Arterial Ulcer Leg Left; Lower (Active)   Wound Image   02/23/21 1316   Wound Description Granulation tissue;Slough 02/23/21 1323   Corazon-wound Assessment Edema;Fragile;Scaly 02/23/21 1323   Wound Length (cm) 1 5 cm 02/23/21 1323   Wound Width (cm) 0 5 cm 02/23/21 1323   Wound Depth (cm) 0 1 cm 02/23/21 1323   Wound Surface Area (cm^2) 0 75 cm^2 02/23/21 1323   Wound Volume (cm^3) 0 08 cm^3 02/23/21 1323   Calculated Wound Volume (cm^3) 0 08 cm^3 02/23/21 1323   Drainage Amount Moderate 02/23/21 1323   Drainage Description Serous;Clear 02/23/21 1323   Non-staged Wound Description Full thickness 02/23/21 1323   Treatments Cleansed 02/23/21 1323       Wound Arterial Ulcer Leg Left;Posterior (Active)   Wound Image   02/23/21 1316   Wound Description [facility] 02/23/21 1321   Corazon-wound Assessment Scaly;Edema; Maceration 02/23/21 1321   Wound Length (cm) 2 cm 02/23/21 1321   Wound Width (cm) 1 cm 02/23/21 1321   Wound Depth (cm) 0 3 cm 02/23/21 1321   Wound Surface Area (cm^2) 2 cm^2 02/23/21 1321   Wound Volume (cm^3) 0 6 cm^3 02/23/21 1321   Calculated Wound Volume (cm^3) 0 6 cm^3 02/23/21 1321   Drainage Amount Moderate 02/23/21 1321   Drainage Description Serous 02/23/21 1321   Non-staged Wound Description Full thickness 02/23/21 1321   Treatments Cleansed 02/23/21 1321                         Diagnosis:  1  Arterial insufficiency with ischemic ulcer (HCC)  -     lidocaine (LMX) 4 % cream  -     Wound cleansing and dressings; Future    2  Arterial leg ulcer (HCC)  -     lidocaine (LMX) 4 % cream  -     Wound cleansing and dressings; Future        Diagnosis ICD-10-CM Associated Orders   1  Arterial insufficiency with ischemic ulcer (HCC)  I77 1 lidocaine (LMX) 4 % cream    L98 499 Wound cleansing and dressings   2  Arterial leg ulcer (HCC)  L97 909 lidocaine (LMX) 4 % cream     Wound cleansing and dressings        ASSESSMENT    Problems:      Chronic illness / Problem not at goal with exacerbation, progression or side effects of treatment  1  Arterial ulcers bilateral legs, feet      PLAN    Data Reviewed / Tests Ordered / Procedures Performed / Recommendations:  1  Review prior external note  Vascular note 2/3/21 reviewed    2  Review result    Arterial Doppler 12/29/20  RIGHT LOWER LIMB:  Findings suggests an occlusion versus high grade stenosis of the mid/distal  popliteal artery with two focal 50-75% stenoses noted; proximal superficial  femoral artery and distal superficial femoral artery  Findings suggests tibioperoneal disease  Ankle/Brachial index:  0 46, ischemic range  PVR/ PPG tracings are severely dampened, therefore, metatarsal and great toe  pressures are unobtainable suggesting poor distal perfusion  LEFT LOWER LIMB:  An occlusion versus high grade stenosis of the proximal thigh portion of the  superficial femoral artery with distal reconstitution  Findings suggests  tibioperoneal disease  Ankle/Brachial index: 0 71, moderate claudication range    PVR/ PPG tracings are severely dampened, therefore, metatarsal and great toe  pressures are unobtainable suggesting poor distal perfusion  3    Test Interpretation Discussion with Patient  Reviewed LEAD results with patient    4  Discussion of Management /  Recommendations    We had a long and open conversation about her options  I explained that I feel it's very unlikely to heal the wounds on either extremity in light of her poor arterial perfusion  I explained that local wound care alone without some type of reperfusion will likely fail  Options include active wound care (that will likely fail without vascular intervention as stated) or palliative care (with the goal being comfort care and hopefully keep things stable)  The pros and cons of both discussed  She is agreement with her  and adamantly refuses any surgery  Therefore she will be on a palliative care plan for now  I debrided the leg wounds  Silver alginate ordered for them  The eschars on the toe and heel Right were left intact and betadine ordered

## 2021-02-23 NOTE — LETTER
February 23, 2021     Patient: Alonzo Blanton   YOB: 1930   Date of Visit: 2/23/2021       To Whom it May Concern:    Alonzo Blanton is under my professional care  She is capable of signing legal documents  If you have any questions or concerns, please don't hesitate to call           Sincerely,          Samantha Diaz DO        CC: No Recipients

## 2021-02-27 ENCOUNTER — APPOINTMENT (EMERGENCY)
Dept: CT IMAGING | Facility: HOSPITAL | Age: 86
End: 2021-02-27
Payer: COMMERCIAL

## 2021-02-27 ENCOUNTER — HOSPITAL ENCOUNTER (EMERGENCY)
Facility: HOSPITAL | Age: 86
Discharge: NURSING, REHAB OR LONG-TERM CARE | End: 2021-02-27
Attending: EMERGENCY MEDICINE
Payer: COMMERCIAL

## 2021-02-27 VITALS
TEMPERATURE: 97.6 F | HEART RATE: 64 BPM | OXYGEN SATURATION: 93 % | DIASTOLIC BLOOD PRESSURE: 69 MMHG | RESPIRATION RATE: 16 BRPM | SYSTOLIC BLOOD PRESSURE: 124 MMHG

## 2021-02-27 DIAGNOSIS — S09.90XA CLOSED HEAD INJURY, INITIAL ENCOUNTER: Primary | ICD-10-CM
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
## 2021-02-27 DIAGNOSIS — R79.1 SUPRATHERAPEUTIC INR: ICD-10-CM

## 2021-02-27 DIAGNOSIS — I50.9: ICD-10-CM

## 2021-02-27 LAB
INR PPP: 3.48 (ref 0.84–1.19)
PROTHROMBIN TIME: 34.2 SECONDS (ref 11.6–14.5)

## 2021-02-27 PROCEDURE — 72125 CT NECK SPINE W/O DYE: CPT

## 2021-02-27 PROCEDURE — 70450 CT HEAD/BRAIN W/O DYE: CPT

## 2021-02-27 PROCEDURE — 99282 EMERGENCY DEPT VISIT SF MDM: CPT | Performed by: EMERGENCY MEDICINE

## 2021-02-27 PROCEDURE — 99284 EMERGENCY DEPT VISIT MOD MDM: CPT

## 2021-02-27 PROCEDURE — G1004 CDSM NDSC: HCPCS

## 2021-02-27 PROCEDURE — 85610 PROTHROMBIN TIME: CPT | Performed by: EMERGENCY MEDICINE

## 2021-02-27 PROCEDURE — 36415 COLL VENOUS BLD VENIPUNCTURE: CPT | Performed by: EMERGENCY MEDICINE

## 2021-02-27 RX ORDER — OXYCODONE HYDROCHLORIDE 5 MG/1
5 TABLET ORAL EVERY 4 HOURS PRN
COMMUNITY
End: 2021-03-10

## 2021-02-27 NOTE — ED NOTES
MARICRU[facility] called to arrange transportation      El Weathers RN  02/27/21 205 Yair Snowden RN  02/27/21 [id number]

## 2021-02-27 NOTE — ED ATTENDING ATTESTATION
2/27/2021  ISharath MD, saw and evaluated the patient  I have discussed the patient with the resident/non-physician practitioner and agree with the resident's/non-physician practitioner's findings, Plan of Care, and MDM as documented in the resident's/non-physician practitioner's note, except where noted  All available labs and Radiology studies were reviewed  I was present for key portions of any procedure(s) performed by the resident/non-physician practitioner and I was immediately available to provide assistance  At this point I agree with the current assessment done in the Emergency Department  I have conducted an independent evaluation of this patient a history and physical is as follows:    79 YO female, wheelchair bound, rolled and fell out of bed, struck head on the floor, no LOC  Pt does take coumadin  Pt denies complaints  Pt denies CP/SOB/F/C/N/V/D/C, no dysuria, burning on urination or blood in urine       Gen: Pt is in NAD  HEENT: Head is atraumatic, EOM's intact, neck has FROM, bruise to the forehead  Chest: CTAB, non-tender  Heart: RRR  Abdomen: Soft, NT/ND  Musculoskeletal: FROM in all extremities  Skin: No rash, no ecchymosis  Neuro: Awake, alert, oriented x4; Cranial nerves II-XII intact  Psych: Normal affect    MDM -  CT head and c-spine, INR    ED Course         Critical Care Time  Procedures

## 2021-02-27 NOTE — DISCHARGE INSTRUCTIONS
--> Skip next dose of warfarin  --> Continues to require oxygen and some pulmonary edema seen on CT neck, diuretic dose may need to be increased  --> If she becomes more short of breath despite oxygen and increasing diuretic may require admission for IV diuresis

## 2021-02-27 NOTE — ED PROVIDER NOTES
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  Patient nonambulatory at baseline and uses wheelchair  She says she accidentally rolled out of bed earlier today and hit her head against the floor  No loss of consciousness  Patient was reluctant to be transported to the hospital because she felt fine  She denies headache, neck pain, back pain, chest pain, shortness of breath, abdominal pain, extremity pain  She has a history of CKD and has chronic lower extremity edema  She also has oxygen dependence of 2-3 L at baseline and pleural effusions from chronic diastolic heart failure  Prior to Admission Medications   Prescriptions Last Dose Informant Patient Reported?  Taking?   acetaminophen (TYLENOL) 325 mg tablet   No Yes   Sig: Take 2 tablets (650 mg total) by mouth every 6 (six) hours as needed for mild pain   carvedilol (COREG) 12 5 mg tablet   No Yes   Sig: Take 1 tablet (12 5 mg total) by mouth 2 (two) times a day with meals   collagenase (SANTYL) ointment   No No   Sig: Apply topically daily   ergocalciferol (VITAMIN D2) 50,000 units   Yes No   Sig: take 1 capsule by mouth every week ON SATURDAY   hydrALAZINE (APRESOLINE) 50 mg tablet   No Yes   Sig: Take 1 tablet (50 mg total) by mouth every 8 (eight) hours   isosorbide dinitrate (ISORDIL) 10 mg tablet   No Yes   Sig: Take 1 tablet (10 mg total) by mouth 3 (three) times daily after meals   levothyroxine 25 mcg tablet   No Yes   Sig: Take 1 tablet (25 mcg total) by mouth daily in the early morning   oxyCODONE (ROXICODONE) 5 mg immediate release tablet   Yes Yes   Sig: Take 5 mg by mouth every 4 (four) hours as needed for moderate pain   potassium chloride (K-DUR,KLOR-CON) 20 mEq tablet   No Yes   Sig: Take 1 tablet (20 mEq total) by mouth daily   saccharomyces boulardii (FLORASTOR) 250 mg capsule   No Yes   Sig: Take 1 capsule (250 mg total) by mouth 2 (two) times a day   senna (SENOKOT) 8 6 mg   No Yes   Sig: Take 1 tablet (8 6 mg total) by mouth daily at bedtime as needed for constipation      Facility-Administered Medications: None       Past Medical History:   Diagnosis Date    Anemia 1/18/2021    Chronic kidney disease     Hypertension        History reviewed  No pertinent surgical history  Family History   Problem Relation Age of Onset    No Known Problems Mother     No Known Problems Father      I have reviewed and agree with the history as documented  E-Cigarette/Vaping    E-Cigarette Use Never User      E-Cigarette/Vaping Substances    Nicotine No     THC No     CBD No     Flavoring No     Other No     Unknown No      Social History     Tobacco Use    Smoking status: Never Smoker    Smokeless tobacco: Never Used   Substance Use Topics    Alcohol use: Never     Frequency: Never    Drug use: Never        Review of Systems   Constitutional: Negative for chills and fever  Respiratory: Negative for shortness of breath  Cardiovascular: Negative for chest pain  Gastrointestinal: Negative for abdominal pain, nausea and vomiting  Genitourinary: Negative for dysuria and flank pain  Musculoskeletal: Negative for arthralgias, myalgias and neck pain  Skin: Negative for pallor and wound  Neurological: Negative for dizziness, weakness, numbness and headaches  All other systems reviewed and are negative        Physical Exam  ED Triage Vitals [02/27/21 1516]   Temperature Pulse Respirations Blood Pressure SpO2   97 6 °F (36 4 °C) 64 18 (!) 194/82 90 %      Temp Source Heart Rate Source Patient Position - Orthostatic VS BP Location FiO2 (%)   Oral Monitor Sitting Right arm --      Pain Score       No Pain             Orthostatic Vital Signs  Vitals:    02/27/21 1516 02/27/21 1752 02/27/21 2053   BP: (!) 194/82 (!) 174/80 124/69   Pulse: 64 69 64   Patient Position - Orthostatic VS: Sitting Sitting Sitting       Physical Exam  Vitals signs and nursing note reviewed  Constitutional:       General: She is not in acute distress  Appearance: She is well-developed  She is not diaphoretic  HENT:      Head: Normocephalic  Comments: Bruise to center forehead  Eyes:      General: No scleral icterus  Conjunctiva/sclera: Conjunctivae normal       Pupils: Pupils are equal, round, and reactive to light  Neck:      Musculoskeletal: Normal range of motion and neck supple  Cardiovascular:      Rate and Rhythm: Normal rate and regular rhythm  Heart sounds: No murmur  No friction rub  No gallop  Pulmonary:      Breath sounds: No wheezing or rales  Comments: Breath sounds diminished at bases bilaterally  Abdominal:      General: There is no distension  Palpations: Abdomen is soft  Tenderness: There is no abdominal tenderness  There is no guarding or rebound  Musculoskeletal: Normal range of motion  General: No tenderness  Right lower leg: Edema present  Left lower leg: Edema present  Skin:     General: Skin is warm and dry  Coloration: Skin is not pale  Findings: No erythema  Comments: Chronic bruising over upper extremities  Neurological:      Mental Status: She is alert and oriented to person, place, and time  Cranial Nerves: No cranial nerve deficit  Sensory: No sensory deficit  Motor: No abnormal muscle tone     Psychiatric:         Behavior: Behavior normal          ED Medications  Medications - No data to display    Diagnostic Studies  Results Reviewed     Procedure Component Value Units Date/Time    Protime-INR [[id number]]  (Abnormal) Collected: 02/27/21 1549    Lab Status: Final result Specimen: Blood from Arm, Left Updated: 02/27/21 1657     Protime 34 2 seconds      INR 3 48                 CT head without contrast   Final Result by Rachelle Bennett MD (02/27 1600)      No acute intracranial abnormality  Microangiopathic changes  Workstation performed: [id number]         CT spine cervical without contrast   Final Result by Rachelle Bennett MD (02/27 1605)      Severe multilevel degenerative disc disease with focal kyphosis C3-C4 likely degenerative  No definite acute fracture  Airspace pulmonary edema and pleural effusions concerning for CHF  Workstation performed: [id number]               Procedures  Procedures      ED Course  ED Course as of Feb 27 2325   Sat Feb 27, 2021   1707 INR(!): 3 48                             SBIRT 20yo+      Most Recent Value   SBIRT (23 yo +)   In order to provide better care to our patients, we are screening all of our patients for alcohol and drug use  Would it be okay to ask you these screening questions? No Filed at: 02/27/2021 2051                MDM  Number of Diagnoses or Management Options  Accidental fall from bed, initial encounter: new and requires workup  Chronic heart failure not affecting current episode of care [facility]): established and worsening  Closed head injury, initial encounter: new and requires workup  Supratherapeutic INR: new and requires workup     Amount and/or Complexity of Data Reviewed  Clinical lab tests: ordered and reviewed  Tests in the radiology section of CPT®: ordered and reviewed  Tests in the medicine section of CPT®: ordered and reviewed  Decide to obtain previous medical records or to obtain history from someone other than the patient: yes  Review and summarize past medical records: yes    Patient Progress  Patient progress: stable     26-year-old woman here with fall, anticoagulated on warfarin    On primary survey patient is awake, alert, bilateral breath sounds, intact central / peripheral pulses  On secondary survey there is bruising over the patient's forehead  She has no spinal tenderness to palpation  No signs of trauma to chest or abdomen  There is chronic appearing bruising over her upper extremities with no tenderness or pain with range of motion  Patient's pelvis is stable  Lower extremities are edematous as per patient's baseline  Patient will be evaluated for intracranial hemorrhage or cervical spine injury with CT head/cervical spine  Will check INR  No traumatic injuries on CT head / cervical spine  Patient has evidence of pulmonary edema consistent with her history of diastolic heart failure  She is on 2 5 L NC oxygen satting 94%  She does appear clinically fluid overloaded but is denying shortness of breath and I do not believe requires IV diuresis  Patient has supratherapeutic INR  Instructed nursing home to hold evening dose of warfarin  Also recommended that patient be re-evaluated by physician at nursing home to consider up titration of her diuretics for fluid overload  Disposition  Final diagnoses:   Accidental fall from bed, initial encounter   Closed head injury, initial encounter   Chronic heart failure not affecting current episode of care [facility])   Supratherapeutic INR     Time reflects when diagnosis was documented in both MDM as applicable and the Disposition within this note     Time User Action Codes Description Comment    2/27/2021  4:59 PM Janis Hurst [W06  XXXA] Accidental fall from bed, initial encounter     2/27/2021  4:59 PM [place] Add [S09 90XA] Closed head injury, initial encounter     2/27/2021  4:59 PM Melanie Youngt [N68  XXXA] Accidental fall from bed, initial encounter     2/27/2021  4:59 PM Piper [place] Modify [S09 90XA] Closed head injury, initial encounter     2/27/2021  4:59 PM [place] Add [I50 9] Chronic heart failure not affecting current episode [facility])     2/27/2021  5:07 PM Enrico Jones [R79 1] Supratherapeutic INR       ED Disposition     ED Disposition Condition Date/Time Comment    Discharge Good Sat Feb 27, 2021  4:59 PM Lawrence Muirumang discharge to home/self care              Follow-up Information     Follow up With Specialties Details Why Contact Info    Kasie Alvares MD Family Medicine Schedule an appointment as soon as possible for a visit  CHF managment [address]  [phone]            Discharge Medication List as of 2/27/2021  5:32 PM      CONTINUE these medications which have NOT CHANGED    Details   acetaminophen (TYLENOL) 325 mg tablet Take 2 tablets (650 mg total) by mouth every 6 (six) hours as needed for mild pain, Starting Wed 2/3/2021, No Print      carvedilol (COREG) 12 5 mg tablet Take 1 tablet (12 5 mg total) by mouth 2 (two) times a day with meals, Starting Wed 1/6/2021, Until Fri 2/5/2021, Normal      hydrALAZINE (APRESOLINE) 50 mg tablet Take 1 tablet (50 mg total) by mouth every 8 (eight) hours, Starting Wed 2/3/2021, No Print      isosorbide dinitrate (ISORDIL) 10 mg tablet Take 1 tablet (10 mg total) by mouth 3 (three) times daily after meals, Starting Wed 1/6/2021, Until Fri 2/5/2021, Normal      levothyroxine 25 mcg tablet Take 1 tablet (25 mcg total) by mouth daily in the early morning, Starting Fri 1/15/2021, Normal      potassium chloride (K-DUR,KLOR-CON) 20 mEq tablet Take 1 tablet (20 mEq total) by mouth daily, Starting Thu 1/7/2021, Until Sat 2/6/2021, Normal      saccharomyces boulardii (FLORASTOR) 250 mg capsule Take 1 capsule (250 mg total) by mouth 2 (two) times a day, Starting Wed 2/3/2021, No Print      senna (SENOKOT) 8 6 mg Take 1 tablet (8 6 mg total) by mouth daily at bedtime as needed for constipation, Starting Wed 1/6/2021, Until Fri 2/5/2021, Normal      collagenase (SANTYL) ointment Apply topically daily, Starting Thu 2/4/2021, No Print      ergocalciferol (VITAMIN D2) 50,000 units take 1 capsule by mouth every week ON SATURDAY, Historical Med           No discharge procedures on file  PDMP Review     None           ED Provider  Attending physically available and evaluated Percell Canavan I managed the patient along with the ED Attending      Electronically Signed by         Carrie Pepper MD  02/27/21 5719

## 2021-03-07 ENCOUNTER — APPOINTMENT (EMERGENCY)
Dept: CT IMAGING | Facility: HOSPITAL | Age: 86
End: 2021-03-07
Payer: COMMERCIAL

## 2021-03-07 ENCOUNTER — HOSPITAL ENCOUNTER (EMERGENCY)
Facility: HOSPITAL | Age: 86
Discharge: HOME | End: 2021-03-07
Attending: EMERGENCY MEDICINE
Payer: COMMERCIAL

## 2021-03-07 VITALS
SYSTOLIC BLOOD PRESSURE: 160 MMHG | BODY MASS INDEX: 21.92 KG/M2 | RESPIRATION RATE: 28 BRPM | HEART RATE: 65 BPM | WEIGHT: 135.8 LBS | OXYGEN SATURATION: 91 % | DIASTOLIC BLOOD PRESSURE: 73 MMHG | TEMPERATURE: 98.7 F

## 2021-03-07 DIAGNOSIS — H53.9 VISUAL DISTURBANCE: Primary | ICD-10-CM

## 2021-03-07 LAB
ALBUMIN SERPL BCP-MCNC: 2.3 G/DL (ref 3.5–5)
ALP SERPL-CCNC: 72 U/L (ref 46–116)
ALT SERPL W P-5'-P-CCNC: 25 U/L (ref 12–78)
ANION GAP SERPL CALCULATED.3IONS-SCNC: 10 MMOL/L (ref 4–13)
APTT PPP: 51 SECONDS (ref 23–37)
AST SERPL W P-5'-P-CCNC: 21 U/L (ref 5–45)
ATRIAL RATE: 65 BPM
BASOPHILS # BLD AUTO: 0.03 THOUSANDS/ΜL (ref 0–0.1)
BASOPHILS NFR BLD AUTO: 0 % (ref 0–1)
BILIRUB SERPL-MCNC: 0.7 MG/DL (ref 0.2–1)
BUN SERPL-MCNC: 59 MG/DL (ref 5–25)
CALCIUM ALBUM COR SERPL-MCNC: 9.9 MG/DL (ref 8.3–10.1)
CALCIUM SERPL-MCNC: 8.5 MG/DL (ref 8.3–10.1)
CHLORIDE SERPL-SCNC: 106 MMOL/L (ref 100–108)
CO2 SERPL-SCNC: 25 MMOL/L (ref 21–32)
CREAT SERPL-MCNC: 2.74 MG/DL (ref 0.6–1.3)
EOSINOPHIL # BLD AUTO: 0.4 THOUSAND/ΜL (ref 0–0.61)
EOSINOPHIL NFR BLD AUTO: 6 % (ref 0–6)
ERYTHROCYTE [DISTWIDTH] IN BLOOD BY AUTOMATED COUNT: 17.8 % (ref 11.6–15.1)
GFR SERPL CREATININE-BSD FRML MDRD: 15 ML/MIN/1.73SQ M
GLUCOSE SERPL-MCNC: 95 MG/DL (ref 65–140)
HCT VFR BLD AUTO: 28.5 % (ref 34.8–46.1)
HGB BLD-MCNC: 8.7 G/DL (ref 11.5–15.4)
IMM GRANULOCYTES # BLD AUTO: 0.09 THOUSAND/UL (ref 0–0.2)
IMM GRANULOCYTES NFR BLD AUTO: 1 % (ref 0–2)
INR PPP: 2.39 (ref 0.84–1.19)
LYMPHOCYTES # BLD AUTO: 3.1 THOUSANDS/ΜL (ref 0.6–4.47)
LYMPHOCYTES NFR BLD AUTO: 43 % (ref 14–44)
MCH RBC QN AUTO: 26.9 PG (ref 26.8–34.3)
MCHC RBC AUTO-ENTMCNC: 30.5 G/DL (ref 31.4–37.4)
MCV RBC AUTO: 88 FL (ref 82–98)
MONOCYTES # BLD AUTO: 0.4 THOUSAND/ΜL (ref 0.17–1.22)
MONOCYTES NFR BLD AUTO: 6 % (ref 4–12)
NEUTROPHILS # BLD AUTO: 3.27 THOUSANDS/ΜL (ref 1.85–7.62)
NEUTS SEG NFR BLD AUTO: 44 % (ref 43–75)
NRBC BLD AUTO-RTO: 0 /100 WBCS
P AXIS: 86 DEGREES
PLATELET # BLD AUTO: 227 THOUSANDS/UL (ref 149–390)
PMV BLD AUTO: 9.2 FL (ref 8.9–12.7)
POTASSIUM SERPL-SCNC: 4.7 MMOL/L (ref 3.5–5.3)
PR INTERVAL: 208 MS
PROT SERPL-MCNC: 7.4 G/DL (ref 6.4–8.2)
PROTHROMBIN TIME: 25.5 SECONDS (ref 11.6–14.5)
QRS AXIS: 69 DEGREES
QRSD INTERVAL: 84 MS
QT INTERVAL: 394 MS
QTC INTERVAL: 409 MS
RBC # BLD AUTO: 3.23 MILLION/UL (ref 3.81–5.12)
SODIUM SERPL-SCNC: 141 MMOL/L (ref 136–145)
T WAVE AXIS: 107 DEGREES
VENTRICULAR RATE: 65 BPM
WBC # BLD AUTO: 7.29 THOUSAND/UL (ref 4.31–10.16)

## 2021-03-07 PROCEDURE — 96360 HYDRATION IV INFUSION INIT: CPT

## 2021-03-07 PROCEDURE — 96361 HYDRATE IV INFUSION ADD-ON: CPT

## 2021-03-07 PROCEDURE — 85730 THROMBOPLASTIN TIME PARTIAL: CPT | Performed by: EMERGENCY MEDICINE

## 2021-03-07 PROCEDURE — 93010 ELECTROCARDIOGRAM REPORT: CPT | Performed by: INTERNAL MEDICINE

## 2021-03-07 PROCEDURE — 85025 COMPLETE CBC W/AUTO DIFF WBC: CPT | Performed by: EMERGENCY MEDICINE

## 2021-03-07 PROCEDURE — 80053 COMPREHEN METABOLIC PANEL: CPT | Performed by: EMERGENCY MEDICINE

## 2021-03-07 PROCEDURE — 93005 ELECTROCARDIOGRAM TRACING: CPT

## 2021-03-07 PROCEDURE — G1004 CDSM NDSC: HCPCS

## 2021-03-07 PROCEDURE — 99285 EMERGENCY DEPT VISIT HI MDM: CPT

## 2021-03-07 PROCEDURE — 99285 EMERGENCY DEPT VISIT HI MDM: CPT | Performed by: EMERGENCY MEDICINE

## 2021-03-07 PROCEDURE — 70450 CT HEAD/BRAIN W/O DYE: CPT

## 2021-03-07 PROCEDURE — 85610 PROTHROMBIN TIME: CPT | Performed by: EMERGENCY MEDICINE

## 2021-03-07 PROCEDURE — 36415 COLL VENOUS BLD VENIPUNCTURE: CPT | Performed by: EMERGENCY MEDICINE

## 2021-03-07 RX ORDER — CYCLOPENTOLATE HYDROCHLORIDE 10 MG/ML
1 SOLUTION/ DROPS OPHTHALMIC ONCE
Status: COMPLETED | OUTPATIENT
Start: 2021-03-07 | End: 2021-03-07

## 2021-03-07 RX ADMIN — SODIUM CHLORIDE 500 ML: 0.9 INJECTION, SOLUTION INTRAVENOUS at 15:27

## 2021-03-07 RX ADMIN — CYCLOPENTOLATE HYDROCHLORIDE 1 DROP: 10 SOLUTION OPHTHALMIC at 15:27

## 2021-03-07 NOTE — ED PROVIDER NOTES
History  Chief Complaint   Patient presents with    Blurred Vision     patient states vlurred vsion in right eye  common to patient but resolves with rubbing eye  patient states has not relieved since this morning  denies any other complaints     30-year-old female with history of hypertension, chronic kidney disease presents to the emergency department with loss of vision to the right eye  Patient states she has had intermittent blurred vision in the right eye which usually goes away with rubbing of the right eye for a while today she felt like there was a film across her entire visual field and rubbing did not relieve it all day  Now she states she can see anything at all out of her right eye  She has never seen an eye doctor concerning this problem in the past   She has poor follow-up with even her regular physician and is non compliant with her medications  She denies pain in the eye  No headaches  No nausea or vomiting  She did corrective lenses  History provided by:  Patient   used: No    Eye Problem  Location:  Right eye  Onset quality:  Unable to specify  Duration: Patient states for a while  Progression:  Worsening  Chronicity:  Recurrent  Context: not chemical exposure, not contact lens problem, not direct trauma, not foreign body and not scratch    Relieved by:  Nothing  Worsened by:  Nothing  Ineffective treatments: Rubbing eyes  Associated symptoms: blurred vision and decreased vision    Associated symptoms: no crusting, no discharge, no double vision, no facial rash, no headaches, no inflammation, no itching, no nausea, no numbness, no photophobia, no redness, no scotomas, no swelling, no tearing, no tingling, no vomiting and no weakness    Risk factors: no previous injury to eye, no recent herpes zoster and no recent URI        Prior to Admission Medications   Prescriptions Last Dose Informant Patient Reported?  Taking?   acetaminophen (TYLENOL) 325 mg tablet   No No   Sig: Take 2 tablets (650 mg total) by mouth every 6 (six) hours as needed for mild pain   carvedilol (COREG) 12 5 mg tablet   No No   Sig: Take 1 tablet (12 5 mg total) by mouth 2 (two) times a day with meals   collagenase (SANTYL) ointment   No No   Sig: Apply topically daily   ergocalciferol (VITAMIN D2) 50,000 units   Yes No   Sig: take 1 capsule by mouth every week ON SATURDAY   hydrALAZINE (APRESOLINE) 50 mg tablet   No No   Sig: Take 1 tablet (50 mg total) by mouth every 8 (eight) hours   isosorbide dinitrate (ISORDIL) 10 mg tablet   No No   Sig: Take 1 tablet (10 mg total) by mouth 3 (three) times daily after meals   levothyroxine 25 mcg tablet   No No   Sig: Take 1 tablet (25 mcg total) by mouth daily in the early morning   oxyCODONE (ROXICODONE) 5 mg immediate release tablet   Yes No   Sig: Take 5 mg by mouth every 4 (four) hours as needed for moderate pain   potassium chloride (K-DUR,KLOR-CON) 20 mEq tablet   No No   Sig: Take 1 tablet (20 mEq total) by mouth daily   saccharomyces boulardii (FLORASTOR) 250 mg capsule   No No   Sig: Take 1 capsule (250 mg total) by mouth 2 (two) times a day   senna (SENOKOT) 8 6 mg   No No   Sig: Take 1 tablet (8 6 mg total) by mouth daily at bedtime as needed for constipation      Facility-Administered Medications: None       Past Medical History:   Diagnosis Date    Anemia 1/18/2021    Chronic kidney disease     Hypertension        History reviewed  No pertinent surgical history  Family History   Problem Relation Age of Onset    No Known Problems Mother     No Known Problems Father      I have reviewed and agree with the history as documented      E-Cigarette/Vaping    E-Cigarette Use Never User      E-Cigarette/Vaping Substances    Nicotine No     THC No     CBD No     Flavoring No     Other No     Unknown No      Social History     Tobacco Use    Smoking status: Never Smoker    Smokeless tobacco: Never Used   Substance Use Topics    Alcohol use: Never     Frequency: Never    Drug use: Never       Review of Systems   Constitutional: Negative  Negative for chills, diaphoresis, fatigue and fever  HENT: Negative  Negative for congestion, rhinorrhea and sore throat  Eyes: Positive for blurred vision and visual disturbance  Negative for double vision, photophobia, pain, discharge, redness and itching  Respiratory: Negative  Negative for apnea, cough, chest tightness, shortness of breath and wheezing  Cardiovascular: Negative for chest pain, palpitations and leg swelling  Gastrointestinal: Negative  Negative for abdominal pain, nausea and vomiting  Endocrine: Negative  Genitourinary: Negative  Negative for flank pain, frequency and urgency  Musculoskeletal: Negative  Negative for back pain  Skin: Negative  Allergic/Immunologic: Negative  Neurological: Negative  Negative for dizziness, tingling, tremors, seizures, syncope, facial asymmetry, speech difficulty, weakness, light-headedness, numbness and headaches  Hematological: Negative  All other systems reviewed and are negative  Physical Exam  Physical Exam  Vitals signs and nursing note reviewed  Constitutional:       General: She is awake  She is not in acute distress  Appearance: She is well-developed and normal weight  She is not ill-appearing, toxic-appearing or diaphoretic  Comments: Patient unable to see fingers in front of her right eye  She can see small amount of light coming from the with lateral aspect of the right eye  HENT:      Head: Normocephalic and atraumatic  Right Ear: External ear normal       Left Ear: External ear normal       Nose: Nose normal       Mouth/Throat:      Mouth: Mucous membranes are moist       Pharynx: No oropharyngeal exudate  Eyes:      General: Lids are normal  Visual field deficit present  No scleral icterus  Right eye: No discharge  Left eye: No discharge        Extraocular Movements: Extraocular movements intact  Right eye: No nystagmus  Left eye: No nystagmus  Conjunctiva/sclera: Conjunctivae normal       Right eye: Right conjunctiva is not injected  No chemosis, exudate or hemorrhage  Left eye: Left conjunctiva is not injected  No chemosis, exudate or hemorrhage  Pupils: Pupils are equal, round, and reactive to light  Neck:      Musculoskeletal: Normal range of motion and neck supple  Thyroid: No thyromegaly  Vascular: No JVD  Trachea: No tracheal deviation  Cardiovascular:      Rate and Rhythm: Normal rate and regular rhythm  Heart sounds: Normal heart sounds  No murmur  No friction rub  No gallop  Pulmonary:      Effort: Pulmonary effort is normal  No respiratory distress  Breath sounds: Normal breath sounds  No stridor  No wheezing, rhonchi or rales  Chest:      Chest wall: No tenderness  Abdominal:      General: Bowel sounds are normal  There is no distension  Palpations: Abdomen is soft  There is no mass  Tenderness: There is no abdominal tenderness  Hernia: No hernia is present  Musculoskeletal: Normal range of motion  General: No swelling, tenderness, deformity or signs of injury  Comments: Chronic wounds bilateral lower extremities   Lymphadenopathy:      Cervical: No cervical adenopathy  Skin:     General: Skin is warm and dry  Coloration: Skin is not jaundiced or pale  Findings: No bruising, erythema, lesion or rash  Neurological:      General: No focal deficit present  Mental Status: She is alert and oriented to person, place, and time  Cranial Nerves: No cranial nerve deficit  Motor: No weakness or abnormal muscle tone  Coordination: Coordination normal       Deep Tendon Reflexes: Reflexes are normal and symmetric  Reflexes normal    Psychiatric:         Mood and Affect: Mood normal          Behavior: Behavior is cooperative           Vital Signs  ED Triage Vitals   Temperature Pulse Respirations Blood Pressure SpO2   03/07/21 1425 03/07/21 1425 03/07/21 1425 03/07/21 1425 03/07/21 1425   98 7 °F (37 1 °C) 70 22 (!) 179/81 98 %      Temp Source Heart Rate Source Patient Position - Orthostatic VS BP Location FiO2 (%)   03/07/21 1425 03/07/21 1425 -- -- --   Oral Monitor         Pain Score       03/07/21 1530       No Pain           Vitals:    03/07/21 1425 03/07/21 1530 03/07/21 1655 03/07/21 1730   BP: (!) 179/81 (!) 171/77 141/76 164/73   Pulse: 70 66 72 64         Visual Acuity  Visual Acuity      Most Recent Value   L Pupil Size (mm)  2   R Pupil Size (mm)  2          ED Medications  Medications   sodium chloride 0 9 % bolus 500 mL (0 mL Intravenous Stopped 3/7/21 1720)   cyclopentolate (CYCLOGYL) 1 % ophthalmic solution 1 drop (1 drop Right Eye Given 3/7/21 1527)       Diagnostic Studies  Results Reviewed     Procedure Component Value Units Date/Time    Comprehensive metabolic panel [[id number]]  (Abnormal) Collected: 03/07/21 1526    Lab Status: Final result Specimen: Blood from Arm, Left Updated: 03/07/21 1608     Sodium 141 mmol/L      Potassium 4 7 mmol/L      Chloride 106 mmol/L      CO2 25 mmol/L      ANION GAP 10 mmol/L      BUN 59 mg/dL      Creatinine 2 74 mg/dL      Glucose 95 mg/dL      Calcium 8 5 mg/dL      Corrected Calcium 9 9 mg/dL      AST 21 U/L      ALT 25 U/L      Alkaline Phosphatase 72 U/L      Total Protein 7 4 g/dL      Albumin 2 3 g/dL      Total Bilirubin 0 70 mg/dL      eGFR 15 ml/min/1 73sq m     Narrative:      Steff guidelines for Chronic Kidney Disease (CKD):     Stage 1 with normal or high GFR (GFR > 90 mL/min/1 73 square meters)    Stage 2 Mild CKD (GFR = 60-89 mL/min/1 73 square meters)    Stage 3A Moderate CKD (GFR = 45-59 mL/min/1 73 square meters)    Stage 3B Moderate CKD (GFR = 30-44 mL/min/1 73 square meters)    Stage 4 Severe CKD (GFR = 15-29 mL/min/1 73 square meters)    Stage 5 End Stage CKD (GFR <15 mL/min/1 73 square meters)  Note: GFR calculation is accurate only with a steady state creatinine    Protime-INR [[id number]]  (Abnormal) Collected: 03/07/21 1526    Lab Status: Final result Specimen: Blood from Arm, Left Updated: 03/07/21 1547     Protime 25 5 seconds      INR 2 39    APTT [[id number]]  (Abnormal) Collected: 03/07/21 1526    Lab Status: Final result Specimen: Blood from Arm, Left Updated: 03/07/21 1547     PTT 51 seconds     CBC and differential [[id number]]  (Abnormal) Collected: 03/07/21 1526    Lab Status: Final result Specimen: Blood from Arm, Left Updated: 03/07/21 1537     WBC 7 29 Thousand/uL      RBC 3 23 Million/uL      Hemoglobin 8 7 g/dL      Hematocrit 28 5 %      MCV 88 fL      MCH 26 9 pg      MCHC 30 5 g/dL      RDW 17 8 %      MPV 9 2 fL      Platelets 686 Thousands/uL      nRBC 0 /100 WBCs      Neutrophils Relative 44 %      Immat GRANS % 1 %      Lymphocytes Relative 43 %      Monocytes Relative 6 %      Eosinophils Relative 6 %      Basophils Relative 0 %      Neutrophils Absolute 3 27 Thousands/µL      Immature Grans Absolute 0 09 Thousand/uL      Lymphocytes Absolute 3 10 Thousands/µL      Monocytes Absolute 0 40 Thousand/µL      Eosinophils Absolute 0 40 Thousand/µL      Basophils Absolute 0 03 Thousands/µL                  CT head without contrast   Final Result by Nazia Soto MD (03/07 1550)      No acute intracranial abnormality                    Workstation performed: [id number]                    Procedures  ECG 12 Lead Documentation Only    Date/Time: 3/7/2021 3:52 PM  Performed by: Matthew Brown DO  Authorized by: Matthew Brown DO     Indications / Diagnosis:  Loss of vision  ECG reviewed by me, the ED Provider: yes    Patient location:  ED  Interpretation:     Interpretation: normal    Rate:     ECG rate:  65    ECG rate assessment: normal    Rhythm:     Rhythm: sinus rhythm    Ectopy:     Ectopy: PVCs      PVCs:  Infrequent  Conduction: Conduction: normal    ST segments:     ST segments:  Normal  T waves:     T waves: normal               ED Course  ED Course as of Mar 07 1834   Pamelia Mail Mar 07, 2021   1544 baseline   Hemoglobin(!): 8 7   1608 Patient not compliant with Retina exam at this time  She keeps moving her head  When asked if she could see the light she states "a little"      1638 Patient will be unable to go to [facility] to see Ophthalmology  [facility]  Number of Diagnoses or Management Options  Diagnosis management comments: 51-year-old female presents with loss of vision in the right eye  She states she has had blurred vision for a while which usually goes away with rubbing her eye, however today she felt like there was a film over her right eye and now she can not see anything out of the right eye  No pain or redness  She has never been evaluated by Ophthalmology  She has poor compliance with her medications and has not seen a family doctor in quite some time  On exam she is alert no acute distress  Her pupils are reactive and extraocular muscles are intact but she cannot see anything out of the right eye  She can see a little bit of light the the lateral aspect of the right eye  Concern for possible CVA or retinal vein or artery thrombosis  Will order basic labs, CT head, will dilate pupil and examined redness         Amount and/or Complexity of Data Reviewed  Clinical lab tests: ordered and reviewed  Tests in the radiology section of CPT®: ordered and reviewed  Independent visualization of images, tracings, or specimens: yes        Disposition  Final diagnoses:   Visual disturbance     Time reflects when diagnosis was documented in both MDM as applicable and the Disposition within this note     Time User Action Codes Description Comment    3/7/2021  4:40 PM Alan Chao Add [H53 9] Visual disturbance       ED Disposition     ED Disposition Condition Date/Time Comment    Discharge Good Sun Mar 7, 2021  4:40 PM David Coello discharge to home/self care  Follow-up Information     Follow [facility] Specialties [facility] Schedule an appointment as soon as possible for a visit in 1 day  [facility]  [phone]            Patient's Medications   Discharge Prescriptions    No medications on file     No discharge procedures on file      PDMP Review     None          ED Provider  Electronically Signed by           Lorna Momin,   03/07/21 Marlen Milton, DO  03/07/21 [id number]

## 2021-03-07 NOTE — DISCHARGE INSTRUCTIONS
Blurred Vision   WHAT YOU NEED TO KNOW:   Blurred vision is when you cannot see fine details  You may have blurred vision if you are nearsighted or farsighted and you need glasses  Blurred vision may be caused by a corneal abrasion (scratch on the cornea) or a corneal ulcer (open sore)  You may have blurred vision if your eye came into contact with a chemical  A foreign body or infection may also cause blurred vision  Medical conditions, such as cataracts, glaucoma, detached retina, and nerve disorders can also cause blurred vision  Blurred vision may also be caused by a concussion or a tumor  If you have diabetes, you may develop diabetic retinopathy  Diabetic retinopathy damages the blood vessels of your retina  DISCHARGE INSTRUCTIONS:   Return to the emergency department if:   · You have weakness in an arm or leg, difficulty speaking or seeing, and a severe headache  · You have a fever, eye pain, or discharge  · You have a sudden loss of vision  Contact your healthcare provider if:   · Your blurred vision gets worse  · Your blurred vision is worse in the morning  · You have a sudden headache or eye pain  · Your eye has swelling, redness, or discharge  · You see floaters, flashes of light, fine dots, or cobweb shapes  · You have questions or concerns about your condition or care  Medicines: You may  need any of the following:  · Prescription pain medicine  may be given  Ask how to take this medicine safely  · Antibiotics  help prevent or treat an eye infection caused by bacteria  It may be given as eyedrops or an ointment  · Take your medicine as directed  Contact your healthcare provider if you think your medicine is not helping or if you have side effects  Tell him of her if you are allergic to any medicine  Keep a list of the medicines, vitamins, and herbs you take  Include the amounts, and when and why you take them   Bring the list or the pill bottles to follow-up visits  Carry your medicine list with you in case of an emergency  Manage your blurred vision:  Your healthcare provider may ask you to do any of the following:  · Use artificial tears  to keep your eye moist or to soothe your irritated eye  · Apply a cool compress  to decrease any swelling or pain  Wet a clean washcloth with cool water and place it on your eye  Use the cool compress as often as directed  · Wear an eye patch as directed  to protect your eye  Follow up with your healthcare provider as directed: You may need other eye exams and medicines  Write down your questions so you remember to ask them during your visits  © Copyright [address] Information is for End User's use only and may not be sold, redistributed or otherwise used for commercial purposes  All illustrations and images included in CareNotes® are the copyrighted property of A D A M , Inc  or [facility] [place] [facility] [place] [employer]   The above information is an  only  It is not intended as medical advice for individual conditions or treatments  Talk to your doctor, nurse or pharmacist before following any medical regimen to see if it is safe and effective for you

## 2021-03-10 ENCOUNTER — APPOINTMENT (EMERGENCY)
Dept: RADIOLOGY | Facility: HOSPITAL | Age: 86
DRG: 291 | End: 2021-03-10
Payer: MEDICARE

## 2021-03-10 ENCOUNTER — HOSPITAL ENCOUNTER (INPATIENT)
Facility: HOSPITAL | Age: 86
LOS: 12 days | Discharge: HOSPICE | DRG: 291 | End: 2021-03-22
Attending: EMERGENCY MEDICINE | Admitting: INTERNAL MEDICINE
Payer: MEDICARE

## 2021-03-10 DIAGNOSIS — N39.0 UTI (URINARY TRACT INFECTION): ICD-10-CM

## 2021-03-10 DIAGNOSIS — J18.9 PNEUMONIA: ICD-10-CM

## 2021-03-10 DIAGNOSIS — L97.912 ULCER OF RIGHT LOWER EXTREMITY WITH FAT LAYER EXPOSED (HCC): ICD-10-CM

## 2021-03-10 DIAGNOSIS — D64.9 ANEMIA: ICD-10-CM

## 2021-03-10 DIAGNOSIS — N18.9 ACUTE KIDNEY INJURY SUPERIMPOSED ON CHRONIC KIDNEY DISEASE (HCC): ICD-10-CM

## 2021-03-10 DIAGNOSIS — R50.9 FEVER: Primary | ICD-10-CM

## 2021-03-10 DIAGNOSIS — R06.02 SOB (SHORTNESS OF BREATH): ICD-10-CM

## 2021-03-10 DIAGNOSIS — T14.8XXA NONHEALING NONSURGICAL WOUND: ICD-10-CM

## 2021-03-10 DIAGNOSIS — T14.90XA WOUNDS AND INJURIES: ICD-10-CM

## 2021-03-10 DIAGNOSIS — N17.9 ACUTE KIDNEY INJURY SUPERIMPOSED ON CHRONIC KIDNEY DISEASE (HCC): ICD-10-CM

## 2021-03-10 PROBLEM — I50.33 ACUTE ON CHRONIC DIASTOLIC CONGESTIVE HEART FAILURE (HCC): Status: ACTIVE | Noted: 2021-01-15

## 2021-03-10 PROBLEM — G93.41 ACUTE METABOLIC ENCEPHALOPATHY: Status: ACTIVE | Noted: 2021-03-10

## 2021-03-10 PROBLEM — S81.809A MULTIPLE OPEN WOUNDS OF LOWER EXTREMITY: Status: ACTIVE | Noted: 2021-03-10

## 2021-03-10 LAB
ABO GROUP BLD: NORMAL
ALBUMIN SERPL BCP-MCNC: 2.1 G/DL (ref 3.5–5)
ALP SERPL-CCNC: 65 U/L (ref 46–116)
ALT SERPL W P-5'-P-CCNC: 20 U/L (ref 12–78)
ANION GAP SERPL CALCULATED.3IONS-SCNC: 9 MMOL/L (ref 4–13)
APTT PPP: 46 SECONDS (ref 23–37)
AST SERPL W P-5'-P-CCNC: 20 U/L (ref 5–45)
BACTERIA UR QL AUTO: ABNORMAL /HPF
BASOPHILS # BLD AUTO: 0.02 THOUSANDS/ΜL (ref 0–0.1)
BASOPHILS NFR BLD AUTO: 0 % (ref 0–1)
BILIRUB SERPL-MCNC: 0.63 MG/DL (ref 0.2–1)
BILIRUB UR QL STRIP: NEGATIVE
BLD GP AB SCN SERPL QL: NEGATIVE
BUN SERPL-MCNC: 58 MG/DL (ref 5–25)
CALCIUM ALBUM COR SERPL-MCNC: 9.7 MG/DL (ref 8.3–10.1)
CALCIUM SERPL-MCNC: 8.2 MG/DL (ref 8.3–10.1)
CHLORIDE SERPL-SCNC: 107 MMOL/L (ref 100–108)
CLARITY UR: ABNORMAL
CO2 SERPL-SCNC: 26 MMOL/L (ref 21–32)
COLOR UR: YELLOW
CREAT SERPL-MCNC: 2.83 MG/DL (ref 0.6–1.3)
EOSINOPHIL # BLD AUTO: 0.2 THOUSAND/ΜL (ref 0–0.61)
EOSINOPHIL NFR BLD AUTO: 3 % (ref 0–6)
ERYTHROCYTE [DISTWIDTH] IN BLOOD BY AUTOMATED COUNT: 18.1 % (ref 11.6–15.1)
FLUAV RNA RESP QL NAA+PROBE: NEGATIVE
FLUBV RNA RESP QL NAA+PROBE: NEGATIVE
GFR SERPL CREATININE-BSD FRML MDRD: 14 ML/MIN/1.73SQ M
GLUCOSE SERPL-MCNC: 82 MG/DL (ref 65–140)
GLUCOSE UR STRIP-MCNC: NEGATIVE MG/DL
HCT VFR BLD AUTO: 24.9 % (ref 34.8–46.1)
HGB BLD-MCNC: 7.3 G/DL (ref 11.5–15.4)
HGB UR QL STRIP.AUTO: ABNORMAL
IMM GRANULOCYTES # BLD AUTO: 0.04 THOUSAND/UL (ref 0–0.2)
IMM GRANULOCYTES NFR BLD AUTO: 1 % (ref 0–2)
INR PPP: 2.22 (ref 0.84–1.19)
KETONES UR STRIP-MCNC: NEGATIVE MG/DL
LACTATE SERPL-SCNC: 0.6 MMOL/L (ref 0.5–2)
LEUKOCYTE ESTERASE UR QL STRIP: ABNORMAL
LYMPHOCYTES # BLD AUTO: 3.48 THOUSANDS/ΜL (ref 0.6–4.47)
LYMPHOCYTES NFR BLD AUTO: 49 % (ref 14–44)
MCH RBC QN AUTO: 25.9 PG (ref 26.8–34.3)
MCHC RBC AUTO-ENTMCNC: 29.3 G/DL (ref 31.4–37.4)
MCV RBC AUTO: 88 FL (ref 82–98)
MONOCYTES # BLD AUTO: 0.33 THOUSAND/ΜL (ref 0.17–1.22)
MONOCYTES NFR BLD AUTO: 5 % (ref 4–12)
NEUTROPHILS # BLD AUTO: 2.89 THOUSANDS/ΜL (ref 1.85–7.62)
NEUTS SEG NFR BLD AUTO: 42 % (ref 43–75)
NITRITE UR QL STRIP: POSITIVE
NON-SQ EPI CELLS URNS QL MICRO: ABNORMAL /HPF
NRBC BLD AUTO-RTO: 0 /100 WBCS
NT-PROBNP SERPL-MCNC: ABNORMAL PG/ML
OTHER STN SPEC: ABNORMAL
PH UR STRIP.AUTO: 5.5 [PH] (ref 4.5–8)
PLATELET # BLD AUTO: 196 THOUSANDS/UL (ref 149–390)
PMV BLD AUTO: 9.5 FL (ref 8.9–12.7)
POTASSIUM SERPL-SCNC: 4.6 MMOL/L (ref 3.5–5.3)
PROCALCITONIN SERPL-MCNC: 0.07 NG/ML
PROCALCITONIN SERPL-MCNC: 0.09 NG/ML
PROT SERPL-MCNC: 6.3 G/DL (ref 6.4–8.2)
PROT UR STRIP-MCNC: ABNORMAL MG/DL
PROTHROMBIN TIME: 24.2 SECONDS (ref 11.6–14.5)
RBC # BLD AUTO: 2.82 MILLION/UL (ref 3.81–5.12)
RBC #/AREA URNS AUTO: ABNORMAL /HPF
RH BLD: POSITIVE
RSV RNA RESP QL NAA+PROBE: NEGATIVE
SARS-COV-2 RNA RESP QL NAA+PROBE: NEGATIVE
SODIUM SERPL-SCNC: 142 MMOL/L (ref 136–145)
SP GR UR STRIP.AUTO: 1.01 (ref 1–1.03)
SPECIMEN EXPIRATION DATE: NORMAL
UROBILINOGEN UR QL STRIP.AUTO: 0.2 E.U./DL
WBC # BLD AUTO: 6.96 THOUSAND/UL (ref 4.31–10.16)
WBC #/AREA URNS AUTO: ABNORMAL /HPF

## 2021-03-10 PROCEDURE — 99223 1ST HOSP IP/OBS HIGH 75: CPT | Performed by: INTERNAL MEDICINE

## 2021-03-10 PROCEDURE — 87040 BLOOD CULTURE FOR BACTERIA: CPT | Performed by: EMERGENCY MEDICINE

## 2021-03-10 PROCEDURE — 83605 ASSAY OF LACTIC ACID: CPT | Performed by: EMERGENCY MEDICINE

## 2021-03-10 PROCEDURE — 81001 URINALYSIS AUTO W/SCOPE: CPT

## 2021-03-10 PROCEDURE — 86850 RBC ANTIBODY SCREEN: CPT | Performed by: EMERGENCY MEDICINE

## 2021-03-10 PROCEDURE — 71045 X-RAY EXAM CHEST 1 VIEW: CPT

## 2021-03-10 PROCEDURE — 99285 EMERGENCY DEPT VISIT HI MDM: CPT | Performed by: EMERGENCY MEDICINE

## 2021-03-10 PROCEDURE — 84145 PROCALCITONIN (PCT): CPT | Performed by: EMERGENCY MEDICINE

## 2021-03-10 PROCEDURE — 85610 PROTHROMBIN TIME: CPT | Performed by: EMERGENCY MEDICINE

## 2021-03-10 PROCEDURE — 87077 CULTURE AEROBIC IDENTIFY: CPT

## 2021-03-10 PROCEDURE — 85025 COMPLETE CBC W/AUTO DIFF WBC: CPT | Performed by: EMERGENCY MEDICINE

## 2021-03-10 PROCEDURE — 87077 CULTURE AEROBIC IDENTIFY: CPT | Performed by: EMERGENCY MEDICINE

## 2021-03-10 PROCEDURE — 87186 SC STD MICRODIL/AGAR DIL: CPT

## 2021-03-10 PROCEDURE — 86901 BLOOD TYPING SEROLOGIC RH(D): CPT | Performed by: EMERGENCY MEDICINE

## 2021-03-10 PROCEDURE — 96360 HYDRATION IV INFUSION INIT: CPT

## 2021-03-10 PROCEDURE — 86900 BLOOD TYPING SEROLOGIC ABO: CPT | Performed by: EMERGENCY MEDICINE

## 2021-03-10 PROCEDURE — 85730 THROMBOPLASTIN TIME PARTIAL: CPT | Performed by: EMERGENCY MEDICINE

## 2021-03-10 PROCEDURE — 0241U HB NFCT DS VIR RESP RNA 4 TRGT: CPT | Performed by: EMERGENCY MEDICINE

## 2021-03-10 PROCEDURE — 87086 URINE CULTURE/COLONY COUNT: CPT

## 2021-03-10 PROCEDURE — 36415 COLL VENOUS BLD VENIPUNCTURE: CPT | Performed by: EMERGENCY MEDICINE

## 2021-03-10 PROCEDURE — 83880 ASSAY OF NATRIURETIC PEPTIDE: CPT | Performed by: INTERNAL MEDICINE

## 2021-03-10 PROCEDURE — 84145 PROCALCITONIN (PCT): CPT | Performed by: INTERNAL MEDICINE

## 2021-03-10 PROCEDURE — 99285 EMERGENCY DEPT VISIT HI MDM: CPT

## 2021-03-10 PROCEDURE — 80053 COMPREHEN METABOLIC PANEL: CPT | Performed by: EMERGENCY MEDICINE

## 2021-03-10 RX ORDER — ACETAMINOPHEN 325 MG/1
650 TABLET ORAL EVERY 6 HOURS PRN
Status: DISCONTINUED | OUTPATIENT
Start: 2021-03-10 | End: 2021-03-22 | Stop reason: HOSPADM

## 2021-03-10 RX ORDER — WARFARIN SODIUM 2 MG/1
2 TABLET ORAL
Status: DISCONTINUED | OUTPATIENT
Start: 2021-03-10 | End: 2021-03-13

## 2021-03-10 RX ORDER — ACETAMINOPHEN 325 MG/1
650 TABLET ORAL ONCE
Status: COMPLETED | OUTPATIENT
Start: 2021-03-10 | End: 2021-03-10

## 2021-03-10 RX ORDER — OXYCODONE HYDROCHLORIDE 5 MG/1
5 TABLET ORAL EVERY 4 HOURS PRN
Status: ON HOLD | COMMUNITY
End: 2021-03-22 | Stop reason: SDUPTHER

## 2021-03-10 RX ORDER — ACETAMINOPHEN 325 MG/1
650 TABLET ORAL EVERY 6 HOURS PRN
COMMUNITY

## 2021-03-10 RX ORDER — SENNOSIDES 8.6 MG
8.6 TABLET ORAL
Status: DISCONTINUED | OUTPATIENT
Start: 2021-03-10 | End: 2021-03-22 | Stop reason: HOSPADM

## 2021-03-10 RX ORDER — LEVOTHYROXINE SODIUM 0.03 MG/1
25 TABLET ORAL
Status: DISCONTINUED | OUTPATIENT
Start: 2021-03-11 | End: 2021-03-22 | Stop reason: HOSPADM

## 2021-03-10 RX ORDER — CARVEDILOL 6.25 MG/1
12.5 TABLET ORAL 2 TIMES DAILY WITH MEALS
Status: DISCONTINUED | OUTPATIENT
Start: 2021-03-10 | End: 2021-03-12

## 2021-03-10 RX ORDER — TORSEMIDE 20 MG/1
20 TABLET ORAL DAILY
COMMUNITY
End: 2021-03-22 | Stop reason: HOSPADM

## 2021-03-10 RX ORDER — ISOSORBIDE DINITRATE 10 MG/1
10 TABLET ORAL
Status: DISCONTINUED | OUTPATIENT
Start: 2021-03-10 | End: 2021-03-22 | Stop reason: HOSPADM

## 2021-03-10 RX ORDER — AMLODIPINE BESYLATE 5 MG/1
5 TABLET ORAL DAILY
COMMUNITY

## 2021-03-10 RX ORDER — OXYCODONE HYDROCHLORIDE 5 MG/1
2.5 CAPSULE ORAL EVERY 4 HOURS PRN
COMMUNITY
End: 2021-03-22 | Stop reason: HOSPADM

## 2021-03-10 RX ORDER — SACCHAROMYCES BOULARDII 250 MG
250 CAPSULE ORAL 2 TIMES DAILY
Status: DISCONTINUED | OUTPATIENT
Start: 2021-03-10 | End: 2021-03-22 | Stop reason: HOSPADM

## 2021-03-10 RX ORDER — HYDRALAZINE HYDROCHLORIDE 25 MG/1
50 TABLET, FILM COATED ORAL EVERY 8 HOURS SCHEDULED
Status: DISCONTINUED | OUTPATIENT
Start: 2021-03-10 | End: 2021-03-12

## 2021-03-10 RX ORDER — FUROSEMIDE 10 MG/ML
80 INJECTION INTRAMUSCULAR; INTRAVENOUS
Status: DISCONTINUED | OUTPATIENT
Start: 2021-03-11 | End: 2021-03-13

## 2021-03-10 RX ORDER — FUROSEMIDE 10 MG/ML
40 INJECTION INTRAMUSCULAR; INTRAVENOUS DAILY
COMMUNITY
End: 2021-03-22 | Stop reason: HOSPADM

## 2021-03-10 RX ORDER — WARFARIN SODIUM 2 MG/1
2 TABLET ORAL
COMMUNITY

## 2021-03-10 RX ORDER — ECHINACEA PURPUREA EXTRACT 125 MG
1 TABLET ORAL AS NEEDED
COMMUNITY
End: 2021-03-22 | Stop reason: HOSPADM

## 2021-03-10 RX ADMIN — ISOSORBIDE DINITRATE 10 MG: 10 TABLET ORAL at 17:59

## 2021-03-10 RX ADMIN — CARVEDILOL 12.5 MG: 6.25 TABLET, FILM COATED ORAL at 17:41

## 2021-03-10 RX ADMIN — SODIUM CHLORIDE 1000 ML: 0.9 INJECTION, SOLUTION INTRAVENOUS at 13:18

## 2021-03-10 RX ADMIN — CEFTRIAXONE SODIUM 1000 MG: 10 INJECTION, POWDER, FOR SOLUTION INTRAVENOUS at 14:57

## 2021-03-10 RX ADMIN — ACETAMINOPHEN 650 MG: 325 TABLET ORAL at 13:23

## 2021-03-10 RX ADMIN — Medication 250 MG: at 17:41

## 2021-03-10 RX ADMIN — WARFARIN SODIUM 2 MG: 2 TABLET ORAL at 18:00

## 2021-03-10 RX ADMIN — HYDRALAZINE HYDROCHLORIDE 50 MG: 25 TABLET ORAL at 21:04

## 2021-03-10 RX ADMIN — CEFEPIME HYDROCHLORIDE 1000 MG: 1 INJECTION, POWDER, FOR SOLUTION INTRAMUSCULAR; INTRAVENOUS at 20:16

## 2021-03-10 NOTE — PLAN OF CARE
Problem: Potential for Falls  Goal: Patient will remain free of falls  Description: INTERVENTIONS:  - Assess patient frequently for physical needs  -  Identify cognitive and physical deficits and behaviors that affect risk of falls  -  [place] fall precautions as indicated by assessment   - Educate patient/family on patient safety including physical limitations  - Instruct patient to call for assistance with activity based on assessment  - Modify environment to reduce risk of injury  - Consider OT/PT consult to assist with strengthening/mobility  Outcome: Progressing     Problem: Prexisting or High Potential for Compromised Skin Integrity  Goal: Skin integrity is maintained or improved  Description: INTERVENTIONS:  - Identify patients at risk for skin breakdown  - Assess and monitor skin integrity  - Assess and monitor nutrition and hydration status  - Monitor labs   - Assess for incontinence   - Turn and reposition patient  - Assist with mobility/ambulation  - Relieve pressure over bony prominences  - Avoid friction and shearing  - Provide appropriate hygiene as needed including keeping skin clean and dry  - Evaluate need for skin moisturizer/barrier cream  - Collaborate with interdisciplinary team   - Patient/family teaching  - Consider wound care consult   Outcome: Progressing     Problem: Nutrition/Hydration-ADULT  Goal: Nutrient/Hydration intake appropriate for improving, restoring or maintaining nutritional needs  Description: Monitor and assess patient's nutrition/hydration status for malnutrition  Collaborate with interdisciplinary team and initiate plan and interventions as ordered  Monitor patient's weight and dietary intake as ordered or per policy  Utilize nutrition screening tool and intervene as necessary  Determine patient's food preferences and provide high-protein, high-caloric foods as appropriate       INTERVENTIONS:  - Monitor oral intake, urinary output, labs, and treatment plans  - Assess nutrition and hydration status and recommend course of action  - Evaluate amount of meals eaten  - Assist patient with eating if necessary   - Allow adequate time for meals  - Recommend/ encourage appropriate diets, oral nutritional supplements, and vitamin/mineral supplements  - Order, calculate, and assess calorie counts as needed  - Recommend, monitor, and adjust tube feedings and TPN/PPN based on assessed needs  - Assess need for intravenous fluids  - Provide specific nutrition/hydration education as appropriate  - Include patient/family/caregiver in decisions related to nutrition  Outcome: Progressing

## 2021-03-10 NOTE — ASSESSMENT & PLAN NOTE
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

## 2021-03-10 NOTE — ASSESSMENT & PLAN NOTE
· She had recent iron studies this January with low iron and low TIBC  · This is likely due to iron deficiency as well as chronic kidney disease/chronic inflammation  · Watch for bleeding  · Transfuse as needed for hemoglobin less than 7  · If blood cultures are negative, will give IV Venofer

## 2021-03-10 NOTE — ASSESSMENT & PLAN NOTE
Wt Readings from Last 3 Encounters:   03/07/21 61 6 kg (135 lb 12 9 oz)   02/03/21 75 1 kg (165 lb 9 1 oz)   01/06/21 62 1 kg (136 lb 14 4 oz)     She has vascular congestion and pleural effusions on chest x-ray  Continue Coreg with hold parameters  Continue isosorbide and hydralazine  Diuretics will be changed from torsemide to IV furosemide

## 2021-03-10 NOTE — ASSESSMENT & PLAN NOTE
Lab Results   Component Value Date    EGFR 14 03/10/2021    EGFR 15 03/07/2021    EGFR 18 02/03/2021    CREATININE 2 83 (H) 03/10/2021    CREATININE 2 74 (H) 03/07/2021    CREATININE 2 32 (H) 02/03/2021     She is fluid overloaded, symptomatic, with vascular congestion on x-ray and weeping wounds of both lower extremity  Her GFR is low and will require higher dose of diuretics  Discussed with Nephrology and she will be started on Lasix 80 mg IV b i d    Her electrolytes and renal function will be monitored closely

## 2021-03-10 NOTE — ASSESSMENT & PLAN NOTE
· Abnormal urinalysis with encephalopathy    High suspicion for UTI  · She will be started on cefepime 1 g Q 12 to cover for pneumonia and UTI

## 2021-03-10 NOTE — ASSESSMENT & PLAN NOTE
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

## 2021-03-10 NOTE — H&P
S[facility] 48  H&P- Macy Portillo 5/18/1930, 80 y o  female MRN: [id number]  Unit/Bed#: ED 17 Encounter: [id number]  Primary Care Provider: Gia Britt MD   Date and time admitted to hospital: 3/10/2021 12:45 PM    * Acute metabolic encephalopathy  Assessment & Plan  · Due to pneumonia, UTI, hypoxemia on top of advanced age  · See plan for individual problems below    HCAP (healthcare-associated pneumonia)  Assessment & Plan  · Chest x-ray with left lower lobe infiltrate associated with confusion  · She came from the nursing home and was hospitalized last month  · She received ceftriaxone in the ER  · Will change her antibiotic to cefepime  · Trend procalcitonin  Follow-up culture results    UTI (urinary tract infection)  Assessment & Plan  · Abnormal urinalysis with encephalopathy  High suspicion for UTI  · She will be started on cefepime 1 g Q 12 to cover for pneumonia and UTI    Acute on chronic diastolic congestive heart failure (HCC)  Assessment & Plan  Wt Readings from Last 3 Encounters:   03/07/21 61 6 kg (135 lb 12 9 oz)   02/03/21 75 1 kg (165 lb 9 1 oz)   01/06/21 62 1 kg (136 lb 14 4 oz)     She has vascular congestion and pleural effusions on chest x-ray  Continue Coreg with hold parameters  Continue isosorbide and hydralazine  Diuretics will be changed from torsemide to IV furosemide        Acute kidney injury superimposed on chronic kidney disease [facility])  Assessment & Plan  Lab Results   Component Value Date    EGFR 14 03/10/2021    EGFR 15 03/07/2021    EGFR 18 02/03/2021    CREATININE 2 83 (H) 03/10/2021    CREATININE 2 74 (H) 03/07/2021    CREATININE 2 32 (H) 02/03/2021     She is fluid overloaded, symptomatic, with vascular congestion on x-ray and weeping wounds of both lower extremity  Her GFR is low and will require higher dose of diuretics  Discussed with Nephrology and she will be started on Lasix 80 mg IV b i d    Her electrolytes and renal function will be monitored closely    Multiple open wounds of lower extremity  Assessment & Plan  · Consult Podiatry plus-minus wound care team regarding chronic nonhealing wounds on both lower extremities present on admission    Iron deficiency anemia secondary to inadequate dietary iron intake  Assessment & Plan  · She had recent iron studies this January with low iron and low TIBC  · This is likely due to iron deficiency as well as chronic kidney disease/chronic inflammation  · Watch for bleeding  · Transfuse as needed for hemoglobin less than 7  · If blood cultures are negative, will give IV Venofer    Chronic respiratory failure with hypoxia (HCC)  Assessment & Plan  On chronic 3L via NC   She required 4L at the nursing    Essential hypertension  Assessment & Plan  · Continue Coreg, hydralazine and isosorbide with hold parameters    PAD (peripheral artery disease) ([facility] [address] )  Assessment & Plan  · She had a lower extremity arterial Doppler in December which showed significant stenosis/occlusion of arteries of both lower extremities  · On her last admission she was evaluated by vascular and angiography was recommended however due to her kidney disease this was not pursued  · She is on anticoagulation with warfarin      VTE Prophylaxis: Warfarin (Coumadin)    Code Status:  Full code per   POLST: There is no POLST form on file for this patient (pre-hospital)  Discussion with family:   Alena Sharpe    Anticipated Length of Stay:  Patient will be admitted on an Inpatient basis with an anticipated length of stay of  at least 2 midnights  Justification for Hospital Stay:  Pneumonia UTI encephalopathy    Total Time for Visit, including Counseling / Coordination of Care: 1 hour  Greater than 50% of this total time spent on direct patient counseling and coordination of care      Chief Complaint:   "Delirious"    History of Present Illness:    Soledad Rivero is a 80 y o  female who was sent from [facility] [address] due to change in mental status  The patient was a poor historian so history was obtained from [facility] staff and her   I spoke with her nurse Alex Sin who reported that this morning the patient was delirious and was hallucinating  She reportedly was seeing fires and doors that were not there  She had a fever of 100 6 and oxygen level in the 80s requiring an up titration of her oxygen from 3 L to 4 L  At baseline she has episodes of confusion but not this severe  She has known history of CKD, congestive heart failure her in chronic nonhealing wounds of the lower extremities  [place] [facility] [place] but she has been there for a while according to her nurse  I reviewed her records and her diuretics have been adjusted recently  At 1 time she required IV Lasix and then torsemide  At the time of my examination the patient was still confused  She did not know why she was in the hospital   She could not tell me where her discomfort was but she knew she was not comfortable  Review of Systems:    Review of Systems   Unable to perform ROS: Mental status change   Constitutional: Positive for fever  HENT: Negative  Respiratory: Positive for shortness of breath  Cardiovascular: Negative for chest pain  Gastrointestinal: Negative  Genitourinary:        Incontinence per nursing record   Psychiatric/Behavioral: Positive for confusion  Past Medical and Surgical History:     Past Medical History:   Diagnosis Date    Anemia 1/18/2021    Chronic kidney disease     Hypertension        No past surgical history on file  Meds/Allergies:    Prior to Admission medications    Medication Sig Start Date End Date Taking?  Authorizing Provider   amLODIPine (NORVASC) 5 mg tablet Take 5 mg by mouth daily   Yes Historical Provider, MD   carvedilol (COREG) 12 5 mg tablet Take 1 tablet (12 5 mg total) by mouth 2 (two) times a day with meals 1/6/21 3/10/21 Yes Noemi Becker, DO   ergocalciferol (VITAMIN D2) 50,000 units take 1 capsule by mouth every week ON SATURDAY 12/14/20  Yes Historical Provider, MD   furosemide (LASIX) 10 mg/mL Infuse 40 mg into a venous catheter daily 40mg/4mL   Yes Historical Provider, MD   hydrALAZINE (APRESOLINE) 50 mg tablet Take 1 tablet (50 mg total) by mouth every 8 (eight) hours 2/3/21  Yes Sy Abdi MD   isosorbide dinitrate (ISORDIL) 10 mg tablet Take 1 tablet (10 mg total) by mouth 3 (three) times daily after meals 1/6/21 3/10/21 Yes Noemi Becker DO   levothyroxine 25 mcg tablet Take 1 tablet (25 mcg total) by mouth daily in the early morning 1/15/21  Yes Jenna Eason DO   potassium chloride (K-DUR,KLOR-CON) 20 mEq tablet Take 1 tablet (20 mEq total) by mouth daily 1/7/21 3/10/21 Yes Noemi Becker DO   saccharomyces boulardii (FLORASTOR) 250 mg capsule Take 1 capsule (250 mg total) by mouth 2 (two) times a day 2/3/21  Yes Sy Abdi MD   sodium chloride (OCEAN) 0 65 % nasal spray 1 spray into each nostril as needed for congestion   Yes Historical Provider, MD   torsemide (DEMADEX) 20 mg tablet Take 20 mg by mouth daily   Yes Historical Provider, MD   warfarin (COUMADIN) 2 mg tablet Take 2 mg by mouth daily   Yes Historical Provider, MD   acetaminophen (TYLENOL) 325 mg tablet Take 650 mg by mouth every 6 (six) hours as needed for mild pain    Historical Provider, MD   oxyCODONE (OXY-IR) 5 MG capsule Take 2 5 mg by mouth every 4 (four) hours as needed for moderate pain    Historical Provider, MD   oxyCODONE (ROXICODONE) 5 mg immediate release tablet Take 5 mg by mouth every 4 (four) hours as needed for severe pain    Historical Provider, MD   senna (SENOKOT) 8 6 mg Take 1 tablet (8 6 mg total) by mouth daily at bedtime as needed for constipation 1/6/21 3/10/21  Noemi Becker DO   acetaminophen (TYLENOL) 325 mg tablet Take 2 tablets (650 mg total) by mouth every 6 (six) hours as needed for mild pain 2/3/21 3/10/21  Sy Abdi MD   collagenase Jordan Cuellar) ointment Apply topically daily 2/4/21 3/10/21  Robbie Graves MD   oxyCODONE (ROXICODONE) 5 mg immediate release tablet Take 5 mg by mouth every 4 (four) hours as needed for moderate pain  3/10/21  Historical Provider, MD     I have reveiwed home medications using records provided by 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  Patient Pre-hospital Living Situation:  Currently at [facility] [place]  Patient Pre-hospital Level of Mobility:  Usually in a wheelchair but she can walk with a walker  Patient Pre-hospital Diet Restrictions:  Lactose intolerance  Substance Use History:   Social History     Substance and Sexual Activity   Alcohol Use Never    Frequency: Never     Social History     Tobacco Use   Smoking Status Never Smoker   Smokeless Tobacco Never Used     Social History     Substance and Sexual Activity   Drug Use Never       Family History: According to her , her parents lived to be in their [de-identified] without any medical issues known to him  She has 2 brothers in their late 80s, both healthy according to him    Physical Exam:     Vitals:   Blood Pressure: 149/82 (03/10/21 1515)  Pulse: 70 (03/10/21 1515)  Temperature: 100 °F (37 8 °C) (03/10/21 1305)  Temp Source: Rectal (03/10/21 1305)  Respirations: 20 (03/10/21 1515)  SpO2: 97 % (03/10/21 1515)    Physical Exam  Constitutional:       Appearance: She is ill-appearing  HENT:      Head: Normocephalic and atraumatic  Nose: No rhinorrhea  Eyes:      General: No scleral icterus  Neck:      Musculoskeletal: Neck supple  Cardiovascular:      Rate and Rhythm: Regular rhythm  Heart sounds: No murmur  No gallop  Pulmonary:      Comments: Left base crackles  Abdominal:      General: Abdomen is flat  Palpations: Abdomen is soft     Musculoskeletal:      Right lower leg: Edema present  Left lower leg: Edema present  Skin:     Comments: Chronic bilateral wounds of both legs and feet  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  She has weeping venous stasis ulcer of the left calf   Neurological:      Mental Status: She is alert  Comments: Oriented to person   Psychiatric:      Comments: Depressed mood       Additional Data:     Lab Results: I have personally reviewed pertinent reports  Results from last 7 days   Lab Units 03/10/21  1318   WBC Thousand/uL 6 96   HEMOGLOBIN g/dL 7 3*   HEMATOCRIT % 24 9*   PLATELETS Thousands/uL 196   NEUTROS PCT % 42*   LYMPHS PCT % 49*   MONOS PCT % 5   EOS PCT % 3     Results from last 7 days   Lab Units 03/10/21  1318   SODIUM mmol/L 142   POTASSIUM mmol/L 4 6   CHLORIDE mmol/L 107   CO2 mmol/L 26   BUN mg/dL 58*   CREATININE mg/dL 2 83*   ANION GAP mmol/L 9   CALCIUM mg/dL 8 2*   ALBUMIN g/dL 2 1*   TOTAL BILIRUBIN mg/dL 0 63   ALK PHOS U/L 65   ALT U/L 20   AST U/L 20   GLUCOSE RANDOM mg/dL 82     Results from last 7 days   Lab Units 03/10/21  1319   INR  2 22*             Results from last 7 days   Lab Units 03/10/21  1318   LACTIC ACID mmol/L 0 6       Imaging: I have personally reviewed pertinent films in PACS    XR chest 1 view portable   ED Interpretation by Caroline Lopez MD (03/10 8679)   Left lower lobe consolidation  Left-sided pleural effusion appears unchanged  X-ray interpreted by myself  Final Result by Salley Schaumann, MD (03/10 4381)      Mild to moderate congestive changes  Small right and moderate left effusion  Left lung base atelectasis or infiltrate  Workstation performed: [id number]             EKG, Pathology, and Other Studies Reviewed on Admission:   · EKG:  No EKG done today  Last EKG on 03/07 sinus rhythm with occasional PVCs    Allscripts / Epic Records Reviewed: Yes     ** Please Note: This note has been constructed using a voice recognition system   **

## 2021-03-10 NOTE — ASSESSMENT & PLAN NOTE
· Chest x-ray with left lower lobe infiltrate associated with confusion  · She came from the nursing home and was hospitalized last month  · She received ceftriaxone in the ER  · Will change her antibiotic to cefepime  · Trend procalcitonin    Follow-up culture results

## 2021-03-10 NOTE — ED PROVIDER NOTES
History  Chief Complaint   Patient presents with    Shortness of Breath     pt arrives via ems from [place] with c/o sob hx copd      This is a 68-year-old female with a history of chronic respiratory failure on oxygen at baseline, chronic kidney disease, CHF who presents with fever and shortness of breath  Per report, the patient started with a fever and shortness of breath today  Patient currently has no complaints  She is alert and oriented x2 (person and place)  She does appear tachypneic  Denies fever/chills, nausea/vomiting, lightheadedness/dizziness, numbness/weakness, headache, change in vision, URI symptoms, neck pain, chest pain, palpitations, shortness of breath, cough, back pain, flank pain, abdominal pain, diarrhea, hematochezia, melena, dysuria, hematuria, abnormal vaginal discharge/bleeding  Prior to Admission Medications   Prescriptions Last Dose Informant Patient Reported?  Taking?   acetaminophen (TYLENOL) 325 mg tablet Unknown at Unknown time  Yes No   Sig: Take 650 mg by mouth every 6 (six) hours as needed for mild pain   amLODIPine (NORVASC) 5 mg tablet Past Week at Unknown time  Yes Yes   Sig: Take 5 mg by mouth daily   carvedilol (COREG) 12 5 mg tablet 3/10/2021 at Unknown time  No Yes   Sig: Take 1 tablet (12 5 mg total) by mouth 2 (two) times a day with meals   ergocalciferol (VITAMIN D2) 50,000 units Past Week at Unknown time  Yes Yes   Sig: take 1 capsule by mouth every week ON SATURDAY   furosemide (LASIX) 10 mg/mL Past Week at Unknown time  Yes Yes   Sig: Infuse 40 mg into a venous catheter daily 40mg/4mL   hydrALAZINE (APRESOLINE) 50 mg tablet 3/10/2021 at Unknown time  No Yes   Sig: Take 1 tablet (50 mg total) by mouth every 8 (eight) hours   isosorbide dinitrate (ISORDIL) 10 mg tablet 3/10/2021 at Unknown time  No Yes   Sig: Take 1 tablet (10 mg total) by mouth 3 (three) times daily after meals   levothyroxine 25 mcg tablet 3/10/2021 at Unknown time  No Yes   Sig: Take 1 tablet (25 mcg total) by mouth daily in the early morning   oxyCODONE (OXY-IR) 5 MG capsule Unknown at Unknown time  Yes No   Sig: Take 2 5 mg by mouth every 4 (four) hours as needed for moderate pain   oxyCODONE (ROXICODONE) 5 mg immediate release tablet Unknown at Unknown time  Yes No   Sig: Take 5 mg by mouth every 4 (four) hours as needed for severe pain   potassium chloride (K-DUR,KLOR-CON) 20 mEq tablet 3/10/2021 at Unknown time  No Yes   Sig: Take 1 tablet (20 mEq total) by mouth daily   saccharomyces boulardii (FLORASTOR) 250 mg capsule 3/10/2021 at Unknown time  No Yes   Sig: Take 1 capsule (250 mg total) by mouth 2 (two) times a day   senna (SENOKOT) 8 6 mg Unknown at Unknown time  No No   Sig: Take 1 tablet (8 6 mg total) by mouth daily at bedtime as needed for constipation   sodium chloride (OCEAN) 0 65 % nasal spray Past Week at Unknown time  Yes Yes   Si spray into each nostril as needed for congestion   torsemide (DEMADEX) 20 mg tablet 3/10/2021 at Unknown time  Yes Yes   Sig: Take 20 mg by mouth daily   warfarin (COUMADIN) 2 mg tablet Past Month at Unknown time  Yes Yes   Sig: Take 2 mg by mouth daily      Facility-Administered Medications: None       Past Medical History:   Diagnosis Date    Anemia 2021    Chronic kidney disease     Hypertension        No past surgical history on file  Family History   Problem Relation Age of Onset    No Known Problems Mother     No Known Problems Father      I have reviewed and agree with the history as documented  E-Cigarette/Vaping    E-Cigarette Use Never User      E-Cigarette/Vaping Substances    Nicotine No     THC No     CBD No     Flavoring No     Other No     Unknown No      Social History     Tobacco Use    Smoking status: Never Smoker    Smokeless tobacco: Never Used   Substance Use Topics    Alcohol use: Never     Frequency: Never    Drug use: Never       Review of Systems   Constitutional: Positive for fever   Negative for chills  HENT: Negative for rhinorrhea, sore throat and trouble swallowing  Eyes: Negative for photophobia and visual disturbance  Respiratory: Positive for shortness of breath  Negative for cough and chest tightness  Cardiovascular: Negative for chest pain, palpitations and leg swelling  Gastrointestinal: Negative for abdominal pain, blood in stool, diarrhea, nausea and vomiting  Endocrine: Negative for polyuria  Genitourinary: Negative for dysuria, flank pain, hematuria, vaginal bleeding and vaginal discharge  Musculoskeletal: Negative for back pain and neck pain  Skin: Negative for color change and rash  Allergic/Immunologic: Negative for immunocompromised state  Neurological: Negative for dizziness, weakness, light-headedness, numbness and headaches  All other systems reviewed and are negative  Physical Exam  Physical Exam  Constitutional:       General: She is not in acute distress  Appearance: She is cachectic  Comments: Chronically ill-appearing  HENT:      Mouth/Throat:      Pharynx: Uvula midline  Eyes:      Conjunctiva/sclera: Conjunctivae normal       Pupils: Pupils are equal, round, and reactive to light  Neck:      Thyroid: No thyroid mass or thyromegaly  Trachea: Trachea normal    Cardiovascular:      Rate and Rhythm: Normal rate and regular rhythm  Pulses: Normal pulses  Heart sounds: Normal heart sounds  No murmur  Pulmonary:      Effort: Pulmonary effort is normal  Tachypnea present  Breath sounds: Normal breath sounds  Abdominal:      General: Bowel sounds are normal       Palpations: Abdomen is soft  Tenderness: There is no abdominal tenderness  There is no guarding or rebound  Musculoskeletal:      Comments: Wounds to bilateral lower extremities undressed  Clean bases  No evidence of infection  She does have erythema surrounding, however, this may be due to the way she was laying     Skin:     General: Skin is warm and dry    Neurological:      Mental Status: She is alert  Psychiatric:         Speech: Speech normal          Behavior: Behavior normal  Behavior is cooperative  Thought Content: Thought content normal          Vital Signs  ED Triage Vitals [03/10/21 1249]   Temperature Pulse Respirations Blood Pressure SpO2   98 8 °F (37 1 °C) 63 22 129/63 96 %      Temp Source Heart Rate Source Patient Position - Orthostatic VS BP Location FiO2 (%)   Oral Monitor Lying Right arm --      Pain Score       No Pain           Vitals:    03/10/21 1249   BP: 129/63   Pulse: 63   Patient Position - Orthostatic VS: Lying         Visual Acuity      ED Medications  Medications   ceftriaxone (ROCEPHIN) 1 g/50 mL in dextrose IVPB (has no administration in time range)   sodium chloride 0 9 % bolus 1,000 mL (0 mL Intravenous Stopped 3/10/21 1418)   acetaminophen (TYLENOL) tablet 650 mg (650 mg Oral Given 3/10/21 1323)       Diagnostic Studies  Results Reviewed     Procedure Component Value Units Date/Time    Urine Microscopic [[id number]] Collected: 03/10/21 1416    Lab Status: In process Specimen: Urine, Other Updated: 03/10/21 1425    Urine Macroscopic, POC [[id number]]  (Abnormal) Collected: 03/10/21 1416    Lab Status: Final result Specimen: Urine Updated: 03/10/21 1417     Color, UA Yellow     Clarity, UA Slightly Cloudy     pH, UA 5 5     Leukocytes, UA Large     Nitrite, UA Positive     Protein, UA 30 (1+) mg/dl      Glucose, UA Negative mg/dl      Ketones, UA Negative mg/dl      Urobilinogen, UA 0 2 E U /dl      Bilirubin, UA Negative     Blood, UA Small     Specific [place], UA 1 015    Narrative:      CLINITEK RESULT    COVID19, Influenza A/B, RSV PCR, [facility] [[id number]]  (Normal) Collected: 03/10/21 1318    Lab Status: Final result Specimen: Nares from Nasopharyngeal Swab Updated: 03/10/21 1413     SARS-CoV-2 Negative     INFLUENZA A PCR Negative     INFLUENZA B PCR Negative     RSV PCR Negative    Narrative:        This test has been authorized by FDA under an EUA (Emergency Use Assay) for use by authorized laboratories  Clinical caution and judgement should be used with the interpretation of these results with consideration of the clinical impression and other laboratory testing  Testing reported as "Positive" or "Negative" has been proven to be accurate according to standard laboratory validation requirements  All testing is performed with control materials showing appropriate reactivity at standard intervals  Lactic Acid [[id number]]  (Normal) Collected: 03/10/21 1318    Lab Status: Final result Specimen: Blood from Arm, Right Updated: 03/10/21 1405     LACTIC ACID 0 6 mmol/L     Narrative:      Result may be elevated if tourniquet was used during collection      Comprehensive metabolic panel [[id number]]  (Abnormal) Collected: 03/10/21 1318    Lab Status: Final result Specimen: Blood from Arm, Right Updated: 03/10/21 1403     Sodium 142 mmol/L      Potassium 4 6 mmol/L      Chloride 107 mmol/L      CO2 26 mmol/L      ANION GAP 9 mmol/L      BUN 58 mg/dL      Creatinine 2 83 mg/dL      Glucose 82 mg/dL      Calcium 8 2 mg/dL      Corrected Calcium 9 7 mg/dL      AST 20 U/L      ALT 20 U/L      Alkaline Phosphatase 65 U/L      Total Protein 6 3 g/dL      Albumin 2 1 g/dL      Total Bilirubin 0 63 mg/dL      eGFR 14 ml/min/1 73sq m     Narrative:      National Kidney Disease Foundation guidelines for Chronic Kidney Disease (CKD):     Stage 1 with normal or high GFR (GFR > 90 mL/min/1 73 square meters)    Stage 2 Mild CKD (GFR = 60-89 mL/min/1 73 square meters)    Stage 3A Moderate CKD (GFR = 45-59 mL/min/1 73 square meters)    Stage 3B Moderate CKD (GFR = 30-44 mL/min/1 73 square meters)    Stage 4 Severe CKD (GFR = 15-29 mL/min/1 73 square meters)    Stage 5 End Stage CKD (GFR <15 mL/min/1 73 square meters)  Note: GFR calculation is accurate only with a steady state creatinine    Protime-INR [[id number]]  (Abnormal) Collected: 03/10/21 1319    Lab Status: Final result Specimen: Blood from Arm, Right Updated: 03/10/21 1401     Protime 24 2 seconds      INR 2 22    APTT [[id number]]  (Abnormal) Collected: 03/10/21 1319    Lab Status: Final result Specimen: Blood from Arm, Right Updated: 03/10/21 1401     PTT 46 seconds     CBC and differential [[id number]]  (Abnormal) Collected: 03/10/21 1318    Lab Status: Final result Specimen: Blood from Arm, Right Updated: 03/10/21 1335     WBC 6 96 Thousand/uL      RBC 2 82 Million/uL      Hemoglobin 7 3 g/dL      Hematocrit 24 9 %      MCV 88 fL      MCH 25 9 pg      MCHC 29 3 g/dL      RDW 18 1 %      MPV 9 5 fL      Platelets 027 Thousands/uL      nRBC 0 /100 WBCs      Neutrophils Relative 42 %      Immat GRANS % 1 %      Lymphocytes Relative 49 %      Monocytes Relative 5 %      Eosinophils Relative 3 %      Basophils Relative 0 %      Neutrophils Absolute 2 89 Thousands/µL      Immature Grans Absolute 0 04 Thousand/uL      Lymphocytes Absolute 3 48 Thousands/µL      Monocytes Absolute 0 33 Thousand/µL      Eosinophils Absolute 0 20 Thousand/µL      Basophils Absolute 0 02 Thousands/µL     Blood culture #1 [[id number]] Collected: 03/10/21 1319    Lab Status: In process Specimen: Blood from Arm, Right Updated: 03/10/21 1331    Procalcitonin with AM Reflex [[id number]] Collected: 03/10/21 1319    Lab Status: In process Specimen: Blood from Arm, Right Updated: 03/10/21 1330    Blood culture #2 [[id number]] Collected: 03/10/21 1319    Lab Status: In process Specimen: Blood from Arm, Left Updated: 03/10/21 1330                 XR chest 1 view portable   ED Interpretation by Addison Kiser MD (03/10 1339)   Left lower lobe consolidation  Left-sided pleural effusion appears unchanged  X-ray interpreted by myself  Final Result by Dmitry Ngo MD (03/10 1359)      Mild to moderate congestive changes  Small right and moderate left effusion  Left lung base atelectasis or infiltrate  Workstation performed: [id number]                    Procedures  Procedures         ED Course  ED Course as of Mar 10 1438   Wed Mar 10, 2021   1336 8 7 three days ago  Hemoglobin(!): 7 3   1407 Stable ckd   Creatinine(!): 2 83                                           [facility]  Number of Diagnoses or Management Options  Diagnosis management comments: Plan to check labs, urinalysis, chest x-ray, COVID swab  Will give IV fluids and Tylenol  Will check rectal temperature as the patient does feel warm  Patient will likely need to be admitted  Disposition  Final diagnoses:   Fever   Pneumonia   SOB (shortness of breath)   UTI (urinary tract infection)   Anemia     Time reflects when diagnosis was documented in both MDM as applicable and the Disposition within this note     Time User Action Codes Description Comment    3/10/2021  2:30 PM Sheryle Romeo Add [R50 9] Fever     3/10/2021  2:30 PM Sheryle Romeo Add [J18 9] Pneumonia     3/10/2021  2:30 PM Para Found P Add [R06 02] SOB (shortness of breath)     3/10/2021  2:36 PM Para Found P Add [N39 0] UTI (urinary tract infection)     3/10/2021  2:36 PM Sheryle Romeo Add [D64 9] Anemia       ED Disposition     ED Disposition Condition Date/Time Comment    Admit Stable Wed Mar 10, 2021  2:30 PM         Follow-up Information    None         Patient's Medications   Discharge Prescriptions    No medications on file     No discharge procedures on file      PDMP Review     None          ED Provider  Electronically Signed by           Ayo Tovar MD  03/10/21 6650

## 2021-03-11 LAB
ANION GAP SERPL CALCULATED.3IONS-SCNC: 10 MMOL/L (ref 4–13)
BASOPHILS # BLD AUTO: 0.03 THOUSANDS/ΜL (ref 0–0.1)
BASOPHILS NFR BLD AUTO: 1 % (ref 0–1)
BUN SERPL-MCNC: 58 MG/DL (ref 5–25)
CALCIUM SERPL-MCNC: 8.3 MG/DL (ref 8.3–10.1)
CHLORIDE SERPL-SCNC: 107 MMOL/L (ref 100–108)
CO2 SERPL-SCNC: 24 MMOL/L (ref 21–32)
CREAT SERPL-MCNC: 2.78 MG/DL (ref 0.6–1.3)
EOSINOPHIL # BLD AUTO: 0.23 THOUSAND/ΜL (ref 0–0.61)
EOSINOPHIL NFR BLD AUTO: 4 % (ref 0–6)
ERYTHROCYTE [DISTWIDTH] IN BLOOD BY AUTOMATED COUNT: 18.3 % (ref 11.6–15.1)
GFR SERPL CREATININE-BSD FRML MDRD: 14 ML/MIN/1.73SQ M
GLUCOSE SERPL-MCNC: 80 MG/DL (ref 65–140)
HCT VFR BLD AUTO: 25.7 % (ref 34.8–46.1)
HGB BLD-MCNC: 7.5 G/DL (ref 11.5–15.4)
IMM GRANULOCYTES # BLD AUTO: 0.05 THOUSAND/UL (ref 0–0.2)
IMM GRANULOCYTES NFR BLD AUTO: 1 % (ref 0–2)
LYMPHOCYTES # BLD AUTO: 2.67 THOUSANDS/ΜL (ref 0.6–4.47)
LYMPHOCYTES NFR BLD AUTO: 45 % (ref 14–44)
MCH RBC QN AUTO: 26.4 PG (ref 26.8–34.3)
MCHC RBC AUTO-ENTMCNC: 29.2 G/DL (ref 31.4–37.4)
MCV RBC AUTO: 91 FL (ref 82–98)
MONOCYTES # BLD AUTO: 0.44 THOUSAND/ΜL (ref 0.17–1.22)
MONOCYTES NFR BLD AUTO: 8 % (ref 4–12)
NEUTROPHILS # BLD AUTO: 2.41 THOUSANDS/ΜL (ref 1.85–7.62)
NEUTS SEG NFR BLD AUTO: 41 % (ref 43–75)
NRBC BLD AUTO-RTO: 0 /100 WBCS
PLATELET # BLD AUTO: 172 THOUSANDS/UL (ref 149–390)
PMV BLD AUTO: 9.8 FL (ref 8.9–12.7)
POTASSIUM SERPL-SCNC: 4.3 MMOL/L (ref 3.5–5.3)
PROCALCITONIN SERPL-MCNC: 0.07 NG/ML
RBC # BLD AUTO: 2.84 MILLION/UL (ref 3.81–5.12)
SODIUM SERPL-SCNC: 141 MMOL/L (ref 136–145)
WBC # BLD AUTO: 5.83 THOUSAND/UL (ref 4.31–10.16)

## 2021-03-11 PROCEDURE — 80048 BASIC METABOLIC PNL TOTAL CA: CPT | Performed by: INTERNAL MEDICINE

## 2021-03-11 PROCEDURE — 99253 IP/OBS CNSLTJ NEW/EST LOW 45: CPT | Performed by: PODIATRIST

## 2021-03-11 PROCEDURE — 85025 COMPLETE CBC W/AUTO DIFF WBC: CPT | Performed by: INTERNAL MEDICINE

## 2021-03-11 PROCEDURE — 99232 SBSQ HOSP IP/OBS MODERATE 35: CPT | Performed by: STUDENT IN AN ORGANIZED HEALTH CARE EDUCATION/TRAINING PROGRAM

## 2021-03-11 PROCEDURE — 84145 PROCALCITONIN (PCT): CPT | Performed by: EMERGENCY MEDICINE

## 2021-03-11 PROCEDURE — 99255 IP/OBS CONSLTJ NEW/EST HI 80: CPT | Performed by: INTERNAL MEDICINE

## 2021-03-11 RX ADMIN — CEFEPIME HYDROCHLORIDE 1000 MG: 1 INJECTION, POWDER, FOR SOLUTION INTRAMUSCULAR; INTRAVENOUS at 07:49

## 2021-03-11 RX ADMIN — CARVEDILOL 12.5 MG: 6.25 TABLET, FILM COATED ORAL at 17:14

## 2021-03-11 RX ADMIN — ISOSORBIDE DINITRATE 10 MG: 10 TABLET ORAL at 12:12

## 2021-03-11 RX ADMIN — HYDRALAZINE HYDROCHLORIDE 50 MG: 25 TABLET ORAL at 13:36

## 2021-03-11 RX ADMIN — WARFARIN SODIUM 2 MG: 2 TABLET ORAL at 17:15

## 2021-03-11 RX ADMIN — CARVEDILOL 12.5 MG: 6.25 TABLET, FILM COATED ORAL at 07:47

## 2021-03-11 RX ADMIN — ISOSORBIDE DINITRATE 10 MG: 10 TABLET ORAL at 07:48

## 2021-03-11 RX ADMIN — HYDRALAZINE HYDROCHLORIDE 50 MG: 25 TABLET ORAL at 05:03

## 2021-03-11 RX ADMIN — Medication 250 MG: at 07:47

## 2021-03-11 RX ADMIN — LEVOTHYROXINE SODIUM 25 MCG: 25 TABLET ORAL at 05:03

## 2021-03-11 RX ADMIN — FUROSEMIDE 80 MG: 10 INJECTION, SOLUTION INTRAMUSCULAR; INTRAVENOUS at 17:00

## 2021-03-11 RX ADMIN — Medication 250 MG: at 17:14

## 2021-03-11 RX ADMIN — FUROSEMIDE 80 MG: 10 INJECTION, SOLUTION INTRAMUSCULAR; INTRAVENOUS at 07:49

## 2021-03-11 RX ADMIN — CEFEPIME HYDROCHLORIDE 1000 MG: 1 INJECTION, POWDER, FOR SOLUTION INTRAMUSCULAR; INTRAVENOUS at 20:00

## 2021-03-11 RX ADMIN — ISOSORBIDE DINITRATE 10 MG: 10 TABLET ORAL at 17:14

## 2021-03-11 RX ADMIN — HYDRALAZINE HYDROCHLORIDE 50 MG: 25 TABLET ORAL at 21:21

## 2021-03-11 NOTE — ASSESSMENT & PLAN NOTE
LEADs showing occlusion versus high grade stenosis of the proximal thigh portion of the superficial femoral artery with distal reconstitution   - Revascularization not pursued previously due to CKD and concern for progression to renal failure

## 2021-03-11 NOTE — UTILIZATION REVIEW
Notification of Inpatient Admission/Inpatient Authorization Request   This is a Notification of Inpatient Admission for [address]  Be advised that this patient was admitted to our facility under Inpatient Status  Contact Kita Molina at [phone] for additional admission information  [facility]  DEDICATED [phone]  Patient Name:   Glenys Escalera   YOB: 1930       [address]   [address]:   [address]   Tax ID: [id number]  NPI: [id number] Attending Provider/NPI:  Phone:  Address: Enrique Hayden [address] [[id number]]  [phone]  Same as the facility   Place of Service Code: 24 Place of Service Name:  [address] [place] [address]   Start Date: 3/10/21 1438 Discharge Date & Time: No discharge date for patient encounter  Type of Admission: Inpatient Status Discharge Disposition   (if discharged): Home/Self Care   Patient Diagnoses: UTI (urinary tract infection) [N39 0]  Pneumonia [J18 9]  SOB (shortness of breath) [R06 02]  Anemia [D64 9]  Nonhealing nonsurgical wound [T14  8XXA]  Fever [R50 9]  Ulcer of right lower extremity with fat layer exposed ([facility] [address] ) [L97 912]  Acute kidney injury superimposed on chronic kidney disease ([address] ) [N17 9, N18 9]     Orders: Admission Orders (From admission, onward)     Ordered        03/10/21 1438  Inpatient Admission  Once                    Assigned Utilization Review Contact: [address] [place] [address] Utilization Review Department  Phone: [phone]; Fax [phone]  Email: [email or web address]   ATTENTION PAYERS: Please call the assigned Utilization  directly with any questions or concerns ALL voicemails in the department are confidential  Send all requests for admission clinical reviews, approved or denied determinations and any other requests to dedicated fax number belonging to the campus where the patient is receiving treatment

## 2021-03-11 NOTE — ASSESSMENT & PLAN NOTE
80year old female admitted due to acute metabolic encephalopathy possibly due to pneumonia  - Continue IV antibiotics  - Procalcitonin negative x2 possibly discontinue diogenes if still wnl    Results from last 7 days   Lab Units 03/10/21  1724 03/10/21  1319 03/10/21  1318   PROCALCITONIN ng/ml 0 07 0 09  --    BLOOD CULTURE   --  Received in Microbiology Lab  Culture in Progress    --    GRAM STAIN RESULT   --  Gram positive cocci in chains*  --    SARS-COV-2   --   --  Negative

## 2021-03-11 NOTE — ASSESSMENT & PLAN NOTE
Minimally improved    - Renal assisting in management  - Urinary retention protocol    Recent Labs     03/10/21  1318 03/11/21  0451   BUN 58* 58*   CREATININE 2 83* 2 78*   EGFR 14 14       Results from last 7 days   Lab Units 03/10/21  1416   BLOOD UA  Small*   PROTEIN UA mg/dl 30 (1+)*

## 2021-03-11 NOTE — ASSESSMENT & PLAN NOTE
Non-healing wounds  Poor surgical candidate without revascularization   - Podiatry recommendations appreciated

## 2021-03-11 NOTE — ASSESSMENT & PLAN NOTE
Wt Readings from Last 3 Encounters:   03/10/21 61 2 kg (135 lb)   03/07/21 61 6 kg (135 lb 12 9 oz)   02/03/21 75 1 kg (165 lb 9 1 oz)     - Continue diuresis, renal assisting in management

## 2021-03-11 NOTE — CONSULTS
[address] Podiatry - Consultation    Patient Information:   Johnathan Campuzano 80 y o  female MRN: [id number]  Unit/Bed#: [place] [facility] 87 213-01 Encounter: [id number]  PCP: Carleen Titus MD  Date of Admission:  3/10/2021  Date of Consultation: 03/11/21  Requesting Physician: Binh Contreras MD      ASSESSMENT:    Johnathan Campuzano is a 80 y o  female with:    1  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, right lesser digits  2  Pressure ulceration, posterior right heel- POA  3  Venous ulceration, posterior left leg  4  Peripheral arterial disease    PLAN:    · Continue palliative wound care at this time  Patient again refusing vascular intervention to potentially increase healing potential   · Reviewed prior lower extremity arterial duplex studies  Poor lower extremity perfusion is noted  Patient refusing vascular intervention  · Offload bilateral lower extremities with elevation while nonambulatory  · WBAT  · Rest of care per primary team   · Will discuss this plan with my attending and update as needed  SUBJECTIVE    History of Present Illness:    Johnathan Campuzano is a 80 y o  female with past medical history of PAD, CHF, CKD, HTN, history of DVT who presents with bilateral lower extremity wounds  Patient is known to Dr Silver Bonds in wound care clinic  Patient has known peripheral arterial disease prior lower extremity arterial duplex studies on 12/29/2020 revealing decreased ABIs with occlusions of bilateral SFAs  Patient exhibiting gangrenous changes on the right lower extremity  Discussion has been had with patient during prior wound care visits that without increased perfusion to the lower extremities, we do not expect progression toward healing to be made of these wounds  Patient has repeatedly refused vascular intervention and has opted for palliative foot care  Review of Systems:    Constitutional: Negative  HENT: Negative  Eyes: Negative  Respiratory: Negative  Cardiovascular: Negative      Gastrointestinal: Negative  Musculoskeletal:  Negative   Skin:  Right foot gangrene, right heel ulcer, left leg ulcer   Neurological:  Negative   Psych: Negative  Past Medical and Surgical History:     Past Medical History:   Diagnosis Date    Anemia 1/18/2021    Chronic kidney disease     Hypertension        History reviewed  No pertinent surgical history      Meds/Allergies:    Medications Prior to Admission   Medication    amLODIPine (NORVASC) 5 mg tablet    carvedilol (COREG) 12 5 mg tablet    ergocalciferol (VITAMIN D2) 50,000 units    furosemide (LASIX) 10 mg/mL    hydrALAZINE (APRESOLINE) 50 mg tablet    isosorbide dinitrate (ISORDIL) 10 mg tablet    levothyroxine 25 mcg tablet    potassium chloride (K-DUR,KLOR-CON) 20 mEq tablet    saccharomyces boulardii (FLORASTOR) 250 mg capsule    sodium chloride (OCEAN) 0 65 % nasal spray    torsemide (DEMADEX) 20 mg tablet    warfarin (COUMADIN) 2 mg tablet    acetaminophen (TYLENOL) 325 mg tablet    oxyCODONE (OXY-IR) 5 MG capsule    oxyCODONE (ROXICODONE) 5 mg immediate release tablet    senna (SENOKOT) 8 6 mg       Allergies   Allergen Reactions    No Known Allergies        Social History:     Marital Status: /Civil Union    Substance Use History:   Social History     Substance and Sexual Activity   Alcohol Use Never    Frequency: Never     Social History     Tobacco Use   Smoking Status Never Smoker   Smokeless Tobacco Never Used     Social History     Substance and Sexual Activity   Drug Use Never       Family History:    Family History   Problem Relation Age of Onset    No Known Problems Mother     No Known Problems Father          OBJECTIVE:    Vitals:   Blood Pressure: 163/70 (03/11/21 0734)  Pulse: 63 (03/11/21 0734)  Temperature: 97 5 °F (36 4 °C) (03/10/21 2009)  Temp Source: Tympanic (03/10/21 2009)  Respirations: 18 (03/11/21 0734)  Weight - Scale: 61 2 kg (135 lb) (03/10/21 1923)  SpO2: 94 % (03/11/21 0734)    Physical Exam:     General Appearance: Alert, cooperative, no distress  HEENT: Head normocephalic, atraumatic, without obvious abnormality  Heart: Normal rate and rhythm  Lungs: Non-labored breathing  On nasal cannula  Abdomen: Without distension  Lower Extremity:  Vascular:   DP/PT pedal pulses on the left are absent  DP/PT pedal pulses on the right are absent  CRT absent at the digits on the right  Pedal hair is absent  +0/4 edema noted at bilateral lower extremities  Skin temperature is cool bilaterally  Musculoskeletal:  MMT is 4/5 in all muscle compartments bilaterally  ROM at the 1st MPJ and ankle joint are decreased bilaterally with the leg extended  Pain on palpation of posterior left leg  Digital deformities noted     Dermatological:  Lesser digits of the right foot are dark in color, consistent with gangrenous changes with mild malodor  Skin of bilateral lower extremities is dry and flaking  LE Wound Exam:   Wound (1) located posterior right heel, measures approximately 2 cm x 2 cm x 0 1 cm  without sinus tracking or undermining  Wound bed appears eschar with no drainage  The wound base is 0% red/granular, 0% yellow/fibrous, 100% black/necrotic  Deepest tissue noted in base is unknown  Malodor is noticed  Wound edge appears Attached  Corazon-wound skin appears intact  Probe to bone is negative   Signs of infection are not present at this time  Wound (2) located posterior left leg, measures approximately 5 cm x 3 cm x 0 2 cm  without sinus tracking or undermining  Wound bed appears pink/red and fibrotic with serous drainage  The wound base is 40% red/granular, 60% yellow/fibrous, 0% black/necrotic  Deepest tissue noted in base is subq  Malodor is not noticed  Wound edge appears Attached  Corazon-wound skin appears intact and erythematous  Probe to bone is negative   Signs of infection are not present at this time  Neurological:  Gross sensation is diminished  Protective sensation is diminished   Patient Denies numbness and/or paresthesias  Clinical Images 03/11/21: Additional Data:     Lab Results: I have personally reviewed pertinent labs including:    Results from last 7 days   Lab Units 03/11/21  0444   WBC Thousand/uL 5 83   HEMOGLOBIN g/dL 7 5*   HEMATOCRIT % 25 7*   PLATELETS Thousands/uL 172   NEUTROS PCT % 41*   LYMPHS PCT % 45*   MONOS PCT % 8   EOS PCT % 4     Results from last 7 days   Lab Units 03/11/21  0451 03/10/21  1318   POTASSIUM mmol/L 4 3 4 6   CHLORIDE mmol/L 107 107   CO2 mmol/L 24 26   BUN mg/dL 58* 58*   CREATININE mg/dL 2 78* 2 83*   CALCIUM mg/dL 8 3 8 2*   ALK PHOS U/L  --  65   ALT U/L  --  20   AST U/L  --  20     Results from last 7 days   Lab Units 03/10/21  1319   INR  2 22*       Cultures: I have personally reviewed pertinent cultures including:    Results from last 7 days   Lab Units 03/10/21  1319   BLOOD CULTURE  Received in Microbiology Lab  Culture in Progress  GRAM STAIN RESULT  Gram positive cocci in chains*           Imaging: I have personally reviewed pertinent films in PACS  EKG, Pathology, and Other Studies: I have personally reviewed pertinent reports  ** Please Note: Portions of the record may have been created with voice recognition software  Occasional wrong word or "sound a like" substitutions may have occurred due to the inherent limitations of voice recognition software  Read the chart carefully and recognize, using context, where substitutions have occurred   **

## 2021-03-11 NOTE — PROGRESS NOTES
[address] [facility]  Progress Note Willie Zambrano 5/18/1930, 80 y o  female MRN: [id number]  Unit/Bed#: Reena Murphy -01 Encounter: [id number]  Primary Care Provider: Dong Gallardo MD   Date and time admitted to hospital: 3/10/2021 12:45 PM    * Acute metabolic encephalopathy  Assessment & Plan  80year old female admitted due to acute metabolic encephalopathy possibly due to pneumonia  - Continue IV antibiotics  - Procalcitonin negative x2 possibly discontinue diogenes if still wnl    Results from last 7 days   Lab Units 03/10/21  1724 03/10/21  1319 03/10/21  1318   PROCALCITONIN ng/ml 0 07 0 09  --    BLOOD CULTURE   --  Received in Microbiology Lab  Culture in Progress  --    GRAM STAIN RESULT   --  Gram positive cocci in chains*  --    SARS-COV-2   --   --  Negative               UTI (urinary tract infection)  Assessment & Plan  Continue IV cefepime    HCAP (healthcare-associated pneumonia)  Assessment & Plan  Concerning for pneumonia  Serial procalcitonins negative  Acute on chronic diastolic congestive heart failure [facility])  Assessment & Plan  Wt Readings from Last 3 Encounters:   03/10/21 61 2 kg (135 lb)   03/07/21 61 6 kg (135 lb 12 9 oz)   02/03/21 75 1 kg (165 lb 9 1 oz)     - Continue diuresis, renal assisting in management  PAD [facility]peripheral artery [facility]) ([facility] )  Assessment & Plan  LEADs showing occlusion versus high grade stenosis of the proximal thigh portion of the superficial femoral artery with distal reconstitution   - Revascularization not pursued previously due to CKD and concern for progression to renal failure  Chronic respiratory failure with hypoxia (HCC)  Assessment & Plan  On chronic 3L via NC     Acute kidney injury superimposed on chronic kidney disease ([facility] )  Assessment & Plan  Minimally improved    - Renal assisting in management  - Urinary retention protocol    Recent Labs     03/10/21  1318 03/11/21  0451   BUN 58* 58*   CREATININE 2 83* 2 78*   EGFR 14 14       Results from last 7 days   Lab Units 03/10/21  1416   BLOOD UA  Small*   PROTEIN UA mg/dl 30 (1+)*         Essential hypertension  Assessment & Plan  Controlled  On lasix, coreg, hydralazine, isosorbide  Iron deficiency anemia secondary to inadequate dietary iron intake  Assessment & Plan  Stable  No indication for transfusion at this time  Recent Labs     03/10/21  1318 21  0444   HGB 7 3* 7 5*   MCV 88 91   RDW 18 1* 18 3*         Multiple open wounds of lower extremity  Assessment & Plan  Non-healing wounds  Poor surgical candidate without revascularization   - Podiatry recommendations appreciated  VTE Pharmacologic Prophylaxis:   Pharmacologic: Warfarin (Coumadin)  Mechanical VTE Prophylaxis in Place: Yes    Patient Centered Rounds: I have performed bedside rounds with nursing staff today  Discussions with Specialists or Other Care Team Provider: Nursing    Education and Discussions with Family / Patient: patient    Time Spent for Care: 30 minutes  More than 50% of total time spent on counseling and coordination of care as described above  Current Length of Stay: 1 day(s)    Current Patient Status: Inpatient   Certification Statement: The patient will continue to require additional inpatient hospital stay due to iv antibiotics    Discharge Plan: active    Code Status: Level 1 - Full Code      Subjective:   Patient seen and examined at bedside  She is currently denying any acute complaints  Oriented except to date  Her decision regarding her foot continues to remain the same which is no intervention  Objective:     Vitals:   Temp (24hrs), Av 4 °F (36 3 °C), Min:97 2 °F (36 2 °C), Max:97 5 °F (36 4 °C)    Temp:  [97 2 °F (36 2 °C)-97 5 °F (36 4 °C)] 97 5 °F (36 4 °C)  HR:  [56-69] 60  Resp:  [18-20] 18  BP: (122-163)/(57-70) 154/67  SpO2:  [92 %-98 %] 96 %  Body mass index is 21 79 kg/m²  Input and Output Summary (last 24 hours):        Intake/Output Summary (Last 24 hours) at 3/11/2021 1515  Last data filed at 3/10/2021 2340  Gross per 24 hour   Intake 50 ml   Output 300 ml   Net -250 ml       Physical Exam:     Physical Exam  Vitals signs reviewed  Constitutional:       Appearance: She is ill-appearing  HENT:      Head: Normocephalic  Nose: Nose normal       Mouth/Throat:      Mouth: Mucous membranes are moist    Eyes:      General: No scleral icterus  Extraocular Movements: Extraocular movements intact  Cardiovascular:      Rate and Rhythm: Normal rate  Pulmonary:      Effort: Pulmonary effort is normal  No respiratory distress  Abdominal:      General: There is no distension  Palpations: Abdomen is soft  Tenderness: There is no abdominal tenderness  Musculoskeletal:      Right lower leg: No edema  Left lower leg: No edema  Comments: Bilateral dressing c/d/i, see image below   Skin:     General: Skin is warm  Neurological:      Mental Status: She is alert        Comments: Oriented to month, year, place, but not date   Psychiatric:         Mood and Affect: Mood normal          Behavior: Behavior normal                Additional Data:     Labs:    Results from last 7 days   Lab Units 03/11/21  0444   WBC Thousand/uL 5 83   HEMOGLOBIN g/dL 7 5*   HEMATOCRIT % 25 7*   PLATELETS Thousands/uL 172   NEUTROS PCT % 41*   LYMPHS PCT % 45*   MONOS PCT % 8   EOS PCT % 4     Results from last 7 days   Lab Units 03/11/21  0451 03/10/21  1318   SODIUM mmol/L 141 142   POTASSIUM mmol/L 4 3 4 6   CHLORIDE mmol/L 107 107   CO2 mmol/L 24 26   BUN mg/dL 58* 58*   CREATININE mg/dL 2 78* 2 83*   ANION GAP mmol/L 10 9   CALCIUM mg/dL 8 3 8 2*   ALBUMIN g/dL  --  2 1*   TOTAL BILIRUBIN mg/dL  --  0 63   ALK PHOS U/L  --  65   ALT U/L  --  20   AST U/L  --  20   GLUCOSE RANDOM mg/dL 80 82     Results from last 7 days   Lab Units 03/10/21  1319   INR  2 22*             Results from last 7 days   Lab Units 03/10/21  1724 03/10/21  1319 03/10/21  1318   LACTIC ACID mmol/L  --   --  0 6   PROCALCITONIN ng/ml 0 07 0 09  --            * I Have Reviewed All Lab Data Listed Above  * Additional Pertinent Lab Tests Reviewed: [address] Admission Reviewed    Imaging:    Imaging Reports Reviewed Today Include: xr chest    Recent Cultures (last 7 days):     Results from last 7 days   Lab Units 03/10/21  1319   BLOOD CULTURE  Received in Microbiology Lab  Culture in Progress  GRAM STAIN RESULT  Gram positive cocci in chains*       Last 24 Hours Medication List:   Current Facility-Administered Medications   Medication Dose Route Frequency Provider Last Rate    acetaminophen  650 mg Oral Q6H PRN Maritza Scheuermann, MD      carvedilol  12 5 mg Oral BID With Meals Maritza Scheuermann, MD      cefepime  1,000 mg Intravenous Q12H Maritza Scheuermann, MD 1,000 mg (03/11/21 0749)    furosemide  80 mg Intravenous BID (diuretic) Maritza Scheuermann, MD      hydrALAZINE  50 mg Oral [facility] Maritza Scheuermann, MD      isosorbide dinitrate  10 mg Oral TID after meals Maritza Scheuermann, MD      levothyroxine  25 mcg Oral Early Morning Maritza Scheuermann, MD      saccharomyces boulardii  250 mg Oral BID Maritza Scheuermann, MD      senna  8 6 mg Oral HS PRN Maritza Scheuermann, MD      warfarin  2 mg Oral Daily (warfarin) Maritza Scheuermann, MD          Today, Patient Was Seen By: Emmet Hammans, MD    ** Please Note: Dictation voice to text software may have been used in the creation of this document   **

## 2021-03-11 NOTE — NURSING NOTE
Agree with previous nursing assessment  Patient resting comfortably with family  No complaints  Will continue to monitor

## 2021-03-11 NOTE — ASSESSMENT & PLAN NOTE
Stable  No indication for transfusion at this time      Recent Labs     03/10/21  1318 03/11/21  0444   HGB 7 3* 7 5*   MCV 88 91   RDW 18 1* 18 3*

## 2021-03-11 NOTE — PLAN OF CARE
Problem: Potential for Falls  Goal: Patient will remain free of falls  Description: INTERVENTIONS:  - Assess patient frequently for physical needs  -  Identify cognitive and physical deficits and behaviors that affect risk of falls  -  [place] fall precautions as indicated by assessment   - Educate patient/family on patient safety including physical limitations  - Instruct patient to call for assistance with activity based on assessment  - Modify environment to reduce risk of injury  - Consider OT/PT consult to assist with strengthening/mobility  Outcome: Progressing     Problem: Prexisting or High Potential for Compromised Skin Integrity  Goal: Skin integrity is maintained or improved  Description: INTERVENTIONS:  - Identify patients at risk for skin breakdown  - Assess and monitor skin integrity  - Assess and monitor nutrition and hydration status  - Monitor labs   - Assess for incontinence   - Turn and reposition patient  - Assist with mobility/ambulation  - Relieve pressure over bony prominences  - Avoid friction and shearing  - Provide appropriate hygiene as needed including keeping skin clean and dry  - Evaluate need for skin moisturizer/barrier cream  - Collaborate with interdisciplinary team   - Patient/family teaching  - Consider wound care consult   Outcome: Progressing     Problem: Nutrition/Hydration-ADULT  Goal: Nutrient/Hydration intake appropriate for improving, restoring or maintaining nutritional needs  Description: Monitor and assess patient's nutrition/hydration status for malnutrition  Collaborate with interdisciplinary team and initiate plan and interventions as ordered  Monitor patient's weight and dietary intake as ordered or per policy  Utilize nutrition screening tool and intervene as necessary  Determine patient's food preferences and provide high-protein, high-caloric foods as appropriate       INTERVENTIONS:  - Monitor oral intake, urinary output, labs, and treatment plans  - Assess nutrition and hydration status and recommend course of action  - Evaluate amount of meals eaten  - Assist patient with eating if necessary   - Allow adequate time for meals  - Recommend/ encourage appropriate diets, oral nutritional supplements, and vitamin/mineral supplements  - Order, calculate, and assess calorie counts as needed  - Recommend, monitor, and adjust tube feedings and TPN/PPN based on assessed needs  - Assess need for intravenous fluids  - Provide specific nutrition/hydration education as appropriate  - Include patient/family/caregiver in decisions related to nutrition  Outcome: Progressing

## 2021-03-11 NOTE — CONSULTS
Consultation - Nephrology   David Coello 80 y o  female MRN: [id number]  Unit/Bed#: [facility] [id number] Encounter: [id number]    ASSESSMENT AND PLAN:  Patient is 80-year-old female with CKD stage 4, baseline creatinine 1 8 to 2 1 going back to early 2945, diastolic CHF, presented for nursing home with altered mental status and found to have worsening hypoxic respiratory failure  We are consulted for LILIAN/CKD management  LILIAN POA on CKD stage 4, baseline creatinine 1 8 to 2 1, has been seen by Storm Reynoso in our clinic  -she was recently hospitalized in January 2021 with episode of LILIAN when creatinine improved to 2 3 on discharge  -creatinine 2 8 on admission slightly improved 2 7 today  -may have to accept higher creatinine to keep patient euvolemic  -renal ultrasound in January 2021 shows atrophic left kidney 6 5 cm, diffuse cortical thinning, no hydronephrosis  , right kidney normal size, diffuse cortical thinning, mild hydronephrosis with ureteral stent in place   -if creatinine does not improve further, consider repeat renal ultrasound to ensure no worsening hydronephrosis  -accurate urine output, avoid nephrotoxins or NSAIDs   -diuretics as below, BMP in a m   -consider purewick if incontinence  Check bladder scan for PVR  -UA this admission shows 1+ proteinuria, 2 to 4 RBCs, innumerable WBCs with moderate bacteria  Diastolic CHF  -clinically seems mild volume overloaded, recent echo showed EF 02%, grade 2 diastolic dysfunction, moderate MR, dilated IVC in December 2020  -currently started on Lasix 80 mg IV b i d   -daily weight, accurate intake and output, goal to keep negative balance over next 24 hours  -currently requiring 4 to 5 L O2 via nasal cannula    -proBNP [id number]    Right-sided hydronephrosis, patient has history of right ureteral stent placement in December 2020    -check bladder scan for PVR, will have low threshold for repeat renal ultrasound    Hypertension, blood pressure overall acceptable, avoid hypotension  Currently on hydralazine, isosorbide, IV diuretics, carvedilol  Suspected pneumonia, currently on antibiotic as per primary team   Altered mental status, suspect in the setting of pneumonia,?  UTI  Urine culture results to follow  Bacteremia, one of two sets of blood culture shows GPC in chains  -further management as per primary team     Severe PVD  Discussed above plan in detail with primary team attending    HISTORY OF PRESENT ILLNESS:  Requesting Physician: Kamila Tinajero MD  Reason for Consult:  Sandra Dalal is a 80y o  year old female who was admitted to [facility] after presenting with altered mental status  A renal consultation is requested today for assistance in the management of LILIAN/CKD  Old medical records were reviewed  Patient's baseline serum creatinine seems to be 1 8 to 2 1  She was recently hospitalized in January 21 when she had an episode of LILIAN which eventually improved to 2 3 on discharge  She presented from nursing home with altered mental status, worsening hypoxic respiratory failure  She remains overall confused and very poor historian at the time of my encounter  Most of the history is obtained from reviewing medical records and talking to nursing staff  She at times urinary incontinent  Currently remains on 4 to 5 L O2 via nasal cannula  Suspected to have pneumonia/? UTI and currently remains on antibiotic all the same  Also suspected to have worsening pulmonary congestion  PAST MEDICAL HISTORY:  Past Medical History:   Diagnosis Date    Anemia 1/18/2021    Chronic kidney disease     Hypertension        PAST SURGICAL HISTORY:  History reviewed  No pertinent surgical history      ALLERGIES:  Allergies   Allergen Reactions    No Known Allergies        SOCIAL HISTORY:  Social History     Substance and Sexual Activity   Alcohol Use Never    Frequency: Never     Social History     Substance and Sexual Activity   Drug Use Never     Social History     Tobacco Use   Smoking Status Never Smoker   Smokeless Tobacco Never Used       FAMILY HISTORY:  Family History   Problem Relation Age of Onset    No Known Problems Mother     No Known Problems Father        MEDICATIONS:    Current Facility-Administered Medications:     acetaminophen (TYLENOL) tablet 650 mg, 650 mg, Oral, Q6H PRN, Mona Hunter MD    carvedilol (COREG) tablet 12 5 mg, 12 5 mg, Oral, BID With Meals, Mona Hunter MD, 12 5 mg at 03/11/21 0747    cefepime (MAXIPIME) 1,000 mg in dextrose 5 % 50 mL IVPB, 1,000 mg, Intravenous, Q12H, Mona Hunter MD, Last Rate: 100 mL/hr at 03/11/21 0749, 1,000 mg at 03/11/21 0749    furosemide (LASIX) injection 80 mg, 80 mg, Intravenous, BID (diuretic), Mona Hunter MD, 80 mg at 03/11/21 0749    hydrALAZINE (APRESOLINE) tablet 50 mg, 50 mg, Oral, Q8H [address], Mona Hunter MD, 50 mg at 03/11/21 0503    isosorbide dinitrate (ISORDIL) tablet 10 mg, 10 mg, Oral, TID after meals, Mona Hunter MD, 10 mg at 03/11/21 0748    levothyroxine tablet 25 mcg, 25 mcg, Oral, Early Morning, Mona Hunter MD, 25 mcg at 03/11/21 0503    saccharomyces boulardii (FLORASTOR) capsule 250 mg, 250 mg, Oral, BID, Mona Hunter MD, 250 mg at 03/11/21 0747    senna (SENOKOT) tablet 8 6 mg, 8 6 mg, Oral, HS PRN, Mona Hunter MD    warfarin (COUMADIN) tablet 2 mg, 2 mg, Oral, Daily (warfarin), Mona Hunter MD, 2 mg at 03/10/21 1800    REVIEW OF SYSTEMS:  More than 10 systems were reviewed and overall remains very limited review of system given patient is poor historian with altered mental status      PHYSICAL EXAM:  Current Weight: Weight - Scale: 61 2 kg (135 lb)  First Weight: Weight - Scale: 61 2 kg (135 lb)  Vitals:    03/11/21 0734   BP: 163/70   Pulse: 63   Resp: 18   Temp:    SpO2: 94%       Intake/Output Summary (Last 24 hours) at 3/11/2021 0941  Last data filed at 3/10/2021 2340  Gross per 24 hour   Intake 1050 ml   Output 700 ml   Net 350 ml     Wt Readings from Last 3 Encounters:   03/10/21 61 2 kg (135 lb)   03/07/21 61 6 kg (135 lb 12 9 oz)   02/03/21 75 1 kg (165 lb 9 1 oz)     Temp Readings from Last 3 Encounters:   03/10/21 97 5 °F (36 4 °C) (Tympanic)   03/07/21 98 7 °F (37 1 °C) (Oral)   02/27/21 97 6 °F (36 4 °C) (Oral)     BP Readings from Last 3 Encounters:   03/11/21 163/70   03/07/21 160/73   02/27/21 124/69     Pulse Readings from Last 3 Encounters:   03/11/21 63   03/07/21 65   02/27/21 64        Physical Examination:  General:  Lying in bed, no acute distress  Eyes:  Mild conjunctival pallor present  ENT:  External examination of ears and nose unremarkable  Neck:  No obvious lymphadenopathy appreciated  Respiratory:  Decreased breath sound at base, poor air movement  CVS:  S1, S2 present  GI:  Soft, nondistended  CNS:  Active, alert, oriented x2  Extremities:  No significant edema in legs  Psych:  Confused, conscious  Skin:  LE wounds    Invasive Devices:   Urethral Catheter Non-latex 18 Fr  (Active)     Lab Results:   Results from last 7 days   Lab Units 03/11/21  0451 03/11/21  0444 03/10/21  1318 03/07/21  1526   WBC Thousand/uL  --  5 83 6 96 7 29   HEMOGLOBIN g/dL  --  7 5* 7 3* 8 7*   HEMATOCRIT %  --  25 7* 24 9* 28 5*   PLATELETS Thousands/uL  --  172 196 227   POTASSIUM mmol/L 4 3  --  4 6 4 7   CHLORIDE mmol/L 107  --  107 106   CO2 mmol/L 24  --  26 25   BUN mg/dL 58*  --  58* 59*   CREATININE mg/dL 2 78*  --  2 83* 2 74*   CALCIUM mg/dL 8 3  --  8 2* 8 5       Other Studies:   XR chest 1 view portable   ED Interpretation by Addison Kiser MD (03/10 1339)   Left lower lobe consolidation  Left-sided pleural effusion appears unchanged  X-ray interpreted by myself  Final Result by Dmitry Ngo MD (03/10 4379)      Mild to moderate congestive changes  Small right and moderate left effusion  Left lung base atelectasis or infiltrate                    Workstation performed: [id number]         Chest x-ray images personally reviewed which shows mild pulmonary congestion  Portions of the record may have been created with voice recognition software  Occasional wrong word or "sound a like" substitutions may have occurred due to the inherent limitations of voice recognition software  Read the chart carefully and recognize, using context, where substitutions have occurred

## 2021-03-11 NOTE — UTILIZATION REVIEW
Initial Clinical Review    Admission: Date/Time/Statement:   Admission Orders (From admission, onward)     Ordered        03/10/21 1438  Inpatient Admission  Once                   Orders Placed This Encounter   Procedures    Inpatient Admission     Standing Status:   Standing     Number of Occurrences:   1     Order Specific Question:   Level of Care     Answer:   Med Surg [16]     Order Specific Question:   Estimated length of stay     Answer:   More than 2 Midnights     Order Specific Question:   Certification     Answer:   I certify that inpatient services are medically necessary for this patient for a duration of greater than two midnights  See H&P and MD Progress Notes for additional information about the patient's course of treatment  ED Arrival Information     Expected Arrival Acuity Means of Arrival Escorted By Service Admission Type    - 3/10/2021 12:44 Urgent Ambulance [facility] ([address]) General Medicine Urgent    Arrival Complaint    SOB        Chief Complaint   Patient presents with    Shortness of Breath     pt arrives via ems from Son with c/o sob hx copd      Assessment/Plan:  81 yo female with Hx of CKD, CHF, Chronic nonhealing wounds LE's presents to ED from SNF with fever, SOB and altered mental status  RN @ SNF reports  this morning the patient was delirious and was hallucinating, fever of 100 6 and oxygen level in the 80s requiring an up titration of her oxygen from 3 L to 4 L  On exam: tachypnea, wounds to LE's  Hg 7 3,  Cr 2 83  Urine + nitrite, leukocytes, WBC's & bacteria  CXR shows Mild to moderate congestive changes   Small right and moderate left effusion  Left lung base atelectasis or infiltrate  Admitted to Inpatient M/S with Acute Metabolic Encephalopathy due to Pneumonia, UTI, Hypoxemia on advanced age  Also with CHF, LILIAN, Multiple open wounds on LE's  Plan is for IV Cefepime, trend procalcitonin, f/u on cxs    She has vascular congestion and pleural effusions on cxr -IV diuresis with Lasix, cont coreg, isosorbide & hydralazine  Consult Nephrology & Podiatry  Transfuse as needed for hemoglobin less than 7  Supplemental O2    3/11 Per Nephrology:LILIAN POA on CKD stage 4, baseline creatinine 1 8 to 2 1-she was recently hospitalized in January 2021 with episode of LILIAN when creatinine improved to 2 3 on discharge  -creatinine 2 8 on admission slightly improved 2 7 today-may have to accept higher creatinine to keep patient euvolemic-diuretics as below, BMP in a m   --clinically seems mild volume overloaded, recent echo showed EF 09%, grade 2 diastolic dysfunction, moderate MR, dilated IVC in December 2020-currently started on Lasix 80 mg IV b i d   -daily weight, accurate intake and output, goal to keep negative balance over next 24 hours  -Right-sided hydronephrosis, patient has history of right ureteral stent placement in December 2020    -check bladder scan for PVR, will have low threshold for repeat renal ultrasound    Per Podiatry: 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, right lesser digits  Pressure ulceration, posterior right heel- POA  Venous ulceration, posterior left leg  Peripheral arterial disease  Continue palliative wound care at this time    Patient again refusing vascular intervention to potentially increase healing potential Offload bilateral lower extremities with elevation while nonambulatory      Bacteremia, one of two sets of blood culture shows GPC in chains      ED Triage Vitals [03/10/21 1249]   Temperature Pulse Respirations Blood Pressure SpO2   98 8 °F (37 1 °C) 63 22 129/63 96 %      Temp Source Heart Rate Source Patient Position - Orthostatic VS BP Location FiO2 (%)   Oral Monitor Lying Right arm --      Pain Score       No Pain          Wt Readings from Last 1 Encounters:   03/10/21 61 2 kg (135 lb)     Additional Vital Signs:   03/11/21 13:30:38  --  60  18  154/67  96  96 % -- -- --   03/11/21 07:34:15  --  63  18  163/70  101  94 % -- -- --   03/11/21 04:59:36 --  56  --  134/63  87  93 % -- -- --   03/10/21 20:55:52  --  57  --  122/57  79  97 % -- -- --   03/10/21 20:09:54  97 5 °F (36 4 °C)  62  20  124/58  80  98 % 4 5 L/min Nasal cannula Sitting   03/10/21 15:59:38  97 2 °F (36 2 °C)  69  20  148/64  92  92 % -- -- --   03/10/21 1559  --  --  --  --  --  -- 3 L/min Nasal cannula --   03/10/21 1515  --  70  20  149/82  110  97 % 3 L/min Nasal cannula Sitting   03/10/21 1452  --  73  20  159/95  --  97 % 2 L/min Nasal cannula Lying   03/10/21 1402  --  --  --  --  --  -- -- Nasal cannula --   03/10/21 1305  100 °F (37 8 °C)  --  --  --  --  -- -- -- --       Pertinent Labs/Diagnostic Test Results:   Results from last 7 days   Lab Units 03/10/21  1318   SARS-COV-2  Negative     Results from last 7 days   Lab Units 03/11/21  0444 03/10/21  1318 03/07/21  1526   WBC Thousand/uL 5 83 6 96 7 29   HEMOGLOBIN g/dL 7 5* 7 3* 8 7*   HEMATOCRIT % 25 7* 24 9* 28 5*   PLATELETS Thousands/uL 172 196 227   NEUTROS ABS Thousands/µL 2 41 2 89 3 27     Results from last 7 days   Lab Units 03/11/21  0451 03/10/21  1318 03/07/21  1526   SODIUM mmol/L 141 142 141   POTASSIUM mmol/L 4 3 4 6 4 7   CHLORIDE mmol/L 107 107 106   CO2 mmol/L 24 26 25   ANION GAP mmol/L 10 9 10   BUN mg/dL 58* 58* 59*   CREATININE mg/dL 2 78* 2 83* 2 74*   EGFR ml/min/1 73sq m 14 14 15   CALCIUM mg/dL 8 3 8 2* 8 5     Results from last 7 days   Lab Units 03/10/21  1318 03/07/21  1526   AST U/L 20 21   ALT U/L 20 25   ALK PHOS U/L 65 72   TOTAL PROTEIN g/dL 6 3* 7 4   ALBUMIN g/dL 2 1* 2 3*   TOTAL BILIRUBIN mg/dL 0 63 0 70     Results from last 7 days   Lab Units 03/11/21  0451 03/10/21  1318 03/07/21  1526   GLUCOSE RANDOM mg/dL 80 82 95     Results from last 7 days   Lab Units 03/10/21  1319 03/07/21  1526   PROTIME seconds 24 2* 25 5*   INR  2 22* 2 39*   PTT seconds 46* 51*     Results from last 7 days   Lab Units 03/10/21  1724 03/10/21  1319   PROCALCITONIN ng/ml 0 07 0 09     Results from last 7 days Lab Units 03/10/21  1318   LACTIC ACID mmol/L 0 6     Results from last 7 days   Lab Units 03/10/21  1724   NT-PRO BNP pg/mL 30,958*     Results from last 7 days   Lab Units 03/10/21  1416   CLARITY UA  Slightly Cloudy   COLOR UA  Yellow   SPEC GRAV UA  1 015   PH UA  5 5   GLUCOSE UA mg/dl Negative   KETONES UA mg/dl Negative   BLOOD UA  Small*   PROTEIN UA mg/dl 30 (1+)*   NITRITE UA  Positive*   BILIRUBIN UA  Negative   UROBILINOGEN UA E U /dl 0 2   LEUKOCYTES UA  Large*   WBC UA /hpf Innumerable*   RBC UA /hpf 2-4   BACTERIA UA /hpf Moderate*   EPITHELIAL CELLS WET PREP /hpf Occasional     Results from last 7 days   Lab Units 03/10/21  1318   INFLUENZA A PCR  Negative   INFLUENZA B PCR  Negative   RSV PCR  Negative     Results from last 7 days   Lab Units 03/10/21  1319   BLOOD CULTURE  Received in Microbiology Lab  Culture in Progress  GRAM STAIN RESULT  Gram positive cocci in chains*     3/10 CXR: Mild to moderate congestive changes   Small right and moderate left effusion   Left lung base atelectasis or infiltrate       ED Treatment:   Medication Administration from 03/10/2021 1244 to 03/10/2021 1558       Date/Time Order Dose Route Action     03/10/2021 1318 sodium chloride 0 9 % bolus 1,000 mL 1,000 mL Intravenous New Bag     03/10/2021 1323 acetaminophen (TYLENOL) tablet 650 mg 650 mg Oral Given     03/10/2021 1457 ceftriaxone (ROCEPHIN) 1 g/50 mL in dextrose IVPB 1,000 mg Intravenous New Bag        Past Medical History:   Diagnosis Date    Anemia 1/18/2021    Chronic kidney disease     Hypertension      Present on Admission:   Acute kidney injury superimposed on chronic kidney disease ([facility] )   Essential hypertension   PAD (peripheral artery disease) ([facility] )   Acute on chronic diastolic congestive heart failure (HCC)   Iron deficiency anemia secondary to inadequate dietary iron intake      Admitting Diagnosis: UTI (urinary tract infection) [N39 0]  Pneumonia [J18 9]  SOB (shortness of breath) [R06 02]  Anemia [D64 9]  Nonhealing nonsurgical wound [T14  8XXA]  Fever [R50 9]  Ulcer of right lower extremity with fat layer exposed ([facility] ) [L97 912]  Acute kidney injury superimposed on chronic kidney disease ([facility] ) [N17 9, N18 9]  Age/Sex: 80 y o  female     Admission Orders:  Scheduled Medications:  carvedilol, 12 5 mg, Oral, BID With Meals  cefepime, 1,000 mg, Intravenous, Q12H  furosemide, 80 mg, Intravenous, BID (diuretic)  hydrALAZINE, 50 mg, Oral, Q8H NATASHA  isosorbide dinitrate, 10 mg, Oral, TID after meals  levothyroxine, 25 mcg, Oral, Early Morning  saccharomyces boulardii, 250 mg, Oral, BID  warfarin, 2 mg, Oral, Daily (warfarin)    Continuous IV Infusions:  PRN Meds:  acetaminophen, 650 mg, Oral, Q6H PRN  senna, 8 6 mg, Oral, HS PRN    IP CONSULT TO NEPHROLOGY  IP CONSULT TO PODIATRY    Network Utilization Review Department  ATTENTION: Please call with any questions or concerns to [phone] and carefully listen to the prompts so that you are directed to the right person  All voicemails are confidential   Roseanne Corona all requests for admission clinical reviews, approved or denied determinations and any other requests to dedicated fax number below belonging to the campus where the patient is receiving treatment   List of dedicated fax numbers for the Facilities:  [address] DENIALS (Administrative/Medical Necessity) [phone]   [address] (Maternity/NICU/Pediatrics) [phone]   [address] [place] [address] [phone] [facility] Shazia Hagan 9595 (Phoebe James "Y[address]" [address]) [id number] [address]   Phoebe Atwood [address] [place] [address] [phone]

## 2021-03-12 ENCOUNTER — APPOINTMENT (INPATIENT)
Dept: ULTRASOUND IMAGING | Facility: HOSPITAL | Age: 86
DRG: 291 | End: 2021-03-12
Payer: MEDICARE

## 2021-03-12 LAB
ANION GAP SERPL CALCULATED.3IONS-SCNC: 7 MMOL/L (ref 4–13)
BUN SERPL-MCNC: 57 MG/DL (ref 5–25)
CALCIUM SERPL-MCNC: 7.9 MG/DL (ref 8.3–10.1)
CHLORIDE SERPL-SCNC: 104 MMOL/L (ref 100–108)
CO2 SERPL-SCNC: 28 MMOL/L (ref 21–32)
CREAT SERPL-MCNC: 2.91 MG/DL (ref 0.6–1.3)
GFR SERPL CREATININE-BSD FRML MDRD: 14 ML/MIN/1.73SQ M
GLUCOSE SERPL-MCNC: 85 MG/DL (ref 65–140)
POTASSIUM SERPL-SCNC: 3.7 MMOL/L (ref 3.5–5.3)
SODIUM SERPL-SCNC: 139 MMOL/L (ref 136–145)

## 2021-03-12 PROCEDURE — 97167 OT EVAL HIGH COMPLEX 60 MIN: CPT

## 2021-03-12 PROCEDURE — 99232 SBSQ HOSP IP/OBS MODERATE 35: CPT | Performed by: INTERNAL MEDICINE

## 2021-03-12 PROCEDURE — 97163 PT EVAL HIGH COMPLEX 45 MIN: CPT

## 2021-03-12 PROCEDURE — 76770 US EXAM ABDO BACK WALL COMP: CPT

## 2021-03-12 PROCEDURE — 80048 BASIC METABOLIC PNL TOTAL CA: CPT | Performed by: INTERNAL MEDICINE

## 2021-03-12 PROCEDURE — 99232 SBSQ HOSP IP/OBS MODERATE 35: CPT | Performed by: STUDENT IN AN ORGANIZED HEALTH CARE EDUCATION/TRAINING PROGRAM

## 2021-03-12 RX ORDER — METOLAZONE 5 MG/1
5 TABLET ORAL ONCE
Status: COMPLETED | OUTPATIENT
Start: 2021-03-12 | End: 2021-03-12

## 2021-03-12 RX ORDER — HYDRALAZINE HYDROCHLORIDE 25 MG/1
50 TABLET, FILM COATED ORAL EVERY 8 HOURS SCHEDULED
Status: DISCONTINUED | OUTPATIENT
Start: 2021-03-12 | End: 2021-03-22 | Stop reason: HOSPADM

## 2021-03-12 RX ORDER — AMLODIPINE BESYLATE 5 MG/1
5 TABLET ORAL DAILY
Status: DISCONTINUED | OUTPATIENT
Start: 2021-03-13 | End: 2021-03-22 | Stop reason: HOSPADM

## 2021-03-12 RX ORDER — CARVEDILOL 6.25 MG/1
12.5 TABLET ORAL 2 TIMES DAILY WITH MEALS
Status: DISCONTINUED | OUTPATIENT
Start: 2021-03-12 | End: 2021-03-22 | Stop reason: HOSPADM

## 2021-03-12 RX ADMIN — COLLAGENASE SANTYL: 250 OINTMENT TOPICAL at 15:00

## 2021-03-12 RX ADMIN — FUROSEMIDE 80 MG: 10 INJECTION, SOLUTION INTRAMUSCULAR; INTRAVENOUS at 16:00

## 2021-03-12 RX ADMIN — CEFEPIME HYDROCHLORIDE 1000 MG: 1 INJECTION, POWDER, FOR SOLUTION INTRAMUSCULAR; INTRAVENOUS at 08:44

## 2021-03-12 RX ADMIN — ISOSORBIDE DINITRATE 10 MG: 10 TABLET ORAL at 12:00

## 2021-03-12 RX ADMIN — Medication 250 MG: at 18:56

## 2021-03-12 RX ADMIN — CARVEDILOL 12.5 MG: 6.25 TABLET, FILM COATED ORAL at 08:43

## 2021-03-12 RX ADMIN — WARFARIN SODIUM 2 MG: 2 TABLET ORAL at 18:58

## 2021-03-12 RX ADMIN — CEFEPIME HYDROCHLORIDE 1000 MG: 1 INJECTION, POWDER, FOR SOLUTION INTRAMUSCULAR; INTRAVENOUS at 20:39

## 2021-03-12 RX ADMIN — ISOSORBIDE DINITRATE 10 MG: 10 TABLET ORAL at 18:57

## 2021-03-12 RX ADMIN — METOLAZONE 5 MG: 5 TABLET ORAL at 14:33

## 2021-03-12 RX ADMIN — HYDRALAZINE HYDROCHLORIDE 50 MG: 25 TABLET ORAL at 05:19

## 2021-03-12 RX ADMIN — FUROSEMIDE 80 MG: 10 INJECTION, SOLUTION INTRAMUSCULAR; INTRAVENOUS at 08:44

## 2021-03-12 RX ADMIN — ISOSORBIDE DINITRATE 10 MG: 10 TABLET ORAL at 08:44

## 2021-03-12 RX ADMIN — LEVOTHYROXINE SODIUM 25 MCG: 25 TABLET ORAL at 05:19

## 2021-03-12 RX ADMIN — HYDRALAZINE HYDROCHLORIDE 50 MG: 25 TABLET ORAL at 21:12

## 2021-03-12 RX ADMIN — Medication 250 MG: at 08:43

## 2021-03-12 NOTE — PROGRESS NOTES
[facility]  Progress Note Willie Zambrano 5/18/1930, 80 y o  female MRN: [id number]  Unit/Bed#: [facility] 2 -01 Encounter: [id number]  Primary Care Provider: Dong Gallardo MD   Date and time admitted to hospital: 3/10/2021 12:45 PM    * Acute metabolic encephalopathy  Assessment & Plan  80year old female admitted due to acute metabolic encephalopathy possibly due to pneumonia  Culture growth below  - Continue IV antibiotics    Results from last 7 days   Lab Units 03/11/21  0444 03/10/21  1724 03/10/21  1416 03/10/21  1319 03/10/21  1318   PROCALCITONIN ng/ml 0 07 0 07  --  0 09  --    BLOOD CULTURE   --   --   --  Lactococcus garvieae*  No Growth at 24 hrs   --    GRAM STAIN RESULT   --   --   --  Gram positive cocci in chains*  --    URINE CULTURE   --   --  >100,000 cfu/ml Citrobacter freundii*  80,000-89,000 cfu/ml Enterococcus species*  --   --    SARS-COV-2   --   --   --   --  Negative               UTI (urinary tract infection)  Assessment & Plan  Continue IV cefepime    Acute on chronic diastolic congestive heart failure (HCC)  Assessment & Plan  Wt Readings from Last 3 Encounters:   03/12/21 58 5 kg (128 lb 15 5 oz)   03/07/21 61 6 kg (135 lb 12 9 oz)   02/03/21 75 1 kg (165 lb 9 1 oz)     - Continue lasix IV BID  Management per renal     PAD (peripheral artery disease) ([facility] )  Assessment & Plan  LEADs showing occlusion versus high grade stenosis of the proximal thigh portion of the superficial femoral artery with distal reconstitution   - Revascularization not pursued previously due to CKD and concern for progression to renal failure  Chronic respiratory failure with hypoxia (HCC)  Assessment & Plan  On chronic 3L via NC     Acute kidney injury superimposed on chronic kidney disease ([facility] [address] )  Assessment & Plan  LILIAN possibly due to CKD progression, cardiorenal  Minimally improved  - IV diuretics  - Renal recommendations appreciated      Recent Labs 03/10/21  1318 21  0451 21  0513   BUN 58* 58* 57*   CREATININE 2 83* 2 78* 2 91*   EGFR 14 14 14       Results from last 7 days   Lab Units 03/10/21  1416   BLOOD UA  Small*   PROTEIN UA mg/dl 30 (1+)*         Essential hypertension  Assessment & Plan  Controlled  On lasix, coreg, hydralazine, isosorbide  Iron deficiency anemia secondary to inadequate dietary iron intake  Assessment & Plan  Stable  No indication for transfusion at this time  Recent Labs     03/10/21  1318 21  0444   HGB 7 3* 7 5*   MCV 88 91   RDW 18 1* 18 3*           VTE Pharmacologic Prophylaxis:   Pharmacologic: Warfarin (Coumadin)  Mechanical VTE Prophylaxis in Place: Yes    Patient Centered Rounds: I have performed bedside rounds with nursing staff today  Discussions with Specialists or Other Care Team Provider: Nursing, renal    Education and Discussions with Family / Patient: patient    Time Spent for Care: 30 minutes  More than 50% of total time spent on counseling and coordination of care as described above  Current Length of Stay: 2 day(s)    Current Patient Status: Inpatient   Certification Statement: The patient will continue to require additional inpatient hospital stay due to iv antibiotics, iv diuresis    Discharge Plan: active    Code Status: Level 1 - Full Code      Subjective:   Patient seen and examined at bedside  She has positive culture growth  No new complaints overnight  Objective:     Vitals:   Temp (24hrs), Av 5 °F (35 8 °C), Min:95 8 °F (35 4 °C), Max:97 4 °F (36 3 °C)    Temp:  [95 8 °F (35 4 °C)-97 4 °F (36 3 °C)] 96 1 °F (35 6 °C)  HR:  [52-64] 64  Resp:  [18-20] 18  BP: (121-156)/(46-65) 122/53  SpO2:  [88 %-98 %] 92 %  Body mass index is 20 82 kg/m²  Input and Output Summary (last 24 hours):        Intake/Output Summary (Last 24 hours) at 3/12/2021 1730  Last data filed at 3/12/2021 1428  Gross per 24 hour   Intake 450 ml   Output 1175 ml   Net -725 ml       Physical Exam: Physical Exam  Vitals signs reviewed  HENT:      Head: Normocephalic  Nose: Nose normal       Mouth/Throat:      Mouth: Mucous membranes are moist    Eyes:      General: No scleral icterus  Cardiovascular:      Rate and Rhythm: Normal rate  Pulmonary:      Effort: Pulmonary effort is normal    Abdominal:      Palpations: Abdomen is soft  Musculoskeletal:      Comments: Bilateral lower extremity dressing intact   Skin:     General: Skin is warm  Neurological:      Mental Status: She is alert  Mental status is at baseline  Psychiatric:         Mood and Affect: Mood normal          Behavior: Behavior normal        Additional Data:     Labs:    Results from last 7 days   Lab Units 03/11/21  0444   WBC Thousand/uL 5 83   HEMOGLOBIN g/dL 7 5*   HEMATOCRIT % 25 7*   PLATELETS Thousands/uL 172   NEUTROS PCT % 41*   LYMPHS PCT % 45*   MONOS PCT % 8   EOS PCT % 4     Results from last 7 days   Lab Units 03/12/21  0513  03/10/21  1318   SODIUM mmol/L 139   < > 142   POTASSIUM mmol/L 3 7   < > 4 6   CHLORIDE mmol/L 104   < > 107   CO2 mmol/L 28   < > 26   BUN mg/dL 57*   < > 58*   CREATININE mg/dL 2 91*   < > 2 83*   ANION GAP mmol/L 7   < > 9   CALCIUM mg/dL 7 9*   < > 8 2*   ALBUMIN g/dL  --   --  2 1*   TOTAL BILIRUBIN mg/dL  --   --  0 63   ALK PHOS U/L  --   --  65   ALT U/L  --   --  20   AST U/L  --   --  20   GLUCOSE RANDOM mg/dL 85   < > 82    < > = values in this interval not displayed  Results from last 7 days   Lab Units 03/10/21  1319   INR  2 22*             Results from last 7 days   Lab Units 03/11/21  0444 03/10/21  1724 03/10/21  1319 03/10/21  1318   LACTIC ACID mmol/L  --   --   --  0 6   PROCALCITONIN ng/ml 0 07 0 07 0 09  --            * I Have Reviewed All Lab Data Listed Above  * Additional Pertinent Lab Tests Reviewed:  Nyo 66 Admission Reviewed    Imaging:    Imaging Reports Reviewed Today Include: us kidney and bladder    Recent Cultures (last 7 days): Results from last 7 days   Lab Units 03/10/21  1416 03/10/21  1319   BLOOD CULTURE   --  Lactococcus garvieae*  No Growth at 24 hrs  GRAM STAIN RESULT   --  Gram positive cocci in chains*   URINE CULTURE  >100,000 cfu/ml Citrobacter freundii*  80,000-89,000 cfu/ml Enterococcus species*  --        Last 24 Hours Medication List:   Current Facility-Administered Medications   Medication Dose Route Frequency Provider Last Rate    acetaminophen  650 mg Oral Q6H PRN Denice Moore MD      [START ON 3/13/2021] amLODIPine  5 mg Oral Daily OTIS Cole      carvedilol  12 5 mg Oral BID With Meals OTIS Cole      cefepime  1,000 mg Intravenous Q12H Denice Moore MD 1,000 mg (03/12/21 0844)    collagenase   Topical Daily Heike MD Robin      furosemide  80 mg Intravenous BID (diuretic) Denice Moore MD      hydrALAZINE  50 mg Oral Q8H [facility] OTIS Cole      isosorbide dinitrate  10 mg Oral TID after meals Denice Moore MD      levothyroxine  25 mcg Oral Early Morning Denice Moore MD      saccharomyces boulardii  250 mg Oral BID Denice Moore MD      senna  8 6 mg Oral HS PRN Denice Moore MD      warfarin  2 mg Oral Daily (warfarin) Denice Moore MD          Today, Patient Was Seen By: Hipolito Calderon MD    ** Please Note: Dictation voice to text software may have been used in the creation of this document   **

## 2021-03-12 NOTE — ASSESSMENT & PLAN NOTE
80year old female admitted due to acute metabolic encephalopathy possibly due to pneumonia  Culture growth below    - Continue IV antibiotics    Results from last 7 days   Lab Units 03/11/21  0444 03/10/21  1724 03/10/21  1416 03/10/21  1319 03/10/21  1318   PROCALCITONIN ng/ml 0 07 0 07  --  0 09  --    BLOOD CULTURE   --   --   --  Lactococcus garvieae*  No Growth at 24 hrs   --    GRAM STAIN RESULT   --   --   --  Gram positive cocci in chains*  --    URINE CULTURE   --   --  >100,000 cfu/ml Citrobacter freundii*  80,000-89,000 cfu/ml Enterococcus species*  --   --    SARS-COV-2   --   --   --   --  Negative

## 2021-03-12 NOTE — PHYSICAL THERAPY NOTE
PT EVALUATION    80 y o     [id number]    UTI (urinary tract infection) [N39 0]  Pneumonia [J18 9]  SOB (shortness of breath) [R06 02]  Anemia [D64 9]  Nonhealing nonsurgical wound [T14  8XXA]  Fever [R50 9]  Ulcer of right lower extremity with fat layer exposed ([facility] ) [L97 912]  Acute kidney injury superimposed on chronic kidney disease ([facility] ) [N17 9, N18 9]    Past Medical History:   Diagnosis Date    Anemia 1/18/2021    Chronic kidney disease     Hypertension          History reviewed  No pertinent surgical history  03/12/21 1105   PT Last Visit   PT Visit Date 03/12/21   Note Type   Note type Evaluation   Pain Assessment   Pain Score No Pain   Home Living   Type of Home SNF  (prior to [facility] was home with spouse )   Home Layout One level   Bathroom Shower/Tub Walk-in shower  (in her home)   [address]; [address]; Wheelchair-manual;Cane   Additional Comments Prior to being at [facility] was home with spouse in one story home  [address]  Limited historian  Prior Function   Level of [facility] Needs assistance with ADLs and functional mobility   Lives With Spouse; Facility staff   Receives Help From Personal care attendant; Family   ADL Assistance Needs assistance   IADLs Needs assistance   Falls in the last 6 months 1 to 4  (2 that she can recall)   Comments Limited historian  REports resides with spouse  Has been at [facility] for rhb  Ambulates with RW assistance  Restrictions/Precautions   Weight Bearing Precautions Per Order Yes   RLE Weight Bearing Per Order WBAT  (per podiatry consult)   LLE Weight Bearing Per Order WBAT  (per podiatry consult )   Other Precautions Chair Alarm; Bed Alarm;Cognitive;Multiple lines;O2;Fall Risk  (Skin integrity  Masimo )   General   Additional Pertinent History Pt is 81 y/o female admitted with acute encephalopathy, fever, hypoxia  HCAP, UTI, acute on chronic CHF  PT consulted  Family/Caregiver Present No   Cognition   Overall Cognitive Status Impaired   Arousal/Participation Responsive   Orientation Level Oriented to person   Following Commands Follows one step commands without difficulty   Comments agitated  Decreased participation  RUE Assessment   RUE Assessment X  (grossly observed <3-/5)   LUE Assessment   LUE Assessment X  (grossly <3-/5 observed with function )   RLE Assessment   RLE Assessment X  (decreased effort )   Strength RLE   R Hip Flexion 2+/5   R Knee Extension 3+/5   R Ankle Dorsiflexion 2-/5   R Ankle Plantar Flexion 2-/5   LLE Assessment   LLE Assessment X  (decreased effort  )   Strength LLE   L Hip Flexion 2/5   L Knee Extension 2+/5   L Ankle Dorsiflexion 2-/5   L Ankle Plantar Flexion 2-/5   Bed Mobility   Rolling R 2  Maximal assistance   Additional items Assist x 2; Increased time required;Verbal cues;LE management;HOB elevated; Bedrails   Rolling L 2  Maximal assistance   Additional items Assist x 2; Increased time required;Verbal cues;LE management; Bedrails   Additional Comments Refused EOB or OOB assessment  Transfers   Sit to Stand Unable to assess   Additional Comments declined mobility  Endurance Deficit   Endurance Deficit Yes   Endurance Deficit Description fatigue, weakness  Activity Tolerance   Activity Tolerance Patient limited by fatigue;Treatment limited secondary to medical complications (Comment)   Medical Staff Made Aware Nurse, Kenn Stubbs  [facility]      Nurse Made Aware yes   Assessment   Prognosis Guarded   Problem List Decreased strength;Decreased range of motion;Decreased endurance; Impaired balance;Decreased mobility; Decreased cognition; Impaired judgement;Decreased safety awareness;Decreased skin integrity   Assessment Merlyn Barrera is a 80 y o  female who [facility] due to change in mental status, fever and hypoxia requiring 3-4L O2  Pt with hx of CKD, CHF, chronic LE nonhealing wounds, PAD    Had been getting rehab at [facility]  Admitted with acute metabolic encephalopathy, UTI, HCAP, acute on chronic CHF, LILIAN on CKD  PT consulted  Up and OOB as tolerated orders  Prior to admission had been at [facility] for [facility]  Previous to [facility] at home with spouse in one story dwelling  Reports ambulation with RW support  Last admission January 2021 max A of 2 for bed mobility  Currently presents with functional limitations related to decreased activity tolerance, impaired cognition, decreased strength, ROM, skin integrity, decreased functional mobility and locomotion  Currently requires max A of 2 for rolling  Declines further functional assessment at this time  Increased time, encouragement and cues to participate needed  The patient's AM-PAC Basic Mobility Inpatient Short Form Low Function Raw Score 12 , Standardized Score is 18 33  A standardized score less 42 9 suggests the patient may benefit from discharge to post-acute rehab services  Please also refer to the recommendation of the Physical Therapist for safe discharge planning  Given level of assistance and impairments as noted above, will benefit from ongoing PT and continued rehab at d/c in order to optimize outcomes  Will follow and progress  Goals   Patient Goals go home   STG Expiration Date 03/22/21   Short Term Goal #1 10 days: 1)  Pt will perform bed mobility with modA  in order to improve function  2)  Further assess functional mobility and revise goals  3)  Improve overall strength in order to optimize ability to perform functional tasks and reduce fall risk  4) Increase activity tolerance to 30 minutes in order to improve endurance to functional tasks  5) PT for ongoing patient and family/caregiver education, DME needs and d/c planning in order to promote highest level of function in least restrictive environment  Plan   Treatment/Interventions Functional transfer training;LE strengthening/ROM; Therapeutic exercise; Endurance training;Patient/family training;Equipment eval/education; Bed mobility;Gait training; Compensatory technique education;Continued evaluation;Spoke to nursing;OT   PT Frequency 2-3x/wk   Recommendation   PT Discharge Recommendation Post-Acute Rehabilitation Services   PT - OK to Discharge   (yes to return to [facility] when medically cleared  )   AM-PAC Basic Mobility Inpatient   Turning in Bed Without Bedrails 1   Lying on Back to Sitting on Edge of Flat Bed 1   Moving Bed to Chair 1   Standing Up From Chair 1   Walk in Room 1   Climb 3-5 Stairs 1   Basic Mobility Inpatient Raw Score 6   Turning Head Towards Sound 3   Follow Simple Instructions 3   Low Function Basic Mobility Raw Score 12   Low Function Basic Mobility Standardized Score 18 33     History: co - morbidities, fall risk, use of assistive device, assist for adl's, cognition, multiple lines  Exam: impairments in locomotion, musculoskeletal, balance,posture, joint integrity, skin integrity, cardiac, pulmonary, cognition   Clinical: unstable/unpredictable  Complexity:high    Brooklyn Whitlock, PT

## 2021-03-12 NOTE — PLAN OF CARE
Problem: OCCUPATIONAL THERAPY ADULT  Goal: Performs self-care activities at highest level of function for planned discharge setting  See evaluation for individualized goals  Description: Treatment Interventions: ADL retraining, Functional transfer training, UE strengthening/ROM, Endurance training, Cognitive reorientation, Patient/family training, Equipment evaluation/education, Compensatory technique education, Continued evaluation          See flowsheet documentation for full assessment, interventions and recommendations  Note: Limitation: Decreased ADL status, Decreased UE ROM, Decreased UE strength, Decreased Safe judgement during ADL, Decreased cognition, Decreased endurance, Visual deficit, Decreased high-level ADLs  Prognosis: Fair  Assessment: Pt is a 79y/o female admitted to the hospital 2* change in her mental status, delirious/hallucinations  Pt noted with acute metabolic encephalopathy, HCAP, R toe dry gangrene(allowed WBAT), venous ulceration, and UTI  Pt with PMH CKD, PAD, CHF, chronic nonhealing wounds LE's, anemia, HTN, and recent hospitalization(1/21) 2* cellulitis/amb dysfunction--with d/c to rehab  At rehab, pt states that she had assistance with her ADLs, transfers, and ambulation--with RW  During initial eval, pt demonstrated deficits with her functional mobility, ADL status, b/l UE strength, transfer safety, activity tolerance(currently poor=5-10mins), and cognition(i e orientation, problem-solving)  Pt would benefit from continued OT assessment and tx for the above deficits  3-5xwk/1-2wks        OT Discharge Recommendation: Post-Acute Rehabilitation Services

## 2021-03-12 NOTE — OCCUPATIONAL THERAPY NOTE
Occupational Therapy Evaluation(time=9919-0572)     Patient Name: Milka He  ZQMCM'[id number] Date: 3/12/2021  Problem List  Principal Problem:    Acute metabolic encephalopathy  Active Problems:    Acute kidney injury superimposed on chronic kidney disease (HCC)    PAD (peripheral artery disease) (HCC)    Essential hypertension    Acute on chronic diastolic congestive heart failure (HCC)    Iron deficiency anemia secondary to inadequate dietary iron intake    Chronic respiratory failure with hypoxia (HCC)    Multiple open wounds of lower extremity    HCAP (healthcare-associated pneumonia)    UTI (urinary tract infection)    Past Medical History  Past Medical History:   Diagnosis Date    Anemia 1/18/2021    Chronic kidney disease     Hypertension      Past Surgical History  History reviewed  No pertinent surgical history  03/12/21 1126   Note Type   Note type Evaluation   Restrictions/Precautions   Weight Bearing Precautions Per Order Yes   RLE Weight Bearing Per Order WBAT   LLE Weight Bearing Per Order WBAT   Other Precautions Chair Alarm; Bed Alarm;Cognitive; Fall Risk;O2   Pain Assessment   Pain Assessment Tool Pain Assessment not indicated - pt denies pain   Pain Score No Pain   Home Living   Type of Home [facility]; prior 1 sty house-)   Home Layout One level   Bathroom Shower/Tub Walk-in shower   Bathroom Equipment Commode; [address]; Wheelchair-manual;Cane   Prior Function   Lives With Spouse   ADL Assistance Needs assistance   Falls in the last 6 months 1 to 4   Lifestyle   Autonomy At rehab, pt states that she had assistance with her ADLs, transfers, and ambulation--with RW   Reciprocal Relationships supportive    Service to Others homemaker   Intrinsic Gratification watching TV   Psychosocial   Psychosocial (WDL) X   Patient Behaviors/Mood Irritable;Flat affect   Subjective   Subjective "I just want to be left alone "   ADL   Where Assessed Supine, bed   Eating Assistance 2  Maximal Assistance   Grooming Assistance 2  Maximal Assistance   UB Bathing Assistance 1  Total Assistance   LB Bathing Assistance 1  Total Assistance   UB Dressing Assistance 1  Total Assistance   LB Dressing Assistance 1  Total Assistance   Toileting Assistance  1  Total Assistance   Bed Mobility   Rolling R 2  Maximal assistance   Additional items Assist x 2; Increased time required;Verbal cues;LE management   Rolling L 2  Maximal assistance   Additional items Assist x 2; Increased time required;Verbal cues;LE management   Transfers   Sit to Stand Unable to assess  (pt refusing to get out of bed)   Functional Mobility   Functional Mobility   (recommend hoyerlift for OOB with nsg)   Activity Tolerance   Activity Tolerance Patient limited by fatigue;Treatment limited secondary to agitation   Medical Staff Made Aware nsg, P T     RUE Assessment   RUE Assessment X  (actively moving UE, but refusing formal assessment)   RUE Strength   RUE Overall Strength   (3-/5 throughout)   LUE Assessment   LUE Assessment X  (limited shr AROM(flex=90*);elbow-distal=WFLs)   LUE Strength   LUE Overall Strength Within Functional Limits - able to perform ADL tasks with strength  (3+/5 throughout)   Hand Function   Gross Motor Coordination Functional   Fine Motor Coordination Functional   Sensation   Light Touch No apparent deficits   Proprioception   Proprioception No apparent deficits   Vision-Basic Assessment   Current Vision   (glasses)   Vision - Complex Assessment   Acuity   (impaired)   Perception   Inattention/Neglect Appears intact   Cognition   Overall Cognitive Status Unable to assess   Arousal/Participation Alert   Attention Within functional limits   Orientation Level Oriented to person;Oriented to place; Disoriented to time   Memory Decreased short term memory;Decreased recall of recent events;Decreased recall of precautions   Following Commands Follows one step commands without difficulty Assessment   Limitation Decreased ADL status; Decreased UE ROM; Decreased UE strength;Decreased Safe judgement during ADL;Decreased cognition;Decreased endurance;Visual deficit; Decreased high-level ADLs   Prognosis Fair   Assessment Pt is a 79y/o female admitted to the hospital 2* change in her mental status, delirious/hallucinations  Pt noted with acute metabolic encephalopathy, HCAP, R toe dry gangrene(allowed WBAT), venous ulceration, and UTI  Pt with PMH CKD, PAD, CHF, chronic nonhealing wounds LE's, anemia, HTN, and recent hospitalization(1/21) 2* cellulitis/amb dysfunction--with d/c to rehab  At rehab, pt states that she had assistance with her ADLs, transfers, and ambulation--with RW  During initial eval, pt demonstrated deficits with her functional mobility, ADL status, b/l UE strength, transfer safety, activity tolerance(currently poor=5-10mins), and cognition(i e orientation, problem-solving)  Pt would benefit from continued OT assessment and tx for the above deficits  3-5xwk/1-2wks  Goals   Patient Goals "to go home"   STG Time Frame   (1-7 days)   Short Term Goal #1 Pt will tolerate continued functional mobility assessment and appropriate goals will be established  Short Term Goal #2 Pt will demonstrate mod A with their bed mobility to facilitate EOB ADLs  Short Term Goal  Pt will demonstrate improved activity tolerance to good(20-30mins) and sitting tolerance(on EOB) to 10-15mins to assist with ADLs  LTG Time Frame   (7-14days)   Long Term Goal #1 Pt will demonstrate improved b/l UE strength by 1/2 MM grade to assist with ADLs/transfers  Long Term Goal #2 Pt will demonstrate [place] with her UE and mod A with his LE bathing/dressing  Long Term Goal Pt will tolerate continued cognitive/home-safety assessment and appropriate d/c recommendations will be provided  Plan   Treatment Interventions ADL retraining;Functional transfer training;UE strengthening/ROM; Endurance training;Cognitive reorientation;Patient/family training;Equipment evaluation/education; Compensatory technique education;Continued evaluation   Goal Expiration Date 03/26/21   OT Treatment Day 0   OT Frequency 3-5x/wk   Recommendation   OT Discharge Recommendation Post-Acute Rehabilitation Services   AM-PAC Daily Activity Inpatient   Lower Body Dressing 2   Bathing 2   Toileting 2   Upper Body Dressing 2   Grooming 2   Eating 2   Daily Activity Raw Score 12   Daily Activity Standardized Score (Calc for Raw Score >=11) 30 6   [facility], OT

## 2021-03-12 NOTE — CASE MANAGEMENT
Pt from [facility] however is NOT a bed hold but able to accept back when medically cleared (needs authorization prior to return)

## 2021-03-12 NOTE — PROGRESS NOTES
NEPHROLOGY PROGRESS NOTE   Macy Portillo 80 y o  female MRN: [id number]  Unit/Bed#: [place] [address] -01 Encounter: [id number]      ASSESSMENT/PLAN:  Acute kidney injury (POA) on chronic kidney disease, stage IV:   - to note, patient recently hospitalized in January 2021 with episode of acute kidney injury, creatinine improved to 2 3 mg/dL upon discharge  - seen by AP with nephrology once post discharge  - suspect acute kidney injury secondary to cardiorenal syndrome with volume overload  - upon review of medical records, baseline creatinine 1 8-2 1 mg/dL  - creatinine 2 83 mg/dL upon admission on 03/10   - most recent creatinine 2 91 mg/dL today  - currently on furosemide 80 mg IV b i d    - UA: Small blood, positive nitrate, large leukocytes, +1 protein, 2-4 RBC, innumerable WBC, moderate bacteria, renal tubule cells present  - imaging: CT scan completed in January 2021 revealed stent present in the upper pole right renal collecting system with some residual hydronephrosis  Left kidney small  Renal ultrasound revealed atrophic left kidney 6 5 cm, diffuse cortical thinning, no hydronephrosis, right kidney normal size, diffuse cortical thinning, mild hydronephrosis with ureteral stent in place    - will repeat renal ultrasound  - repeat PVR with bladder scan as patient required intermittent straight catheterization 2 days prior   - avoid NSAIDs, nephrotoxic agents, IV contrast   - adjust medications to appropriate GFR  - monitor volume status with strict intake/output, daily weight  - will check BMP, magnesium, phosphorus in a m  Right-sided hydronephrosis:  - with history of right ureteral stent placement in December 2020    - as above, will repeat renal ultrasound  Electrolytes, acid/base:  - overall stable and acceptable  - will continue to monitor with repeat lab studies  Blood pressure, hypertension:  - blood pressure overall stable, slightly elevated    - currently on carvedilol 12 5 mg b i d , furosemide 80 mg b i d , hydralazine 50 mg t i d , Isordil 10 mg t i d   - given elevated blood pressures, will add amlodipine 5 mg daily back to regimen with hold parameters  - optimize hemodynamics; avoid fluctuations of blood pressure and hypotension  - maintain MAP > 65  H/H, anemia of chronic disease:  - most recent hemoglobin 7 5 grams/deciliter   - goal hemoglobin greater than 8 grams/deciliter  - recommend PRBC transfusion for hemoglobin less than 7  Diastolic CHF:  - chest x-ray completed upon admission revealed mild-to-moderate congestive changes  Small right and moderate left effusion  Left lung base atelectasis or infiltrate   - most recent echo revealed EF of 50% with grade 2 diastolic dysfunction, moderate MR, dilated IVC in December 2020   - currently on Lasix 80 mg IV b i d   - order placed for daily weights, intake/output  Suspected pneumonia:  - currently on antibiotics per primary team   - chest x-ray as above  Bacteremia:  - 1 of 2 sets of blood cultures revealed Gram-positive cocci in chains  - plan per primary team     Severe PVD:  - no surgical intervention or invasive measures per prior notes  Encephalopathy, multifactorial given UTI, possible pneumonia, acute renal failure  SUBJECTIVE:  Patient resting in bed  Patient with complaints that she is cold  She is without current shortness of breath   She is awaiting to eat breakfast      OBJECTIVE:  Current Weight: Weight - Scale: 61 2 kg (135 lb)  Vitals:    03/12/21 0739   BP: 150/65   Pulse: 62   Resp: 20   Temp:    SpO2: 92%       Intake/Output Summary (Last 24 hours) at 3/12/2021 1015  Last data filed at 3/12/2021 0433  Gross per 24 hour   Intake --   Output 900 ml   Net -900 ml       General:  Acute distress with discomfort and feeling cold  Skin:  Warm, no rash, dry  Eyes:  Sclerae anicteric   ENT:  Moist lips and mucous membranes  Neck:  Supple trachea midline  Chest:  Diminished breath sounds bilaterally  CVS:  Regular rate regular rhythm  Abdomen:  Soft, nontender, normoactive bowel sounds  Extremities:  No significant edema, wrapped  Neuro:  Awake, interactive to questions, fluctuant cognition        Medications:  Scheduled Meds:  Current Facility-Administered Medications   Medication Dose Route Frequency Provider Last Rate    acetaminophen  650 mg Oral Q6H PRN Lexii Castellon MD      carvedilol  12 5 mg Oral BID With Meals Lexii Castellon MD      cefepime  1,000 mg Intravenous Q12H Lexii Castellon MD 1,000 mg (03/12/21 0844)    furosemide  80 mg Intravenous BID (diuretic) Lexii Castellon MD      hydrALAZINE  50 mg Oral [facility] Lexii Castellon MD      isosorbide dinitrate  10 mg Oral TID after meals Lexii Castellon MD      levothyroxine  25 mcg Oral Early Morning Lexii Castellon MD      saccharomyces boulardii  250 mg Oral BID Lexii Castellon MD      senna  8 6 mg Oral HS PRN Lexii Castellon MD      warfarin  2 mg Oral Daily (warfarin) Lexii Castellon MD         PRN Meds:   acetaminophen    senna    Continuous Infusions:     Laboratory Results:  Results from last 7 days   Lab Units 03/12/21  0513 03/11/21  0451 03/11/21  0444 03/10/21  1318 03/07/21  1526   WBC Thousand/uL  --   --  5 83 6 96 7 29   HEMOGLOBIN g/dL  --   --  7 5* 7 3* 8 7*   HEMATOCRIT %  --   --  25 7* 24 9* 28 5*   PLATELETS Thousands/uL  --   --  172 196 227   SODIUM mmol/L 139 141  --  142 141   POTASSIUM mmol/L 3 7 4 3  --  4 6 4 7   CHLORIDE mmol/L 104 107  --  107 106   CO2 mmol/L 28 24  --  26 25   BUN mg/dL 57* 58*  --  58* 59*   CREATININE mg/dL 2 91* 2 78*  --  2 83* 2 74*   CALCIUM mg/dL 7 9* 8 3  --  8 2* 8 5

## 2021-03-12 NOTE — WOUND OSTOMY CARE
Consult Note - Wound   Nikolas Witt 80 y o  female MRN: [id number]  Unit/Bed#: [place] 2 -01 Encounter: [id number]      History and Present Illness:  80year old female presented to the hospital from [place] with change in mental status  Patient's history significant for PAD  Assessment Findings:   Patient requires assist x 2 to turn in bed  She appears generally frail and weak, skin fragile with loss of subcutaneous tissue  Bruising to arms and hands  Blanchable erythema to right hip and sacrum  Patient continent during the day, Mary Buitrago in use at bedtime per nursing team   Nutrition team following, supplements ordered  1   Present on admission evolving deep tissue injury to sacrum (suspect stage 3, 4, or unstageable)--wound bed beefy red and yellow slough  Periwound fragile with some blanchable erythema, no induration  Minimal serosanguinous drainage  2   Right hip with small amount of brown eschar flush with skin and surrounding erythema/induration--no open wound, no drainage, unclear etiology  3   Lower extremity wounds--per podiatry team     See flowsheet and media for wound details  Accumax alternating pump and positioning wedges in use  Heels elevated off of bed  Wound Care Plan:   1-P500 low air-loss mattress  2-Elevate heels off of bed/chair surface to offload pressure  3-Offloading air cushion in chair when out of bed  4-Moisturize skin daily with skin nourishing cream   5-Turn/reposition every 2 hours while in bed, or when medically stable, using positioning wedges; and weight shift frequently while in chair for pressure re-distribution on skin  6-Lower extremity wounds per podiatry recommendations  7-Right hip--cleanse with soap and water, pat dry  Apply 3m on sting to eschar and redness on right hip  Allow to dry and cover with Allevyn Life foam dressing  Change dressing every 3 days  8-Sacrum--cleanse with normal saline, pat dry    Apply Santyl to wound bed in nickel thick layer  Cover with Allevyn Life foam dressing  Change dressing daily  Wound care team to follow  Plan of care reviewed with primary RN  Dr Ramila Chakraborty made aware of wounds, right hip induration, and recommendation for Santyl to sacrum daily via tiger text  Patient should follow-up at the wound center on discharge  Wound 03/10/21 Pressure Injury Sacrum (Active)   Wound Image   03/12/21 1116   Wound Description Beefy red;Yellow;Slough 03/12/21 1116   Pressure Injury Stage DTPI 03/12/21 1116   Corazon-wound Assessment Erythema; Intact 03/12/21 1116   Wound Length (cm) 2 1 cm 03/12/21 1116   Wound Width (cm) 2 cm 03/12/21 1116   Wound Depth (cm) 0 2 cm 03/12/21 1116   Wound Surface Area (cm^2) 4 2 cm^2 03/12/21 1116   Wound Volume (cm^3) 0 84 cm^3 03/12/21 1116   Calculated Wound Volume (cm^3) 0 84 cm^3 03/12/21 1116   Drainage Amount Scant 03/12/21 1116   Drainage Description Serosanguineous 03/12/21 1116   Treatments Cleansed 03/12/21 1116   Dressing Calcium Alginate with Silver; Foam, Silicon (eg  Allevyn, etc) 03/12/21 1116   Wound packed? No 03/10/21 1833   Dressing Changed Changed 03/12/21 1116   Patient Tolerance Tolerated well 03/12/21 1116   Dressing Status Clean;Dry; Intact 03/12/21 1116       Wound 03/12/21 Hip Right (Active)   Wound Image   03/12/21 1121   Wound Description Brown;Dry 03/12/21 1121   Corazon-wound Assessment Induration;Erythema 03/12/21 1121   Wound Length (cm) 1 2 cm 03/12/21 1121   Wound Width (cm) 0 5 cm 03/12/21 1121   Wound Depth (cm) 0 cm 03/12/21 1121   Wound Surface Area (cm^2) 0 6 cm^2 03/12/21 1121   Wound Volume (cm^3) 0 cm^3 03/12/21 1121   Calculated Wound Volume (cm^3) 0 cm^3 03/12/21 1121   Drainage Amount None 03/12/21 1121   Treatments Cleansed 03/12/21 1121   Dressing Foam, Silicon (eg  Allevyn, etc) 03/12/21 1121   Dressing Changed New 03/12/21 1121   Patient Tolerance Tolerated well 03/12/21 1121   Dressing Status Clean;Dry; Intact 03/12/21 1121     Sigrid Sims Bradford WOODN, RN, [employer]

## 2021-03-12 NOTE — CASE MANAGEMENT
Cm spoke to pt's  who states he believed pt was getting near to d/c at [place] and was being set up with [facility] 24/7  Referral to [facility] who confirmed same   understanding that pt most likely will need continued STR upon d/c from  Hospital due to rehospitalization need with weakness- spoke of possible Palliative Care however he continues to assume this as hospice which he doesn't feel pt is ready for as of yet- explained same to him with verbal understanding and voiced an interest in referral to same as an outpt  Therapies saw pt and recommend continued STR with  asking for referrals to [facility]- same made- understands possible d/c Sunday should have accepting facility and insurance auth  Will continue to follow to assist with dc poc  Per [facility] Liaison, bed at [facility] would be available for pt should they accept a bed at their facility   made aware of same  Will continue to follow to assist with dc poc

## 2021-03-12 NOTE — ASSESSMENT & PLAN NOTE
Wt Readings from Last 3 Encounters:   03/12/21 58 5 kg (128 lb 15 5 oz)   03/07/21 61 6 kg (135 lb 12 9 oz)   02/03/21 75 1 kg (165 lb 9 1 oz)     - Continue lasix IV BID   Management per renal

## 2021-03-12 NOTE — PLAN OF CARE
Problem: Potential for Falls  Goal: Patient will remain free of falls  Description: INTERVENTIONS:  - Assess patient frequently for physical needs  -  Identify cognitive and physical deficits and behaviors that affect risk of falls  -  [place] fall precautions as indicated by assessment   - Educate patient/family on patient safety including physical limitations  - Instruct patient to call for assistance with activity based on assessment  - Modify environment to reduce risk of injury  - Consider OT/PT consult to assist with strengthening/mobility  Outcome: Progressing     Problem: Prexisting or High Potential for Compromised Skin Integrity  Goal: Skin integrity is maintained or improved  Description: INTERVENTIONS:  - Identify patients at risk for skin breakdown  - Assess and monitor skin integrity  - Assess and monitor nutrition and hydration status  - Monitor labs   - Assess for incontinence   - Turn and reposition patient  - Assist with mobility/ambulation  - Relieve pressure over bony prominences  - Avoid friction and shearing  - Provide appropriate hygiene as needed including keeping skin clean and dry  - Evaluate need for skin moisturizer/barrier cream  - Collaborate with interdisciplinary team   - Patient/family teaching  - Consider wound care consult   Outcome: Progressing     Problem: Nutrition/Hydration-ADULT  Goal: Nutrient/Hydration intake appropriate for improving, restoring or maintaining nutritional needs  Description: Monitor and assess patient's nutrition/hydration status for malnutrition  Collaborate with interdisciplinary team and initiate plan and interventions as ordered  Monitor patient's weight and dietary intake as ordered or per policy  Utilize nutrition screening tool and intervene as necessary  Determine patient's food preferences and provide high-protein, high-caloric foods as appropriate       INTERVENTIONS:  - Monitor oral intake, urinary output, labs, and treatment plans  - Assess nutrition and hydration status and recommend course of action  - Evaluate amount of meals eaten  - Assist patient with eating if necessary   - Allow adequate time for meals  - Recommend/ encourage appropriate diets, oral nutritional supplements, and vitamin/mineral supplements  - Order, calculate, and assess calorie counts as needed  - Recommend, monitor, and adjust tube feedings and TPN/PPN based on assessed needs  - Assess need for intravenous fluids  - Provide specific nutrition/hydration education as appropriate  - Include patient/family/caregiver in decisions related to nutrition  Outcome: Progressing

## 2021-03-12 NOTE — DISCHARGE INSTR - OTHER ORDERS
Wound Care Plan:   1-Low air-loss mattress  2-Elevate heels off of bed/chair surface to offload pressure  3-Offloading air cushion in chair when out of bed  4-Moisturize skin daily with skin nourishing cream   5-Turn/reposition every 2 hours while in bed, using positioning wedges; and weight shift frequently while in chair for pressure re-distribution on skin  6-Lower extremity wounds per podiatry recommendations  7-Right hip--cleanse with soap and water, pat dry  Apply 3m on sting to eschar and redness on right hip  Allow to dry and cover with Allevyn Life foam dressing  Change dressing every 3 days  8-Sacrum--cleanse with normal saline, pat dry  3m no sting skin barrier film to javed-wound skin  Apply Santyl to wound bed in nickel thick layer  Cover with Allevyn Life foam dressing  Change dressing daily  Follow-up at the [address]

## 2021-03-12 NOTE — ASSESSMENT & PLAN NOTE
LILIAN possibly due to CKD progression, cardiorenal  Minimally improved  - IV diuretics  - Renal recommendations appreciated      Recent Labs     03/10/21  1318 03/11/21  0451 03/12/21  0513   BUN 58* 58* 57*   CREATININE 2 83* 2 78* 2 91*   EGFR 14 14 14       Results from last 7 days   Lab Units 03/10/21  1416   BLOOD UA  Small*   PROTEIN UA mg/dl 30 (1+)*

## 2021-03-12 NOTE — UTILIZATION REVIEW
Continued Stay Review    Date: 3/12                         Current Patient Class: Inpatient Current Level of Care: Med Surg    HPI:89 y o  female initially admitted on 3/10    Assessment/Plan: LILIAN, Right-sided hydropnephrosis, Suspected pneumonia, UTI  One of two blood cultures shows GPC in chain  Continue Iv diuretic 80 mg bid and IV antibiotics  Will given metolazone 5 mg one dos today  Currently on O2 4L N/C       Pertinent Labs/Diagnostic Results:   Results from last 7 days   Lab Units 03/10/21  1318   SARS-COV-2  Negative     Results from last 7 days   Lab Units 03/11/21  0444 03/10/21  1318 03/07/21  1526   WBC Thousand/uL 5 83 6 96 7 29   HEMOGLOBIN g/dL 7 5* 7 3* 8 7*   HEMATOCRIT % 25 7* 24 9* 28 5*   PLATELETS Thousands/uL 172 196 227   NEUTROS ABS Thousands/µL 2 41 2 89 3 27         Lab Units 03/12/21  0513 03/11/21  0451 03/10/21  1318   SODIUM mmol/L 139 141 142   POTASSIUM mmol/L 3 7 4 3 4 6   CHLORIDE mmol/L 104 107 107   CO2 mmol/L 28 24 26   ANION GAP mmol/L 7 10 9   BUN mg/dL 57* 58* 58*   CREATININE mg/dL 2 91* 2 78* 2 83*   EGFR ml/min/1 73sq m 14 14 14   CALCIUM mg/dL 7 9* 8 3 8 2*     Lab Units 03/10/21  1318   AST U/L 20   ALT U/L 20   ALK PHOS U/L 65   TOTAL PROTEIN g/dL 6 3*   ALBUMIN g/dL 2 1*   TOTAL BILIRUBIN mg/dL 0 63         Lab Units 03/12/21  0513 03/11/21  0451 03/10/21  1318   GLUCOSE RANDOM mg/dL 85 80 82     Results from last 7 days   Lab Units 03/10/21  1319 03/07/21  1526   PROTIME seconds 24 2* 25 5*   INR  2 22* 2 39*   PTT seconds 46* 51*         Results from last 7 days   Lab Units 03/11/21  0444 03/10/21  1724 03/10/21  1319   PROCALCITONIN ng/ml 0 07 0 07 0 09     Results from last 7 days   Lab Units 03/10/21  1318   LACTIC ACID mmol/L 0 6             Results from last 7 days   Lab Units 03/10/21  1724   NT-PRO BNP pg/mL 30,958*     Results from last 7 days   Lab Units 03/10/21  1416   CLARITY UA  Slightly Cloudy   COLOR UA  Yellow   SPEC GRAV UA  1 015   PH UA  5 5 GLUCOSE UA mg/dl Negative   KETONES UA mg/dl Negative   BLOOD UA  Small*   PROTEIN UA mg/dl 30 (1+)*   NITRITE UA  Positive*   BILIRUBIN UA  Negative   UROBILINOGEN UA E U /dl 0 2   LEUKOCYTES UA  Large*   WBC UA /hpf Innumerable*   RBC UA /hpf 2-4   BACTERIA UA /hpf Moderate*   EPITHELIAL CELLS WET PREP /hpf Occasional     Results from last 7 days   Lab Units 03/10/21  1318   INFLUENZA A PCR  Negative   INFLUENZA B PCR  Negative   RSV PCR  Negative     Results from last 7 days   Lab Units 03/10/21  1416 03/10/21  1319   BLOOD CULTURE   --  No Growth at 24 hrs     GRAM STAIN RESULT   --  Gram positive cocci in chains*   URINE CULTURE  >100,000 cfu/ml Citrobacter freundii*  --      Vital Signs:  03/12/21 11:46:05  95 8 °F (35 4 °C)Abnormal   52Abnormal   --  124/46Abnormal   72  95 %  --  --  --  --   03/12/21 0900  96 9 °F (36 1 °C)Abnormal   54Abnormal   --  --  --  92 %  --  --  --  --   03/12/21 07:39:14  --  62  20  150/65  93  92 %  --  --  --  --   03/12/21 0700  --  --  --  --  --  94 %  --  --  --  --   03/12/21 05:14:54  --  57  --  156/65  95  88 %Abnormal                Medications:   Scheduled Medications:  [START ON 3/13/2021] amLODIPine, 5 mg, Oral, Daily  carvedilol, 12 5 mg, Oral, BID With Meals  cefepime, 1,000 mg, Intravenous, Q12H  collagenase, , Topical, Daily  furosemide, 80 mg, Intravenous, BID (diuretic)  hydrALAZINE, 50 mg, Oral, Q8H NATASHA  isosorbide dinitrate, 10 mg, Oral, TID after meals  levothyroxine, 25 mcg, Oral, Early Morning  metolazone, 5 mg, Oral, Once  saccharomyces boulardii, 250 mg, Oral, BID  warfarin, 2 mg, Oral, Daily (warfarin)      Continuous IV Infusions: None     PRN Meds:  acetaminophen, 650 mg, Oral, Q6H PRN  senna, 8 6 mg, Oral, HS PRN      Discharge Plan: Return to [facility] when medically stable    Network Utilization Review Department  ATTENTION: Please call with any questions or concerns to [phone] and carefully listen to the prompts so that you are directed to the right person  All voicemails are confidential   Sarah Beth Quintanilla all requests for admission clinical reviews, approved or denied determinations and any other requests to dedicated fax number below belonging to the campus where the patient is receiving treatment   List of dedicated fax numbers for the Facilities:  [address] DENIALS (Administrative/Medical Necessity) [phone]   [address] (Maternity/NICU/Pediatrics) [phone]   [address] Lukas Hagan 4309 (  [place] [facility] [address] [place] [place] [address] Torrey Best [address] [place], [place] [phone]

## 2021-03-12 NOTE — PLAN OF CARE
Problem: PHYSICAL THERAPY ADULT  Goal: Performs mobility at highest level of function for planned discharge setting  See evaluation for individualized goals  Description: Treatment/Interventions: Functional transfer training, LE strengthening/ROM, Therapeutic exercise, Endurance training, Patient/family training, Equipment eval/education, Bed mobility, Gait training, Compensatory technique education, Continued evaluation, Spoke to nursing, OT          See flowsheet documentation for full assessment, interventions and recommendations  Outcome: Progressing  Note: Prognosis: Guarded  Problem List: Decreased strength, Decreased range of motion, Decreased endurance, Impaired balance, Decreased mobility, Decreased cognition, Impaired judgement, Decreased safety awareness, Decreased skin integrity  Assessment: Yuri Bowman is a 80 y o  female who was sent from [address] due to change in mental status, fever and hypoxia requiring 3-4L O2  Pt with hx of CKD, CHF, chronic LE nonhealing wounds, PAD  Had been getting rehab at [place]  Admitted with acute metabolic encephalopathy, UTI, HCAP, acute on chronic CHF, LILIAN on CKD  PT consulted  Up and OOB as tolerated orders  Prior to admission had been at [facility] for rehab  Previous to [place] at home with spouse in one story dwelling  Reports ambulation with RW support  Last admission January 2021 max A of 2 for bed mobility  Currently presents with functional limitations related to decreased activity tolerance, impaired cognition, decreased strength, ROM, skin integrity, decreased functional mobility and locomotion  Currently requires max A of 2 for rolling  Declines further functional assessment at this time  Increased time, encouragement and cues to participate needed  The patient's AM-PAC Basic Mobility Inpatient Short Form Low Function Raw Score 12 , Standardized Score is 18 33   A standardized score less 42 9 suggests the patient may benefit from discharge to post-acute rehab services  Please also refer to the recommendation of the Physical Therapist for safe discharge planning  Given level of assistance and impairments as noted above, will benefit from ongoing PT and continued rehab at d/c in order to optimize outcomes  Will follow and progress  PT Discharge Recommendation: Post-Acute Rehabilitation Services     PT - OK to Discharge: (yes to return to rhb at [facility] when medically cleared )    See flowsheet documentation for full assessment

## 2021-03-13 LAB
ANION GAP SERPL CALCULATED.3IONS-SCNC: 10 MMOL/L (ref 4–13)
BACTERIA BLD CULT: ABNORMAL
BACTERIA UR CULT: ABNORMAL
BACTERIA UR CULT: ABNORMAL
BASOPHILS # BLD AUTO: 0.03 THOUSANDS/ΜL (ref 0–0.1)
BASOPHILS NFR BLD AUTO: 1 % (ref 0–1)
BUN SERPL-MCNC: 57 MG/DL (ref 5–25)
CALCIUM SERPL-MCNC: 8.4 MG/DL (ref 8.3–10.1)
CHLORIDE SERPL-SCNC: 102 MMOL/L (ref 100–108)
CO2 SERPL-SCNC: 28 MMOL/L (ref 21–32)
CREAT SERPL-MCNC: 2.87 MG/DL (ref 0.6–1.3)
EOSINOPHIL # BLD AUTO: 0.31 THOUSAND/ΜL (ref 0–0.61)
EOSINOPHIL NFR BLD AUTO: 5 % (ref 0–6)
ERYTHROCYTE [DISTWIDTH] IN BLOOD BY AUTOMATED COUNT: 18.2 % (ref 11.6–15.1)
GFR SERPL CREATININE-BSD FRML MDRD: 14 ML/MIN/1.73SQ M
GLUCOSE SERPL-MCNC: 84 MG/DL (ref 65–140)
GRAM STN SPEC: ABNORMAL
HCT VFR BLD AUTO: 27 % (ref 34.8–46.1)
HGB BLD-MCNC: 7.9 G/DL (ref 11.5–15.4)
IMM GRANULOCYTES # BLD AUTO: 0.07 THOUSAND/UL (ref 0–0.2)
IMM GRANULOCYTES NFR BLD AUTO: 1 % (ref 0–2)
INR PPP: 3.27 (ref 0.84–1.19)
LYMPHOCYTES # BLD AUTO: 2.16 THOUSANDS/ΜL (ref 0.6–4.47)
LYMPHOCYTES NFR BLD AUTO: 38 % (ref 14–44)
MAGNESIUM SERPL-MCNC: 2.1 MG/DL (ref 1.6–2.6)
MCH RBC QN AUTO: 26.5 PG (ref 26.8–34.3)
MCHC RBC AUTO-ENTMCNC: 29.3 G/DL (ref 31.4–37.4)
MCV RBC AUTO: 91 FL (ref 82–98)
MONOCYTES # BLD AUTO: 0.4 THOUSAND/ΜL (ref 0.17–1.22)
MONOCYTES NFR BLD AUTO: 7 % (ref 4–12)
NEUTROPHILS # BLD AUTO: 2.73 THOUSANDS/ΜL (ref 1.85–7.62)
NEUTS SEG NFR BLD AUTO: 48 % (ref 43–75)
NRBC BLD AUTO-RTO: 0 /100 WBCS
PHOSPHATE SERPL-MCNC: 4.8 MG/DL (ref 2.3–4.1)
PLATELET # BLD AUTO: 182 THOUSANDS/UL (ref 149–390)
PMV BLD AUTO: 9.9 FL (ref 8.9–12.7)
POTASSIUM SERPL-SCNC: 3.3 MMOL/L (ref 3.5–5.3)
PROTHROMBIN TIME: 32.5 SECONDS (ref 11.6–14.5)
RBC # BLD AUTO: 2.98 MILLION/UL (ref 3.81–5.12)
SODIUM SERPL-SCNC: 140 MMOL/L (ref 136–145)
WBC # BLD AUTO: 5.7 THOUSAND/UL (ref 4.31–10.16)

## 2021-03-13 PROCEDURE — 84100 ASSAY OF PHOSPHORUS: CPT | Performed by: NURSE PRACTITIONER

## 2021-03-13 PROCEDURE — 83735 ASSAY OF MAGNESIUM: CPT | Performed by: NURSE PRACTITIONER

## 2021-03-13 PROCEDURE — 99232 SBSQ HOSP IP/OBS MODERATE 35: CPT | Performed by: STUDENT IN AN ORGANIZED HEALTH CARE EDUCATION/TRAINING PROGRAM

## 2021-03-13 PROCEDURE — 99232 SBSQ HOSP IP/OBS MODERATE 35: CPT | Performed by: INTERNAL MEDICINE

## 2021-03-13 PROCEDURE — 85025 COMPLETE CBC W/AUTO DIFF WBC: CPT | Performed by: STUDENT IN AN ORGANIZED HEALTH CARE EDUCATION/TRAINING PROGRAM

## 2021-03-13 PROCEDURE — 85610 PROTHROMBIN TIME: CPT | Performed by: INTERNAL MEDICINE

## 2021-03-13 PROCEDURE — 80048 BASIC METABOLIC PNL TOTAL CA: CPT | Performed by: NURSE PRACTITIONER

## 2021-03-13 RX ORDER — POTASSIUM CHLORIDE 20 MEQ/1
40 TABLET, EXTENDED RELEASE ORAL ONCE
Status: COMPLETED | OUTPATIENT
Start: 2021-03-13 | End: 2021-03-13

## 2021-03-13 RX ORDER — FUROSEMIDE 10 MG/ML
80 INJECTION INTRAMUSCULAR; INTRAVENOUS
Status: DISCONTINUED | OUTPATIENT
Start: 2021-03-13 | End: 2021-03-14

## 2021-03-13 RX ADMIN — ISOSORBIDE DINITRATE 10 MG: 10 TABLET ORAL at 12:00

## 2021-03-13 RX ADMIN — FUROSEMIDE 80 MG: 10 INJECTION, SOLUTION INTRAVENOUS at 17:46

## 2021-03-13 RX ADMIN — CEFEPIME HYDROCHLORIDE 1000 MG: 1 INJECTION, POWDER, FOR SOLUTION INTRAMUSCULAR; INTRAVENOUS at 20:58

## 2021-03-13 RX ADMIN — FUROSEMIDE 80 MG: 10 INJECTION, SOLUTION INTRAVENOUS at 12:01

## 2021-03-13 RX ADMIN — Medication 250 MG: at 17:46

## 2021-03-13 RX ADMIN — LEVOTHYROXINE SODIUM 25 MCG: 25 TABLET ORAL at 05:05

## 2021-03-13 RX ADMIN — COLLAGENASE SANTYL: 250 OINTMENT TOPICAL at 09:17

## 2021-03-13 RX ADMIN — POTASSIUM CHLORIDE 40 MEQ: 1500 TABLET, EXTENDED RELEASE ORAL at 09:16

## 2021-03-13 RX ADMIN — CARVEDILOL 12.5 MG: 6.25 TABLET, FILM COATED ORAL at 17:45

## 2021-03-13 RX ADMIN — CEFEPIME HYDROCHLORIDE 1000 MG: 1 INJECTION, POWDER, FOR SOLUTION INTRAMUSCULAR; INTRAVENOUS at 09:16

## 2021-03-13 RX ADMIN — ISOSORBIDE DINITRATE 10 MG: 10 TABLET ORAL at 17:46

## 2021-03-13 RX ADMIN — HYDRALAZINE HYDROCHLORIDE 50 MG: 25 TABLET ORAL at 05:05

## 2021-03-13 RX ADMIN — HYDRALAZINE HYDROCHLORIDE 50 MG: 25 TABLET ORAL at 21:00

## 2021-03-13 RX ADMIN — HYDRALAZINE HYDROCHLORIDE 50 MG: 25 TABLET ORAL at 15:34

## 2021-03-13 RX ADMIN — Medication 250 MG: at 09:16

## 2021-03-13 NOTE — PROGRESS NOTES
[address]  Progress Note Yvrose Johnson 5/18/1930, 80 y o  female MRN: [id number]  Unit/Bed#: Reena Murphy -01 Encounter: [id number]  Primary Care Provider: Lisset Lewis MD   Date and time admitted to hospital: 3/10/2021 12:45 PM    * Acute metabolic encephalopathy  Assessment & Plan  80year old female admitted due to acute metabolic encephalopathy possibly due to pneumonia  Culture growth below  - Continue IV antibiotics    Results from last 7 days   Lab Units 03/11/21  0444 03/10/21  1724 03/10/21  1416 03/10/21  1319 03/10/21  1318   PROCALCITONIN ng/ml 0 07 0 07  --  0 09  --    BLOOD CULTURE   --   --   --  Lactococcus garvieae*  No Growth at 48 hrs   --    GRAM STAIN RESULT   --   --   --  Gram positive cocci in chains*  --    URINE CULTURE   --   --  >100,000 cfu/ml Citrobacter freundii*  80,000-89,000 cfu/ml Enterococcus faecalis*  --   --    SARS-COV-2   --   --   --   --  Negative               UTI (urinary tract infection)  Assessment & Plan  Continue IV cefepime    HCAP (healthcare-associated pneumonia)  Assessment & Plan  Concerning for pneumonia  Serial procalcitonins negative  Will continue IV antibiotics for UTI purposes  Chronic respiratory failure with hypoxia (HCC)  Assessment & Plan  On chronic 3L via NC     Acute on chronic diastolic congestive heart failure [facility])  Assessment & Plan  Wt Readings from Last 3 Encounters:   03/13/21 59 kg (130 lb 1 1 oz)   03/07/21 61 6 kg (135 lb 12 9 oz)   02/03/21 75 1 kg (165 lb 9 1 oz)     - Continue lasix IV BID  Management per renal     PAD (peripheral artery disease) ([facility] [address] )  Assessment & Plan  LEADs showing occlusion versus high grade stenosis of the proximal thigh portion of the superficial femoral artery with distal reconstitution   - Revascularization not pursued previously due to CKD and concern for progression to renal failure  - Anticoagulated with warfarin mainly due to history of DVT   Held today due to supratherapeutic levels  Recent Labs     21  0450   INR 3 27*         Acute kidney injury superimposed on chronic kidney disease ([address] )  Assessment & Plan  LILIAN possibly due to CKD progression, cardiorenal  Minimally improved  - Currently on IV diuretics  - Renal recommendations appreciated  Recent Labs     21  0451 21  0513 21  0450   BUN 58* 57* 57*   CREATININE 2 78* 2 91* 2 87*   EGFR 14 14 14       Results from last 7 days   Lab Units 03/10/21  1416   BLOOD UA  Small*   PROTEIN UA mg/dl 30 (1+)*         Essential hypertension  Assessment & Plan  Controlled  On lasix, coreg, hydralazine, isosorbide  Iron deficiency anemia secondary to inadequate dietary iron intake  Assessment & Plan  Stable  No indication for transfusion at this time  Recent Labs     21  0444 21  0450   HGB 7 5* 7 9*   MCV 91 91   RDW 18 3* 18 2*         VTE Pharmacologic Prophylaxis:   Pharmacologic: Warfarin (Coumadin)  Mechanical VTE Prophylaxis in Place: Yes    Patient Centered Rounds: I have performed bedside rounds with nursing staff today  Discussions with Specialists or Other Care Team Provider: Nursing    Education and Discussions with Family / Patient: patient    Time Spent for Care: 30 minutes  More than 50% of total time spent on counseling and coordination of care as described above  Current Length of Stay: 3 day(s)    Current Patient Status: Inpatient   Certification Statement: The patient will continue to require additional inpatient hospital stay due to IV antibiotics, iv lasix    Discharge Plan: active    Code Status: Level 1 - Full Code      Subjective:   Patient seen and examined at bedside  She currently has no new complaints and states that she feels the same      Objective:     Vitals:   Temp (24hrs), Av 1 °F (35 6 °C), Min:95 8 °F (35 4 °C), Max:96 3 °F (35 7 °C)    Temp:  [95 8 °F (35 4 °C)-96 3 °F (35 7 °C)] 96 3 °F (35 7 °C)  HR:  [59-71] 71  Resp:  [16-20] 20  BP: ()/(52-73) 168/73  SpO2:  [82 %-94 %] 82 %  Body mass index is 20 99 kg/m²  Input and Output Summary (last 24 hours): Intake/Output Summary (Last 24 hours) at 3/13/2021 1418  Last data filed at 3/13/2021 1154  Gross per 24 hour   Intake 210 ml   Output 1202 ml   Net -992 ml       Physical Exam:     Physical Exam  Vitals signs reviewed  Constitutional:       Appearance: She is ill-appearing  HENT:      Head: Normocephalic  Nose: Nose normal       Mouth/Throat:      Mouth: Mucous membranes are dry  Eyes:      General: No scleral icterus  Cardiovascular:      Rate and Rhythm: Bradycardia present  Pulmonary:      Effort: Pulmonary effort is normal  No respiratory distress  Breath sounds: No wheezing  Abdominal:      General: There is no distension  Palpations: Abdomen is soft  Tenderness: There is no abdominal tenderness  Musculoskeletal:      Comments: RLE wound stable, with bilateral dressing   Skin:     General: Skin is warm  Neurological:      Mental Status: She is alert  Mental status is at baseline     Psychiatric:         Mood and Affect: Mood normal          Behavior: Behavior normal        Additional Data:     Labs:    Results from last 7 days   Lab Units 03/13/21  0450   WBC Thousand/uL 5 70   HEMOGLOBIN g/dL 7 9*   HEMATOCRIT % 27 0*   PLATELETS Thousands/uL 182   NEUTROS PCT % 48   LYMPHS PCT % 38   MONOS PCT % 7   EOS PCT % 5     Results from last 7 days   Lab Units 03/13/21  0450  03/10/21  1318   SODIUM mmol/L 140   < > 142   POTASSIUM mmol/L 3 3*   < > 4 6   CHLORIDE mmol/L 102   < > 107   CO2 mmol/L 28   < > 26   BUN mg/dL 57*   < > 58*   CREATININE mg/dL 2 87*   < > 2 83*   ANION GAP mmol/L 10   < > 9   CALCIUM mg/dL 8 4   < > 8 2*   ALBUMIN g/dL  --   --  2 1*   TOTAL BILIRUBIN mg/dL  --   --  0 63   ALK PHOS U/L  --   --  65   ALT U/L  --   --  20   AST U/L  --   --  20   GLUCOSE RANDOM mg/dL 84   < > 82    < > = values in this interval not displayed  Results from last 7 days   Lab Units 03/13/21  0450   INR  3 27*             Results from last 7 days   Lab Units 03/11/21  0444 03/10/21  1724 03/10/21  1319 03/10/21  1318   LACTIC ACID mmol/L  --   --   --  0 6   PROCALCITONIN ng/ml 0 07 0 07 0 09  --            * I Have Reviewed All Lab Data Listed Above  * Additional Pertinent Lab Tests Reviewed: [address] Admission Reviewed    Imaging:    Imaging Reports Reviewed Today Include: US kidney    Recent Cultures (last 7 days):     Results from last 7 days   Lab Units 03/10/21  1416 03/10/21  1319   BLOOD CULTURE   --  Lactococcus garvieae*  No Growth at 48 hrs  GRAM STAIN RESULT   --  Gram positive cocci in chains*   URINE CULTURE  >100,000 cfu/ml Citrobacter freundii*  80,000-89,000 cfu/ml Enterococcus faecalis*  --        Last 24 Hours Medication List:   Current Facility-Administered Medications   Medication Dose Route Frequency Provider Last Rate    acetaminophen  650 mg Oral Q6H PRN Chela Brock MD      amLODIPine  5 mg Oral Daily OTIS Cole      carvedilol  12 5 mg Oral BID With Meals OTIS Cole      cefepime  1,000 mg Intravenous Q12H Chela Brock MD Stopped (03/13/21 7639)    collagenase   Topical Daily Hannah Reyez MD      furosemide  80 mg Intravenous BID (diuretic) OTIS Cole      hydrALAZINE  50 mg Oral Q8H [facility] OTIS Cole      isosorbide dinitrate  10 mg Oral TID after meals Chela Brock MD      levothyroxine  25 mcg Oral Early Morning Chela Brock MD      saccharomyces boulardii  250 mg Oral BID Chela Brock MD      senna  8 6 mg Oral HS PRN Chela Brock MD      warfarin  2 mg Oral Daily (warfarin) Chela Brock MD          Today, Patient Was Seen By: Kev Hodges MD    ** Please Note: Dictation voice to text software may have been used in the creation of this document   **

## 2021-03-13 NOTE — PROGRESS NOTES
Dr Farhat Galan made aware of patient's low temperatures  Last temp 96 3 and 96 4 both rectally  Will continue to monitor

## 2021-03-13 NOTE — PROGRESS NOTES
NEPHROLOGY PROGRESS NOTE   Jignesh Marcial 80 y o  female MRN: [id number]  Unit/Bed#: [place] [address] -[id number] Encounter: [id number]      ASSESSMENT/PLAN:  Acute kidney injury (POA) on chronic kidney disease, stage IV:   - to note, patient recently hospitalized in January 2021 with episode of acute kidney injury, creatinine improved to 2 3 mg/dL upon discharge  - seen by AP with nephrology once post discharge  - suspect acute kidney injury secondary to cardiorenal syndrome with volume overload  - upon review of medical records, baseline creatinine 1 8-2 1 mg/dL  To note, creatinine stabilized around 2 4-2 5 mg/dL post hospitalization  - creatinine 2 83 mg/dL upon admission on 03/10   - most recent creatinine 2 87 mg/dL today  - currently on furosemide 80 mg IV b i d, post 1 dose metolazone 5 mg yesterday, 3/12    - continue IV diuretics today, plan for possibly transitioning to oral diuretics within 24-48 hours if volume status continues to improve  - UA: Small blood, positive nitrate, large leukocytes, +1 protein, 2-4 RBC, innumerable WBC, moderate bacteria, renal tubule cells present  - imaging: CT scan completed in January 2021 revealed stent present in the upper pole right renal collecting system with some residual hydronephrosis  Left kidney small  Renal ultrasound revealed atrophic left kidney 6 5 cm, diffuse cortical thinning, no hydronephrosis, right kidney normal size, diffuse cortical thinning, mild hydronephrosis with ureteral stent in place               - repeat renal ultrasound completed on 03/12:  Right kidney 11 8 cm, left kidney 6 2 cm  Echogenic ureteral stent identified in the removal pelvis  No hydronephrosis bilaterally  - continue to monitor bladder scan, maintain urinary retention protocol   - avoid NSAIDs, nephrotoxic agents, IV contrast   - adjust medications to appropriate GFR    - monitor volume status with strict intake/output, daily weight    - accuracy of weight?  - will check BMP, magnesium, phosphorus in a m      Right-sided hydronephrosis:  - with history of right ureteral stent placement in December 2020    - repeat renal ultrasound results as above      Electrolytes, acid/base:  - hypokalemia with most recent potassium 3 3, provide K-Dur 40 mEq x 1   - will continue to monitor with repeat lab studies  Hyperphosphatemia:  - most recent phosphorus 4 8, suspect elevation secondary to renal dysfunction  - will continue to monitor with repeat lab studies and initiate binder if warranted      Blood pressure, hypertension:  - blood pressure with significant fluctuations  - currently on amlodipine 5 mg daily, carvedilol 12 5 mg b i d , furosemide 80 mg b i d , hydralazine 50 mg t i d , Isordil 10 mg t i d   - recommendations: Maintain hold parameters on antihypertensives  Will adjust regimen if blood pressure remains on the lower side  Will adjust parameters for diuretic   - optimize hemodynamics; avoid fluctuations of blood pressure and hypotension  - maintain MAP > 65       H/H, anemia of chronic disease:  - most recent hemoglobin 7 9 grams/deciliter   - goal hemoglobin greater than 8 grams/deciliter  - recommend PRBC transfusion for hemoglobin less than 7       Diastolic CHF:  - chest x-ray completed upon admission revealed mild-to-moderate congestive changes  Small right and moderate left effusion  Left lung base atelectasis or infiltrate   - most recent echo revealed EF of 50% with grade 2 diastolic dysfunction, moderate MR, dilated IVC in December 2020   - currently on Lasix 80 mg IV b i d, post metolazone 5 mg x 1 yesterday 3/12   - remains on supplemental oxygen, 3 L nasal cannula at current    - order placed for daily weights, intake/output      Suspected pneumonia:  - currently on antibiotics per primary team   - chest x-ray as above      Bacteremia:  - 1 of 2 sets of blood cultures revealed Gram-positive cocci in chains   - plan per primary team      Severe PVD:  - no surgical intervention or invasive measures per prior notes      Encephalopathy, multifactorial given UTI, possible pneumonia, acute renal failure  SUBJECTIVE:  Patient resting in bed  Patient states her breathing is "so-so" today  She is eating her breakfast      OBJECTIVE:  Current Weight: Weight - Scale: 59 kg (130 lb 1 1 oz)  Vitals:    03/13/21 0742   BP: 99/57   Pulse: 61   Resp: 20   Temp: (!) 95 8 °F (35 4 °C)   SpO2: (!) 89%       Intake/Output Summary (Last 24 hours) at 3/13/2021 1039  Last data filed at 3/13/2021 0946  Gross per 24 hour   Intake 410 ml   Output 1173 ml   Net -763 ml       General:  No acute distress, resting in bed  Skin:  Warm, no rash, dry  Eyes:  Sclerae anicteric  ENT:  Moist lips mucous membranes  Neck:  Supple with trachea midline  Chest:  Diminished breath sounds bilaterally   CVS:  Regular rate regular rhythm  Abdomen:  Soft, nontender, normoactive bowel sounds  Extremities:  No significant edema noted   Neuro:  Awake and interactive  Psych:  Appropriate affect      Medications:  Scheduled Meds:  Current Facility-Administered Medications   Medication Dose Route Frequency Provider Last Rate    acetaminophen  650 mg Oral Q6H PRN Marisel Mcgee MD      amLODIPine  5 mg Oral Daily OTIS Cole      carvedilol  12 5 mg Oral BID With Meals OTIS Cole      cefepime  1,000 mg Intravenous Q12H Marisel Mcgee MD Stopped (03/13/21 4621)    collagenase   Topical Daily Domo Weir MD      furosemide  80 mg Intravenous BID (diuretic) Marisel Mcgee MD      hydrALAZINE  50 mg Oral Q8H [facility] OTIS Cole      isosorbide dinitrate  10 mg Oral TID after meals Marisel Mcgee MD      levothyroxine  25 mcg Oral Early Morning Marisel Mcgee MD      saccharomyces boulardii  250 mg Oral BID Marisel Mcgee MD      senna  8 6 mg Oral HS PRN Marisel Mcgee MD      warfarin  2 mg Oral Daily (warfarin) Marisel Mcgee MD         PRN Meds:   acetaminophen    senna    Continuous Infusions:     Laboratory Results:  Results from last 7 days   Lab Units 03/13/21  0450 03/12/21  0513 03/11/21  0451 03/11/21  0444 03/10/21  1318 03/07/21  1526   WBC Thousand/uL 5 70  --   --  5 83 6 96 7 29   HEMOGLOBIN g/dL 7 9*  --   --  7 5* 7 3* 8 7*   HEMATOCRIT % 27 0*  --   --  25 7* 24 9* 28 5*   PLATELETS Thousands/uL 182  --   --  172 196 227   SODIUM mmol/L 140 139 141  --  142 141   POTASSIUM mmol/L 3 3* 3 7 4 3  --  4 6 4 7   CHLORIDE mmol/L 102 104 107  --  107 106   CO2 mmol/L 28 28 24  --  26 25   BUN mg/dL 57* 57* 58*  --  58* 59*   CREATININE mg/dL 2 87* 2 91* 2 78*  --  2 83* 2 74*   CALCIUM mg/dL 8 4 7 9* 8 3  --  8 2* 8 5   MAGNESIUM mg/dL 2 1  --   --   --   --   --    PHOSPHORUS mg/dL 4 8*  --   --   --   --   --

## 2021-03-13 NOTE — ASSESSMENT & PLAN NOTE
Stable  No indication for transfusion at this time      Recent Labs     03/11/21  0444 03/13/21  0450   HGB 7 5* 7 9*   MCV 91 91   RDW 18 3* 18 2*

## 2021-03-13 NOTE — ASSESSMENT & PLAN NOTE
80year old female admitted due to acute metabolic encephalopathy possibly due to pneumonia  Culture growth below    - Continue IV antibiotics    Results from last 7 days   Lab Units 03/11/21  0444 03/10/21  1724 03/10/21  1416 03/10/21  1319 03/10/21  1318   PROCALCITONIN ng/ml 0 07 0 07  --  0 09  --    BLOOD CULTURE   --   --   --  Lactococcus garvieae*  No Growth at 48 hrs   --    GRAM STAIN RESULT   --   --   --  Gram positive cocci in chains*  --    URINE CULTURE   --   --  >100,000 cfu/ml Citrobacter freundii*  80,000-89,000 cfu/ml Enterococcus faecalis*  --   --    SARS-COV-2   --   --   --   --  Negative

## 2021-03-13 NOTE — ASSESSMENT & PLAN NOTE
Concerning for pneumonia  Serial procalcitonins negative  Will continue IV antibiotics for UTI purposes

## 2021-03-13 NOTE — PLAN OF CARE
Problem: Potential for Falls  Goal: Patient will remain free of falls  Description: INTERVENTIONS:  - Assess patient frequently for physical needs  -  Identify cognitive and physical deficits and behaviors that affect risk of falls  -  [place] fall precautions as indicated by assessment   - Educate patient/family on patient safety including physical limitations  - Instruct patient to call for assistance with activity based on assessment  - Modify environment to reduce risk of injury  - Consider OT/PT consult to assist with strengthening/mobility  Outcome: Progressing     Problem: Prexisting or High Potential for Compromised Skin Integrity  Goal: Skin integrity is maintained or improved  Description: INTERVENTIONS:  - Identify patients at risk for skin breakdown  - Assess and monitor skin integrity  - Assess and monitor nutrition and hydration status  - Monitor labs   - Assess for incontinence   - Turn and reposition patient  - Assist with mobility/ambulation  - Relieve pressure over bony prominences  - Avoid friction and shearing  - Provide appropriate hygiene as needed including keeping skin clean and dry  - Evaluate need for skin moisturizer/barrier cream  - Collaborate with interdisciplinary team   - Patient/family teaching  - Consider wound care consult   Outcome: Progressing     Problem: Nutrition/Hydration-ADULT  Goal: Nutrient/Hydration intake appropriate for improving, restoring or maintaining nutritional needs  Description: Monitor and assess patient's nutrition/hydration status for malnutrition  Collaborate with interdisciplinary team and initiate plan and interventions as ordered  Monitor patient's weight and dietary intake as ordered or per policy  Utilize nutrition screening tool and intervene as necessary  Determine patient's food preferences and provide high-protein, high-caloric foods as appropriate       INTERVENTIONS:  - Monitor oral intake, urinary output, labs, and treatment plans  - Assess nutrition and hydration status and recommend course of action  - Evaluate amount of meals eaten  - Assist patient with eating if necessary   - Allow adequate time for meals  - Recommend/ encourage appropriate diets, oral nutritional supplements, and vitamin/mineral supplements  - Order, calculate, and assess calorie counts as needed  - Recommend, monitor, and adjust tube feedings and TPN/PPN based on assessed needs  - Assess need for intravenous fluids  - Provide specific nutrition/hydration education as appropriate  - Include patient/family/caregiver in decisions related to nutrition  Outcome: Progressing

## 2021-03-13 NOTE — ASSESSMENT & PLAN NOTE
LEADs showing occlusion versus high grade stenosis of the proximal thigh portion of the superficial femoral artery with distal reconstitution   - Revascularization not pursued previously due to CKD and concern for progression to renal failure  - Anticoagulated with warfarin mainly due to history of DVT  Held today due to supratherapeutic levels      Recent Labs     03/13/21  0450   INR 3 27*

## 2021-03-13 NOTE — ASSESSMENT & PLAN NOTE
LILIAN possibly due to CKD progression, cardiorenal  Minimally improved  - Currently on IV diuretics  - Renal recommendations appreciated      Recent Labs     03/11/21  0451 03/12/21  0513 03/13/21  0450   BUN 58* 57* 57*   CREATININE 2 78* 2 91* 2 87*   EGFR 14 14 14       Results from last 7 days   Lab Units 03/10/21  1416   BLOOD UA  Small*   PROTEIN UA mg/dl 30 (1+)*

## 2021-03-13 NOTE — ASSESSMENT & PLAN NOTE
80year old female admitted due to acute metabolic encephalopathy possibly due to pneumonia  Culture growth below    - Given sensitivities, will switch to doxcycline     Results from last 7 days   Lab Units 03/11/21  0444 03/10/21  1724 03/10/21  1416 03/10/21  1319 03/10/21  1318   PROCALCITONIN ng/ml 0 07 0 07  --  0 09  --    BLOOD CULTURE   --   --   --  Lactococcus garvieae*  No Growth at 48 hrs   --    GRAM STAIN RESULT   --   --   --  Gram positive cocci in chains*  --    URINE CULTURE   --   --  >100,000 cfu/ml Citrobacter freundii*  80,000-89,000 cfu/ml Enterococcus faecalis*  --   --    SARS-COV-2   --   --   --   --  Negative

## 2021-03-13 NOTE — ASSESSMENT & PLAN NOTE
Wt Readings from Last 3 Encounters:   03/13/21 59 kg (130 lb 1 1 oz)   03/07/21 61 6 kg (135 lb 12 9 oz)   02/03/21 75 1 kg (165 lb 9 1 oz)     - Continue lasix IV BID   Management per renal

## 2021-03-14 LAB
ANION GAP SERPL CALCULATED.3IONS-SCNC: 5 MMOL/L (ref 4–13)
BASOPHILS # BLD AUTO: 0.01 THOUSANDS/ΜL (ref 0–0.1)
BASOPHILS NFR BLD AUTO: 0 % (ref 0–1)
BUN SERPL-MCNC: 62 MG/DL (ref 5–25)
CALCIUM SERPL-MCNC: 8.3 MG/DL (ref 8.3–10.1)
CHLORIDE SERPL-SCNC: 102 MMOL/L (ref 100–108)
CO2 SERPL-SCNC: 32 MMOL/L (ref 21–32)
CREAT SERPL-MCNC: 3.24 MG/DL (ref 0.6–1.3)
EOSINOPHIL # BLD AUTO: 0.31 THOUSAND/ΜL (ref 0–0.61)
EOSINOPHIL NFR BLD AUTO: 6 % (ref 0–6)
ERYTHROCYTE [DISTWIDTH] IN BLOOD BY AUTOMATED COUNT: 18.4 % (ref 11.6–15.1)
GFR SERPL CREATININE-BSD FRML MDRD: 12 ML/MIN/1.73SQ M
GLUCOSE SERPL-MCNC: 87 MG/DL (ref 65–140)
HCT VFR BLD AUTO: 26.9 % (ref 34.8–46.1)
HGB BLD-MCNC: 8.1 G/DL (ref 11.5–15.4)
IMM GRANULOCYTES # BLD AUTO: 0.05 THOUSAND/UL (ref 0–0.2)
IMM GRANULOCYTES NFR BLD AUTO: 1 % (ref 0–2)
LYMPHOCYTES # BLD AUTO: 2.27 THOUSANDS/ΜL (ref 0.6–4.47)
LYMPHOCYTES NFR BLD AUTO: 40 % (ref 14–44)
MAGNESIUM SERPL-MCNC: 2.1 MG/DL (ref 1.6–2.6)
MCH RBC QN AUTO: 26.7 PG (ref 26.8–34.3)
MCHC RBC AUTO-ENTMCNC: 30.1 G/DL (ref 31.4–37.4)
MCV RBC AUTO: 89 FL (ref 82–98)
MONOCYTES # BLD AUTO: 0.36 THOUSAND/ΜL (ref 0.17–1.22)
MONOCYTES NFR BLD AUTO: 6 % (ref 4–12)
NEUTROPHILS # BLD AUTO: 2.66 THOUSANDS/ΜL (ref 1.85–7.62)
NEUTS SEG NFR BLD AUTO: 47 % (ref 43–75)
NRBC BLD AUTO-RTO: 0 /100 WBCS
PLATELET # BLD AUTO: 178 THOUSANDS/UL (ref 149–390)
PMV BLD AUTO: 10.3 FL (ref 8.9–12.7)
POTASSIUM SERPL-SCNC: 3.8 MMOL/L (ref 3.5–5.3)
RBC # BLD AUTO: 3.03 MILLION/UL (ref 3.81–5.12)
SODIUM SERPL-SCNC: 139 MMOL/L (ref 136–145)
WBC # BLD AUTO: 5.66 THOUSAND/UL (ref 4.31–10.16)

## 2021-03-14 PROCEDURE — 85025 COMPLETE CBC W/AUTO DIFF WBC: CPT | Performed by: STUDENT IN AN ORGANIZED HEALTH CARE EDUCATION/TRAINING PROGRAM

## 2021-03-14 PROCEDURE — 83735 ASSAY OF MAGNESIUM: CPT | Performed by: STUDENT IN AN ORGANIZED HEALTH CARE EDUCATION/TRAINING PROGRAM

## 2021-03-14 PROCEDURE — 99232 SBSQ HOSP IP/OBS MODERATE 35: CPT | Performed by: INTERNAL MEDICINE

## 2021-03-14 PROCEDURE — 97530 THERAPEUTIC ACTIVITIES: CPT

## 2021-03-14 PROCEDURE — 99232 SBSQ HOSP IP/OBS MODERATE 35: CPT | Performed by: STUDENT IN AN ORGANIZED HEALTH CARE EDUCATION/TRAINING PROGRAM

## 2021-03-14 PROCEDURE — 80048 BASIC METABOLIC PNL TOTAL CA: CPT | Performed by: STUDENT IN AN ORGANIZED HEALTH CARE EDUCATION/TRAINING PROGRAM

## 2021-03-14 PROCEDURE — 97535 SELF CARE MNGMENT TRAINING: CPT

## 2021-03-14 RX ORDER — AMOXICILLIN 500 MG/1
500 CAPSULE ORAL EVERY 12 HOURS SCHEDULED
Status: DISCONTINUED | OUTPATIENT
Start: 2021-03-14 | End: 2021-03-21

## 2021-03-14 RX ADMIN — CARVEDILOL 12.5 MG: 6.25 TABLET, FILM COATED ORAL at 08:11

## 2021-03-14 RX ADMIN — ISOSORBIDE DINITRATE 10 MG: 10 TABLET ORAL at 08:12

## 2021-03-14 RX ADMIN — Medication 250 MG: at 17:26

## 2021-03-14 RX ADMIN — AMOXICILLIN 500 MG: 500 CAPSULE ORAL at 21:13

## 2021-03-14 RX ADMIN — FUROSEMIDE 80 MG: 10 INJECTION, SOLUTION INTRAVENOUS at 08:11

## 2021-03-14 RX ADMIN — CARVEDILOL 12.5 MG: 6.25 TABLET, FILM COATED ORAL at 17:26

## 2021-03-14 RX ADMIN — HYDRALAZINE HYDROCHLORIDE 50 MG: 25 TABLET ORAL at 21:13

## 2021-03-14 RX ADMIN — Medication 250 MG: at 08:11

## 2021-03-14 RX ADMIN — ISOSORBIDE DINITRATE 10 MG: 10 TABLET ORAL at 13:29

## 2021-03-14 RX ADMIN — COLLAGENASE SANTYL: 250 OINTMENT TOPICAL at 08:11

## 2021-03-14 RX ADMIN — LEVOTHYROXINE SODIUM 25 MCG: 25 TABLET ORAL at 05:04

## 2021-03-14 RX ADMIN — HYDRALAZINE HYDROCHLORIDE 50 MG: 25 TABLET ORAL at 05:04

## 2021-03-14 RX ADMIN — CEFEPIME HYDROCHLORIDE 1000 MG: 1 INJECTION, POWDER, FOR SOLUTION INTRAMUSCULAR; INTRAVENOUS at 08:13

## 2021-03-14 RX ADMIN — ISOSORBIDE DINITRATE 10 MG: 10 TABLET ORAL at 17:26

## 2021-03-14 RX ADMIN — AMLODIPINE BESYLATE 5 MG: 5 TABLET ORAL at 08:11

## 2021-03-14 NOTE — ASSESSMENT & PLAN NOTE
80year old female admitted due to acute metabolic encephalopathy  Metabolic encephalopathy felt to be multifactorial, secondary to acute kidney injury, congestive heart failure, as well as UTI  Initially there were concerns for possible healthcare acquired pneumonia, however her chest x-ray did not have any focal infiltrates in her procalcitonin was unremarkable, which ruled out pneumonia or lower respiratory tract infection  Patient was diagnosed with a UTI and treated with antibiotics  Continue supportive care,

## 2021-03-14 NOTE — PLAN OF CARE
Problem: Potential for Falls  Goal: Patient will remain free of falls  Description: INTERVENTIONS:  - Assess patient frequently for physical needs  -  Identify cognitive and physical deficits and behaviors that affect risk of falls  -  [place] fall precautions as indicated by assessment   - Educate patient/family on patient safety including physical limitations  - Instruct patient to call for assistance with activity based on assessment  - Modify environment to reduce risk of injury  - Consider OT/PT consult to assist with strengthening/mobility  Outcome: Progressing     Problem: Prexisting or High Potential for Compromised Skin Integrity  Goal: Skin integrity is maintained or improved  Description: INTERVENTIONS:  - Identify patients at risk for skin breakdown  - Assess and monitor skin integrity  - Assess and monitor nutrition and hydration status  - Monitor labs   - Assess for incontinence   - Turn and reposition patient  - Assist with mobility/ambulation  - Relieve pressure over bony prominences  - Avoid friction and shearing  - Provide appropriate hygiene as needed including keeping skin clean and dry  - Evaluate need for skin moisturizer/barrier cream  - Collaborate with interdisciplinary team   - Patient/family teaching  - Consider wound care consult   Outcome: Progressing     Problem: Nutrition/Hydration-ADULT  Goal: Nutrient/Hydration intake appropriate for improving, restoring or maintaining nutritional needs  Description: Monitor and assess patient's nutrition/hydration status for malnutrition  Collaborate with interdisciplinary team and initiate plan and interventions as ordered  Monitor patient's weight and dietary intake as ordered or per policy  Utilize nutrition screening tool and intervene as necessary  Determine patient's food preferences and provide high-protein, high-caloric foods as appropriate       INTERVENTIONS:  - Monitor oral intake, urinary output, labs, and treatment plans  - Assess nutrition and hydration status and recommend course of action  - Evaluate amount of meals eaten  - Assist patient with eating if necessary   - Allow adequate time for meals  - Recommend/ encourage appropriate diets, oral nutritional supplements, and vitamin/mineral supplements  - Order, calculate, and assess calorie counts as needed  - Recommend, monitor, and adjust tube feedings and TPN/PPN based on assessed needs  - Assess need for intravenous fluids  - Provide specific nutrition/hydration education as appropriate  - Include patient/family/caregiver in decisions related to nutrition  Outcome: Progressing     Problem: RESPIRATORY - ADULT  Goal: Achieves optimal ventilation and oxygenation  Description: INTERVENTIONS:  - Assess for changes in respiratory status  - Assess for changes in mentation and behavior  - Position to facilitate oxygenation and minimize respiratory effort  - Oxygen administered by appropriate delivery if ordered  - Initiate smoking cessation education as indicated  - Encourage broncho-pulmonary hygiene including cough, deep breathe, Incentive Spirometry  - Assess the need for suctioning and aspirate as needed  - Assess and instruct to report SOB or any respiratory difficulty  - Respiratory Therapy support as indicated  Outcome: Progressing     Problem: METABOLIC, FLUID AND ELECTROLYTES - ADULT  Goal: Electrolytes maintained within normal limits  Description: INTERVENTIONS:  - Monitor labs and assess patient for signs and symptoms of electrolyte imbalances  - Administer electrolyte replacement as ordered  - Monitor response to electrolyte replacements, including repeat lab results as appropriate  - Instruct patient on fluid and nutrition as appropriate  Outcome: Progressing  Goal: Fluid balance maintained  Description: INTERVENTIONS:  - Monitor labs   - Monitor I/O and WT  - Instruct patient on fluid and nutrition as appropriate  - Assess for signs & symptoms of volume excess or deficit  Outcome: Progressing     Problem: SKIN/TISSUE INTEGRITY - ADULT  Goal: Skin integrity remains intact  Description: INTERVENTIONS  - Identify patients at risk for skin breakdown  - Assess and monitor skin integrity  - Assess and monitor nutrition and hydration status  - Monitor labs (i e  albumin)  - Assess for incontinence   - Turn and reposition patient  - Assist with mobility/ambulation  - Relieve pressure over bony prominences  - Avoid friction and shearing  - Provide appropriate hygiene as needed including keeping skin clean and dry  - Evaluate need for skin moisturizer/barrier cream  - Collaborate with interdisciplinary team (i e  Nutrition, Rehabilitation, etc )   - Patient/family teaching  Outcome: Progressing  Goal: Incision(s), wounds(s) or drain site(s) healing without S/S of infection  Description: INTERVENTIONS  - Assess and document risk factors for skin impairment   - Assess and document dressing, incision, wound bed, drain sites and surrounding tissue  - Consider nutrition services referral as needed  - Oral mucous membranes remain intact  - Provide patient/ family education  Outcome: Progressing     Problem: INFECTION - ADULT  Goal: Absence or prevention of progression during hospitalization  Description: INTERVENTIONS:  - Assess and monitor for signs and symptoms of infection  - Monitor lab/diagnostic results  - Monitor all insertion sites, i e  indwelling lines, tubes, and drains  - Monitor endotracheal if appropriate and nasal secretions for changes in amount and color  - [place] appropriate cooling/warming therapies per order  - Administer medications as ordered  - Instruct and encourage patient and family to use good hand hygiene technique  - Identify and instruct in appropriate isolation precautions for identified infection/condition  Outcome: Progressing     Problem: SAFETY ADULT  Goal: Patient will remain free of falls  Description: INTERVENTIONS:  - Assess patient frequently for physical needs  -  Identify cognitive and physical deficits and behaviors that affect risk of falls    -  [place] fall precautions as indicated by assessment   - Educate patient/family on patient safety including physical limitations  - Instruct patient to call for assistance with activity based on assessment  - Modify environment to reduce risk of injury  - Consider OT/PT consult to assist with strengthening/mobility  Outcome: Progressing  Goal: Maintain or return to baseline ADL function  Description: INTERVENTIONS:  -  Assess patient's ability to carry out ADLs; assess patient's baseline for ADL function and identify physical deficits which impact ability to perform ADLs (bathing, care of mouth/teeth, toileting, grooming, dressing, etc )  - Assess/evaluate cause of self-care deficits   - Assess range of motion  - Assess patient's mobility; develop plan if impaired  - Assess patient's need for assistive devices and provide as appropriate  - Encourage maximum independence but intervene and supervise when necessary  - Involve family in performance of ADLs  - Assess for home care needs following discharge   - Consider OT consult to assist with ADL evaluation and planning for discharge  - Provide patient education as appropriate  Outcome: Progressing     Problem: DISCHARGE PLANNING  Goal: Discharge to home or other facility with appropriate resources  Description: INTERVENTIONS:  - Identify barriers to discharge w/patient and caregiver  - Arrange for needed discharge resources and transportation as appropriate  - Identify discharge learning needs (meds, wound care, etc )  - Arrange for interpretive services to assist at discharge as needed  - Refer to Case Management Department for coordinating discharge planning if the patient needs post-hospital services based on physician/advanced practitioner order or complex needs related to functional status, cognitive ability, or social support system  Outcome: Progressing Problem: Knowledge Deficit  Goal: Patient/family/caregiver demonstrates understanding of disease process, treatment plan, medications, and discharge instructions  Description: Complete learning assessment and assess knowledge base    Interventions:  - Provide teaching at level of understanding  - Provide teaching via preferred learning methods  Outcome: Progressing

## 2021-03-14 NOTE — PLAN OF CARE
Problem: Potential for Falls  Goal: Patient will remain free of falls  Description: INTERVENTIONS:  - Assess patient frequently for physical needs  -  Identify cognitive and physical deficits and behaviors that affect risk of falls  -  [facility] fall precautions as indicated by assessment   - Educate patient/family on patient safety including physical limitations  - Instruct patient to call for assistance with activity based on assessment  - Modify environment to reduce risk of injury  - Consider OT/PT consult to assist with strengthening/mobility  Outcome: Progressing     Problem: Prexisting or High Potential for Compromised Skin Integrity  Goal: Skin integrity is maintained or improved  Description: INTERVENTIONS:  - Identify patients at risk for skin breakdown  - Assess and monitor skin integrity  - Assess and monitor nutrition and hydration status  - Monitor labs   - Assess for incontinence   - Turn and reposition patient  - Assist with mobility/ambulation  - Relieve pressure over bony prominences  - Avoid friction and shearing  - Provide appropriate hygiene as needed including keeping skin clean and dry  - Evaluate need for skin moisturizer/barrier cream  - Collaborate with interdisciplinary team   - Patient/family teaching  - Consider wound care consult   Outcome: Progressing     Problem: Nutrition/Hydration-ADULT  Goal: Nutrient/Hydration intake appropriate for improving, restoring or maintaining nutritional needs  Description: Monitor and assess patient's nutrition/hydration status for malnutrition  Collaborate with interdisciplinary team and initiate plan and interventions as ordered  Monitor patient's weight and dietary intake as ordered or per policy  Utilize nutrition screening tool and intervene as necessary  Determine patient's food preferences and provide high-protein, high-caloric foods as appropriate       INTERVENTIONS:  - Monitor oral intake, urinary output, labs, and treatment plans  - Assess nutrition and hydration status and recommend course of action  - Evaluate amount of meals eaten  - Assist patient with eating if necessary   - Allow adequate time for meals  - Recommend/ encourage appropriate diets, oral nutritional supplements, and vitamin/mineral supplements  - Order, calculate, and assess calorie counts as needed  - Recommend, monitor, and adjust tube feedings and TPN/PPN based on assessed needs  - Assess need for intravenous fluids  - Provide specific nutrition/hydration education as appropriate  - Include patient/family/caregiver in decisions related to nutrition  Outcome: Progressing     Problem: RESPIRATORY - ADULT  Goal: Achieves optimal ventilation and oxygenation  Description: INTERVENTIONS:  - Assess for changes in respiratory status  - Assess for changes in mentation and behavior  - Position to facilitate oxygenation and minimize respiratory effort  - Oxygen administered by appropriate delivery if ordered  - Initiate smoking cessation education as indicated  - Encourage broncho-pulmonary hygiene including cough, deep breathe, Incentive Spirometry  - Assess the need for suctioning and aspirate as needed  - Assess and instruct to report SOB or any respiratory difficulty  - Respiratory Therapy support as indicated  Outcome: Progressing     Problem: METABOLIC, FLUID AND ELECTROLYTES - ADULT  Goal: Electrolytes maintained within normal limits  Description: INTERVENTIONS:  - Monitor labs and assess patient for signs and symptoms of electrolyte imbalances  - Administer electrolyte replacement as ordered  - Monitor response to electrolyte replacements, including repeat lab results as appropriate  - Instruct patient on fluid and nutrition as appropriate  Outcome: Progressing  Goal: Fluid balance maintained  Description: INTERVENTIONS:  - Monitor labs   - Monitor I/O and WT  - Instruct patient on fluid and nutrition as appropriate  - Assess for signs & symptoms of volume excess or deficit  Outcome: Progressing     Problem: SKIN/TISSUE INTEGRITY - ADULT  Goal: Skin integrity remains intact  Description: INTERVENTIONS  - Identify patients at risk for skin breakdown  - Assess and monitor skin integrity  - Assess and monitor nutrition and hydration status  - Monitor labs (i e  albumin)  - Assess for incontinence   - Turn and reposition patient  - Assist with mobility/ambulation  - Relieve pressure over bony prominences  - Avoid friction and shearing  - Provide appropriate hygiene as needed including keeping skin clean and dry  - Evaluate need for skin moisturizer/barrier cream  - Collaborate with interdisciplinary team (i e  Nutrition, Rehabilitation, etc )   - Patient/family teaching  Outcome: Progressing  Goal: Incision(s), wounds(s) or drain site(s) healing without S/S of infection  Description: INTERVENTIONS  - Assess and document risk factors for skin impairment   - Assess and document dressing, incision, wound bed, drain sites and surrounding tissue  - Consider nutrition services referral as needed  - Oral mucous membranes remain intact  - Provide patient/ family education  Outcome: Progressing     Problem: INFECTION - ADULT  Goal: Absence or prevention of progression during hospitalization  Description: INTERVENTIONS:  - Assess and monitor for signs and symptoms of infection  - Monitor lab/diagnostic results  - Monitor all insertion sites, i e  indwelling lines, tubes, and drains  - Monitor endotracheal if appropriate and nasal secretions for changes in amount and color  - [place] appropriate cooling/warming therapies per order  - Administer medications as ordered  - Instruct and encourage patient and family to use good hand hygiene technique  - Identify and instruct in appropriate isolation precautions for identified infection/condition  Outcome: Progressing     Problem: SAFETY ADULT  Goal: Patient will remain free of falls  Description: INTERVENTIONS:  - Assess patient frequently for physical needs  -  Identify cognitive and physical deficits and behaviors that affect risk of falls    -  [place] fall precautions as indicated by assessment   - Educate patient/family on patient safety including physical limitations  - Instruct patient to call for assistance with activity based on assessment  - Modify environment to reduce risk of injury  - Consider OT/PT consult to assist with strengthening/mobility  Outcome: Progressing  Goal: Maintain or return to baseline ADL function  Description: INTERVENTIONS:  -  Assess patient's ability to carry out ADLs; assess patient's baseline for ADL function and identify physical deficits which impact ability to perform ADLs (bathing, care of mouth/teeth, toileting, grooming, dressing, etc )  - Assess/evaluate cause of self-care deficits   - Assess range of motion  - Assess patient's mobility; develop plan if impaired  - Assess patient's need for assistive devices and provide as appropriate  - Encourage maximum independence but intervene and supervise when necessary  - Involve family in performance of ADLs  - Assess for home care needs following discharge   - Consider OT consult to assist with ADL evaluation and planning for discharge  - Provide patient education as appropriate  Outcome: Progressing     Problem: DISCHARGE PLANNING  Goal: Discharge to home or other facility with appropriate resources  Description: INTERVENTIONS:  - Identify barriers to discharge w/patient and caregiver  - Arrange for needed discharge resources and transportation as appropriate  - Identify discharge learning needs (meds, wound care, etc )  - Arrange for interpretive services to assist at discharge as needed  - Refer to Case Management Department for coordinating discharge planning if the patient needs post-hospital services based on physician/advanced practitioner order or complex needs related to functional status, cognitive ability, or social support system  Outcome: Progressing Problem: Knowledge Deficit  Goal: Patient/family/caregiver demonstrates understanding of disease process, treatment plan, medications, and discharge instructions  Description: Complete learning assessment and assess knowledge base    Interventions:  - Provide teaching at level of understanding  - Provide teaching via preferred learning methods  Outcome: Progressing

## 2021-03-14 NOTE — ASSESSMENT & PLAN NOTE
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

## 2021-03-14 NOTE — PHYSICAL THERAPY NOTE
Physical Therapy Treatment Note         03/14/21 1357   Note Type   Note Type Treatment   Pain Assessment   Pain Assessment Tool 0-10   Pain Score 5   Pain Location/Orientation Orientation: Left; Location: Leg   Effect of Pain on Daily Activities increased pain limiting mobility  Hospital Pain Intervention(s) Repositioned; Emotional support; Rest   Restrictions/Precautions   RLE Weight Bearing Per Order WBAT   LLE Weight Bearing Per Order WBAT   Other Precautions Chair Alarm; Bed Alarm;Cognitive   General   Chart Reviewed Yes   Family/Caregiver Present Yes   Cognition   Overall Cognitive Status Impaired   Arousal/Participation Responsive; Uncooperative   Attention Difficulty attending to directions   Orientation Level Oriented to person;Oriented to place   Memory Decreased recall of precautions;Decreased recall of recent events;Decreased short term memory   Following Commands Follows one step commands with increased time or repetition   Comments increased agitation with attempts at mobility  Subjective   Subjective " Just leave me alone, not today "     Bed Mobility   Rolling R 2  Maximal assistance   Additional items Assist x 2;Bedrails; Increased time required;Verbal cues;LE management   Rolling L 2  Maximal assistance   Additional items Assist x 2;Bedrails; Increased time required;Verbal cues;LE management   Supine to Sit Unable to assess  (Pt adamantly refusing)   Endurance Deficit   Endurance Deficit Yes   Endurance Deficit Description fatigue, pain, fear of fallilng,    Activity Tolerance   Activity Tolerance Patient limited by pain; Patient limited by fatigue;Treatment limited secondary to agitation   Medical Staff [facility], [address]   Nurse Made Aware Arianna Saxena   Exercises   Heelslides Supine;AROM; Right;Left  (x 2 reps)   Hip Flexion Supine;AROM; Right;Left  (x 2 reps  )   Equipment Use   Comments pt ed on benefits of mobility, Reposition in bed q2 hours for improved skin intergrity      Assessment Prognosis Guarded   Problem List Decreased strength;Decreased range of motion;Decreased endurance; Impaired balance;Decreased mobility; Decreased cognition; Impaired judgement;Decreased safety awareness;Decreased skin integrity;Pain   Assessment PT  Showing poor tolerance to activity due to increased b/l le pain L>R with mobility/ activity  Increase agitation noted with continued attempts and encouragement for mobility  Pt stated, " NO, not today just leave me alone "  Pt incontinent of urine, pt performed rolling el and R with use of bed rails with max assist x2, pt resistive to rolling attempts  Pt performs 2 reps SLR and knee flexion exercises to b/l le's  Pt refused to perform further arom exercises with le's  Pt yelling out during PT treatment session  Attempted to have pt 's  encourage pt to participate in PT session to no avail  Pt's  stated, " Just ignore her yelling  You just have to do it "  Pt provided with education pt regarding benefits of mobility and repositioning for improved skin integrity as well as proper nutrition to promote healing  Pt not receptive to Pt requests and mobility training  The patient's AM-PAC Basic Mobility Inpatient Short Form Low Function Raw Score 13 , Standardized Score is 20  14  A standardized score less 42 9 suggests the patient may benefit from discharge to post-acute rehab services  Please also refer to the recommendation of the Physical Therapist for safe discharge planning  Due to level of assistance required for mobility and functional impairments continue to recommend inpt rehab at d/c in order to maximize functional outcomes  Goals   Patient Goals " to be left alone "     [facility] Expiration Date 03/22/21   PT Treatment Day 1   Plan   Treatment/Interventions LE strengthening/ROM; Endurance training; Therapeutic exercise;Patient/family training;Equipment eval/education; Bed mobility;Spoke to nursing;OT;Family   Progress Slow progress, multiple refusals  (increased agitation, )   PT Frequency 2-3x/wk   Recommendation   PT Discharge Recommendation Post-Acute Rehabilitation Services   PT - OK to Discharge Yes  (to return to [facility] when medically cleared  )   AM-PAC Basic Mobility Inpatient   Turning in Bed Without Bedrails 1   Lying on Back to Sitting on Edge of Flat Bed 1   Moving Bed to Chair 1   Standing Up From Chair 1   Walk in Room 1   Climb 3-5 Stairs 1   Basic Mobility Inpatient Raw Score 6   Turning Head Towards Sound 4   Follow Simple Instructions 3   Low Function Basic Mobility Raw Score 13   Low Function Basic Mobility Standardized Score 20 14        03/14/21 1357   Note Type   Note Type Treatment   Pain Assessment   Pain Assessment Tool 0-10   Pain Score 5   Pain Location/Orientation Orientation: Left; Location: Leg   Effect of Pain on Daily Activities increased pain limiting mobility  Hospital Pain Intervention(s) Repositioned; Emotional support; Rest   Restrictions/Precautions   RLE Weight Bearing Per Order WBAT   LLE Weight Bearing Per Order WBAT   Other Precautions Chair Alarm; Bed Alarm;Cognitive   General   Chart Reviewed Yes   Family/Caregiver Present Yes   Cognition   Overall Cognitive Status Impaired   Arousal/Participation Responsive; Uncooperative   Attention Difficulty attending to directions   Orientation Level Oriented to person;Oriented to place   Memory Decreased recall of precautions;Decreased recall of recent events;Decreased short term memory   Following Commands Follows one step commands with increased time or repetition   Comments increased agitation with attempts at mobility  Subjective   Subjective " Just leave me alone, not today "     Bed Mobility   Rolling R 2  Maximal assistance   Additional items Assist x 2;Bedrails; Increased time required;Verbal cues;LE management   Rolling L 2  Maximal assistance   Additional items Assist x 2;Bedrails; Increased time required;Verbal cues;LE management   Supine to Sit Unable to assess  (Pt adamantly refusing)   Endurance Deficit   Endurance Deficit Yes   Endurance Deficit Description fatigue, pain, fear of fallilng,    Activity Tolerance   Activity Tolerance Patient limited by pain; Patient limited by fatigue;Treatment limited secondary to agitation   Medical Staff [facility], [address]   Nurse Made Aware RnArianna   Exercises   Heelslides Supine;AROM; Right;Left  (x 2 reps)   Hip Flexion Supine;AROM; Right;Left  (x 2 reps  )   Equipment Use   Comments pt ed on benefits of mobility, Reposition in bed q2 hours for improved skin intergrity  Assessment   Prognosis Guarded   Problem List Decreased strength;Decreased range of motion;Decreased endurance; Impaired balance;Decreased mobility; Decreased cognition; Impaired judgement;Decreased safety awareness;Decreased skin integrity;Pain   Assessment PT  Showing poor tolerance to activity due to increased b/l le pain L>R with mobility/ activity  Increase agitation noted with continued attempts and encouragement for mobility  Pt stated, " NO, not today just leave me alone "  Pt incontinent of urine, pt performed rolling el and R with use of bed rails with max assist x2, pt resistive to rolling attempts  Pt performs 2 reps SLR and knee flexion exercises to b/l le's  Pt refused to perform further arom exercises with le's  Pt yelling out during PT treatment session  Attempted to have pt 's  encourage pt to participate in PT session to no avail  Pt's  stated, " Just ignore her yelling  You just have to do it "  Pt provided with education pt regarding benefits of mobility and repositioning for improved skin integrity as well as proper nutrition to promote healing  Pt not receptive to Pt requests and mobility training  The patient's AM-PAC Basic Mobility Inpatient Short Form Low Function Raw Score 13 , Standardized Score is 20  14  A standardized score less 42 9 suggests the patient may benefit from discharge to post-acute rehab services  Please also refer to the recommendation of the Physical Therapist for safe discharge planning  Due to level of assistance required for mobility and functional impairments continue to recommend inpt rehab at d/c in order to maximize functional outcomes  Goals   Patient Goals " to be left alone "     [facility] Expiration Date 03/22/21   PT Treatment Day 1   Plan   Treatment/Interventions LE strengthening/ROM; Endurance training; Therapeutic exercise;Patient/family training;Equipment eval/education; Bed mobility;Spoke to nursing;OT;Family   Progress Slow progress, multiple refusals  (increased agitation, )   PT Frequency 2-3x/wk   Recommendation   PT Discharge Recommendation Post-Acute Rehabilitation Services   PT - OK to Discharge Yes  (to return to [facility] when medically cleared  )   AM-PAC Basic Mobility Inpatient   Turning in Bed Without Bedrails 1   Lying on Back to Sitting on Edge of Flat Bed 1   Moving Bed to Chair 1   Standing Up From Chair 1   Walk in Room 1   Climb 3-5 Stairs 1   Basic Mobility Inpatient Raw Score 6   Turning Head Towards Sound 4   Follow Simple Instructions 3   Low Function Basic Mobility Raw Score 13   Low Function Basic Mobility Standardized Score 20 14     Celester Sabal, PTA

## 2021-03-14 NOTE — ASSESSMENT & PLAN NOTE
Stable  No indication for transfusion at this time      Recent Labs     03/13/21  0450 03/14/21  0430   HGB 7 9* 8 1*   MCV 91 89   RDW 18 2* 18 4*

## 2021-03-14 NOTE — ASSESSMENT & PLAN NOTE
LILIAN possibly due to CKD progression vs cardiorenal  Creatinine uptrending  - Diuretics held by renal this afternoon    Recent Labs     03/12/21  0513 03/13/21  0450 03/14/21  0434   BUN 57* 57* 62*   CREATININE 2 91* 2 87* 3 24*   EGFR 14 14 12       Results from last 7 days   Lab Units 03/10/21  1416   BLOOD UA  Small*   PROTEIN UA mg/dl 30 (1+)*

## 2021-03-14 NOTE — PROGRESS NOTES
[address] [facility]  Progress Note Julia Hagan 5/18/1930, 80 y o  female MRN: [id number]  Unit/Bed#: Reena Murphy -01 Encounter: [id number]  Primary Care Provider: Carleen Titus MD   Date and time admitted to hospital: 3/10/2021 12:45 PM    * Acute metabolic encephalopathy  Assessment & Plan  80year old female admitted due to acute metabolic encephalopathy possibly due to pneumonia  Culture growth below  - She has completed 4 days of Cefepime which is more than enough against Citrobacter, but would still continue her with amoxicillin for the management of enterococcus  Results from last 7 days   Lab Units 03/11/21  0444 03/10/21  1724 03/10/21  1416 03/10/21  1319 03/10/21  1318   PROCALCITONIN ng/ml 0 07 0 07  --  0 09  --    BLOOD CULTURE   --   --   --  Lactococcus garvieae*  No Growth at 48 hrs   --    GRAM STAIN RESULT   --   --   --  Gram positive cocci in chains*  --    URINE CULTURE   --   --  >100,000 cfu/ml Citrobacter freundii*  80,000-89,000 cfu/ml Enterococcus faecalis*  --   --    SARS-COV-2   --   --   --   --  Negative               UTI (urinary tract infection)  Assessment & Plan  See main problem    HCAP (healthcare-associated pneumonia)  Assessment & Plan  Serial procalcitonins negative  Cefepime discontinued today  Will continue po antibiotics for UTI purposes  Chronic respiratory failure with hypoxia (HCC)  Assessment & Plan  On chronic 3L via NC     Acute on chronic diastolic congestive heart failure (HCC)  Assessment & Plan  Wt Readings from Last 3 Encounters:   03/14/21 60 6 kg (133 lb 9 6 oz)   03/07/21 61 6 kg (135 lb 12 9 oz)   02/03/21 75 1 kg (165 lb 9 1 oz)     IV diuresis held due to uptrending creatinine      PAD (peripheral artery disease) ([facility] [address] )  Assessment & Plan  LEADs showing occlusion versus high grade stenosis of the proximal thigh portion of the superficial femoral artery with distal reconstitution   - Revascularization not pursued previously due to CKD and concern for progression to renal failure  - Anticoagulated with warfarin mainly due to history of DVT  - Repeat INR diogenes and restart coumadin once subtherapeutic or downtrending  Recent Labs     03/13/21  0450   INR 3 27*         Acute kidney injury superimposed on chronic kidney disease ([address] )  Assessment & Plan  LILIAN possibly due to CKD progression vs cardiorenal  Creatinine uptrending  - Diuretics held by renal this afternoon    Recent Labs     03/12/21  0513 03/13/21  0450 03/14/21  0434   BUN 57* 57* 62*   CREATININE 2 91* 2 87* 3 24*   EGFR 14 14 12       Results from last 7 days   Lab Units 03/10/21  1416   BLOOD UA  Small*   PROTEIN UA mg/dl 30 (1+)*         Essential hypertension  Assessment & Plan  Controlled  On lasix, coreg, hydralazine, isosorbide  Iron deficiency anemia secondary to inadequate dietary iron intake  Assessment & Plan  Stable  No indication for transfusion at this time  Recent Labs     03/13/21  0450 03/14/21  0430   HGB 7 9* 8 1*   MCV 91 89   RDW 18 2* 18 4*           VTE Pharmacologic Prophylaxis:   Pharmacologic: Warfarin (Coumadin)  Mechanical VTE Prophylaxis in Place: Yes    Patient Centered Rounds: I have performed bedside rounds with nursing staff today  Discussions with Specialists or Other Care Team Provider: Nursing    Education and Discussions with Family / Patient: patient    Time Spent for Care: 30 minutes  More than 50% of total time spent on counseling and coordination of care as described above  Current Length of Stay: 4 day(s)    Current Patient Status: Inpatient   Certification Statement: The patient will continue to require additional inpatient hospital stay [facility]    Discharge Plan: active    Code Status: Level 1 - Full Code      Subjective:   Patient seen and examined at bedside  States that she is "okay", but wants to get better  She has no new complaints at this time      Objective:     Vitals: Temp (24hrs), Av 5 °F (36 4 °C), Min:97 3 °F (36 3 °C), Max:97 6 °F (36 4 °C)    Temp:  [97 3 °F (36 3 °C)-97 6 °F (36 4 °C)] 97 3 °F (36 3 °C)  HR:  [61-68] 61  Resp:  [18-20] 20  BP: (126-147)/(49-68) 140/58  SpO2:  [91 %-94 %] 91 %  Body mass index is 21 56 kg/m²  Input and Output Summary (last 24 hours): Intake/Output Summary (Last 24 hours) at 3/14/2021 1612  Last data filed at 3/14/2021 1346  Gross per 24 hour   Intake 640 ml   Output 2087 ml   Net -1447 ml       Physical Exam:     Physical Exam  Vitals signs reviewed  HENT:      Head: Normocephalic  Nose: Nose normal       Mouth/Throat:      Mouth: Mucous membranes are moist    Eyes:      General: No scleral icterus  Cardiovascular:      Rate and Rhythm: Normal rate and regular rhythm  Pulmonary:      Effort: Pulmonary effort is normal  No respiratory distress  Breath sounds: Decreased breath sounds present  No wheezing  Abdominal:      General: There is no distension  Palpations: Abdomen is soft  Tenderness: There is no abdominal tenderness  Musculoskeletal:      Comments: Bilateral lower extremity dressings intact, wound stable   Skin:     General: Skin is warm  Neurological:      Mental Status: She is alert  Mental status is at baseline     Psychiatric:      Comments: Flat affect       Additional Data:     Labs:    Results from last 7 days   Lab Units 21  0430   WBC Thousand/uL 5 66   HEMOGLOBIN g/dL 8 1*   HEMATOCRIT % 26 9*   PLATELETS Thousands/uL 178   NEUTROS PCT % 47   LYMPHS PCT % 40   MONOS PCT % 6   EOS PCT % 6     Results from last 7 days   Lab Units 21  0434  03/10/21  1318   SODIUM mmol/L 139   < > 142   POTASSIUM mmol/L 3 8   < > 4 6   CHLORIDE mmol/L 102   < > 107   CO2 mmol/L 32   < > 26   BUN mg/dL 62*   < > 58*   CREATININE mg/dL 3 24*   < > 2 83*   ANION GAP mmol/L 5   < > 9   CALCIUM mg/dL 8 3   < > 8 2*   ALBUMIN g/dL  --   --  2 1*   TOTAL BILIRUBIN mg/dL  --   --  0 63   ALK PHOS U/L  --   --  65   ALT U/L  --   --  20   AST U/L  --   --  20   GLUCOSE RANDOM mg/dL 87   < > 82    < > = values in this interval not displayed  Results from last 7 days   Lab Units 03/13/21  0450   INR  3 27*             Results from last 7 days   Lab Units 03/11/21  0444 03/10/21  1724 03/10/21  1319 03/10/21  1318   LACTIC ACID mmol/L  --   --   --  0 6   PROCALCITONIN ng/ml 0 07 0 07 0 09  --            * I Have Reviewed All Lab Data Listed Above  * Additional Pertinent Lab Tests Reviewed: [address] Admission Reviewed    Imaging:    Imaging Reports Reviewed Today Include: No new imaging    Recent Cultures (last 7 days):     Results from last 7 days   Lab Units 03/10/21  1416 03/10/21  1319   BLOOD CULTURE   --  No Growth at 72 hrs  Lactococcus garvieae*   GRAM STAIN RESULT   --  Gram positive cocci in chains*   URINE CULTURE  >100,000 cfu/ml Citrobacter freundii*  80,000-89,000 cfu/ml Enterococcus faecalis*  --        Last 24 Hours Medication List:   Current Facility-Administered Medications   Medication Dose Route Frequency Provider Last Rate    acetaminophen  650 mg Oral Q6H PRN Chela Brock MD      amLODIPine  5 mg Oral Daily OTIS Cole      amoxicillin  500 mg Oral Q12H [facility] Hannah Reyez MD      carvedilol  12 5 mg Oral BID With Meals OTIS oCle      collagenase   Topical Daily Hannah Reyez MD      hydrALAZINE  50 mg Oral Q8H [facility] OTIS Cole      isosorbide dinitrate  10 mg Oral TID after meals Chela Brock MD      levothyroxine  25 mcg Oral Early Morning Chela Brock MD      saccharomyces boulardii  250 mg Oral BID Chela Brock MD      senna  8 6 mg Oral HS PRN Chela Brock MD          Today, Patient Was Seen By: Kev Hodges MD    ** Please Note: Dictation voice to text software may have been used in the creation of this document   **

## 2021-03-14 NOTE — OCCUPATIONAL THERAPY NOTE
Occupational Therapy Treatment Note:         03/14/21 1403   OT Last Visit   OT Visit Date 03/14/21   Note Type   Note Type Treatment for insurance authorization   Restrictions/Precautions   Weight Bearing Precautions Per Order Yes   RLE Weight Bearing Per Order WBAT   LLE Weight Bearing Per Order WBAT   Pain Assessment   Pain Assessment Tool 0-10   Pain Score 5   Pain Location/Orientation Orientation: Left; Location: Leg   Pain Radiating Towards foot   ADL   Where Assessed Supine, bed  (HOB elevated  )   Eating Assistance 4  Minimal Assistance   Eating Deficit Setup;Bringing food to mouth assist;Verbal cueing;Supervision/safety; Increased time to complete   Grooming Assistance Unable to assess   UB Bathing Assistance Unable to assess   LB Bathing Assistance 2  Maximal Assistance   LB Bathing Deficit Setup;Verbal cueing;Supervision/safety; Increased time to complete;Perineal area; Buttocks;Right lower leg including foot; Left lower leg including foot   LB Bathing Comments Max A to roll for javed care  Pt incontinent of bladder this tx session  UB Dressing Assistance Unable to assess   LB Dressing Assistance 2  Maximal Assistance   LB Dressing Deficit Setup;Verbal cueing; Increased time to complete;Supervision/safety; Don/doff R sock; Don/doff L sock   Toileting Assistance  Unable to assess   Toileting Comments Pt incontinent of bladder this tx session  Functional Standing Tolerance   Comments unable to assess  Pt declined OOB or EOB positioning  Bed Mobility   Rolling R 2  Maximal assistance   Additional items Assist x 2;Bedrails; Increased time required;Verbal cues;LE management   Rolling L 2  Maximal assistance   Additional items Assist x 2;Bedrails; Increased time required;Verbal cues;LE management   Supine to Sit Unable to assess   Additional Comments Pt adamantly refusing movement without outbursts  Calm gentle approach provided without positive results      Transfers   Sit to Stand Unable to assess  (Pt refusing any form of movement this tx session  )   Functional Mobility   Functional Mobility   (Pt refusing trial at this time  )   Toilet Transfers   Toilet Transfers Comments unable to assess  Therapeutic Exercise - ROM   UE-ROM Yes   ROM- Right Upper Extremities   R Shoulder AROM; Flexion   RUE ROM Comment Pt with limited ROM in RUE  Declined repetitions of activity  ROM - Left Upper Extremities    L Shoulder AROM; Flexion   Cognition   Overall Cognitive Status Impaired   Arousal/Participation Responsive; Uncooperative   Attention Difficulty attending to directions   Orientation Level Oriented to place;Oriented to person   Memory Decreased short term memory;Decreased recall of recent events;Decreased recall of precautions   Following Commands Follows one step commands with increased time or repetition   Comments Pt agitated with attempts for movement of anykind  Additional Activities   Additional Activities Other (Comment)  (reviewed current plan of care  )   Additional Activities Comments Reviewed current plan of care with Pt and spouse  Pt's spouse reports, "just make her do it, even if she's yeling"  Reviewed current role of OT to be encouraged as tolerated  Activity Tolerance   Activity Tolerance Patient limited by fatigue;Patient limited by pain   Medical Staff Made Aware reported all findings to nursing staff  Pt resistive to movement, rolling, repositioning this tx session  Reported 5/10 pain noted in LLE  Nursing aware  Assessment   Assessment Pt was seen for skilled OT with focus on completion of light grooming, bed mobility, javed care and review of current plan of care  Pt, spouse and 2 sons were present during tx session  Pt required initial A for javed hygiene due to incontinence of bladder  Pt with resistive comments stating, "Not today, just leave me alone"  Educated Pt on need for cleansing to encourage good skin integrity  Pt stated, "Don't touch me, don't touch my leg"   Calm, gentle approach required to complete rolling with Max A provided with Pt continuing to say, "No, don't touch me"  Large amount of urine noted in blue pad  Reported findings to RN Arianna with noted pain levels reviewed  See above levels of A required for all functional tasks  Spoke at length with Pt's  regarding Pt's wishes   stated, "Just ignore her yelling, just do it", regarding participation in therapies  Educated spouse on encouragement that will be provided as tolerated by Pt with limitations based on the Pt's levels of agitation with activity  The patient's raw score on the AM-PAC Daily Activity inpatient short form is 12, standardized score is 30 6, less than 39 4  Patients at this level are likely to benefit from discharge to post-acute rehabilitation services  Pending progression of current goals  Plan   Treatment Interventions ADL retraining;Functional transfer training;UE strengthening/ROM; Endurance training;Cognitive reorientation;Patient/family training   Goal Expiration Date 03/26/21   OT Treatment Day 1   OT Frequency 3-5x/wk   Recommendation   OT Discharge Recommendation Post-Acute Rehabilitation Services   AM-[facility] Daily Activity Inpatient   Lower Body Dressing 2   Bathing 2   Toileting 2   Upper Body Dressing 2   Grooming 2   Eating 2   Daily Activity Raw Score 12   Daily Activity Standardized Score (Calc for Raw Score >=11) 30 6   AM-PAC Applied Cognition Inpatient   Following a Speech/Presentation 2   Understanding Ordinary Conversation 3   Taking Medications 2   Remembering Where Things Are Placed or Put Away 2   Remembering List of 4-5 Errands 2   Taking Care of Complicated Tasks 2   Applied Cognition Raw Score 13   Applied Cognition Standardized Score 30 46   [place] Ye, [address]

## 2021-03-14 NOTE — PLAN OF CARE
Problem: PHYSICAL THERAPY ADULT  Goal: Performs mobility at highest level of function for planned discharge setting  See evaluation for individualized goals  Description: Treatment/Interventions: Functional transfer training, LE strengthening/ROM, Therapeutic exercise, Endurance training, Patient/family training, Equipment eval/education, Bed mobility, Gait training, Compensatory technique education, Continued evaluation, Spoke to nursing, OT          See flowsheet documentation for full assessment, interventions and recommendations  Outcome: Progressing  Note: Prognosis: Guarded  Problem List: Decreased strength, Decreased range of motion, Decreased endurance, Impaired balance, Decreased mobility, Decreased cognition, Impaired judgement, Decreased safety awareness, Decreased skin integrity, Pain  Assessment: PT  Showing poor tolerance to activity due to increased b/l le pain L>R with mobility/ activity  Increase agitation noted with continued attempts and encouragement for mobility  Pt stated, " NO, not today just leave me alone "  Pt incontinent of urine, pt performed rolling el and R with use of bed rails with max assist x2, pt resistive to rolling attempts  Pt performs 2 reps SLR and knee flexion exercises to b/l le's  Pt refused to perform further arom exercises with le's  Pt yelling out during PT treatment session  Attempted to have pt 's  encourage pt to participate in PT session to no avail  Pt's  stated, " Just ignore her yelling  You just have to do it "  Pt provided with education pt regarding benefits of mobility and repositioning for improved skin integrity as well as proper nutrition to promote healing  Pt not receptive to Pt requests and mobility training  The patient's AM-PAC Basic Mobility Inpatient Short Form Low Function Raw Score 13 , Standardized Score is 20  14  A standardized score less 42 9 suggests the patient may benefit from discharge to post-acute rehab services  Please also refer to the recommendation of the Physical Therapist for safe discharge planning  Due to level of assistance required for mobility and functional impairments continue to recommend inpt rehab at d/c in order to maximize functional outcomes  PT Discharge Recommendation: Post-Acute Rehabilitation Services     PT - OK to Discharge: Yes(to return to Son when medically cleared )    See flowsheet documentation for full assessment

## 2021-03-14 NOTE — PLAN OF CARE
Problem: OCCUPATIONAL THERAPY ADULT  Goal: Performs self-care activities at highest level of function for planned discharge setting  See evaluation for individualized goals  Description: Treatment Interventions: ADL retraining, Functional transfer training, UE strengthening/ROM, Endurance training, Cognitive reorientation, Patient/family training, Equipment evaluation/education, Compensatory technique education, Continued evaluation          See flowsheet documentation for full assessment, interventions and recommendations  Outcome: Progressing  Note: Limitation: Decreased ADL status, Decreased UE ROM, Decreased UE strength, Decreased Safe judgement during ADL, Decreased cognition, Decreased endurance, Visual deficit, Decreased high-level ADLs  Prognosis: Fair  Assessment: Pt was seen for skilled OT with focus on completion of light grooming, bed mobility, javed care and review of current plan of care  Pt, spouse and 2 sons were present during tx session  Pt required initial A for javed hygiene due to incontinence of bladder  Pt with resistive comments stating, "Not today, just leave me alone"  Educated Pt on need for cleansing to encourage good skin integrity  Pt stated, "Don't touch me, don't touch my leg"  Calm, gentle approach required to complete rolling with Max A provided with Pt continuing to say, "No, don't touch me"  Large amount of urine noted in blue pad  Reported findings to RN Arianna with noted pain levels reviewed  See above levels of A required for all functional tasks  Spoke at length with Pt's  regarding Pt's wishes   stated, "Just ignore her yelling, just do it", regarding participation in therapies  Educated spouse on encouragement that will be provided as tolerated by Pt with limitations based on the Pt's levels of agitation with activity  The patient's raw score on the AM-PAC Daily Activity inpatient short form is 12, standardized score is 30 6, less than 39 4   Patients at this level are likely to benefit from discharge to post-acute rehabilitation services  Pending progression of current goals        OT Discharge Recommendation: Post-Acute Rehabilitation Services

## 2021-03-14 NOTE — PLAN OF CARE
Problem: Potential for Falls  Goal: Patient will remain free of falls  Description: INTERVENTIONS:  - Assess patient frequently for physical needs  -  Identify cognitive and physical deficits and behaviors that affect risk of falls  -  [place] fall precautions as indicated by assessment   - Educate patient/family on patient safety including physical limitations  - Instruct patient to call for assistance with activity based on assessment  - Modify environment to reduce risk of injury  - Consider OT/PT consult to assist with strengthening/mobility  Outcome: Progressing     Problem: Prexisting or High Potential for Compromised Skin Integrity  Goal: Skin integrity is maintained or improved  Description: INTERVENTIONS:  - Identify patients at risk for skin breakdown  - Assess and monitor skin integrity  - Assess and monitor nutrition and hydration status  - Monitor labs   - Assess for incontinence   - Turn and reposition patient  - Assist with mobility/ambulation  - Relieve pressure over bony prominences  - Avoid friction and shearing  - Provide appropriate hygiene as needed including keeping skin clean and dry  - Evaluate need for skin moisturizer/barrier cream  - Collaborate with interdisciplinary team   - Patient/family teaching  - Consider wound care consult   Outcome: Progressing     Problem: Nutrition/Hydration-ADULT  Goal: Nutrient/Hydration intake appropriate for improving, restoring or maintaining nutritional needs  Description: Monitor and assess patient's nutrition/hydration status for malnutrition  Collaborate with interdisciplinary team and initiate plan and interventions as ordered  Monitor patient's weight and dietary intake as ordered or per policy  Utilize nutrition screening tool and intervene as necessary  Determine patient's food preferences and provide high-protein, high-caloric foods as appropriate       INTERVENTIONS:  - Monitor oral intake, urinary output, labs, and treatment plans  - Assess nutrition and hydration status and recommend course of action  - Evaluate amount of meals eaten  - Assist patient with eating if necessary   - Allow adequate time for meals  - Recommend/ encourage appropriate diets, oral nutritional supplements, and vitamin/mineral supplements  - Order, calculate, and assess calorie counts as needed  - Recommend, monitor, and adjust tube feedings and TPN/PPN based on assessed needs  - Assess need for intravenous fluids  - Provide specific nutrition/hydration education as appropriate  - Include patient/family/caregiver in decisions related to nutrition  Outcome: Progressing     Problem: RESPIRATORY - ADULT  Goal: Achieves optimal ventilation and oxygenation  Description: INTERVENTIONS:  - Assess for changes in respiratory status  - Assess for changes in mentation and behavior  - Position to facilitate oxygenation and minimize respiratory effort  - Oxygen administered by appropriate delivery if ordered  - Initiate smoking cessation education as indicated  - Encourage broncho-pulmonary hygiene including cough, deep breathe, Incentive Spirometry  - Assess the need for suctioning and aspirate as needed  - Assess and instruct to report SOB or any respiratory difficulty  - Respiratory Therapy support as indicated  Outcome: Progressing     Problem: METABOLIC, FLUID AND ELECTROLYTES - ADULT  Goal: Electrolytes maintained within normal limits  Description: INTERVENTIONS:  - Monitor labs and assess patient for signs and symptoms of electrolyte imbalances  - Administer electrolyte replacement as ordered  - Monitor response to electrolyte replacements, including repeat lab results as appropriate  - Instruct patient on fluid and nutrition as appropriate  Outcome: Progressing  Goal: Fluid balance maintained  Description: INTERVENTIONS:  - Monitor labs   - Monitor I/O and WT  - Instruct patient on fluid and nutrition as appropriate  - Assess for signs & symptoms of volume excess or deficit  Outcome: Progressing     Problem: SKIN/TISSUE INTEGRITY - ADULT  Goal: Skin integrity remains intact  Description: INTERVENTIONS  - Identify patients at risk for skin breakdown  - Assess and monitor skin integrity  - Assess and monitor nutrition and hydration status  - Monitor labs (i e  albumin)  - Assess for incontinence   - Turn and reposition patient  - Assist with mobility/ambulation  - Relieve pressure over bony prominences  - Avoid friction and shearing  - Provide appropriate hygiene as needed including keeping skin clean and dry  - Evaluate need for skin moisturizer/barrier cream  - Collaborate with interdisciplinary team (i e  Nutrition, Rehabilitation, etc )   - Patient/family teaching  Outcome: Progressing  Goal: Incision(s), wounds(s) or drain site(s) healing without S/S of infection  Description: INTERVENTIONS  - Assess and document risk factors for skin impairment   - Assess and document dressing, incision, wound bed, drain sites and surrounding tissue  - Consider nutrition services referral as needed  - Oral mucous membranes remain intact  - Provide patient/ family education  Outcome: Progressing     Problem: INFECTION - ADULT  Goal: Absence or prevention of progression during hospitalization  Description: INTERVENTIONS:  - Assess and monitor for signs and symptoms of infection  - Monitor lab/diagnostic results  - Monitor all insertion sites, i e  indwelling lines, tubes, and drains  - Monitor endotracheal if appropriate and nasal secretions for changes in amount and color  - Jadwin appropriate cooling/warming therapies per order  - Administer medications as ordered  - Instruct and encourage patient and family to use good hand hygiene technique  - Identify and instruct in appropriate isolation precautions for identified infection/condition  Outcome: Progressing     Problem: SAFETY ADULT  Goal: Patient will remain free of falls  Description: INTERVENTIONS:  - Assess patient frequently for physical needs  -  Identify cognitive and physical deficits and behaviors that affect risk of falls    -  [place] fall precautions as indicated by assessment   - Educate patient/family on patient safety including physical limitations  - Instruct patient to call for assistance with activity based on assessment  - Modify environment to reduce risk of injury  - Consider OT/PT consult to assist with strengthening/mobility  Outcome: Progressing  Goal: Maintain or return to baseline ADL function  Description: INTERVENTIONS:  -  Assess patient's ability to carry out ADLs; assess patient's baseline for ADL function and identify physical deficits which impact ability to perform ADLs (bathing, care of mouth/teeth, toileting, grooming, dressing, etc )  - Assess/evaluate cause of self-care deficits   - Assess range of motion  - Assess patient's mobility; develop plan if impaired  - Assess patient's need for assistive devices and provide as appropriate  - Encourage maximum independence but intervene and supervise when necessary  - Involve family in performance of ADLs  - Assess for home care needs following discharge   - Consider OT consult to assist with ADL evaluation and planning for discharge  - Provide patient education as appropriate  Outcome: Progressing     Problem: DISCHARGE PLANNING  Goal: Discharge to home or other facility with appropriate resources  Description: INTERVENTIONS:  - Identify barriers to discharge w/patient and caregiver  - Arrange for needed discharge resources and transportation as appropriate  - Identify discharge learning needs (meds, wound care, etc )  - Arrange for interpretive services to assist at discharge as needed  - Refer to Case Management Department for coordinating discharge planning if the patient needs post-hospital services based on physician/advanced practitioner order or complex needs related to functional status, cognitive ability, or social support system  Outcome: Progressing Problem: Knowledge Deficit  Goal: Patient/family/caregiver demonstrates understanding of disease process, treatment plan, medications, and discharge instructions  Description: Complete learning assessment and assess knowledge base    Interventions:  - Provide teaching at level of understanding  - Provide teaching via preferred learning methods  Outcome: Progressing

## 2021-03-14 NOTE — ASSESSMENT & PLAN NOTE
Wt Readings from Last 3 Encounters:   03/14/21 60 6 kg (133 lb 9 6 oz)   03/07/21 61 6 kg (135 lb 12 9 oz)   02/03/21 75 1 kg (165 lb 9 1 oz)     IV diuresis held due to uptrending creatinine

## 2021-03-14 NOTE — PROGRESS NOTES
NEPHROLOGY PROGRESS NOTE   Reyes Terrell 80 y o  female MRN: [id number]  Unit/Bed#: [place] [address] -01 Encounter: [id number]      ASSESSMENT/PLAN:  Acute kidney injury (POA) on chronic kidney disease, stage IV:   - to note, patient recently hospitalized in January 2021 with episode of acute kidney injury, creatinine improved to 2 3 mg/dL upon discharge  - seen by AP with nephrology once post discharge    - suspect acute kidney injury secondary to cardiorenal syndrome with volume overload  - upon review of medical records, baseline creatinine 1 8-2 1 mg/dL  To note, creatinine stabilized around 2 4-2 5 mg/dL post hospitalization  - creatinine 2 83 mg/dL upon admission on 03/10   - most recent creatinine 3 24 mg/dL today               - currently on furosemide 80 mg IV b i d, post 1 dose metolazone 5 mg on 3/12  Will hold p m  dose of diuretics              - UA: Small blood, positive nitrate, large leukocytes, +1 protein, 2-4 RBC, innumerable WBC, moderate bacteria, renal tubule cells present               - imaging: CT scan completed in January 2021 revealed stent present in the upper pole right renal collecting system with some residual hydronephrosis  Left kidney small  Renal ultrasound revealed atrophic left kidney 6 5 cm, diffuse cortical thinning, no hydronephrosis, right kidney normal size, diffuse cortical thinning, mild hydronephrosis with ureteral stent in place               - repeat renal ultrasound completed on 03/12: Right kidney 11 8 cm, left kidney 6 2 cm  Echogenic ureteral stent identified in the removal pelvis  No hydronephrosis bilaterally               - continue to monitor bladder scan, maintain urinary retention protocol   - avoid NSAIDs, nephrotoxic agents, IV contrast   - adjust medications to appropriate GFR    - monitor volume status with strict intake/output, daily weight               - accuracy of weight?  - will check BMP, magnesium, phosphorus in a m      Right-sided hydronephrosis:  - with history of right ureteral stent placement in December 2020    - repeat renal ultrasound results as above      Electrolytes, acid/base:  - hypokalemia improving with most recent potassium 3 8 post replacement  Carolyn Gardnerd continue to monitor with repeat lab studies      Hyperphosphatemia:  - most recent phosphorus 4 8, suspect elevation secondary to renal dysfunction  - will continue to monitor with repeat lab studies and initiate binder if warranted      Blood pressure, hypertension:  - blood pressure with fluctuations  - currently on amlodipine 5 mg daily, carvedilol 12 5 mg b i d , hydralazine 50 mg t i d , Isordil 10 mg t i d   - recommendations: Maintain hold parameters on antihypertensives  - optimize hemodynamics; avoid fluctuations of blood pressure and hypotension  - maintain MAP > 65       H/H, anemia of chronic disease:  - most recent hemoglobin 8 1 grams/deciliter   - goal hemoglobin greater than 8 grams/deciliter   - recommend PRBC transfusion for hemoglobin less than 7       Diastolic CHF:  - chest x-ray completed upon admission revealed mild-to-moderate congestive changes   Small right and moderate left effusion   Left lung base atelectasis or infiltrate   - most recent echo revealed EF of 50% with grade 2 diastolic dysfunction, moderate MR, dilated IVC in December 2020   - diuretics as above  - given chronic hypoxic respiratory failure, continues on 3 L nasal cannula      Suspected pneumonia:  - currently on antibiotics per primary team   - chest x-ray as above      Severe PVD:  - no surgical intervention or invasive measures per prior notes  SUBJECTIVE:  Patient resting bed comfortably eating her breakfast   Patient states her breathing has improved  Patient without acute shortness of breath or chest pain      OBJECTIVE:  Current Weight: Weight - Scale: 60 6 kg (133 lb 9 6 oz)  Vitals:    03/14/21 0727   BP: 147/65   Pulse: 68   Resp: 18   Temp: 97 5 °F (36 4 °C)   SpO2: 94%       Intake/Output Summary (Last 24 hours) at 3/14/2021 1009  Last data filed at 3/13/2021 2053  Gross per 24 hour   Intake 480 ml   Output 1866 ml   Net -1386 ml       General:  No acute distress  Skin:  Warm, no rash, dry  Eyes:  Sclerae anicteric  ENT:  Dry lips mucous membranes  Neck:  Supple trachea midline  Chest:  Diminished breath sounds at bilateral bases   CVS:  Regular rate regular rhythm  Abdomen:  Soft, nontender, normoactive bowel sounds  Extremities:  No overt edema, chronic wounds   Neuro:  Awake and interactive  Psych:  Flat affect      Medications:  Scheduled Meds:  Current Facility-Administered Medications   Medication Dose Route Frequency Provider Last Rate    acetaminophen  650 mg Oral Q6H PRN Elsa Summers MD      amLODIPine  5 mg Oral Daily OTIS Cole      carvedilol  12 5 mg Oral BID With Meals OTIS Cole      cefepime  1,000 mg Intravenous Q12H Elsa Summers MD 1,000 mg (03/14/21 0813)    collagenase   Topical Daily Yady Everett MD      furosemide  80 mg Intravenous BID (diuretic) OTIS Cole      hydrALAZINE  50 mg Oral Q8H [facility] OTIS Cole      isosorbide dinitrate  10 mg Oral TID after meals Elsa Summers MD      levothyroxine  25 mcg Oral Early Morning Elsa Summers MD      saccharomyces boulardii  250 mg Oral BID Elsa Summers MD      senna  8 6 mg Oral HS PRN Elsa Summers MD         PRN Meds:   acetaminophen    senna    Continuous Infusions:     Laboratory Results:  Results from last 7 days   Lab Units 03/14/21  0434 03/14/21  0430 03/13/21  0450 03/12/21  0513 03/11/21  0451 03/11/21  0444 03/10/21  1318 03/07/21  1526   WBC Thousand/uL  --  5 66 5 70  --   --  5 83 6 96 7 29   HEMOGLOBIN g/dL  --  8 1* 7 9*  --   --  7 5* 7 3* 8 7*   HEMATOCRIT %  --  26 9* 27 0*  --   --  25 7* 24 9* 28 5*   PLATELETS Thousands/uL  --  178 182  --   --  172 196 227   SODIUM mmol/L 139  --  140 139 141  --  142 141   POTASSIUM mmol/L 3 8  --  3 3* 3 7 4 3  --  4 6 4 7   CHLORIDE mmol/L 102  --  102 104 107  --  107 106   CO2 mmol/L 32  --  28 28 24  --  26 25   BUN mg/dL 62*  --  57* 57* 58*  --  58* 59*   CREATININE mg/dL 3 24*  --  2 87* 2 91* 2 78*  --  2 83* 2 74*   CALCIUM mg/dL 8 3  --  8 4 7 9* 8 3  --  8 2* 8 5   MAGNESIUM mg/dL 2 1  --  2 1  --   --   --   --   --    PHOSPHORUS mg/dL  --   --  4 8*  --   --   --   --   --

## 2021-03-14 NOTE — ASSESSMENT & PLAN NOTE
Serial procalcitonins negative  Cefepime discontinued today  Will continue po antibiotics for UTI purposes

## 2021-03-15 PROBLEM — T14.90XA WOUNDS AND INJURIES: Status: ACTIVE | Noted: 2021-03-10

## 2021-03-15 PROBLEM — I82.409 DVT (DEEP VENOUS THROMBOSIS) (HCC): Status: ACTIVE | Noted: 2021-03-15

## 2021-03-15 LAB
ANION GAP SERPL CALCULATED.3IONS-SCNC: 11 MMOL/L (ref 4–13)
BACTERIA BLD CULT: NORMAL
BUN SERPL-MCNC: 60 MG/DL (ref 5–25)
CALCIUM SERPL-MCNC: 8.7 MG/DL (ref 8.3–10.1)
CHLORIDE SERPL-SCNC: 98 MMOL/L (ref 100–108)
CO2 SERPL-SCNC: 32 MMOL/L (ref 21–32)
CREAT SERPL-MCNC: 3.16 MG/DL (ref 0.6–1.3)
GFR SERPL CREATININE-BSD FRML MDRD: 12 ML/MIN/1.73SQ M
GLUCOSE SERPL-MCNC: 78 MG/DL (ref 65–140)
INR PPP: 3.01 (ref 0.84–1.19)
MAGNESIUM SERPL-MCNC: 2.1 MG/DL (ref 1.6–2.6)
PHOSPHATE SERPL-MCNC: 4.4 MG/DL (ref 2.3–4.1)
POTASSIUM SERPL-SCNC: 3.3 MMOL/L (ref 3.5–5.3)
PROTHROMBIN TIME: 30.5 SECONDS (ref 11.6–14.5)
SODIUM SERPL-SCNC: 141 MMOL/L (ref 136–145)

## 2021-03-15 PROCEDURE — 97530 THERAPEUTIC ACTIVITIES: CPT

## 2021-03-15 PROCEDURE — 85610 PROTHROMBIN TIME: CPT | Performed by: STUDENT IN AN ORGANIZED HEALTH CARE EDUCATION/TRAINING PROGRAM

## 2021-03-15 PROCEDURE — 84100 ASSAY OF PHOSPHORUS: CPT | Performed by: NURSE PRACTITIONER

## 2021-03-15 PROCEDURE — 99232 SBSQ HOSP IP/OBS MODERATE 35: CPT | Performed by: INTERNAL MEDICINE

## 2021-03-15 PROCEDURE — 80048 BASIC METABOLIC PNL TOTAL CA: CPT | Performed by: NURSE PRACTITIONER

## 2021-03-15 PROCEDURE — 97110 THERAPEUTIC EXERCISES: CPT

## 2021-03-15 PROCEDURE — 97535 SELF CARE MNGMENT TRAINING: CPT

## 2021-03-15 PROCEDURE — 99233 SBSQ HOSP IP/OBS HIGH 50: CPT | Performed by: INTERNAL MEDICINE

## 2021-03-15 PROCEDURE — 83735 ASSAY OF MAGNESIUM: CPT | Performed by: NURSE PRACTITIONER

## 2021-03-15 PROCEDURE — 99253 IP/OBS CNSLTJ NEW/EST LOW 45: CPT | Performed by: NURSE PRACTITIONER

## 2021-03-15 RX ORDER — WARFARIN SODIUM 2 MG/1
2 TABLET ORAL
Status: DISCONTINUED | OUTPATIENT
Start: 2021-03-16 | End: 2021-03-16

## 2021-03-15 RX ORDER — POTASSIUM CHLORIDE 20 MEQ/1
20 TABLET, EXTENDED RELEASE ORAL ONCE
Status: COMPLETED | OUTPATIENT
Start: 2021-03-15 | End: 2021-03-15

## 2021-03-15 RX ADMIN — COLLAGENASE SANTYL 1 APPLICATION: 250 OINTMENT TOPICAL at 08:30

## 2021-03-15 RX ADMIN — AMOXICILLIN 500 MG: 500 CAPSULE ORAL at 08:24

## 2021-03-15 RX ADMIN — POTASSIUM CHLORIDE 20 MEQ: 1500 TABLET, EXTENDED RELEASE ORAL at 08:32

## 2021-03-15 RX ADMIN — Medication 250 MG: at 08:25

## 2021-03-15 RX ADMIN — CARVEDILOL 12.5 MG: 6.25 TABLET, FILM COATED ORAL at 08:24

## 2021-03-15 RX ADMIN — AMLODIPINE BESYLATE 5 MG: 5 TABLET ORAL at 08:25

## 2021-03-15 RX ADMIN — ISOSORBIDE DINITRATE 10 MG: 10 TABLET ORAL at 08:33

## 2021-03-15 NOTE — CONSULTS
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 [id number]    Assessment:    -   Patient Active Problem List   Diagnosis    Acute deep vein thrombosis (DVT) of calf muscle vein of right lower extremity (HCC)    Acute kidney injury superimposed on chronic kidney disease (HCC)    Hydronephrosis of right kidney    Elevated blood urea nitrogen    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 ([address]    Nonhealing nonsurgical wound    Protein-calorie malnutrition ([address]    Ulcer of right lower extremity with fat layer exposed ([address] )    Urinary retention    Wound infection    PAD (peripheral artery disease) ([address] )    Essential hypertension    CKD (chronic kidney disease)    History of DVT (deep vein thrombosis)    Acute on chronic diastolic congestive heart failure (HCC)    Cellulitis    Iron deficiency anemia secondary to inadequate dietary iron intake    Chronic diastolic heart failure (HCC)    Ambulatory dysfunction    Chronic respiratory failure with hypoxia (HCC)    Coagulopathy (HCC)    Arterial leg ulcer (HCC)    Acute metabolic encephalopathy    Multiple open wounds of lower extremity    HCAP (healthcare-associated pneumonia)    UTI (urinary tract infection)         Plan:  1  Symptom management  · Per primary team    · 2  Goals  Limitation set at DNAR/DNI  · Family may readdress goals of care in coming days if no improvement  Code Status: DNAR/DNI - Level 3   Decisional apparatus:  Patient is not competent on my exam today  If competence is lost, patient's substitute decision maker would default to spouse by [place]  Advance Directive / Living Will / POLST: none on file     I have reviewed the patient's controlled substance dispensing history in the Prescription Drug Monitoring Program in compliance with the [facility] regulations before prescribing any controlled substances  We appreciate the invitation to be involved in this patient's care    We will continue to follow  Please do not hesitate to reach our on call provider through our clinic answering service at  should you have acute symptom control concerns  OTIS Simon  Palliative and Supportive Care  Clinic/Answering Service: [phone]  You can find me on [employer]! IDENTIFICATION:  Inpatient consult to Palliative Care  Consult performed by: OTIS Dominguez  Consult ordered by: Juan Langley, [address]         Physician Requesting Consult: Sammi Villarreal MD  Reason for Consult / Principal Problem: goals of care in overall failure to thrive, decline  Hx and PE limited by: patient unable to effectively participate    HISTORY OF PRESENT ILLNESS:       Nikolas Witt is a 80 y o  female past medical history of diastolic congestive heart failure, CKD, hypertension, P AD  Chronic respiratory failure, who presented on March 10th from [facility] nursing facility with change in mental status  At baseline she has episodes of confusion that this was not consistent however on the day of admission she was encephalopathic  She is symptomatic fluid overload with vascular congestion on x-ray as well as weeping edema of her bilateral lower extremities, she was noted to have LILIAN on CKD, UTI, and coagulopathy  She was noted to have a DTI of her sacrum (suspected stage3-unstageable) Right hip eschar and multiple lower extremity wounds  Her kidney function has been worsening needing likely secondary to the cardio renal syndrome and the use of diuretics  Her GFR is 12  Nephrology team has discussed initiation of hemodialysis with her however the patient does not wish to have aggressive measures including HD  Her physical therapy sessions have been limited by her encephalopathy as she has been slightly agitated and uncooperative  The patient is , Lamar Torres and they have 2 sons, Dixie Centeno and   Both of their sons are local       Jovanni Ike describes her as always very active and healthy    She did not see a doctor for 45 years  In November of 2020, she experienced a fall and went to [facility]  Since that time she was found to have heart failure, CKD, PAD with high grade femoral stenosis  She has had frequent readmissions  He describes a rapid decline since November  He describes his wife as strong-willed and really hopes to have her participate in goals of care conversations  However, do to ongoing confusion, she is unable to effectively participate  Spent time with Paramjit Mccann, the patient's  both on the phone and at the bedside  It was important to Paramjit Mccann for the patient to be involved in decision making  Although the patient does not appear to have decision making ability, she did agree that she would not want CPR or to be on a ventilator  Paramjit Mccann had communicated that he wanted his wife to be a do not resuscitate and his wife agreeing gave him peace of mind  It is likely in coming days that if the patient does not improve or her kidney function declines, he would be open to hospice conversations  Review of Systems   Unable to perform ROS: dementia       Past Medical History:   Diagnosis Date    Anemia 1/18/2021    Chronic kidney disease     Hypertension      History reviewed  No pertinent surgical history    Social History     Socioeconomic History    Marital status: /Civil Union     Spouse name: Not on file    Number of children: Not on file    Years of education: Not on file    Highest education level: Not on file   Occupational History    Not on file   Social Needs    Financial resource strain: Not on file    Food insecurity     Worry: Not on file     Inability: Not on file   [employer] needs     Medical: Not on file     Non-medical: Not on file   Tobacco Use    Smoking status: Never Smoker    Smokeless tobacco: Never Used   Substance and Sexual Activity    Alcohol use: Never     Frequency: Never    Drug use: Never    Sexual activity: Not on file   Lifestyle    Physical activity     Days per week: Not on file     Minutes per session: Not on file    Stress: Not on file   Relationships    Social connections     Talks on phone: Not on file     Gets together: Not on file     Attends Spiritism service: Not on file     Active member of club or organization: Not on file     Attends meetings of clubs or organizations: Not on file     Relationship status: Not on file    Intimate partner violence     Fear of current or ex partner: Not on file     Emotionally abused: Not on file     Physically abused: Not on file     Forced sexual activity: Not on file   Other Topics Concern    Not on file   Social History Narrative    Not on file     Family History   Problem Relation Age of Onset    No Known Problems Mother     No Known Problems Father        MEDICATIONS / ALLERGIES:    current meds:   Current Facility-Administered Medications   Medication Dose Route Frequency    acetaminophen (TYLENOL) tablet 650 mg  650 mg Oral Q6H PRN    amLODIPine (NORVASC) tablet 5 mg  5 mg Oral Daily    amoxicillin (AMOXIL) capsule 500 mg  500 mg Oral Q12H [facility]    carvedilol (COREG) tablet 12 5 mg  12 5 mg Oral BID With Meals    collagenase (SANTYL) ointment   Topical Daily    hydrALAZINE (APRESOLINE) tablet 50 mg  50 mg Oral Q8H [facility]    isosorbide dinitrate (ISORDIL) tablet 10 mg  10 mg Oral TID after meals    levothyroxine tablet 25 mcg  25 mcg Oral Early Morning    saccharomyces boulardii (FLORASTOR) capsule 250 mg  250 mg Oral BID    senna (SENOKOT) tablet 8 6 mg  8 6 mg Oral HS PRN       Allergies   Allergen Reactions    No Known Allergies        OBJECTIVE:    Physical Exam  Physical Exam  Vitals signs and nursing note reviewed  Constitutional:       Appearance: She is ill-appearing  HENT:      Head: Normocephalic and atraumatic  Mouth/Throat:      Mouth: Mucous membranes are dry  Pharynx: Oropharynx is clear     Eyes:      Pupils: Pupils are equal, round, and reactive to light    Neurological:      General: No focal deficit present  Mental Status: She is alert  She is confused  Psychiatric:         Attention and Perception: She is inattentive  Behavior: Behavior is withdrawn  Cognition and Memory: Cognition is impaired  Memory is impaired  Comments: Occasionally answers yes, no  Lab Results:   CBC: No results found for: WBC, HGB, HCT, MCV, PLT, ADJUSTEDWBC, MCH, MCHC, RDW, MPV, NRBC, CMP:   Lab Results   Component Value Date    SODIUM 141 03/15/2021    K 3 3 (L) 03/15/2021    CL 98 (L) 03/15/2021    CO2 32 03/15/2021    BUN 60 (H) 03/15/2021    CREATININE 3 16 (H) 03/15/2021    CALCIUM 8 7 03/15/2021    EGFR 12 03/15/2021   , PT/PTT:No results found for: PT, PTT        Counseling / Coordination of Care    Total floor / unit time spent today 45+  minutes  Greater than 50% of total time was spent with the patient and / or family counseling and / or coordination of care   A description of the counseling / coordination of care: time spent with , chart review, colaboration with promary team, review of symptoms, change in code status

## 2021-03-15 NOTE — WOUND OSTOMY CARE
Consult Note - Wound   Verito Legacy 80 y o  female MRN: [id number]  Unit/Bed#: [place] 2 -01 Encounter: [id number]      History and Present Illness:  80year old female presented to the hospital from [place] with change in mental status  Patient's history significant for PAD  Assessment Findings:   Patient requires max assist x 2 to turn in bed  She is generally frail and curls up into almost fetal position  She denies pain when asked  However, FLACC score would indicate pain, even at rest   Bruising to arms and hands with fragile epidermis with loss of subcutaneous tissue  Blanchable erythema to heels, buttocks, sacrum, and right hip  Patient is incontinent of urine on exam   Recommend placing purewick  Nutrition team following, supplements ordered  1   Present on admission evolving deep tissue injury to sacrum (suspect stage 3, 4, or unstageable)--wound pink with yellow slough  Periwound fragile with some blanchable erythema, mild maceration, no induration  Minimal serosanguinous drainage  2   Right hip with small amount of brown eschar flush with skin and surrounding erythema/induration--on exam today, wound with brown eschar and yellow slough with small amount of pink partial thickness to area around slough  Induration and erythema to this area is improved from prior assessment  Erythema is entirely blanchable  3   Lower extremity wounds--per podiatry team   Assisted nursing with dressing change  No new areas of skin breakdown noted on assessment today  Two areas on lower extremities healed        See flowsheet and media for wound details  Patient on P500 low air-loss mattress  Positioning wedges in use  Heels elevated off of bed      Wound Care Plan:   1-P500 low air-loss mattress  2-Elevate heels off of bed/chair surface to offload pressure  3-Offloading air cushion in chair when out of bed    4-Moisturize skin daily with skin nourishing cream   5-Turn/reposition every 2 hours while in bed, or when medically stable, using positioning wedges; and weight shift frequently while in chair for pressure re-distribution on skin  6-Lower extremity wounds per podiatry recommendations  7-Right hip--cleanse with soap and water, pat dry  Apply 3m on sting to eschar and redness on right hip  Allow to dry and cover with Allevyn Life foam dressing  Change dressing every 3 days  8-Sacrum--cleanse with normal saline, pat dry  3M no sting to javed-wound skin  Apply Santyl to wound bed in nickel thick layer  Cover with Allevyn Life foam dressing  Change dressing daily      Wound care team to follow  Plan of care reviewed with primary RN  Patient should follow-up at the wound center on discharge depending on goals of care  Palliative care consultation pending  Wound 12/28/20 Arterial Ulcer Toe (Comment  which one) Right (Active)   Wound Image   03/15/21 0942   Wound Description Black;Dry 03/15/21 0942   Javed-wound Assessment Intact 03/15/21 0942   Wound Length (cm) 2 cm 03/15/21 0942   Wound Width (cm) 1 cm 03/15/21 0942   Wound Depth (cm) 0 cm 03/15/21 0942   Wound Surface Area (cm^2) 2 cm^2 03/15/21 0942   Wound Volume (cm^3) 0 cm^3 03/15/21 0942   Calculated Wound Volume (cm^3) 0 cm^3 03/15/21 0942   Wound Site Closure Open to air 03/14/21 2100   Drainage Amount None 03/15/21 0942   Treatments Elevated 03/15/21 0942   Dressing Open to air 03/15/21 0942   Wound packed?  No 03/13/21 0730   Dressing Changed New 03/13/21 0730   Patient Tolerance Tolerated well 03/15/21 0942   Dressing Status Other (Comment) 03/13/21 0730       Wound 12/28/20 Arterial Ulcer Heel Right (Active)   Wound Image   03/15/21 0950   Wound Description Black;Dry 03/15/21 0950   Pressure Injury Stage U 03/13/21 2315   Javed-wound Assessment Pink 03/15/21 0950   Wound Length (cm) 2 6 cm 03/15/21 0950   Wound Width (cm) 2 cm 03/15/21 0950   Wound Depth (cm) 0 cm 03/15/21 0950   Wound Surface Area (cm^2) 5 2 cm^2 03/15/21 0950   Wound Volume (cm^3) 0 cm^3 03/15/21 0950   Calculated Wound Volume (cm^3) 0 cm^3 03/15/21 0950   Drainage Amount None 03/15/21 0950   Treatments Elevated 03/15/21 0950   Dressing Non adherent;ABD 03/15/21 0950   Wound packed? No 03/13/21 0730   Dressing Changed Changed 03/15/21 1000   Patient Tolerance Tolerated well 03/15/21 1000   Dressing Status Clean;Dry; Intact 03/15/21 1000       Wound Arterial Ulcer Leg Left;Posterior (Active)   Wound Image   03/15/21 0949   Wound Description Pink;Yellow 03/15/21 0949   Corazon-wound Assessment Intact 03/15/21 0949   Wound Length (cm) 6 5 cm 03/15/21 0949   Wound Width (cm) 3 3 cm 03/15/21 0949   Wound Depth (cm) 0 1 cm 03/15/21 0949   Wound Surface Area (cm^2) 21 45 cm^2 03/15/21 0949   Wound Volume (cm^3) 2 15 cm^3 03/15/21 0949   Calculated Wound Volume (cm^3) 2 15 cm^3 03/15/21 0949   Change in Wound Size % -258 33 03/15/21 0949   Drainage Amount Small 03/15/21 0949   Drainage Description Serosanguineous 03/15/21 0949   Non-staged Wound Description Partial thickness 03/15/21 0949   Treatments Cleansed;Elevated 03/15/21 0949   Dressing Non adherent;Calcium Alginate;ABD 03/15/21 0949   Dressing Changed Changed 03/15/21 0949   Patient Tolerance Tolerated well 03/15/21 0949   Dressing Status Clean;Dry; Intact 03/15/21 0949       Wound 03/10/21 Pressure Injury Sacrum (Active)   Wound Image   03/15/21 1000   Wound Description Yellow;Slough;Pink 03/15/21 1000   Pressure Injury Stage DTPI 03/15/21 1000   Corazon-wound Assessment Erythema;Fragile 03/15/21 1000   Wound Length (cm) 2 2 cm 03/15/21 1000   Wound Width (cm) 2 cm 03/15/21 1000   Wound Depth (cm) 0 2 cm 03/15/21 1000   Wound Surface Area (cm^2) 4 4 cm^2 03/15/21 1000   Wound Volume (cm^3) 0 88 cm^3 03/15/21 1000   Calculated Wound Volume (cm^3) 0 88 cm^3 03/15/21 1000   Change in Wound Size % -4 76 03/15/21 1000   Drainage Amount Small 03/15/21 1000   Drainage Description Serous 03/15/21 1000   Treatments Cleansed 03/15/21 1000   Dressing Enzymatic debrider;Foam, Silicon (eg  Allevyn, etc) 03/15/21 1000   Wound packed? No 03/13/21 0730   Dressing Changed Changed 03/15/21 1000   Patient Tolerance Tolerated well 03/15/21 1000   Dressing Status Clean; Intact;Dry 03/15/21 1000       Wound 03/12/21 Hip Right (Active)   Wound Image   03/15/21 1005   Wound Description Brown;Yellow;[place] 03/15/21 1005   Corazon-wound Assessment Erythema; Induration 03/15/21 1005   Wound Length (cm) 1 cm 03/15/21 1005   Wound Width (cm) 0 5 cm 03/15/21 1005   Wound Depth (cm) 0 1 cm 03/15/21 1005   Wound Surface Area (cm^2) 0 5 cm^2 03/15/21 1005   Wound Volume (cm^3) 0 05 cm^3 03/15/21 1005   Calculated Wound Volume (cm^3) 0 05 cm^3 03/15/21 1005   Drainage Amount None 03/15/21 1005   Treatments Cleansed 03/15/21 1005   Dressing Foam, Silicon (eg  Allevyn, etc) 03/15/21 1005   Dressing Changed Changed 03/15/21 1005   Patient Tolerance Tolerated well 03/15/21 1005   Dressing Status Dry; Intact; Clean 03/15/21 [address] [facility] BSN, RN, [employer]

## 2021-03-15 NOTE — PLAN OF CARE
Problem: PHYSICAL THERAPY ADULT  Goal: Performs mobility at highest level of function for planned discharge setting  See evaluation for individualized goals  Description: Treatment/Interventions: Functional transfer training, LE strengthening/ROM, Therapeutic exercise, Endurance training, Patient/family training, Equipment eval/education, Bed mobility, Gait training, Compensatory technique education, Continued evaluation, Spoke to nursing, OT          See flowsheet documentation for full assessment, interventions and recommendations  Outcome: Not Progressing  Note: Prognosis: Guarded  Problem List: Decreased strength, Decreased range of motion, Decreased endurance, Impaired balance, Decreased mobility, Decreased cognition, Impaired judgement, Decreased safety awareness, Decreased skin integrity  Assessment: Pt  seated at EOB with OTR present upon my arrival  Pt  able to sit at EOB for additional time with no noted LOB  Attempted standing trial this treatment session, however pt  refusing at this time  Pt  able to perform limited HEP seated at EOB  Returned to supine in bed with A of 2, pt  resistive to movement with increased agitation noted  Pt  remained supine in bed with alarm active at end of treatment session  PT will continue to recommend d/c to rehab when medically stable for continued improvement of noted impairments  Barriers to Discharge: Other (Comment)  Barriers to Discharge Comments: medical status  PT Discharge Recommendation: Post-Acute Rehabilitation Services     PT - OK to Discharge: Yes(if d/c to rehab when medically stable )    See flowsheet documentation for full assessment

## 2021-03-15 NOTE — ASSESSMENT & PLAN NOTE
-Patient has a history of peripheral arterial disease and was previously evaluated by vascular surgery and Podiatry during prior admission  -LEADs significant for occlusion versus high grade stenosis of the proximal thigh portion of the superficial femoral artery with distal reconstitution   - Revascularization not pursued previously due to CKD and concern for progression to renal failure   -during her admission January 2021 patient was diagnosed with dry gangrene of the right foot, with a right pretibial ulcer:  Patient declined any invasive intervention at that time  -patient was re-evaluated by Podiatry during this current admission and diagnosed with 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

## 2021-03-15 NOTE — PLAN OF CARE
Problem: Potential for Falls  Goal: Patient will remain free of falls  Description: INTERVENTIONS:  - Assess patient frequently for physical needs  -  Identify cognitive and physical deficits and behaviors that affect risk of falls  -  [place] fall precautions as indicated by assessment   - Educate patient/family on patient safety including physical limitations  - Instruct patient to call for assistance with activity based on assessment  - Modify environment to reduce risk of injury  - Consider OT/PT consult to assist with strengthening/mobility  Outcome: Progressing     Problem: Prexisting or High Potential for Compromised Skin Integrity  Goal: Skin integrity is maintained or improved  Description: INTERVENTIONS:  - Identify patients at risk for skin breakdown  - Assess and monitor skin integrity  - Assess and monitor nutrition and hydration status  - Monitor labs   - Assess for incontinence   - Turn and reposition patient  - Assist with mobility/ambulation  - Relieve pressure over bony prominences  - Avoid friction and shearing  - Provide appropriate hygiene as needed including keeping skin clean and dry  - Evaluate need for skin moisturizer/barrier cream  - Collaborate with interdisciplinary team   - Patient/family teaching  - Consider wound care consult   Outcome: Progressing     Problem: Nutrition/Hydration-ADULT  Goal: Nutrient/Hydration intake appropriate for improving, restoring or maintaining nutritional needs  Description: Monitor and assess patient's nutrition/hydration status for malnutrition  Collaborate with interdisciplinary team and initiate plan and interventions as ordered  Monitor patient's weight and dietary intake as ordered or per policy  Utilize nutrition screening tool and intervene as necessary  Determine patient's food preferences and provide high-protein, high-caloric foods as appropriate       INTERVENTIONS:  - Monitor oral intake, urinary output, labs, and treatment plans  - Assess nutrition and hydration status and recommend course of action  - Evaluate amount of meals eaten  - Assist patient with eating if necessary   - Allow adequate time for meals  - Recommend/ encourage appropriate diets, oral nutritional supplements, and vitamin/mineral supplements  - Order, calculate, and assess calorie counts as needed  - Recommend, monitor, and adjust tube feedings and TPN/PPN based on assessed needs  - Assess need for intravenous fluids  - Provide specific nutrition/hydration education as appropriate  - Include patient/family/caregiver in decisions related to nutrition  Outcome: Progressing     Problem: RESPIRATORY - ADULT  Goal: Achieves optimal ventilation and oxygenation  Description: INTERVENTIONS:  - Assess for changes in respiratory status  - Assess for changes in mentation and behavior  - Position to facilitate oxygenation and minimize respiratory effort  - Oxygen administered by appropriate delivery if ordered  - Initiate smoking cessation education as indicated  - Encourage broncho-pulmonary hygiene including cough, deep breathe, Incentive Spirometry  - Assess the need for suctioning and aspirate as needed  - Assess and instruct to report SOB or any respiratory difficulty  - Respiratory Therapy support as indicated  Outcome: Progressing     Problem: METABOLIC, FLUID AND ELECTROLYTES - ADULT  Goal: Electrolytes maintained within normal limits  Description: INTERVENTIONS:  - Monitor labs and assess patient for signs and symptoms of electrolyte imbalances  - Administer electrolyte replacement as ordered  - Monitor response to electrolyte replacements, including repeat lab results as appropriate  - Instruct patient on fluid and nutrition as appropriate  Outcome: Progressing  Goal: Fluid balance maintained  Description: INTERVENTIONS:  - Monitor labs   - Monitor I/O and WT  - Instruct patient on fluid and nutrition as appropriate  - Assess for signs & symptoms of volume excess or deficit  Outcome: Progressing     Problem: SKIN/TISSUE INTEGRITY - ADULT  Goal: Skin integrity remains intact  Description: INTERVENTIONS  - Identify patients at risk for skin breakdown  - Assess and monitor skin integrity  - Assess and monitor nutrition and hydration status  - Monitor labs (i e  albumin)  - Assess for incontinence   - Turn and reposition patient  - Assist with mobility/ambulation  - Relieve pressure over bony prominences  - Avoid friction and shearing  - Provide appropriate hygiene as needed including keeping skin clean and dry  - Evaluate need for skin moisturizer/barrier cream  - Collaborate with interdisciplinary team (i e  Nutrition, Rehabilitation, etc )   - Patient/family teaching  Outcome: Progressing  Goal: Incision(s), wounds(s) or drain site(s) healing without S/S of infection  Description: INTERVENTIONS  - Assess and document risk factors for skin impairment   - Assess and document dressing, incision, wound bed, drain sites and surrounding tissue  - Consider nutrition services referral as needed  - Oral mucous membranes remain intact  - Provide patient/ family education  Outcome: Progressing     Problem: INFECTION - ADULT  Goal: Absence or prevention of progression during hospitalization  Description: INTERVENTIONS:  - Assess and monitor for signs and symptoms of infection  - Monitor lab/diagnostic results  - Monitor all insertion sites, i e  indwelling lines, tubes, and drains  - Monitor endotracheal if appropriate and nasal secretions for changes in amount and color  - [place] appropriate cooling/warming therapies per order  - Administer medications as ordered  - Instruct and encourage patient and family to use good hand hygiene technique  - Identify and instruct in appropriate isolation precautions for identified infection/condition  Outcome: Progressing     Problem: SAFETY ADULT  Goal: Patient will remain free of falls  Description: INTERVENTIONS:  - Assess patient frequently for physical needs  -  Identify cognitive and physical deficits and behaviors that affect risk of falls    -  [place] fall precautions as indicated by assessment   - Educate patient/family on patient safety including physical limitations  - Instruct patient to call for assistance with activity based on assessment  - Modify environment to reduce risk of injury  - Consider OT/PT consult to assist with strengthening/mobility  Outcome: Progressing  Goal: Maintain or return to baseline ADL function  Description: INTERVENTIONS:  -  Assess patient's ability to carry out ADLs; assess patient's baseline for ADL function and identify physical deficits which impact ability to perform ADLs (bathing, care of mouth/teeth, toileting, grooming, dressing, etc )  - Assess/evaluate cause of self-care deficits   - Assess range of motion  - Assess patient's mobility; develop plan if impaired  - Assess patient's need for assistive devices and provide as appropriate  - Encourage maximum independence but intervene and supervise when necessary  - Involve family in performance of ADLs  - Assess for home care needs following discharge   - Consider OT consult to assist with ADL evaluation and planning for discharge  - Provide patient education as appropriate  Outcome: Progressing     Problem: DISCHARGE PLANNING  Goal: Discharge to home or other facility with appropriate resources  Description: INTERVENTIONS:  - Identify barriers to discharge w/patient and caregiver  - Arrange for needed discharge resources and transportation as appropriate  - Identify discharge learning needs (meds, wound care, etc )  - Arrange for interpretive services to assist at discharge as needed  - Refer to Case Management Department for coordinating discharge planning if the patient needs post-hospital services based on physician/advanced practitioner order or complex needs related to functional status, cognitive ability, or social support system  Outcome: Progressing Problem: Knowledge Deficit  Goal: Patient/family/caregiver demonstrates understanding of disease process, treatment plan, medications, and discharge instructions  Description: Complete learning assessment and assess knowledge base    Interventions:  - Provide teaching at level of understanding  - Provide teaching via preferred learning methods  Outcome: Progressing

## 2021-03-15 NOTE — OCCUPATIONAL THERAPY NOTE
OccupationalTherapy Progress Note(nlph=[id number])     Patient Name: Viridiana Carroll  HRSOQ'L Date: 3/15/2021  Problem List  Principal Problem:    Acute metabolic encephalopathy  Active Problems:    Acute kidney injury superimposed on chronic kidney disease ([facility] [address] )    PAD (peripheral artery disease) ([facility] )    Essential hypertension    Acute on chronic diastolic congestive heart failure (HCC)    Iron deficiency anemia secondary to inadequate dietary iron intake    Chronic respiratory failure with hypoxia (HCC)    Multiple open wounds of lower extremity    HCAP (healthcare-associated pneumonia)    UTI (urinary tract infection)       03/15/21 1240   Note Type   Note Type Treatment   Restrictions/Precautions   Weight Bearing Precautions Per Order Yes   RLE Weight Bearing Per Order WBAT   LLE Weight Bearing Per Order WBAT   Other Precautions Fall Risk;Cognitive;Agitated; Chair Alarm; Bed Alarm;Multiple lines;Telemetry   Pain Assessment   Pain Assessment Tool FLACC   Pain Rating: FLACC (Rest) - Face 0   Pain Rating: FLACC (Rest) - Legs 0   Pain Rating: FLACC (Rest) - Activity 0   Pain Rating: FLACC (Rest) - Cry 0   Pain Rating: FLACC (Rest) - Consolability 0   Score: FLACC (Rest) 0   ADL   Where Assessed Supine, bed   LB Dressing Assistance 1  Total Assistance   LB Dressing Deficit Don/doff R sock; Don/doff L sock   Bed Mobility   Rolling R 2  Maximal assistance   Additional items Assist x 2; Increased time required;Verbal cues;LE management   Rolling L 2  Maximal assistance   Additional items Assist x 2; Increased time required;Verbal cues;LE management   Supine to Sit 2  Maximal assistance   Additional items Assist x 2; Increased time required;Verbal cues;LE management   Sit to Supine 2  Maximal assistance   Additional items Assist x 2; Increased time required;LE management;Verbal cues   Transfers   Sit to Stand Unable to assess  (pt refusing OOB or standing--several attempts made)   Functional Mobility   Functional Mobility (recommend hoyerlift for OOB with nsg)   Cognition   Overall Cognitive Status Impaired   Arousal/Participation Arousable   Attention Difficulty attending to directions   Orientation Level Oriented to person   Memory Decreased short term memory;Decreased recall of recent events;Decreased recall of precautions   Following Commands Follows one step commands inconsistently   Activity Tolerance   Activity Tolerance Patient limited by fatigue;Treatment limited secondary to agitation   Medical Staff Made Aware nsg, P T  Assessment   Assessment Pt seen for 55 min tx session with focus on functional balance, functional mobility, ADL status, transfer safety, and cognition  Pt able to tolerate EOB sitting(sitting tolerance on EOB to 12-15mins)  Pt required heavy assistance with all functional mobility tasks  Attempted to encourage(several attempts made)OOB mobility, but pt refusing  Noted cognitive deficits(i e orientation, memory)   present during tx session; pt with noted confusion with family history(i e 's name, number of yrs )  Pt demonstrated dependency with LE ADLs  Pt resistive to any b/l UE AROM exercises  Will attempt to continued tx when pt is cooperative  Plan   Treatment Interventions ADL retraining;Functional transfer training;UE strengthening/ROM; Endurance training;Cognitive reorientation;Patient/family training; Compensatory technique education;Continued evaluation   Goal Expiration Date 03/26/21   OT Treatment Day 2   OT Frequency 3-5x/wk   Recommendation   OT Discharge Recommendation Post-Acute Rehabilitation Services   AM-PAC Daily Activity Inpatient   Lower Body Dressing 1   Bathing 1   Toileting 1   Upper Body Dressing 1   Grooming 1   Eating 2   Daily Activity Raw Score 7   Raegan Conti, OT

## 2021-03-15 NOTE — UTILIZATION REVIEW
Continued Stay Review    Date:  3/14/2021                     Current Patient Class: inpatient  Current Level of Care: med surg     HPI:89 y o  female initially admitted on  3/10/2021 inpatient due to acute metabolic encephalopathy/pneumonia/UTI/acute on chronic CHF/LILIAN  Baseline creatinine of mid to high 2s  On chronic oxygen  Assessment/Plan: 3/14/2021:   Patient with improved breathing  On exam dry lips and mucous membranes  Diminished breath sounds  Bilateral LE dressings intact  Creatinine has risen to 3 24  To continue  Hold pm diuresis  To continue cefepime until today and transitioned to amoxicillin  Pertinent Labs/Diagnostic Results:  No new imaging     Results from last 7 days   Lab Units 03/10/21  1318   SARS-COV-2  Negative     Results from last 7 days   Lab Units 03/14/21  0430 03/13/21  0450 03/11/21  0444 03/10/21  1318   WBC Thousand/uL 5 66 5 70 5 83 6 96   HEMOGLOBIN g/dL 8 1* 7 9* 7 5* 7 3*   HEMATOCRIT % 26 9* 27 0* 25 7* 24 9*   PLATELETS Thousands/uL 178 182 172 196   NEUTROS ABS Thousands/µL 2 66 2 73 2 41 2 89     Results from last 7 days   Lab Units 03/15/21  0456 03/14/21  0434 03/13/21  0450 03/12/21  0513 03/11/21  0451   SODIUM mmol/L 141 139 140 139 141   POTASSIUM mmol/L 3 3* 3 8 3 3* 3 7 4 3   CHLORIDE mmol/L 98* 102 102 104 107   CO2 mmol/L 32 32 28 28 24   ANION GAP mmol/L 11 5 10 7 10   BUN mg/dL 60* 62* 57* 57* 58*   CREATININE mg/dL 3 16* 3 24* 2 87* 2 91* 2 78*   EGFR ml/min/1 73sq m 12 12 14 14 14   CALCIUM mg/dL 8 7 8 3 8 4 7 9* 8 3   MAGNESIUM mg/dL 2 1 2 1 2 1  --   --    PHOSPHORUS mg/dL 4 4*  --  4 8*  --   --      Results from last 7 days   Lab Units 03/10/21  1318   AST U/L 20   ALT U/L 20   ALK PHOS U/L 65   TOTAL PROTEIN g/dL 6 3*   ALBUMIN g/dL 2 1*   TOTAL BILIRUBIN mg/dL 0 63     Results from last 7 days   Lab Units 03/15/21  0456 03/14/21  0434 03/13/21  0450 03/12/21  0513 03/11/21  0451 03/10/21  1318   GLUCOSE RANDOM mg/dL 78 87 84 85 80 82 Results from last 7 days   Lab Units 03/15/21  0456 03/13/21  0450 03/10/21  1319   PROTIME seconds 30 5* 32 5* 24 2*   INR  3 01* 3 27* 2 22*   PTT seconds  --   --  46*     Results from last 7 days   Lab Units 03/11/21  0444 03/10/21  1724 03/10/21  1319   PROCALCITONIN ng/ml 0 07 0 07 0 09     Results from last 7 days   Lab Units 03/10/21  1318   LACTIC ACID mmol/L 0 6     Results from last 7 days   Lab Units 03/10/21  1724   NT-PRO BNP pg/mL 30,958*     Results from last 7 days   Lab Units 03/10/21  1416   CLARITY UA  Slightly Cloudy   COLOR UA  Yellow   SPEC GRAV UA  1 015   PH UA  5 5   GLUCOSE UA mg/dl Negative   KETONES UA mg/dl Negative   BLOOD UA  Small*   PROTEIN UA mg/dl 30 (1+)*   NITRITE UA  Positive*   BILIRUBIN UA  Negative   UROBILINOGEN UA E U /dl 0 2   LEUKOCYTES UA  Large*   WBC UA /hpf Innumerable*   RBC UA /hpf 2-4   BACTERIA UA /hpf Moderate*   EPITHELIAL CELLS WET PREP /hpf Occasional     Results from last 7 days   Lab Units 03/10/21  1318   INFLUENZA A PCR  Negative   INFLUENZA B PCR  Negative   RSV PCR  Negative     Results from last 7 days   Lab Units 03/10/21  1416 03/10/21  1319   BLOOD CULTURE   --  No Growth After 4 Days    Lactococcus garvieae*   GRAM STAIN RESULT   --  Gram positive cocci in chains*   URINE CULTURE  >100,000 cfu/ml Citrobacter freundii*  80,000-89,000 cfu/ml Enterococcus faecalis*  --        Vital Signs:   03/15/21 07:41:51  97 °F (36 1 °C)Abnormal   61  16  141/86  104  91 %  --  --  Nasal cannula  Lying   03/14/21 20:27:55  98 3 °F (36 8 °C)  64  16  138/91  107  94 %  32  3 L/min  Nasal cannula  Lying   03/14/21 15:46:51  97 3 °F (36 3 °C)Abnormal   61  20  140/58  85  91 %  32  3 L/min   Nasal cannula         Medications:   Scheduled Medications:  amLODIPine, 5 mg, Oral, Daily  amoxicillin, 500 mg, Oral, Q12H [facility]  carvedilol, 12 5 mg, Oral, BID With Meals  collagenase, , Topical, Daily  hydrALAZINE, 50 mg, Oral, Q8H NATASHA  isosorbide dinitrate, 10 mg, Oral, TID after meals  levothyroxine, 25 mcg, Oral, Early Morning  saccharomyces boulardii, 250 mg, Oral, BID    cefepime (MAXIPIME) 1,000 mg in dextrose 5 % 50 mL IVPB   Dose: 1,000 mg  Freq: Every 12 hours Route: IV  Last Dose: Stopped (03/14/21 1618)  Start: 03/10/21 2100 End: 03/14/21 1610    furosemide (LASIX) injection 80 mg   Dose: 80 mg  Freq: 2 times daily (diuretic) Route: IV  Start: 03/13/21 1100 End: 03/14/21 1013    potassium chloride (K-DUR,KLOR-CON) CR tablet 20 mEq   Dose: 20 mEq  Freq: Once Route: PO  Start: 03/15/21 0745 End: 03/15/21 0832    Continuous IV Infusions: none     PRN Meds:  acetaminophen, 650 mg, Oral, Q6H PRN  senna, 8 6 mg, Oral, HS PRN        Discharge Plan: To be determined  Network Utilization Review Department  ATTENTION: Please call with any questions or concerns to [phone] and carefully listen to the prompts so that you are directed to the right person  All voicemails are confidential   Taiwo Pedersen all requests for admission clinical reviews, approved or denied determinations and any other requests to dedicated fax number below belonging to the campus where the patient is receiving treatment   List of dedicated fax numbers for the Facilities:  [address] DENIALS (Administrative/Medical Necessity) [phone]   [address] (Maternity/NICU/Pediatrics) [phone]   [address] Lukas Hagan 1095 (  Blaze James "Yuki" 103) [address] [place] [address] [address] Piyush [address] [facility]) [address] [place] [address] 66 [facility] [phone]

## 2021-03-15 NOTE — PHYSICAL THERAPY NOTE
Physical Therapy Progress Note     03/15/21 1238   PT Last Visit   PT Visit Date 03/15/21   Note Type   Note Type Treatment   Pain Assessment   Pain Assessment Tool FLACC   Pain Rating: FLACC (Rest) - Face 0   Pain Rating: FLACC (Rest) - Legs 0   Pain Rating: FLACC (Rest) - Activity 0   Pain Rating: FLACC (Rest) - Cry 0   Pain Rating: FLACC (Rest) - Consolability 0   Score: FLACC (Rest) 0   Pain Rating: FLACC (Activity) - Face 1   Pain Rating: FLACC (Activity) - Legs 1   Pain Rating: FLACC (Activity) - Activity 0   Pain Rating: FLACC (Activity) - Cry 0   Pain Rating: FLACC (Activity) - Consolability 2   Score: FLACC (Activity) 4   Restrictions/Precautions   Weight Bearing Precautions Per Order Yes   RLE Weight Bearing Per Order WBAT   LLE Weight Bearing Per Order WBAT   Other Precautions Chair Alarm; Bed Alarm; Fall Risk;Cognitive;Telemetry;Multiple lines   General   Chart Reviewed Yes   Response to Previous Treatment Patient unable to report, no changes reported from family or staff   Family/Caregiver Present Yes  (pt's spouse present during treatment session )   Subjective   Subjective "Leave me alone "   Bed Mobility   Rolling R 2  Maximal assistance   Additional items Assist x 2;HOB elevated; Bedrails;Leg ; Increased time required;Verbal cues;LE management   Rolling L 2  Maximal assistance   Additional items Assist x 2;Bedrails;Leg ; Increased time required;LE management;Verbal cues   Supine to Sit 2  Maximal assistance   Additional items Assist x 2;Bedrails;Leg ; Increased time required;LE management;Verbal cues   Sit to Supine 2  Maximal assistance   Additional items Assist x 2;Bedrails;Leg ; Increased time required;Verbal cues;LE management   Transfers   Sit to Stand   (Pt  refusing)   Endurance Deficit   Endurance Deficit Yes   Endurance Deficit Description fatigue/weakness/cognition   Activity Tolerance   Activity Tolerance Patient limited by fatigue; Other (Comment); Treatment limited secondary to agitation  (cognition)   Medical Staff Made Aware Brandon Campoverde Rubia 79    Nurse Made Aware Yes   Exercises   THR Sitting;5 reps;AAROM; Bilateral   Assessment   Prognosis Guarded   Problem List Decreased strength;Decreased range of motion;Decreased endurance; Impaired balance;Decreased mobility; Decreased cognition; Impaired judgement;Decreased safety awareness;Decreased skin integrity   Assessment Pt  seated at EOB with OTR present upon my arrival  Pt  able to sit at EOB for additional time with no noted LOB  Attempted standing trial this treatment session, however pt  refusing at this time  Pt  able to perform limited HEP seated at EOB  Returned to supine in bed with A of 2, pt  resistive to movement with increased agitation noted  Pt  remained supine in bed with alarm active at end of treatment session  PT will continue to recommend d/c to rehab when medically stable for continued improvement of noted impairments  Barriers to Discharge Other (Comment)   Barriers to Discharge Comments medical status   Goals   Patient Goals To be left alone  STG Expiration Date 03/22/21   PT Treatment Day 2   Plan   Treatment/Interventions Functional transfer training;LE strengthening/ROM; Therapeutic exercise; Endurance training;Bed mobility;Spoke to nursing;Spoke to case management;OT;Family   Progress Slow progress, cognitive deficits   PT Frequency 2-3x/wk   Recommendation   PT Discharge Recommendation Post-Acute Rehabilitation Services   PT - OK to Discharge Yes  (if d/c to rehab when medically stable  )   AM-PAC Basic Mobility Inpatient   Turning in Bed Without Bedrails 1   Lying on Back to Sitting on Edge of Flat Bed 1   Moving Bed to Chair 1   Standing Up From Chair 1   Walk in Room 1   Climb 3-5 Stairs 1   Basic Mobility Inpatient Raw Score 6   Turning Head Towards Sound 4   Follow Simple Instructions 3   Low Function Basic Mobility Raw Score 13   Low Function Basic Mobility Standardized Score 20 14     Diamond Krishna PTA

## 2021-03-15 NOTE — ASSESSMENT & PLAN NOTE
Patient has a history of essential hypertension  Continue Norvasc 5 mg daily, Coreg 12 5 mg b i d , hydralazine 50 mg q 8 hours, Isordil 10 mg t i d   Blood pressure adequately controlled

## 2021-03-15 NOTE — PROGRESS NOTES
NEPHROLOGY PROGRESS NOTE   Walter Krause 80 y o  female MRN: [id number]  Unit/Bed#: [address] 2 [id number] Encounter: [id number]  Reason for Consult: LILIAN on CKD, vs progression of chronic disease    PLAN:  -creatinine peaking at 3 2 on 03/14  Diuretics held last evening and again this morning  Continue to hold further diuretics  Reassess daily for diuretic needs  -palliative care/hospice consulted for goals of care discussion  -UTI, treating with antibiotics  -hypokalemia, replacing  Normal magnesium level   -remains with poor oral intake and decreased appetite  ASSESSMENT/PLAN:  LILIAN on CKD, vs progression of chronic disease:  Suspected secondary to cardiorenal syndrome, presenting with volume overload  History of atrophic left kidney   -baseline creatinine 1 8-2 1   -presented with creatinine of 2 83 upon admission   -creatinine unfortunately rising to peak of 3 2 on 3/14   -previously on furosemide 80 mg IV b i d  And intermittent metolazone  Diuretics held last evening and this morning   -most recent UA showed small blood, nitrates, large leukocytes, +1 protein, 2-4 RBCs, innumerable WBCs, moderate bacteria   -hospice consulted for goals of care discussion   -continue to avoid nephrotoxins, hypotension, IV contrast   -I/O     UTI:  Treating with antibiotics  Right-sided hydronephrosis:  History of right ureteral stent  -repeat renal ultrasound shows mild residual hydronephrosis  Hypertension:  Blood pressure overall acceptable   -continues on amlodipine 5 mg daily, carvedilol 12 5mg b i d , hydralazine 50 mg t i d , isosorbide 10 mg t i d  -recommend holding parameters for systolic blood pressure less than 130    -avoid hypotension or high fluctuations in blood pressure  Diastolic heart failure:   Most recent echo showed EF of 50% with grade 2 diastolic dysfunction   -chest x-ray upon admission showed mild-to-moderate congestive changes with bilateral effusion   -diuretics held last evening and today   -home diuretics: Torsemide 20 mg daily  -previously on furosemide 80 mg IV b i d  With intermittent metolazone dosing   -continue checking daily weights, fluid restriction, I/O  Chronic hypoxic respiratory failure: On chronic O2 at baseline  Suspected pneumonia:  -continues on antibiotics  Severe PVD with bilateral lower extremity wounds:  No surgical intervention at this time  Hyperphosphatemia:  -most recent phosphorus 4 4  Hypokalemia:  Magnesium level noted to be normal   -continue to monitor and replace as needed  Disposition:  Requiring additional stay due to medical needs  SUBJECTIVE:  The patient is resting in bed  Denies chest pain or shortness of breath  She is shaking her head yes or no to my questions  The patient has poor appetite      OBJECTIVE:  Current Weight: Weight - Scale: 59 9 kg (132 lb 0 9 oz)  Vitals:    03/14/21 1546 03/14/21 2027 03/15/21 0600 03/15/21 0741   BP: 140/58 138/91  141/86   BP Location: Right arm Right arm  Right arm   Pulse: 61 64  61   Resp: 20 16  16   Temp: (!) 97 3 °F (36 3 °C) 98 3 °F (36 8 °C)  (!) 97 °F (36 1 °C)   TempSrc: Oral Axillary  Temporal   SpO2: 91% 94%  91%   Weight:   59 9 kg (132 lb 0 9 oz)        Intake/Output Summary (Last 24 hours) at 3/15/2021 1135  Last data filed at 3/15/2021 1543  Gross per 24 hour   Intake 200 ml   Output 1051 ml   Net -851 ml     General: NAD, thin, frail  Skin: warm, dry, intact, no rash, necrotic toes, right foot wrapped, left shin wrapped  HEENT: Moist mucous membranes, sclera anicteric, normocephalic, atraumatic  Neck: No apparent JVD appreciated  Chest: lung sounds decreased B/L, on O2  CVS:Regular rate and rhythm, no murmer   Abdomen: Soft, round, non-tender, +BS  Extremities: No B/L LE edema present  Neuro: alert and oriented  Psych: appropriate mood and affect     Medications:    Current Facility-Administered Medications:     acetaminophen (TYLENOL) tablet 650 mg, 650 mg, Oral, Q6H PRN, Gladis Andrews MD    amLODIPine (NORVASC) tablet 5 mg, 5 mg, Oral, Daily, OTIS Cole, 5 mg at 03/15/21 0825    amoxicillin (AMOXIL) capsule 500 mg, 500 mg, Oral, Q12H [facility], June Leiva MD, 500 mg at 03/15/21 0824    carvedilol (COREG) tablet 12 5 mg, 12 5 mg, Oral, BID With Meals, OTIS Cole, 12 5 mg at 03/15/21 9580    collagenase (SANTYL) ointment, , Topical, Daily, June Leiva MD, 1 application at 33/76/16 0830    hydrALAZINE (APRESOLINE) tablet 50 mg, 50 mg, Oral, Q8H NATASHA, OTIS Cole, 50 mg at 03/14/21 2113    isosorbide dinitrate (ISORDIL) tablet 10 mg, 10 mg, Oral, TID after meals, Gladis Andrews MD, 10 mg at 03/15/21 9878    levothyroxine tablet 25 mcg, 25 mcg, Oral, Early Morning, Gladis Andrews MD, 25 mcg at 03/14/21 0504    saccharomyces boulardii (FLORASTOR) capsule 250 mg, 250 mg, Oral, BID, Gladis Andrews MD, 250 mg at 03/15/21 0825    senna (SENOKOT) tablet 8 6 mg, 8 6 mg, Oral, HS PRN, Gladis Andrews MD    Laboratory Results:  Results from last 7 days   Lab Units 03/15/21  0456 03/14/21  0434 03/14/21  0430 03/13/21  0450  03/11/21  0444 03/10/21  1318   WBC Thousand/uL  --   --  5 66 5 70  --  5 83 6 96   HEMOGLOBIN g/dL  --   --  8 1* 7 9*  --  7 5* 7 3*   HEMATOCRIT %  --   --  26 9* 27 0*  --  25 7* 24 9*   PLATELETS Thousands/uL  --   --  178 182  --  172 196   SODIUM mmol/L 141 139  --  140   < >  --  142   POTASSIUM mmol/L 3 3* 3 8  --  3 3*   < >  --  4 6   CHLORIDE mmol/L 98* 102  --  102   < >  --  107   CO2 mmol/L 32 32  --  28   < >  --  26   BUN mg/dL 60* 62*  --  57*   < >  --  58*   CREATININE mg/dL 3 16* 3 24*  --  2 87*   < >  --  2 83*   CALCIUM mg/dL 8 7 8 3  --  8 4   < >  --  8 2*   MAGNESIUM mg/dL 2 1 2 1  --  2 1  --   --   --    PHOSPHORUS mg/dL 4 4*  --   --  4 8*  --   --   --    ALK PHOS U/L  --   --   --   --   --   --  65   ALT U/L  --   --   --   --   --   --  20   AST U/L  --   --   --   --   --   --  20    < > = values in this interval not displayed

## 2021-03-15 NOTE — ASSESSMENT & PLAN NOTE
Patient with a history of anemia   Stable  No indication for transfusion at this time    Hemoglobin ranging 7-8    Recent Labs     03/13/21  0450 03/14/21  0430   HGB 7 9* 8 1*   MCV 91 89   RDW 18 2* 18 4*

## 2021-03-15 NOTE — ASSESSMENT & PLAN NOTE
Patient has a history of chronic kidney disease stage 3  Baseline creatinine ranges 1 8-2 1  She presented with acute kidney injury with a creatinine of 2 83, that peaked at 3 2 on 03/14  Patient was evaluated by the nephrology team   Her acute kidney injury was felt to be cardiorenal syndrome, as well as previous IV diuretics with Lasix, and p o  metolazone  Patient has a history of atrophic left kidney  Patient also has a history of previous right ureteral stent and residual mild hydronephrosis  Continue to hold diuretics  Creatinine slightly improved to 3 16  Urinary retention protocol    Recent Labs     03/13/21  0450 03/14/21  0434 03/15/21  0456   BUN 57* 62* 60*   CREATININE 2 87* 3 24* 3 16*   EGFR 14 12 12       Results from last 7 days   Lab Units 03/10/21  1416   BLOOD UA  Small*   PROTEIN UA mg/dl 30 (1+)*

## 2021-03-15 NOTE — ASSESSMENT & PLAN NOTE
Patient with history of chronic respiratory failure with hypoxia  Prior to admission she was on 3 L nasal cannula  Continue oxygen titrate as needed   Currently with adequate oxygenation on her home 3 L

## 2021-03-15 NOTE — PLAN OF CARE
Problem: OCCUPATIONAL THERAPY ADULT  Goal: Performs self-care activities at highest level of function for planned discharge setting  See evaluation for individualized goals  Description: Treatment Interventions: ADL retraining, Functional transfer training, UE strengthening/ROM, Endurance training, Cognitive reorientation, Patient/family training, Equipment evaluation/education, Compensatory technique education, Continued evaluation          See flowsheet documentation for full assessment, interventions and recommendations  Note: Limitation: Decreased ADL status, Decreased UE ROM, Decreased UE strength, Decreased Safe judgement during ADL, Decreased cognition, Decreased endurance, Visual deficit, Decreased high-level ADLs  Prognosis: Fair  Assessment: Pt seen for 55 min tx session with focus on functional balance, functional mobility, ADL status, transfer safety, and cognition  Pt able to tolerate EOB sitting(sitting tolerance on EOB to 12-15mins)  Pt required heavy assistance with all functional mobility tasks  Attempted to encourage(several attempts made)OOB mobility, but pt refusing  Noted cognitive deficits(i e orientation, memory)   present during tx session; pt with noted confusion with family history(i e 's name, number of yrs )  Pt demonstrated dependency with LE ADLs  Pt resistive to any b/l UE AROM exercises  Will attempt to continued tx when pt is cooperative        OT Discharge Recommendation: Post-Acute Rehabilitation Services

## 2021-03-15 NOTE — ASSESSMENT & PLAN NOTE
Wt Readings from Last 3 Encounters:   03/15/21 59 9 kg (132 lb 0 9 oz)   03/07/21 61 6 kg (135 lb 12 9 oz)   02/03/21 75 1 kg (165 lb 9 1 oz)     Initially presented with an acute exacerbation of her chronic diastolic congestive heart failure  Prior to admission she was on Demadex 20 mg daily  Was initially diuresed with IV Lasix 80 mg IV b i d  however developed subsequent acute kidney injury, and diuretics on hold  Currently hypovolemic, with poor p o  intake  Weight decreased 1 3 kilos since admission and net -3 L  Monitor off diuretics

## 2021-03-16 LAB
ANION GAP SERPL CALCULATED.3IONS-SCNC: 8 MMOL/L (ref 4–13)
BASOPHILS # BLD AUTO: 0.03 THOUSANDS/ΜL (ref 0–0.1)
BASOPHILS NFR BLD AUTO: 1 % (ref 0–1)
BUN SERPL-MCNC: 58 MG/DL (ref 5–25)
CALCIUM SERPL-MCNC: 8.5 MG/DL (ref 8.3–10.1)
CHLORIDE SERPL-SCNC: 99 MMOL/L (ref 100–108)
CO2 SERPL-SCNC: 35 MMOL/L (ref 21–32)
CREAT SERPL-MCNC: 3.4 MG/DL (ref 0.6–1.3)
EOSINOPHIL # BLD AUTO: 0.31 THOUSAND/ΜL (ref 0–0.61)
EOSINOPHIL NFR BLD AUTO: 5 % (ref 0–6)
ERYTHROCYTE [DISTWIDTH] IN BLOOD BY AUTOMATED COUNT: 18.2 % (ref 11.6–15.1)
GFR SERPL CREATININE-BSD FRML MDRD: 11 ML/MIN/1.73SQ M
GLUCOSE SERPL-MCNC: 72 MG/DL (ref 65–140)
HCT VFR BLD AUTO: 28.9 % (ref 34.8–46.1)
HGB BLD-MCNC: 8.8 G/DL (ref 11.5–15.4)
IMM GRANULOCYTES # BLD AUTO: 0.02 THOUSAND/UL (ref 0–0.2)
IMM GRANULOCYTES NFR BLD AUTO: 0 % (ref 0–2)
INR PPP: 3.02 (ref 0.84–1.19)
LYMPHOCYTES # BLD AUTO: 3.16 THOUSANDS/ΜL (ref 0.6–4.47)
LYMPHOCYTES NFR BLD AUTO: 53 % (ref 14–44)
MCH RBC QN AUTO: 27 PG (ref 26.8–34.3)
MCHC RBC AUTO-ENTMCNC: 30.4 G/DL (ref 31.4–37.4)
MCV RBC AUTO: 89 FL (ref 82–98)
MONOCYTES # BLD AUTO: 0.51 THOUSAND/ΜL (ref 0.17–1.22)
MONOCYTES NFR BLD AUTO: 9 % (ref 4–12)
NEUTROPHILS # BLD AUTO: 1.87 THOUSANDS/ΜL (ref 1.85–7.62)
NEUTS SEG NFR BLD AUTO: 32 % (ref 43–75)
NRBC BLD AUTO-RTO: 0 /100 WBCS
PLATELET # BLD AUTO: 184 THOUSANDS/UL (ref 149–390)
PMV BLD AUTO: 10.7 FL (ref 8.9–12.7)
POTASSIUM SERPL-SCNC: 3.5 MMOL/L (ref 3.5–5.3)
PROTHROMBIN TIME: 30.6 SECONDS (ref 11.6–14.5)
RBC # BLD AUTO: 3.26 MILLION/UL (ref 3.81–5.12)
SODIUM SERPL-SCNC: 142 MMOL/L (ref 136–145)
WBC # BLD AUTO: 5.9 THOUSAND/UL (ref 4.31–10.16)

## 2021-03-16 PROCEDURE — 99232 SBSQ HOSP IP/OBS MODERATE 35: CPT | Performed by: INTERNAL MEDICINE

## 2021-03-16 PROCEDURE — 80048 BASIC METABOLIC PNL TOTAL CA: CPT | Performed by: INTERNAL MEDICINE

## 2021-03-16 PROCEDURE — 85025 COMPLETE CBC W/AUTO DIFF WBC: CPT | Performed by: INTERNAL MEDICINE

## 2021-03-16 PROCEDURE — 85610 PROTHROMBIN TIME: CPT | Performed by: INTERNAL MEDICINE

## 2021-03-16 RX ORDER — WARFARIN SODIUM 2 MG/1
2 TABLET ORAL
Status: DISCONTINUED | OUTPATIENT
Start: 2021-03-17 | End: 2021-03-22 | Stop reason: HOSPADM

## 2021-03-16 RX ORDER — OXYCODONE HYDROCHLORIDE 5 MG/1
2.5 TABLET ORAL EVERY 4 HOURS PRN
Status: DISCONTINUED | OUTPATIENT
Start: 2021-03-16 | End: 2021-03-22 | Stop reason: HOSPADM

## 2021-03-16 RX ORDER — SODIUM CHLORIDE, SODIUM GLUCONATE, SODIUM ACETATE, POTASSIUM CHLORIDE, MAGNESIUM CHLORIDE, SODIUM PHOSPHATE, DIBASIC, AND POTASSIUM PHOSPHATE .53; .5; .37; .037; .03; .012; .00082 G/100ML; G/100ML; G/100ML; G/100ML; G/100ML; G/100ML; G/100ML
500 INJECTION, SOLUTION INTRAVENOUS ONCE
Status: COMPLETED | OUTPATIENT
Start: 2021-03-16 | End: 2021-03-16

## 2021-03-16 RX ADMIN — AMOXICILLIN 500 MG: 500 CAPSULE ORAL at 08:12

## 2021-03-16 RX ADMIN — CARVEDILOL 12.5 MG: 6.25 TABLET, FILM COATED ORAL at 08:12

## 2021-03-16 RX ADMIN — ISOSORBIDE DINITRATE 10 MG: 10 TABLET ORAL at 08:12

## 2021-03-16 RX ADMIN — HYDRALAZINE HYDROCHLORIDE 50 MG: 25 TABLET ORAL at 21:19

## 2021-03-16 RX ADMIN — COLLAGENASE SANTYL: 250 OINTMENT TOPICAL at 08:29

## 2021-03-16 RX ADMIN — SODIUM CHLORIDE, SODIUM GLUCONATE, SODIUM ACETATE, POTASSIUM CHLORIDE, MAGNESIUM CHLORIDE, SODIUM PHOSPHATE, DIBASIC, AND POTASSIUM PHOSPHATE 500 ML: .53; .5; .37; .037; .03; .012; .00082 INJECTION, SOLUTION INTRAVENOUS at 11:04

## 2021-03-16 RX ADMIN — Medication 250 MG: at 08:12

## 2021-03-16 RX ADMIN — AMOXICILLIN 500 MG: 500 CAPSULE ORAL at 21:19

## 2021-03-16 RX ADMIN — AMLODIPINE BESYLATE 5 MG: 5 TABLET ORAL at 08:12

## 2021-03-16 NOTE — ASSESSMENT & PLAN NOTE
Patient was diagnosed with a UTI  Has a residual right ureteral stent which has migrated up to the right renal pelvis on ultrasound  Urine cultures revealed greater than 100,000 Citrobacter, and [id number] Enterococcus  Patient completed 5 days of cefepime with a Citrobacter  Currently on amoxicillin to continue treatment for the Enterococcus  Concurrent blood culture revealed 1 set with lactococcus garviae

## 2021-03-16 NOTE — ASSESSMENT & PLAN NOTE
Patient has a history of a DVT, on chronic anticoagulation with Coumadin  INR supratherapeutic so Coumadin temporarily on hold  INR improved:   Will restart Coumadin at 2 mg daily and monitor INR closely due to current antibiotic use

## 2021-03-16 NOTE — ASSESSMENT & PLAN NOTE
Patient has a history of chronic kidney disease stage 3  Baseline creatinine ranges 1 8-2 1  She presented with acute kidney injury with a creatinine of 2 83, that peaked at 3 2 on 03/14  Patient was evaluated by the nephrology team   Her acute kidney injury was felt to be cardiorenal syndrome, as well as previous IV diuretics with Lasix, and p o  metolazone  Patient has a history of atrophic left kidney  Patient also has a history of previous right ureteral stent and residual mild hydronephrosis  Continue to hold diuretics  Creatinine worsened to 3 4  Urinary retention protocol    Recent Labs     03/14/21  0434 03/15/21  0456 03/16/21  0454   BUN 62* 60* 58*   CREATININE 3 24* 3 16* 3 40*   EGFR 12 12 11       Results from last 7 days   Lab Units 03/10/21  1416   BLOOD UA  Small*   PROTEIN UA mg/dl 30 (1+)*

## 2021-03-16 NOTE — PROGRESS NOTES
[address]  Progress Note Lucille Elizabeth 5/18/1930, 80 y o  female MRN: [id number]  Unit/Bed#: [place] [facility] 2 -01 Encounter: [id number]  Primary Care Provider: Brenda Cook MD   Date and time admitted to hospital: 3/10/2021 12:45 PM    * Acute metabolic encephalopathy  Assessment & Plan  80year old female admitted due to acute metabolic encephalopathy  Metabolic encephalopathy felt to be multifactorial, secondary to acute kidney injury, congestive heart failure, as well as UTI  Initially there were concerns for possible healthcare acquired pneumonia, however her chest x-ray did not have any focal infiltrates in her procalcitonin was unremarkable, which ruled out pneumonia or lower respiratory tract infection  Patient was diagnosed with a UTI and treated with antibiotics  Continue supportive care,    UTI (urinary tract infection)  Assessment & Plan  Patient was diagnosed with a UTI  Has a residual right ureteral stent which has migrated up to the right renal pelvis on ultrasound  Urine cultures revealed greater than 100,000 Citrobacter, and [id number] Enterococcus  Patient completed 5 days of cefepime with a Citrobacter  Currently on amoxicillin day #3 to continue treatment for the Enterococcus  Concurrent blood culture revealed 1 set with lactococcus garviae        Acute on chronic diastolic congestive heart failure (HCC)  Assessment & Plan  Wt Readings from Last 3 Encounters:   03/15/21 59 9 kg (132 lb 0 9 oz)   03/07/21 61 6 kg (135 lb 12 9 oz)   02/03/21 75 1 kg (165 lb 9 1 oz)     Initially presented with an acute exacerbation of her chronic diastolic congestive heart failure  Prior to admission she was on Demadex 20 mg daily  Was initially diuresed with IV Lasix 80 mg IV b i d  however developed subsequent acute kidney injury, and diuretics on hold  Currently hypovolemic, with poor p o  intake  Weight decreased 1 3 kilos since admission and net -3 9 L  Monitor off diuretics      Acute kidney injury superimposed on chronic kidney disease [facility])  Assessment & Plan  Patient has a history of chronic kidney disease stage 3  Baseline creatinine ranges 1 8-2 1  She presented with acute kidney injury with a creatinine of 2 83, that peaked at 3 2 on 03/14  Patient was evaluated by the nephrology team   Her acute kidney injury was felt to be cardiorenal syndrome, as well as previous IV diuretics with Lasix, and p o  metolazone  Patient has a history of atrophic left kidney  Patient also has a history of previous right ureteral stent and residual mild hydronephrosis  Continue to hold diuretics  Creatinine worsened to 3 4  Urinary retention protocol    Recent Labs     03/14/21  0434 03/15/21  0456 03/16/21  0454   BUN 62* 60* 58*   CREATININE 3 24* 3 16* 3 40*   EGFR 12 12 11       Results from last 7 days   Lab Units 03/10/21  1416   BLOOD UA  Small*   PROTEIN UA mg/dl 30 (1+)*         DVT (deep venous thrombosis) ([facility])  Assessment & Plan  Patient has a history of a DVT, on chronic anticoagulation with Coumadin  INR supratherapeutic so Coumadin temporarily on hold  INR improved:   Will restart Coumadin at 2 mg daily once INR is less than 3 0 and monitor INR closely due to current antibiotic use    Wounds and injuries  Assessment & Plan  Patient was evaluated by the wound care team  She is being treated for:  1-present on admission:  Evolving deep tissue injury to sacrum, suspected stage 3-4 unstageable  2-right hip with small amount of brown eschar, with surrounding erythema/induration  3-dry gangrene right toes  4-pressure ulceration posterior right heel, present on admission  5-venous ulceration posterior left leg  Continue wound care per Podiatry and Wound Care team  Will add analgesics as patient appears to be having pain    Chronic respiratory failure with hypoxia [facility])  Assessment & Plan  Patient with history of chronic respiratory failure with hypoxia  Prior to admission she was on 3 L nasal cannula  Continue oxygen titrate as needed   Currently with adequate oxygenation on her home 3 L    Iron deficiency anemia secondary to inadequate dietary iron intake  Assessment & Plan  Patient with a history of anemia   Stable  No indication for transfusion at this time  Hemoglobin ranging 7-8    Recent Labs     03/14/21  0430 03/16/21  0454   HGB 8 1* 8 8*   MCV 89 89   RDW 18 4* 18 2*         Essential hypertension  Assessment & Plan  Patient has a history of essential hypertension  Continue Norvasc 5 mg daily, Coreg 12 5 mg b i d , hydralazine 50 mg q 8 hours, Isordil 10 mg t i d   Blood pressure adequately controlled    PAD (peripheral artery disease) (HCC)  Assessment & Plan  -Patient has a history of peripheral arterial disease and was previously evaluated by vascular surgery and Podiatry during prior admission  -LEADs significant for occlusion versus high grade stenosis of the proximal thigh portion of the superficial femoral artery with distal reconstitution   - Revascularization not pursued previously due to CKD and concern for progression to renal failure   -during her admission January 2021 patient was diagnosed with dry gangrene of the right foot, with a right pretibial ulcer:  Patient declined any invasive intervention at that time  -patient was re-evaluated by Podiatry during this current admission and diagnosed with dry gangrene to her right foot, venous ulceration posterior left leg, and pressure ulceration to right posterior heel  -continue palliative wound care      Recent Labs     03/15/21  0456 03/16/21  0454   INR 3 01* 3 02*         Hydronephrosis of right kidney  Assessment & Plan  -patient had right ureteral stent placed at [facility] 12/2  -current ultrasound reveals that the stent has migrated into the renal pelvis  -she will need follow-up with urology at [facility]            Family:  Updated  at bedside       Disposition:  Patient has declined intervention for her peripheral arterial disease and gangrene of her feet on multiple subsequent hospitalizations  She has significant decline in function since she 1st presented for medical care last fall   notes that he does not feel that he and his family can care for her as she is currently requiring 24 hour nursing care  Patient is hospice appropriate  Patient's  is considering future disposition options  Will continue to coordinate with him and the palliative care team    rubio pts nurse  rubio   rubio Garcia    VTE Pharmacologic Prophylaxis: Warfarin (Coumadin)  VTE Mechanical Prophylaxis: sequential compression device        Certification Statement: The patient will continue to require additional inpatient hospital stay due to need for further acute intervention for acute kidney injury    Status: inpatient     ===================================================================    Subjective:  Patient currently complaining of pain  Indicates pain is in her backside  She is also currently passing a bowel movement  Patient declines repositioning, returning her on her side  She does not communicate responses to any other questions  She is moaning let me go      Physical Exam:   Temp:  [97 6 °F (36 4 °C)-97 9 °F (36 6 °C)] 97 9 °F (36 6 °C)  HR:  [66-74] 74  Resp:  [17-19] 18  BP: (144-153)/(63-85) 153/64    Gen:   non-tachypnic, non-dyspnic  Appears uncomfortable  Lying right lateral decubitus position  Heart: regular rate and rhythm, S1S2 present, no murmur, rub or gallop  Lungs: clear to ausculatation bilaterally  No wheezing, crackles, or rhonchi  No accessory muscle use or respiratory distress  Abd: soft, non-tender, non-distended  NABS, no guarding, rebound or peritoneal signs  Extremities: no clubbing, cyanosis or edema  Neuro: awake, alert  Makes eye contact  Moaning let me go    Does not cooperate with exam   Does not communicate any other responses  LABS:   Results from last 7 days   Lab Units 03/16/21  0454 03/14/21  0430 03/13/21  0450   WBC Thousand/uL 5 90 5 66 5 70   HEMOGLOBIN g/dL 8 8* 8 1* 7 9*   HEMATOCRIT % 28 9* 26 9* 27 0*   PLATELETS Thousands/uL 184 178 182     Results from last 7 days   Lab Units 03/16/21  0454 03/15/21  0456 03/14/21  0434   POTASSIUM mmol/L 3 5 3 3* 3 8   CHLORIDE mmol/L 99* 98* 102   CO2 mmol/L 35* 32 32   BUN mg/dL 58* 60* 62*   CREATININE mg/dL 3 40* 3 16* 3 24*   CALCIUM mg/dL 8 5 8 7 8 3       Hospital Data:    3/12 ultrasound kidney/bladder  Limited study   No right hydronephrosis status post ureteral stent placement  Echogenic ureteral stent identified in the renal pelvis       3/10 chest x-ray  Mild to moderate congestive changes   Small right and moderate left effusion   Left lung base atelectasis or infiltrate     3/7 CT head  No acute intracranial abnormality     Microbiology  3/10 urine culture:  Greater than 100,000 Citrobacter freundii, sensitive to ceftriaxone  [id number] Enterococcus faecalis sensitive to ampicillin  3/10 blood cultures:  No growth x1, lactococcus garvieae x 1  3/10 negative COVID, influenza, RSV        ---------------------------------------------------------------------------------------------------------------  This note has been constructed using a voice recognition system

## 2021-03-16 NOTE — ASSESSMENT & PLAN NOTE
Patient was evaluated by the wound care team  She is being treated for:  1-present on admission:  Evolving deep tissue injury to sacrum, suspected stage 3-4 unstageable  2-right hip with small amount of brown eschar, with surrounding erythema/induration  3-dry gangrene right toes  4-pressure ulceration posterior right heel, present on admission  5-venous ulceration posterior left leg  Continue wound care per Podiatry and Wound Care team

## 2021-03-16 NOTE — PROGRESS NOTES
NEPHROLOGY PROGRESS NOTE   Yuri Bowman 80 y o  female MRN: [id number]  Unit/Bed#: [place] [address] 213-01 Encounter: [id number]  Reason for Consult: LILIAN on CKD, vs progression of chronic disease    PLAN:   -creatinine unfortunately continues to rise   -will give 500 cc of isolate today and monitor for response   -diuretics held since evening of  3/14   -ongoing discussions with palliative care in regards to goals of care  ASSESSMENT/PLAN:  LILIAN on CKD, vs progression of chronic disease:  Suspected secondary to cardiorenal syndrome, presenting with volume overload  History of atrophic left kidney   -baseline creatinine 1 8-2 1   -presented with creatinine of 2 83 upon admission   -creatinine unfortunately continues to rise   -will give 500 cc of isolate today   -previously on furosemide 80 mg IV b i d  And intermittent metolazone  diuretics held since evening of 3/14   -most recent UA showed small blood, nitrates, large leukocytes, +1 protein, 2-4 RBCs, innumerable WBCs, moderate bacteria   -hospice following for goals of care discussion   -continue to avoid nephrotoxins, hypotension, IV contrast   -I/O      UTI:  Treating with antibiotics      Right-sided hydronephrosis:  History of right ureteral stent  -repeat renal ultrasound shows mild residual hydronephrosis        Hypertension:  Blood pressure overall acceptable   -continues on amlodipine 5 mg daily, carvedilol 12 5mg b i d , hydralazine 50 mg t i d , isosorbide 10 mg t i d  -recommend holding parameters for systolic blood pressure less than 130    -avoid hypotension or high fluctuations in blood pressure      Diastolic heart failure: Most recent echo showed EF of 50% with grade 2 diastolic dysfunction   -chest x-ray upon admission showed mild-to-moderate congestive changes with bilateral effusion   -diuretics held last evening and today   -home diuretics: Torsemide 20 mg daily  -previously on furosemide 80 mg IV b i d   With intermittent metolazone dosing   -continue checking daily weights, fluid restriction, I/O      Chronic hypoxic respiratory failure: On chronic O2 at baseline      Suspected pneumonia:  -continues on antibiotics      Severe PVD with bilateral lower extremity wounds:  No surgical intervention at this time      Hyperphosphatemia:  -most recent phosphorus 4 4      Hypokalemia:  Magnesium level noted to be normal   -continue to monitor and replace as needed  Ongoing goals of care discussion with family and palliative care  Will re-evaluate and readdress discussion in a couple days based on patient's status  Disposition:  Requiring additional stay due to medical needs  SUBJECTIVE:  The patient is resting in her bed  She is eating fruit  She is alert but disoriented      OBJECTIVE:  Current Weight: Weight - Scale: 59 9 kg (132 lb 0 9 oz)  Vitals:    03/15/21 0741 03/15/21 1438 03/15/21 1900 03/16/21 0616   BP: 141/86 129/53 144/85 152/63   BP Location: Right arm Left arm     Pulse: 61 63  66   Resp: 16 19 17 19   Temp: (!) 97 °F (36 1 °C) (!) 97 4 °F (36 3 °C) 97 6 °F (36 4 °C)    TempSrc: Temporal Oral     SpO2: 91% 92%  91%   Weight:         No intake or output data in the 24 hours ending 03/16/21 1338  General: NAD, thin, frail  Skin: warm, dry, intact, no rash  HEENT: Moist mucous membranes, sclera anicteric, normocephalic, atraumatic  Neck: No apparent JVD appreciated  Chest: lung sounds clear B/L, on O2  CVS:Regular rate and rhythm, no murmer   Abdomen: Soft, round, non-tender, +BS  Extremities: No B/L LE edema present  Neuro: alert, disoriented  Psych: appropriate mood and affect     Medications:    Current Facility-Administered Medications:     acetaminophen (TYLENOL) tablet 650 mg, 650 mg, Oral, Q6H PRN, Meche Tinajero MD    amLODIPine (NORVASC) tablet 5 mg, 5 mg, Oral, Daily, OTIS Cole, 5 mg at 03/16/21 1644    amoxicillin (AMOXIL) capsule 500 mg, 500 mg, Oral, Q12H [address], Mary Banuelos MD, 500 mg at 03/16/21 5114    carvedilol (COREG) tablet 12 5 mg, 12 5 mg, Oral, BID With Meals, OTIS Cole, 12 5 mg at 03/16/21 7295    collagenase (SANTYL) ointment, , Topical, Daily, David Townsend MD, Given at 03/16/21 2342    hydrALAZINE (APRESOLINE) tablet 50 mg, 50 mg, Oral, Q8H NATASHA, OTIS Cole, 50 mg at 03/14/21 2113    isosorbide dinitrate (ISORDIL) tablet 10 mg, 10 mg, Oral, TID after meals, Dee Dee Mcdonnell MD, 10 mg at 03/16/21 1492    levothyroxine tablet 25 mcg, 25 mcg, Oral, Early Morning, Dee Dee Mcdonnell MD, 25 mcg at 03/14/21 0504    multi-electrolyte (ISOLYTE-S PH 7 4) bolus 500 mL, 500 mL, Intravenous, Once, Kalie Fuentes DO, Last Rate: 50 mL/hr at 03/16/21 1104, 500 mL at 03/16/21 1104    oxyCODONE (ROXICODONE) IR tablet 2 5 mg, 2 5 mg, Oral, Q4H PRN, Liddie Kawasaki, MD    saccharomyces [facility]) capsule 250 mg, 250 mg, Oral, BID, Dee Dee Mcdonnell MD, 250 mg at 03/16/21 9162    senna (SENOKOT) tablet 8 6 mg, 8 6 mg, Oral, HS PRN, Dee Dee Mcdonnell MD    warfarin (COUMADIN) tablet 2 mg, 2 mg, Oral, Daily (warfarin), Liddie Kawasaki, MD    Laboratory Results:  Results from last 7 days   Lab Units 03/16/21  0454 03/15/21  0456 03/14/21  0434 03/14/21  0430 03/13/21  0450  03/10/21  1318   WBC Thousand/uL 5 90  --   --  5 66 5 70   < > 6 96   HEMOGLOBIN g/dL 8 8*  --   --  8 1* 7 9*   < > 7 3*   HEMATOCRIT % 28 9*  --   --  26 9* 27 0*   < > 24 9*   PLATELETS Thousands/uL 184  --   --  178 182   < > 196   SODIUM mmol/L 142 141 139  --  140   < > 142   POTASSIUM mmol/L 3 5 3 3* 3 8  --  3 3*   < > 4 6   CHLORIDE mmol/L 99* 98* 102  --  102   < > 107   CO2 mmol/L 35* 32 32  --  28   < > 26   BUN mg/dL 58* 60* 62*  --  57*   < > 58*   CREATININE mg/dL 3 40* 3 16* 3 24*  --  2 87*   < > 2 83*   CALCIUM mg/dL 8 5 8 7 8 3  --  8 4   < > 8 2*   MAGNESIUM mg/dL  --  2 1 2 1  --  2 1  --   --    PHOSPHORUS mg/dL  --  4 4*  --   --  4 8*  --   --    ALK PHOS U/L  --   --   --   --   --   --  65   ALT U/L  -- --   --   --   --   --  20   AST U/L  --   --   --   --   --   --  20    < > = values in this interval not displayed

## 2021-03-16 NOTE — ASSESSMENT & PLAN NOTE
-Patient has a history of peripheral arterial disease and was previously evaluated by vascular surgery and Podiatry during prior admission  -LEADs significant for occlusion versus high grade stenosis of the proximal thigh portion of the superficial femoral artery with distal reconstitution   - Revascularization not pursued previously due to CKD and concern for progression to renal failure   -during her admission January 2021 patient was diagnosed with dry gangrene of the right foot, with a right pretibial ulcer:  Patient declined any invasive intervention at that time  -patient was re-evaluated by Podiatry during this current admission and diagnosed with dry gangrene to her right foot, venous ulceration posterior left leg, and pressure ulceration to right posterior heel  -continue palliative wound care      Recent Labs     03/15/21  0456 03/16/21  0454   INR 3 01* 3 02*

## 2021-03-16 NOTE — ASSESSMENT & PLAN NOTE
Patient has a history of a DVT, on chronic anticoagulation with Coumadin  INR supratherapeutic so Coumadin temporarily on hold  INR improved:   Will restart Coumadin at 2 mg daily once INR is less than 3 0 and monitor INR closely due to current antibiotic use

## 2021-03-16 NOTE — PROGRESS NOTES
Progress Note - Palliative & Supportive Care  Jayne Ventura  80 y o   female  [address]*   MRN: [id number]  Encounter: [id number]     ASSESSMENT:    Patient Active Problem List   Diagnosis    Acute deep vein thrombosis (DVT) of calf muscle vein of right lower extremity (HCC)    Acute kidney injury superimposed on chronic kidney disease (HCC)    Hydronephrosis of right kidney    Elevated blood urea nitrogen    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 ([address] )    Nonhealing nonsurgical wound    Protein-calorie malnutrition ([address] )    Ulcer of right lower extremity with fat layer exposed ([address] )    Urinary retention    Wound infection    PAD (peripheral artery disease) ([address] )    Essential hypertension    CKD (chronic kidney disease)    History of DVT (deep vein thrombosis)    Acute on chronic diastolic congestive heart failure (HCC)    Cellulitis    Iron deficiency anemia secondary to inadequate dietary iron intake    Chronic diastolic heart failure ([address] )    Ambulatory dysfunction    Chronic respiratory failure with hypoxia (HCC)    Coagulopathy (HCC)    Arterial leg ulcer (HCC)    Acute metabolic encephalopathy    Wounds and injuries    UTI (urinary tract infection)    DVT (deep venous thrombosis) ([address] )       PLAN:    1  Goals:    Evolving  We further discussed overall poor prognosis and likely disease trajectory  We also discussed the difference between "palliative care" and "hospice care"  After much discussion, Jagjit Seay seemed to have a better grasp on what these mean  Though he displayed more insight into her overall decline, he is not quite ready for hospice  He wants to continue current cares but seems very open to further discussing hospice   Patient's  asked a lot of questions to help clarify palliative vs hospice, as he felt that a lot of doctors have been pushing him to do palliative and he couldn't quite understand what this means    o "What exactly is the definition of palliative?"  - Technically, "To Palliate" is to "make a disease/symptom less severe or unpleasant without removing the cause" or to "disguise the seriousness or gravity of (an offense)  In the context of cancer, for example, if someone is stage 1 cancer, they will lilkely not use the word "palliative" because they will offer you treatments with curative intent  However, if you progress despite treatment, your cancer may become level 4 or end stage and they will start using the word "palliate" to say that the treatments are no longer for curative reasons, but to treat/slow down disease to prolong life  They will also start medications to mask the symptoms of progressive cancer - like pain and shortness of breath - but these medications are not to cure the cancer but to disguise its symptoms  So in the context of heart and kidney failure, when the muscles of the heart and kidneys continue to decline, they will start using the word "palliate" to say that we now need to start disguising the symptoms of these disease because the medications meant to treat the heart/kidney failure are no longer working optimally  But palliative care in medicine is a support for patients with progressive medical conditions where we make sure to talk to patients and their families about disease trajectory and prognosis so we can provide the best care for them "  o "So what is the difference between palliative and hospice?"  - "Palliative care means that we will continue all supportive measures and standard of care for both heart and kidney failure/injury with the goal to get her back to or close to her baseline  This may involve PT/rehab  Then she will have to follow closely with her heart and kidney doctors  Should something happen to her acutely at home, she will likely come back to the hospital  Hospice care means that we accept that the treatments are now limited   We, therefore, would rather focus on her quality of life and providing her treatments that will mask the symptoms of these disease like pain and shortness of breath, rather than treatments that will 'fix or improve' her kidney numbers  We will prevent bringing her back to the hospital because we know the treatments are limited  Rather, we will keep her comfortable at home where she will likely pass away "  o "I worry about bringing her home  I do not see how we can possibly help her get up"  - "The generalized weakness and inability to get up from bed anymore are part of the overall clinical decline from 2 organ failure, unfortunately  We are most likely looking at this being her new baseline now  If this is her baseline now, we then shouldn't worry about forcing her to get up if she can't  If she is more comfortable in bed, then you do not have to worry about lifting her up anymore and she can be with you at home "    ADDENDUM: Spoke to daughter separately and discussed case and overall trajectory of heart and kidney failure  Provided her the uptrend of her creatinine  The children are more open to hospice  Daughter is seeing the decline and wonders if they are prolonging the inevitable and allowing her to suffer  I emphasized my recommendation of hospice cares  They worry about their father's acceptance of this and they will talk as a family to see if they can get him to accept hospice  They feel that they can provide hospice cares at home  The children appears willing to provide additional cares when necessary  Code status: Level 3 - DNAR and DNI   Decisional apparatus:  Patient does not have capacity to make medical decisions on my exam today  If such capacity is lost, patient's substitute decision maker would default to  by [place]  Advance Directive / Living Will / POLST:  None on file    3  Prognosis: poor, likely weeks    We appreciate the opportunity to participate in this patient's care  We will continue to follow   Please do not hesitate to contact our on-call provider through our clinic answering service at [phone] should you have acute symptom control concerns  INTERVAL HISTORY:  Chart reviewed  No acute events overnight  Her creatinine continues to worsen  Met with her and  at bedside  Then met with daughter as well separately  Conversation above  Review of Systems   Unable to perform ROS: Mental status change       MEDICATIONS / ALLERGIES:  all current active meds have been reviewed    Allergies   Allergen Reactions    No Known Allergies        OBJECTIVE:  /63   Pulse 66   Temp 97 6 °F (36 4 °C)   Resp 19   Wt 59 9 kg (132 lb 0 9 oz)   LMP  (LMP Unknown)   SpO2 91%   BMI 21 31 kg/m²   Physical Exam:  Constitutional: Appears frail, debilitated, sickly but not toxic looking  In no acute physical or emotional distress  Oropharyngeal: dry mucosa  Head: Normocephalic and atraumatic  Temporal, periorbital and submaxillary muscle wasting   Eyes: EOM are normal  No ocular discharge  No scleral icterus  Neck: No visible adenopathy or masses  Respiratory: Effort normal  No stridor  No respiratory distress  Gastrointestinal: No abdominal distension  Musculoskeletal: No edema  Neurological: Awake, alert, but confused  Skin: Dry, no diaphoresis  Psychiatric: Calm    Lab Results:   I have personally reviewed pertinent labs  Imaging Studies: I have personally reviewed pertinent reports  EKG, Pathology, and Other Studies: I have personally reviewed pertinent reports  Counseling / Coordination of Care  Total floor / unit time spent today 60+ minutes  Greater than 50% of total time was spent with the patient and / or family counseling and / or coordination of care   A description of the counseling / coordination of care: provided medical updates, discussed palliative care, determined competency, determined goals of care, determined POA, determined social/family support, discussed plans of care, discussed symptom management, provided psychosocial support      Jalen Daigle MD  [address] and Supportive Care

## 2021-03-16 NOTE — ASSESSMENT & PLAN NOTE
Patient with a history of anemia   Stable  No indication for transfusion at this time    Hemoglobin ranging 7-8    Recent Labs     03/14/21  0430 03/16/21  0454   HGB 8 1* 8 8*   MCV 89 89   RDW 18 4* 18 2*

## 2021-03-16 NOTE — UTILIZATION REVIEW
Continued Stay Review    Date: 03-15-21                          Current Patient Class: inpatient  Current Level of Care: medical     HPI:89 y o  female initially admitted on 03-10-21 for acute metabolic encephalopathy/pneumonia/UTI/acute on chronic CHF/LILIAN  Baseline creatinine of mid to high 2s  On chronic oxygen        Assessment/Plan: creatinine unfortunately continues to rise  will give 500 cc of isolate today and monitor for response  diuretics held since 03/14   Palliative care overall poor prognosis and likely disease trajectory  We also discussed the difference between "palliative care" and "hospice care"  After much discussion, Cami Patel seemed to have a better grasp on what these mean   Though he displayed more insight into her overall decline, he is not quite ready for hospice    Pertinent Labs/Diagnostic Results:   Results from last 7 days   Lab Units 03/10/21  1318   SARS-COV-2  Negative     Results from last 7 days   Lab Units 03/16/21  0454 03/14/21  0430 03/13/21  0450 03/11/21  0444 03/10/21  1318   WBC Thousand/uL 5 90 5 66 5 70 5 83 6 96   HEMOGLOBIN g/dL 8 8* 8 1* 7 9* 7 5* 7 3*   HEMATOCRIT % 28 9* 26 9* 27 0* 25 7* 24 9*   PLATELETS Thousands/uL 184 178 182 172 196   NEUTROS ABS Thousands/µL 1 87 2 66 2 73 2 41 2 89       Results from last 7 days   Lab Units 03/10/21  1318   AST U/L 20   ALT U/L 20   ALK PHOS U/L 65   TOTAL PROTEIN g/dL 6 3*   ALBUMIN g/dL 2 1*   TOTAL BILIRUBIN mg/dL 0 63         Results from last 7 days   Lab Units 03/16/21  0454 03/15/21  0456 03/14/21  0434 03/13/21  0450 03/12/21  0513 03/11/21  0451 03/10/21  1318   GLUCOSE RANDOM mg/dL 72 78 87 84 85 80 82       Results from last 7 days   Lab Units 03/16/21  0454 03/15/21  0456 03/13/21  0450 03/10/21  1319   PROTIME seconds 30 6* 30 5* 32 5* 24 2*   INR  3 02* 3 01* 3 27* 2 22*   PTT seconds  --   --   --  46*         Results from last 7 days   Lab Units 03/11/21  0444 03/10/21  1724 03/10/21  1319   PROCALCITONIN ng/ml 0  07 0 07 0 09     Results from last 7 days   Lab Units 03/10/21  1318   LACTIC ACID mmol/L 0 6     Results from last 7 days   Lab Units 03/10/21  1724   NT-PRO BNP pg/mL 30,958*     Results from last 7 days   Lab Units 03/10/21  1416   CLARITY UA  Slightly Cloudy   COLOR UA  Yellow   SPEC GRAV UA  1 015   PH UA  5 5   GLUCOSE UA mg/dl Negative   KETONES UA mg/dl Negative   BLOOD UA  Small*   PROTEIN UA mg/dl 30 (1+)*   NITRITE UA  Positive*   BILIRUBIN UA  Negative   UROBILINOGEN UA E U /dl 0 2   LEUKOCYTES UA  Large*   WBC UA /hpf Innumerable*   RBC UA /hpf 2-4   BACTERIA UA /hpf Moderate*   EPITHELIAL CELLS WET PREP /hpf Occasional     Results from last 7 days   Lab Units 03/10/21  1318   INFLUENZA A PCR  Negative   INFLUENZA B PCR  Negative   RSV PCR  Negative     Results from last 7 days   Lab Units 03/10/21  1416 03/10/21  1319   BLOOD CULTURE   --  No Growth After 5 Days    Lactococcus garvieae*   GRAM STAIN RESULT   --  Gram positive cocci in chains*   URINE CULTURE  >100,000 cfu/ml Citrobacter freundii*  80,000-89,000 cfu/ml Enterococcus faecalis*  --                Vital Signs:   FIO2 (%) - Nasal Cannula  Nasal Cannula O2 Flow Rate (L/min)  O2 Device  Patient Position - Orthostatic VS                 03/16/21 15:15:10  97 9 °F (36 6 °C)  74  18  153/64  94  92 %  --  --  --  --   03/16/21 06:16:10  --  66  19  152/63  93  91 %  --  --  --  --   03/16/21 0005  --  --  --  --  --  --  --  --  None (Room air)  --   03/15/21 1900  97 6 °F (36 4 °C)  --  17  144/85  --  --  --  --  --  --   03/15/21 14:38:46  97 4 °F (36 3 °C)Abnormal   63  19  129/53  78  92 %  32  3 L/min  Nasal cannula  Lying   03/15/21 07:41:51  97 °F (36 1 °C)Abnormal   61  16  141/86  104  91 %  32  3 L/min  Nasal cannula  Lying         Medications:   Scheduled Medications:  amLODIPine, 5 mg, Oral, Daily  amoxicillin, 500 mg, Oral, Q12H [facility]  carvedilol, 12 5 mg, Oral, BID With Meals  collagenase, , Topical, Daily  hydrALAZINE, 50 mg, Oral, Q8H [address]  isosorbide dinitrate, 10 mg, Oral, TID after meals  levothyroxine, 25 mcg, Oral, Early Morning  multi-electrolyte, 500 mL, Intravenous, Once  saccharomyces boulardii, 250 mg, Oral, BID  warfarin, 2 mg, Oral, Daily (warfarin)      Continuous IV Infusions:     PRN Meds:  acetaminophen, 650 mg, Oral, Q6H PRN  oxyCODONE, 2 5 mg, Oral, Q4H PRN  senna, 8 6 mg, Oral, HS PRN        Discharge Plan: D    Network Utilization Review Department  ATTENTION: Please call with any questions or concerns to [phone] and carefully listen to the prompts so that you are directed to the right person  All voicemails are confidential   Taiwo Pedersen all requests for admission clinical reviews, approved or denied determinations and any other requests to dedicated fax number below belonging to the campus where the patient is receiving treatment   List of dedicated fax numbers for the Facilities:  [address] DENIALS (Administrative/Medical Necessity) [phone]   [address] (Maternity/NICU/Pediatrics) [phone]   [address] [place] [address] Dr Lukas Hagan 8941 (Phoebe James "[address]) [address] [place] [address] Torrey Best [address] [place] [address]) [facility] [place] [address] [phone]

## 2021-03-16 NOTE — ASSESSMENT & PLAN NOTE
-patient had right ureteral stent placed at [facility] 12/2  -current ultrasound reveals that the stent has migrated into the renal pelvis  -she will need follow-up with urology at [facility]

## 2021-03-16 NOTE — ASSESSMENT & PLAN NOTE
-patient had right ureteral stent placed at [facility] 12/2  -current ultrasound reveals that the stent has migrated into the renal pelvis  -she will need follow-up with urology at [facility]

## 2021-03-16 NOTE — ASSESSMENT & PLAN NOTE
Patient was evaluated by the wound care team  She is being treated for:  1-present on admission:  Evolving deep tissue injury to sacrum, suspected stage 3-4 unstageable  2-right hip with small amount of brown eschar, with surrounding erythema/induration  3-dry gangrene right toes  4-pressure ulceration posterior right heel, present on admission  5-venous ulceration posterior left leg  Continue wound care per Podiatry and Wound Care team  Will add analgesics as patient appears to be having pain

## 2021-03-16 NOTE — PLAN OF CARE
Problem: Potential for Falls  Goal: Patient will remain free of falls  Description: INTERVENTIONS:  - Assess patient frequently for physical needs  -  Identify cognitive and physical deficits and behaviors that affect risk of falls  -  [place] fall precautions as indicated by assessment   - Educate patient/family on patient safety including physical limitations  - Instruct patient to call for assistance with activity based on assessment  - Modify environment to reduce risk of injury  - Consider OT/PT consult to assist with strengthening/mobility  Outcome: Progressing     Problem: Prexisting or High Potential for Compromised Skin Integrity  Goal: Skin integrity is maintained or improved  Description: INTERVENTIONS:  - Identify patients at risk for skin breakdown  - Assess and monitor skin integrity  - Assess and monitor nutrition and hydration status  - Monitor labs   - Assess for incontinence   - Turn and reposition patient  - Assist with mobility/ambulation  - Relieve pressure over bony prominences  - Avoid friction and shearing  - Provide appropriate hygiene as needed including keeping skin clean and dry  - Evaluate need for skin moisturizer/barrier cream  - Collaborate with interdisciplinary team   - Patient/family teaching  - Consider wound care consult   Outcome: Progressing     Problem: Nutrition/Hydration-ADULT  Goal: Nutrient/Hydration intake appropriate for improving, restoring or maintaining nutritional needs  Description: Monitor and assess patient's nutrition/hydration status for malnutrition  Collaborate with interdisciplinary team and initiate plan and interventions as ordered  Monitor patient's weight and dietary intake as ordered or per policy  Utilize nutrition screening tool and intervene as necessary  Determine patient's food preferences and provide high-protein, high-caloric foods as appropriate       INTERVENTIONS:  - Monitor oral intake, urinary output, labs, and treatment plans  - Assess nutrition and hydration status and recommend course of action  - Evaluate amount of meals eaten  - Assist patient with eating if necessary   - Allow adequate time for meals  - Recommend/ encourage appropriate diets, oral nutritional supplements, and vitamin/mineral supplements  - Order, calculate, and assess calorie counts as needed  - Recommend, monitor, and adjust tube feedings and TPN/PPN based on assessed needs  - Assess need for intravenous fluids  - Provide specific nutrition/hydration education as appropriate  - Include patient/family/caregiver in decisions related to nutrition  Outcome: Progressing     Problem: RESPIRATORY - ADULT  Goal: Achieves optimal ventilation and oxygenation  Description: INTERVENTIONS:  - Assess for changes in respiratory status  - Assess for changes in mentation and behavior  - Position to facilitate oxygenation and minimize respiratory effort  - Oxygen administered by appropriate delivery if ordered  - Initiate smoking cessation education as indicated  - Encourage broncho-pulmonary hygiene including cough, deep breathe, Incentive Spirometry  - Assess the need for suctioning and aspirate as needed  - Assess and instruct to report SOB or any respiratory difficulty  - Respiratory Therapy support as indicated  Outcome: Progressing     Problem: METABOLIC, FLUID AND ELECTROLYTES - ADULT  Goal: Electrolytes maintained within normal limits  Description: INTERVENTIONS:  - Monitor labs and assess patient for signs and symptoms of electrolyte imbalances  - Administer electrolyte replacement as ordered  - Monitor response to electrolyte replacements, including repeat lab results as appropriate  - Instruct patient on fluid and nutrition as appropriate  Outcome: Progressing  Goal: Fluid balance maintained  Description: INTERVENTIONS:  - Monitor labs   - Monitor I/O and WT  - Instruct patient on fluid and nutrition as appropriate  - Assess for signs & symptoms of volume excess or deficit  Outcome: Progressing     Problem: SKIN/TISSUE INTEGRITY - ADULT  Goal: Skin integrity remains intact  Description: INTERVENTIONS  - Identify patients at risk for skin breakdown  - Assess and monitor skin integrity  - Assess and monitor nutrition and hydration status  - Monitor labs (i e  albumin)  - Assess for incontinence   - Turn and reposition patient  - Assist with mobility/ambulation  - Relieve pressure over bony prominences  - Avoid friction and shearing  - Provide appropriate hygiene as needed including keeping skin clean and dry  - Evaluate need for skin moisturizer/barrier cream  - Collaborate with interdisciplinary team (i e  Nutrition, Rehabilitation, etc )   - Patient/family teaching  Outcome: Progressing  Goal: Incision(s), wounds(s) or drain site(s) healing without S/S of infection  Description: INTERVENTIONS  - Assess and document risk factors for skin impairment   - Assess and document dressing, incision, wound bed, drain sites and surrounding tissue  - Consider nutrition services referral as needed  - Oral mucous membranes remain intact  - Provide patient/ family education  Outcome: Progressing     Problem: INFECTION - ADULT  Goal: Absence or prevention of progression during hospitalization  Description: INTERVENTIONS:  - Assess and monitor for signs and symptoms of infection  - Monitor lab/diagnostic results  - Monitor all insertion sites, i e  indwelling lines, tubes, and drains  - Monitor endotracheal if appropriate and nasal secretions for changes in amount and color  - [place] appropriate cooling/warming therapies per order  - Administer medications as ordered  - Instruct and encourage patient and family to use good hand hygiene technique  - Identify and instruct in appropriate isolation precautions for identified infection/condition  Outcome: Progressing     Problem: SAFETY ADULT  Goal: Patient will remain free of falls  Description: INTERVENTIONS:  - Assess patient frequently for physical needs  -  Identify cognitive and physical deficits and behaviors that affect risk of falls    -  [place] fall precautions as indicated by assessment   - Educate patient/family on patient safety including physical limitations  - Instruct patient to call for assistance with activity based on assessment  - Modify environment to reduce risk of injury  - Consider OT/PT consult to assist with strengthening/mobility  Outcome: Progressing  Goal: Maintain or return to baseline ADL function  Description: INTERVENTIONS:  -  Assess patient's ability to carry out ADLs; assess patient's baseline for ADL function and identify physical deficits which impact ability to perform ADLs (bathing, care of mouth/teeth, toileting, grooming, dressing, etc )  - Assess/evaluate cause of self-care deficits   - Assess range of motion  - Assess patient's mobility; develop plan if impaired  - Assess patient's need for assistive devices and provide as appropriate  - Encourage maximum independence but intervene and supervise when necessary  - Involve family in performance of ADLs  - Assess for home care needs following discharge   - Consider OT consult to assist with ADL evaluation and planning for discharge  - Provide patient education as appropriate  Outcome: Progressing     Problem: DISCHARGE PLANNING  Goal: Discharge to home or other facility with appropriate resources  Description: INTERVENTIONS:  - Identify barriers to discharge w/patient and caregiver  - Arrange for needed discharge resources and transportation as appropriate  - Identify discharge learning needs (meds, wound care, etc )  - Arrange for interpretive services to assist at discharge as needed  - Refer to Case Management Department for coordinating discharge planning if the patient needs post-hospital services based on physician/advanced practitioner order or complex needs related to functional status, cognitive ability, or social support system  Outcome: Progressing Problem: Knowledge Deficit  Goal: Patient/family/caregiver demonstrates understanding of disease process, treatment plan, medications, and discharge instructions  Description: Complete learning assessment and assess knowledge base    Interventions:  - Provide teaching at level of understanding  - Provide teaching via preferred learning methods  Outcome: Progressing

## 2021-03-16 NOTE — ASSESSMENT & PLAN NOTE
Patient was diagnosed with a UTI  Has a residual right ureteral stent which has migrated up to the right renal pelvis on ultrasound  Urine cultures revealed greater than 100,000 Citrobacter, and [phone] Enterococcus  Patient completed 5 days of cefepime with a Citrobacter  Currently on amoxicillin day #3 to continue treatment for the Enterococcus  Concurrent blood culture revealed 1 set with lactococcus garviae

## 2021-03-16 NOTE — ASSESSMENT & PLAN NOTE
Wt Readings from Last 3 Encounters:   03/15/21 59 9 kg (132 lb 0 9 oz)   03/07/21 61 6 kg (135 lb 12 9 oz)   02/03/21 75 1 kg (165 lb 9 1 oz)     Initially presented with an acute exacerbation of her chronic diastolic congestive heart failure  Prior to admission she was on Demadex 20 mg daily  Was initially diuresed with IV Lasix 80 mg IV b i d  however developed subsequent acute kidney injury, and diuretics on hold  Currently hypovolemic, with poor p o  intake  Weight decreased 1 3 kilos since admission and net -3 9 L  Monitor off diuretics

## 2021-03-16 NOTE — PLAN OF CARE
Problem: Potential for Falls  Goal: Patient will remain free of falls  Description: INTERVENTIONS:  - Assess patient frequently for physical needs  -  Identify cognitive and physical deficits and behaviors that affect risk of falls  -  [place] fall precautions as indicated by assessment   - Educate patient/family on patient safety including physical limitations  - Instruct patient to call for assistance with activity based on assessment  - Modify environment to reduce risk of injury  - Consider OT/PT consult to assist with strengthening/mobility  Outcome: Progressing     Problem: Prexisting or High Potential for Compromised Skin Integrity  Goal: Skin integrity is maintained or improved  Description: INTERVENTIONS:  - Identify patients at risk for skin breakdown  - Assess and monitor skin integrity  - Assess and monitor nutrition and hydration status  - Monitor labs   - Assess for incontinence   - Turn and reposition patient  - Assist with mobility/ambulation  - Relieve pressure over bony prominences  - Avoid friction and shearing  - Provide appropriate hygiene as needed including keeping skin clean and dry  - Evaluate need for skin moisturizer/barrier cream  - Collaborate with interdisciplinary team   - Patient/family teaching  - Consider wound care consult   Outcome: Progressing     Problem: Nutrition/Hydration-ADULT  Goal: Nutrient/Hydration intake appropriate for improving, restoring or maintaining nutritional needs  Description: Monitor and assess patient's nutrition/hydration status for malnutrition  Collaborate with interdisciplinary team and initiate plan and interventions as ordered  Monitor patient's weight and dietary intake as ordered or per policy  Utilize nutrition screening tool and intervene as necessary  Determine patient's food preferences and provide high-protein, high-caloric foods as appropriate       INTERVENTIONS:  - Monitor oral intake, urinary output, labs, and treatment plans  - Assess nutrition and hydration status and recommend course of action  - Evaluate amount of meals eaten  - Assist patient with eating if necessary   - Allow adequate time for meals  - Recommend/ encourage appropriate diets, oral nutritional supplements, and vitamin/mineral supplements  - Order, calculate, and assess calorie counts as needed  - Recommend, monitor, and adjust tube feedings and TPN/PPN based on assessed needs  - Assess need for intravenous fluids  - Provide specific nutrition/hydration education as appropriate  - Include patient/family/caregiver in decisions related to nutrition  Outcome: Progressing     Problem: RESPIRATORY - ADULT  Goal: Achieves optimal ventilation and oxygenation  Description: INTERVENTIONS:  - Assess for changes in respiratory status  - Assess for changes in mentation and behavior  - Position to facilitate oxygenation and minimize respiratory effort  - Oxygen administered by appropriate delivery if ordered  - Initiate smoking cessation education as indicated  - Encourage broncho-pulmonary hygiene including cough, deep breathe, Incentive Spirometry  - Assess the need for suctioning and aspirate as needed  - Assess and instruct to report SOB or any respiratory difficulty  - Respiratory Therapy support as indicated  Outcome: Progressing     Problem: METABOLIC, FLUID AND ELECTROLYTES - ADULT  Goal: Electrolytes maintained within normal limits  Description: INTERVENTIONS:  - Monitor labs and assess patient for signs and symptoms of electrolyte imbalances  - Administer electrolyte replacement as ordered  - Monitor response to electrolyte replacements, including repeat lab results as appropriate  - Instruct patient on fluid and nutrition as appropriate  Outcome: Progressing  Goal: Fluid balance maintained  Description: INTERVENTIONS:  - Monitor labs   - Monitor I/O and WT  - Instruct patient on fluid and nutrition as appropriate  - Assess for signs & symptoms of volume excess or deficit  Outcome: Progressing     Problem: SKIN/TISSUE INTEGRITY - ADULT  Goal: Skin integrity remains intact  Description: INTERVENTIONS  - Identify patients at risk for skin breakdown  - Assess and monitor skin integrity  - Assess and monitor nutrition and hydration status  - Monitor labs (i e  albumin)  - Assess for incontinence   - Turn and reposition patient  - Assist with mobility/ambulation  - Relieve pressure over bony prominences  - Avoid friction and shearing  - Provide appropriate hygiene as needed including keeping skin clean and dry  - Evaluate need for skin moisturizer/barrier cream  - Collaborate with interdisciplinary team (i e  Nutrition, Rehabilitation, etc )   - Patient/family teaching  Outcome: Progressing  Goal: Incision(s), wounds(s) or drain site(s) healing without S/S of infection  Description: INTERVENTIONS  - Assess and document risk factors for skin impairment   - Assess and document dressing, incision, wound bed, drain sites and surrounding tissue  - Consider nutrition services referral as needed  - Oral mucous membranes remain intact  - Provide patient/ family education  Outcome: Progressing     Problem: INFECTION - ADULT  Goal: Absence or prevention of progression during hospitalization  Description: INTERVENTIONS:  - Assess and monitor for signs and symptoms of infection  - Monitor lab/diagnostic results  - Monitor all insertion sites, i e  indwelling lines, tubes, and drains  - Monitor endotracheal if appropriate and nasal secretions for changes in amount and color  - [place] appropriate cooling/warming therapies per order  - Administer medications as ordered  - Instruct and encourage patient and family to use good hand hygiene technique  - Identify and instruct in appropriate isolation precautions for identified infection/condition  Outcome: Progressing     Problem: SAFETY ADULT  Goal: Patient will remain free of falls  Description: INTERVENTIONS:  - Assess patient frequently for physical needs  -  Identify cognitive and physical deficits and behaviors that affect risk of falls    -  [place] fall precautions as indicated by assessment   - Educate patient/family on patient safety including physical limitations  - Instruct patient to call for assistance with activity based on assessment  - Modify environment to reduce risk of injury  - Consider OT/PT consult to assist with strengthening/mobility  Outcome: Progressing  Goal: Maintain or return to baseline ADL function  Description: INTERVENTIONS:  -  Assess patient's ability to carry out ADLs; assess patient's baseline for ADL function and identify physical deficits which impact ability to perform ADLs (bathing, care of mouth/teeth, toileting, grooming, dressing, etc )  - Assess/evaluate cause of self-care deficits   - Assess range of motion  - Assess patient's mobility; develop plan if impaired  - Assess patient's need for assistive devices and provide as appropriate  - Encourage maximum independence but intervene and supervise when necessary  - Involve family in performance of ADLs  - Assess for home care needs following discharge   - Consider OT consult to assist with ADL evaluation and planning for discharge  - Provide patient education as appropriate  Outcome: Progressing     Problem: DISCHARGE PLANNING  Goal: Discharge to home or other facility with appropriate resources  Description: INTERVENTIONS:  - Identify barriers to discharge w/patient and caregiver  - Arrange for needed discharge resources and transportation as appropriate  - Identify discharge learning needs (meds, wound care, etc )  - Arrange for interpretive services to assist at discharge as needed  - Refer to Case Management Department for coordinating discharge planning if the patient needs post-hospital services based on physician/advanced practitioner order or complex needs related to functional status, cognitive ability, or social support system  Outcome: Progressing Problem: Knowledge Deficit  Goal: Patient/family/caregiver demonstrates understanding of disease process, treatment plan, medications, and discharge instructions  Description: Complete learning assessment and assess knowledge base    Interventions:  - Provide teaching at level of understanding  - Provide teaching via preferred learning methods  Outcome: Progressing

## 2021-03-17 LAB
ANION GAP SERPL CALCULATED.3IONS-SCNC: 8 MMOL/L (ref 4–13)
BASOPHILS # BLD AUTO: 0.05 THOUSANDS/ΜL (ref 0–0.1)
BASOPHILS NFR BLD AUTO: 1 % (ref 0–1)
BUN SERPL-MCNC: 56 MG/DL (ref 5–25)
CALCIUM SERPL-MCNC: 8.6 MG/DL (ref 8.3–10.1)
CHLORIDE SERPL-SCNC: 97 MMOL/L (ref 100–108)
CO2 SERPL-SCNC: 36 MMOL/L (ref 21–32)
CREAT SERPL-MCNC: 3.29 MG/DL (ref 0.6–1.3)
EOSINOPHIL # BLD AUTO: 0.3 THOUSAND/ΜL (ref 0–0.61)
EOSINOPHIL NFR BLD AUTO: 4 % (ref 0–6)
ERYTHROCYTE [DISTWIDTH] IN BLOOD BY AUTOMATED COUNT: 18.1 % (ref 11.6–15.1)
GFR SERPL CREATININE-BSD FRML MDRD: 12 ML/MIN/1.73SQ M
GLUCOSE SERPL-MCNC: 71 MG/DL (ref 65–140)
HCT VFR BLD AUTO: 30.6 % (ref 34.8–46.1)
HGB BLD-MCNC: 9 G/DL (ref 11.5–15.4)
IMM GRANULOCYTES # BLD AUTO: 0.03 THOUSAND/UL (ref 0–0.2)
IMM GRANULOCYTES NFR BLD AUTO: 0 % (ref 0–2)
INR PPP: 2.67 (ref 0.84–1.19)
LYMPHOCYTES # BLD AUTO: 4.15 THOUSANDS/ΜL (ref 0.6–4.47)
LYMPHOCYTES NFR BLD AUTO: 55 % (ref 14–44)
MCH RBC QN AUTO: 25.8 PG (ref 26.8–34.3)
MCHC RBC AUTO-ENTMCNC: 29.4 G/DL (ref 31.4–37.4)
MCV RBC AUTO: 88 FL (ref 82–98)
MONOCYTES # BLD AUTO: 0.61 THOUSAND/ΜL (ref 0.17–1.22)
MONOCYTES NFR BLD AUTO: 8 % (ref 4–12)
NEUTROPHILS # BLD AUTO: 2.43 THOUSANDS/ΜL (ref 1.85–7.62)
NEUTS SEG NFR BLD AUTO: 32 % (ref 43–75)
NRBC BLD AUTO-RTO: 0 /100 WBCS
PLATELET # BLD AUTO: 195 THOUSANDS/UL (ref 149–390)
PMV BLD AUTO: 10.6 FL (ref 8.9–12.7)
POTASSIUM SERPL-SCNC: 3.4 MMOL/L (ref 3.5–5.3)
PROTHROMBIN TIME: 27.8 SECONDS (ref 11.6–14.5)
RBC # BLD AUTO: 3.49 MILLION/UL (ref 3.81–5.12)
SODIUM SERPL-SCNC: 141 MMOL/L (ref 136–145)
WBC # BLD AUTO: 7.57 THOUSAND/UL (ref 4.31–10.16)

## 2021-03-17 PROCEDURE — 85610 PROTHROMBIN TIME: CPT | Performed by: INTERNAL MEDICINE

## 2021-03-17 PROCEDURE — 99232 SBSQ HOSP IP/OBS MODERATE 35: CPT | Performed by: INTERNAL MEDICINE

## 2021-03-17 PROCEDURE — 87040 BLOOD CULTURE FOR BACTERIA: CPT | Performed by: INTERNAL MEDICINE

## 2021-03-17 PROCEDURE — 85025 COMPLETE CBC W/AUTO DIFF WBC: CPT | Performed by: INTERNAL MEDICINE

## 2021-03-17 PROCEDURE — 80048 BASIC METABOLIC PNL TOTAL CA: CPT | Performed by: INTERNAL MEDICINE

## 2021-03-17 RX ORDER — POTASSIUM CHLORIDE 20 MEQ/1
40 TABLET, EXTENDED RELEASE ORAL ONCE
Status: COMPLETED | OUTPATIENT
Start: 2021-03-17 | End: 2021-03-17

## 2021-03-17 RX ORDER — HYDROMORPHONE HCL/PF 1 MG/ML
0.2 SYRINGE (ML) INJECTION EVERY 4 HOURS PRN
Status: DISCONTINUED | OUTPATIENT
Start: 2021-03-17 | End: 2021-03-22 | Stop reason: HOSPADM

## 2021-03-17 RX ADMIN — ISOSORBIDE DINITRATE 10 MG: 10 TABLET ORAL at 08:29

## 2021-03-17 RX ADMIN — COLLAGENASE SANTYL 1 APPLICATION: 250 OINTMENT TOPICAL at 08:36

## 2021-03-17 RX ADMIN — Medication 250 MG: at 08:29

## 2021-03-17 RX ADMIN — CARVEDILOL 12.5 MG: 6.25 TABLET, FILM COATED ORAL at 08:29

## 2021-03-17 RX ADMIN — Medication 250 MG: at 17:24

## 2021-03-17 RX ADMIN — AMOXICILLIN 500 MG: 500 CAPSULE ORAL at 08:29

## 2021-03-17 RX ADMIN — ISOSORBIDE DINITRATE 10 MG: 10 TABLET ORAL at 17:23

## 2021-03-17 RX ADMIN — WARFARIN SODIUM 2 MG: 2 TABLET ORAL at 17:25

## 2021-03-17 RX ADMIN — HYDRALAZINE HYDROCHLORIDE 50 MG: 25 TABLET ORAL at 05:15

## 2021-03-17 RX ADMIN — AMLODIPINE BESYLATE 5 MG: 5 TABLET ORAL at 08:29

## 2021-03-17 RX ADMIN — ISOSORBIDE DINITRATE 10 MG: 10 TABLET ORAL at 11:46

## 2021-03-17 RX ADMIN — POTASSIUM CHLORIDE 40 MEQ: 1500 TABLET, EXTENDED RELEASE ORAL at 11:47

## 2021-03-17 RX ADMIN — LEVOTHYROXINE SODIUM 25 MCG: 25 TABLET ORAL at 05:15

## 2021-03-17 NOTE — OCCUPATIONAL THERAPY NOTE
Occupational Therapy         Patient Name: Reyes Terrell  SIWTJ'W Date: 3/17/2021      Pt with palliative consult with plans for hospice care  Acute OT tx not indicated at this time 2* plans for hospice  Will d/c from caseload   Eli Baker, OT

## 2021-03-17 NOTE — PALLIATIVE CARE CONFERENCE
Palliative Inpatient Assessment Note    MSW appreciates the opportunity to provide patient/family with inpatient emotional support and guidance while they continue to receive medical attention from a Palliative provider  MSW completed an assessment of need which was completed with patient's  Den Vang  Family dynamics:  Relationship status:   Duration of relationship:58 yrs   Name of significant other: Susan Mcnally and Ages: 2 sons, Brando Mojica and Ludy Hernandez     Pets:   Other important family information:  Living situation: resides with spouse    Patient's primary caregiver: Spouse   Any limitations: None  Environmental concerns or barriers: N A   history: N/A  Employment history/ Source of income: Worked at [employer] after raising their son's  Disability: N/A  Spirituality/ Confucianism:    Cultural information:   Mental Health and/or Drug and Alcohol history: N/A  Advanced Directives:    Patient's strengths, social supports, and resources: Patient has strong family support with her , two sons and the DIL        Patient/family current level of coping:  Level of understanding: PTS  is understandably struggling  I spent some time with him recalling how they met and their long life together  Den Vang expressed that she had not seen a doctor in 39 years, he describes her as head strong and out spoken  I asked if he thought some more about his conversation with Viv Leong, and Dr Terry Alonzo regarding hospice at home  Den Vang states that he made the decision to have her sign onto hospice, as he knows that she would want to return home and he states that he has been her caregiver for about 1 year now  Den Vang states that he has started clearing out the dining room area, as he plans to care for her there  We spoke some more about the expectations of the hospice team, explaining that the main responsibility of caring for her will fall on the family   Den Vang states that he has the help of his two sons, as well as his daughter in law  MSW reached out to Dr Al Umanzor via tiger text and she will place a hospice referral  I explained to Demarco Mckoy that a hospice liaison will reach out to him  Demarco Mckoy was very appreciative of the visit  We also spoke about end of life and I encouraged him and his family to continue talking to her, as she is able to hear them, even when she approached end of life  Patient/family concerns and areas of need: Families main concern is having the patient remain comfortable and surrounded by her loved ones at home  *All questions may not be answered due to constraints    Follow-up discussions may need to occur

## 2021-03-17 NOTE — ASSESSMENT & PLAN NOTE
-Patient has a history of peripheral arterial disease and was previously evaluated by vascular surgery and Podiatry during prior admission  -LEADs significant for occlusion versus high grade stenosis of the proximal thigh portion of the superficial femoral artery with distal reconstitution   - Revascularization not pursued previously due to CKD and concern for progression to renal failure   -during her admission January 2021 patient was diagnosed with dry gangrene of the right foot, with a right pretibial ulcer:  Patient declined any invasive intervention at that time  -patient was re-evaluated by Podiatry during this current admission and diagnosed with 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

## 2021-03-17 NOTE — PLAN OF CARE
Problem: Potential for Falls  Goal: Patient will remain free of falls  Description: INTERVENTIONS:  - Assess patient frequently for physical needs  -  Identify cognitive and physical deficits and behaviors that affect risk of falls  -  [place] fall precautions as indicated by assessment   - Educate patient/family on patient safety including physical limitations  - Instruct patient to call for assistance with activity based on assessment  - Modify environment to reduce risk of injury  - Consider OT/PT consult to assist with strengthening/mobility  Outcome: Progressing     Problem: Prexisting or High Potential for Compromised Skin Integrity  Goal: Skin integrity is maintained or improved  Description: INTERVENTIONS:  - Identify patients at risk for skin breakdown  - Assess and monitor skin integrity  - Assess and monitor nutrition and hydration status  - Monitor labs   - Assess for incontinence   - Turn and reposition patient  - Assist with mobility/ambulation  - Relieve pressure over bony prominences  - Avoid friction and shearing  - Provide appropriate hygiene as needed including keeping skin clean and dry  - Evaluate need for skin moisturizer/barrier cream  - Collaborate with interdisciplinary team   - Patient/family teaching  - Consider wound care consult   Outcome: Progressing     Problem: Nutrition/Hydration-ADULT  Goal: Nutrient/Hydration intake appropriate for improving, restoring or maintaining nutritional needs  Description: Monitor and assess patient's nutrition/hydration status for malnutrition  Collaborate with interdisciplinary team and initiate plan and interventions as ordered  Monitor patient's weight and dietary intake as ordered or per policy  Utilize nutrition screening tool and intervene as necessary  Determine patient's food preferences and provide high-protein, high-caloric foods as appropriate       INTERVENTIONS:  - Monitor oral intake, urinary output, labs, and treatment plans  - Assess nutrition and hydration status and recommend course of action  - Evaluate amount of meals eaten  - Assist patient with eating if necessary   - Allow adequate time for meals  - Recommend/ encourage appropriate diets, oral nutritional supplements, and vitamin/mineral supplements  - Order, calculate, and assess calorie counts as needed  - Recommend, monitor, and adjust tube feedings and TPN/PPN based on assessed needs  - Assess need for intravenous fluids  - Provide specific nutrition/hydration education as appropriate  - Include patient/family/caregiver in decisions related to nutrition  Outcome: Progressing     Problem: RESPIRATORY - ADULT  Goal: Achieves optimal ventilation and oxygenation  Description: INTERVENTIONS:  - Assess for changes in respiratory status  - Assess for changes in mentation and behavior  - Position to facilitate oxygenation and minimize respiratory effort  - Oxygen administered by appropriate delivery if ordered  - Initiate smoking cessation education as indicated  - Encourage broncho-pulmonary hygiene including cough, deep breathe, Incentive Spirometry  - Assess the need for suctioning and aspirate as needed  - Assess and instruct to report SOB or any respiratory difficulty  - Respiratory Therapy support as indicated  Outcome: Progressing     Problem: METABOLIC, FLUID AND ELECTROLYTES - ADULT  Goal: Electrolytes maintained within normal limits  Description: INTERVENTIONS:  - Monitor labs and assess patient for signs and symptoms of electrolyte imbalances  - Administer electrolyte replacement as ordered  - Monitor response to electrolyte replacements, including repeat lab results as appropriate  - Instruct patient on fluid and nutrition as appropriate  Outcome: Progressing  Goal: Fluid balance maintained  Description: INTERVENTIONS:  - Monitor labs   - Monitor I/O and WT  - Instruct patient on fluid and nutrition as appropriate  - Assess for signs & symptoms of volume excess or deficit  Outcome: Progressing     Problem: SKIN/TISSUE INTEGRITY - ADULT  Goal: Skin integrity remains intact  Description: INTERVENTIONS  - Identify patients at risk for skin breakdown  - Assess and monitor skin integrity  - Assess and monitor nutrition and hydration status  - Monitor labs (i e  albumin)  - Assess for incontinence   - Turn and reposition patient  - Assist with mobility/ambulation  - Relieve pressure over bony prominences  - Avoid friction and shearing  - Provide appropriate hygiene as needed including keeping skin clean and dry  - Evaluate need for skin moisturizer/barrier cream  - Collaborate with interdisciplinary team (i e  Nutrition, Rehabilitation, etc )   - Patient/family teaching  Outcome: Progressing  Goal: Incision(s), wounds(s) or drain site(s) healing without S/S of infection  Description: INTERVENTIONS  - Assess and document risk factors for skin impairment   - Assess and document dressing, incision, wound bed, drain sites and surrounding tissue  - Consider nutrition services referral as needed  - Oral mucous membranes remain intact  - Provide patient/ family education  Outcome: Progressing     Problem: INFECTION - ADULT  Goal: Absence or prevention of progression during hospitalization  Description: INTERVENTIONS:  - Assess and monitor for signs and symptoms of infection  - Monitor lab/diagnostic results  - Monitor all insertion sites, i e  indwelling lines, tubes, and drains  - Monitor endotracheal if appropriate and nasal secretions for changes in amount and color  - [place] appropriate cooling/warming therapies per order  - Administer medications as ordered  - Instruct and encourage patient and family to use good hand hygiene technique  - Identify and instruct in appropriate isolation precautions for identified infection/condition  Outcome: Progressing     Problem: SAFETY ADULT  Goal: Patient will remain free of falls  Description: INTERVENTIONS:  - Assess patient frequently for physical needs  -  Identify cognitive and physical deficits and behaviors that affect risk of falls    -  [place] fall precautions as indicated by assessment   - Educate patient/family on patient safety including physical limitations  - Instruct patient to call for assistance with activity based on assessment  - Modify environment to reduce risk of injury  - Consider OT/PT consult to assist with strengthening/mobility  Outcome: Progressing  Goal: Maintain or return to baseline ADL function  Description: INTERVENTIONS:  -  Assess patient's ability to carry out ADLs; assess patient's baseline for ADL function and identify physical deficits which impact ability to perform ADLs (bathing, care of mouth/teeth, toileting, grooming, dressing, etc )  - Assess/evaluate cause of self-care deficits   - Assess range of motion  - Assess patient's mobility; develop plan if impaired  - Assess patient's need for assistive devices and provide as appropriate  - Encourage maximum independence but intervene and supervise when necessary  - Involve family in performance of ADLs  - Assess for home care needs following discharge   - Consider OT consult to assist with ADL evaluation and planning for discharge  - Provide patient education as appropriate  Outcome: Progressing     Problem: DISCHARGE PLANNING  Goal: Discharge to home or other facility with appropriate resources  Description: INTERVENTIONS:  - Identify barriers to discharge w/patient and caregiver  - Arrange for needed discharge resources and transportation as appropriate  - Identify discharge learning needs (meds, wound care, etc )  - Arrange for interpretive services to assist at discharge as needed  - Refer to Case Management Department for coordinating discharge planning if the patient needs post-hospital services based on physician/advanced practitioner order or complex needs related to functional status, cognitive ability, or social support system  Outcome: Progressing Problem: Knowledge Deficit  Goal: Patient/family/caregiver demonstrates understanding of disease process, treatment plan, medications, and discharge instructions  Description: Complete learning assessment and assess knowledge base    Interventions:  - Provide teaching at level of understanding  - Provide teaching via preferred learning methods  Outcome: Progressing

## 2021-03-17 NOTE — ASSESSMENT & PLAN NOTE
Patient with a history of anemia   Stable  No indication for transfusion at this time    Hemoglobin ranging 7-8    Recent Labs     03/16/21  0454 03/17/21  0519   HGB 8 8* 9 0*   MCV 89 88   RDW 18 2* 18 1*

## 2021-03-17 NOTE — CASE MANAGEMENT
Referral for Home Hospice received with referral to [facility] (hospice) made- will follow up with ability to accept and to assist with dc poc

## 2021-03-17 NOTE — ASSESSMENT & PLAN NOTE
Patient has a history of chronic kidney disease stage 3  Baseline creatinine ranges 1 8-2 1  She presented with acute kidney injury with a creatinine of 2 83, that peaked at 3 4 on 03/16  Patient was evaluated by the nephrology team   Her acute kidney injury was felt to be cardiorenal syndrome, as well as previous IV diuretics with Lasix, and p o  metolazone  Patient has a history of atrophic left kidney  Patient also has a history of previous right ureteral stent and residual mild hydronephrosis  Continue to hold diuretics  Creatinine improved slightly to 3 2 after gentle IV fluids given yesterday  Urinary retention protocol  Patient and family in discussions regarding hospice care    Recent Labs     03/15/21  0456 03/16/21  0454 03/17/21  0519   BUN 60* 58* 56*   CREATININE 3 16* 3 40* 3 29*   EGFR 12 11 12

## 2021-03-17 NOTE — PLAN OF CARE
Problem: Potential for Falls  Goal: Patient will remain free of falls  Description: INTERVENTIONS:  - Assess patient frequently for physical needs  -  Identify cognitive and physical deficits and behaviors that affect risk of falls  -  [place] fall precautions as indicated by assessment   - Educate patient/family on patient safety including physical limitations  - Instruct patient to call for assistance with activity based on assessment  - Modify environment to reduce risk of injury  - Consider OT/PT consult to assist with strengthening/mobility  Outcome: Progressing     Problem: Prexisting or High Potential for Compromised Skin Integrity  Goal: Skin integrity is maintained or improved  Description: INTERVENTIONS:  - Identify patients at risk for skin breakdown  - Assess and monitor skin integrity  - Assess and monitor nutrition and hydration status  - Monitor labs   - Assess for incontinence   - Turn and reposition patient  - Assist with mobility/ambulation  - Relieve pressure over bony prominences  - Avoid friction and shearing  - Provide appropriate hygiene as needed including keeping skin clean and dry  - Evaluate need for skin moisturizer/barrier cream  - Collaborate with interdisciplinary team   - Patient/family teaching  - Consider wound care consult   Outcome: Progressing     Problem: Nutrition/Hydration-ADULT  Goal: Nutrient/Hydration intake appropriate for improving, restoring or maintaining nutritional needs  Description: Monitor and assess patient's nutrition/hydration status for malnutrition  Collaborate with interdisciplinary team and initiate plan and interventions as ordered  Monitor patient's weight and dietary intake as ordered or per policy  Utilize nutrition screening tool and intervene as necessary  Determine patient's food preferences and provide high-protein, high-caloric foods as appropriate       INTERVENTIONS:  - Monitor oral intake, urinary output, labs, and treatment plans  - Assess nutrition and hydration status and recommend course of action  - Evaluate amount of meals eaten  - Assist patient with eating if necessary   - Allow adequate time for meals  - Recommend/ encourage appropriate diets, oral nutritional supplements, and vitamin/mineral supplements  - Order, calculate, and assess calorie counts as needed  - Recommend, monitor, and adjust tube feedings and TPN/PPN based on assessed needs  - Assess need for intravenous fluids  - Provide specific nutrition/hydration education as appropriate  - Include patient/family/caregiver in decisions related to nutrition  Outcome: Progressing     Problem: RESPIRATORY - ADULT  Goal: Achieves optimal ventilation and oxygenation  Description: INTERVENTIONS:  - Assess for changes in respiratory status  - Assess for changes in mentation and behavior  - Position to facilitate oxygenation and minimize respiratory effort  - Oxygen administered by appropriate delivery if ordered  - Initiate smoking cessation education as indicated  - Encourage broncho-pulmonary hygiene including cough, deep breathe, Incentive Spirometry  - Assess the need for suctioning and aspirate as needed  - Assess and instruct to report SOB or any respiratory difficulty  - Respiratory Therapy support as indicated  Outcome: Progressing     Problem: METABOLIC, FLUID AND ELECTROLYTES - ADULT  Goal: Electrolytes maintained within normal limits  Description: INTERVENTIONS:  - Monitor labs and assess patient for signs and symptoms of electrolyte imbalances  - Administer electrolyte replacement as ordered  - Monitor response to electrolyte replacements, including repeat lab results as appropriate  - Instruct patient on fluid and nutrition as appropriate  Outcome: Progressing  Goal: Fluid balance maintained  Description: INTERVENTIONS:  - Monitor labs   - Monitor I/O and WT  - Instruct patient on fluid and nutrition as appropriate  - Assess for signs & symptoms of volume excess or deficit  Outcome: Progressing     Problem: SKIN/TISSUE INTEGRITY - ADULT  Goal: Skin integrity remains intact  Description: INTERVENTIONS  - Identify patients at risk for skin breakdown  - Assess and monitor skin integrity  - Assess and monitor nutrition and hydration status  - Monitor labs (i e  albumin)  - Assess for incontinence   - Turn and reposition patient  - Assist with mobility/ambulation  - Relieve pressure over bony prominences  - Avoid friction and shearing  - Provide appropriate hygiene as needed including keeping skin clean and dry  - Evaluate need for skin moisturizer/barrier cream  - Collaborate with interdisciplinary team (i e  Nutrition, Rehabilitation, etc )   - Patient/family teaching  Outcome: Progressing  Goal: Incision(s), wounds(s) or drain site(s) healing without S/S of infection  Description: INTERVENTIONS  - Assess and document risk factors for skin impairment   - Assess and document dressing, incision, wound bed, drain sites and surrounding tissue  - Consider nutrition services referral as needed  - Oral mucous membranes remain intact  - Provide patient/ family education  Outcome: Progressing     Problem: INFECTION - ADULT  Goal: Absence or prevention of progression during hospitalization  Description: INTERVENTIONS:  - Assess and monitor for signs and symptoms of infection  - Monitor lab/diagnostic results  - Monitor all insertion sites, i e  indwelling lines, tubes, and drains  - Monitor endotracheal if appropriate and nasal secretions for changes in amount and color  - [place] appropriate cooling/warming therapies per order  - Administer medications as ordered  - Instruct and encourage patient and family to use good hand hygiene technique  - Identify and instruct in appropriate isolation precautions for identified infection/condition  Outcome: Progressing     Problem: SAFETY ADULT  Goal: Patient will remain free of falls  Description: INTERVENTIONS:  - Assess patient frequently for physical needs  -  Identify cognitive and physical deficits and behaviors that affect risk of falls    -  [place] fall precautions as indicated by assessment   - Educate patient/family on patient safety including physical limitations  - Instruct patient to call for assistance with activity based on assessment  - Modify environment to reduce risk of injury  - Consider OT/PT consult to assist with strengthening/mobility  Outcome: Progressing  Goal: Maintain or return to baseline ADL function  Description: INTERVENTIONS:  -  Assess patient's ability to carry out ADLs; assess patient's baseline for ADL function and identify physical deficits which impact ability to perform ADLs (bathing, care of mouth/teeth, toileting, grooming, dressing, etc )  - Assess/evaluate cause of self-care deficits   - Assess range of motion  - Assess patient's mobility; develop plan if impaired  - Assess patient's need for assistive devices and provide as appropriate  - Encourage maximum independence but intervene and supervise when necessary  - Involve family in performance of ADLs  - Assess for home care needs following discharge   - Consider OT consult to assist with ADL evaluation and planning for discharge  - Provide patient education as appropriate  Outcome: Progressing     Problem: DISCHARGE PLANNING  Goal: Discharge to home or other facility with appropriate resources  Description: INTERVENTIONS:  - Identify barriers to discharge w/patient and caregiver  - Arrange for needed discharge resources and transportation as appropriate  - Identify discharge learning needs (meds, wound care, etc )  - Arrange for interpretive services to assist at discharge as needed  - Refer to Case Management Department for coordinating discharge planning if the patient needs post-hospital services based on physician/advanced practitioner order or complex needs related to functional status, cognitive ability, or social support system  Outcome: Progressing Problem: Knowledge Deficit  Goal: Patient/family/caregiver demonstrates understanding of disease process, treatment plan, medications, and discharge instructions  Description: Complete learning assessment and assess knowledge base    Interventions:  - Provide teaching at level of understanding  - Provide teaching via preferred learning methods  Outcome: Progressing

## 2021-03-17 NOTE — ASSESSMENT & PLAN NOTE
Wt Readings from Last 3 Encounters:   03/17/21 60 6 kg (133 lb 9 6 oz)   03/07/21 61 6 kg (135 lb 12 9 oz)   02/03/21 75 1 kg (165 lb 9 1 oz)     Initially presented with an acute exacerbation of her chronic diastolic congestive heart failure  Prior to admission she was on Demadex 20 mg daily  Was initially diuresed with IV Lasix 80 mg IV b i d  however developed subsequent acute kidney injury, and diuretics on hold  Currently hypovolemic, with poor p o  intake  Patient had increasing weight after IV fluids given for worsening acute kidney injury  Family considering hospice another treatment options

## 2021-03-17 NOTE — PROGRESS NOTES
NEPHROLOGY PROGRESS NOTE   Shanelle Bryan 80 y o  female MRN: [id number]  Unit/Bed#: [facility] [id number] Encounter: [id number]  Reason for Consult: LILIAN on CKD, vs progression of chornic disease    PLAN:   -creatinine slightly improved to 3 2  S/P 500 cc of fluid yesterday    -poor oral intake, continue to hold diuretics  Assess daily for diuretic needs   -continues on antibiotics for urinary tract infection/pneumonia  -hypokalemia, receiving oral replacement   -ongoing discussions in regards to goal of care with family  ASSESSMENT/PLAN:  LILIAN on CKD, vs progression of chronic disease:  Suspected secondary to cardiorenal syndrome, presenting with volume overload   History of atrophic left kidney   -baseline creatinine 1 8-2 1   -presented with creatinine of 2 83 upon admission   -creatinine slightly improved today   -received 500 cc of isolate yesterday   -previously on furosemide 80 mg IV b i d  And intermittent metolazone    diuretics held since evening of 3/14   -most recent UA showed small blood, nitrates, large leukocytes, +1 protein, 2-4 RBCs, innumerable WBCs, moderate bacteria   -ultrasound showed left renal atrophy   -hospice following for goals of care discussion   -continue to avoid nephrotoxins, hypotension, IV contrast   -I/O      UTI:  Treating with antibiotics      Right-sided hydronephrosis:  History of right ureteral stent  -repeat renal ultrasound negative for hydro status post ureteral stent placement      Hypertension:  Blood pressure overall acceptable   -continues on amlodipine 5 mg daily, carvedilol 12 5 mg b i d , hydralazine 50 mg t i d , isosorbide 10 mg t i d    -recommend holding parameters for systolic blood pressure less than 130    -avoid hypotension or high fluctuations in blood pressure      Diastolic heart failure:  Most recent echo showed EF of 50% with grade 2 diastolic dysfunction   -chest x-ray upon admission showed mild-to-moderate congestive changes with bilateral effusion   -diuretics currently on hold  Evaluating daily for further diuretics  -home diuretics:  Torsemide 20 mg daily  -previously on furosemide 80 mg IV b i d  With intermittent metolazone dosing   -continue checking daily weights, fluid restriction, I/O   -status post fluids 3/16 with improving renal function      Chronic hypoxic respiratory failure:  On chronic O2 at baseline      Suspected pneumonia:  -continues on antibiotics      Severe PVD with bilateral lower extremity wounds:  No surgical intervention at this time      Hyperphosphatemia:  -most recent phosphorus 4 4      Hypokalemia:  Magnesium level noted to be normal   -continue to monitor and replace as needed      Ongoing goals of care discussion with family and palliative care  Will re-evaluate and readdress discussion in a couple days based on patient's status  Disposition:  Requiring additional stay due to medical needs  SUBJECTIVE:  The patient is sleeping comfortably in her bed  She appears to be comfortable  She opens her eyes to speech  She is currently not answering my questions      OBJECTIVE:  Current Weight: Weight - Scale: 60 6 kg (133 lb 9 6 oz)  Vitals:    03/17/21 0449 03/17/21 0600 03/17/21 0738 03/17/21 1144   BP: 166/77  162/77 131/55   BP Location:   Right arm    Pulse: 76  72 71   Resp:   20    Temp:   97 8 °F (36 6 °C)    TempSrc:   Oral    SpO2: 95%  97% 92%   Weight:  60 6 kg (133 lb 9 6 oz)         Intake/Output Summary (Last 24 hours) at 3/17/2021 1148  Last data filed at 3/17/2021 0041  Gross per 24 hour   Intake --   Output 286 ml   Net -286 ml     General: NAD, thin, frail, cachectic  Skin: warm, dry, intact, no rash  HEENT: Moist mucous membranes, sclera anicteric, normocephalic, atraumatic  Neck: No apparent JVD appreciated  Chest: lung sounds clear B/L, on O2  CVS:Regular rate and rhythm, no murmer   Abdomen: Soft, round, non-tender, +BS  Extremities: No B/L LE edema present  Neuro: alert, responsive to speech  Psych: appropriate mood and affect     Medications:    Current Facility-Administered Medications:     acetaminophen (TYLENOL) tablet 650 mg, 650 mg, Oral, Q6H PRN, Sapna Banks MD    amLODIPine (NORVASC) tablet 5 mg, 5 mg, Oral, Daily, OTIS Cole, 5 mg at 03/17/21 0829    amoxicillin (AMOXIL) capsule 500 mg, 500 mg, Oral, Q12H [facility], Kirk Celaya MD, 500 mg at 03/17/21 0829    carvedilol (COREG) tablet 12 5 mg, 12 5 mg, Oral, BID With Meals, OTIS Cole, 12 5 mg at 03/17/21 8568    collagenase (SANTYL) ointment, , Topical, Daily, Kirk Celaya MD, 1 application at 60/60/81 0836    hydrALAZINE (APRESOLINE) tablet 50 mg, 50 mg, Oral, Q8H [facility], OTIS Cole, 50 mg at 03/17/21 0515    HYDROmorphone (DILAUDID) injection 0 2 mg, 0 2 mg, Intravenous, Q4H PRN, Matthew Torres MD    isosorbide dinitrate (ISORDIL) tablet 10 mg, 10 mg, Oral, TID after meals, Sapna Banks MD, 10 mg at 03/17/21 0731    levothyroxine tablet 25 mcg, 25 mcg, Oral, Early Morning, Sapna Banks MD, 25 mcg at 03/17/21 0515    oxyCODONE (ROXICODONE) IR tablet 2 5 mg, 2 5 mg, Oral, Q4H PRN, Matthew Torres MD    potassium chloride (K-DUR,KLOR-CON) CR tablet 40 mEq, 40 mEq, Oral, Once, Kalie Fuentes, DO    saccharomyces boulardii (FLORASTOR) capsule 250 mg, 250 mg, Oral, BID, Sapna Banks MD, 250 mg at 03/17/21 5276    senna (SENOKOT) tablet 8 6 mg, 8 6 mg, Oral, HS PRN, Sapna Banks MD    warfarin (COUMADIN) tablet 2 mg, 2 mg, Oral, Daily (warfarin), Matthew Torres MD    Laboratory Results:  Results from last 7 days   Lab Units 03/17/21  0519 03/16/21  0454 03/15/21  0456 03/14/21  0434 03/14/21  0430 03/13/21  0450  03/10/21  1318   WBC Thousand/uL 7 57 5 90  --   --  5 66 5 70   < > 6 96   HEMOGLOBIN g/dL 9 0* 8 8*  --   --  8 1* 7 9*   < > 7 3*   HEMATOCRIT % 30 6* 28 9*  --   --  26 9* 27 0*   < > 24 9*   PLATELETS Thousands/uL 195 184  --   --  178 182   < > 196   SODIUM mmol/L 141 142 141 139  --  140 < > 142   POTASSIUM mmol/L 3 4* 3 5 3 3* 3 8  --  3 3*   < > 4 6   CHLORIDE mmol/L 97* 99* 98* 102  --  102   < > 107   CO2 mmol/L 36* 35* 32 32  --  28   < > 26   BUN mg/dL 56* 58* 60* 62*  --  57*   < > 58*   CREATININE mg/dL 3 29* 3 40* 3 16* 3 24*  --  2 87*   < > 2 83*   CALCIUM mg/dL 8 6 8 5 8 7 8 3  --  8 4   < > 8 2*   MAGNESIUM mg/dL  --   --  2 1 2 1  --  2 1  --   --    PHOSPHORUS mg/dL  --   --  4 4*  --   --  4 8*  --   --    ALK PHOS U/L  --   --   --   --   --   --   --  65   ALT U/L  --   --   --   --   --   --   --  20   AST U/L  --   --   --   --   --   --   --  20    < > = values in this interval not displayed

## 2021-03-17 NOTE — PROGRESS NOTES
[address] [facility]  Progress Note Danita Palacio 5/18/1930, 80 y o  female MRN: [id number]  Unit/Bed#: Reena Murphy -01 Encounter: [id number]  Primary Care Provider: Julius Marquez MD   Date and time admitted to hospital: 3/10/2021 12:45 PM    * Acute metabolic encephalopathy  Assessment & Plan  80year old female admitted due to acute metabolic encephalopathy  Metabolic encephalopathy felt to be multifactorial, secondary to acute kidney injury, congestive heart failure, as well as UTI  Initially there were concerns for possible healthcare acquired pneumonia, however her chest x-ray did not have any focal infiltrates in her procalcitonin was unremarkable, which ruled out pneumonia or lower respiratory tract infection  Patient was diagnosed with a UTI and treated with antibiotics  Continue supportive care,    UTI (urinary tract infection)  Assessment & Plan  Patient was diagnosed with a UTI  Has a residual right ureteral stent which has migrated up to the right renal pelvis on ultrasound  Urine cultures revealed greater than 100,000 Citrobacter, and [id number] Enterococcus  Patient completed 5 days of cefepime with a Citrobacter  Currently on amoxicillin day #4 to continue treatment for the Enterococcus  Concurrent blood culture revealed 1 set with lactococcus garviae, upon review of literature- this has been noted to be urinary pathogen, however was not found concurrently in her urine cx  Reviewed with ID and literature- probable contaminant  Water borne pathogen associate with fish          Acute on chronic diastolic congestive heart failure (HCC)  Assessment & Plan  Wt Readings from Last 3 Encounters:   03/17/21 60 6 kg (133 lb 9 6 oz)   03/07/21 61 6 kg (135 lb 12 9 oz)   02/03/21 75 1 kg (165 lb 9 1 oz)     Initially presented with an acute exacerbation of her chronic diastolic congestive heart failure  Prior to admission she was on Demadex 20 mg daily  Was initially diuresed with IV Lasix 80 mg IV b i d  however developed subsequent acute kidney injury, and diuretics on hold  Currently hypovolemic, with poor p o  intake  Patient had increasing weight after IV fluids given for worsening acute kidney injury  Family considering hospice another treatment options      Acute kidney injury superimposed on chronic kidney disease [facility])  Assessment & Plan  Patient has a history of chronic kidney disease stage 3  Baseline creatinine ranges 1 8-2 1  She presented with acute kidney injury with a creatinine of 2 83, that peaked at 3 4 on 03/16  Patient was evaluated by the nephrology team   Her acute kidney injury was felt to be cardiorenal syndrome, as well as previous IV diuretics with Lasix, and p o  metolazone  Patient has a history of atrophic left kidney  Patient also has a history of previous right ureteral stent and residual mild hydronephrosis  Continue to hold diuretics  Creatinine improved slightly to 3 2 after gentle IV fluids given yesterday  Urinary retention protocol  Patient and family in discussions regarding hospice care    Recent Labs     03/15/21  0456 03/16/21  0454 03/17/21  0519   BUN 60* 58* 56*   CREATININE 3 16* 3 40* 3 29*   EGFR 12 11 12               DVT (deep venous thrombosis) ([facility] [address] )  Assessment & Plan  Patient has a history of a DVT, on chronic anticoagulation with Coumadin  INR supratherapeutic so Coumadin temporarily on hold  INR improved:   Will restart Coumadin at 2 mg daily once INR is less than 3 0 and monitor INR closely due to current antibiotic use    Wounds and injuries  Assessment & Plan  Patient was evaluated by the wound care team  She is being treated for:  1-present on admission:  Evolving deep tissue injury to sacrum, suspected stage 3-4 unstageable  2-right hip with small amount of brown eschar, with surrounding erythema/induration  3-dry gangrene right toes  4-pressure ulceration posterior right heel, present on admission  5-venous ulceration posterior left leg  Continue wound care per Podiatry and Wound Care team  cont add analgesics as patient appears to be having pain:     Chronic respiratory failure with hypoxia [facility])  Assessment & Plan  Patient with history of chronic respiratory failure with hypoxia  Prior to admission she was on 3 L nasal cannula  Continue oxygen titrate as needed   Currently with adequate oxygenation on her home 3 L    Iron deficiency anemia secondary to inadequate dietary iron intake  Assessment & Plan  Patient with a history of anemia   Stable  No indication for transfusion at this time    Hemoglobin ranging 7-8    Recent Labs     03/16/21  0454 03/17/21  0519   HGB 8 8* 9 0*   MCV 89 88   RDW 18 2* 18 1*         Essential hypertension  Assessment & Plan  Patient has a history of essential hypertension  Continue Norvasc 5 mg daily, Coreg 12 5 mg b i d , hydralazine 50 mg q 8 hours, Isordil 10 mg t i d   Blood pressure adequately controlled    PAD (peripheral artery disease) ([facility])  Assessment & Plan  -Patient has a history of peripheral arterial disease and was previously evaluated by vascular surgery and Podiatry during prior admission  -LEADs significant for occlusion versus high grade stenosis of the proximal thigh portion of the superficial femoral artery with distal reconstitution   - Revascularization not pursued previously due to CKD and concern for progression to renal failure   -during her admission January 2021 patient was diagnosed with dry gangrene of the right foot, with a right pretibial ulcer:  Patient declined any invasive intervention at that time  -patient was re-evaluated by Podiatry during this current admission and diagnosed with 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         Hydronephrosis of right kidney  Assessment & Plan  -patient had right ureteral stent placed at [facility] 12/2  -current ultrasound reveals that the stent has migrated into the renal pelvis  -she will need follow-up with urology at [facility]            Family:  Updated  at bedside    dw pt's nurse  rubio   rubio Garcia, palliative care    VTE Pharmacologic Prophylaxis: Warfarin (Coumadin)  VTE Mechanical Prophylaxis: sequential compression device        Certification Statement: The patient will continue to require additional inpatient hospital stay due to need for further acute intervention for acute kidney injury, goals of care discussion, disposition planning    Status: inpatient     ===================================================================    Subjective:  Patient is currently lying right lateral decubitus  Answers questions with nodding and shaking her head  She denies any pain anywhere at all  She denies any upset stomach   at the bedside notes that she has been confused the past several days  Physical Exam:   Temp:  [97 7 °F (36 5 °C)-97 8 °F (36 6 °C)] 97 8 °F (36 6 °C)  HR:  [66-76] 66  Resp:  [18-20] 20  BP: (102-173)/(53-77) 126/53    Gen:  Non tachypneic non dyspneic  Appears slightly somnolent  Heart: regular rate and rhythm, S1S2 present, no murmur, rub or gallop  Lungs: clear to ausculatation bilaterally  No wheezing, crackles, or rhonchi  No accessory muscle use or respiratory distress  Abd: soft, non-tender, non-distended  NABS, no guarding, rebound or peritoneal signs  Neuro: somnolent  Opens eyes to voice  Communicates by nodding/shaking her head           LABS:   Results from last 7 days   Lab Units 03/17/21 0519 03/16/21 0454 03/14/21  0430   WBC Thousand/uL 7 57 5 90 5 66   HEMOGLOBIN g/dL 9 0* 8 8* 8 1*   HEMATOCRIT % 30 6* 28 9* 26 9*   PLATELETS Thousands/uL 195 184 178     Results from last 7 days   Lab Units 03/17/21  0519 03/16/21  0454 03/15/21  0456 POTASSIUM mmol/L 3 4* 3 5 3 3*   CHLORIDE mmol/L 97* 99* 98*   CO2 mmol/L 36* 35* 32   BUN mg/dL 56* 58* 60*   CREATININE mg/dL 3 29* 3 40* 3 16*   CALCIUM mg/dL 8 6 8 5 8 7       Hospital Data:    3/12 ultrasound kidney/bladder  Limited study   No right hydronephrosis status post ureteral stent placement  Echogenic ureteral stent identified in the renal pelvis       3/10 chest x-ray  Mild to moderate congestive changes   Small right and moderate left effusion   Left lung base atelectasis or infiltrate     3/7 CT head  No acute intracranial abnormality     Microbiology  3/10 urine culture:  Greater than 100,000 Citrobacter freundii, sensitive to ceftriaxone  [id number] Enterococcus faecalis sensitive to ampicillin  3/10 blood cultures:  No growth x1, lactococcus garvieae x 1  3/10 negative COVID, influenza, RSV        ---------------------------------------------------------------------------------------------------------------  This note has been constructed using a voice recognition system

## 2021-03-17 NOTE — ASSESSMENT & PLAN NOTE
Patient was evaluated by the wound care team  She is being treated for:  1-present on admission:  Evolving deep tissue injury to sacrum, suspected stage 3-4 unstageable  2-right hip with small amount of brown eschar, with surrounding erythema/induration  3-dry gangrene right toes  4-pressure ulceration posterior right heel, present on admission  5-venous ulceration posterior left leg  Continue wound care per Podiatry and Wound Care team  cont add analgesics as patient appears to be having pain:

## 2021-03-17 NOTE — ASSESSMENT & PLAN NOTE
-patient had right ureteral stent placed at [facility] 12/2  -current ultrasound reveals that the stent has migrated into the renal pelvis  -she will need follow-up with urology at [facility]

## 2021-03-18 LAB
ANION GAP SERPL CALCULATED.3IONS-SCNC: 6 MMOL/L (ref 4–13)
BASOPHILS # BLD AUTO: 0.05 THOUSANDS/ΜL (ref 0–0.1)
BASOPHILS NFR BLD AUTO: 1 % (ref 0–1)
BUN SERPL-MCNC: 55 MG/DL (ref 5–25)
CALCIUM SERPL-MCNC: 9 MG/DL (ref 8.3–10.1)
CHLORIDE SERPL-SCNC: 99 MMOL/L (ref 100–108)
CO2 SERPL-SCNC: 35 MMOL/L (ref 21–32)
CREAT SERPL-MCNC: 3.18 MG/DL (ref 0.6–1.3)
EOSINOPHIL # BLD AUTO: 0.34 THOUSAND/ΜL (ref 0–0.61)
EOSINOPHIL NFR BLD AUTO: 5 % (ref 0–6)
ERYTHROCYTE [DISTWIDTH] IN BLOOD BY AUTOMATED COUNT: 17.9 % (ref 11.6–15.1)
GFR SERPL CREATININE-BSD FRML MDRD: 12 ML/MIN/1.73SQ M
GLUCOSE SERPL-MCNC: 72 MG/DL (ref 65–140)
HCT VFR BLD AUTO: 31 % (ref 34.8–46.1)
HGB BLD-MCNC: 8.9 G/DL (ref 11.5–15.4)
IMM GRANULOCYTES # BLD AUTO: 0.04 THOUSAND/UL (ref 0–0.2)
IMM GRANULOCYTES NFR BLD AUTO: 1 % (ref 0–2)
INR PPP: 2.38 (ref 0.84–1.19)
LYMPHOCYTES # BLD AUTO: 3.2 THOUSANDS/ΜL (ref 0.6–4.47)
LYMPHOCYTES NFR BLD AUTO: 49 % (ref 14–44)
MAGNESIUM SERPL-MCNC: 2.4 MG/DL (ref 1.6–2.6)
MCH RBC QN AUTO: 25.8 PG (ref 26.8–34.3)
MCHC RBC AUTO-ENTMCNC: 28.7 G/DL (ref 31.4–37.4)
MCV RBC AUTO: 90 FL (ref 82–98)
MONOCYTES # BLD AUTO: 0.6 THOUSAND/ΜL (ref 0.17–1.22)
MONOCYTES NFR BLD AUTO: 9 % (ref 4–12)
NEUTROPHILS # BLD AUTO: 2.25 THOUSANDS/ΜL (ref 1.85–7.62)
NEUTS SEG NFR BLD AUTO: 35 % (ref 43–75)
NRBC BLD AUTO-RTO: 0 /100 WBCS
PLATELET # BLD AUTO: 177 THOUSANDS/UL (ref 149–390)
PMV BLD AUTO: 10.6 FL (ref 8.9–12.7)
POTASSIUM SERPL-SCNC: 4.1 MMOL/L (ref 3.5–5.3)
PROTHROMBIN TIME: 25.5 SECONDS (ref 11.6–14.5)
RBC # BLD AUTO: 3.45 MILLION/UL (ref 3.81–5.12)
SODIUM SERPL-SCNC: 140 MMOL/L (ref 136–145)
WBC # BLD AUTO: 6.48 THOUSAND/UL (ref 4.31–10.16)

## 2021-03-18 PROCEDURE — 85610 PROTHROMBIN TIME: CPT | Performed by: INTERNAL MEDICINE

## 2021-03-18 PROCEDURE — 83735 ASSAY OF MAGNESIUM: CPT | Performed by: INTERNAL MEDICINE

## 2021-03-18 PROCEDURE — 80048 BASIC METABOLIC PNL TOTAL CA: CPT | Performed by: INTERNAL MEDICINE

## 2021-03-18 PROCEDURE — 85025 COMPLETE CBC W/AUTO DIFF WBC: CPT | Performed by: INTERNAL MEDICINE

## 2021-03-18 PROCEDURE — 99232 SBSQ HOSP IP/OBS MODERATE 35: CPT | Performed by: PODIATRIST

## 2021-03-18 PROCEDURE — 99232 SBSQ HOSP IP/OBS MODERATE 35: CPT | Performed by: INTERNAL MEDICINE

## 2021-03-18 RX ADMIN — Medication 250 MG: at 08:31

## 2021-03-18 RX ADMIN — LEVOTHYROXINE SODIUM 25 MCG: 25 TABLET ORAL at 05:50

## 2021-03-18 RX ADMIN — CARVEDILOL 12.5 MG: 6.25 TABLET, FILM COATED ORAL at 08:31

## 2021-03-18 RX ADMIN — AMLODIPINE BESYLATE 5 MG: 5 TABLET ORAL at 08:31

## 2021-03-18 RX ADMIN — ISOSORBIDE DINITRATE 10 MG: 10 TABLET ORAL at 08:32

## 2021-03-18 RX ADMIN — AMOXICILLIN 500 MG: 500 CAPSULE ORAL at 08:31

## 2021-03-18 RX ADMIN — COLLAGENASE SANTYL: 250 OINTMENT TOPICAL at 08:32

## 2021-03-18 RX ADMIN — HYDRALAZINE HYDROCHLORIDE 50 MG: 25 TABLET ORAL at 05:53

## 2021-03-18 NOTE — PLAN OF CARE
Problem: Potential for Falls  Goal: Patient will remain free of falls  Description: INTERVENTIONS:  - Assess patient frequently for physical needs  -  Identify cognitive and physical deficits and behaviors that affect risk of falls  -  [place] fall precautions as indicated by assessment   - Educate patient/family on patient safety including physical limitations  - Instruct patient to call for assistance with activity based on assessment  - Modify environment to reduce risk of injury  - Consider OT/PT consult to assist with strengthening/mobility  Outcome: Progressing     Problem: Prexisting or High Potential for Compromised Skin Integrity  Goal: Skin integrity is maintained or improved  Description: INTERVENTIONS:  - Identify patients at risk for skin breakdown  - Assess and monitor skin integrity  - Assess and monitor nutrition and hydration status  - Monitor labs   - Assess for incontinence   - Turn and reposition patient  - Assist with mobility/ambulation  - Relieve pressure over bony prominences  - Avoid friction and shearing  - Provide appropriate hygiene as needed including keeping skin clean and dry  - Evaluate need for skin moisturizer/barrier cream  - Collaborate with interdisciplinary team   - Patient/family teaching  - Consider wound care consult   Outcome: Progressing     Problem: Nutrition/Hydration-ADULT  Goal: Nutrient/Hydration intake appropriate for improving, restoring or maintaining nutritional needs  Description: Monitor and assess patient's nutrition/hydration status for malnutrition  Collaborate with interdisciplinary team and initiate plan and interventions as ordered  Monitor patient's weight and dietary intake as ordered or per policy  Utilize nutrition screening tool and intervene as necessary  Determine patient's food preferences and provide high-protein, high-caloric foods as appropriate       INTERVENTIONS:  - Monitor oral intake, urinary output, labs, and treatment plans  - Assess nutrition and hydration status and recommend course of action  - Evaluate amount of meals eaten  - Assist patient with eating if necessary   - Allow adequate time for meals  - Recommend/ encourage appropriate diets, oral nutritional supplements, and vitamin/mineral supplements  - Order, calculate, and assess calorie counts as needed  - Recommend, monitor, and adjust tube feedings and TPN/PPN based on assessed needs  - Assess need for intravenous fluids  - Provide specific nutrition/hydration education as appropriate  - Include patient/family/caregiver in decisions related to nutrition  Outcome: Progressing     Problem: RESPIRATORY - ADULT  Goal: Achieves optimal ventilation and oxygenation  Description: INTERVENTIONS:  - Assess for changes in respiratory status  - Assess for changes in mentation and behavior  - Position to facilitate oxygenation and minimize respiratory effort  - Oxygen administered by appropriate delivery if ordered  - Initiate smoking cessation education as indicated  - Encourage broncho-pulmonary hygiene including cough, deep breathe, Incentive Spirometry  - Assess the need for suctioning and aspirate as needed  - Assess and instruct to report SOB or any respiratory difficulty  - Respiratory Therapy support as indicated  Outcome: Progressing     Problem: METABOLIC, FLUID AND ELECTROLYTES - ADULT  Goal: Electrolytes maintained within normal limits  Description: INTERVENTIONS:  - Monitor labs and assess patient for signs and symptoms of electrolyte imbalances  - Administer electrolyte replacement as ordered  - Monitor response to electrolyte replacements, including repeat lab results as appropriate  - Instruct patient on fluid and nutrition as appropriate  Outcome: Progressing  Goal: Fluid balance maintained  Description: INTERVENTIONS:  - Monitor labs   - Monitor I/O and WT  - Instruct patient on fluid and nutrition as appropriate  - Assess for signs & symptoms of volume excess or deficit  Outcome: Progressing     Problem: SKIN/TISSUE INTEGRITY - ADULT  Goal: Skin integrity remains intact  Description: INTERVENTIONS  - Identify patients at risk for skin breakdown  - Assess and monitor skin integrity  - Assess and monitor nutrition and hydration status  - Monitor labs (i e  albumin)  - Assess for incontinence   - Turn and reposition patient  - Assist with mobility/ambulation  - Relieve pressure over bony prominences  - Avoid friction and shearing  - Provide appropriate hygiene as needed including keeping skin clean and dry  - Evaluate need for skin moisturizer/barrier cream  - Collaborate with interdisciplinary team (i e  Nutrition, Rehabilitation, etc )   - Patient/family teaching  Outcome: Progressing  Goal: Incision(s), wounds(s) or drain site(s) healing without S/S of infection  Description: INTERVENTIONS  - Assess and document risk factors for skin impairment   - Assess and document dressing, incision, wound bed, drain sites and surrounding tissue  - Consider nutrition services referral as needed  - Oral mucous membranes remain intact  - Provide patient/ family education  Outcome: Progressing     Problem: INFECTION - ADULT  Goal: Absence or prevention of progression during hospitalization  Description: INTERVENTIONS:  - Assess and monitor for signs and symptoms of infection  - Monitor lab/diagnostic results  - Monitor all insertion sites, i e  indwelling lines, tubes, and drains  - Monitor endotracheal if appropriate and nasal secretions for changes in amount and color  - [place] appropriate cooling/warming therapies per order  - Administer medications as ordered  - Instruct and encourage patient and family to use good hand hygiene technique  - Identify and instruct in appropriate isolation precautions for identified infection/condition  Outcome: Progressing     Problem: SAFETY ADULT  Goal: Patient will remain free of falls  Description: INTERVENTIONS:  - Assess patient frequently for physical needs  -  Identify cognitive and physical deficits and behaviors that affect risk of falls    -  [place] fall precautions as indicated by assessment   - Educate patient/family on patient safety including physical limitations  - Instruct patient to call for assistance with activity based on assessment  - Modify environment to reduce risk of injury  - Consider OT/PT consult to assist with strengthening/mobility  Outcome: Progressing  Goal: Maintain or return to baseline ADL function  Description: INTERVENTIONS:  -  Assess patient's ability to carry out ADLs; assess patient's baseline for ADL function and identify physical deficits which impact ability to perform ADLs (bathing, care of mouth/teeth, toileting, grooming, dressing, etc )  - Assess/evaluate cause of self-care deficits   - Assess range of motion  - Assess patient's mobility; develop plan if impaired  - Assess patient's need for assistive devices and provide as appropriate  - Encourage maximum independence but intervene and supervise when necessary  - Involve family in performance of ADLs  - Assess for home care needs following discharge   - Consider OT consult to assist with ADL evaluation and planning for discharge  - Provide patient education as appropriate  Outcome: Progressing     Problem: DISCHARGE PLANNING  Goal: Discharge to home or other facility with appropriate resources  Description: INTERVENTIONS:  - Identify barriers to discharge w/patient and caregiver  - Arrange for needed discharge resources and transportation as appropriate  - Identify discharge learning needs (meds, wound care, etc )  - Arrange for interpretive services to assist at discharge as needed  - Refer to Case Management Department for coordinating discharge planning if the patient needs post-hospital services based on physician/advanced practitioner order or complex needs related to functional status, cognitive ability, or social support system  Outcome: Progressing Problem: Knowledge Deficit  Goal: Patient/family/caregiver demonstrates understanding of disease process, treatment plan, medications, and discharge instructions  Description: Complete learning assessment and assess knowledge base    Interventions:  - Provide teaching at level of understanding  - Provide teaching via preferred learning methods  Outcome: Progressing

## 2021-03-18 NOTE — ASSESSMENT & PLAN NOTE
Wt Readings from Last 3 Encounters:   03/18/21 58 9 kg (129 lb 13 6 oz)   03/07/21 61 6 kg (135 lb 12 9 oz)   02/03/21 75 1 kg (165 lb 9 1 oz)     Initially presented with an acute exacerbation of her chronic diastolic congestive heart failure  Prior to admission she was on Demadex 20 mg daily  Was initially diuresed with IV Lasix 80 mg IV b i d  however developed subsequent acute kidney injury, and diuretics on hold  Currently hypovolemic, with poor p o  intake  Patient had increasing weight after IV fluids given for worsening acute kidney injury  Family considering hospice

## 2021-03-18 NOTE — ASSESSMENT & PLAN NOTE
Patient has a history of a DVT, on chronic anticoagulation with Coumadin  INR supratherapeutic so Coumadin temporarily on hold  INR improved:    Restart Coumadin at 2 mg daily   Cont to monitor INR

## 2021-03-18 NOTE — ASSESSMENT & PLAN NOTE
Patient with a history of anemia   Stable  No indication for transfusion at this time    Hemoglobin ranging 7-8    Recent Labs     03/16/21  0454 03/17/21  0519 03/18/21  0458   HGB 8 8* 9 0* 8 9*   MCV 89 88 90   RDW 18 2* 18 1* 17 9*

## 2021-03-18 NOTE — CASE MANAGEMENT
Neck , no lymphadenopathy Pt's  Wilfred Holcomb to meet with Hospice Liaison Friday a m at which time equipment needed will be arranged  Per Wilfred Holcomb, he is to have his children assist with moving furniture to accommodate said equipment  Wilfred Holcomb is very overwhelmed and admits to anxiety over situation and how fast everything is moving- emotional support provided with process for d/c to home on hospice explained more in depth- verbal understanding of same given  Agreeable to dc arrangements with start of hospice on Saturday morning (understands would be looking for an 11:00 discharge home)  IMM#2 discussed with verbal signature provided, form completed and to be scanned into EHR  Will arrange BLS transport home proactively Friday for a Saturday d/c

## 2021-03-18 NOTE — ASSESSMENT & PLAN NOTE
Patient has a history of chronic kidney disease stage 3  Baseline creatinine ranges 1 8-2 1  She presented with acute kidney injury with a creatinine of 2 83, that peaked at 3 4 on 03/16  Patient was evaluated by the nephrology team   Her acute kidney injury was felt to be cardiorenal syndrome, as well as previous IV diuretics with Lasix, and p o  metolazone  Patient has a history of atrophic left kidney  Patient also has a history of previous right ureteral stent and residual mild hydronephrosis  Continue to hold diuretics  Creatinine improved slightly to 3 2 after gentle IV fluids   Cont Urinary retention protocol  Patient and family are considering transition to hospice care    Recent Labs     03/16/21  0454 03/17/21  0519 03/18/21  0458   BUN 58* 56* 55*   CREATININE 3 40* 3 29* 3 18*   EGFR 11 12 12

## 2021-03-18 NOTE — HOSPICE NOTE
Liaison met with  at bedside  Pt sleeping and he did not want me to wake her  We minimally talked about hospice and he did not want to be rushed into making a decision  Liaison will be meeting him tomorrow at his home around 930 am to talk about plan of care moving forward

## 2021-03-18 NOTE — PLAN OF CARE
Problem: Potential for Falls  Goal: Patient will remain free of falls  Description: INTERVENTIONS:  - Assess patient frequently for physical needs  -  Identify cognitive and physical deficits and behaviors that affect risk of falls  -  [place] fall precautions as indicated by assessment   - Educate patient/family on patient safety including physical limitations  - Instruct patient to call for assistance with activity based on assessment  - Modify environment to reduce risk of injury  - Consider OT/PT consult to assist with strengthening/mobility  Outcome: Progressing     Problem: Prexisting or High Potential for Compromised Skin Integrity  Goal: Skin integrity is maintained or improved  Description: INTERVENTIONS:  - Identify patients at risk for skin breakdown  - Assess and monitor skin integrity  - Assess and monitor nutrition and hydration status  - Monitor labs   - Assess for incontinence   - Turn and reposition patient  - Assist with mobility/ambulation  - Relieve pressure over bony prominences  - Avoid friction and shearing  - Provide appropriate hygiene as needed including keeping skin clean and dry  - Evaluate need for skin moisturizer/barrier cream  - Collaborate with interdisciplinary team   - Patient/family teaching  - Consider wound care consult   Outcome: Progressing     Problem: Nutrition/Hydration-ADULT  Goal: Nutrient/Hydration intake appropriate for improving, restoring or maintaining nutritional needs  Description: Monitor and assess patient's nutrition/hydration status for malnutrition  Collaborate with interdisciplinary team and initiate plan and interventions as ordered  Monitor patient's weight and dietary intake as ordered or per policy  Utilize nutrition screening tool and intervene as necessary  Determine patient's food preferences and provide high-protein, high-caloric foods as appropriate       INTERVENTIONS:  - Monitor oral intake, urinary output, labs, and treatment plans  - Assess nutrition and hydration status and recommend course of action  - Evaluate amount of meals eaten  - Assist patient with eating if necessary   - Allow adequate time for meals  - Recommend/ encourage appropriate diets, oral nutritional supplements, and vitamin/mineral supplements  - Order, calculate, and assess calorie counts as needed  - Recommend, monitor, and adjust tube feedings and TPN/PPN based on assessed needs  - Assess need for intravenous fluids  - Provide specific nutrition/hydration education as appropriate  - Include patient/family/caregiver in decisions related to nutrition  Outcome: Progressing     Problem: RESPIRATORY - ADULT  Goal: Achieves optimal ventilation and oxygenation  Description: INTERVENTIONS:  - Assess for changes in respiratory status  - Assess for changes in mentation and behavior  - Position to facilitate oxygenation and minimize respiratory effort  - Oxygen administered by appropriate delivery if ordered  - Initiate smoking cessation education as indicated  - Encourage broncho-pulmonary hygiene including cough, deep breathe, Incentive Spirometry  - Assess the need for suctioning and aspirate as needed  - Assess and instruct to report SOB or any respiratory difficulty  - Respiratory Therapy support as indicated  Outcome: Progressing     Problem: METABOLIC, FLUID AND ELECTROLYTES - ADULT  Goal: Electrolytes maintained within normal limits  Description: INTERVENTIONS:  - Monitor labs and assess patient for signs and symptoms of electrolyte imbalances  - Administer electrolyte replacement as ordered  - Monitor response to electrolyte replacements, including repeat lab results as appropriate  - Instruct patient on fluid and nutrition as appropriate  Outcome: Progressing  Goal: Fluid balance maintained  Description: INTERVENTIONS:  - Monitor labs   - Monitor I/O and WT  - Instruct patient on fluid and nutrition as appropriate  - Assess for signs & symptoms of volume excess or deficit  Outcome: Progressing     Problem: SKIN/TISSUE INTEGRITY - ADULT  Goal: Skin integrity remains intact  Description: INTERVENTIONS  - Identify patients at risk for skin breakdown  - Assess and monitor skin integrity  - Assess and monitor nutrition and hydration status  - Monitor labs (i e  albumin)  - Assess for incontinence   - Turn and reposition patient  - Assist with mobility/ambulation  - Relieve pressure over bony prominences  - Avoid friction and shearing  - Provide appropriate hygiene as needed including keeping skin clean and dry  - Evaluate need for skin moisturizer/barrier cream  - Collaborate with interdisciplinary team (i e  Nutrition, Rehabilitation, etc )   - Patient/family teaching  Outcome: Progressing  Goal: Incision(s), wounds(s) or drain site(s) healing without S/S of infection  Description: INTERVENTIONS  - Assess and document risk factors for skin impairment   - Assess and document dressing, incision, wound bed, drain sites and surrounding tissue  - Consider nutrition services referral as needed  - Oral mucous membranes remain intact  - Provide patient/ family education  Outcome: Progressing     Problem: INFECTION - ADULT  Goal: Absence or prevention of progression during hospitalization  Description: INTERVENTIONS:  - Assess and monitor for signs and symptoms of infection  - Monitor lab/diagnostic results  - Monitor all insertion sites, i e  indwelling lines, tubes, and drains  - Monitor endotracheal if appropriate and nasal secretions for changes in amount and color  - [place] appropriate cooling/warming therapies per order  - Administer medications as ordered  - Instruct and encourage patient and family to use good hand hygiene technique  - Identify and instruct in appropriate isolation precautions for identified infection/condition  Outcome: Progressing     Problem: SAFETY ADULT  Goal: Patient will remain free of falls  Description: INTERVENTIONS:  - Assess patient frequently for physical needs  -  Identify cognitive and physical deficits and behaviors that affect risk of falls    -  [place] fall precautions as indicated by assessment   - Educate patient/family on patient safety including physical limitations  - Instruct patient to call for assistance with activity based on assessment  - Modify environment to reduce risk of injury  - Consider OT/PT consult to assist with strengthening/mobility  Outcome: Progressing  Goal: Maintain or return to baseline ADL function  Description: INTERVENTIONS:  -  Assess patient's ability to carry out ADLs; assess patient's baseline for ADL function and identify physical deficits which impact ability to perform ADLs (bathing, care of mouth/teeth, toileting, grooming, dressing, etc )  - Assess/evaluate cause of self-care deficits   - Assess range of motion  - Assess patient's mobility; develop plan if impaired  - Assess patient's need for assistive devices and provide as appropriate  - Encourage maximum independence but intervene and supervise when necessary  - Involve family in performance of ADLs  - Assess for home care needs following discharge   - Consider OT consult to assist with ADL evaluation and planning for discharge  - Provide patient education as appropriate  Outcome: Progressing     Problem: DISCHARGE PLANNING  Goal: Discharge to home or other facility with appropriate resources  Description: INTERVENTIONS:  - Identify barriers to discharge w/patient and caregiver  - Arrange for needed discharge resources and transportation as appropriate  - Identify discharge learning needs (meds, wound care, etc )  - Arrange for interpretive services to assist at discharge as needed  - Refer to Case Management Department for coordinating discharge planning if the patient needs post-hospital services based on physician/advanced practitioner order or complex needs related to functional status, cognitive ability, or social support system  Outcome: Progressing Problem: Knowledge Deficit  Goal: Patient/family/caregiver demonstrates understanding of disease process, treatment plan, medications, and discharge instructions  Description: Complete learning assessment and assess knowledge base    Interventions:  - Provide teaching at level of understanding  - Provide teaching via preferred learning methods  Outcome: Progressing

## 2021-03-18 NOTE — ASSESSMENT & PLAN NOTE
-Patient has a history of peripheral arterial disease and was previously evaluated by vascular surgery and Podiatry during prior admission  -LEADs significant for occlusion versus high grade stenosis of the proximal thigh portion of the superficial femoral artery with distal reconstitution   - Revascularization not pursued previously due to CKD and concern for progression to renal failure   -during her admission January 2021 patient was diagnosed with dry gangrene of the right foot, with a right pretibial ulcer:  Patient declined any invasive intervention at that time  -patient was re-evaluated by Podiatry during this current admission and diagnosed with dry gangrene to her right foot, venous ulceration posterior left leg, and pressure ulceration to right posterior heel  -continue palliative wound care      Recent Labs     03/16/21  0454 03/17/21  0856 03/18/21  0458   INR 3 02* 2 67* 2 38*

## 2021-03-18 NOTE — ASSESSMENT & PLAN NOTE
Patient was diagnosed with a UTI  Has a residual right ureteral stent which has migrated up to the right renal pelvis on ultrasound  Urine cultures revealed greater than 100,000 Citrobacter, and [id number] Enterococcus  Patient completed 5 days of cefepime with a Citrobacter  Currently on amoxicillin day #5 to continue treatment for the Enterococcus  Concurrent blood culture revealed 1 set with lactococcus garviae, upon review of literature- this has been noted to be urinary pathogen, however was not found concurrently in her urine cx  Reviewed with ID and literature- probable contaminant  Water borne pathogen associate with fish    Repeat blood cx sent and are neg thus far

## 2021-03-18 NOTE — PROGRESS NOTES
[facility] - Progress Note  Patient: Johnathan Campuzano 80 y o  female   MRN: [id number]  PCP: Carleen Titus MD  Unit/Bed#: [address] [address] [place] [address] [id number] Encounter: [id number]  Date Of Visit: 21    ASSESSMENT:    Johnathan Campuzano is a 80 y o  female with:    1  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, right lesser digits  2  Unstageable Pressure ulceration, posterior right heel- POA  3  Venous ulceration, posterior left leg  4  Peripheral arterial disease    PLAN:    · Continue local wound care while in house  · Nursing instructions in place for dressing changes  · Elevate and offload heels while in bed  · Rest of care per primary team   · Ok for discharge from podiatry point of view  Will continue to see on a weekly basis while in house  SUBJECTIVE:     The patient was seen, evaluated, and assessed at bedside today  The patient was awake, alert, and in no acute distress  No acute events overnight  Reports occasional pain  discomfort to bilateral legs, R>L  OBJECTIVE:     Vitals:   /70 (BP Location: Right arm)   Pulse 79   Temp 97 6 °F (36 4 °C) (Oral)   Resp 18   Wt 58 9 kg (129 lb 13 6 oz)   LMP  (LMP Unknown)   SpO2 96%   BMI 20 96 kg/m²     Temp (24hrs), Av 6 °F (36 4 °C), Min:97 6 °F (36 4 °C), Max:97 6 °F (36 4 °C)      Physical Exam:     General:  Alert, cooperative, and in no distress  Lower extremity exam:  Cardiovascular status at baseline  Neurological status at baseline  Musculoskeletal status at baseline  No calf tenderness noted  Lesser digits on the right dusky/darkened in color consistent with ischemic changes  Dry eschars anterior right leg intact without signs of infection, skin is fragile in appearance and cool to touch of BLE  Wound (1) located posterior right heel, measures approximately 2 cm x 2 cm x 0 1 cm  without sinus tracking or undermining  Wound bed appears eschar with no drainage  The wound base is 0% red/granular, 0% yellow/fibrous, 100% black/necrotic   Deepest tissue noted in base is unknown  Malodor is not noticed  Wound edge appears non-attached medialy  [place]-wound skin appears intact  Probe to bone is negative   Signs of infection are not present at this time  Wound (2) located posterior left leg, measures approximately 6 cm x 3 cm x 0 1 cm  without sinus tracking or undermining  Wound bed appears pink/red and fibrotic with serous drainage  The wound base is 40% red/granular, 60% yellow/fibrous, 0% black/necrotic  Deepest tissue noted in base is subq  Malodor is not noticed  Wound edge appears Attached  Coraz[place]-wound skin appears intact  Probe to bone is negative   Signs of infection are not present at this time  Additional Data:     Labs:    Results from last 7 days   Lab Units 03/18/21  0458   WBC Thousand/uL 6 48   HEMOGLOBIN g/dL 8 9*   HEMATOCRIT % 31 0*   PLATELETS Thousands/uL 177   NEUTROS PCT % 35*   LYMPHS PCT % 49*   MONOS PCT % 9   EOS PCT % 5     Results from last 7 days   Lab Units 03/18/21  0458   POTASSIUM mmol/L 4 1   CHLORIDE mmol/L 99*   CO2 mmol/L 35*   BUN mg/dL 55*   CREATININE mg/dL 3 18*   CALCIUM mg/dL 9 0     Results from last 7 days   Lab Units 03/18/21  0458   INR  2 38*       * I Have Reviewed All Lab Data Listed Above  Recent Cultures (last 7 days):     Results from last 7 days   Lab Units 03/17/21  0516 03/17/21  0515   BLOOD CULTURE  Received in Microbiology Lab  Culture in Progress  Received in Microbiology Lab  Culture in Progress  Imaging: I have personally reviewed pertinent films in PACS  EKG, Pathology, and Other Studies: I have personally reviewed pertinent reports  ** Please Note: Portions of the record may have been created with voice recognition software  Occasional wrong word or "sound a like" substitutions may have occurred due to the inherent limitations of voice recognition software  Read the chart carefully and recognize, using context, where substitutions have occurred   **

## 2021-03-18 NOTE — PROGRESS NOTES
[facility]  Progress Note Call Notice 5/18/1930, 80 y o  female MRN: [id number]  Unit/Bed#: [place] -01 Encounter: [id number]  Primary Care Provider: Jacobo Ashraf MD   Date and time admitted to hospital: 3/10/2021 12:45 PM    * Acute metabolic encephalopathy  Assessment & Plan  80year old female admitted due to acute metabolic encephalopathy  Metabolic encephalopathy felt to be multifactorial, secondary to acute kidney injury, congestive heart failure, as well as UTI  Initially there were concerns for possible healthcare acquired pneumonia, however her chest x-ray did not have any focal infiltrates in her procalcitonin was unremarkable, which ruled out pneumonia or lower respiratory tract infection  Patient was diagnosed with a UTI and treated with antibiotics  Continue supportive care,    UTI (urinary tract infection)  Assessment & Plan  Patient was diagnosed with a UTI  Has a residual right ureteral stent which has migrated up to the right renal pelvis on ultrasound  Urine cultures revealed greater than 100,000 Citrobacter, and [id number] Enterococcus  Patient completed 5 days of cefepime with a Citrobacter  Currently on amoxicillin day #5 to continue treatment for the Enterococcus  Concurrent blood culture revealed 1 set with lactococcus garviae, upon review of literature- this has been noted to be urinary pathogen, however was not found concurrently in her urine cx  Reviewed with ID and literature- probable contaminant  Water borne pathogen associate with fish    Repeat blood cx sent and are neg thus far        Acute on chronic diastolic congestive heart failure (HCC)  Assessment & Plan  Wt Readings from Last 3 Encounters:   03/18/21 58 9 kg (129 lb 13 6 oz)   03/07/21 61 6 kg (135 lb 12 9 oz)   02/03/21 75 1 kg (165 lb 9 1 oz)     Initially presented with an acute exacerbation of her chronic diastolic congestive heart failure  Prior to admission she was on Demadex 20 mg daily  Was initially diuresed with IV Lasix 80 mg IV b i d  however developed subsequent acute kidney injury, and diuretics on hold  Currently hypovolemic, with poor p o  intake  Patient had increasing weight after IV fluids given for worsening acute kidney injury  Family considering hospice       Acute kidney injury superimposed on chronic kidney disease [facility])  Assessment & Plan  Patient has a history of chronic kidney disease stage 3  Baseline creatinine ranges 1 8-2 1  She presented with acute kidney injury with a creatinine of 2 83, that peaked at 3 4 on 03/16  Patient was evaluated by the nephrology team   Her acute kidney injury was felt to be cardiorenal syndrome, as well as previous IV diuretics with Lasix, and p o  metolazone  Patient has a history of atrophic left kidney  Patient also has a history of previous right ureteral stent and residual mild hydronephrosis  Continue to hold diuretics  Creatinine improved slightly to 3 2 after gentle IV fluids   Cont Urinary retention protocol  Patient and family are considering transition to hospice care    Recent Labs     03/16/21  0454 03/17/21  0519 03/18/21  0458   BUN 58* 56* 55*   CREATININE 3 40* 3 29* 3 18*   EGFR 11 12 12               DVT (deep venous thrombosis) ([facility] [address] )  Assessment & Plan  Patient has a history of a DVT, on chronic anticoagulation with Coumadin  INR supratherapeutic so Coumadin temporarily on hold  INR improved:    Restart Coumadin at 2 mg daily   Cont to monitor INR    Wounds and injuries  Assessment & Plan  Patient was evaluated by the wound care team  She is being treated for:  1-present on admission:  Evolving deep tissue injury to sacrum, suspected stage 3-4 unstageable  2-right hip with small amount of brown eschar, with surrounding erythema/induration  3-dry gangrene right toes  4-pressure ulceration posterior right heel, present on admission  5-venous ulceration posterior left leg  Continue wound care per Podiatry and Wound Care team  cont add analgesics as patient appears to be having pain:     Chronic respiratory failure with hypoxia [facility])  Assessment & Plan  Patient with history of chronic respiratory failure with hypoxia  Prior to admission she was on 3 L nasal cannula  Continue oxygen titrate as needed   Currently with adequate oxygenation on her home 3 L    Iron deficiency anemia secondary to inadequate dietary iron intake  Assessment & Plan  Patient with a history of anemia   Stable  No indication for transfusion at this time    Hemoglobin ranging 7-8    Recent Labs     03/16/21  0454 03/17/21  0519 03/18/21  0458   HGB 8 8* 9 0* 8 9*   MCV 89 88 90   RDW 18 2* 18 1* 17 9*         Essential hypertension  Assessment & Plan  Patient has a history of essential hypertension  Continue Norvasc 5 mg daily, Coreg 12 5 mg b i d , hydralazine 50 mg q 8 hours, Isordil 10 mg t i d   Blood pressure adequately controlled    PAD (peripheral artery disease) ([facility])  Assessment & Plan  -Patient has a history of peripheral arterial disease and was previously evaluated by vascular surgery and Podiatry during prior admission  -LEADs significant for occlusion versus high grade stenosis of the proximal thigh portion of the superficial femoral artery with distal reconstitution   - Revascularization not pursued previously due to CKD and concern for progression to renal failure   -during her admission January 2021 patient was diagnosed with dry gangrene of the right foot, with a right pretibial ulcer:  Patient declined any invasive intervention at that time  -patient was re-evaluated by Podiatry during this current admission and diagnosed with dry gangrene to her right foot, venous ulceration posterior left leg, and pressure ulceration to right posterior heel  -continue palliative wound care      Recent Labs     03/16/21  0454 03/17/21  0856 03/18/21  0458   INR 3 02* 2 67* 2 38*         Hydronephrosis of right kidney  Assessment & Plan  -patient had right ureteral stent placed at [facility] 12/2  -current ultrasound reveals that the stent has migrated into the renal pelvis  -she will need follow-up with urology at [facility]  -however is considering hospice            Family:  Updated  at bedside    dw pts nurse  dw     VTE Pharmacologic Prophylaxis: Warfarin (Coumadin)  VTE Mechanical Prophylaxis: sequential compression device        Certification Statement: The patient will continue to require additional inpatient hospital stay due to need for further acute intervention for discharge planning  Possible hospice    Status: inpatient     ===================================================================    Subjective:  Pt denies any pain anywhere  Denies any nausea  Denies any sob  Physical Exam:   Temp:  [97 6 °F (36 4 °C)] 97 6 °F (36 4 °C)  HR:  [64-79] 67  Resp:  [17-18] 17  BP: (118-157)/(48-70) 118/49    Gen:  Sitting upright with hob at 75 degrees, non-tachypnic, non-dyspnic  Awake and interactive  Heart: regular rate and rhythm, S1S2 present, no murmur, rub or gallop  Lungs: clear to ausculatation bilaterally  No wheezing, crackles, or rhonchi  No accessory muscle use or respiratory distress  Abd: soft, non-tender, non-distended  NABS  Neuro:  Awake and alert  Makes eye contact    Fluent speech      LABS:   Results from last 7 days   Lab Units 03/18/21 0458 03/17/21  0519 03/16/21  0454   WBC Thousand/uL 6 48 7 57 5 90   HEMOGLOBIN g/dL 8 9* 9 0* 8 8*   HEMATOCRIT % 31 0* 30 6* 28 9*   PLATELETS Thousands/uL 177 195 184     Results from last 7 days   Lab Units 03/18/21 0458 03/17/21  0519 03/16/21  0454   POTASSIUM mmol/L 4 1 3 4* 3 5   CHLORIDE mmol/L 99* 97* 99*   CO2 mmol/L 35* 36* 35*   BUN mg/dL 55* 56* 58*   CREATININE mg/dL 3 18* 3 29* 3 40*   CALCIUM mg/dL 9 0 8 6 8 5       Hospital Data:    3/12 ultrasound kidney/bladder  Limited study   No right hydronephrosis status post ureteral stent placement  Echogenic ureteral stent identified in the renal pelvis       3/10 chest x-ray  Mild to moderate congestive changes   Small right and moderate left effusion   Left lung base atelectasis or infiltrate     3/7 CT head  No acute intracranial abnormality     Microbiology  3/17 blood cx: neg x 2  3/10 urine culture:  Greater than 100,000 Citrobacter freundii, sensitive to ceftriaxone  [id number] Enterococcus faecalis sensitive to ampicillin  3/10 blood cultures:  No growth x1, lactococcus garvieae x 1  3/10 negative COVID, influenza, RSV           ---------------------------------------------------------------------------------------------------------------  This note has been constructed using a voice recognition system

## 2021-03-18 NOTE — ASSESSMENT & PLAN NOTE
-patient had right ureteral stent placed at [facility] 12/2  -current ultrasound reveals that the stent has migrated into the renal pelvis  -she will need follow-up with urology at [facility]  -however is considering hospice

## 2021-03-18 NOTE — TREATMENT PLAN
The patient's family has opted for hospice referral  Nephrology will sign off  Please call/text with questions

## 2021-03-19 PROCEDURE — 99232 SBSQ HOSP IP/OBS MODERATE 35: CPT | Performed by: INTERNAL MEDICINE

## 2021-03-19 RX ADMIN — ISOSORBIDE DINITRATE 10 MG: 10 TABLET ORAL at 09:03

## 2021-03-19 RX ADMIN — AMOXICILLIN 500 MG: 500 CAPSULE ORAL at 09:02

## 2021-03-19 RX ADMIN — COLLAGENASE SANTYL: 250 OINTMENT TOPICAL at 09:03

## 2021-03-19 RX ADMIN — HYDRALAZINE HYDROCHLORIDE 50 MG: 25 TABLET ORAL at 07:25

## 2021-03-19 RX ADMIN — AMOXICILLIN 500 MG: 500 CAPSULE ORAL at 21:36

## 2021-03-19 RX ADMIN — Medication 250 MG: at 09:02

## 2021-03-19 RX ADMIN — ISOSORBIDE DINITRATE 10 MG: 10 TABLET ORAL at 13:43

## 2021-03-19 RX ADMIN — HYDRALAZINE HYDROCHLORIDE 50 MG: 25 TABLET ORAL at 22:36

## 2021-03-19 RX ADMIN — HYDRALAZINE HYDROCHLORIDE 50 MG: 25 TABLET ORAL at 13:43

## 2021-03-19 RX ADMIN — LEVOTHYROXINE SODIUM 25 MCG: 25 TABLET ORAL at 07:23

## 2021-03-19 RX ADMIN — AMLODIPINE BESYLATE 5 MG: 5 TABLET ORAL at 09:02

## 2021-03-19 RX ADMIN — CARVEDILOL 12.5 MG: 6.25 TABLET, FILM COATED ORAL at 09:02

## 2021-03-19 NOTE — CASE MANAGEMENT
CM called several times to [id number] [facility] to ascertain involvement in pt's case with [facility] with no return call back  Per Hospice Liaison, pt's  overwhelmed and unable to care for pt alone with family unexpectedly being out of town this weekend  CM called pt's  [place], which the above information was relayed to this writer- furniture is rearranged at the house, ready for DME delivery when arranged however lacks support for proper care of pt this weekend- [facility] is to contact [place] to arrange a time for signing of hospice paperwork Saturday with arrangement of DME for anticipated d/c home Monday  CM called [facility] proactively to arrange BLS transport for Monday- await call back with finalized time (asking for 11am)  Will continue to follow

## 2021-03-19 NOTE — PLAN OF CARE
Problem: Potential for Falls  Goal: Patient will remain free of falls  Description: INTERVENTIONS:  - Assess patient frequently for physical needs  -  Identify cognitive and physical deficits and behaviors that affect risk of falls  -  [place] fall precautions as indicated by assessment   - Educate patient/family on patient safety including physical limitations  - Instruct patient to call for assistance with activity based on assessment  - Modify environment to reduce risk of injury  - Consider OT/PT consult to assist with strengthening/mobility  Outcome: Progressing     Problem: Prexisting or High Potential for Compromised Skin Integrity  Goal: Skin integrity is maintained or improved  Description: INTERVENTIONS:  - Identify patients at risk for skin breakdown  - Assess and monitor skin integrity  - Assess and monitor nutrition and hydration status  - Monitor labs   - Assess for incontinence   - Turn and reposition patient  - Assist with mobility/ambulation  - Relieve pressure over bony prominences  - Avoid friction and shearing  - Provide appropriate hygiene as needed including keeping skin clean and dry  - Evaluate need for skin moisturizer/barrier cream  - Collaborate with interdisciplinary team   - Patient/family teaching  - Consider wound care consult   Outcome: Progressing     Problem: Nutrition/Hydration-ADULT  Goal: Nutrient/Hydration intake appropriate for improving, restoring or maintaining nutritional needs  Description: Monitor and assess patient's nutrition/hydration status for malnutrition  Collaborate with interdisciplinary team and initiate plan and interventions as ordered  Monitor patient's weight and dietary intake as ordered or per policy  Utilize nutrition screening tool and intervene as necessary  Determine patient's food preferences and provide high-protein, high-caloric foods as appropriate       INTERVENTIONS:  - Monitor oral intake, urinary output, labs, and treatment plans  - Assess nutrition and hydration status and recommend course of action  - Evaluate amount of meals eaten  - Assist patient with eating if necessary   - Allow adequate time for meals  - Recommend/ encourage appropriate diets, oral nutritional supplements, and vitamin/mineral supplements  - Order, calculate, and assess calorie counts as needed  - Recommend, monitor, and adjust tube feedings and TPN/PPN based on assessed needs  - Assess need for intravenous fluids  - Provide specific nutrition/hydration education as appropriate  - Include patient/family/caregiver in decisions related to nutrition  Outcome: Progressing     Problem: RESPIRATORY - ADULT  Goal: Achieves optimal ventilation and oxygenation  Description: INTERVENTIONS:  - Assess for changes in respiratory status  - Assess for changes in mentation and behavior  - Position to facilitate oxygenation and minimize respiratory effort  - Oxygen administered by appropriate delivery if ordered  - Initiate smoking cessation education as indicated  - Encourage broncho-pulmonary hygiene including cough, deep breathe, Incentive Spirometry  - Assess the need for suctioning and aspirate as needed  - Assess and instruct to report SOB or any respiratory difficulty  - Respiratory Therapy support as indicated  Outcome: Progressing     Problem: METABOLIC, FLUID AND ELECTROLYTES - ADULT  Goal: Electrolytes maintained within normal limits  Description: INTERVENTIONS:  - Monitor labs and assess patient for signs and symptoms of electrolyte imbalances  - Administer electrolyte replacement as ordered  - Monitor response to electrolyte replacements, including repeat lab results as appropriate  - Instruct patient on fluid and nutrition as appropriate  Outcome: Progressing  Goal: Fluid balance maintained  Description: INTERVENTIONS:  - Monitor labs   - Monitor I/O and WT  - Instruct patient on fluid and nutrition as appropriate  - Assess for signs & symptoms of volume excess or deficit  Outcome: Progressing     Problem: SKIN/TISSUE INTEGRITY - ADULT  Goal: Skin integrity remains intact  Description: INTERVENTIONS  - Identify patients at risk for skin breakdown  - Assess and monitor skin integrity  - Assess and monitor nutrition and hydration status  - Monitor labs (i e  albumin)  - Assess for incontinence   - Turn and reposition patient  - Assist with mobility/ambulation  - Relieve pressure over bony prominences  - Avoid friction and shearing  - Provide appropriate hygiene as needed including keeping skin clean and dry  - Evaluate need for skin moisturizer/barrier cream  - Collaborate with interdisciplinary team (i e  Nutrition, Rehabilitation, etc )   - Patient/family teaching  Outcome: Progressing  Goal: Incision(s), wounds(s) or drain site(s) healing without S/S of infection  Description: INTERVENTIONS  - Assess and document risk factors for skin impairment   - Assess and document dressing, incision, wound bed, drain sites and surrounding tissue  - Consider nutrition services referral as needed  - Oral mucous membranes remain intact  - Provide patient/ family education  Outcome: Progressing     Problem: INFECTION - ADULT  Goal: Absence or prevention of progression during hospitalization  Description: INTERVENTIONS:  - Assess and monitor for signs and symptoms of infection  - Monitor lab/diagnostic results  - Monitor all insertion sites, i e  indwelling lines, tubes, and drains  - Monitor endotracheal if appropriate and nasal secretions for changes in amount and color  - Marshall appropriate cooling/warming therapies per order  - Administer medications as ordered  - Instruct and encourage patient and family to use good hand hygiene technique  - Identify and instruct in appropriate isolation precautions for identified infection/condition  Outcome: Progressing     Problem: SAFETY ADULT  Goal: Patient will remain free of falls  Description: INTERVENTIONS:  - Assess patient frequently for physical needs  -  Identify cognitive and physical deficits and behaviors that affect risk of falls    -  [place] fall precautions as indicated by assessment   - Educate patient/family on patient safety including physical limitations  - Instruct patient to call for assistance with activity based on assessment  - Modify environment to reduce risk of injury  - Consider OT/PT consult to assist with strengthening/mobility  Outcome: Progressing  Goal: Maintain or return to baseline ADL function  Description: INTERVENTIONS:  -  Assess patient's ability to carry out ADLs; assess patient's baseline for ADL function and identify physical deficits which impact ability to perform ADLs (bathing, care of mouth/teeth, toileting, grooming, dressing, etc )  - Assess/evaluate cause of self-care deficits   - Assess range of motion  - Assess patient's mobility; develop plan if impaired  - Assess patient's need for assistive devices and provide as appropriate  - Encourage maximum independence but intervene and supervise when necessary  - Involve family in performance of ADLs  - Assess for home care needs following discharge   - Consider OT consult to assist with ADL evaluation and planning for discharge  - Provide patient education as appropriate  Outcome: Progressing     Problem: DISCHARGE PLANNING  Goal: Discharge to home or other facility with appropriate resources  Description: INTERVENTIONS:  - Identify barriers to discharge w/patient and caregiver  - Arrange for needed discharge resources and transportation as appropriate  - Identify discharge learning needs (meds, wound care, etc )  - Arrange for interpretive services to assist at discharge as needed  - Refer to Case Management Department for coordinating discharge planning if the patient needs post-hospital services based on physician/advanced practitioner order or complex needs related to functional status, cognitive ability, or social support system  Outcome: Progressing Problem: Knowledge Deficit  Goal: Patient/family/caregiver demonstrates understanding of disease process, treatment plan, medications, and discharge instructions  Description: Complete learning assessment and assess knowledge base    Interventions:  - Provide teaching at level of understanding  - Provide teaching via preferred learning methods  Outcome: Progressing

## 2021-03-19 NOTE — UTILIZATION REVIEW
Continued Stay Review    Date:  3/19/2021                      Current Patient Class: IP  Current Level of Care: [facility]     HPI:89 y o  female initially admitted on  03-10-21 for acute metabolic encephalopathy/pneumonia/UTI/acute on chronic CHF/LILIAN   Baseline creatinine of mid to high 2s    On chronic oxygen        Assessment/Plan:  3/17 Palliative care met with pt;s   - Considering hospice  Creatinine improved slightly to 3 2 after gentle IV fluids given yesterday    3/18: Hospice liason met with  - minimally talked about hospice and he did not want to be rushed into making a decision  Liaison will be meeting him tomorrow at his home around 930 am to talk about plan of care moving forward  3/19: UTI -completed 5 days of cefepime and day 5 of 7 amoxicillin   Acute/chronic diastolic HF  - appears compensated after IV Lasix diuresis, now holding with increased creatinine, continue Coreg and Imdur  CKD4 -creatinine 3 18 yesterday -improved from prior 2 days -  holding diuretics and monitor  Continue O2   cancelled Hospice appt today due to not feeling well following 2nd covid shot  Per Hospice Liaison, pt's  overwhelmed and unable to care for pt alone with family unexpectedly being out of town this weekend  He is requesting his wife come home Monday due to lack of help at home until Monday       Pertinent Labs/Diagnostic Results:     Results from last 7 days   Lab Units 03/18/21 0458 03/17/21 0519 03/16/21 0454 03/14/21  0430 03/13/21  0450   WBC Thousand/uL 6 48 7 57 5 90 5 66 5 70   HEMOGLOBIN g/dL 8 9* 9 0* 8 8* 8 1* 7 9*   HEMATOCRIT % 31 0* 30 6* 28 9* 26 9* 27 0*   PLATELETS Thousands/uL 177 195 184 178 182   NEUTROS ABS Thousands/µL 2 25 2 43 1 87 2 66 2 73       Results from last 7 days   Lab Units 03/18/21  0458 03/17/21  0519 03/16/21  0454 03/15/21  0456 03/14/21  0434 03/13/21  0450   SODIUM mmol/L 140 141 142 141 139 140   POTASSIUM mmol/L 4 1 3 4* 3 5 3 3* 3 8 3 3* CHLORIDE mmol/L 99* 97* 99* 98* 102 102   CO2 mmol/L 35* 36* 35* 32 32 28   ANION GAP mmol/L 6 8 8 11 5 10   BUN mg/dL 55* 56* 58* 60* 62* 57*   CREATININE mg/dL 3 18* 3 29* 3 40* 3 16* 3 24* 2 87*   EGFR ml/min/1 73sq m 12 12 11 12 12 14   CALCIUM mg/dL 9 0 8 6 8 5 8 7 8 3 8 4   MAGNESIUM mg/dL 2 4  --   --  2 1 2 1 2 1   PHOSPHORUS mg/dL  --   --   --  4 4*  --  4 8*     Results from last 7 days   Lab Units 03/18/21  0458 03/17/21  0519 03/16/21  0454 03/15/21  0456 03/14/21  0434 03/13/21  0450   GLUCOSE RANDOM mg/dL 72 71 72 78 87 84     Results from last 7 days   Lab Units 03/18/21  0458 03/17/21  0856 03/16/21  0454   PROTIME seconds 25 5* 27 8* 30 6*   INR  2 38* 2 67* 3 02*     Results from last 7 days   Lab Units 03/17/21  0516 03/17/21  0515   BLOOD CULTURE  No Growth at 48 hrs  No Growth at 48 hrs       Vital Signs:   03/19/21 12:31:30  --  61  --  144/96  112  100 %  --  --  --  --   03/19/21 07:20:57  97 6 °F (36 4 °C)  65  17  145/63  90  96 %  32  3 L/min  Nasal cannula  Lying   03/18/21 22:34:56  --  --  18  150/62  91  --  --  --  --  --   03/18/21 2000  --  --  --  --  --  --  32  3 L/min  Nasal cannula  --   03/18/21 1900  --  --  --  --  --  97 %  --  --  --  --   03/18/21 13:45:39  --  67  17  118/49Abnormal   72  97 %  32  3 L/min  Nasal cannula  --   03/18/21 11:51:47  --  73  --  119/48Abnormal   72  96 %  32  3 L/min  Nasal cannula  --   03/18/21 08:00:30  97 6 °F (36 4 °C)  79  18  143/70  94  96 %  32  3 L/min   Nasal cannula  Lying       Medications:   Scheduled Medications:  amLODIPine, 5 mg, Oral, Daily  amoxicillin, 500 mg, Oral, Q12H NATASHA  carvedilol, 12 5 mg, Oral, BID With Meals  collagenase, , Topical, Daily  hydrALAZINE, 50 mg, Oral, Q8H NATASHA  isosorbide dinitrate, 10 mg, Oral, TID after meals  levothyroxine, 25 mcg, Oral, Early Morning  saccharomyces boulardii, 250 mg, Oral, BID  warfarin, 2 mg, Oral, Daily (warfarin)    Continuous IV Infusions:  PRN Meds:  acetaminophen, 650 mg, Oral, Q6H PRN  HYDROmorphone, 0 2 mg, Intravenous, Q4H PRN  oxyCODONE, 2 5 mg, Oral, Q4H PRN  senna, 8 6 mg, Oral, HS PRN    Discharge Plan: [address] on Monday with Hospice    Network Utilization Review Department  ATTENTION: Please call with any questions or concerns to [phone] and carefully listen to the prompts so that you are directed to the right person  All voicemails are confidential   Jaimee Spear all requests for admission clinical reviews, approved or denied determinations and any other requests to dedicated fax number below belonging to the campus where the patient is receiving treatment   List of dedicated fax numbers for the Facilities:  [address] DENIALS (Administrative/Medical Necessity) [phone]   [address] (Maternity/NICU/Pediatrics) [phone] [address] [place] [address] Dr Lukas Hagan [phone] (  [facility]) [address] [address] [address] Piyush Best [address] [place]) [address] [place], [place] [address] [phone]

## 2021-03-19 NOTE — PALLIATIVE CARE CONFERENCE
Palliative Care MSW called patient's spouse, Rachel Liriano, to offer support  Rachel Liriano, states that he received his second covid-19 vaccine yesterday and he is not feeling well  He will not be coming in to visit pt today  MSW spoke about hospice and he states that Leslie Rausch, hospice liaison, will be reaching out to [facility], [place]ita, to arrange for pt to be discharged home on Monday, as well as discuss any equipment delivery times  Rachel Liriano explains that he is feeling under the weather, as well as his son and DIL who he refers to as his helpers, are out of town for the weekend, therefore this is why he prefers Monday discharge  Rachel Liriano was appreciative of the call  Gerry Panchal

## 2021-03-19 NOTE — HOSPICE NOTE
Hospice Liaison was to meet with the  today to have hospice consents signed =and order any equipment needed for pt prior to coming home   cancelled today reporting he did not feel well due to the 2nd covid shot  Liaison asked to reach out tomorrow to discuss plan and have hospice  consents signed   responded " we will see how I feel"  He is requesting his wife come home Monday due to lack of help at home until Monday  Hospice Liaison did reach out to CM and she is aware  Liaison attempted to reach out to son with no success  Voice mail was left and waiting for a return call at this time  Hospice services are not scheduled to begin at this time

## 2021-03-19 NOTE — PLAN OF CARE
Problem: Potential for Falls  Goal: Patient will remain free of falls  Description: INTERVENTIONS:  - Assess patient frequently for physical needs  -  Identify cognitive and physical deficits and behaviors that affect risk of falls  -  [place] fall precautions as indicated by assessment   - Educate patient/family on patient safety including physical limitations  - Instruct patient to call for assistance with activity based on assessment  - Modify environment to reduce risk of injury  - Consider OT/PT consult to assist with strengthening/mobility  3/19/2021 1117 by Jalen Preston RN  Outcome: Progressing  3/19/2021 1117 by Jalen Preston RN  Outcome: Progressing     Problem: Prexisting or High Potential for Compromised Skin Integrity  Goal: Skin integrity is maintained or improved  Description: INTERVENTIONS:  - Identify patients at risk for skin breakdown  - Assess and monitor skin integrity  - Assess and monitor nutrition and hydration status  - Monitor labs   - Assess for incontinence   - Turn and reposition patient  - Assist with mobility/ambulation  - Relieve pressure over bony prominences  - Avoid friction and shearing  - Provide appropriate hygiene as needed including keeping skin clean and dry  - Evaluate need for skin moisturizer/barrier cream  - Collaborate with interdisciplinary team   - Patient/family teaching  - Consider wound care consult   3/19/2021 1117 by Jalen Preston RN  Outcome: Progressing  3/19/2021 1117 by Jalen Preston RN  Outcome: Progressing     Problem: Nutrition/Hydration-ADULT  Goal: Nutrient/Hydration intake appropriate for improving, restoring or maintaining nutritional needs  Description: Monitor and assess patient's nutrition/hydration status for malnutrition  Collaborate with interdisciplinary team and initiate plan and interventions as ordered  Monitor patient's weight and dietary intake as ordered or per policy   Utilize nutrition screening tool and intervene as necessary  Determine patient's food preferences and provide high-protein, high-caloric foods as appropriate       INTERVENTIONS:  - Monitor oral intake, urinary output, labs, and treatment plans  - Assess nutrition and hydration status and recommend course of action  - Evaluate amount of meals eaten  - Assist patient with eating if necessary   - Allow adequate time for meals  - Recommend/ encourage appropriate diets, oral nutritional supplements, and vitamin/mineral supplements  - Order, calculate, and assess calorie counts as needed  - Recommend, monitor, and adjust tube feedings and TPN/PPN based on assessed needs  - Assess need for intravenous fluids  - Provide specific nutrition/hydration education as appropriate  - Include patient/family/caregiver in decisions related to nutrition  3/19/2021 1117 by Domenica Stephen RN  Outcome: Progressing  3/19/2021 1117 by Domenica Stephen RN  Outcome: Progressing     Problem: RESPIRATORY - ADULT  Goal: Achieves optimal ventilation and oxygenation  Description: INTERVENTIONS:  - Assess for changes in respiratory status  - Assess for changes in mentation and behavior  - Position to facilitate oxygenation and minimize respiratory effort  - Oxygen administered by appropriate delivery if ordered  - Initiate smoking cessation education as indicated  - Encourage broncho-pulmonary hygiene including cough, deep breathe, Incentive Spirometry  - Assess the need for suctioning and aspirate as needed  - Assess and instruct to report SOB or any respiratory difficulty  - Respiratory Therapy support as indicated  3/19/2021 1117 by Domenica Stephen RN  Outcome: Progressing  3/19/2021 1117 by Domenica Stephen RN  Outcome: Progressing     Problem: METABOLIC, FLUID AND ELECTROLYTES - ADULT  Goal: Electrolytes maintained within normal limits  Description: INTERVENTIONS:  - Monitor labs and assess patient for signs and symptoms of electrolyte imbalances  - Administer electrolyte replacement as ordered  - Monitor response to electrolyte replacements, including repeat lab results as appropriate  - Instruct patient on fluid and nutrition as appropriate  3/19/2021 1117 by Leta Eller RN  Outcome: Progressing  3/19/2021 1117 by Leta Eller RN  Outcome: Progressing  Goal: Fluid balance maintained  Description: INTERVENTIONS:  - Monitor labs   - Monitor I/O and WT  - Instruct patient on fluid and nutrition as appropriate  - Assess for signs & symptoms of volume excess or deficit  3/19/2021 1117 by Leta Eller RN  Outcome: Progressing  3/19/2021 1117 by Leta Eller RN  Outcome: Progressing     Problem: SKIN/TISSUE INTEGRITY - ADULT  Goal: Skin integrity remains intact  Description: INTERVENTIONS  - Identify patients at risk for skin breakdown  - Assess and monitor skin integrity  - Assess and monitor nutrition and hydration status  - Monitor labs (i e  albumin)  - Assess for incontinence   - Turn and reposition patient  - Assist with mobility/ambulation  - Relieve pressure over bony prominences  - Avoid friction and shearing  - Provide appropriate hygiene as needed including keeping skin clean and dry  - Evaluate need for skin moisturizer/barrier cream  - Collaborate with interdisciplinary team (i e  Nutrition, Rehabilitation, etc )   - Patient/family teaching  3/19/2021 1117 by Leta Eller RN  Outcome: Progressing  3/19/2021 1117 by Leta Eller RN  Outcome: Progressing  Goal: Incision(s), wounds(s) or drain site(s) healing without S/S of infection  Description: INTERVENTIONS  - Assess and document risk factors for skin impairment   - Assess and document dressing, incision, wound bed, drain sites and surrounding tissue  - Consider nutrition services referral as needed  - Oral mucous membranes remain intact  - Provide patient/ family education  3/19/2021 1117 by Leta Eller RN  Outcome: Progressing  3/19/2021 1117 by Kerry Brar SUKHJINDER Paez  Outcome: Progressing     Problem: INFECTION - ADULT  Goal: Absence or prevention of progression during hospitalization  Description: INTERVENTIONS:  - Assess and monitor for signs and symptoms of infection  - Monitor lab/diagnostic results  - Monitor all insertion sites, i e  indwelling lines, tubes, and drains  - Monitor endotracheal if appropriate and nasal secretions for changes in amount and color  - [place] appropriate cooling/warming therapies per order  - Administer medications as ordered  - Instruct and encourage patient and family to use good hand hygiene technique  - Identify and instruct in appropriate isolation precautions for identified infection/condition  3/19/2021 1117 by Olman Rogers RN  Outcome: Progressing  3/19/2021 1117 by Olman Rogers RN  Outcome: Progressing     Problem: SAFETY ADULT  Goal: Patient will remain free of falls  Description: INTERVENTIONS:  - Assess patient frequently for physical needs  -  Identify cognitive and physical deficits and behaviors that affect risk of falls    -  [place] fall precautions as indicated by assessment   - Educate patient/family on patient safety including physical limitations  - Instruct patient to call for assistance with activity based on assessment  - Modify environment to reduce risk of injury  - Consider OT/PT consult to assist with strengthening/mobility  3/19/2021 1117 by Olman Rogers RN  Outcome: Progressing  3/19/2021 1117 by Olman Rogers RN  Outcome: Progressing  Goal: Maintain or return to baseline ADL function  Description: INTERVENTIONS:  -  Assess patient's ability to carry out ADLs; assess patient's baseline for ADL function and identify physical deficits which impact ability to perform ADLs (bathing, care of mouth/teeth, toileting, grooming, dressing, etc )  - Assess/evaluate cause of self-care deficits   - Assess range of motion  - Assess patient's mobility; develop plan if impaired  - Assess patient's need for assistive devices and provide as appropriate  - Encourage maximum independence but intervene and supervise when necessary  - Involve family in performance of ADLs  - Assess for home care needs following discharge   - Consider OT consult to assist with ADL evaluation and planning for discharge  - Provide patient education as appropriate  3/19/2021 1117 by Hilary Hernandez RN  Outcome: Progressing  3/19/2021 1117 by Hilary Hernandez RN  Outcome: Progressing     Problem: DISCHARGE PLANNING  Goal: Discharge to home or other facility with appropriate resources  Description: INTERVENTIONS:  - Identify barriers to discharge w/patient and caregiver  - Arrange for needed discharge resources and transportation as appropriate  - Identify discharge learning needs (meds, wound care, etc )  - Arrange for interpretive services to assist at discharge as needed  - Refer to Case Management Department for coordinating discharge planning if the patient needs post-hospital services based on physician/advanced practitioner order or complex needs related to functional status, cognitive ability, or social support system  3/19/2021 1117 by Hilary Hernandez RN  Outcome: Progressing  3/19/2021 1117 by Hilary Hernandez RN  Outcome: Progressing     Problem: Knowledge Deficit  Goal: Patient/family/caregiver demonstrates understanding of disease process, treatment plan, medications, and discharge instructions  Description: Complete learning assessment and assess knowledge base    Interventions:  - Provide teaching at level of understanding  - Provide teaching via preferred learning methods  3/19/2021 1117 by Hilary Hernandez RN  Outcome: Progressing  3/19/2021 1117 by Hilary Hernandez RN  Outcome: Progressing

## 2021-03-19 NOTE — PROGRESS NOTES
Progress Note - Wayne Morales 80 y o  female MRN: [id number]    Unit/Bed#: [place] [address] -01 Encounter: [id number]    Assessment/Plan:    Acute metabolic encephalopathy multifactorial with acute kidney injury, congestive heart failure, infection, slightly improved continue supportive care    UTI     completed 5 days of cefepime and day 5 of 7 amoxicillin   Acute/chronic diastolic HF  appears compensated after IV Lasix diuresis, now holding with increased creatinine family now agreed to home hospice continue Coreg and Imdur    CKD4     creatinine 3 18, slightly increased from baseline 2 7 - 2 0 holding diuretics and monitor    DVT     continue warfarin INR therapeutic 2 38    Chronic respiratory failure  continue oxygen    Hypothyroidism   continue Synthroid    Subjective:   Feels okay, denies chest pain or shortness of breath no nausea vomiting no fevers chills appetite appears stable    Objective:     Vitals: Blood pressure 144/96, pulse 61, temperature 97 6 °F (36 4 °C), temperature source Oral, resp  rate 17, weight 58 6 kg (129 lb 3 oz), SpO2 100 %  ,Body mass index is 20 85 kg/m²        Results from last 7 days   Lab Units 03/18/21  0458   WBC Thousand/uL 6 48   HEMOGLOBIN g/dL 8 9*   HEMATOCRIT % 31 0*   PLATELETS Thousands/uL 177   INR  2 38*     Results from last 7 days   Lab Units 03/18/21  0458   POTASSIUM mmol/L 4 1   CHLORIDE mmol/L 99*   CO2 mmol/L 35*   BUN mg/dL 55*   CREATININE mg/dL 3 18*   CALCIUM mg/dL 9 0       Scheduled Meds:  Current Facility-Administered Medications   Medication Dose Route Frequency Provider Last Rate    acetaminophen  650 mg Oral Q6H PRN Kristina Ro MD      amLODIPine  5 mg Oral Daily OTIS Cole      amoxicillin  500 mg Oral Q12H [address] Klaudia Bateman MD      carvedilol  12 5 mg Oral BID With Meals OTIS Cole      collagenase   Topical Daily Klaudia Bateman MD      hydrALAZINE  50 mg Oral Q8H [address] OTIS Cole      HYDROmorphone  0 2 mg Intravenous Q4H PRN Shelby Melara MD      isosorbide dinitrate  10 mg Oral TID after meals Kelly Lan MD      levothyroxine  25 mcg Oral Early Morning Kelly Lan MD      oxyCODONE  2 5 mg Oral Q4H PRN Shelby Melara MD      saccharomyces boulardii  250 mg Oral BID Kelly Lan MD      senna  8 6 mg Oral HS PRN Kelly Lan MD      warfarin  2 mg Oral Daily (warfarin) Shelby Melara MD         Continuous Infusions:         Physical exam:  General appearance:  Elderly frail no distress interaction for  Head/Eyes:  Nonicteric pale sluggish  Neck:  Supple  Lungs:  Decreased BS bilateral no wheezing rhonchi or rales  Heart: normal S1 S2 regular  Abdomen: Soft nontender with bowel sounds  Extremities: no edema  Skin: no rash    Invasive Devices     Peripheral Intravenous Line            Peripheral IV 03/18/21 Dorsal (posterior); Left Forearm 1 day                  VTE Pharmacologic Prophylaxis:  Warfarin        Counseling / Coordination of Care  Total floor / unit time spent today 30  minutes  Greater than 50% of total time was spent with the patient and / or family counseling and / or coordination of care    A description of the counseling / coordination of care:  Planned home hospice

## 2021-03-20 PROCEDURE — 99232 SBSQ HOSP IP/OBS MODERATE 35: CPT | Performed by: INTERNAL MEDICINE

## 2021-03-20 RX ADMIN — ISOSORBIDE DINITRATE 10 MG: 10 TABLET ORAL at 16:40

## 2021-03-20 RX ADMIN — AMLODIPINE BESYLATE 5 MG: 5 TABLET ORAL at 09:11

## 2021-03-20 RX ADMIN — ISOSORBIDE DINITRATE 10 MG: 10 TABLET ORAL at 09:12

## 2021-03-20 RX ADMIN — AMOXICILLIN 500 MG: 500 CAPSULE ORAL at 09:11

## 2021-03-20 RX ADMIN — HYDRALAZINE HYDROCHLORIDE 50 MG: 25 TABLET ORAL at 05:05

## 2021-03-20 RX ADMIN — Medication 250 MG: at 17:01

## 2021-03-20 RX ADMIN — CARVEDILOL 12.5 MG: 6.25 TABLET, FILM COATED ORAL at 09:11

## 2021-03-20 RX ADMIN — WARFARIN SODIUM 2 MG: 2 TABLET ORAL at 17:01

## 2021-03-20 RX ADMIN — COLLAGENASE SANTYL: 250 OINTMENT TOPICAL at 16:30

## 2021-03-20 RX ADMIN — ISOSORBIDE DINITRATE 10 MG: 10 TABLET ORAL at 13:50

## 2021-03-20 RX ADMIN — LEVOTHYROXINE SODIUM 25 MCG: 25 TABLET ORAL at 05:05

## 2021-03-20 RX ADMIN — CARVEDILOL 12.5 MG: 6.25 TABLET, FILM COATED ORAL at 16:40

## 2021-03-20 RX ADMIN — AMOXICILLIN 500 MG: 500 CAPSULE ORAL at 21:53

## 2021-03-20 RX ADMIN — Medication 250 MG: at 09:11

## 2021-03-20 RX ADMIN — HYDRALAZINE HYDROCHLORIDE 50 MG: 25 TABLET ORAL at 22:38

## 2021-03-20 NOTE — CASE MANAGEMENT
Called [facility] to confirm pickup time for BLS transport to home hospice on Monday  Per Laura Guzman, pickup at 1100 with [facility]  Updated Bessie, hospice liaison  Bessie also confirmed she would be bringing the signed OOH DNR form to hospital prior to d/c  No other d/c needs at this time  CM Department will continue to follow

## 2021-03-20 NOTE — PROGRESS NOTES
Progress Note - Lainey Suarez 80 y o  female MRN: [id number]    Unit/Bed#: [place] [address] -01 Encounter: [id number]    Assessment/Plan:    Acute metabolic encephalopathy continue supportive care, slowly improving, etiology associated with acute kidney injury, congestive heart failure, infection    UTI     continue amoxicillin day 6 of 7 with cefepime completed 5 day course    Acute/chronic diastolic HF  appears compensating now holding diuresis with increased creatinine and family has agreed to home hospice, continue Imdur and Coreg    CKD 4     creatinine 3 18    History of DVT    INR therapeutic at 2 38    Chronic respiratory failure  continue oxygen    Hypothyroid    continue Synthroid     Subjective:   Minimal response feels okay denies chest pain or shortness of breath appetite stable    Objective:     Vitals: Blood pressure 154/70, pulse 61, temperature (!) 96 1 °F (35 6 °C), resp  rate 18, weight 58 5 kg (128 lb 15 5 oz), SpO2 96 %  ,Body mass index is 20 82 kg/m²        Results from last 7 days   Lab Units 03/18/21  0458   WBC Thousand/uL 6 48   HEMOGLOBIN g/dL 8 9*   HEMATOCRIT % 31 0*   PLATELETS Thousands/uL 177   INR  2 38*     Results from last 7 days   Lab Units 03/18/21  0458   POTASSIUM mmol/L 4 1   CHLORIDE mmol/L 99*   CO2 mmol/L 35*   BUN mg/dL 55*   CREATININE mg/dL 3 18*   CALCIUM mg/dL 9 0       Scheduled Meds:  Current Facility-Administered Medications   Medication Dose Route Frequency Provider Last Rate    acetaminophen  650 mg Oral Q6H PRN Meche Tinajero MD      amLODIPine  5 mg Oral Daily OTIS Cole      amoxicillin  500 mg Oral Q12H [facility] Mary Banuelos MD      carvedilol  12 5 mg Oral BID With Meals OTIS Cole      collagenase   Topical Daily Mary Banuelos MD      hydrALAZINE  50 mg Oral Q8H [facility] OTIS Cole      HYDROmorphone  0 2 mg Intravenous Q4H PRN Tiburcio Witt MD      isosorbide dinitrate  10 mg Oral TID after meals MD Carline Deleon Sang levothyroxine  25 mcg Oral Early Morning Seema Eddy MD      oxyCODONE  2 5 mg Oral Q4H PRN Effie Arreguin MD      saccharomyces boulardii  250 mg Oral BID Seema Eddy MD      senna  8 6 mg Oral HS PRN Seema Eddy MD      warfarin  2 mg Oral Daily (warfarin) Effie Arreguin MD         Continuous Infusions:         Physical exam:  General appearance:  Sleepy awakens to verbal stimuli and responds  Head/Eyes:  Nonicteric sluggish  Neck:  Supple  Lungs:  Decreased BS bilateral no wheezing rhonchi or rales  Heart: normal S1 S2 regular  Abdomen: Soft nontender with bowel sounds  Extremities: no edema  Skin: no rash    Invasive Devices     Peripheral Intravenous Line            Peripheral IV 03/18/21 Dorsal (posterior); Left Forearm 2 days                    Counseling / Coordination of Care  Total floor / unit time spent today 30  minutes  Greater than 50% of total time was spent with the patient and / or family counseling and / or coordination of care    A description of the counseling / coordination of care:

## 2021-03-20 NOTE — NURSING NOTE
RN and PCA went in to check patient for incontinence  Patient was incontinent  Upon cleaning patient, she began telling us that the RN and PCA are "annoying" for cleaning her at this hour  RN and PCA explained to patient that she was incontinent and that is the reason we were cleaning her  She stated that she "did not care and this is the worst hospital " RN and PCA finished cleaning patient and repositioned her  When walking out of the room she yelled "Khurram Gains" and demanded that the RN go get her coffee  RN and PCA explained to patient that she was being rude, inappropriate, and that her coffee would come up with her breakfast tray  RN and PCA left the room and patient rang her call light  The PCA asked the patient what she needed help with  Patient stated she wanted coffee  It was explained again that it would come up on her breakfast tray  Patient demanded that the PCA leave and go to [place] [employer] to go get her a coffee now  PCA explained that she is not able to leave work to go to [employer] at this time

## 2021-03-20 NOTE — PLAN OF CARE
Problem: Potential for Falls  Goal: Patient will remain free of falls  Description: INTERVENTIONS:  - Assess patient frequently for physical needs  -  Identify cognitive and physical deficits and behaviors that affect risk of falls  -  [place] fall precautions as indicated by assessment   - Educate patient/family on patient safety including physical limitations  - Instruct patient to call for assistance with activity based on assessment  - Modify environment to reduce risk of injury  - Consider OT/PT consult to assist with strengthening/mobility  Outcome: Progressing     Problem: Prexisting or High Potential for Compromised Skin Integrity  Goal: Skin integrity is maintained or improved  Description: INTERVENTIONS:  - Identify patients at risk for skin breakdown  - Assess and monitor skin integrity  - Assess and monitor nutrition and hydration status  - Monitor labs   - Assess for incontinence   - Turn and reposition patient  - Assist with mobility/ambulation  - Relieve pressure over bony prominences  - Avoid friction and shearing  - Provide appropriate hygiene as needed including keeping skin clean and dry  - Evaluate need for skin moisturizer/barrier cream  - Collaborate with interdisciplinary team   - Patient/family teaching  - Consider wound care consult   Outcome: Progressing     Problem: Nutrition/Hydration-ADULT  Goal: Nutrient/Hydration intake appropriate for improving, restoring or maintaining nutritional needs  Description: Monitor and assess patient's nutrition/hydration status for malnutrition  Collaborate with interdisciplinary team and initiate plan and interventions as ordered  Monitor patient's weight and dietary intake as ordered or per policy  Utilize nutrition screening tool and intervene as necessary  Determine patient's food preferences and provide high-protein, high-caloric foods as appropriate       INTERVENTIONS:  - Monitor oral intake, urinary output, labs, and treatment plans  - Assess nutrition and hydration status and recommend course of action  - Evaluate amount of meals eaten  - Assist patient with eating if necessary   - Allow adequate time for meals  - Recommend/ encourage appropriate diets, oral nutritional supplements, and vitamin/mineral supplements  - Order, calculate, and assess calorie counts as needed  - Recommend, monitor, and adjust tube feedings and TPN/PPN based on assessed needs  - Assess need for intravenous fluids  - Provide specific nutrition/hydration education as appropriate  - Include patient/family/caregiver in decisions related to nutrition  Outcome: Progressing     Problem: RESPIRATORY - ADULT  Goal: Achieves optimal ventilation and oxygenation  Description: INTERVENTIONS:  - Assess for changes in respiratory status  - Assess for changes in mentation and behavior  - Position to facilitate oxygenation and minimize respiratory effort  - Oxygen administered by appropriate delivery if ordered  - Initiate smoking cessation education as indicated  - Encourage broncho-pulmonary hygiene including cough, deep breathe, Incentive Spirometry  - Assess the need for suctioning and aspirate as needed  - Assess and instruct to report SOB or any respiratory difficulty  - Respiratory Therapy support as indicated  Outcome: Progressing     Problem: METABOLIC, FLUID AND ELECTROLYTES - ADULT  Goal: Electrolytes maintained within normal limits  Description: INTERVENTIONS:  - Monitor labs and assess patient for signs and symptoms of electrolyte imbalances  - Administer electrolyte replacement as ordered  - Monitor response to electrolyte replacements, including repeat lab results as appropriate  - Instruct patient on fluid and nutrition as appropriate  Outcome: Progressing  Goal: Fluid balance maintained  Description: INTERVENTIONS:  - Monitor labs   - Monitor I/O and WT  - Instruct patient on fluid and nutrition as appropriate  - Assess for signs & symptoms of volume excess or deficit  Outcome: Progressing     Problem: SKIN/TISSUE INTEGRITY - ADULT  Goal: Skin integrity remains intact  Description: INTERVENTIONS  - Identify patients at risk for skin breakdown  - Assess and monitor skin integrity  - Assess and monitor nutrition and hydration status  - Monitor labs (i e  albumin)  - Assess for incontinence   - Turn and reposition patient  - Assist with mobility/ambulation  - Relieve pressure over bony prominences  - Avoid friction and shearing  - Provide appropriate hygiene as needed including keeping skin clean and dry  - Evaluate need for skin moisturizer/barrier cream  - Collaborate with interdisciplinary team (i e  Nutrition, Rehabilitation, etc )   - Patient/family teaching  Outcome: Progressing  Goal: Incision(s), wounds(s) or drain site(s) healing without S/S of infection  Description: INTERVENTIONS  - Assess and document risk factors for skin impairment   - Assess and document dressing, incision, wound bed, drain sites and surrounding tissue  - Consider nutrition services referral as needed  - Oral mucous membranes remain intact  - Provide patient/ family education  Outcome: Progressing     Problem: INFECTION - ADULT  Goal: Absence or prevention of progression during hospitalization  Description: INTERVENTIONS:  - Assess and monitor for signs and symptoms of infection  - Monitor lab/diagnostic results  - Monitor all insertion sites, i e  indwelling lines, tubes, and drains  - Monitor endotracheal if appropriate and nasal secretions for changes in amount and color  - [place] appropriate cooling/warming therapies per order  - Administer medications as ordered  - Instruct and encourage patient and family to use good hand hygiene technique  - Identify and instruct in appropriate isolation precautions for identified infection/condition  Outcome: Progressing     Problem: SAFETY ADULT  Goal: Patient will remain free of falls  Description: INTERVENTIONS:  - Assess patient frequently for physical needs  -  Identify cognitive and physical deficits and behaviors that affect risk of falls    -  [place] fall precautions as indicated by assessment   - Educate patient/family on patient safety including physical limitations  - Instruct patient to call for assistance with activity based on assessment  - Modify environment to reduce risk of injury  - Consider OT/PT consult to assist with strengthening/mobility  Outcome: Progressing  Goal: Maintain or return to baseline ADL function  Description: INTERVENTIONS:  -  Assess patient's ability to carry out ADLs; assess patient's baseline for ADL function and identify physical deficits which impact ability to perform ADLs (bathing, care of mouth/teeth, toileting, grooming, dressing, etc )  - Assess/evaluate cause of self-care deficits   - Assess range of motion  - Assess patient's mobility; develop plan if impaired  - Assess patient's need for assistive devices and provide as appropriate  - Encourage maximum independence but intervene and supervise when necessary  - Involve family in performance of ADLs  - Assess for home care needs following discharge   - Consider OT consult to assist with ADL evaluation and planning for discharge  - Provide patient education as appropriate  Outcome: Progressing     Problem: DISCHARGE PLANNING  Goal: Discharge to home or other facility with appropriate resources  Description: INTERVENTIONS:  - Identify barriers to discharge w/patient and caregiver  - Arrange for needed discharge resources and transportation as appropriate  - Identify discharge learning needs (meds, wound care, etc )  - Arrange for interpretive services to assist at discharge as needed  - Refer to Case Management Department for coordinating discharge planning if the patient needs post-hospital services based on physician/advanced practitioner order or complex needs related to functional status, cognitive ability, or social support system  Outcome: Progressing Problem: Knowledge Deficit  Goal: Patient/family/caregiver demonstrates understanding of disease process, treatment plan, medications, and discharge instructions  Description: Complete learning assessment and assess knowledge base    Interventions:  - Provide teaching at level of understanding  - Provide teaching via preferred learning methods  Outcome: Progressing

## 2021-03-20 NOTE — HOSPICE NOTE
Hospice liaison met with  and family today in the home to sign hospice consents   is aware pt will be coming home on Monday and hospice services will begin on Tuesday March 23 2021  All equipment ordered and will be delivered today to the home  Family support on board to help with transition to home and hospice  Private care agency list provided to family for extra help in the home per husbands request  Liaison will reach out to Physician to request a [place] placement prior to discharge, as this is a comfort measure for hospice pt  Any concerns please feel free to reach out to liaison

## 2021-03-21 LAB
INR PPP: 1.97 (ref 0.84–1.19)
PROTHROMBIN TIME: 22 SECONDS (ref 11.6–14.5)

## 2021-03-21 PROCEDURE — 85610 PROTHROMBIN TIME: CPT | Performed by: INTERNAL MEDICINE

## 2021-03-21 PROCEDURE — 99232 SBSQ HOSP IP/OBS MODERATE 35: CPT | Performed by: INTERNAL MEDICINE

## 2021-03-21 RX ADMIN — LEVOTHYROXINE SODIUM 25 MCG: 25 TABLET ORAL at 06:02

## 2021-03-21 RX ADMIN — Medication 250 MG: at 09:16

## 2021-03-21 RX ADMIN — WARFARIN SODIUM 2 MG: 2 TABLET ORAL at 17:46

## 2021-03-21 RX ADMIN — Medication 250 MG: at 17:46

## 2021-03-21 RX ADMIN — ISOSORBIDE DINITRATE 10 MG: 10 TABLET ORAL at 17:46

## 2021-03-21 RX ADMIN — HYDRALAZINE HYDROCHLORIDE 50 MG: 25 TABLET ORAL at 06:02

## 2021-03-21 RX ADMIN — ISOSORBIDE DINITRATE 10 MG: 10 TABLET ORAL at 09:16

## 2021-03-21 RX ADMIN — AMLODIPINE BESYLATE 5 MG: 5 TABLET ORAL at 09:16

## 2021-03-21 RX ADMIN — AMOXICILLIN 500 MG: 500 CAPSULE ORAL at 09:16

## 2021-03-21 RX ADMIN — COLLAGENASE SANTYL: 250 OINTMENT TOPICAL at 09:16

## 2021-03-21 RX ADMIN — CARVEDILOL 12.5 MG: 6.25 TABLET, FILM COATED ORAL at 09:16

## 2021-03-21 RX ADMIN — ISOSORBIDE DINITRATE 10 MG: 10 TABLET ORAL at 13:40

## 2021-03-21 NOTE — PROGRESS NOTES
Progress Note - Macy Portillo 80 y o  female MRN: [id number]    Unit/Bed#: [place] [address] -01 Encounter: [id number]    Assessment/Plan:    Metabolic encephalopathy  mental status slightly improved now stable, family considering home hospice    UTI     complete course of antibiotics today, amoxicillin and cefepime    Acute/chronic diastolic HF  appears compensated, holding diuretics with increased creatinine, continue Coreg    CKD 4     last creatinine 3 18    History of DVT    continue warfarin    Chronic respiratory failure  stable on 3 liters oxygen    Hypothyroid    continue Synthroid    Subjective:   Feels okay today, no chest pain or shortness of breath no nausea vomiting no fevers chills    Objective:     Vitals: Blood pressure 146/60, pulse 60, temperature (!) 95 3 °F (35 2 °C), resp  rate 14, weight 58 2 kg (128 lb 4 9 oz), SpO2 98 %  ,Body mass index is 20 71 kg/m²        Results from last 7 days   Lab Units 03/21/21  0504 03/18/21  0458   WBC Thousand/uL  --  6 48   HEMOGLOBIN g/dL  --  8 9*   HEMATOCRIT %  --  31 0*   PLATELETS Thousands/uL  --  177   INR  1 97* 2 38*     Results from last 7 days   Lab Units 03/18/21  0458   POTASSIUM mmol/L 4 1   CHLORIDE mmol/L 99*   CO2 mmol/L 35*   BUN mg/dL 55*   CREATININE mg/dL 3 18*   CALCIUM mg/dL 9 0       Scheduled Meds:  Current Facility-Administered Medications   Medication Dose Route Frequency Provider Last Rate    acetaminophen  650 mg Oral Q6H PRN Gladis Andrews MD      amLODIPine  5 mg Oral Daily OTIS Cole      amoxicillin  500 mg Oral Q12H [address] June Leiva MD      carvedilol  12 5 mg Oral BID With Meals OTIS Cole      collagenase   Topical Daily June Leiva MD      hydrALAZINE  50 mg Oral Q8H [address] OTIS Cole      HYDROmorphone  0 2 mg Intravenous Q4H PRN Frank Fontenot MD      isosorbide dinitrate  10 mg Oral TID after meals Gladis Andrews MD      levothyroxine  25 mcg Oral Early Morning MD Nadya Gabriel oxyCODONE  2 5 mg Oral Q4H PRN Soni Mares MD      saccharomyces boulardii  250 mg Oral BID Bhavesh Rowe MD      senna  8 6 mg Oral HS PRN Bhavesh Rowe MD      warfarin  2 mg Oral Daily (warfarin) Soin Mares MD         Continuous Infusions:         Physical exam:  General appearance:  Alert no distress elderly frail  Head/Eyes:  Nonicteric sluggish  Neck:  Supple  Lungs:  Decreased BS bilateral no wheezing rhonchi or rales  Heart: normal S1 S2 regular  Abdomen: Soft nontender with bowel sounds      Invasive Devices     Peripheral Intravenous Line            Peripheral IV 03/18/21 Dorsal (posterior); Left Forearm 3 days                  VTE Pharmacologic Prophylaxis:  Warfarin      Counseling / Coordination of Care  Total floor / unit time spent today 30  minutes  Greater than 50% of total time was spent with the patient and / or family counseling and / or coordination of care  A description of the counseling / coordination of care:  Discussed with  and son, plan home hospice transfer tomorrow 11:00 a m

## 2021-03-21 NOTE — PLAN OF CARE
Problem: Potential for Falls  Goal: Patient will remain free of falls  Description: INTERVENTIONS:  - Assess patient frequently for physical needs  -  Identify cognitive and physical deficits and behaviors that affect risk of falls  -  [place] fall precautions as indicated by assessment   - Educate patient/family on patient safety including physical limitations  - Instruct patient to call for assistance with activity based on assessment  - Modify environment to reduce risk of injury  - Consider OT/PT consult to assist with strengthening/mobility  Outcome: Progressing     Problem: Prexisting or High Potential for Compromised Skin Integrity  Goal: Skin integrity is maintained or improved  Description: INTERVENTIONS:  - Identify patients at risk for skin breakdown  - Assess and monitor skin integrity  - Assess and monitor nutrition and hydration status  - Monitor labs   - Assess for incontinence   - Turn and reposition patient  - Assist with mobility/ambulation  - Relieve pressure over bony prominences  - Avoid friction and shearing  - Provide appropriate hygiene as needed including keeping skin clean and dry  - Evaluate need for skin moisturizer/barrier cream  - Collaborate with interdisciplinary team   - Patient/family teaching  - Consider wound care consult   Outcome: Progressing     Problem: Nutrition/Hydration-ADULT  Goal: Nutrient/Hydration intake appropriate for improving, restoring or maintaining nutritional needs  Description: Monitor and assess patient's nutrition/hydration status for malnutrition  Collaborate with interdisciplinary team and initiate plan and interventions as ordered  Monitor patient's weight and dietary intake as ordered or per policy  Utilize nutrition screening tool and intervene as necessary  Determine patient's food preferences and provide high-protein, high-caloric foods as appropriate       INTERVENTIONS:  - Monitor oral intake, urinary output, labs, and treatment plans  - Assess nutrition and hydration status and recommend course of action  - Evaluate amount of meals eaten  - Assist patient with eating if necessary   - Allow adequate time for meals  - Recommend/ encourage appropriate diets, oral nutritional supplements, and vitamin/mineral supplements  - Order, calculate, and assess calorie counts as needed  - Recommend, monitor, and adjust tube feedings and TPN/PPN based on assessed needs  - Assess need for intravenous fluids  - Provide specific nutrition/hydration education as appropriate  - Include patient/family/caregiver in decisions related to nutrition  Outcome: Progressing     Problem: RESPIRATORY - ADULT  Goal: Achieves optimal ventilation and oxygenation  Description: INTERVENTIONS:  - Assess for changes in respiratory status  - Assess for changes in mentation and behavior  - Position to facilitate oxygenation and minimize respiratory effort  - Oxygen administered by appropriate delivery if ordered  - Initiate smoking cessation education as indicated  - Encourage broncho-pulmonary hygiene including cough, deep breathe, Incentive Spirometry  - Assess the need for suctioning and aspirate as needed  - Assess and instruct to report SOB or any respiratory difficulty  - Respiratory Therapy support as indicated  Outcome: Progressing     Problem: METABOLIC, FLUID AND ELECTROLYTES - ADULT  Goal: Electrolytes maintained within normal limits  Description: INTERVENTIONS:  - Monitor labs and assess patient for signs and symptoms of electrolyte imbalances  - Administer electrolyte replacement as ordered  - Monitor response to electrolyte replacements, including repeat lab results as appropriate  - Instruct patient on fluid and nutrition as appropriate  Outcome: Progressing  Goal: Fluid balance maintained  Description: INTERVENTIONS:  - Monitor labs   - Monitor I/O and WT  - Instruct patient on fluid and nutrition as appropriate  - Assess for signs & symptoms of volume excess or deficit  Outcome: Progressing     Problem: SKIN/TISSUE INTEGRITY - ADULT  Goal: Skin integrity remains intact  Description: INTERVENTIONS  - Identify patients at risk for skin breakdown  - Assess and monitor skin integrity  - Assess and monitor nutrition and hydration status  - Monitor labs (i e  albumin)  - Assess for incontinence   - Turn and reposition patient  - Assist with mobility/ambulation  - Relieve pressure over bony prominences  - Avoid friction and shearing  - Provide appropriate hygiene as needed including keeping skin clean and dry  - Evaluate need for skin moisturizer/barrier cream  - Collaborate with interdisciplinary team (i e  Nutrition, Rehabilitation, etc )   - Patient/family teaching  Outcome: Progressing  Goal: Incision(s), wounds(s) or drain site(s) healing without S/S of infection  Description: INTERVENTIONS  - Assess and document risk factors for skin impairment   - Assess and document dressing, incision, wound bed, drain sites and surrounding tissue  - Consider nutrition services referral as needed  - Oral mucous membranes remain intact  - Provide patient/ family education  Outcome: Progressing     Problem: INFECTION - ADULT  Goal: Absence or prevention of progression during hospitalization  Description: INTERVENTIONS:  - Assess and monitor for signs and symptoms of infection  - Monitor lab/diagnostic results  - Monitor all insertion sites, i e  indwelling lines, tubes, and drains  - Monitor endotracheal if appropriate and nasal secretions for changes in amount and color  - [place] appropriate cooling/warming therapies per order  - Administer medications as ordered  - Instruct and encourage patient and family to use good hand hygiene technique  - Identify and instruct in appropriate isolation precautions for identified infection/condition  Outcome: Progressing     Problem: SAFETY ADULT  Goal: Patient will remain free of falls  Description: INTERVENTIONS:  - Assess patient frequently for physical needs  -  Identify cognitive and physical deficits and behaviors that affect risk of falls    -  [place] fall precautions as indicated by assessment   - Educate patient/family on patient safety including physical limitations  - Instruct patient to call for assistance with activity based on assessment  - Modify environment to reduce risk of injury  - Consider OT/PT consult to assist with strengthening/mobility  Outcome: Progressing  Goal: Maintain or return to baseline ADL function  Description: INTERVENTIONS:  -  Assess patient's ability to carry out ADLs; assess patient's baseline for ADL function and identify physical deficits which impact ability to perform ADLs (bathing, care of mouth/teeth, toileting, grooming, dressing, etc )  - Assess/evaluate cause of self-care deficits   - Assess range of motion  - Assess patient's mobility; develop plan if impaired  - Assess patient's need for assistive devices and provide as appropriate  - Encourage maximum independence but intervene and supervise when necessary  - Involve family in performance of ADLs  - Assess for home care needs following discharge   - Consider OT consult to assist with ADL evaluation and planning for discharge  - Provide patient education as appropriate  Outcome: Progressing     Problem: DISCHARGE PLANNING  Goal: Discharge to home or other facility with appropriate resources  Description: INTERVENTIONS:  - Identify barriers to discharge w/patient and caregiver  - Arrange for needed discharge resources and transportation as appropriate  - Identify discharge learning needs (meds, wound care, etc )  - Arrange for interpretive services to assist at discharge as needed  - Refer to Case Management Department for coordinating discharge planning if the patient needs post-hospital services based on physician/advanced practitioner order or complex needs related to functional status, cognitive ability, or social support system  Outcome: Progressing Problem: Knowledge Deficit  Goal: Patient/family/caregiver demonstrates understanding of disease process, treatment plan, medications, and discharge instructions  Description: Complete learning assessment and assess knowledge base    Interventions:  - Provide teaching at level of understanding  - Provide teaching via preferred learning methods  Outcome: Progressing

## 2021-03-21 NOTE — CASE MANAGEMENT
Received email from [address] [place] [address], which included the OOH DNR order for patient's transport home tomorrow at 1100  Printed document, placed in chart, and notified Dr Yayo Olmos via TT that it is available to be signed  He agreed with plan  No other d/c needs identified

## 2021-03-21 NOTE — PLAN OF CARE
Problem: Potential for Falls  Goal: Patient will remain free of falls  Description: INTERVENTIONS:  - Assess patient frequently for physical needs  -  Identify cognitive and physical deficits and behaviors that affect risk of falls  -  [place] fall precautions as indicated by assessment   - Educate patient/family on patient safety including physical limitations  - Instruct patient to call for assistance with activity based on assessment  - Modify environment to reduce risk of injury  - Consider OT/PT consult to assist with strengthening/mobility  Outcome: Progressing     Problem: Prexisting or High Potential for Compromised Skin Integrity  Goal: Skin integrity is maintained or improved  Description: INTERVENTIONS:  - Identify patients at risk for skin breakdown  - Assess and monitor skin integrity  - Assess and monitor nutrition and hydration status  - Monitor labs   - Assess for incontinence   - Turn and reposition patient  - Assist with mobility/ambulation  - Relieve pressure over bony prominences  - Avoid friction and shearing  - Provide appropriate hygiene as needed including keeping skin clean and dry  - Evaluate need for skin moisturizer/barrier cream  - Collaborate with interdisciplinary team   - Patient/family teaching  - Consider wound care consult   Outcome: Progressing     Problem: Nutrition/Hydration-ADULT  Goal: Nutrient/Hydration intake appropriate for improving, restoring or maintaining nutritional needs  Description: Monitor and assess patient's nutrition/hydration status for malnutrition  Collaborate with interdisciplinary team and initiate plan and interventions as ordered  Monitor patient's weight and dietary intake as ordered or per policy  Utilize nutrition screening tool and intervene as necessary  Determine patient's food preferences and provide high-protein, high-caloric foods as appropriate       INTERVENTIONS:  - Monitor oral intake, urinary output, labs, and treatment plans  - Assess nutrition and hydration status and recommend course of action  - Evaluate amount of meals eaten  - Assist patient with eating if necessary   - Allow adequate time for meals  - Recommend/ encourage appropriate diets, oral nutritional supplements, and vitamin/mineral supplements  - Order, calculate, and assess calorie counts as needed  - Recommend, monitor, and adjust tube feedings and TPN/PPN based on assessed needs  - Assess need for intravenous fluids  - Provide specific nutrition/hydration education as appropriate  - Include patient/family/caregiver in decisions related to nutrition  Outcome: Progressing     Problem: RESPIRATORY - ADULT  Goal: Achieves optimal ventilation and oxygenation  Description: INTERVENTIONS:  - Assess for changes in respiratory status  - Assess for changes in mentation and behavior  - Position to facilitate oxygenation and minimize respiratory effort  - Oxygen administered by appropriate delivery if ordered  - Initiate smoking cessation education as indicated  - Encourage broncho-pulmonary hygiene including cough, deep breathe, Incentive Spirometry  - Assess the need for suctioning and aspirate as needed  - Assess and instruct to report SOB or any respiratory difficulty  - Respiratory Therapy support as indicated  Outcome: Progressing     Problem: METABOLIC, FLUID AND ELECTROLYTES - ADULT  Goal: Electrolytes maintained within normal limits  Description: INTERVENTIONS:  - Monitor labs and assess patient for signs and symptoms of electrolyte imbalances  - Administer electrolyte replacement as ordered  - Monitor response to electrolyte replacements, including repeat lab results as appropriate  - Instruct patient on fluid and nutrition as appropriate  Outcome: Progressing  Goal: Fluid balance maintained  Description: INTERVENTIONS:  - Monitor labs   - Monitor I/O and WT  - Instruct patient on fluid and nutrition as appropriate  - Assess for signs & symptoms of volume excess or deficit  Outcome: Progressing     Problem: SKIN/TISSUE INTEGRITY - ADULT  Goal: Skin integrity remains intact  Description: INTERVENTIONS  - Identify patients at risk for skin breakdown  - Assess and monitor skin integrity  - Assess and monitor nutrition and hydration status  - Monitor labs (i e  albumin)  - Assess for incontinence   - Turn and reposition patient  - Assist with mobility/ambulation  - Relieve pressure over bony prominences  - Avoid friction and shearing  - Provide appropriate hygiene as needed including keeping skin clean and dry  - Evaluate need for skin moisturizer/barrier cream  - Collaborate with interdisciplinary team (i e  Nutrition, Rehabilitation, etc )   - Patient/family teaching  Outcome: Progressing  Goal: Incision(s), wounds(s) or drain site(s) healing without S/S of infection  Description: INTERVENTIONS  - Assess and document risk factors for skin impairment   - Assess and document dressing, incision, wound bed, drain sites and surrounding tissue  - Consider nutrition services referral as needed  - Oral mucous membranes remain intact  - Provide patient/ family education  Outcome: Progressing     Problem: INFECTION - ADULT  Goal: Absence or prevention of progression during hospitalization  Description: INTERVENTIONS:  - Assess and monitor for signs and symptoms of infection  - Monitor lab/diagnostic results  - Monitor all insertion sites, i e  indwelling lines, tubes, and drains  - Monitor endotracheal if appropriate and nasal secretions for changes in amount and color  - [place] appropriate cooling/warming therapies per order  - Administer medications as ordered  - Instruct and encourage patient and family to use good hand hygiene technique  - Identify and instruct in appropriate isolation precautions for identified infection/condition  Outcome: Progressing     Problem: SAFETY ADULT  Goal: Patient will remain free of falls  Description: INTERVENTIONS:  - Assess patient frequently for physical needs  -  Identify cognitive and physical deficits and behaviors that affect risk of falls    -  [place] fall precautions as indicated by assessment   - Educate patient/family on patient safety including physical limitations  - Instruct patient to call for assistance with activity based on assessment  - Modify environment to reduce risk of injury  - Consider OT/PT consult to assist with strengthening/mobility  Outcome: Progressing  Goal: Maintain or return to baseline ADL function  Description: INTERVENTIONS:  -  Assess patient's ability to carry out ADLs; assess patient's baseline for ADL function and identify physical deficits which impact ability to perform ADLs (bathing, care of mouth/teeth, toileting, grooming, dressing, etc )  - Assess/evaluate cause of self-care deficits   - Assess range of motion  - Assess patient's mobility; develop plan if impaired  - Assess patient's need for assistive devices and provide as appropriate  - Encourage maximum independence but intervene and supervise when necessary  - Involve family in performance of ADLs  - Assess for home care needs following discharge   - Consider OT consult to assist with ADL evaluation and planning for discharge  - Provide patient education as appropriate  Outcome: Progressing     Problem: DISCHARGE PLANNING  Goal: Discharge to home or other facility with appropriate resources  Description: INTERVENTIONS:  - Identify barriers to discharge w/patient and caregiver  - Arrange for needed discharge resources and transportation as appropriate  - Identify discharge learning needs (meds, wound care, etc )  - Arrange for interpretive services to assist at discharge as needed  - Refer to Case Management Department for coordinating discharge planning if the patient needs post-hospital services based on physician/advanced practitioner order or complex needs related to functional status, cognitive ability, or social support system  Outcome: Progressing Problem: Knowledge Deficit  Goal: Patient/family/caregiver demonstrates understanding of disease process, treatment plan, medications, and discharge instructions  Description: Complete learning assessment and assess knowledge base    Interventions:  - Provide teaching at level of understanding  - Provide teaching via preferred learning methods  Outcome: Progressing

## 2021-03-22 VITALS
RESPIRATION RATE: 20 BRPM | DIASTOLIC BLOOD PRESSURE: 66 MMHG | SYSTOLIC BLOOD PRESSURE: 153 MMHG | TEMPERATURE: 97 F | BODY MASS INDEX: 20.71 KG/M2 | OXYGEN SATURATION: 99 % | HEART RATE: 67 BPM | WEIGHT: 128.31 LBS

## 2021-03-22 LAB
BACTERIA BLD CULT: NORMAL
BACTERIA BLD CULT: NORMAL

## 2021-03-22 PROCEDURE — 99239 HOSP IP/OBS DSCHRG MGMT >30: CPT | Performed by: HOSPITALIST

## 2021-03-22 RX ORDER — OXYCODONE HYDROCHLORIDE 5 MG/1
2.5 TABLET ORAL EVERY 4 HOURS PRN
Qty: 30 TABLET | Refills: 0 | Status: SHIPPED | OUTPATIENT
Start: 2021-03-22 | End: 2021-04-01

## 2021-03-22 RX ORDER — OXYCODONE HYDROCHLORIDE 5 MG/1
5 TABLET ORAL EVERY 4 HOURS PRN
Qty: 30 TABLET | Refills: 0 | Status: SHIPPED | OUTPATIENT
Start: 2021-03-22 | End: 2021-04-01

## 2021-03-22 RX ADMIN — LEVOTHYROXINE SODIUM 25 MCG: 25 TABLET ORAL at 05:05

## 2021-03-22 RX ADMIN — COLLAGENASE SANTYL: 250 OINTMENT TOPICAL at 11:00

## 2021-03-22 RX ADMIN — ISOSORBIDE DINITRATE 10 MG: 10 TABLET ORAL at 12:54

## 2021-03-22 RX ADMIN — AMLODIPINE BESYLATE 5 MG: 5 TABLET ORAL at 08:58

## 2021-03-22 RX ADMIN — CARVEDILOL 12.5 MG: 6.25 TABLET, FILM COATED ORAL at 08:58

## 2021-03-22 RX ADMIN — HYDRALAZINE HYDROCHLORIDE 50 MG: 25 TABLET ORAL at 05:05

## 2021-03-22 RX ADMIN — ISOSORBIDE DINITRATE 10 MG: 10 TABLET ORAL at 08:58

## 2021-03-22 RX ADMIN — Medication 250 MG: at 08:58

## 2021-03-22 NOTE — ASSESSMENT & PLAN NOTE
80year old female admitted due to acute metabolic encephalopathy  Metabolic encephalopathy felt to be multifactorial, secondary to acute kidney injury, congestive heart failure, as well as UTI She has completed a course with antibiotics  Acute kidney injury is better  She has been doing very poorly over the past several months    Patient will be going home with hospice

## 2021-03-22 NOTE — PLAN OF CARE
Problem: Potential for Falls  Goal: Patient will remain free of falls  Description: INTERVENTIONS:  - Assess patient frequently for physical needs  -  Identify cognitive and physical deficits and behaviors that affect risk of falls  -  [place] fall precautions as indicated by assessment   - Educate patient/family on patient safety including physical limitations  - Instruct patient to call for assistance with activity based on assessment  - Modify environment to reduce risk of injury  - Consider OT/PT consult to assist with strengthening/mobility  3/22/2021 1042 by Jabier Cogan, RN  Outcome: Completed  3/22/2021 1042 by Jabier Cogan, RN  Outcome: Progressing     Problem: Prexisting or High Potential for Compromised Skin Integrity  Goal: Skin integrity is maintained or improved  Description: INTERVENTIONS:  - Identify patients at risk for skin breakdown  - Assess and monitor skin integrity  - Assess and monitor nutrition and hydration status  - Monitor labs   - Assess for incontinence   - Turn and reposition patient  - Assist with mobility/ambulation  - Relieve pressure over bony prominences  - Avoid friction and shearing  - Provide appropriate hygiene as needed including keeping skin clean and dry  - Evaluate need for skin moisturizer/barrier cream  - Collaborate with interdisciplinary team   - Patient/family teaching  - Consider wound care consult   3/22/2021 1042 by Jabier Cogan, RN  Outcome: Completed  3/22/2021 1042 by Jabier Cogan, RN  Outcome: Progressing     Problem: Nutrition/Hydration-ADULT  Goal: Nutrient/Hydration intake appropriate for improving, restoring or maintaining nutritional needs  Description: Monitor and assess patient's nutrition/hydration status for malnutrition  Collaborate with interdisciplinary team and initiate plan and interventions as ordered  Monitor patient's weight and dietary intake as ordered or per policy  Utilize nutrition screening tool and intervene as necessary   Determine patient's food preferences and provide high-protein, high-caloric foods as appropriate       INTERVENTIONS:  - Monitor oral intake, urinary output, labs, and treatment plans  - Assess nutrition and hydration status and recommend course of action  - Evaluate amount of meals eaten  - Assist patient with eating if necessary   - Allow adequate time for meals  - Recommend/ encourage appropriate diets, oral nutritional supplements, and vitamin/mineral supplements  - Order, calculate, and assess calorie counts as needed  - Recommend, monitor, and adjust tube feedings and TPN/PPN based on assessed needs  - Assess need for intravenous fluids  - Provide specific nutrition/hydration education as appropriate  - Include patient/family/caregiver in decisions related to nutrition  3/22/2021 1042 by Judge Abdiel RN  Outcome: Completed  3/22/2021 1042 by Judge Abdiel RN  Outcome: Progressing     Problem: RESPIRATORY - ADULT  Goal: Achieves optimal ventilation and oxygenation  Description: INTERVENTIONS:  - Assess for changes in respiratory status  - Assess for changes in mentation and behavior  - Position to facilitate oxygenation and minimize respiratory effort  - Oxygen administered by appropriate delivery if ordered  - Initiate smoking cessation education as indicated  - Encourage broncho-pulmonary hygiene including cough, deep breathe, Incentive Spirometry  - Assess the need for suctioning and aspirate as needed  - Assess and instruct to report SOB or any respiratory difficulty  - Respiratory Therapy support as indicated  3/22/2021 1042 by Judge Meadows RN  Outcome: Completed  3/22/2021 1042 by Judge Abdiel RN  Outcome: Progressing     Problem: METABOLIC, FLUID AND ELECTROLYTES - ADULT  Goal: Electrolytes maintained within normal limits  Description: INTERVENTIONS:  - Monitor labs and assess patient for signs and symptoms of electrolyte imbalances  - Administer electrolyte replacement as ordered  - Monitor response to electrolyte replacements, including repeat lab results as appropriate  - Instruct patient on fluid and nutrition as appropriate  3/22/2021 1042 by Judge Abdiel RN  Outcome: Completed  3/22/2021 1042 by Judge Abdiel RN  Outcome: Progressing  Goal: Fluid balance maintained  Description: INTERVENTIONS:  - Monitor labs   - Monitor I/O and WT  - Instruct patient on fluid and nutrition as appropriate  - Assess for signs & symptoms of volume excess or deficit  3/22/2021 1042 by Judge Abdiel RN  Outcome: Completed  3/22/2021 1042 by Judge Abdiel RN  Outcome: Progressing     Problem: SKIN/TISSUE INTEGRITY - ADULT  Goal: Skin integrity remains intact  Description: INTERVENTIONS  - Identify patients at risk for skin breakdown  - Assess and monitor skin integrity  - Assess and monitor nutrition and hydration status  - Monitor labs (i e  albumin)  - Assess for incontinence   - Turn and reposition patient  - Assist with mobility/ambulation  - Relieve pressure over bony prominences  - Avoid friction and shearing  - Provide appropriate hygiene as needed including keeping skin clean and dry  - Evaluate need for skin moisturizer/barrier cream  - Collaborate with interdisciplinary team (i e  Nutrition, Rehabilitation, etc )   - Patient/family teaching  3/22/2021 1042 by Judge Abdiel RN  Outcome: Completed  3/22/2021 1042 by Judge Abdiel RN  Outcome: Progressing  Goal: Incision(s), wounds(s) or drain site(s) healing without S/S of infection  Description: INTERVENTIONS  - Assess and document risk factors for skin impairment   - Assess and document dressing, incision, wound bed, drain sites and surrounding tissue  - Consider nutrition services referral as needed  - Oral mucous membranes remain intact  - Provide patient/ family education  3/22/2021 1042 by Judge Abdiel RN  Outcome: Completed  3/22/2021 1042 by Judge Abdiel RN  Outcome: Progressing     Problem: INFECTION - ADULT  Goal: Absence or prevention of progression during hospitalization  Description: INTERVENTIONS:  - Assess and monitor for signs and symptoms of infection  - Monitor lab/diagnostic results  - Monitor all insertion sites, i e  indwelling lines, tubes, and drains  - Monitor endotracheal if appropriate and nasal secretions for changes in amount and color  - [place] appropriate cooling/warming therapies per order  - Administer medications as ordered  - Instruct and encourage patient and family to use good hand hygiene technique  - Identify and instruct in appropriate isolation precautions for identified infection/condition  3/22/2021 1042 by Darleen Garcia RN  Outcome: Completed  3/22/2021 1042 by Darleen Garcia RN  Outcome: Progressing     Problem: SAFETY ADULT  Goal: Patient will remain free of falls  Description: INTERVENTIONS:  - Assess patient frequently for physical needs  -  Identify cognitive and physical deficits and behaviors that affect risk of falls    -  [place] fall precautions as indicated by assessment   - Educate patient/family on patient safety including physical limitations  - Instruct patient to call for assistance with activity based on assessment  - Modify environment to reduce risk of injury  - Consider OT/PT consult to assist with strengthening/mobility  3/22/2021 1042 by Darleen Garcia RN  Outcome: Completed  3/22/2021 1042 by Darleen Garcia RN  Outcome: Progressing  Goal: Maintain or return to baseline ADL function  Description: INTERVENTIONS:  -  Assess patient's ability to carry out ADLs; assess patient's baseline for ADL function and identify physical deficits which impact ability to perform ADLs (bathing, care of mouth/teeth, toileting, grooming, dressing, etc )  - Assess/evaluate cause of self-care deficits   - Assess range of motion  - Assess patient's mobility; develop plan if impaired  - Assess patient's need for assistive devices and provide as appropriate  - Encourage maximum independence but intervene and supervise when necessary  - Involve family in performance of ADLs  - Assess for home care needs following discharge   - Consider OT consult to assist with ADL evaluation and planning for discharge  - Provide patient education as appropriate  3/22/2021 1042 by Devi Haque RN  Outcome: Completed  3/22/2021 1042 by Devi Haque RN  Outcome: Progressing     Problem: DISCHARGE PLANNING  Goal: Discharge to home or other facility with appropriate resources  Description: INTERVENTIONS:  - Identify barriers to discharge w/patient and caregiver  - Arrange for needed discharge resources and transportation as appropriate  - Identify discharge learning needs (meds, wound care, etc )  - Arrange for interpretive services to assist at discharge as needed  - Refer to Case Management Department for coordinating discharge planning if the patient needs post-hospital services based on physician/advanced practitioner order or complex needs related to functional status, cognitive ability, or social support system  3/22/2021 1042 by Devi Haque RN  Outcome: Completed  3/22/2021 1042 by Devi Haque RN  Outcome: Progressing     Problem: Knowledge Deficit  Goal: Patient/family/caregiver demonstrates understanding of disease process, treatment plan, medications, and discharge instructions  Description: Complete learning assessment and assess knowledge base    Interventions:  - Provide teaching at level of understanding  - Provide teaching via preferred learning methods  3/22/2021 1042 by Devi Haque RN  Outcome: Completed  3/22/2021 1042 by Devi Haque RN  Outcome: Progressing

## 2021-03-22 NOTE — DISCHARGE SUMMARY
[facility]  Discharge- Howard Aschoff 5/18/1930, 80 y o  female MRN: [id number]  Unit/Bed#: [place] 68 2 -01 Encounter: [id number]  Primary Care Provider: Kasie Alvares MD   Date and time admitted to hospital: 3/10/2021 12:45 PM    * Acute metabolic encephalopathy  Assessment & Plan  80year old female admitted due to acute metabolic encephalopathy  Metabolic encephalopathy felt to be multifactorial, secondary to acute kidney injury, congestive heart failure, as well as UTI She has completed a course with antibiotics  Acute kidney injury is better  She has been doing very poorly over the past several months  Patient will be going home with hospice    UTI (urinary tract infection)  Assessment & Plan    Completed course of antibiotics in the hospital      Acute kidney injury superimposed on chronic kidney disease [facility])  Assessment & Plan   Doing better  Holding diuretics at discharge  Discharging Physician / Practitioner: Christopher Bonilla DO  PCP: Kasie Alvares MD  Admission Date:   Admission Orders (From admission, onward)     Ordered        03/10/21 1438  Inpatient Admission  Once                   Discharge Date: 03/22/21    Resolved Problems  Date Reviewed: 3/18/2021    None              Reason for Admission:  confusion      Hospital Course:     Howard Aschoff is a 80 y o  female patient who originally presented to the hospital on 3/10/2021 due to  Confusion  Patient lives at [facility] nursing home  She came in with confusion and lethargy  She had a low-grade fever  She was found have an acute UTI  She completed her course of antibiotics here in the hospital   Course was complicated by acute kidney injury  Her diuretics were discontinued  She has not been doing well over the last several months  She is therefore going home with hospice  Please see above list of diagnoses and related plan for additional information         Condition at Discharge: stable       Discharge Day Visit / Exam:     Subjective:  No complaints  Pain is controlled  Vitals: Blood Pressure: 153/66 (03/22/21 0711)  Pulse: 67 (03/22/21 0711)  Temperature: (!) 97 °F (36 1 °C) (03/22/21 0711)  Temp Source: Oral (03/21/21 1510)  Respirations: 20 (03/21/21 2242)  Weight - Scale: 58 2 kg (128 lb 4 9 oz) (03/21/21 0600)  SpO2: 99 % (03/22/21 0711)    Exam:     Physical Exam  Vitals signs and nursing note reviewed  HENT:      Head: Normocephalic and atraumatic  Eyes:      Pupils: Pupils are equal, round, and reactive to light  Cardiovascular:      Rate and Rhythm: Normal rate and regular rhythm  Heart sounds: No murmur  No friction rub  No gallop  Pulmonary:      Effort: Pulmonary effort is normal       Breath sounds: Normal breath sounds  No wheezing or rales  Abdominal:      General: Bowel sounds are normal       Palpations: Abdomen is soft  Tenderness: There is no abdominal tenderness  Musculoskeletal:      Right lower leg: No edema  Left lower leg: No edema            Discharge instructions/Information to patient and family:   See after visit summary for information provided to patient and family  Provisions for Follow-Up Care:  See after visit summary for information related to follow-up care and any pertinent home health orders  Disposition:     Other: Hospice       Discharge Statement:  I spent 38 minutes discharging the patient  This time was spent on the day of discharge  I had direct contact with the patient on the day of discharge  Greater than 50% of the total time was spent examining patient, answering all patient questions, arranging and discussing plan of care with patient as well as directly providing post-discharge instructions  Additional time then spent on discharge activities  Discharge Medications:  See after visit summary for reconciled discharge medications provided to patient and family        ** Please Note: This note has been constructed using a voice recognition system **

## 2021-03-22 NOTE — PLAN OF CARE
Problem: Potential for Falls  Goal: Patient will remain free of falls  Description: INTERVENTIONS:  - Assess patient frequently for physical needs  -  Identify cognitive and physical deficits and behaviors that affect risk of falls  -  [place] fall precautions as indicated by assessment   - Educate patient/family on patient safety including physical limitations  - Instruct patient to call for assistance with activity based on assessment  - Modify environment to reduce risk of injury  - Consider OT/PT consult to assist with strengthening/mobility  Outcome: Progressing     Problem: Prexisting or High Potential for Compromised Skin Integrity  Goal: Skin integrity is maintained or improved  Description: INTERVENTIONS:  - Identify patients at risk for skin breakdown  - Assess and monitor skin integrity  - Assess and monitor nutrition and hydration status  - Monitor labs   - Assess for incontinence   - Turn and reposition patient  - Assist with mobility/ambulation  - Relieve pressure over bony prominences  - Avoid friction and shearing  - Provide appropriate hygiene as needed including keeping skin clean and dry  - Evaluate need for skin moisturizer/barrier cream  - Collaborate with interdisciplinary team   - Patient/family teaching  - Consider wound care consult   Outcome: Progressing     Problem: Nutrition/Hydration-ADULT  Goal: Nutrient/Hydration intake appropriate for improving, restoring or maintaining nutritional needs  Description: Monitor and assess patient's nutrition/hydration status for malnutrition  Collaborate with interdisciplinary team and initiate plan and interventions as ordered  Monitor patient's weight and dietary intake as ordered or per policy  Utilize nutrition screening tool and intervene as necessary  Determine patient's food preferences and provide high-protein, high-caloric foods as appropriate       INTERVENTIONS:  - Monitor oral intake, urinary output, labs, and treatment plans  - Assess nutrition and hydration status and recommend course of action  - Evaluate amount of meals eaten  - Assist patient with eating if necessary   - Allow adequate time for meals  - Recommend/ encourage appropriate diets, oral nutritional supplements, and vitamin/mineral supplements  - Order, calculate, and assess calorie counts as needed  - Recommend, monitor, and adjust tube feedings and TPN/PPN based on assessed needs  - Assess need for intravenous fluids  - Provide specific nutrition/hydration education as appropriate  - Include patient/family/caregiver in decisions related to nutrition  Outcome: Progressing     Problem: RESPIRATORY - ADULT  Goal: Achieves optimal ventilation and oxygenation  Description: INTERVENTIONS:  - Assess for changes in respiratory status  - Assess for changes in mentation and behavior  - Position to facilitate oxygenation and minimize respiratory effort  - Oxygen administered by appropriate delivery if ordered  - Initiate smoking cessation education as indicated  - Encourage broncho-pulmonary hygiene including cough, deep breathe, Incentive Spirometry  - Assess the need for suctioning and aspirate as needed  - Assess and instruct to report SOB or any respiratory difficulty  - Respiratory Therapy support as indicated  Outcome: Progressing     Problem: METABOLIC, FLUID AND ELECTROLYTES - ADULT  Goal: Electrolytes maintained within normal limits  Description: INTERVENTIONS:  - Monitor labs and assess patient for signs and symptoms of electrolyte imbalances  - Administer electrolyte replacement as ordered  - Monitor response to electrolyte replacements, including repeat lab results as appropriate  - Instruct patient on fluid and nutrition as appropriate  Outcome: Progressing  Goal: Fluid balance maintained  Description: INTERVENTIONS:  - Monitor labs   - Monitor I/O and WT  - Instruct patient on fluid and nutrition as appropriate  - Assess for signs & symptoms of volume excess or deficit  Outcome: Progressing     Problem: SKIN/TISSUE INTEGRITY - ADULT  Goal: Skin integrity remains intact  Description: INTERVENTIONS  - Identify patients at risk for skin breakdown  - Assess and monitor skin integrity  - Assess and monitor nutrition and hydration status  - Monitor labs (i e  albumin)  - Assess for incontinence   - Turn and reposition patient  - Assist with mobility/ambulation  - Relieve pressure over bony prominences  - Avoid friction and shearing  - Provide appropriate hygiene as needed including keeping skin clean and dry  - Evaluate need for skin moisturizer/barrier cream  - Collaborate with interdisciplinary team (i e  Nutrition, Rehabilitation, etc )   - Patient/family teaching  Outcome: Progressing  Goal: Incision(s), wounds(s) or drain site(s) healing without S/S of infection  Description: INTERVENTIONS  - Assess and document risk factors for skin impairment   - Assess and document dressing, incision, wound bed, drain sites and surrounding tissue  - Consider nutrition services referral as needed  - Oral mucous membranes remain intact  - Provide patient/ family education  Outcome: Progressing     Problem: INFECTION - ADULT  Goal: Absence or prevention of progression during hospitalization  Description: INTERVENTIONS:  - Assess and monitor for signs and symptoms of infection  - Monitor lab/diagnostic results  - Monitor all insertion sites, i e  indwelling lines, tubes, and drains  - Monitor endotracheal if appropriate and nasal secretions for changes in amount and color  - [place] appropriate cooling/warming therapies per order  - Administer medications as ordered  - Instruct and encourage patient and family to use good hand hygiene technique  - Identify and instruct in appropriate isolation precautions for identified infection/condition  Outcome: Progressing     Problem: SAFETY ADULT  Goal: Patient will remain free of falls  Description: INTERVENTIONS:  - Assess patient frequently for physical needs  -  Identify cognitive and physical deficits and behaviors that affect risk of falls    -  [place] fall precautions as indicated by assessment   - Educate patient/family on patient safety including physical limitations  - Instruct patient to call for assistance with activity based on assessment  - Modify environment to reduce risk of injury  - Consider OT/PT consult to assist with strengthening/mobility  Outcome: Progressing  Goal: Maintain or return to baseline ADL function  Description: INTERVENTIONS:  -  Assess patient's ability to carry out ADLs; assess patient's baseline for ADL function and identify physical deficits which impact ability to perform ADLs (bathing, care of mouth/teeth, toileting, grooming, dressing, etc )  - Assess/evaluate cause of self-care deficits   - Assess range of motion  - Assess patient's mobility; develop plan if impaired  - Assess patient's need for assistive devices and provide as appropriate  - Encourage maximum independence but intervene and supervise when necessary  - Involve family in performance of ADLs  - Assess for home care needs following discharge   - Consider OT consult to assist with ADL evaluation and planning for discharge  - Provide patient education as appropriate  Outcome: Progressing     Problem: DISCHARGE PLANNING  Goal: Discharge to home or other facility with appropriate resources  Description: INTERVENTIONS:  - Identify barriers to discharge w/patient and caregiver  - Arrange for needed discharge resources and transportation as appropriate  - Identify discharge learning needs (meds, wound care, etc )  - Arrange for interpretive services to assist at discharge as needed  - Refer to Case Management Department for coordinating discharge planning if the patient needs post-hospital services based on physician/advanced practitioner order or complex needs related to functional status, cognitive ability, or social support system  Outcome: Progressing Problem: Knowledge Deficit  Goal: Patient/family/caregiver demonstrates understanding of disease process, treatment plan, medications, and discharge instructions  Description: Complete learning assessment and assess knowledge base    Interventions:  - Provide teaching at level of understanding  - Provide teaching via preferred learning methods  Outcome: Progressing

## 2021-03-22 NOTE — CASE MANAGEMENT
CMN emailed to [place] and placed in [place] folder with facesheet  Pt for transport at 1100  Addendum from 1120: [facility] called and transport time changed to 1330  Pt's , RN , [facility] aware  PCP notified as pt is yellow/moderate risk  PCP also notified that pt going home on hospice

## 2021-03-22 NOTE — NURSING NOTE
All discharge instructions were printed and handed to the transport team to be provided for family when patient arrived home  All patient belongings were sent home  IV and masimo were removed prior to patient leaving

## 2021-03-23 ENCOUNTER — TRANSITIONAL CARE MANAGEMENT (OUTPATIENT)
Dept: FAMILY MEDICINE CLINIC | Facility: CLINIC | Age: 86
End: 2021-03-23

## 2021-03-23 DIAGNOSIS — Z51.5 HOSPICE CARE PATIENT: Primary | ICD-10-CM

## 2021-03-23 RX ORDER — MORPHINE SULFATE 100 MG/5ML
5 SOLUTION, CONCENTRATE ORAL EVERY 4 HOURS PRN
Qty: 30 ML | Refills: 0 | Status: SHIPPED | OUTPATIENT
Start: 2021-03-23

## 2021-03-23 RX ORDER — LORAZEPAM 2 MG/ML
0.5 CONCENTRATE ORAL EVERY 4 HOURS PRN
Qty: 30 ML | Refills: 0 | Status: SHIPPED | OUTPATIENT
Start: 2021-03-23 | End: 2021-04-02

## 2021-03-25 DIAGNOSIS — Z51.5 HOSPICE CARE PATIENT: Primary | ICD-10-CM

## 2021-03-25 RX ORDER — DIVALPROEX SODIUM 125 MG/1
125 CAPSULE, COATED PELLETS ORAL EVERY 12 HOURS SCHEDULED
Qty: 60 CAPSULE | Refills: 0 | Status: SHIPPED | OUTPATIENT
Start: 2021-03-25 | End: 2021-05-24

## 2021-04-07 DIAGNOSIS — Z51.5 HOSPICE CARE PATIENT: Primary | ICD-10-CM

## 2021-04-07 DIAGNOSIS — I50.31 ACUTE DIASTOLIC CHF (CONGESTIVE HEART FAILURE) (HCC): ICD-10-CM

## 2021-04-07 RX ORDER — CARVEDILOL 12.5 MG/1
12.5 TABLET ORAL 2 TIMES DAILY WITH MEALS
Qty: 60 TABLET | Refills: 0 | Status: SHIPPED | OUTPATIENT
Start: 2021-04-07 | End: 2021-05-24 | Stop reason: SDUPTHER

## 2021-04-13 ENCOUNTER — TELEPHONE (OUTPATIENT)
Dept: NEPHROLOGY | Facility: CLINIC | Age: 86
End: 2021-04-13

## 2021-04-13 NOTE — TELEPHONE ENCOUNTER
Spoke with the patients  and he stated that the patient is home and does not get around very well   So he said he will reach out to us

## 2021-04-16 DIAGNOSIS — I50.31 ACUTE DIASTOLIC CHF (CONGESTIVE HEART FAILURE) (HCC): ICD-10-CM

## 2021-04-16 DIAGNOSIS — Z51.5 HOSPICE CARE PATIENT: Primary | ICD-10-CM

## 2021-04-16 RX ORDER — ISOSORBIDE DINITRATE 10 MG/1
10 TABLET ORAL
Qty: 90 TABLET | Refills: 0 | Status: SHIPPED | OUTPATIENT
Start: 2021-04-16 | End: 2021-05-16

## 2021-04-16 RX ORDER — OXYCODONE HYDROCHLORIDE 5 MG/1
TABLET ORAL
Qty: 30 TABLET | Refills: 0 | Status: SHIPPED | OUTPATIENT
Start: 2021-04-16

## 2021-04-16 NOTE — PROGRESS NOTES
2021 12:26 PM  [facility] hospice patient requests refill of CII medication  Filled electronically via Epic as per [place] State Law  Requested Prescriptions     Signed Prescriptions Disp Refills    isosorbide dinitrate (ISORDIL) 10 mg tablet 90 tablet 0     Sig: Take 1 tablet (10 mg total) by mouth 3 (three) times daily after meals    oxyCODONE (ROXICODONE) 5 mg immediate release tablet 30 tablet 0     Si 5mg - 5mg every 4 hours as needed for pain or shortness of breath       Vitor Smith [address] Answering Service: [phone]  You can find me on [employer]!

## 2021-04-26 DIAGNOSIS — Z51.5 HOSPICE CARE PATIENT: ICD-10-CM

## 2021-04-26 DIAGNOSIS — I50.31 ACUTE DIASTOLIC CHF (CONGESTIVE HEART FAILURE) (HCC): Primary | ICD-10-CM

## 2021-04-26 RX ORDER — ASPIRIN 325 MG
325 TABLET, DELAYED RELEASE (ENTERIC COATED) ORAL DAILY
Qty: 30 TABLET | Refills: 2 | Status: SHIPPED | OUTPATIENT
Start: 2021-04-26 | End: 2021-05-12 | Stop reason: SDUPTHER

## 2021-04-26 NOTE — PROGRESS NOTES
4/26/2021 10:59 AM  [facility] CBC patient requests refill of  medication  Filled electronically via Epic as per [place] State Law  Requested Prescriptions     Signed Prescriptions Disp Refills    aspirin (ECOTRIN) 325 mg EC tablet 30 tablet 2     Sig: Take 1 tablet (325 mg total) by mouth daily       Vitor Bosch 77 Answering Service: [phone]  You can find me on [employer]!

## 2021-05-12 DIAGNOSIS — I50.31 ACUTE DIASTOLIC CHF (CONGESTIVE HEART FAILURE) (HCC): ICD-10-CM

## 2021-05-12 DIAGNOSIS — Z51.5 HOSPICE CARE PATIENT: ICD-10-CM

## 2021-05-12 DIAGNOSIS — I10 ESSENTIAL HYPERTENSION: ICD-10-CM

## 2021-05-12 RX ORDER — HYDRALAZINE HYDROCHLORIDE 50 MG/1
50 TABLET, FILM COATED ORAL EVERY 8 HOURS SCHEDULED
Qty: 90 TABLET | Refills: 1 | Status: SHIPPED | OUTPATIENT
Start: 2021-05-12 | End: 2021-07-11

## 2021-05-12 RX ORDER — ASPIRIN 325 MG
325 TABLET, DELAYED RELEASE (ENTERIC COATED) ORAL DAILY
Qty: 30 TABLET | Refills: 1 | Status: SHIPPED | OUTPATIENT
Start: 2021-05-12

## 2021-05-12 NOTE — PROGRESS NOTES
5/12/2021 11:42 AM  [facility] CBC patient requests medication refill  Filled electronically via Epic as per [place] State Law  Requested Prescriptions     Signed Prescriptions Disp Refills    aspirin (ECOTRIN) 325 mg EC tablet 30 tablet 1     Sig: Take 1 tablet (325 mg total) by mouth daily    hydrALAZINE (APRESOLINE) 50 mg tablet 90 tablet 1     Sig: Take 1 tablet (50 mg total) by mouth every 8 (eight) hours       Vitor Merritt 77 Answering Service: [phone]  You can find me on [employer]!

## 2021-05-24 DIAGNOSIS — I50.31 ACUTE DIASTOLIC CHF (CONGESTIVE HEART FAILURE) (HCC): ICD-10-CM

## 2021-05-24 DIAGNOSIS — Z51.5 HOSPICE CARE PATIENT: ICD-10-CM

## 2021-05-24 RX ORDER — CARVEDILOL 12.5 MG/1
12.5 TABLET ORAL 2 TIMES DAILY WITH MEALS
Qty: 60 TABLET | Refills: 0 | Status: SHIPPED | OUTPATIENT
Start: 2021-05-24 | End: 2021-06-23

## 2021-05-24 RX ORDER — DIVALPROEX SODIUM 125 MG/1
CAPSULE, COATED PELLETS ORAL
Qty: 180 CAPSULE | Refills: 0 | Status: SHIPPED | OUTPATIENT
Start: 2021-05-24

## 2021-05-24 NOTE — PROGRESS NOTES
5/24/2021 11:20 AM  [facility] CBC patient requests medication refill  Filled electronically via Epic [place] per [place] Law  Requested Prescriptions     Signed Prescriptions Disp Refills    carvedilol (COREG) 12 5 mg tablet 60 tablet 0     Sig: Take 1 tablet (12 5 mg total) by mouth 2 (two) times a day with meals    divalproex sodium (DEPAKOTE SPRINKLE) 125 MG capsule 180 capsule 0     Sig: Take 250mg po in the morning and 500mg po at [address] [place] [address] Answering Service: [phone]  You can find me on [employer]!

## 2021-05-28 DIAGNOSIS — I50.31 ACUTE DIASTOLIC CHF (CONGESTIVE HEART FAILURE) (HCC): Primary | ICD-10-CM

## 2021-05-28 DIAGNOSIS — Z51.5 HOSPICE CARE PATIENT: ICD-10-CM

## 2021-05-28 RX ORDER — IPRATROPIUM BROMIDE AND ALBUTEROL SULFATE 2.5; .5 MG/3ML; MG/3ML
3 SOLUTION RESPIRATORY (INHALATION) 4 TIMES DAILY
Qty: 360 ML | Refills: 0 | Status: SHIPPED | OUTPATIENT
Start: 2021-05-28 | End: 2021-06-27

## 2021-05-28 NOTE — PROGRESS NOTES
5/28/2021 3:24 PM  [facility] CBC patient requests medication refill  Filled electronically via Epic as per [place] Law  Requested Prescriptions     Signed Prescriptions Disp Refills    ipratropium-albuterol (DUO-NEB) 0 5-2 5 mg/3 mL nebulizer solution 360 mL 0     Sig: Take 1 vial (3 mL total) by nebulization 4 (four) times a day       Vitor Diana 77 Answering Service: [phone]  You can find me on [employer]!

## 2021-06-16 DIAGNOSIS — Z51.5 HOSPICE CARE: Primary | ICD-10-CM

## 2021-06-16 RX ORDER — OXYCODONE HCL 20 MG/ML
5 CONCENTRATE, ORAL ORAL EVERY 2 HOUR PRN
Qty: 15 ML | Refills: 0 | Status: SHIPPED | OUTPATIENT
Start: 2021-06-16

## 2023-01-18 NOTE — ASSESSMENT & PLAN NOTE
[facility], [address] [facility] [address], [address] [place] [address], [address] [facility]           Dept Phone: [phone]           Dept Fax:  [phone] [address] [facility]           [place]          Dept Phone: [phone]           Dept Fax:  [phone]      Chief Compliant:  Chief Complaint   Patient presents with    Knee Pain     right        History of Present Illness:  Ceferino Mosqueda returns today. This is a 64 y.o. female who presents to the clinic today for follow up of chronic right knee pain following right total knee arthroplasty on 6/14/2022 with subsequent manipulation under anesthesia on 8/29/2022. Patient reports unfortunately she has had chronic pain with difficulty with range of motion since the manipulation. She states she was making some progress with physical therapy however over the last 2 months range of motion has significantly regressed and pain seems to have worsened. Patient notes chronic pain with ambulation and rising from seated position. She states she has significant difficulty bending the knee and fully straightening it. Associate with intermittent swelling and some increased warmth of the right compared to the left knee. She notes history of left total knee arthroplasty performed approximately 3 years ago reports this is doing quite well. Review of Systems   Constitutional: Negative for fever, chills, sweats, recent illness, or recent injury. Neurological: Negative for headaches, numbness, or weakness. Integumentary: Negative for rash, itching, ecchymosis, abrasions, or laceration. Musculoskeletal: Positive for Knee Pain (right)       Physical Exam:  Constitutional: Patient is oriented to person, place, and time. Patient appears well-developed and well nourished. Musculoskeletal:    Right Knee    Gait:  Antalgic with cane. In the setting of advanced peripheral arterial disease    - Renal stating ok for arteriogram on Monday if renal function remains stable    - Continue wound care Incision:  {Well healed without any incisional erythema. Tenderness: Moderate tenderness diffusely   Flexion ROM: Actively: 40 degrees, passively 70 degrees   Extension ROM: Actively: -7 degrees, passively: -5 degrees   Effusion:  mild   DVT Evaluation:  No evidence of DVT seen on physical exam.     Skin: No evidence of erythema, ecchymosis, abrasions or lacerations. Neurological: Patient is alert and oriented to person, place, and time. Normal strenght. No sensory deficit. Skin: Skin is warm and dry  Psychiatric: Behavior is normal. Thought content normal.  Nursing note and vitals reviewed. Labs and Imaging:     XR taken today:  No results found. X-rays taken in clinic and preliminarily reviewed by me 1/18/23:   AP bilateral knees standing lateral view of the right knee demonstrates patient is status post right total knee arthroplasty. Compared with postoperative x-rays on 7/15/2022 components appear in appropriate position without evidence of hardware complication or significant interval changes. Unable appreciate any obvious loosening, periprosthetic fracture or significant polyethylene wear. Assessment and Plan:  1. Right knee pain, unspecified chronicity    2. S/P TKR (total knee replacement), right              PLAN:  This is a 64 y.o. female who presents to the clinic today for follow up chronic pain status post right total knee arthroplasty with subsequent manipulation under anesthesia on 8/29/2022. Examination today patient with significant limited range of motion and flexion contracture of about 5 degrees unable to flex past about 65 degrees before severe pain. Unable to appreciate any signs of obvious infection however recommend initiating work-up to rule out septic joint including sed rate and CRP.   Patient did have issues with range of motion initially after the June surgery and had undergone manipulation I do question possible development of significant scar tissue that limited range of motion however would like to rule out infectious etiology and aseptic loosening. Should laboratory work-up be negative recommend further diagnostic evaluation with a CT of the right knee to rule out aseptic loosening periprosthetic fracture. Please note that this chart was generated using voice recognition Dragon dictation software. Although every effort was made to ensure the accuracy of this automated transcription, some errors in transcription may have occurred.

## 2024-10-29 NOTE — ED NOTES
Dinner tray ordered  Patient repositioned again at this time       Jennifer Phillips RN  03/07/21 8386
Dinner tray provided     David Meneses RN  03/07/21 8778
Patient transported to [address] [place] [address]  03/07/21 6880
Patient turned onto left side and pillow placed under bottom for comfort per request          Ruddy Goodwin RN  03/07/21 3738
Patient's  Wilfred Holcomb made aware patient to be discharged back to [facility], SUKHJINDER  03/07/21 3508
ronaldo  time at 2100     [facility]  03/07/21 7279
No

## 2025-02-18 NOTE — ASSESSMENT & PLAN NOTE
· H/o CKD stage 3; baseline Cr  1 7-2 2  · In 11/2020, hospitalized at [facility] with LILIAN, right-sided hydronephrosis, and atrophic left kidney, which required right ureteral stent  · POA, Cr 3 3 which worsened to 3 6   · Associated metabolic acidosis and hyperkalemia  · Nephrology following  · Urology consulted for stent assessment  · Urine retention protocol - despite low residuals;  Scott placed on 01/21  · Received IV fluid hydration; continue bicarbonate  · Creatinine 3 19; sodium 131; potassium 3 4  · Avoid nephrotoxins; avoid hypotension to promote perfusion Render Risk Assessment In Note?: no Comment: BCC left lat sup chest radiation done 09/25/2024 by [facility]\\nPatient stated he used imiquimod cream on the scar and it bleached his skin. Detail Level: Simple